# Patient Record
Sex: MALE | Race: WHITE | NOT HISPANIC OR LATINO | Employment: OTHER | URBAN - METROPOLITAN AREA
[De-identification: names, ages, dates, MRNs, and addresses within clinical notes are randomized per-mention and may not be internally consistent; named-entity substitution may affect disease eponyms.]

---

## 2017-02-06 DIAGNOSIS — E11.9 DIABETES MELLITUS WITHOUT COMPLICATION: ICD-10-CM

## 2017-02-06 RX ORDER — INSULIN DETEMIR 100 [IU]/ML
25 INJECTION, SOLUTION SUBCUTANEOUS DAILY
Qty: 45 ML | Refills: 3 | Status: SHIPPED | OUTPATIENT
Start: 2017-02-06 | End: 2018-02-12 | Stop reason: SDUPTHER

## 2017-02-09 ENCOUNTER — NUTRITION (OUTPATIENT)
Dept: DIABETES | Facility: CLINIC | Age: 67
End: 2017-02-09
Payer: MEDICARE

## 2017-02-09 VITALS
BODY MASS INDEX: 28.64 KG/M2 | SYSTOLIC BLOOD PRESSURE: 138 MMHG | DIASTOLIC BLOOD PRESSURE: 82 MMHG | WEIGHT: 204.56 LBS | HEIGHT: 71 IN

## 2017-02-09 DIAGNOSIS — E11.9 TYPE II OR UNSPECIFIED TYPE DIABETES MELLITUS WITHOUT MENTION OF COMPLICATION, NOT STATED AS UNCONTROLLED: Primary | ICD-10-CM

## 2017-02-09 PROCEDURE — 99999 PR PBB SHADOW E&M-EST. PATIENT-LVL III: CPT | Mod: PBBFAC,,, | Performed by: DIETITIAN, REGISTERED

## 2017-02-09 PROCEDURE — G0108 DIAB MANAGE TRN  PER INDIV: HCPCS | Mod: S$GLB,,, | Performed by: DIETITIAN, REGISTERED

## 2017-02-09 NOTE — MR AVS SNAPSHOT
[facility]  [address] [facility] [place] [address]  Phone: [phone]  Fax: [phone]                  Rigoberto Vasquez   2017 9:30 AM   Nutrition    Description:  Male : 1950   Provider:  Genevieve Saldaña RD, CDE   Department:  [facility] - [facility]           Reason for Visit     Diabetes           Diagnoses this Visit        Comments    Type II or unspecified type diabetes mellitus without mention of complication, not stated as uncontrolled    -  Primary            To Do List           Future Appointments        Provider Department Dept Phone    2017 7:35 AM LAB[facility]ATORY, [facility] [phone]    2017 7:40 AM SPECIMEN, [facility] [phone]    2017 8:40 AM MARYURI Doran [facility] [phone]    2017 10:00 AM FREDERICK Epperson MD [facility] [phone]      Goals (5 Years of Data)     None      Follow-Up and Disposition     Return in about 6 months (around 2017), or E11.9 Cornelio; [facility] mid am.      [facility]     [facility] -  Assistance  Registered nurses in the [facility] provide clinical advisement, health education, appointment booking, and other advisory services.  Call for this free service at [phone].             Medications           Message regarding Medications     Verify the changes and/or additions to your medication regime listed below are the same as discussed with your clinician today.  If any of these changes or additions are incorrect, please notify your healthcare provider.             Verify that the below list of medications is an accurate representation of the medications you are currently taking.  If none reported, the list may be blank. If incorrect, please contact your healthcare provider. Carry this list with you in case of emergency.           Current Medications     ACETAMINOPHEN  "(TYLENOL 8 HOUR ORAL) Take by mouth as needed.    azelastine (ASTELIN) 137 mcg (0.1 %) nasal spray 2 sprays (274 mcg total) by Nasal route 2 (two) times daily.    bifidobacterium infantis (ALIGN) 4 mg Cap Take 1 capsule by mouth Daily.    blood sugar diagnostic (ACCU-CHEK JAXON) Strp Inject 1 strip into the skin as directed. Check BG 2-3 times daily. Accuchek jaxon strips    EASY TOUCH 31 gauge x 3/16" Ndle USE AS DIRECTED WITH INSULIN    fenofibrate micronized (LOFIBRA) 200 MG Cap Take 1 capsule (200 mg total) by mouth every evening.    hydrocortisone 2.5 % cream     LEVEMIR FLEXPEN 100 unit/mL (3 mL) InPn pen Inject 25 Units into the skin once daily. Needs pens d/t limited manual dexterity.    liraglutide 0.6 mg/0.1 mL, 18 mg/3 mL, subq PNIJ (VICTOZA 3-ALESHA) 0.6 mg/0.1 mL (18 mg/3 mL) PnIj INJECT 1.8 MG DAILY    losartan (COZAAR) 50 MG tablet Take 1 tablet (50 mg total) by mouth once daily.    lovastatin (MEVACOR) 40 MG tablet Take 1 tablet by mouth Every evening.    metformin (GLUCOPHAGE) 1000 MG tablet Take 1 tablet (1,000 mg total) by mouth once daily. Generic OK    multivitamin capsule Take 1 capsule by mouth once daily.    pantoprazole (PROTONIX) 40 MG tablet Take 1 tablet (40 mg total) by mouth before breakfast.    pen needle, diabetic (SURE COMFORT PEN NEEDLE) 31 gauge x 3/16" Ndle USE AS DIRECTED WITH INSULIN    pioglitazone (ACTOS) 30 MG tablet Take 1 tablet (30 mg total) by mouth once daily.    lidocaine (XYLOCAINE) 5 % Oint ointment Apply 1 to 2 grams to affected area 3 to 4 times daily    lidocaine-prilocaine (EMLA) cream            Clinical Reference Information           Your Vitals Were     BP Height Weight BMI       138/82 (BP Location: Right arm, Patient Position: Sitting, BP Method: Manual) 5' 11" (1.803 m) 92.8 kg (204 lb 9.4 oz) 28.53 kg/m2       Allergies as of 2/9/2017     Aspirin    Meperidine    Niacin      Immunizations Administered on Date of Encounter - 2/9/2017     None      Language " Assistance Services     ATTENTION: Language assistance services are available, free of charge. Please call [phone].      ATENCIÓN: Si habla talya, tiene a neal disposición servicios gratuitos de asistencia lingüística. Llame al [phone].     CHÚ Ý: N?u b?n nói Ti?ng Vi?t, có các d?ch v? h? tr? ngôn ng? mi?n phí dành cho b?n. G?i s? [phone].         [facility] complies with applicable Federal civil rights laws and does not discriminate on the basis of race, color, national origin, age, disability, or sex.

## 2017-02-09 NOTE — PROGRESS NOTES
"PCP:  Jesse Grimes MD  REFERRING PROVIDER:  Jesse Grimes MD    HISTORY OF PRESENT ILLNESS:  66 y.o. male patient is in clinic today for fu diabetes.  Diabetes medications:   Actos 30 mg in AM  Victoza 1.8 mg SQ AM  Metformin 1000 mg in PM  Levemir 16 units twice daily    Hemoglobin A1C   Date Value Ref Range Status   12/19/2016 6.5 (H) 4.5 - 6.2 % Final     Comment:     According to ADA guidelines, hemoglobin A1C <7.0% represents  optimal control in non-pregnant diabetic patients.  Different  metrics may apply to specific populations.   Standards of Medical Care in Diabetes - 2016.  For the purpose of screening for the presence of diabetes:  <5.7%     Consistent with the absence of diabetes  5.7-6.4%  Consistent with increasing risk for diabetes   (prediabetes)  >or=6.5%  Consistent with diabetes  Currently no consensus exists for use of hemoglobin A1C  for diagnosis of diabetes for children.     , ADA recommends less than 7.0.      Per recall, fasting BGs are , 117; ac . Patient denies polyuria, polydipsia, polyphagia, blurred vision, nausea, vomiting, diarrhea, and hypoglycemia.     VITAL SIGNS:  Height: 5' 11" (180.3 cm)   Weight: 92.8 kg (204 lb 9.4 oz)   IBW: 172 lbs +/-10%      ALLERGIES & MEDICATIONS: Reviewed and Reconciled  MEDICAL/SURGICAL & FAMILY HISTORY: Reviewed and Reconciled    LABORATORY: Reviewed Diabetes Management Flowsheet and Results Review.    HEALTH MAINTENANCE: Reviewed and Reconciled  Eye exam: 12/12  Dental exam: Recommend regular exams; denies gums bleeding.  Podiatry exam: 8/16    SELF-MONITORING: Glucometer - accucheck; Food - none are avail    ACTIVITY LEVEL: core exercises from PT - plans to start bike or treadmill at home    NUTRITION INTAKE: Meal patterns include 3 meals, 1-2 snacks daily with intake 5852-5778 cals/d. Patient is limiting carbohydrates, saturated fat or sodium w/ occas excess from dining out. Adequate intakes of fruits, vegetables. Inadequate " whole grains.  B - cereal (cheerios or raisin bran), 2% milk   S - occs fruit   L - BK hamburger, French fries OR chix, rice, carrots, g. beans - decaf tea (splenda)   S - fruit  D - salad - lettuce, carrot, cucumber, ham/cheese, french OR ham sandwich (white) - water, sf flav  S - handful nuts OR few vanilla wafers OR canned fruit     PSYCHOSOCIAL: Stage of change - action; Barriers to change - none    EDUCATIONAL ASSESSMENT:  Patient is able to verbalize and/or demonstrate appropriate self care management skills:  Monitoring  Taking medications  Problem solving  Healthy coping  Reducing risks  Patient needs improvement in self care management skills:    Healthy Eating   Being Active     MNT ASSESSMENT:  7177-9124 calories, 6 ounces protein daily  45 grams carb/meal, 15-30 grams carb/snack  increase fruit 2 serv/d, vegetables 2+ cups/d, whole grains 3+serv/d, lowfat dairy 3+serv/d  low-fat, low-sodium  150 min physical activity per week, moderate intensity, as tolerated      PLAN:  · Reviewed pathophysiology diabetes, complications and importance of annual dilated eye exams, regular dental exams, and daily foot self-exams.   · Encouraged daily self-monitoring of glucose, food and activity patterns, return records to clinic.      Reviewed glucose goals. Discussed prevention, identification and treatment of hyperglycemia and hypoglycemia and when to contact clinic. Instructed to test glucose once daily - fasting, 2 hr pp rotating times as needed.  · Reviewed action of diabetes medications and to continue taking as prescribed.    Provided meal-planning instruction via foodlists, plate method, food models, food labels and/or ADA booklet. Reviewed micro/macronutrient effect on health management. Discussed carbohydrate counting and spacing techniques with emphasis on cardiovascular wellness, health strategies, functional foods. Reviewed principles of energy metabolism to assist weight and health management. Pt agrees  to increase whole grains for insulin sensitivity improvement and weight loss. He will report to clinic if BG levels start dropping below target.    Discussed importance of daily physical activity with review of benefits, methods, guidelines and precautions. Pt agrees to start cardio (bike, treadmill) 30min 5d/wk. He will report to clinic if BG levels start dropping below target.     Reviewed ADA goals, progress.   GOALS: Self monitoring: daily food & activity journal. Meal plan-90% accuracy, Physical activity-150 minutes per week.   Visit Time Spent:  30 minutes    Thank you for the opportunity to work with your patient.

## 2017-02-21 ENCOUNTER — OFFICE VISIT (OUTPATIENT)
Dept: GASTROENTEROLOGY | Facility: CLINIC | Age: 67
End: 2017-02-21
Payer: MEDICARE

## 2017-02-21 VITALS
SYSTOLIC BLOOD PRESSURE: 128 MMHG | HEIGHT: 71 IN | BODY MASS INDEX: 28.39 KG/M2 | HEART RATE: 80 BPM | DIASTOLIC BLOOD PRESSURE: 78 MMHG | WEIGHT: 202.81 LBS

## 2017-02-21 DIAGNOSIS — R11.0 NAUSEA: ICD-10-CM

## 2017-02-21 DIAGNOSIS — K29.50 CHRONIC GASTRITIS WITHOUT BLEEDING, UNSPECIFIED GASTRITIS TYPE: Primary | ICD-10-CM

## 2017-02-21 DIAGNOSIS — K58.9 COLON SPASM: ICD-10-CM

## 2017-02-21 PROCEDURE — 99999 PR PBB SHADOW E&M-EST. PATIENT-LVL III: CPT | Mod: PBBFAC,,, | Performed by: NURSE PRACTITIONER

## 2017-02-21 PROCEDURE — 3078F DIAST BP <80 MM HG: CPT | Mod: S$GLB,,, | Performed by: NURSE PRACTITIONER

## 2017-02-21 PROCEDURE — 1160F RVW MEDS BY RX/DR IN RCRD: CPT | Mod: S$GLB,,, | Performed by: NURSE PRACTITIONER

## 2017-02-21 PROCEDURE — 99214 OFFICE O/P EST MOD 30 MIN: CPT | Mod: S$GLB,,, | Performed by: NURSE PRACTITIONER

## 2017-02-21 PROCEDURE — 3074F SYST BP LT 130 MM HG: CPT | Mod: S$GLB,,, | Performed by: NURSE PRACTITIONER

## 2017-02-21 RX ORDER — DICYCLOMINE HYDROCHLORIDE 20 MG/1
20 TABLET ORAL EVERY 6 HOURS
Qty: 120 TABLET | Refills: 0 | Status: SHIPPED | OUTPATIENT
Start: 2017-02-21 | End: 2017-03-23

## 2017-02-21 NOTE — MR AVS SNAPSHOT
[facility]  [address] [place] [address]  Phone: [phone]  Fax: [phone]                  Rigoberto Vasquez   2017 9:40 AM   Office Visit    Description:  Male : 1950   Provider:  MARYURI Kim   Department:  [facility]           Reason for Visit     Abdominal Pain     Nausea           Diagnoses this Visit        Comments    Colon spasm    -  Primary     Nausea                To Do List           Future Appointments        Provider Department Dept Phone    [facility] [phone]    2017 7:35 AM [facility]AT[facility], [facility] [phone]    2017 7:40 AM SPECIMEN, [facility] [phone]    2017 8:40 AM Leticia Toscano [facility] Internal [facility] [phone]    2017 10:00 AM FREDERICK Epperson MD [facility] Op[facility] [phone]      Goals (5 Years of Data)     None      Follow-Up and Disposition     Return in about 6 weeks (around 2017).       These Medications        Disp Refills Start End    dicyclomine (BENTYL) 20 mg tablet 120 tablet 0 2017 3/23/2017    Take 1 tablet (20 mg total) by mouth every 6 (six) hours. - Oral    Pharmacy: [facility] [address] Ph #: [phone]         [facility]     [facility] -  Assistance  Registered nurses in the [facility] provide clinical advisement, health education, appointment booking, and other advisory services.  Call for this free service at [phone].             Medications           Message regarding Medications     Verify the changes and/or additions to your medication regime listed below are the same as discussed with your clinician today.  If any of these changes or additions are incorrect, please notify your healthcare provider.        START taking these NEW medications        Refills     "dicyclomine (BENTYL) 20 mg tablet 0    Sig: Take 1 tablet (20 mg total) by mouth every 6 (six) hours.    Class: Normal    Route: Oral           Verify that the below list of medications is an accurate representation of the medications you are currently taking.  If none reported, the list may be blank. If incorrect, please contact your healthcare provider. Carry this list with you in case of emergency.           Current Medications     ACETAMINOPHEN (TYLENOL 8 HOUR ORAL) Take by mouth as needed.    azelastine (ASTELIN) 137 mcg (0.1 %) nasal spray 2 sprays (274 mcg total) by Nasal route 2 (two) times daily.    bifidobacterium infantis (ALIGN) 4 mg Cap Take 1 capsule by mouth Daily.    blood sugar diagnostic (ACCU-CHEK JAXON) Strp Inject 1 strip into the skin as directed. Check BG 2-3 times daily. Accuchek jaxon strips    EASY TOUCH 31 gauge x 3/16" Ndle USE AS DIRECTED WITH INSULIN    fenofibrate micronized (LOFIBRA) 200 MG Cap Take 1 capsule (200 mg total) by mouth every evening.    hydrocortisone 2.5 % cream     LEVEMIR FLEXPEN 100 unit/mL (3 mL) InPn pen Inject 25 Units into the skin once daily. Needs pens d/t limited manual dexterity.    lidocaine (XYLOCAINE) 5 % Oint ointment Apply 1 to 2 grams to affected area 3 to 4 times daily    lidocaine-prilocaine (EMLA) cream     liraglutide 0.6 mg/0.1 mL, 18 mg/3 mL, subq PNIJ (VICTOZA 3-ALESHA) 0.6 mg/0.1 mL (18 mg/3 mL) PnIj INJECT 1.8 MG DAILY    losartan (COZAAR) 50 MG tablet Take 1 tablet (50 mg total) by mouth once daily.    lovastatin (MEVACOR) 40 MG tablet Take 1 tablet by mouth Every evening.    metformin (GLUCOPHAGE) 1000 MG tablet Take 1 tablet (1,000 mg total) by mouth once daily. Generic OK    multivitamin capsule Take 1 capsule by mouth once daily.    pantoprazole (PROTONIX) 40 MG tablet Take 1 tablet (40 mg total) by mouth before breakfast.    pen needle, diabetic (SURE COMFORT PEN NEEDLE) 31 gauge x 3/16" Ndle USE AS DIRECTED WITH INSULIN    pioglitazone " "(ACTOS) 30 MG tablet Take 1 tablet (30 mg total) by mouth once daily.    dicyclomine (BENTYL) 20 mg tablet Take 1 tablet (20 mg total) by mouth every 6 (six) hours.           Clinical Reference Information           Your Vitals Were     BP Pulse Height Weight BMI    128/78 80 5' 11" (1.803 m) 92 kg (202 lb 13.2 oz) 28.29 kg/m2      Blood Pressure          Most Recent Value    BP  128/78      Allergies as of 2/21/2017     Aspirin    Meperidine    Niacin      Immunizations Administered on Date of Encounter - 2/21/2017     None      Language Assistance Services     ATTENTION: Language assistance services are available, free of charge. Please call [phone].      ATENCIÓN: Si habla talya, tiene a neal disposición servicios gratuitos de asistencia lingüística. Llame al [phone].     KIRSTIE Ý: N?u b?n nói Ti?ng Vi?t, có các d?ch v? h? tr? ngôn ng? mi?n phí dành cho b?n. G?i s? [phone].         [facility] complies with applicable Federal civil rights laws and does not discriminate on the basis of race, color, national origin, age, disability, or sex.        "

## 2017-02-21 NOTE — PROGRESS NOTES
Clinic Follow Up:  [facility] Follow Up Note    Reason for Follow Up:  The primary encounter diagnosis was Chronic gastritis without bleeding, unspecified gastritis type. Diagnoses of Nausea and Colon spasm were also pertinent to this visit.    PCP: Jesse Grimes       HPI:  This is a 66 y.o. male here for follow up of the above  He reports that he has had chronic abdominal complaints.  He states that recently they have progressively worsened. He states that he has had worsening nausea and dyspepsia.  He has been taking PPI daily, but continues with symptoms.  He reports that his symptoms are worse after meals.  In addition he has began having left sided abdominal pain that starts near the epigastric region and radiates downward.  He describes pain as a cramping sensation that occurs about 2-3 hours after meals. He denies any known exacerbating or reliving factors for any of the complaints.   He denies any weight loss.  No melena or hematochezia.       Review of Systems   Constitutional: Negative for chills, fever, malaise/fatigue and weight loss.   Respiratory: Negative for cough.    Cardiovascular: Negative for chest pain.   Gastrointestinal:        Per HPI   Musculoskeletal: Negative for myalgias.   Skin: Negative for itching and rash.   Neurological: Negative for headaches.   Psychiatric/Behavioral: The patient is not nervous/anxious.        Medical History:  Past Medical History   Diagnosis Date    Acid reflux      gi chintanshenry    Angina pectoris     Back pain     BPH (benign prostatic hyperplasia)      blue    Degenerative joint disease     Diverticulosis     Erectile dysfunction     History of elevated PSA 2/14     normalized, dr dave annually    History of pericarditis     Hypercholesteremia     Hypertension     Iron deficiency anemia     Pacemaker      complete av block;NO afib    Squamous cell cancer of skin of mastoid region of scalp      dr jaida salgado    Type 2  "diabetes mellitus      dr wyman    Urinary incontinence     when he was 7 Yrs old.        Surgical History:   Past Surgical History   Procedure Laterality Date    Shoulder arthroscopy Right     Nasal sinus surgery      Cardiac pacemaker placement      Tonsillectomy      Transurethral resection of prostate      Eye surgery      Knee cartilage surgery Bilateral     Tympanoplasty      Vesectomy         Family History:   Family History   Problem Relation Age of Onset    Arthritis Mother     Hypertension Mother     Heart disease Father     Hypertension Father     Stroke Father     Diabetes Son     Diabetes Maternal Grandmother        Social History:   Social History   Substance Use Topics    Smoking status: Never Smoker    Smokeless tobacco: Never Used    Alcohol use Yes      Comment: " once a month"       Allergies: Reviewed    Home Medications:  Current Outpatient Prescriptions on File Prior to Visit   Medication Sig Dispense Refill    ACETAMINOPHEN (TYLENOL 8 HOUR ORAL) Take by mouth as needed.      azelastine (ASTELIN) 137 mcg (0.1 %) nasal spray 2 sprays (274 mcg total) by Nasal route 2 (two) times daily. 30 mL 12    bifidobacterium infantis (ALIGN) 4 mg Cap Take 1 capsule by mouth Daily.      blood sugar diagnostic (ACCU-CHEK JAXON) Strp Inject 1 strip into the skin as directed. Check BG 2-3 times daily. Accuchek jaxon strips 300 strip 3    EASY TOUCH 31 gauge x 3/16" Ndle USE AS DIRECTED WITH INSULIN 100 each 3    fenofibrate micronized (LOFIBRA) 200 MG Cap Take 1 capsule (200 mg total) by mouth every evening. 90 capsule 3    hydrocortisone 2.5 % cream       LEVEMIR FLEXPEN 100 unit/mL (3 mL) InPn pen Inject 25 Units into the skin once daily. Needs pens d/t limited manual dexterity. (Patient taking differently: Inject 16 Units into the skin 2 (two) times daily. Needs pens d/t limited manual dexterity.) 45 mL 3    lidocaine (XYLOCAINE) 5 % Oint ointment Apply 1 to 2 grams to affected " "area 3 to 4 times daily  5    lidocaine-prilocaine (EMLA) cream       liraglutide 0.6 mg/0.1 mL, 18 mg/3 mL, subq PNIJ (VICTOZA 3-ALESHA) 0.6 mg/0.1 mL (18 mg/3 mL) PnIj INJECT 1.8 MG DAILY 27 mL 1    losartan (COZAAR) 50 MG tablet Take 1 tablet (50 mg total) by mouth once daily. 90 tablet 1    lovastatin (MEVACOR) 40 MG tablet Take 1 tablet by mouth Every evening. 90 tablet 3    metformin (GLUCOPHAGE) 1000 MG tablet Take 1 tablet (1,000 mg total) by mouth once daily. Generic  tablet 3    multivitamin capsule Take 1 capsule by mouth once daily.      pantoprazole (PROTONIX) 40 MG tablet Take 1 tablet (40 mg total) by mouth before breakfast. 90 tablet 3    pen needle, diabetic (SURE COMFORT PEN NEEDLE) 31 gauge x 3/16" Ndle USE AS DIRECTED WITH INSULIN 100 each 4    pioglitazone (ACTOS) 30 MG tablet Take 1 tablet (30 mg total) by mouth once daily. 90 tablet 1     No current facility-administered medications on file prior to visit.        Physical Exam:  Vital Signs:  Visit Vitals    /78    Pulse 80    Ht 5' 11" (1.803 m)    Wt 92 kg (202 lb 13.2 oz)    BMI 28.29 kg/m2     Body mass index is 28.29 kg/(m^2).  Physical Exam   Constitutional: He is oriented to person, place, and time. He appears well-developed.   HENT:   Head: Normocephalic.   Neck: Normal range of motion.   Cardiovascular: Normal rate and regular rhythm.    Pulmonary/Chest: Effort normal and breath sounds normal.   Abdominal: Soft. Bowel sounds are normal. He exhibits no distension. There is no tenderness.   Musculoskeletal: Normal range of motion.   Neurological: He is alert and oriented to person, place, and time.   Skin: Skin is warm and dry.   Psychiatric: He has a normal mood and affect.   Vitals reviewed.      Labs: Pertinent labs reviewed.      Assessment:  1. Chronic gastritis without bleeding, unspecified gastritis type    2. Nausea    3. Colon spasm        Recommendations:  Chronic gastritis without bleeding, unspecified " gastritis type  Nausea  - Worsening gastritis.    - Will increase PPI to BID for a short time.  If no improvement, will need repeat EGD for investigation.   - Can add zantac 150mg BID PRN for continued breakthrough     Colon spasm  -     dicyclomine (BENTYL) 20 mg tablet; Take 1 tablet (20 mg total) by mouth every 6 (six) hours.  Dispense: 120 tablet; Refill: 0        Return to Clinic:    Return in about 6 weeks (around 4/4/2017).

## 2017-03-31 RX ORDER — PIOGLITAZONEHYDROCHLORIDE 30 MG/1
30 TABLET ORAL DAILY
Qty: 90 TABLET | Refills: 1 | Status: SHIPPED | OUTPATIENT
Start: 2017-03-31 | End: 2017-10-16 | Stop reason: SDUPTHER

## 2017-03-31 RX ORDER — LOSARTAN POTASSIUM 50 MG/1
50 TABLET ORAL DAILY
Qty: 90 TABLET | Refills: 1 | Status: SHIPPED | OUTPATIENT
Start: 2017-03-31 | End: 2017-10-16 | Stop reason: SDUPTHER

## 2017-04-10 ENCOUNTER — TELEPHONE (OUTPATIENT)
Dept: INTERNAL MEDICINE | Facility: CLINIC | Age: 67
End: 2017-04-10

## 2017-04-10 NOTE — TELEPHONE ENCOUNTER
----- Message from Johana Villalta sent at 4/10/2017 11:10 AM CDT -----  Contact: [facility]/Dr Curt Newman Office  Caller request call from nurse to get pt a pre op apt for total left knee replacement before 05-15-17, I offered next avail but it would be after the date of service and caller declined another provider for pt, please contact caller at [phone] press 0 and over head page

## 2017-04-13 ENCOUNTER — OFFICE VISIT (OUTPATIENT)
Dept: OTOLARYNGOLOGY | Facility: CLINIC | Age: 67
End: 2017-04-13
Payer: MEDICARE

## 2017-04-13 VITALS
BODY MASS INDEX: 28.23 KG/M2 | SYSTOLIC BLOOD PRESSURE: 139 MMHG | HEART RATE: 85 BPM | WEIGHT: 202.38 LBS | DIASTOLIC BLOOD PRESSURE: 87 MMHG

## 2017-04-13 DIAGNOSIS — J01.90 ACUTE SINUSITIS, RECURRENCE NOT SPECIFIED, UNSPECIFIED LOCATION: Primary | ICD-10-CM

## 2017-04-13 PROCEDURE — 3075F SYST BP GE 130 - 139MM HG: CPT | Mod: S$GLB,,, | Performed by: PHYSICIAN ASSISTANT

## 2017-04-13 PROCEDURE — 99214 OFFICE O/P EST MOD 30 MIN: CPT | Mod: S$GLB,,, | Performed by: PHYSICIAN ASSISTANT

## 2017-04-13 PROCEDURE — 1160F RVW MEDS BY RX/DR IN RCRD: CPT | Mod: S$GLB,,, | Performed by: PHYSICIAN ASSISTANT

## 2017-04-13 PROCEDURE — 3079F DIAST BP 80-89 MM HG: CPT | Mod: S$GLB,,, | Performed by: PHYSICIAN ASSISTANT

## 2017-04-13 PROCEDURE — 99999 PR PBB SHADOW E&M-EST. PATIENT-LVL III: CPT | Mod: PBBFAC,,, | Performed by: PHYSICIAN ASSISTANT

## 2017-04-13 RX ORDER — LOVASTATIN 40 MG/1
TABLET ORAL
Qty: 90 TABLET | Refills: 3 | Status: SHIPPED | OUTPATIENT
Start: 2017-04-13 | End: 2018-05-04 | Stop reason: SDUPTHER

## 2017-04-13 RX ORDER — AMOXICILLIN AND CLAVULANATE POTASSIUM 875; 125 MG/1; MG/1
1 TABLET, FILM COATED ORAL 2 TIMES DAILY
Qty: 20 TABLET | Refills: 0 | Status: SHIPPED | OUTPATIENT
Start: 2017-04-13 | End: 2017-04-23

## 2017-04-13 RX ORDER — METHYLPREDNISOLONE 4 MG/1
TABLET ORAL
Qty: 1 PACKAGE | Refills: 0 | Status: SHIPPED | OUTPATIENT
Start: 2017-04-13 | End: 2017-05-04 | Stop reason: ALTCHOICE

## 2017-04-13 NOTE — PROGRESS NOTES
Subjective:   Patient: Rigoberto Vasquez [id number], :1950   Visit date:2017 4:40 PM    Chief Complaint:  Sinus Problem    HPI:  Rigoberto is a 66 y.o. male who is here for follow-up. He was last here in 10/2016 with Dr. Lopez.  He reports URI symptoms started about 1 month ago.  Now worsening with facial pain and pressure, postnasal drainage and rhinorrhea (thick, yellow-green).  Denies fever.  Denies cough.  He's been using Sudafed, Astelin and nasal saline irrigation BID with little improvement.  Denies ear pain or drainage but has ear fullness bilateral.  He has history of 2 prior sinus surgeries; last one 20 years ago.    Surgery date: 3/26/15  Procedure:   1. left tympanoplasty with CWU mastoidectomy with ossicular reconstruction  2. cartilage graft (palisading technique)  3. Tympanostomy tube placement (T tube)  4. Placement of silastic spacer in middle ear     Findings at surgery:   External canal- Normal  Tympanic membrane- Severe retraction with complete myringopexy. Non adherent to the promontory but severe adherence onto ossicles and stapes suprastructure  Middle Ear Mucosa- Normal  Ossicles- Abnormal - erosion of IS joint with retracted TM attached at this point  Mastoid cavity- Normal  Other- None         Review of Systems:  -     Allergic/Immunologic: is allergic to aspirin; meperidine; and niacin..  -     Constitutional: Current temp:      His meds, allergies, medical, surgical, social & family histories were reviewed & updated:  -     He has a current medication list which includes the following prescription(s): acetaminophen, azelastine, bifidobacterium infantis, blood sugar diagnostic, easy touch, fenofibrate micronized, hydrocortisone, levemir flexpen, lidocaine, lidocaine-prilocaine, liraglutide 0.6 mg/0.1 ml (18 mg/3 ml) subq pnij, losartan, lovastatin, metformin, multivitamin, pantoprazole, pen needle, diabetic, pioglitazone, amoxicillin-clavulanate 875-125mg, and methylprednisolone.  -     He   has a past medical history of Acid reflux; Angina pectoris; Back pain; BPH (benign prostatic hyperplasia); Degenerative joint disease; Diverticulosis; Erectile dysfunction; History of elevated PSA (2/14); History of pericarditis; Hypercholesteremia; Hypertension; Iron deficiency anemia; Pacemaker; Squamous cell cancer of skin of mastoid region of scalp; Type 2 diabetes mellitus; Urinary incontinence; and when he was 7 Yrs old..   -     He  does not have any pertinent problems on file.   -     He  has a past surgical history that includes Shoulder arthroscopy (Right); Nasal sinus surgery; Cardiac pacemaker placement; Tonsillectomy; Transurethral resection of prostate; Eye surgery; Knee cartilage surgery (Bilateral); Tympanoplasty; and vesectomy.  -     He  reports that he has never smoked. He has never used smokeless tobacco. He reports that he drinks alcohol. He reports that he does not use illicit drugs.  -     His family history includes Arthritis in his mother; Diabetes in his maternal grandmother and son; Heart disease in his father; Hypertension in his father and mother; Stroke in his father.  -     He is allergic to aspirin; meperidine; and niacin.    Objective:     Physical Exam:  Vitals:  /87  Pulse 85  Wt 91.8 kg (202 lb 6.1 oz)  BMI 28.23 kg/m2  Appearance:  Well-developed, well-nourished.  Communication:  Able to communicate, no hoarseness.  Head & Face:  Normocephalic, atraumatic, tender over bilateral maxillary sinuses; normal facial strength.  Eyes:  Extraocular motions intact.  Ears:  Otoscopy of right external auditory canals and tympanic membrane was normal.  Left TM with T tube in place, no drainage noted.   Clinical speech reception thresholds grossly intact, no mass/lesion of auricle.  Nose:  No masses/lesions of external nose, nasal mucosa normal.  Mouth:  No mass/lesion of lips, teeth, gums, hard/soft palate, tongue, tonsils, or oropharynx.  Neck & Lymphatics:  No cervical  lymphadenopathy, no neck mass/crepitus/ asymmetry, trachea is midline, no thyroid enlargement/tenderness/mass.  Neuro/Psych: Alert with normal mood and affect.   Abdominal: Normal appearance.   Respiration/Chest:  Symmetric expansion during respiration, normal respiratory effort.  Skin:  Warm and intact  Cardiovascular:  No peripheral vascular edema or varicosities.    Assessment & Plan:   Rigoberto was seen today for sinus problem.    Diagnoses and all orders for this visit:    Acute sinusitis, recurrence not specified, unspecified location    Other orders  -     amoxicillin-clavulanate 875-125mg (AUGMENTIN) 875-125 mg per tablet; Take 1 tablet by mouth 2 (two) times daily.  -     methylPREDNISolone (MEDROL DOSEPACK) 4 mg tablet; use as directed    Medications as above.  He is diabetic and we discussed risk of hyperglycemia with steroid use.  He wishes to proceed with steroids and will call PCP with any sugars > 300.  Continue saline nasal irrigations BID and Astelin.  Instructed him to call if no improvement or symptoms worsen.

## 2017-05-04 ENCOUNTER — OFFICE VISIT (OUTPATIENT)
Dept: INTERNAL MEDICINE | Facility: CLINIC | Age: 67
End: 2017-05-04
Payer: COMMERCIAL

## 2017-05-04 VITALS
OXYGEN SATURATION: 95 % | RESPIRATION RATE: 16 BRPM | HEIGHT: 71 IN | DIASTOLIC BLOOD PRESSURE: 77 MMHG | WEIGHT: 204.38 LBS | SYSTOLIC BLOOD PRESSURE: 127 MMHG | BODY MASS INDEX: 28.61 KG/M2 | TEMPERATURE: 98 F | HEART RATE: 74 BPM

## 2017-05-04 DIAGNOSIS — Z01.818 PREOP EXAMINATION: Primary | ICD-10-CM

## 2017-05-04 DIAGNOSIS — E11.59 HYPERTENSION ASSOCIATED WITH DIABETES: Chronic | ICD-10-CM

## 2017-05-04 DIAGNOSIS — M17.0 OSTEOARTHRITIS OF BOTH KNEES, UNSPECIFIED OSTEOARTHRITIS TYPE: ICD-10-CM

## 2017-05-04 DIAGNOSIS — E11.9 TYPE 2 DIABETES MELLITUS WITHOUT COMPLICATION, WITH LONG-TERM CURRENT USE OF INSULIN: Chronic | ICD-10-CM

## 2017-05-04 DIAGNOSIS — Z79.4 TYPE 2 DIABETES MELLITUS WITHOUT COMPLICATION, WITH LONG-TERM CURRENT USE OF INSULIN: Chronic | ICD-10-CM

## 2017-05-04 DIAGNOSIS — Z95.0 PACEMAKER: Chronic | ICD-10-CM

## 2017-05-04 DIAGNOSIS — E78.5 HYPERLIPIDEMIA ASSOCIATED WITH TYPE 2 DIABETES MELLITUS: Chronic | ICD-10-CM

## 2017-05-04 DIAGNOSIS — I15.2 HYPERTENSION ASSOCIATED WITH DIABETES: Chronic | ICD-10-CM

## 2017-05-04 DIAGNOSIS — E11.69 HYPERLIPIDEMIA ASSOCIATED WITH TYPE 2 DIABETES MELLITUS: Chronic | ICD-10-CM

## 2017-05-04 PROCEDURE — 99999 PR PBB SHADOW E&M-EST. PATIENT-LVL III: CPT | Mod: PBBFAC,,, | Performed by: FAMILY MEDICINE

## 2017-05-04 PROCEDURE — 3074F SYST BP LT 130 MM HG: CPT | Mod: S$GLB,,, | Performed by: FAMILY MEDICINE

## 2017-05-04 PROCEDURE — 3078F DIAST BP <80 MM HG: CPT | Mod: S$GLB,,, | Performed by: FAMILY MEDICINE

## 2017-05-04 PROCEDURE — 99499 UNLISTED E&M SERVICE: CPT | Mod: S$GLB,,, | Performed by: FAMILY MEDICINE

## 2017-05-04 PROCEDURE — 1160F RVW MEDS BY RX/DR IN RCRD: CPT | Mod: S$GLB,,, | Performed by: FAMILY MEDICINE

## 2017-05-04 PROCEDURE — 3044F HG A1C LEVEL LT 7.0%: CPT | Mod: S$GLB,,, | Performed by: FAMILY MEDICINE

## 2017-05-04 PROCEDURE — 99214 OFFICE O/P EST MOD 30 MIN: CPT | Mod: S$GLB,,, | Performed by: FAMILY MEDICINE

## 2017-05-04 PROCEDURE — 4010F ACE/ARB THERAPY RXD/TAKEN: CPT | Mod: S$GLB,,, | Performed by: FAMILY MEDICINE

## 2017-05-04 NOTE — PROGRESS NOTES
"Subjective:       Patient ID: Rigboerto Vasquez is a 66 y.o. male.    Chief Complaint: Pre-op Exam    HPI  Here today for preop evaluation.  He will be having a left total knee replacement with Dr. Newman at [facility].  He has significant past medical history for diabetes, hypertension and presence of a pacemaker.  He recently had a new pacemaker within the last year and has been evaluated by his cardiologist yesterday and was cleared from cardiology standpoint. [facility].  He has no concerns at this time and has had several surgeries in the past without ever having any complications from procedures.  He did have an issue with taking Demerol and having nausea.  He plans to have his left knee done at this time since it is the most symptomatic and perhaps in 6 months he will have the right knee replaced also after a course of physical therapy.      Family History   Problem Relation Age of Onset    Arthritis Mother     Hypertension Mother     Heart disease Father     Hypertension Father     Stroke Father     Diabetes Son     Diabetes Maternal Grandmother        Current Outpatient Prescriptions:     ACETAMINOPHEN (TYLENOL 8 HOUR ORAL), Take by mouth as needed., Disp: , Rfl:     azelastine (ASTELIN) 137 mcg (0.1 %) nasal spray, 2 sprays (274 mcg total) by Nasal route 2 (two) times daily., Disp: 30 mL, Rfl: 12    bifidobacterium infantis (ALIGN) 4 mg Cap, Take 1 capsule by mouth Daily., Disp: , Rfl:     blood sugar diagnostic (ACCU-CHEK JAXON) Strp, Inject 1 strip into the skin as directed. Check BG 2-3 times daily. Accuchek jaxon strips, Disp: 300 strip, Rfl: 3    EASY TOUCH 31 gauge x 3/16" Ndle, USE AS DIRECTED WITH INSULIN, Disp: 100 each, Rfl: 3    fenofibrate micronized (LOFIBRA) 200 MG Cap, Take 1 capsule (200 mg total) by mouth every evening., Disp: 90 capsule, Rfl: 3    LEVEMIR FLEXPEN 100 unit/mL (3 mL) InPn pen, Inject 25 Units into the skin once daily. Needs pens d/t " "limited manual dexterity. (Patient taking differently: Inject 16 Units into the skin 2 (two) times daily. Needs pens d/t limited manual dexterity.), Disp: 45 mL, Rfl: 3    lidocaine-prilocaine (EMLA) cream, , Disp: , Rfl:     liraglutide 0.6 mg/0.1 mL, 18 mg/3 mL, subq PNIJ (VICTOZA 3-ALESHA) 0.6 mg/0.1 mL (18 mg/3 mL) PnIj, INJECT 1.8 MG DAILY, Disp: 27 mL, Rfl: 1    losartan (COZAAR) 50 MG tablet, Take 1 tablet (50 mg total) by mouth once daily., Disp: 90 tablet, Rfl: 1    lovastatin (MEVACOR) 40 MG tablet, Take 1 tablet by mouth Every evening., Disp: 90 tablet, Rfl: 3    metformin (GLUCOPHAGE) 1000 MG tablet, Take 1 tablet (1,000 mg total) by mouth once daily. Generic OK, Disp: 180 tablet, Rfl: 3    pantoprazole (PROTONIX) 40 MG tablet, Take 1 tablet (40 mg total) by mouth before breakfast., Disp: 90 tablet, Rfl: 3    pen needle, diabetic (SURE COMFORT PEN NEEDLE) 31 gauge x 3/16" Ndle, USE AS DIRECTED WITH INSULIN, Disp: 100 each, Rfl: 4    pioglitazone (ACTOS) 30 MG tablet, Take 1 tablet (30 mg total) by mouth once daily., Disp: 90 tablet, Rfl: 1    hydrocortisone 2.5 % cream, , Disp: , Rfl:     lidocaine (XYLOCAINE) 5 % Oint ointment, Apply 1 to 2 grams to affected area 3 to 4 times daily, Disp: , Rfl: 5    multivitamin capsule, Take 1 capsule by mouth once daily., Disp: , Rfl:     Review of Systems   Constitutional: Negative for chills, fever and unexpected weight change.   HENT: Negative for congestion, ear pain, sore throat and voice change.    Eyes: Negative for pain and visual disturbance.   Respiratory: Negative for cough, shortness of breath and wheezing.    Cardiovascular: Negative for chest pain.   Gastrointestinal: Negative for abdominal pain, nausea and vomiting.   Genitourinary: Negative for difficulty urinating.   Musculoskeletal: Negative for back pain.   Skin: Negative for rash.   Neurological: Negative for dizziness and speech difficulty.   Hematological: Does not bruise/bleed easily. " "  Psychiatric/Behavioral: Negative for sleep disturbance and suicidal ideas. The patient is not nervous/anxious.        Objective:   /77 (BP Location: Left arm, Patient Position: Sitting, BP Method: Automatic)  Pulse 74  Temp 98 °F (36.7 °C) (Tympanic)   Resp 16  Ht 5' 11" (1.803 m)  Wt 92.7 kg (204 lb 5.9 oz)  SpO2 95%  BMI 28.5 kg/m2     Physical Exam   Constitutional: He is oriented to person, place, and time. He appears well-developed and well-nourished.   HENT:   Head: Normocephalic and atraumatic.   Eyes: Conjunctivae are normal.   Cardiovascular: Normal rate, regular rhythm and normal heart sounds.    Pulmonary/Chest: Effort normal. No respiratory distress.   Musculoskeletal: He exhibits no edema.   Neurological: He is alert and oriented to person, place, and time. Coordination normal.   Skin: Skin is warm and dry. No rash noted.   Psychiatric: He has a normal mood and affect. His behavior is normal.   Vitals reviewed.      Assessment & Plan     1. Preop examination  Assuming all of his blood work done at surgery clinic was normal, he is low risk for planned procedure.  He was seen by cardiology yesterday and given the okay from cardiology standpoint.  He has done well with previous surgeries and his chronic medical conditions are stable.    2. Hypertension associated with diabetes  Well-controlled at this time.  Continue same medications.    3. Type 2 diabetes mellitus without complication, with long-term current use of insulin  His last hemoglobin A1c in December was 6.5.  Is scheduled to repeat labs within the next month.  Continue same medication    4. Hyperlipidemia associated with type 2 diabetes mellitus  Continue lovastatin.    5. Pacemaker  Recently had new pacemaker within the last year and was evaluated by cardiology yesterday and all seems to be working well.  Asymptomatic.    6. Osteoarthritis of both knees, unspecified osteoarthritis type  Having left knee replacement soon and " possibly right in the future.

## 2017-05-04 NOTE — MR AVS SNAPSHOT
[facility]  [address] [address] [address]  Phone: [phone]                  Rigoberto Vasquez   2017 9:20 AM   Office Visit    Description:  Male : 1950   Provider:  Vance Marina DO   Department:  [facility]           Reason for Visit     Pre-op Exam           Diagnoses this Visit        Comments    Preop examination    -  Primary     Hypertension associated with diabetes         Type 2 diabetes mellitus without complication, with long-term current use of insulin         Hyperlipidemia associated with type 2 diabetes mellitus         Pacemaker         Osteoarthritis of both knees, unspecified osteoarthritis type                To Do List           Future Appointments        Provider Department Dept Phone    2017 7:35 AM LAB[facility]ATORY, [facility] [phone]    2017 7:40 AM SPECIMEN, [facility] [phone]    2017 8:40 AM Suma Sheth PA-C [facility] [phone]    2017 10:00 AM FREDERICK Epperson MD [facility] [phone]      Goals (5 Years of Data)     None      Follow-Up and Disposition     Return in about 6 months (around 2017).    Follow-up and Disposition History      [facility]     [facility] -  Assistance  Unless otherwise directed by your provider, please contact [facility], our nurse care line that is available for  assistance.     Registered nurses in the [facility] provide: appointment scheduling, clinical advisement, health education, and other advisory services.  Call: [phone] (toll free)               Medications           Message regarding Medications     Verify the changes and/or additions to your medication regime listed below are the same as discussed with your clinician today.  If any of these changes or additions are incorrect, please notify your healthcare  "provider.        STOP taking these medications     methylPREDNISolone (MEDROL DOSEPACK) 4 mg tablet use as directed           Verify that the below list of medications is an accurate representation of the medications you are currently taking.  If none reported, the list may be blank. If incorrect, please contact your healthcare provider. Carry this list with you in case of emergency.           Current Medications     ACETAMINOPHEN (TYLENOL 8 HOUR ORAL) Take by mouth as needed.    azelastine (ASTELIN) 137 mcg (0.1 %) nasal spray 2 sprays (274 mcg total) by Nasal route 2 (two) times daily.    bifidobacterium infantis (ALIGN) 4 mg Cap Take 1 capsule by mouth Daily.    blood sugar diagnostic (ACCU-CHEK JAXON) Strp Inject 1 strip into the skin as directed. Check BG 2-3 times daily. Accuchek jaxon strips    EASY TOUCH 31 gauge x 3/16" Ndle USE AS DIRECTED WITH INSULIN    fenofibrate micronized (LOFIBRA) 200 MG Cap Take 1 capsule (200 mg total) by mouth every evening.    LEVEMIR FLEXPEN 100 unit/mL (3 mL) InPn pen Inject 25 Units into the skin once daily. Needs pens d/t limited manual dexterity.    lidocaine-prilocaine (EMLA) cream     liraglutide 0.6 mg/0.1 mL, 18 mg/3 mL, subq PNIJ (VICTOZA 3-ALESHA) 0.6 mg/0.1 mL (18 mg/3 mL) PnIj INJECT 1.8 MG DAILY    losartan (COZAAR) 50 MG tablet Take 1 tablet (50 mg total) by mouth once daily.    lovastatin (MEVACOR) 40 MG tablet Take 1 tablet by mouth Every evening.    metformin (GLUCOPHAGE) 1000 MG tablet Take 1 tablet (1,000 mg total) by mouth once daily. Generic OK    pantoprazole (PROTONIX) 40 MG tablet Take 1 tablet (40 mg total) by mouth before breakfast.    pen needle, diabetic (SURE COMFORT PEN NEEDLE) 31 gauge x 3/16" Ndle USE AS DIRECTED WITH INSULIN    pioglitazone (ACTOS) 30 MG tablet Take 1 tablet (30 mg total) by mouth once daily.    hydrocortisone 2.5 % cream     lidocaine (XYLOCAINE) 5 % Oint ointment Apply 1 to 2 grams to affected area 3 to 4 times daily    " "multivitamin capsule Take 1 capsule by mouth once daily.           Clinical Reference Information           Your Vitals Were     BP Pulse Temp Resp    127/77 (BP Location: Left arm, Patient Position: Sitting, BP Method: Automatic) 74 98 °F (36.7 °C) (Tympanic) 16    Height Weight SpO2 BMI    5' 11" (1.803 m) 92.7 kg (204 lb 5.9 oz) 95% 28.5 kg/m2      Blood Pressure          Most Recent Value    BP  127/77      Allergies as of 5/4/2017     Aspirin    Meperidine    Niacin      Immunizations Administered on Date of Encounter - 5/4/2017     None      Language Assistance Services     ATTENTION: Language assistance services are available, free of charge. Please call [phone].      ATENCIÓN: Si brisala talya, tiene a neal disposición servicios gratuitos de asistencia lingüística. Llame al [phone].     CHÚ Ý: N?u b?n nói Ti?ng Vi?t, có các d?ch v? h? tr? ngôn ng? mi?n phí dành cho b?n. G?i s? [phone].         [facility] complies with applicable Federal civil rights laws and does not discriminate on the basis of race, color, national origin, age, disability, or sex.        "

## 2017-05-23 ENCOUNTER — PATIENT MESSAGE (OUTPATIENT)
Dept: INTERNAL MEDICINE | Facility: CLINIC | Age: 67
End: 2017-05-23

## 2017-06-03 ENCOUNTER — PATIENT MESSAGE (OUTPATIENT)
Dept: INTERNAL MEDICINE | Facility: CLINIC | Age: 67
End: 2017-06-03

## 2017-06-03 DIAGNOSIS — R11.0 NAUSEA: ICD-10-CM

## 2017-06-05 RX ORDER — PANTOPRAZOLE SODIUM 40 MG/1
40 TABLET, DELAYED RELEASE ORAL
Qty: 90 TABLET | Refills: 3 | Status: SHIPPED | OUTPATIENT
Start: 2017-06-05 | End: 2018-06-14 | Stop reason: SDUPTHER

## 2017-06-06 ENCOUNTER — PATIENT MESSAGE (OUTPATIENT)
Dept: ORTHOPEDICS | Facility: CLINIC | Age: 67
End: 2017-06-06

## 2017-06-08 ENCOUNTER — OFFICE VISIT (OUTPATIENT)
Dept: GASTROENTEROLOGY | Facility: CLINIC | Age: 67
End: 2017-06-08
Payer: COMMERCIAL

## 2017-06-08 ENCOUNTER — PATIENT MESSAGE (OUTPATIENT)
Dept: INTERNAL MEDICINE | Facility: CLINIC | Age: 67
End: 2017-06-08

## 2017-06-08 VITALS
WEIGHT: 192.44 LBS | HEIGHT: 71 IN | SYSTOLIC BLOOD PRESSURE: 148 MMHG | HEART RATE: 80 BPM | DIASTOLIC BLOOD PRESSURE: 84 MMHG | BODY MASS INDEX: 26.94 KG/M2

## 2017-06-08 DIAGNOSIS — K59.03 DRUG-INDUCED CONSTIPATION: ICD-10-CM

## 2017-06-08 DIAGNOSIS — R11.0 NAUSEA: ICD-10-CM

## 2017-06-08 DIAGNOSIS — R10.12 LUQ ABDOMINAL PAIN: Primary | ICD-10-CM

## 2017-06-08 PROCEDURE — 99999 PR PBB SHADOW E&M-EST. PATIENT-LVL III: CPT | Mod: PBBFAC,,, | Performed by: NURSE PRACTITIONER

## 2017-06-08 PROCEDURE — 99214 OFFICE O/P EST MOD 30 MIN: CPT | Mod: S$GLB,,, | Performed by: NURSE PRACTITIONER

## 2017-06-08 NOTE — PROGRESS NOTES
Clinic Follow Up:  [facility] Follow Up Note    Reason for Follow Up:  The primary encounter diagnosis was LUQ abdominal pain. Diagnoses of Nausea and Drug-induced constipation were also pertinent to this visit.    PCP: Jesse Grimes       HPI:  This is a 66 y.o. male here for follow up of the above  He reports that since his last visit in February, he has had a return of his symptoms.   He states that over the last few weeks, his LUQ abdominal pain with nausea has returned.  He states that the symptom worsened while he was taking high doses of NSAID s/p total knee replacement.  He has stopped the NSAID's.  He is now taking PPI twice daily since Saturday and has had some mild improvement in his symptoms, but not complete resolution.   In addition, he has had a return of his constipation, worsening by use of narcotics for pain in the knee.   He denies any vomiting.  No melena or hematochezia.     Review of Systems   Constitutional: Negative for chills, fever, malaise/fatigue and weight loss.   Respiratory: Negative for cough.    Cardiovascular: Negative for chest pain.   Gastrointestinal:        Per HPI   Musculoskeletal: Negative for myalgias.   Skin: Negative for itching and rash.   Neurological: Negative for headaches.   Psychiatric/Behavioral: The patient is not nervous/anxious.        Medical History:  Past Medical History:   Diagnosis Date    Acid reflux     gi chintanshenry    Angina pectoris     Back pain     BPH (benign prostatic hyperplasia)     blue    Degenerative joint disease     Diverticulosis     Erectile dysfunction     History of elevated PSA 2/14    normalized, dr dave annually    History of pericarditis     Hypercholesteremia     Hypertension     Iron deficiency anemia     Pacemaker     complete av block;NO afib    Squamous cell cancer of skin of mastoid region of scalp     dr jaida salgado    Type 2 diabetes mellitus     dr wyman    Urinary incontinence      "when he was 7 Yrs old.        Surgical History:   Past Surgical History:   Procedure Laterality Date    CARDIAC PACEMAKER PLACEMENT      EYE SURGERY      KNEE CARTILAGE SURGERY Bilateral     NASAL SINUS SURGERY      SHOULDER ARTHROSCOPY Right     TONSILLECTOMY      TOTAL KNEE ARTHROPLASTY Left 05/15/2017    TRANSURETHRAL RESECTION OF PROSTATE      TYMPANOPLASTY      vesectomy         Family History:   Family History   Problem Relation Age of Onset    Arthritis Mother     Hypertension Mother     Heart disease Father     Hypertension Father     Stroke Father     Diabetes Son     Diabetes Maternal Grandmother        Social History:   Social History   Substance Use Topics    Smoking status: Never Smoker    Smokeless tobacco: Never Used    Alcohol use Yes      Comment: " once a month"       Allergies: Reviewed    Home Medications:  Current Outpatient Prescriptions on File Prior to Visit   Medication Sig Dispense Refill    ACETAMINOPHEN (TYLENOL 8 HOUR ORAL) Take by mouth as needed.      azelastine (ASTELIN) 137 mcg (0.1 %) nasal spray 2 sprays (274 mcg total) by Nasal route 2 (two) times daily. 30 mL 12    bifidobacterium infantis (ALIGN) 4 mg Cap Take 1 capsule by mouth Daily.      blood sugar diagnostic (ACCU-CHEK JAXON) Strp Inject 1 strip into the skin as directed. Check BG 2-3 times daily. Accuchek jaxon strips 300 strip 3    EASY TOUCH 31 gauge x 3/16" Ndle USE AS DIRECTED WITH INSULIN 100 each 3    fenofibrate micronized (LOFIBRA) 200 MG Cap Take 1 capsule (200 mg total) by mouth every evening. 90 capsule 3    hydrocortisone 2.5 % cream       LEVEMIR FLEXPEN 100 unit/mL (3 mL) InPn pen Inject 25 Units into the skin once daily. Needs pens d/t limited manual dexterity. (Patient taking differently: Inject 16 Units into the skin 2 (two) times daily. Needs pens d/t limited manual dexterity.) 45 mL 3    lidocaine (XYLOCAINE) 5 % Oint ointment Apply 1 to 2 grams to affected area 3 to 4 times " "daily  5    lidocaine-prilocaine (EMLA) cream       liraglutide 0.6 mg/0.1 mL, 18 mg/3 mL, subq PNIJ (VICTOZA 3-ALESHA) 0.6 mg/0.1 mL (18 mg/3 mL) PnIj INJECT 1.8 MG DAILY 27 mL 3    losartan (COZAAR) 50 MG tablet Take 1 tablet (50 mg total) by mouth once daily. 90 tablet 1    lovastatin (MEVACOR) 40 MG tablet Take 1 tablet by mouth Every evening. 90 tablet 3    metformin (GLUCOPHAGE) 1000 MG tablet Take 1 tablet (1,000 mg total) by mouth once daily. Generic  tablet 3    multivitamin capsule Take 1 capsule by mouth once daily.      pantoprazole (PROTONIX) 40 MG tablet Take 1 tablet (40 mg total) by mouth before breakfast. 90 tablet 3    pen needle, diabetic (SURE COMFORT PEN NEEDLE) 31 gauge x 3/16" Ndle USE AS DIRECTED WITH INSULIN 100 each 4    pioglitazone (ACTOS) 30 MG tablet Take 1 tablet (30 mg total) by mouth once daily. 90 tablet 1     No current facility-administered medications on file prior to visit.        Physical Exam:  Vital Signs:  BP (!) 148/84   Pulse 80   Ht 5' 11" (1.803 m)   Wt 87.3 kg (192 lb 7.4 oz)   BMI 26.84 kg/m²   Body mass index is 26.84 kg/m².  Physical Exam   Constitutional: He is oriented to person, place, and time. He appears well-developed.   HENT:   Head: Normocephalic.   Eyes: No scleral icterus.   Neck: Normal range of motion.   Cardiovascular: Normal rate and regular rhythm.    Pulmonary/Chest: Effort normal and breath sounds normal.   Abdominal: Soft. Bowel sounds are normal. He exhibits no distension. There is no tenderness.   Musculoskeletal: Normal range of motion.   Neurological: He is alert and oriented to person, place, and time.   Skin: Skin is warm and dry.   Psychiatric: He has a normal mood and affect.   Vitals reviewed.      Labs: Pertinent labs reviewed.      Assessment:  1. LUQ abdominal pain    2. Nausea    3. Drug-induced constipation        Recommendations:  LUQ abdominal pain  Nausea  - Will continue PPI BID  - Add zantac 150mg BID  - If " symptoms worsen or continue, will plan for EGD    Drug-induced constipation  - Miralax once daily        Return to Clinic:    Return in about 6 weeks (around 7/20/2017).

## 2017-06-08 NOTE — PATIENT INSTRUCTIONS
Start Miralax once daily    Continue Protonix twice daily    Add Zantac 150mg twice daily with first and last meal of the day

## 2017-06-09 ENCOUNTER — TELEPHONE (OUTPATIENT)
Dept: INTERNAL MEDICINE | Facility: CLINIC | Age: 67
End: 2017-06-09

## 2017-06-13 ENCOUNTER — PATIENT MESSAGE (OUTPATIENT)
Dept: INTERNAL MEDICINE | Facility: CLINIC | Age: 67
End: 2017-06-13

## 2017-06-13 NOTE — TELEPHONE ENCOUNTER
"Mila can you help with below :    "[facility] had prescribed MGLP cream (contained Meloxicam, Gabapentin, and Lidocaine) to be used on my joints because I cannot take anti-inflammatory medicine by mouth (upsets my stomach). I had received two orders of this cream from [facility], then my insurance stopped coverage and had them send me two separate items. One was Lidocaine cream and the other was a liquid anti-inflammatory medicine. I mixed both of these together, but it was very messy, greasy and did not work as well as the MGLP. Please send a refill request to [facility] for the original cream and see if the insurance will cover it. If not, I will pay for it. "  "

## 2017-06-14 NOTE — TELEPHONE ENCOUNTER
These are the ingredients for the cream. I can not put in computer you may have to write rx.   MGLP 0.09-6-2.5-2.5% (240GM)    Ingredients:  Meloxicam 15 MG Tablet Regina  Gabapentin  600 MG Tablet Gin  Lidocaine-Prilocaine Cream

## 2017-06-15 ENCOUNTER — PATIENT MESSAGE (OUTPATIENT)
Dept: INTERNAL MEDICINE | Facility: CLINIC | Age: 67
End: 2017-06-15

## 2017-06-20 ENCOUNTER — LAB VISIT (OUTPATIENT)
Dept: LAB | Facility: HOSPITAL | Age: 67
End: 2017-06-20
Attending: FAMILY MEDICINE
Payer: MEDICARE

## 2017-06-20 DIAGNOSIS — Z79.4 TYPE 2 DIABETES MELLITUS WITHOUT COMPLICATION, WITH LONG-TERM CURRENT USE OF INSULIN: Chronic | ICD-10-CM

## 2017-06-20 DIAGNOSIS — E11.9 TYPE 2 DIABETES MELLITUS WITHOUT COMPLICATION, WITH LONG-TERM CURRENT USE OF INSULIN: Chronic | ICD-10-CM

## 2017-06-20 LAB
ALBUMIN SERPL BCP-MCNC: 3.8 G/DL
ALP SERPL-CCNC: 39 U/L
ALT SERPL W/O P-5'-P-CCNC: 8 U/L
ANION GAP SERPL CALC-SCNC: 9 MMOL/L
AST SERPL-CCNC: 17 U/L
BILIRUB SERPL-MCNC: 0.5 MG/DL
BUN SERPL-MCNC: 15 MG/DL
CALCIUM SERPL-MCNC: 9.4 MG/DL
CHLORIDE SERPL-SCNC: 103 MMOL/L
CHOLEST/HDLC SERPL: 2.7 {RATIO}
CO2 SERPL-SCNC: 27 MMOL/L
CREAT SERPL-MCNC: 1.2 MG/DL
EST. GFR  (AFRICAN AMERICAN): >60 ML/MIN/1.73 M^2
EST. GFR  (NON AFRICAN AMERICAN): >60 ML/MIN/1.73 M^2
GLUCOSE SERPL-MCNC: 91 MG/DL
HDL/CHOLESTEROL RATIO: 36.9 %
HDLC SERPL-MCNC: 111 MG/DL
HDLC SERPL-MCNC: 41 MG/DL
LDLC SERPL CALC-MCNC: 55.6 MG/DL
NONHDLC SERPL-MCNC: 70 MG/DL
POTASSIUM SERPL-SCNC: 3.9 MMOL/L
PROT SERPL-MCNC: 6.8 G/DL
SODIUM SERPL-SCNC: 139 MMOL/L
TRIGL SERPL-MCNC: 72 MG/DL

## 2017-06-20 PROCEDURE — 80061 LIPID PANEL: CPT

## 2017-06-20 PROCEDURE — 80053 COMPREHEN METABOLIC PANEL: CPT

## 2017-06-20 PROCEDURE — 83036 HEMOGLOBIN GLYCOSYLATED A1C: CPT

## 2017-06-20 PROCEDURE — 36415 COLL VENOUS BLD VENIPUNCTURE: CPT | Mod: PO

## 2017-06-21 LAB
ESTIMATED AVG GLUCOSE: 123 MG/DL
HBA1C MFR BLD HPLC: 5.9 %

## 2017-06-22 ENCOUNTER — PATIENT MESSAGE (OUTPATIENT)
Dept: GASTROENTEROLOGY | Facility: CLINIC | Age: 67
End: 2017-06-22

## 2017-06-27 ENCOUNTER — OFFICE VISIT (OUTPATIENT)
Dept: INTERNAL MEDICINE | Facility: CLINIC | Age: 67
End: 2017-06-27
Payer: COMMERCIAL

## 2017-06-27 VITALS
OXYGEN SATURATION: 95 % | DIASTOLIC BLOOD PRESSURE: 80 MMHG | TEMPERATURE: 98 F | HEART RATE: 96 BPM | WEIGHT: 194 LBS | HEIGHT: 71 IN | BODY MASS INDEX: 27.16 KG/M2 | SYSTOLIC BLOOD PRESSURE: 130 MMHG

## 2017-06-27 DIAGNOSIS — E11.59 HYPERTENSION ASSOCIATED WITH DIABETES: Chronic | ICD-10-CM

## 2017-06-27 DIAGNOSIS — Z95.0 PACEMAKER: Chronic | ICD-10-CM

## 2017-06-27 DIAGNOSIS — E11.69 HYPERLIPIDEMIA ASSOCIATED WITH TYPE 2 DIABETES MELLITUS: Chronic | ICD-10-CM

## 2017-06-27 DIAGNOSIS — E78.5 HYPERLIPIDEMIA ASSOCIATED WITH TYPE 2 DIABETES MELLITUS: Chronic | ICD-10-CM

## 2017-06-27 DIAGNOSIS — I15.2 HYPERTENSION ASSOCIATED WITH DIABETES: Chronic | ICD-10-CM

## 2017-06-27 DIAGNOSIS — E11.9 TYPE 2 DIABETES MELLITUS WITHOUT COMPLICATION, WITHOUT LONG-TERM CURRENT USE OF INSULIN: Primary | ICD-10-CM

## 2017-06-27 PROCEDURE — 99999 PR PBB SHADOW E&M-EST. PATIENT-LVL IV: CPT | Mod: PBBFAC,,, | Performed by: PHYSICIAN ASSISTANT

## 2017-06-27 PROCEDURE — 3044F HG A1C LEVEL LT 7.0%: CPT | Mod: S$GLB,,, | Performed by: PHYSICIAN ASSISTANT

## 2017-06-27 PROCEDURE — 4010F ACE/ARB THERAPY RXD/TAKEN: CPT | Mod: S$GLB,,, | Performed by: PHYSICIAN ASSISTANT

## 2017-06-27 PROCEDURE — 99213 OFFICE O/P EST LOW 20 MIN: CPT | Mod: S$GLB,,, | Performed by: PHYSICIAN ASSISTANT

## 2017-06-27 PROCEDURE — 99499 UNLISTED E&M SERVICE: CPT | Mod: S$GLB,,, | Performed by: PHYSICIAN ASSISTANT

## 2017-06-27 NOTE — PROGRESS NOTES
Subjective:       Patient ID: Rigoberto Vasquez is a 66 y.o. male.    Chief Complaint: Follow-up    HPI   Here today for 6 month follow up. Had knee replacement on the left. After surgery developed abdominal pain, nausea, constipation, reduced appetite, and weight loss. Has been seeing GI for this.  GI put him on protonix BID, miralax, and zantac. Pt states that the next step will be an endoscopy.   Today denies any acute changes or issues to discuss.   Patient Active Problem List   Diagnosis    Osteoarthritis of knees, bilateral -total knee replacement. Currently in PT to work on ROM.     Hypertension associated with diabetes -stable. Doing well on medications    Type 2 diabetes mellitus -much improved since weight loss. Denies any hypoglycemic episodes. Would like to get off of metformin at some point.     Hyperlipidemia associated with type 2 diabetes mellitus - improved. Will review recent labs today.     Macular hole    Open angle with borderline findings, low risk - up to date with eye doctor    High myopia    Optic disc anomaly    CHANDRAKANT (iron deficiency anemia) - stable.    Pacemaker -denies chest pain, shortness of breath, or palpitations. Stable    Conductive hearing loss in left ear -See's ENT regularly    Steroid responders borderline glaucoma    Choroidal nevus of right eye    Epiretinal membrane    Overweight (BMI 25.0-29.9) - weight loss due to GI issues. Currently seeing GI for this.     S/P nasal surgery    Macular hole of right eye    Glaucoma suspect of both eyes         Health Maintenance   Topic Date Due    Foot Exam  08/09/2017    Influenza Vaccine  08/01/2017    Eye Exam  12/05/2017    Hemoglobin A1c  12/20/2017    Pneumococcal (65+) (2 of 2 - PPSV23) 12/28/2017    Lipid Panel  06/20/2018    Colonoscopy  06/10/2020    TETANUS VACCINE  03/22/2021    DEXA SCAN  07/14/2021    Hepatitis C Screening  Completed    Zoster Vaccine  Completed    Abdominal Aortic Aneurysm  "Screening  Excluded     Review of Systems   Constitutional: Positive for appetite change and unexpected weight change. Negative for activity change, chills, diaphoresis, fatigue and fever.   HENT: Negative.  Negative for congestion, hearing loss, postnasal drip, rhinorrhea, sore throat, trouble swallowing and voice change.    Eyes: Negative.  Negative for visual disturbance.   Respiratory: Negative.  Negative for cough, choking, chest tightness and shortness of breath.    Cardiovascular: Negative for chest pain, palpitations and leg swelling.   Gastrointestinal: Positive for abdominal pain, constipation and nausea. Negative for abdominal distention, blood in stool, diarrhea and vomiting.   Endocrine: Negative for cold intolerance, heat intolerance, polydipsia and polyuria.   Genitourinary: Negative.  Negative for difficulty urinating and frequency.   Musculoskeletal: Positive for arthralgias. Negative for back pain, gait problem, joint swelling and myalgias.   Skin: Negative for color change, pallor, rash and wound.   Neurological: Negative for dizziness, tremors, weakness, light-headedness, numbness and headaches.   Hematological: Negative for adenopathy.   Psychiatric/Behavioral: Negative for behavioral problems, confusion, self-injury, sleep disturbance and suicidal ideas. The patient is not nervous/anxious.          /80   Pulse 96   Temp 98.1 °F (36.7 °C) (Tympanic)   Ht 5' 11" (1.803 m)   Wt 88 kg (194 lb 0.1 oz)   SpO2 95%   BMI 27.06 kg/m²   Objective:      Physical Exam   Constitutional: He is oriented to person, place, and time. He appears well-developed and well-nourished.   HENT:   Head: Normocephalic and atraumatic.   Right Ear: Hearing, tympanic membrane, external ear and ear canal normal.   Left Ear: External ear and ear canal normal. There is drainage.   Ears:    Nose: Nose normal.   Mouth/Throat: Oropharynx is clear and moist.   Eyes: Conjunctivae and EOM are normal. Pupils are equal, " round, and reactive to light.   Neck: Normal range of motion. Neck supple.   Cardiovascular: Normal rate, regular rhythm and normal heart sounds.  Exam reveals no gallop and no friction rub.    No murmur heard.  Pulses:       Dorsalis pedis pulses are 2+ on the right side, and 2+ on the left side.        Posterior tibial pulses are 2+ on the right side, and 2+ on the left side.   Pulmonary/Chest: Effort normal and breath sounds normal. No respiratory distress. He has no wheezes. He has no rales. He exhibits no tenderness.   Abdominal: Soft. He exhibits no distension. There is no tenderness.   Musculoskeletal: Normal range of motion.   Feet:   Right Foot:   Protective Sensation: 10 sites tested. 10 sites sensed.   Left Foot:   Protective Sensation: 10 sites tested. 10 sites sensed.   Lymphadenopathy:     He has no cervical adenopathy.   Neurological: He is alert and oriented to person, place, and time.   Skin: Skin is warm and dry.   Psychiatric: He has a normal mood and affect. His behavior is normal. Judgment and thought content normal.   Vitals reviewed.      No visits with results within 1 Week(s) from this visit.   Latest known visit with results is:   Lab Visit on 06/20/2017   Component Date Value Ref Range Status    Hemoglobin A1C 06/21/2017 5.9* 4.0 - 5.6 % Final    Comment: According to ADA guidelines, hemoglobin A1c <7.0% represents  optimal control in non-pregnant diabetic patients. Different  metrics may apply to specific patient populations.   Standards of Medical Care in Diabetes-2016.  For the purpose of screening for the presence of diabetes:  <5.7%     Consistent with the absence of diabetes  5.7-6.4%  Consistent with increasing risk for diabetes   (prediabetes)  >or=6.5%  Consistent with diabetes  Currently, no consensus exists for use of hemoglobin A1c  for diagnosis of diabetes for children.  This Hemoglobin A1c assay has significant interference with fetal   hemoglobin   (HbF). The results are  invalid for patients with abnormal amounts of   HbF,   including those with known Hereditary Persistence   of Fetal Hemoglobin. Heterozygous hemoglobin variants (HbAS, HbAC,   HbAD, HbAE, HbA2) do not significantly interfere with this assay;   however, presence of multiple variants in a sample may impact the %   interference.      Estimated Avg Glucose 06/21/2017 123  68 - 131 mg/dL Final    Cholesterol 06/20/2017 111* 120 - 199 mg/dL Final    Comment: The National Cholesterol Education Program (NCEP) has set the  following guidelines (reference ranges) for Cholesterol:  Optimal.....................<200 mg/dL  Borderline High.............200-239 mg/dL  High........................> or = 240 mg/dL      Triglycerides 06/20/2017 72  30 - 150 mg/dL Final    Comment: The National Cholesterol Education Program (NCEP) has set the  following guidelines (reference values) for triglycerides:  Normal......................<150 mg/dL  Borderline High.............150-199 mg/dL  High........................200-499 mg/dL      HDL 06/20/2017 41  40 - 75 mg/dL Final    Comment: The National Cholesterol Education Program (NCEP) has set the  following guidelines (reference values) for HDL Cholesterol:  Low...............<40 mg/dL  Optimal...........>60 mg/dL      LDL Cholesterol 06/20/2017 55.6* 63.0 - 159.0 mg/dL Final    Comment: The National Cholesterol Education Program (NCEP) has set the  following guidelines (reference values) for LDL Cholesterol:  Optimal.......................<130 mg/dL  Borderline High...............130-159 mg/dL  High..........................160-189 mg/dL  Very High.....................>190 mg/dL      HDL/Chol Ratio 06/20/2017 36.9  20.0 - 50.0 % Final    Total Cholesterol/HDL Ratio 06/20/2017 2.7  2.0 - 5.0 Final    Non-HDL Cholesterol 06/20/2017 70  mg/dL Final    Comment: Risk category and Non-HDL cholesterol goals:  Coronary heart disease (CHD)or equivalent (10-year risk of CHD >20%):  Non-HDL  cholesterol goal     <130 mg/dL  Two or more CHD risk factors and 10-year risk of CHD <= 20%:  Non-HDL cholesterol goal     <160 mg/dL  0 to 1 CHD risk factor:  Non-HDL cholesterol goal     <190 mg/dL      Sodium 06/20/2017 139  136 - 145 mmol/L Final    Potassium 06/20/2017 3.9  3.5 - 5.1 mmol/L Final    Chloride 06/20/2017 103  95 - 110 mmol/L Final    CO2 06/20/2017 27  23 - 29 mmol/L Final    Glucose 06/20/2017 91  70 - 110 mg/dL Final    BUN, Bld 06/20/2017 15  8 - 23 mg/dL Final    Creatinine 06/20/2017 1.2  0.5 - 1.4 mg/dL Final    Calcium 06/20/2017 9.4  8.7 - 10.5 mg/dL Final    Total Protein 06/20/2017 6.8  6.0 - 8.4 g/dL Final    Albumin 06/20/2017 3.8  3.5 - 5.2 g/dL Final    Total Bilirubin 06/20/2017 0.5  0.1 - 1.0 mg/dL Final    Comment: For infants and newborns, interpretation of results should be based  on gestational age, weight and in agreement with clinical  observations.  Premature Infant recommended reference ranges:  Up to 24 hours.............<8.0 mg/dL  Up to 48 hours............<12.0 mg/dL  3-5 days..................<15.0 mg/dL  6-29 days.................<15.0 mg/dL      Alkaline Phosphatase 06/20/2017 39* 55 - 135 U/L Final    AST 06/20/2017 17  10 - 40 U/L Final    ALT 06/20/2017 8* 10 - 44 U/L Final    Anion Gap 06/20/2017 9  8 - 16 mmol/L Final    eGFR if African American 06/20/2017 >60.0  >60 mL/min/1.73 m^2 Final    eGFR if non African American 06/20/2017 >60.0  >60 mL/min/1.73 m^2 Final    Comment: Calculation used to obtain the estimated glomerular filtration  rate (eGFR) is the CKD-EPI equation. Since race is unknown   in our information system, the eGFR values for   -American and Non--American patients are given   for each creatinine result.         Assessment:       1. Type 2 diabetes mellitus without complication, without long-term current use of insulin    2. Hyperlipidemia associated with type 2 diabetes mellitus    3. Hypertension associated  with diabetes    4. Pacemaker        Plan:   Type 2 diabetes mellitus without complication, without long-term current use of insulin  -     Hemoglobin A1c; Future; Expected date: 12/27/2017  -monitor for signs of hypoglycemia. If noted will need to make adjustment.   -will recheck in 6 months. Pt needs to get back to baseline before we make any adjustments.   -Foot exam completed today. NO acute findings.     Hyperlipidemia associated with type 2 diabetes mellitus  -low cholesterol noted on labs. Continue lovastatin for now.     Hypertension associated with diabetes  -stable. Doing well on current medications. Within good range.     Pacemaker  -no issues to report. Cardiologist: Dr. Luís Senior.       Return in about 6 months (around 12/27/2017), or if symptoms worsen or fail to improve.

## 2017-06-28 ENCOUNTER — TELEPHONE (OUTPATIENT)
Dept: GASTROENTEROLOGY | Facility: CLINIC | Age: 67
End: 2017-06-28

## 2017-06-28 ENCOUNTER — PATIENT MESSAGE (OUTPATIENT)
Dept: GASTROENTEROLOGY | Facility: CLINIC | Age: 67
End: 2017-06-28

## 2017-06-28 DIAGNOSIS — R11.0 NAUSEA: ICD-10-CM

## 2017-06-28 DIAGNOSIS — R10.12 LUQ ABDOMINAL PAIN: Primary | ICD-10-CM

## 2017-06-28 NOTE — TELEPHONE ENCOUNTER
----- Message from Pallavi Augustin NP sent at 6/28/2017 12:57 PM CDT -----  Please contact patient to schedule EGD.

## 2017-06-28 NOTE — TELEPHONE ENCOUNTER
Phone call to pt and scheduled pt for EGD on 07/03/17, verbal instructions given and pt vrbalized understanding, instructions also mailed to pt/mirna,lpn

## 2017-06-28 NOTE — TELEPHONE ENCOUNTER
Will place order for EGD for Jahaira Akins. I will have her nursing staff contact patient to schedule endoscopy.

## 2017-07-02 PROBLEM — R10.12 CHRONIC LUQ PAIN: Status: ACTIVE | Noted: 2017-07-02

## 2017-07-02 PROBLEM — R11.0 NAUSEA: Status: ACTIVE | Noted: 2017-07-02

## 2017-07-02 PROBLEM — G89.29 CHRONIC LUQ PAIN: Status: ACTIVE | Noted: 2017-07-02

## 2017-07-03 ENCOUNTER — SURGERY (OUTPATIENT)
Age: 67
End: 2017-07-03

## 2017-07-03 ENCOUNTER — ANESTHESIA (OUTPATIENT)
Dept: ENDOSCOPY | Facility: HOSPITAL | Age: 67
End: 2017-07-03
Payer: COMMERCIAL

## 2017-07-03 ENCOUNTER — PATIENT MESSAGE (OUTPATIENT)
Dept: GASTROENTEROLOGY | Facility: HOSPITAL | Age: 67
End: 2017-07-03

## 2017-07-03 ENCOUNTER — ANESTHESIA EVENT (OUTPATIENT)
Dept: ENDOSCOPY | Facility: HOSPITAL | Age: 67
End: 2017-07-03
Payer: COMMERCIAL

## 2017-07-03 ENCOUNTER — HOSPITAL ENCOUNTER (OUTPATIENT)
Facility: HOSPITAL | Age: 67
Discharge: HOME | End: 2017-07-03
Attending: INTERNAL MEDICINE | Admitting: INTERNAL MEDICINE
Payer: COMMERCIAL

## 2017-07-03 VITALS — RESPIRATION RATE: 26 BRPM

## 2017-07-03 VITALS
OXYGEN SATURATION: 98 % | SYSTOLIC BLOOD PRESSURE: 158 MMHG | TEMPERATURE: 98 F | BODY MASS INDEX: 26.88 KG/M2 | HEIGHT: 71 IN | HEART RATE: 70 BPM | RESPIRATION RATE: 18 BRPM | DIASTOLIC BLOOD PRESSURE: 70 MMHG | WEIGHT: 192 LBS

## 2017-07-03 DIAGNOSIS — Q40.2 ECTOPIC GASTRIC MUCOSA: Chronic | ICD-10-CM

## 2017-07-03 DIAGNOSIS — R10.9 ABDOMINAL PAIN: ICD-10-CM

## 2017-07-03 DIAGNOSIS — R10.12 CHRONIC LUQ PAIN: Primary | ICD-10-CM

## 2017-07-03 DIAGNOSIS — R11.0 NAUSEA: ICD-10-CM

## 2017-07-03 DIAGNOSIS — G89.29 CHRONIC LUQ PAIN: Primary | ICD-10-CM

## 2017-07-03 LAB — POCT GLUCOSE: 106 MG/DL (ref 70–110)

## 2017-07-03 PROCEDURE — 88305 TISSUE EXAM BY PATHOLOGIST: CPT | Performed by: PATHOLOGY

## 2017-07-03 PROCEDURE — 25000003 PHARM REV CODE 250: Performed by: INTERNAL MEDICINE

## 2017-07-03 PROCEDURE — 25000003 PHARM REV CODE 250: Performed by: NURSE ANESTHETIST, CERTIFIED REGISTERED

## 2017-07-03 PROCEDURE — 37000008 HC ANESTHESIA 1ST 15 MINUTES: Performed by: INTERNAL MEDICINE

## 2017-07-03 PROCEDURE — 88305 TISSUE EXAM BY PATHOLOGIST: CPT | Mod: 26,,, | Performed by: PATHOLOGY

## 2017-07-03 PROCEDURE — 82962 GLUCOSE BLOOD TEST: CPT | Performed by: INTERNAL MEDICINE

## 2017-07-03 PROCEDURE — 43239 EGD BIOPSY SINGLE/MULTIPLE: CPT | Mod: ,,, | Performed by: INTERNAL MEDICINE

## 2017-07-03 PROCEDURE — 43239 EGD BIOPSY SINGLE/MULTIPLE: CPT | Performed by: INTERNAL MEDICINE

## 2017-07-03 PROCEDURE — 37000009 HC ANESTHESIA EA ADD 15 MINS: Performed by: INTERNAL MEDICINE

## 2017-07-03 PROCEDURE — 63600175 PHARM REV CODE 636 W HCPCS: Performed by: NURSE ANESTHETIST, CERTIFIED REGISTERED

## 2017-07-03 PROCEDURE — 27201012 HC FORCEPS, HOT/COLD, DISP: Performed by: INTERNAL MEDICINE

## 2017-07-03 RX ORDER — LIDOCAINE HCL/PF 100 MG/5ML
SYRINGE (ML) INTRAVENOUS
Status: DISCONTINUED | OUTPATIENT
Start: 2017-07-03 | End: 2017-07-03

## 2017-07-03 RX ORDER — SODIUM CHLORIDE, SODIUM LACTATE, POTASSIUM CHLORIDE, CALCIUM CHLORIDE 600; 310; 30; 20 MG/100ML; MG/100ML; MG/100ML; MG/100ML
INJECTION, SOLUTION INTRAVENOUS CONTINUOUS PRN
Status: DISCONTINUED | OUTPATIENT
Start: 2017-07-03 | End: 2017-07-03

## 2017-07-03 RX ORDER — SODIUM CHLORIDE, SODIUM LACTATE, POTASSIUM CHLORIDE, CALCIUM CHLORIDE 600; 310; 30; 20 MG/100ML; MG/100ML; MG/100ML; MG/100ML
INJECTION, SOLUTION INTRAVENOUS CONTINUOUS
Status: DISCONTINUED | OUTPATIENT
Start: 2017-07-03 | End: 2017-07-03 | Stop reason: HOSPADM

## 2017-07-03 RX ORDER — PROPOFOL 10 MG/ML
INJECTION, EMULSION INTRAVENOUS
Status: DISCONTINUED | OUTPATIENT
Start: 2017-07-03 | End: 2017-07-03

## 2017-07-03 RX ADMIN — PROPOFOL 30 MG: 10 INJECTION, EMULSION INTRAVENOUS at 09:07

## 2017-07-03 RX ADMIN — SODIUM CHLORIDE, SODIUM LACTATE, POTASSIUM CHLORIDE, AND CALCIUM CHLORIDE: 600; 310; 30; 20 INJECTION, SOLUTION INTRAVENOUS at 09:07

## 2017-07-03 RX ADMIN — PROPOFOL 140 MG: 10 INJECTION, EMULSION INTRAVENOUS at 09:07

## 2017-07-03 RX ADMIN — LIDOCAINE HYDROCHLORIDE 100 MG: 20 INJECTION, SOLUTION INTRAVENOUS at 09:07

## 2017-07-03 NOTE — DISCHARGE INSTRUCTIONS
If you develop fevers, severe abdominal pain, significant bleeding, nausea or vomiting, please contact us or come to our Emergency Department at [facility].  Our  contact number is [phone] available for emergencies or any question that you may have.      You may return to normal activities tomorrow.  Written discharge instructions were provided to you today and have them handy since you may not remember our conversation today.       I also recommend that you follow a high fiber diet and take calcium supplements daily as well as to continue your present medications.      If you take Aspirin, please resume taking it at your prior dose today. If we obtained biopsies or removed polyps during your procedure, we will contact you within 5 to 6 days with the results.      Thanks for trusting us with your healthcare needs.      Sincerely,     Greyson Crowe M.D.        To rate your experience with Dr. Crowe, please click on the link below     [email or web address]

## 2017-07-03 NOTE — ANESTHESIA PREPROCEDURE EVALUATION
07/03/2017  Rigoberto Vasquez is a 66 y.o., male.    Anesthesia Evaluation    I have reviewed the Patient Summary Reports.    I have reviewed the Nursing Notes.   I have reviewed the Medications.     Review of Systems  Anesthesia Hx:  No problems with previous Anesthesia    Social:  Non-Smoker, Social Alcohol Use    Hematology/Oncology:         -- Anemia: --  Cancer in past history:  Oncology Comments: Skin cancer     EENT/Dental:   chronic allergic rhinitis   Cardiovascular:   Pacemaker Hypertension, well controlled Angina ECG has been reviewed. Electronic ventricular pacemaker  When compared with ECG of 11-MAY-2010 10:41,  Angina pectoris history with pacemaker.  Pericarditis history Hypertension, Essential Hypertension    Pulmonary:   Pneumonia Shortness of breath Snore Denies Pulmonary Infection.    Renal/:  Renal/ Normal     Hepatic/GI:   Bowel Prep. GERD 0730 last drink of fluid.  diverticulosis Esophageal / Stomach Disorders Gerd    Musculoskeletal:   Arthritis     Neurological:  Neurology Normal    Endocrine:   Diabetes, well controlled, type 2, using insulin  Diabetes, Type 2 Diabetes , controlled by insulin.  Metabolic Disorders, Obesity / BMI > 30  Dermatological:  Skin Normal    Psych:  Psychiatric Normal           Physical Exam  General:  Obesity    Airway/Jaw/Neck:  Airway Findings: Mouth Opening: Normal Tongue: Normal  General Airway Assessment: Adult  Mallampati: III       Chest/Lungs:  Chest/Lungs Findings: Normal Respiratory Rate     Heart/Vascular:  Heart Findings: Rate: Normal             Anesthesia Plan  Type of Anesthesia, risks & benefits discussed:  Anesthesia Type:  MAC  Patient's Preference:   Intra-op Monitoring Plan:   Intra-op Monitoring Plan Comments:   Post Op Pain Control Plan:   Post Op Pain Control Plan Comments:   Induction:   IV  Beta Blocker:  Patient is not currently on a  Beta-Blocker (No further documentation required).       Informed Consent: Patient understands risks and agrees with Anesthesia plan.  Questions answered. Anesthesia consent signed with patient.  ASA Score: 3     Day of Surgery Review of History & Physical: I have interviewed and examined the patient. I have reviewed the patient's H&P dated: 07/03/17. There are no significant changes.  H&P update referred to the surgeon.         Ready For Surgery From Anesthesia Perspective.

## 2017-07-03 NOTE — TRANSFER OF CARE
"Anesthesia Transfer of Care Note    Patient: Rigoberto Vasquez    Procedure(s) Performed: Procedure(s) (LRB):  ESOPHAGOGASTRODUODENOSCOPY (EGD) (N/A)    Patient location: PACU    Anesthesia Type: MAC    Transport from OR: Transported from OR on room air with adequate spontaneous ventilation    Post pain: adequate analgesia    Post assessment: no apparent anesthetic complications    Post vital signs: stable    Level of consciousness: sedated    Nausea/Vomiting: no nausea/vomiting    Complications: none    Transfer of care protocol was followed      Last vitals:   Visit Vitals  BP (!) 143/83 (BP Location: Right leg, Patient Position: Lying, BP Method: Automatic)   Pulse 77   Temp 36.8 °C (98.2 °F) (Oral)   Resp 16   Ht 5' 11" (1.803 m)   Wt 87.1 kg (192 lb)   SpO2 95%   BMI 26.78 kg/m²     "

## 2017-07-03 NOTE — ANESTHESIA RELEASE NOTE
"Anesthesia Release from PACU Note    Patient: Rigoberto Vasquez    Procedure(s) Performed: Procedure(s) (LRB):  ESOPHAGOGASTRODUODENOSCOPY (EGD) (N/A)    Anesthesia type: MAC    Post pain: Adequate analgesia    Post assessment: no apparent anesthetic complications, tolerated procedure well and no evidence of recall    Last Vitals:   Visit Vitals  BP (!) 143/83 (BP Location: Right leg, Patient Position: Lying, BP Method: Automatic)   Pulse 77   Temp 36.8 °C (98.2 °F) (Oral)   Resp 16   Ht 5' 11" (1.803 m)   Wt 87.1 kg (192 lb)   SpO2 95%   BMI 26.78 kg/m²       Post vital signs: stable    Level of consciousness: awake and alert     Nausea/Vomiting: no nausea/no vomiting    Complications: none    Airway Patency: patent    Respiratory: unassisted, spontaneous ventilation, room air    Cardiovascular: stable    Hydration: euvolemic  "

## 2017-07-03 NOTE — INTERVAL H&P NOTE
The patient has been examined and the H&P has been reviewed:    I concur with the findings and no changes have occurred since H&P was written.    Anesthesia/Surgery risks, benefits and alternative options discussed and understood by patient/family.          Active Hospital Problems    Diagnosis  POA    *Chronic LUQ pain [R10.12, G89.29]  Yes    Nausea [R11.0]  Yes      Resolved Hospital Problems    Diagnosis Date Resolved POA   No resolved problems to display.

## 2017-07-03 NOTE — ANESTHESIA POSTPROCEDURE EVALUATION
"Anesthesia Post Evaluation    Patient: Rigoberto Vasquez    Procedure(s) Performed: Procedure(s) (LRB):  ESOPHAGOGASTRODUODENOSCOPY (EGD) (N/A)    Final Anesthesia Type: MAC  Patient location during evaluation: PACU  Patient participation: Yes- Able to Participate  Level of consciousness: oriented and awake and alert  Post-procedure vital signs: reviewed and stable  Pain management: adequate  Airway patency: patent  PONV status at discharge: No PONV  Anesthetic complications: no      Cardiovascular status: blood pressure returned to baseline  Respiratory status: unassisted, room air and spontaneous ventilation  Hydration status: euvolemic  Follow-up not needed.        Visit Vitals  BP (!) 143/83 (BP Location: Right leg, Patient Position: Lying, BP Method: Automatic)   Pulse 77   Temp 36.8 °C (98.2 °F) (Oral)   Resp 16   Ht 5' 11" (1.803 m)   Wt 87.1 kg (192 lb)   SpO2 95%   BMI 26.78 kg/m²       Pain/Berna Score: Pain Assessment Performed: Yes (7/3/2017  9:00 AM)  Presence of Pain: denies (7/3/2017  9:00 AM)      "

## 2017-07-03 NOTE — PLAN OF CARE
Dr Crowe came to bedside and discussed findings. NO N/V,  no abdominal pain, no GI bleeding, and vitals stable.  Pt discharged from unit.

## 2017-07-03 NOTE — DISCHARGE SUMMARY
" Endoscopy Discharge Summary      Admit Date: 7/3/2017    Discharge Date and Time:  7/3/2017 9:47 AM    Attending Physician: Greyson Crowe MD     Discharge Physician: Greyson Crowe MD     Principal Admitting Diagnoses: Chronic LUQ pain         Discharge Diagnosis: The primary encounter diagnosis was Chronic LUQ pain. Diagnoses of Abdominal pain, Nausea, and Ectopic gastric mucosa were also pertinent to this visit.     Discharged Condition: Good    Indication for Admission: Chronic LUQ pain     Hospital Course: Patient was admitted for an inpatient procedure and tolerated the procedure well with no complications.    Significant Diagnostic Studies: EGD    Pathology (if any):  Specimen (12h ago through future)    Start     Ordered    07/03/17 0936  Specimen to Pathology - Surgery  Once     Comments:  1. Antrum biopsy r/o h.pylori2. Small bowel biopsy r/o celiac disease      07/03/17 0944          Disposition: Home.    Bleeding: None    No Implants    Follow Up/Patient Instructions:   Current Discharge Medication List      CONTINUE these medications which have NOT CHANGED    Details   ACETAMINOPHEN (TYLENOL 8 HOUR ORAL) Take by mouth as needed.      azelastine (ASTELIN) 137 mcg (0.1 %) nasal spray 2 sprays (274 mcg total) by Nasal route 2 (two) times daily.  Qty: 30 mL, Refills: 12      bifidobacterium infantis (ALIGN) 4 mg Cap Take 1 capsule by mouth Daily.      blood sugar diagnostic (ACCU-CHEK LUIS FERNANDO) Strp Inject 1 strip into the skin as directed. Check BG 2-3 times daily. Accuchek luis fernando strips  Qty: 300 strip, Refills: 3      !! EASY TOUCH 31 gauge x 3/16" Ndle USE AS DIRECTED WITH INSULIN  Qty: 100 each, Refills: 3      fenofibrate micronized (LOFIBRA) 200 MG Cap Take 1 capsule (200 mg total) by mouth every evening.  Qty: 90 capsule, Refills: 3      hydrocortisone 2.5 % cream       LEVEMIR FLEXPEN 100 unit/mL (3 mL) InPn pen Inject 25 Units into the skin once daily. Needs pens d/t limited manual dexterity.  Qty: " "45 mL, Refills: 3    Associated Diagnoses: Diabetes mellitus without complication      lidocaine (XYLOCAINE) 5 % Oint ointment Apply 1 to 2 grams to affected area 3 to 4 times daily  Refills: 5      lidocaine-prilocaine (EMLA) cream       liraglutide 0.6 mg/0.1 mL, 18 mg/3 mL, subq PNIJ (VICTOZA 3-ALESHA) 0.6 mg/0.1 mL (18 mg/3 mL) PnIj INJECT 1.8 MG DAILY  Qty: 27 mL, Refills: 3      losartan (COZAAR) 50 MG tablet Take 1 tablet (50 mg total) by mouth once daily.  Qty: 90 tablet, Refills: 1      lovastatin (MEVACOR) 40 MG tablet Take 1 tablet by mouth Every evening.  Qty: 90 tablet, Refills: 3      metformin (GLUCOPHAGE) 1000 MG tablet Take 1 tablet (1,000 mg total) by mouth once daily. Generic OK  Qty: 180 tablet, Refills: 3      pantoprazole (PROTONIX) 40 MG tablet Take 1 tablet (40 mg total) by mouth before breakfast.  Qty: 90 tablet, Refills: 3    Associated Diagnoses: Nausea      !! pen needle, diabetic (SURE COMFORT PEN NEEDLE) 31 gauge x 3/16" Ndle USE AS DIRECTED WITH INSULIN  Qty: 100 each, Refills: 4      pioglitazone (ACTOS) 30 MG tablet Take 1 tablet (30 mg total) by mouth once daily.  Qty: 90 tablet, Refills: 1      multivitamin capsule Take 1 capsule by mouth once daily.       !! - Potential duplicate medications found. Please discuss with provider.            Discharge Procedure Orders  Diet general     Activity as tolerated     Call MD for:  temperature >100.4     Call MD for:  persistent nausea and vomiting     Call MD for:  severe uncontrolled pain     Call MD for:  difficulty breathing, headache or visual disturbances     Call MD for:  redness, tenderness, or signs of infection (pain, swelling, redness, odor or green/yellow discharge around incision site)     Call MD for:  hives     Call MD for:  persistent dizziness or light-headedness     No dressing needed         Follow-up Information     Jesse Grimes MD.    Specialty:  Family Medicine  Why:  As needed  Contact information:  [address]  [place] [address]  [phone]

## 2017-07-08 ENCOUNTER — PATIENT MESSAGE (OUTPATIENT)
Dept: GASTROENTEROLOGY | Facility: HOSPITAL | Age: 67
End: 2017-07-08

## 2017-07-08 NOTE — TELEPHONE ENCOUNTER
FINAL PATHOLOGIC DIAGNOSIS  1  Antrum biopsy. Rule out H. pylori:  Antral mucosa without significant histologic alteration;  H. pylori is not identified on H&E-stained sections.  2  Small bowel biopsy. Rule out celiac disease:  Normal small bowel mucosa.    Biopsy results sent to the patient.    baltazar

## 2017-07-20 ENCOUNTER — OFFICE VISIT (OUTPATIENT)
Dept: GASTROENTEROLOGY | Facility: CLINIC | Age: 67
End: 2017-07-20
Payer: COMMERCIAL

## 2017-07-20 VITALS
WEIGHT: 195.75 LBS | HEART RATE: 82 BPM | SYSTOLIC BLOOD PRESSURE: 110 MMHG | HEIGHT: 71 IN | DIASTOLIC BLOOD PRESSURE: 82 MMHG | BODY MASS INDEX: 27.4 KG/M2

## 2017-07-20 DIAGNOSIS — R10.12 LUQ ABDOMINAL PAIN: ICD-10-CM

## 2017-07-20 DIAGNOSIS — K21.9 GASTROESOPHAGEAL REFLUX DISEASE, ESOPHAGITIS PRESENCE NOT SPECIFIED: Primary | ICD-10-CM

## 2017-07-20 DIAGNOSIS — K59.03 DRUG-INDUCED CONSTIPATION: ICD-10-CM

## 2017-07-20 PROCEDURE — 99999 PR PBB SHADOW E&M-EST. PATIENT-LVL III: CPT | Mod: PBBFAC,,, | Performed by: NURSE PRACTITIONER

## 2017-07-20 PROCEDURE — 99214 OFFICE O/P EST MOD 30 MIN: CPT | Mod: S$GLB,,, | Performed by: NURSE PRACTITIONER

## 2017-07-20 NOTE — PROGRESS NOTES
Clinic Follow Up:  [facility] Follow Up Note    Reason for Follow Up:  The primary encounter diagnosis was Gastroesophageal reflux disease, esophagitis presence not specified. Diagnoses of LUQ abdominal pain and Drug-induced constipation were also pertinent to this visit.    PCP: Jesse Grimes       HPI:  This is a 66 y.o. male here for follow up of the above  He reports that since his last visit, he has been feeling overall well without any major complaints. He has decreased the PPI to once daily and has continued the Zantac BID.  He denies any further abdominal pain.  GERD is well controlled.  He continues with intermittent constipation related to his pain medications, however, it is well controlled with PRN Miralax.     Review of Systems   Constitutional: Negative for chills, fever, malaise/fatigue and weight loss.   Respiratory: Negative for cough.    Cardiovascular: Negative for chest pain.   Gastrointestinal:        Per HPI   Musculoskeletal: Negative for myalgias.   Skin: Negative for itching and rash.   Neurological: Negative for headaches.   Psychiatric/Behavioral: The patient is not nervous/anxious.        Medical History:  Past Medical History:   Diagnosis Date    Acid reflux     [facility]    Angina pectoris     Back pain     BPH (benign prostatic hyperplasia)     blue    Degenerative joint disease     Diverticulosis     Erectile dysfunction     History of elevated PSA 2/14    normalized, dr dave annually    History of pericarditis     Hypercholesteremia     Hypertension     Iron deficiency anemia     Pacemaker     complete av block;NO afib    Squamous cell cancer of skin of mastoid region of scalp     dr jaida salgado    Type 2 diabetes mellitus     dr wyman    Urinary incontinence     when he was 7 Yrs old.        Surgical History:   Past Surgical History:   Procedure Laterality Date    CARDIAC PACEMAKER PLACEMENT      JOINT REPLACEMENT      KNEE  "CARTILAGE SURGERY Bilateral     NASAL SINUS SURGERY      SHOULDER ARTHROSCOPY Right     TONSILLECTOMY      TOTAL KNEE ARTHROPLASTY Left 05/15/2017    TRANSURETHRAL RESECTION OF PROSTATE      TYMPANOPLASTY      vesectomy         Family History:   Family History   Problem Relation Age of Onset    Arthritis Mother     Hypertension Mother     Heart disease Father     Hypertension Father     Stroke Father     Diabetes Son     Diabetes Maternal Grandmother     Colon cancer Paternal Grandmother        Social History:   Social History   Substance Use Topics    Smoking status: Never Smoker    Smokeless tobacco: Never Used    Alcohol use Yes      Comment: " once a month"       Allergies: Reviewed    Home Medications:  Current Outpatient Prescriptions on File Prior to Visit   Medication Sig Dispense Refill    ACETAMINOPHEN (TYLENOL 8 HOUR ORAL) Take by mouth as needed.      bifidobacterium infantis (ALIGN) 4 mg Cap Take 1 capsule by mouth Daily.      blood sugar diagnostic (ACCU-CHEK JAXON) Strp Inject 1 strip into the skin as directed. Check BG 2-3 times daily. Accuchek jaxon strips 300 strip 3    EASY TOUCH 31 gauge x 3/16" Ndle USE AS DIRECTED WITH INSULIN 100 each 3    fenofibrate micronized (LOFIBRA) 200 MG Cap Take 1 capsule (200 mg total) by mouth every evening. 90 capsule 3    hydrocortisone 2.5 % cream       LEVEMIR FLEXPEN 100 unit/mL (3 mL) InPn pen Inject 25 Units into the skin once daily. Needs pens d/t limited manual dexterity. (Patient taking differently: Inject 16 Units into the skin 2 (two) times daily. Needs pens d/t limited manual dexterity.) 45 mL 3    liraglutide 0.6 mg/0.1 mL, 18 mg/3 mL, subq PNIJ (VICTOZA 3-ALESHA) 0.6 mg/0.1 mL (18 mg/3 mL) PnIj INJECT 1.8 MG DAILY 27 mL 3    losartan (COZAAR) 50 MG tablet Take 1 tablet (50 mg total) by mouth once daily. 90 tablet 1    lovastatin (MEVACOR) 40 MG tablet Take 1 tablet by mouth Every evening. 90 tablet 3    metformin (GLUCOPHAGE) " "1000 MG tablet Take 1 tablet (1,000 mg total) by mouth once daily. Generic  tablet 3    pantoprazole (PROTONIX) 40 MG tablet Take 1 tablet (40 mg total) by mouth before breakfast. 90 tablet 3    pen needle, diabetic (SURE COMFORT PEN NEEDLE) 31 gauge x 3/16" Ndle USE AS DIRECTED WITH INSULIN 100 each 4    pioglitazone (ACTOS) 30 MG tablet Take 1 tablet (30 mg total) by mouth once daily. 90 tablet 1    azelastine (ASTELIN) 137 mcg (0.1 %) nasal spray 2 sprays (274 mcg total) by Nasal route 2 (two) times daily. 30 mL 12    multivitamin capsule Take 1 capsule by mouth once daily.      [DISCONTINUED] lidocaine (XYLOCAINE) 5 % Oint ointment Apply 1 to 2 grams to affected area 3 to 4 times daily  5    [DISCONTINUED] lidocaine-prilocaine (EMLA) cream        No current facility-administered medications on file prior to visit.        Physical Exam:  Vital Signs:  /82   Pulse 82   Ht 5' 11" (1.803 m)   Wt 88.8 kg (195 lb 12.3 oz)   BMI 27.30 kg/m²   Body mass index is 27.3 kg/m².  Physical Exam   Constitutional: He is oriented to person, place, and time. He appears well-developed.   HENT:   Head: Normocephalic.   Neck: Normal range of motion.   Cardiovascular: Normal rate and regular rhythm.    Pulmonary/Chest: Effort normal and breath sounds normal.   Abdominal: Soft. Bowel sounds are normal. He exhibits no distension. There is no tenderness.   Musculoskeletal: Normal range of motion.   Neurological: He is alert and oriented to person, place, and time.   Skin: Skin is warm and dry.   Psychiatric: He has a normal mood and affect.   Vitals reviewed.      Labs: Pertinent labs reviewed.      Assessment:  1. Gastroesophageal reflux disease, esophagitis presence not specified    2. LUQ abdominal pain    3. Drug-induced constipation        Recommendations:  Gastroesophageal reflux disease, esophagitis presence not specified  LUQ abdominal pain  - Improved  - Continue current medications  - GERD diet and " lifestyle discussed.     Drug-induced constipation  - Improved  - Continue to decrease pain medication use to PRN  - Continue Miralax PRN.         Return to Clinic:    Return in about 6 months (around 1/20/2018).

## 2017-07-24 ENCOUNTER — PATIENT MESSAGE (OUTPATIENT)
Dept: OTOLARYNGOLOGY | Facility: CLINIC | Age: 67
End: 2017-07-24

## 2017-07-25 RX ORDER — AZELASTINE 1 MG/ML
2 SPRAY, METERED NASAL 2 TIMES DAILY
Qty: 30 ML | Refills: 12 | Status: SHIPPED | OUTPATIENT
Start: 2017-07-25 | End: 2018-07-23 | Stop reason: SDUPTHER

## 2017-08-02 ENCOUNTER — PATIENT MESSAGE (OUTPATIENT)
Dept: INTERNAL MEDICINE | Facility: CLINIC | Age: 67
End: 2017-08-02

## 2017-08-02 RX ORDER — FENOFIBRATE 200 MG/1
200 CAPSULE ORAL NIGHTLY
Qty: 90 CAPSULE | Refills: 3 | Status: SHIPPED | OUTPATIENT
Start: 2017-08-02 | End: 2018-08-27 | Stop reason: SDUPTHER

## 2017-10-16 RX ORDER — LOSARTAN POTASSIUM 50 MG/1
50 TABLET ORAL DAILY
Qty: 90 TABLET | Refills: 3 | Status: SHIPPED | OUTPATIENT
Start: 2017-10-16 | End: 2018-10-03 | Stop reason: SDUPTHER

## 2017-10-16 RX ORDER — PIOGLITAZONEHYDROCHLORIDE 30 MG/1
30 TABLET ORAL DAILY
Qty: 90 TABLET | Refills: 3 | Status: SHIPPED | OUTPATIENT
Start: 2017-10-16 | End: 2018-10-03 | Stop reason: SDUPTHER

## 2017-11-20 ENCOUNTER — OFFICE VISIT (OUTPATIENT)
Dept: OTOLARYNGOLOGY | Facility: CLINIC | Age: 67
End: 2017-11-20
Payer: MEDICARE

## 2017-11-20 VITALS
HEART RATE: 93 BPM | SYSTOLIC BLOOD PRESSURE: 145 MMHG | TEMPERATURE: 99 F | WEIGHT: 208.13 LBS | DIASTOLIC BLOOD PRESSURE: 101 MMHG | BODY MASS INDEX: 29.03 KG/M2

## 2017-11-20 DIAGNOSIS — Z98.890 HISTORY OF TYMPANOPLASTY OF LEFT EAR: ICD-10-CM

## 2017-11-20 DIAGNOSIS — J32.9 RHINOSINUSITIS: ICD-10-CM

## 2017-11-20 DIAGNOSIS — Z90.89 H/O MASTOIDECTOMY: ICD-10-CM

## 2017-11-20 DIAGNOSIS — H66.92 ACUTE OTITIS MEDIA, LEFT: Primary | ICD-10-CM

## 2017-11-20 PROCEDURE — 99214 OFFICE O/P EST MOD 30 MIN: CPT | Mod: S$GLB,,, | Performed by: OTOLARYNGOLOGY

## 2017-11-20 PROCEDURE — 99999 PR PBB SHADOW E&M-EST. PATIENT-LVL IV: CPT | Mod: PBBFAC,,, | Performed by: OTOLARYNGOLOGY

## 2017-11-20 PROCEDURE — 87070 CULTURE OTHR SPECIMN AEROBIC: CPT

## 2017-11-20 RX ORDER — CIPROFLOXACIN AND DEXAMETHASONE 3; 1 MG/ML; MG/ML
5 SUSPENSION/ DROPS AURICULAR (OTIC) 2 TIMES DAILY
Qty: 7.5 ML | Refills: 0 | Status: SHIPPED | OUTPATIENT
Start: 2017-11-20 | End: 2017-12-04

## 2017-11-20 RX ORDER — METHYLPREDNISOLONE 4 MG/1
TABLET ORAL
Qty: 1 PACKAGE | Refills: 0 | Status: SHIPPED | OUTPATIENT
Start: 2017-11-20 | End: 2017-12-04 | Stop reason: ALTCHOICE

## 2017-11-20 RX ORDER — LEVOFLOXACIN 500 MG/1
500 TABLET, FILM COATED ORAL DAILY
Qty: 14 TABLET | Refills: 0 | Status: SHIPPED | OUTPATIENT
Start: 2017-11-20 | End: 2017-12-04 | Stop reason: ALTCHOICE

## 2017-11-20 NOTE — PROGRESS NOTES
Subjective:   Patient: Rigoberto Vasquez [id number], :1950   Visit date:2017 3:04 PM    Chief Complaint:  Right ear pain.    HPI:  Rigoberto is a 67 y.o. male     2 weeks nasal drainage, post nasal drip.  Left ear pain.   Location: left ear  Severity: moderate  Quality: crampy  Duration: 2 week(s)  Modifying factors:  None  Associated signs and symptoms:  Decrease hearing    Surgery date: 3/26/15  Procedure:   1. left tympanoplasty with CWU mastoidectomy with ossicular reconstruction  2. cartilage graft (palisading technique)  3. Tympanostomy tube placement (T tube)  4. Placement of silastic spacer in middle ear    Findings at surgery:   External canal- Normal  Tympanic membrane- Severe retraction with complete myringopexy. Non adherent to the promontory but severe adherence onto ossicles and stapes suprastructure  Middle Ear Mucosa- Normal  Ossicles- Abnormal - erosion of IS joint with retracted TM attached at this point  Mastoid cavity- Normal  Other- None      Review of Systems:  -     Allergic/Immunologic: is allergic to aspirin; meperidine; and niacin..  -     Constitutional: Current temp: 98.7 °F (37.1 °C) (Tympanic)    His meds, allergies, medical, surgical, social & family histories were reviewed & updated:  -     He has a current medication list which includes the following prescription(s): acetaminophen, azelastine, bifidobacterium infantis, blood sugar diagnostic, easy touch, fenofibrate micronized, hydrocortisone, levemir flexpen, liraglutide 0.6 mg/0.1 ml (18 mg/3 ml) subq pnij, losartan, lovastatin, metformin, multivitamin, pantoprazole, pen needle, diabetic, pioglitazone, ranitidine, ciprofloxacin-dexamethasone 0.3-0.1%, levofloxacin, and methylprednisolone.  -     He  has a past medical history of Acid reflux; Angina pectoris; Back pain; BPH (benign prostatic hyperplasia); Degenerative joint disease; Diverticulosis; Erectile dysfunction; History of elevated PSA (); History of pericarditis;  Hypercholesteremia; Hypertension; Iron deficiency anemia; Pacemaker; Squamous cell cancer of skin of mastoid region of scalp; Type 2 diabetes mellitus; Urinary incontinence; and when he was 7 Yrs old..   -     He  does not have any pertinent problems on file.   -     He  has a past surgical history that includes Shoulder arthroscopy (Right); Nasal sinus surgery; Cardiac pacemaker placement; Tonsillectomy; Transurethral resection of prostate; Knee cartilage surgery (Bilateral); Tympanoplasty; vesectomy; Total knee arthroplasty (Left, 05/15/2017); and Joint replacement.  -     He  reports that he has never smoked. He has never used smokeless tobacco. He reports that he drinks alcohol. He reports that he does not use drugs.  -     His family history includes Arthritis in his mother; Colon cancer in his paternal grandmother; Diabetes in his maternal grandmother and son; Heart disease in his father; Hypertension in his father and mother; Stroke in his father.  -     He is allergic to aspirin; meperidine; and niacin.    Objective:     Physical Exam:  Vitals:    BP (!) 145/101   Pulse 93   Temp 98.7 °F (37.1 °C) (Tympanic)   Wt 94.4 kg (208 lb 1.8 oz)   BMI 29.03 kg/m²   Physical Exam   Constitutional: He is oriented to person, place, and time. Vital signs are normal. He appears well-developed and well-nourished. No distress.   HENT:   Head: Normocephalic and atraumatic.   Right Ear: External ear and ear canal normal. Tympanic membrane is mildly retracted.  Left ear:  Purulence and debris near TM with edema and erythema of TM.  Unable to see tube despite suctioning.   Nose: No significant abnormality   Mouth/Throat: Uvula is midline, oropharynx is clear and moist and mucous membranes are normal. No trismus in the jaw. Normal dentition. No uvula swelling. No oropharyngeal exudate or posterior oropharyngeal edema.   Eyes: Conjunctivae and EOM are normal. Pupils are equal, round, and reactive to light. Right eye exhibits  no chemosis. Left eye exhibits no chemosis. Right conjunctiva is not injected. Left conjunctiva is not injected. No scleral icterus.   Neck: Trachea normal and phonation normal. No tracheal tenderness present. No tracheal deviation present. No thyroid mass and no thyromegaly present.   Cardiovascular: Intact distal pulses.   Pulmonary/Chest: Effort normal. No accessory muscle usage or stridor. No respiratory distress.   Lymphadenopathy:   Head (right side): No submental, no submandibular, no preauricular and no posterior auricular adenopathy present.   Head (left side): No submental, no submandibular, no preauricular and no posterior auricular adenopathy present.   He has no cervical adenopathy.   Right cervical: No superficial cervical and no deep cervical adenopathy present.  Left cervical: No superficial cervical and no deep cervical adenopathy present.   Neurological: He is alert and oriented to person, place, and time. No cranial nerve deficit.   Skin: Skin is warm and dry. No rash noted. No erythema.   Psychiatric: He has a normal mood and affect. His behavior is normal. Thought content normal.                           Assessment & Plan:   Rigoberto was seen today for otalgia.    Diagnoses and all orders for this visit:    Acute otitis media, left  -     CULTURE, AEROBIC  (SPECIFY SOURCE)    H/O mastoidectomy    History of tympanoplasty of left ear    Rhinosinusitis    Other orders  -     ciprofloxacin-dexamethasone 0.3-0.1% (CIPRODEX) 0.3-0.1 % DrpS; Place 5 drops into the left ear 2 (two) times daily.  -     levoFLOXacin (LEVAQUIN) 500 MG tablet; Take 1 tablet (500 mg total) by mouth once daily.  -     methylPREDNISolone (MEDROL DOSEPACK) 4 mg tablet; use as directed         Left ear- acutely infected.  Cultured.  Empiric abx      Right ear- intermittent eustachian tube dysfunction mild evidence of retraction on exam.  He has some issues with wax and advised OTC rinses are OK but ONLY in the right ear.    Nasal-  acute infection.  Abx/steroids.  Recommended trying to avoid steroids but he states that without them he never resolves.  He will call PCP for sugars over [phone]    We discussed his medical conditions, treatments and plan.  Rigoberto should return to clinic if any issues arise (symptoms worsen or persist), otherwise we will see him back in the clinic in a year.

## 2017-11-22 LAB — BACTERIA SPEC AEROBE CULT: NORMAL

## 2017-12-04 ENCOUNTER — OFFICE VISIT (OUTPATIENT)
Dept: OTOLARYNGOLOGY | Facility: CLINIC | Age: 67
End: 2017-12-04
Payer: MEDICARE

## 2017-12-04 VITALS
BODY MASS INDEX: 28.23 KG/M2 | TEMPERATURE: 99 F | WEIGHT: 202.38 LBS | DIASTOLIC BLOOD PRESSURE: 82 MMHG | HEART RATE: 82 BPM | SYSTOLIC BLOOD PRESSURE: 131 MMHG

## 2017-12-04 DIAGNOSIS — Z98.890 HISTORY OF TYMPANOPLASTY OF LEFT EAR: ICD-10-CM

## 2017-12-04 DIAGNOSIS — Z90.89 H/O MASTOIDECTOMY: Primary | ICD-10-CM

## 2017-12-04 DIAGNOSIS — H69.93 EUSTACHIAN TUBE DYSFUNCTION, BILATERAL: ICD-10-CM

## 2017-12-04 PROCEDURE — 99213 OFFICE O/P EST LOW 20 MIN: CPT | Mod: S$GLB,,, | Performed by: OTOLARYNGOLOGY

## 2017-12-04 PROCEDURE — 99999 PR PBB SHADOW E&M-EST. PATIENT-LVL III: CPT | Mod: PBBFAC,,, | Performed by: OTOLARYNGOLOGY

## 2017-12-04 NOTE — PROGRESS NOTES
Subjective:   Patient: Rigoberto Vasquez [id number], :1950   Visit date:2017 3:04 PM    Chief Complaint:  Right ear pain.    HPI:  Rigoberto is a 67 y.o. male     Treated with steroids and levaquin.  Ear no longer draining.  Feeling better.  Still with poor hearing.     Surgery date: 3/26/15  Procedure:   1. left tympanoplasty with CWU mastoidectomy with ossicular reconstruction  2. cartilage graft (palisading technique)  3. Tympanostomy tube placement (T tube)  4. Placement of silastic spacer in middle ear    Findings at surgery:   External canal- Normal  Tympanic membrane- Severe retraction with complete myringopexy. Non adherent to the promontory but severe adherence onto ossicles and stapes suprastructure  Middle Ear Mucosa- Normal  Ossicles- Abnormal - erosion of IS joint with retracted TM attached at this point  Mastoid cavity- Normal  Other- None      Review of Systems:  -     Allergic/Immunologic: is allergic to aspirin; meperidine; and niacin..  -     Constitutional: Current temp: 98.5 °F (36.9 °C) (Tympanic)    His meds, allergies, medical, surgical, social & family histories were reviewed & updated:  -     He has a current medication list which includes the following prescription(s): acetaminophen, azelastine, bifidobacterium infantis, blood sugar diagnostic, easy touch, fenofibrate micronized, hydrocortisone, levemir flexpen, liraglutide 0.6 mg/0.1 ml (18 mg/3 ml) subq pnij, losartan, lovastatin, metformin, multivitamin, pantoprazole, pen needle, diabetic, pioglitazone, ranitidine, amoxicillin-clavulanate 875-125mg, fluticasone, and methylprednisolone.  -     He  has a past medical history of Acid reflux; Angina pectoris; Back pain; BPH (benign prostatic hyperplasia); Degenerative joint disease; Diverticulosis; Erectile dysfunction; History of elevated PSA (); History of pericarditis; Hypercholesteremia; Hypertension; Iron deficiency anemia; Pacemaker; Squamous cell cancer of skin of mastoid region  of scalp; Type 2 diabetes mellitus; Urinary incontinence; and when he was 7 Yrs old..   -     He  does not have any pertinent problems on file.   -     He  has a past surgical history that includes Shoulder arthroscopy (Right); Nasal sinus surgery; Cardiac pacemaker placement; Tonsillectomy; Transurethral resection of prostate; Knee cartilage surgery (Bilateral); Tympanoplasty; vesectomy; Total knee arthroplasty (Left, 05/15/2017); and Joint replacement.  -     He  reports that he has never smoked. He has never used smokeless tobacco. He reports that he drinks alcohol. He reports that he does not use drugs.  -     His family history includes Arthritis in his mother; Colon cancer in his paternal grandmother; Diabetes in his maternal grandmother and son; Heart disease in his father; Hypertension in his father and mother; Stroke in his father.  -     He is allergic to aspirin; meperidine; and niacin.    Objective:     Physical Exam:  Vitals:    /82   Pulse 82   Temp 98.5 °F (36.9 °C) (Tympanic)   Wt 91.8 kg (202 lb 6.1 oz)   BMI 28.23 kg/m²   Physical Exam   Constitutional: He is oriented to person, place, and time. Vital signs are normal. He appears well-developed and well-nourished. No distress.   HENT:   Head: Normocephalic and atraumatic.   Right Ear: External ear and ear canal normal. Tympanic membrane is mildly retracted.  Left ear: Debris near TM; perforation anteriorly.  Blunted landmarks. ?squamous debris  Nose: No significant abnormality   Mouth/Throat: Uvula is midline, oropharynx is clear and moist and mucous membranes are normal. No trismus in the jaw. Normal dentition. No uvula swelling. No oropharyngeal exudate or posterior oropharyngeal edema.   Eyes: Conjunctivae and EOM are normal. Pupils are equal, round, and reactive to light. Right eye exhibits no chemosis. Left eye exhibits no chemosis. Right conjunctiva is not injected. Left conjunctiva is not injected. No scleral icterus.   Neck:  Trachea normal and phonation normal. No tracheal tenderness present. No tracheal deviation present. No thyroid mass and no thyromegaly present.   Cardiovascular: Intact distal pulses.   Pulmonary/Chest: Effort normal. No accessory muscle usage or stridor. No respiratory distress.   Lymphadenopathy:   Head (right side): No submental, no submandibular, no preauricular and no posterior auricular adenopathy present.   Head (left side): No submental, no submandibular, no preauricular and no posterior auricular adenopathy present.   He has no cervical adenopathy.   Right cervical: No superficial cervical and no deep cervical adenopathy present.  Left cervical: No superficial cervical and no deep cervical adenopathy present.   Neurological: He is alert and oriented to person, place, and time. No cranial nerve deficit.   Skin: Skin is warm and dry. No rash noted. No erythema.   Psychiatric: He has a normal mood and affect. His behavior is normal. Thought content normal.                           Assessment & Plan:   Rigoberto was seen today for follow-up and sinus problem.    Diagnoses and all orders for this visit:    H/O mastoidectomy    History of tympanoplasty of left ear    Rhinosinusitis    Eustachian tube dysfunction, bilateral         Left ear- concerned for cholesteatoma; will recheck in 1 month; call if draining or having pain      Right ear- intermittent eustachian tube dysfunction mild evidence of retraction on exam.  He has some issues with wax and advised OTC rinses are OK but ONLY in the right ear.    We discussed his medical conditions, treatments and plan.  Rigoberto should return to clinic if any issues arise (symptoms worsen or persist), otherwise we will see him back in the clinic in a year.

## 2017-12-11 ENCOUNTER — OFFICE VISIT (OUTPATIENT)
Dept: OPHTHALMOLOGY | Facility: CLINIC | Age: 67
End: 2017-12-11
Payer: MEDICARE

## 2017-12-11 DIAGNOSIS — H35.341 MACULAR HOLE OF RIGHT EYE: ICD-10-CM

## 2017-12-11 DIAGNOSIS — E11.59 HYPERTENSION ASSOCIATED WITH DIABETES: Chronic | ICD-10-CM

## 2017-12-11 DIAGNOSIS — Z79.4 TYPE 2 DIABETES MELLITUS WITHOUT COMPLICATION, WITH LONG-TERM CURRENT USE OF INSULIN: Primary | Chronic | ICD-10-CM

## 2017-12-11 DIAGNOSIS — H40.019 OPEN ANGLE WITH BORDERLINE FINDINGS, LOW RISK: ICD-10-CM

## 2017-12-11 DIAGNOSIS — E11.9 TYPE 2 DIABETES MELLITUS WITHOUT COMPLICATION, WITH LONG-TERM CURRENT USE OF INSULIN: Primary | Chronic | ICD-10-CM

## 2017-12-11 DIAGNOSIS — D31.31 CHOROIDAL NEVUS OF RIGHT EYE: ICD-10-CM

## 2017-12-11 DIAGNOSIS — I15.2 HYPERTENSION ASSOCIATED WITH DIABETES: Chronic | ICD-10-CM

## 2017-12-11 DIAGNOSIS — H40.003 GLAUCOMA SUSPECT OF BOTH EYES: ICD-10-CM

## 2017-12-11 DIAGNOSIS — H35.372 EPIRETINAL MEMBRANE (ERM) OF LEFT EYE: ICD-10-CM

## 2017-12-11 DIAGNOSIS — H52.13 SEVERE MYOPIA OF BOTH EYES: ICD-10-CM

## 2017-12-11 PROCEDURE — 92133 CPTRZD OPH DX IMG PST SGM ON: CPT | Mod: S$GLB,,, | Performed by: OPHTHALMOLOGY

## 2017-12-11 PROCEDURE — 99499 UNLISTED E&M SERVICE: CPT | Mod: S$GLB,,, | Performed by: OPHTHALMOLOGY

## 2017-12-11 PROCEDURE — 92014 COMPRE OPH EXAM EST PT 1/>: CPT | Mod: S$GLB,,, | Performed by: OPHTHALMOLOGY

## 2017-12-11 PROCEDURE — 99999 PR PBB SHADOW E&M-EST. PATIENT-LVL II: CPT | Mod: PBBFAC,,, | Performed by: OPHTHALMOLOGY

## 2017-12-11 NOTE — PROGRESS NOTES
===============================  12/11/2017   Rigoberto Vasquez,   67 y.o. male   Last visit [facility]: :12/5/2016   Last visit eye dept. Visit date not found  [facility]:  Corrected distance visual acuity was 20/25 in the right eye and 20/25 in the left eye.  Tonometry     Tonometry (Applanation, 9:56 AM)       Right Left    Pressure 21 18              Wearing Rx     Wearing Rx       Sphere Cylinder Axis Add    Right -5.50 +1.25 160 +2.25    Left -5.25 +0.25 160 +2.25              Manifest Refraction     Manifest Refraction       Sphere Cylinder Axis Dist [facility]    Right -5.50 +1.25 160 20/20    Left -4.75 +0.25 160 20/20              No chief complaint on file.       HPI     1. DM since 1999  NO DBR  2. Mac hole od  3. erm os  4. Choroidal nevus od   5. HIGH MYOPE(-6)  6. SCOAG  iop 25/ 20 ou on po steroid  Last vf 5/25/15 worse os / od stable with ^ blind spot  anamolous myopic disc  ASSYMETRIC C/D 0.7 / 0.5  (-7)  GOOD IOP  NO FAMILY HISTORY      Last edited by FREDERICK Epperson MD on 12/11/2017  9:37 AM. (History)          ________________  12/11/2017  Problem List Items Addressed This Visit        Eye/Vision problems    Hypertension associated with diabetes (Chronic)  No htnr    Type 2 diabetes mellitus - Primary (Chronic)  No bdr    Choroidal nevus of right eye  Choroidal nevus along [address], perfectly wedged between primary superior vein and primary superior artery, drusen in center of nevus stable appearance from previous visits  Continue to follow  optos photos today      Overview              Relevant Orders    Color Fundus Photography - OU - Both Eyes (Completed)    Epiretinal membrane (ERM) of left eye  Stable, see images below - follow    Macular hole of right eye  Stable see images below - follow       Other    Open angle with borderline findings, low risk  Slight worse RNFL?  Increased c:d  Recommend VF next available    Relevant Orders    Posterior Segment OCT Optic Nerve- Both eyes (Completed)    High myopia     Glaucoma suspect of both eyes    Overview                    .RNFL symmetric low NFL OU  Recommend repeat VF and iop check next available  Disc oct     ===========================

## 2017-12-12 ENCOUNTER — TELEPHONE (OUTPATIENT)
Dept: OTOLARYNGOLOGY | Facility: CLINIC | Age: 67
End: 2017-12-12

## 2017-12-12 NOTE — TELEPHONE ENCOUNTER
Scheduled pt to see Dorinda Ludwig PA-C on 12/13/17 @10am at the [facility] for possible sinus infection.  Pt verbalized understanding of date/time/location.

## 2017-12-12 NOTE — TELEPHONE ENCOUNTER
----- Message from Irene Licona sent at 12/12/2017  8:21 AM CST -----  Contact: self [phone]  Would like same day appointment for sinus infection.   Please call back at [phone].   Md Katie

## 2017-12-13 ENCOUNTER — OFFICE VISIT (OUTPATIENT)
Dept: OTOLARYNGOLOGY | Facility: CLINIC | Age: 67
End: 2017-12-13
Payer: MEDICARE

## 2017-12-13 VITALS
WEIGHT: 203.06 LBS | HEART RATE: 88 BPM | DIASTOLIC BLOOD PRESSURE: 85 MMHG | SYSTOLIC BLOOD PRESSURE: 131 MMHG | TEMPERATURE: 99 F | BODY MASS INDEX: 28.32 KG/M2

## 2017-12-13 DIAGNOSIS — Z98.890 HISTORY OF SINUS SURGERY: ICD-10-CM

## 2017-12-13 DIAGNOSIS — J01.90 ACUTE SINUSITIS, RECURRENCE NOT SPECIFIED, UNSPECIFIED LOCATION: Primary | ICD-10-CM

## 2017-12-13 DIAGNOSIS — Z98.890 HISTORY OF TYMPANOPLASTY OF LEFT EAR: ICD-10-CM

## 2017-12-13 PROCEDURE — 99214 OFFICE O/P EST MOD 30 MIN: CPT | Mod: S$GLB,,, | Performed by: PHYSICIAN ASSISTANT

## 2017-12-13 PROCEDURE — 99499 UNLISTED E&M SERVICE: CPT | Mod: S$GLB,,, | Performed by: PHYSICIAN ASSISTANT

## 2017-12-13 PROCEDURE — 99999 PR PBB SHADOW E&M-EST. PATIENT-LVL IV: CPT | Mod: PBBFAC,,, | Performed by: PHYSICIAN ASSISTANT

## 2017-12-13 RX ORDER — FLUTICASONE PROPIONATE 50 MCG
2 SPRAY, SUSPENSION (ML) NASAL DAILY
Qty: 1 BOTTLE | Refills: 12 | Status: SHIPPED | OUTPATIENT
Start: 2017-12-13 | End: 2018-07-02

## 2017-12-13 RX ORDER — AMOXICILLIN AND CLAVULANATE POTASSIUM 875; 125 MG/1; MG/1
1 TABLET, FILM COATED ORAL 2 TIMES DAILY
Qty: 20 TABLET | Refills: 0 | Status: SHIPPED | OUTPATIENT
Start: 2017-12-13 | End: 2017-12-23

## 2017-12-13 RX ORDER — METHYLPREDNISOLONE 4 MG/1
TABLET ORAL
Qty: 1 PACKAGE | Refills: 0 | Status: SHIPPED | OUTPATIENT
Start: 2017-12-13 | End: 2018-01-03

## 2017-12-13 NOTE — PROGRESS NOTES
"Subjective:   Patient: Rigoberto Vasquez [id number], :1950   Visit date:2017 10:37 AM    Chief Complaint:  Sinus Problem    HPI:  Rigoberto is a 67 y.o. male who is here for sinus issues. He was last here with Dr. Lopez on 17 in followup for ear pain and drainage.  He was treated with steroids and levaquin and reported then that his ear was no longer draining.  Feeling better but still with poor hearing.     Today he notes 2 weeks of worsening thick yellow-green nasal drainage and postnasal drainage; subjective fevers; facial pain and pressure.  His right ear feels "sucked in."  Denies ear drainage.  He's had slight cough.  Had headache yesterday and today notes pressure in his forehead.  He's using Nyquil, Tylenol Cold and Flu, and Astelin.  Also saline irrigation BID.  He has history of 2 prior sinus surgeries; last one 20 years ago.     Surgery date: 3/26/15  Procedure:   1. left tympanoplasty with CWU mastoidectomy with ossicular reconstruction  2. cartilage graft (palisading technique)  3. Tympanostomy tube placement (T tube)  4. Placement of silastic spacer in middle ear     Findings at surgery:   External canal- Normal  Tympanic membrane- Severe retraction with complete myringopexy. Non adherent to the promontory but severe adherence onto ossicles and stapes suprastructure  Middle Ear Mucosa- Normal  Ossicles- Abnormal - erosion of IS joint with retracted TM attached at this point  Mastoid cavity- Normal  Other- None        Review of Systems:  -     Allergic/Immunologic: is allergic to aspirin; meperidine; and niacin..  -     Constitutional: Current temp: 99 °F (37.2 °C) (Tympanic)    His meds, allergies, medical, surgical, social & family histories were reviewed & updated:  -     He has a current medication list which includes the following prescription(s): acetaminophen, azelastine, bifidobacterium infantis, blood sugar diagnostic, easy touch, fenofibrate micronized, hydrocortisone, levemir flexpen, " liraglutide 0.6 mg/0.1 ml (18 mg/3 ml) subq pnij, losartan, lovastatin, metformin, multivitamin, pantoprazole, pen needle, diabetic, pioglitazone, ranitidine, amoxicillin-clavulanate 875-125mg, fluticasone, and methylprednisolone.  -     He  has a past medical history of Acid reflux; Angina pectoris; Back pain; BPH (benign prostatic hyperplasia); Degenerative joint disease; Diverticulosis; Erectile dysfunction; History of elevated PSA (2/14); History of pericarditis; Hypercholesteremia; Hypertension; Iron deficiency anemia; Pacemaker; Squamous cell cancer of skin of mastoid region of scalp; Type 2 diabetes mellitus; Urinary incontinence; and when he was 7 Yrs old..   -     He  does not have any pertinent problems on file.   -     He  has a past surgical history that includes Shoulder arthroscopy (Right); Nasal sinus surgery; Cardiac pacemaker placement; Tonsillectomy; Transurethral resection of prostate; Knee cartilage surgery (Bilateral); Tympanoplasty; vesectomy; Total knee arthroplasty (Left, 05/15/2017); and Joint replacement.  -     He  reports that he has never smoked. He has never used smokeless tobacco. He reports that he drinks alcohol. He reports that he does not use drugs.  -     His family history includes Arthritis in his mother; Colon cancer in his paternal grandmother; Diabetes in his maternal grandmother and son; Heart disease in his father; Hypertension in his father and mother; Stroke in his father.  -     He is allergic to aspirin; meperidine; and niacin.    Objective:     Physical Exam:  Vitals:  /85   Pulse 88   Temp 99 °F (37.2 °C) (Tympanic)   Wt 92.1 kg (203 lb 0.7 oz)   BMI 28.32 kg/m²   Appearance:  Well-developed, well-nourished.  Communication:  Able to communicate, no hoarseness.  Head & Face:  Normocephalic, atraumatic, sinus tenderness bilateral maxillary, normal facial strength.  Eyes:  Extraocular motions intact. Eyeglasses  Ears:  Otoscopy of right external auditory canal  and tympanic membrane was retracted,  Left TM is abnormal with loss of landmarks; do not see T tube; no drainage but squamous debris in front of TM.  clinical speech reception thresholds grossly intact, no mass/lesion of auricle.     Nose:  No masses/lesions of external nose, nasal mucosa edematous, septum, and turbinates were within normal limits.  Mouth:  No mass/lesion of lips, teeth, gums, hard/soft palate, tongue, tonsils, or oropharynx.  Neck & Lymphatics:  No cervical lymphadenopathy, no neck mass/crepitus/ asymmetry, trachea is midline, no thyroid enlargement/tenderness/mass.  Neuro/Psych: Alert with normal mood and affect.   Abdominal: Normal appearance.   Respiration/Chest:  Symmetric expansion during respiration, normal respiratory effort.  Skin:  Warm and intact  Cardiovascular:  No peripheral vascular edema or varicosities.    Assessment & Plan:   Rigoberto was seen today for sinus problem.    Diagnoses and all orders for this visit:    Acute sinusitis, recurrence not specified, unspecified location    History of sinus surgery    History of tympanoplasty of left ear    Other orders  -     amoxicillin-clavulanate 875-125mg (AUGMENTIN) 875-125 mg per tablet; Take 1 tablet by mouth 2 (two) times daily.  -     methylPREDNISolone (MEDROL DOSEPACK) 4 mg tablet; use as directed  -     fluticasone (FLONASE) 50 mcg/actuation nasal spray; 2 sprays by Each Nare route once daily.        Medications as above.  He is diabetic and we discussed risk of hyperglycemia with steroid use.  He wishes to proceed with steroids and will call PCP with any sugars > 300.  Continue saline nasal irrigations BID and Astelin.  Will add Flonase to his regimen.   Instructed him to call if no improvement or symptoms worsen; otherwise keep already scheduled followup with Dr. Lopez to recheck ear.

## 2017-12-27 ENCOUNTER — LAB VISIT (OUTPATIENT)
Dept: LAB | Facility: HOSPITAL | Age: 67
End: 2017-12-27
Attending: FAMILY MEDICINE
Payer: MEDICARE

## 2017-12-27 DIAGNOSIS — E11.9 TYPE 2 DIABETES MELLITUS WITHOUT COMPLICATION, WITHOUT LONG-TERM CURRENT USE OF INSULIN: ICD-10-CM

## 2017-12-27 LAB
ESTIMATED AVG GLUCOSE: 126 MG/DL
HBA1C MFR BLD HPLC: 6 %

## 2017-12-27 PROCEDURE — 83036 HEMOGLOBIN GLYCOSYLATED A1C: CPT

## 2017-12-27 PROCEDURE — 36415 COLL VENOUS BLD VENIPUNCTURE: CPT | Mod: PO

## 2018-01-02 ENCOUNTER — PATIENT MESSAGE (OUTPATIENT)
Dept: ADMINISTRATIVE | Facility: OTHER | Age: 68
End: 2018-01-02

## 2018-01-03 ENCOUNTER — OFFICE VISIT (OUTPATIENT)
Dept: INTERNAL MEDICINE | Facility: CLINIC | Age: 68
End: 2018-01-03
Payer: MEDICARE

## 2018-01-03 VITALS
SYSTOLIC BLOOD PRESSURE: 126 MMHG | WEIGHT: 205.69 LBS | HEART RATE: 82 BPM | HEIGHT: 71 IN | BODY MASS INDEX: 28.8 KG/M2 | DIASTOLIC BLOOD PRESSURE: 88 MMHG | OXYGEN SATURATION: 96 % | TEMPERATURE: 98 F

## 2018-01-03 DIAGNOSIS — E78.5 HYPERLIPIDEMIA ASSOCIATED WITH TYPE 2 DIABETES MELLITUS: Chronic | ICD-10-CM

## 2018-01-03 DIAGNOSIS — E11.69 HYPERLIPIDEMIA ASSOCIATED WITH TYPE 2 DIABETES MELLITUS: Chronic | ICD-10-CM

## 2018-01-03 DIAGNOSIS — Z79.4 TYPE 2 DIABETES MELLITUS WITHOUT COMPLICATION, WITH LONG-TERM CURRENT USE OF INSULIN: Primary | Chronic | ICD-10-CM

## 2018-01-03 DIAGNOSIS — E11.9 TYPE 2 DIABETES MELLITUS WITHOUT COMPLICATION, WITH LONG-TERM CURRENT USE OF INSULIN: Primary | Chronic | ICD-10-CM

## 2018-01-03 DIAGNOSIS — E11.59 HYPERTENSION ASSOCIATED WITH DIABETES: Chronic | ICD-10-CM

## 2018-01-03 DIAGNOSIS — I15.2 HYPERTENSION ASSOCIATED WITH DIABETES: Chronic | ICD-10-CM

## 2018-01-03 PROCEDURE — 99499 UNLISTED E&M SERVICE: CPT | Mod: S$GLB,,, | Performed by: FAMILY MEDICINE

## 2018-01-03 PROCEDURE — 90732 PPSV23 VACC 2 YRS+ SUBQ/IM: CPT | Mod: S$GLB,,, | Performed by: FAMILY MEDICINE

## 2018-01-03 PROCEDURE — 99214 OFFICE O/P EST MOD 30 MIN: CPT | Mod: S$GLB,,, | Performed by: FAMILY MEDICINE

## 2018-01-03 PROCEDURE — 99999 PR PBB SHADOW E&M-EST. PATIENT-LVL III: CPT | Mod: PBBFAC,,, | Performed by: FAMILY MEDICINE

## 2018-01-03 PROCEDURE — G0009 ADMIN PNEUMOCOCCAL VACCINE: HCPCS | Mod: S$GLB,,, | Performed by: FAMILY MEDICINE

## 2018-01-03 RX ORDER — METFORMIN HYDROCHLORIDE 1000 MG/1
1000 TABLET ORAL DAILY
Qty: 180 TABLET | Refills: 3 | Status: SHIPPED | OUTPATIENT
Start: 2018-01-03 | End: 2019-01-15 | Stop reason: SDUPTHER

## 2018-01-03 NOTE — PROGRESS NOTES
Subjective:       Patient ID: Rigoberto Vasquez is a . male.    Chief Complaint: Multiple issues see below    HPIType 2 diabetes: a1c ok . elida insul. ;Tolerating medicine. No hypoglycemia c/o; ;on metform once daily and elida      Hypertension: blood pressures at home normal. Tolerating medicine.   Hypercholesterolemia: controlled. Tolerating medicine.  GERD sympt w/o med. last egd fall 15;asympt. Tolerating medication. No swallowing problems    Hx elev psa sees dr dave annually utd        Pacemaker utd dr davila    Past Medical History   Diagnosis Date    Hypertension     Acid reflux     Degenerative joint disease     Erectile dysfunction     pacemaker       dr davila card    Diverticulosis     Pacemaker      complete av block    BPH (benign prostatic hyperplasia)      blue    History of pericarditis     Squamous cell cancer of skin of mastoid region of scalp      dr jaida salgado    Type 2 diabetes mellitus     Hypercholesteremia     Glaucoma      Past Surgical History   Procedure Laterality Date    Shoulder arthroscopy      Knee surgery       cartilage removal    Nasal sinus surgery      Cardiac pacemaker placement      Tonsillectomy      Transurethral resection of prostate       Family History   Problem Relation Age of Onset    Arthritis Mother     Hypertension Mother     Heart disease Father     Hypertension Father     Stroke Father     Diabetes Son     Diabetes Maternal Grandmother                    Review of Systems   Respiratory: Negative for shortness of breath.    Cardiovascular: Negative for chest pain and leg swelling.       Objective:      Physical Exam   Constitutional: He is oriented to person, place, and time. He appears well-developed and well-nourished.   HENT:   Head: Normocephalic and atraumatic.   Eyes: Conjunctivae normal and EOM are normal. Pupils are equal, round, and reactive to light.   Neck: Normal range of motion. Neck supple. Carotid bruit is not present.    Cardiovascular: Normal rate and regular rhythm.    Pulmonary/Chest: Effort normal and breath sounds normal.     Musculoskeletal: He exhibits no edema.   Neurological: He is alert and oriented to person, place, and time.   Skin: Skin is warm and dry.   Psychiatric: He has a normal mood and affect. His behavior is normal. Judgment and thought content normal.     Bilateral feet with normal monofilament testing and no lesions.      Assessment:         type 2 dm  htn  gerd  hyperchol  Hx elev psa  Pacemaker hx    Plan:    **  F/u card and urol as planned    He req dietician for dm f/u and help wt loss gained after knee surg   Lab and follow up after in 6 months; Leigh      Avoid nsaids except for rare use(note seeing ortho for chronic djd knees_)  Type 2 diabetes mellitus without complication, with long-term current use of insulin  -     Comprehensive metabolic panel; Future; Expected date: 07/02/2018  -     Lipid panel; Future; Expected date: 07/02/2018  -     Microalbumin/creatinine urine ratio; Future; Expected date: 07/02/2018  -     Hemoglobin A1c; Future; Expected date: 07/02/2018  -     Ambulatory Referral to Diabetes Education    Hypertension associated with diabetes    Hyperlipidemia associated with type 2 diabetes mellitus    Other orders  -     (In Office Administered) Pneumococcal Polysaccharide Vaccine (23 Valent) (SQ/IM)

## 2018-01-12 ENCOUNTER — OFFICE VISIT (OUTPATIENT)
Dept: OTOLARYNGOLOGY | Facility: CLINIC | Age: 68
End: 2018-01-12
Payer: MEDICARE

## 2018-01-12 VITALS — BODY MASS INDEX: 29.08 KG/M2 | WEIGHT: 207.69 LBS | RESPIRATION RATE: 18 BRPM | HEIGHT: 71 IN

## 2018-01-12 DIAGNOSIS — Z98.890 HISTORY OF TYMPANOPLASTY OF LEFT EAR: ICD-10-CM

## 2018-01-12 DIAGNOSIS — H69.93 EUSTACHIAN TUBE DYSFUNCTION, BILATERAL: ICD-10-CM

## 2018-01-12 DIAGNOSIS — Z98.890 HISTORY OF SINUS SURGERY: Primary | ICD-10-CM

## 2018-01-12 DIAGNOSIS — J30.89 CHRONIC NON-SEASONAL ALLERGIC RHINITIS, UNSPECIFIED TRIGGER: ICD-10-CM

## 2018-01-12 PROCEDURE — 99214 OFFICE O/P EST MOD 30 MIN: CPT | Mod: 25,S$GLB,, | Performed by: OTOLARYNGOLOGY

## 2018-01-12 PROCEDURE — 92504 EAR MICROSCOPY EXAMINATION: CPT | Mod: S$GLB,,, | Performed by: OTOLARYNGOLOGY

## 2018-01-12 PROCEDURE — 99999 PR PBB SHADOW E&M-EST. PATIENT-LVL III: CPT | Mod: PBBFAC,,, | Performed by: OTOLARYNGOLOGY

## 2018-01-12 NOTE — PROGRESS NOTES
Subjective:   Patient: Rigoberto Vasquez [id number], :1950   Visit date:2018 10:37 AM    Chief Complaint:  Follow-up (1 month follow up)    HPI:  Rigoberto is a 67 y.o. male who is here for follow up on the left ear.  No drainage or pain.  Hearing unchanged.  Still with PND.   Moderate severity.  Thick to watery.  Nearly constant for many months but was worse during active infection.  Improved with abx.  astelin and flonase are helpful.  No assoc ssx.        Surgery date: 3/26/15  Procedure:   1. left tympanoplasty with CWU mastoidectomy with ossicular reconstruction  2. cartilage graft (palisading technique)  3. Tympanostomy tube placement (T tube)  4. Placement of silastic spacer in middle ear     Findings at surgery:   External canal- Normal  Tympanic membrane- Severe retraction with complete myringopexy. Non adherent to the promontory but severe adherence onto ossicles and stapes suprastructure  Middle Ear Mucosa- Normal  Ossicles- Abnormal - erosion of IS joint with retracted TM attached at this point  Mastoid cavity- Normal  Other- None        Review of Systems:  -     Allergic/Immunologic: is allergic to aspirin; meperidine; and niacin..  -     Constitutional: Current temp:      His meds, allergies, medical, surgical, social & family histories were reviewed & updated:  -     He has a current medication list which includes the following prescription(s): acetaminophen, azelastine, bifidobacterium infantis, blood sugar diagnostic, easy touch, fenofibrate micronized, fluticasone, hydrocortisone, levemir flexpen, liraglutide 0.6 mg/0.1 ml (18 mg/3 ml) subq pnij, losartan, lovastatin, metformin, multivitamin, pantoprazole, pen needle, diabetic, pioglitazone, and ranitidine.  -     He  has a past medical history of Acid reflux; Angina pectoris; Back pain; BPH (benign prostatic hyperplasia); Degenerative joint disease; Diverticulosis; Erectile dysfunction; History of elevated PSA (); History of pericarditis;  "Hypercholesteremia; Hypertension; Iron deficiency anemia; Pacemaker; Squamous cell cancer of skin of mastoid region of scalp; Type 2 diabetes mellitus; Urinary incontinence; and when he was 7 Yrs old..   -     He  does not have any pertinent problems on file.   -     He  has a past surgical history that includes Shoulder arthroscopy (Right); Nasal sinus surgery; Cardiac pacemaker placement; Tonsillectomy; Transurethral resection of prostate; Knee cartilage surgery (Bilateral); Tympanoplasty; vesectomy; Total knee arthroplasty (Left, 05/15/2017); and Joint replacement.  -     He  reports that he has never smoked. He has never used smokeless tobacco. He reports that he drinks alcohol. He reports that he does not use drugs.  -     His family history includes Arthritis in his mother; Colon cancer in his paternal grandmother; Diabetes in his maternal grandmother and son; Heart disease in his father; Hypertension in his father and mother; Stroke in his father.  -     He is allergic to aspirin; meperidine; and niacin.    Objective:     Physical Exam:  Vitals:  Resp 18   Ht 5' 11" (1.803 m)   Wt 94.2 kg (207 lb 10.8 oz)   BMI 28.96 kg/m²   Appearance:  Well-developed, well-nourished.  Communication:  Able to communicate, no hoarseness.  Head & Face:  Normocephalic, atraumatic, sinus tenderness bilateral maxillary, normal facial strength.  Eyes:  Extraocular motions intact. Eyeglasses    Ear exam was performed utilizing binocular otomicroscopy with the findings:  Otoscopy of right external auditory canal and tympanic membrane was retracted,  Left TM is abnormal with loss of landmarks and large cartilage graft; do not see T tube; no drainage.  No squamous debris present.  clinical speech reception thresholds grossly intact, no mass/lesion of auricle.         Nose:  No masses/lesions of external nose, nasal mucosa edematous, septum, and turbinates were within normal limits.  Mouth:  No mass/lesion of lips, teeth, gums, " hard/soft palate, tongue, tonsils, or oropharynx.  Neck & Lymphatics:  No cervical lymphadenopathy, no neck mass/crepitus/ asymmetry, trachea is midline, no thyroid enlargement/tenderness/mass.  Neuro/Psych: Alert with normal mood and affect.   Abdominal: Normal appearance.   Respiration/Chest:  Symmetric expansion during respiration, normal respiratory effort.  Skin:  Warm and intact  Cardiovascular:  No peripheral vascular edema or varicosities.    Assessment & Plan:   There are no diagnoses linked to this encounter.    Xyzal for AR.  Ear Looks better.  Observe for now.  6 mo unless problems arise.

## 2018-01-13 ENCOUNTER — PATIENT MESSAGE (OUTPATIENT)
Dept: ADMINISTRATIVE | Facility: OTHER | Age: 68
End: 2018-01-13

## 2018-01-15 ENCOUNTER — OFFICE VISIT (OUTPATIENT)
Dept: OPHTHALMOLOGY | Facility: CLINIC | Age: 68
End: 2018-01-15
Payer: MEDICARE

## 2018-01-15 DIAGNOSIS — H40.019 OPEN ANGLE WITH BORDERLINE FINDINGS, LOW RISK: Primary | ICD-10-CM

## 2018-01-15 PROCEDURE — 92083 EXTENDED VISUAL FIELD XM: CPT | Mod: S$GLB,,, | Performed by: OPHTHALMOLOGY

## 2018-01-15 PROCEDURE — 99999 PR PBB SHADOW E&M-EST. PATIENT-LVL II: CPT | Mod: PBBFAC,,, | Performed by: OPHTHALMOLOGY

## 2018-01-15 PROCEDURE — 99212 OFFICE O/P EST SF 10 MIN: CPT | Mod: S$GLB,,, | Performed by: OPHTHALMOLOGY

## 2018-01-15 NOTE — PROGRESS NOTES
===============================  01/15/2018   Rigoberto Vasquez,   67 y.o. male   Last visit [facility]: :12/11/2017   Last visit eye dept. 12/11/2017  [facility]:  Corrected distance visual acuity was 20/25 in the right eye and 20/25 in the left eye.  Tonometry     Tonometry (Applanation, 12:12 PM)       Right Left    Pressure 19 19               Not recorded         Not recorded        Chief Complaint   Patient presents with    Glaucoma Suspect     REV HVF/ IOP CHECK    Diabetes        HPI     Glaucoma Suspect    Additional comments: REV HVF/ IOP CHECK           Comments   1. DM since 1999  NO DBR  2. Mac hole od  3. erm os  4. Choroidal nevus od   5. HIGH MYOPE(-6)  6. SCOAG  iop 25/ 20 ou on po steroid  HVF 5/25/15 worse os / od stable with ^ blind spot  LAST HVF 1/15/18   anamolous myopic disc  ASSYMETRIC C/D 0.7 / 0.5  (-7)  GOOD IOP  NO FAMILY HISTORY       Last edited by Tasha Dooley on 1/15/2018 12:04 PM. (History)          ________________  1/15/2018  Problem List Items Addressed This Visit     Open angle with borderline findings, low risk - Primary    Relevant Orders    Morales Visual Field - OU - Extended - Both Eyes (Completed)          .hvf today ? Increased blind spot  od good reliability, OS poor reliability  rtc 1 year with repeat vf, rnfl, disc contour       ===========================

## 2018-01-18 ENCOUNTER — NUTRITION (OUTPATIENT)
Dept: DIABETES | Facility: CLINIC | Age: 68
End: 2018-01-18
Payer: MEDICARE

## 2018-01-18 VITALS — HEIGHT: 71 IN | WEIGHT: 208.13 LBS | BODY MASS INDEX: 29.14 KG/M2

## 2018-01-18 DIAGNOSIS — E11.9 DIABETES MELLITUS WITHOUT COMPLICATION: Primary | ICD-10-CM

## 2018-01-18 DIAGNOSIS — Z79.4 ENCOUNTER FOR LONG-TERM (CURRENT) USE OF INSULIN: ICD-10-CM

## 2018-01-18 PROCEDURE — G0108 DIAB MANAGE TRN  PER INDIV: HCPCS | Mod: S$GLB,,, | Performed by: DIETITIAN, REGISTERED

## 2018-01-18 PROCEDURE — 99999 PR PBB SHADOW E&M-EST. PATIENT-LVL III: CPT | Mod: PBBFAC,,, | Performed by: DIETITIAN, REGISTERED

## 2018-01-18 NOTE — LETTER
January 18, 2018        Jesse Grimes MD  [address] [facility] [place] [address]             [facility] Diabetes [facility]  [address] [facility] [place] [address]  Phone: [phone]  Fax: [phone]   Patient: Rigoberto Vasquez   MR Number: [id number]   YOB: 1950   Date of Visit: 1/18/2018       Dear Dr. Grimes:    Thank you for referring Rigoberto Vasquez to me for evaluation. Below are the relevant portions of my assessment and plan of care.    If you have questions, please do not hesitate to call me. I look forward to following Rigoberto along with you.    Sincerely,      Genevieve Saldaña, DONNY, CDE           CC  No Recipients

## 2018-01-18 NOTE — PROGRESS NOTES
Diabetes Education  Author: Genevieve Saldaña RD, CDE  Date: 1/18/2018    Diabetes Education Visit  Diabetes Education Record Assessment/Progress: Comprehensive/Group    Diabetes Type  Diabetes Type : Type II      Nutrition  Meal Planning:  (2292-0189 cals/d. Overall, limiting carb, fat, sodium. Fried foods 1d/wk. )  Meal Plan 24 Hour Recall - Breakfast: raisin bran or cheerios or oat w/ 1/2 banana   Meal Plan 24 Hour Recall - Lunch: leftover bkd pork, s.potato, g.beans- water  Meal Plan 24 Hour Recall - Dinner: similar to lunch   Meal Plan 24 Hour Recall - Snack: fruit    Monitoring   Self Monitoring : fst bg , 126   Blood Glucose Logs: Yes    Exercise   Exercise Type:  (PT 5d/wk (leg) 60-90min (pt will work on increasing cardio intensity and duration verses stretching/resistance))    Current Diabetes Treatment   Current Treatment: Oral Medication, Diet, Injectable, Exercise, Insulin (levemir 16 units twice daily, victoza 1.8 mg daily, metformin 1000mg daily, actos 30 mg 1 tab daily)    Social History  Preferred Learning Method: Face to Face  Primary Support: Self, Spouse  Smoking Status: Never a Smoker  Alcohol Use: Rarely     Barriers to Change  Barriers to Change: None  Learning Challenges : None    Readiness to Learn   Readiness to Learn : Eager    Cultural Influences  Cultural Influences: No    Diabetes Education Assessment/Progress  Diabetes Disease Process (diabetes disease process and treatment options): Discussion, Individual Session, Demonstrates Understanding/Competency(verbalizes/demonstrates)  Nutrition (Incorporating nutritional management into one's lifestyle): Discussion, Individual Session, Demonstrates Understanding/Competency (verbalizes/demonstrates), Written Materials Provided  Physical Activity (incorporating physical activity into one's lifestyle): Discussion, Individual Session, Demonstrates Understanding/Competency (verbalizes/demonstrates)  Medications (states correct name, dose,  onset, peak, duration, side effects & timing of meds): Discussion, Individual Session, Demonstrates Understanding/Competency(verbalizes/demonstrates), Written Materials Provided  Monitoring (monitoring blood glucose/other parameters & using results): Discussion, Individual Session, Demonstrates Understanding/Competency (verbalizes/demonstrates)  Acute Complications (preventing, detecting, and treating acute complications): Discussion, Individual Session, Demonstrates Understanding/Competency (verbalizes/demonstrates)  Chronic Complications (preventing, detecting, and treating chronic complications): Discussion, Individual Session, Demonstrates Understanding/Competency (verbalizes/demonstrates)  Clinical (diabetes and other pertinent medical history): Discussion, Individual Session, Demonstrates Understanding/Competency (verbalizes/demonstrates)  Cognitive (knowledge of self-management skills, functional health literacy): Discussion, Individual Session, Demonstrates Understanding/Competency (verbalizes/demonstrates)  Psychosocial (emotional response to diabetes): Discussion, Individual Session, Demonstrates Understanding/Competency (verbalizes/demonstrates)  Diabetes Distress and Support Systems: Discussion, Individual Session, Demonstrates Understanding/Competency (verbalizes/demonstrates)  Behavioral (readiness for change, lifestyle practices, self-care behaviors): Discussion, Individual Session, Demonstrates Understanding/Competency (verbalizes/demonstrates)    Goals  Patient has selected/evaluated goals during today's session: Yes, evaluated  Physical Activity: Set (cardio (bike, treadmill) 30min 5d/wk)  Met Percentage : 50%  Start Date: 01/18/18  Target Date: 07/18/18    Diabetes Self-Management Support Plan  Diabetes Learning: DM websites, DM apps  Exercise/Nutrition: apps, websites  Review Status: Patient has selected and agrees to support plan.    Diabetes Care Plan/Intervention  Education Plan/Intervention:  Individual Follow-Up DSMT    Diabetes Meal Plan  Restrictions: Low Fat, Low Sodium  Calories: 1600, 1800  Carbohydrate Per Meal: 30-45g  Carbohydrate Per Snack : 7-15g    Education Units of Time   Time Spent: 30 min      Health Maintenance Topics with due status: Not Due       Topic Last Completion Date    TETANUS VACCINE 03/22/2011    Colonoscopy 06/10/2015    DEXA SCAN 07/14/2016    Lipid Panel 06/20/2017    Foot Exam 06/27/2017    Eye Exam 12/11/2017    Hemoglobin A1c 12/27/2017     There are no preventive care reminders to display for this patient.

## 2018-01-22 ENCOUNTER — OFFICE VISIT (OUTPATIENT)
Dept: GASTROENTEROLOGY | Facility: CLINIC | Age: 68
End: 2018-01-22
Payer: MEDICARE

## 2018-01-22 VITALS
HEART RATE: 96 BPM | BODY MASS INDEX: 28.92 KG/M2 | SYSTOLIC BLOOD PRESSURE: 124 MMHG | DIASTOLIC BLOOD PRESSURE: 80 MMHG | WEIGHT: 206.56 LBS | HEIGHT: 71 IN

## 2018-01-22 DIAGNOSIS — K59.03 DRUG-INDUCED CONSTIPATION: ICD-10-CM

## 2018-01-22 DIAGNOSIS — K21.9 GASTROESOPHAGEAL REFLUX DISEASE, ESOPHAGITIS PRESENCE NOT SPECIFIED: Primary | ICD-10-CM

## 2018-01-22 PROCEDURE — 99214 OFFICE O/P EST MOD 30 MIN: CPT | Mod: S$GLB,,, | Performed by: NURSE PRACTITIONER

## 2018-01-22 PROCEDURE — 99999 PR PBB SHADOW E&M-EST. PATIENT-LVL III: CPT | Mod: PBBFAC,,, | Performed by: NURSE PRACTITIONER

## 2018-01-22 PROCEDURE — 99499 UNLISTED E&M SERVICE: CPT | Mod: S$GLB,,, | Performed by: NURSE PRACTITIONER

## 2018-01-23 NOTE — PROGRESS NOTES
Clinic Follow Up:  [facility] Follow Up Note    Reason for Follow Up:  The primary encounter diagnosis was Gastroesophageal reflux disease, esophagitis presence not specified. A diagnosis of Drug-induced constipation was also pertinent to this visit.    PCP: Jesse Grimes       HPI:  This is a 67 y.o. male here for follow up of the above  He states that since his last visit, he has been overall feeling well without any new complaints.   GERD is well controlled with current medications.   He has not had any further issues with constipation since stopping the pain medications.   Denies any abdominal pain.  No nausea or vomiting.  No change in bowel pattern.  No melena or hematochezia. No weight loss.      Review of Systems   Constitutional: Negative for chills, fever, malaise/fatigue and weight loss.   Respiratory: Negative for cough.    Cardiovascular: Negative for chest pain.   Gastrointestinal:        Per HPI   Musculoskeletal: Negative for myalgias.   Skin: Negative for itching and rash.   Neurological: Negative for headaches.   Psychiatric/Behavioral: The patient is not nervous/anxious.        Medical History:  Past Medical History:   Diagnosis Date    Acid reflux     [facility]    Angina pectoris     Back pain     BPH (benign prostatic hyperplasia)     blue    Degenerative joint disease     Diverticulosis     Erectile dysfunction     History of elevated PSA 2/14    normalized, dr dave annually    History of pericarditis     Hypercholesteremia     Hypertension     Iron deficiency anemia     Pacemaker     complete av block;NO afib    Squamous cell cancer of skin of mastoid region of scalp     dr jaida salgado    Type 2 diabetes mellitus     dr wyman    Urinary incontinence     when he was 7 Yrs old.        Surgical History:   Past Surgical History:   Procedure Laterality Date    CARDIAC PACEMAKER PLACEMENT      JOINT REPLACEMENT      KNEE CARTILAGE SURGERY Bilateral   "   NASAL SINUS SURGERY      SHOULDER ARTHROSCOPY Right     TONSILLECTOMY      TOTAL KNEE ARTHROPLASTY Left 05/15/2017    TRANSURETHRAL RESECTION OF PROSTATE      TYMPANOPLASTY      vesectomy         Family History:   Family History   Problem Relation Age of Onset    Arthritis Mother     Hypertension Mother     Heart disease Father     Hypertension Father     Stroke Father     Diabetes Son     Diabetes Maternal Grandmother     Colon cancer Paternal Grandmother        Social History:   Social History   Substance Use Topics    Smoking status: Never Smoker    Smokeless tobacco: Never Used    Alcohol use Yes      Comment: " once a month"       Allergies: Reviewed    Home Medications:  Current Outpatient Prescriptions on File Prior to Visit   Medication Sig Dispense Refill    ACETAMINOPHEN (TYLENOL 8 HOUR ORAL) Take by mouth as needed.      azelastine (ASTELIN) 137 mcg (0.1 %) nasal spray 2 sprays (274 mcg total) by Nasal route 2 (two) times daily. 30 mL 12    bifidobacterium infantis (ALIGN) 4 mg Cap Take 1 capsule by mouth Daily.      blood sugar diagnostic (ACCU-CHEK JAXON) Strp Inject 1 strip into the skin as directed. Check BG 2-3 times daily. Accuchek jaxon strips 300 strip 3    EASY TOUCH 31 gauge x 3/16" Ndle USE AS DIRECTED WITH INSULIN 100 each 3    fenofibrate micronized (LOFIBRA) 200 MG Cap Take 1 capsule (200 mg total) by mouth every evening. 90 capsule 3    fluticasone (FLONASE) 50 mcg/actuation nasal spray 2 sprays by Each Nare route once daily. 1 Bottle 12    hydrocortisone 2.5 % cream       LEVEMIR FLEXPEN 100 unit/mL (3 mL) InPn pen Inject 25 Units into the skin once daily. Needs pens d/t limited manual dexterity. (Patient taking differently: Inject 16 Units into the skin 2 (two) times daily. Needs pens d/t limited manual dexterity.) 45 mL 3    liraglutide 0.6 mg/0.1 mL, 18 mg/3 mL, subq PNIJ (VICTOZA 3-ALESHA) 0.6 mg/0.1 mL (18 mg/3 mL) PnIj INJECT 1.8 MG DAILY 27 mL 3    " "losartan (COZAAR) 50 MG tablet Take 1 tablet (50 mg total) by mouth once daily. 90 tablet 3    lovastatin (MEVACOR) 40 MG tablet Take 1 tablet by mouth Every evening. 90 tablet 3    metFORMIN (GLUCOPHAGE) 1000 MG tablet Take 1 tablet (1,000 mg total) by mouth once daily. Generic  tablet 3    multivitamin capsule Take 1 capsule by mouth once daily.      pantoprazole (PROTONIX) 40 MG tablet Take 1 tablet (40 mg total) by mouth before breakfast. 90 tablet 3    pen needle, diabetic (SURE COMFORT PEN NEEDLE) 31 gauge x 3/16" Ndle USE AS DIRECTED WITH INSULIN 100 each 4    pioglitazone (ACTOS) 30 MG tablet Take 1 tablet (30 mg total) by mouth once daily. 90 tablet 3    ranitidine (ZANTAC) 150 MG tablet Take 150 mg by mouth 2 (two) times daily.       No current facility-administered medications on file prior to visit.        Physical Exam:  Vital Signs:  /80   Pulse 96   Ht 5' 11" (1.803 m)   Wt 93.7 kg (206 lb 9.1 oz)   BMI 28.81 kg/m²   Body mass index is 28.81 kg/m².  Physical Exam   Constitutional: He is oriented to person, place, and time. He appears well-developed.   HENT:   Head: Normocephalic.   Eyes: No scleral icterus.   Neck: Normal range of motion.   Cardiovascular: Normal rate and regular rhythm.    Pulmonary/Chest: Effort normal and breath sounds normal.   Abdominal: Soft. Bowel sounds are normal. He exhibits no distension. There is no tenderness.   Musculoskeletal: Normal range of motion.   Neurological: He is alert and oriented to person, place, and time.   Skin: Skin is warm and dry.   Psychiatric: He has a normal mood and affect.   Vitals reviewed.      Labs: Pertinent labs reviewed.    Assessment:  1. Gastroesophageal reflux disease, esophagitis presence not specified    2. Drug-induced constipation        Recommendations:  - Stable with no new complaints.  - Can start weaning off PPI and remain on H2 blocker PRN for breakthrough symptoms.     Gastroesophageal reflux disease, " esophagitis presence not specified    Drug-induced constipation        Return to Clinic:    Follow-up in about 1 year (around 1/22/2019).

## 2018-02-06 ENCOUNTER — PATIENT OUTREACH (OUTPATIENT)
Dept: ADMINISTRATIVE | Facility: HOSPITAL | Age: 68
End: 2018-02-06

## 2018-02-06 NOTE — PROGRESS NOTES
Last documented 2017 Medication reconciliation Post d/c 06/08/17 per NORI LANTIGUA, FNP has been sent to [facility] as attestation documentation for MEDICATION RECONCILITION. Measure has been met.

## 2018-02-12 DIAGNOSIS — E11.9 DIABETES MELLITUS WITHOUT COMPLICATION: ICD-10-CM

## 2018-02-21 DIAGNOSIS — E11.9 DIABETES MELLITUS WITHOUT COMPLICATION: ICD-10-CM

## 2018-03-20 RX ORDER — PEN NEEDLE, DIABETIC 30 GX3/16"
NEEDLE, DISPOSABLE MISCELLANEOUS
Qty: 90 EACH | Refills: 4 | Status: SHIPPED | OUTPATIENT
Start: 2018-03-20 | End: 2021-11-19 | Stop reason: SDUPTHER

## 2018-03-26 ENCOUNTER — OFFICE VISIT (OUTPATIENT)
Dept: INTERNAL MEDICINE | Facility: CLINIC | Age: 68
End: 2018-03-26
Payer: MEDICARE

## 2018-03-26 VITALS
SYSTOLIC BLOOD PRESSURE: 138 MMHG | OXYGEN SATURATION: 98 % | TEMPERATURE: 98 F | HEIGHT: 71 IN | HEART RATE: 82 BPM | WEIGHT: 204.81 LBS | BODY MASS INDEX: 28.67 KG/M2 | DIASTOLIC BLOOD PRESSURE: 78 MMHG

## 2018-03-26 DIAGNOSIS — M54.6 ACUTE RIGHT-SIDED THORACIC BACK PAIN: Primary | ICD-10-CM

## 2018-03-26 PROCEDURE — 3078F DIAST BP <80 MM HG: CPT | Mod: CPTII,S$GLB,, | Performed by: FAMILY MEDICINE

## 2018-03-26 PROCEDURE — 99999 PR PBB SHADOW E&M-EST. PATIENT-LVL III: CPT | Mod: PBBFAC,,, | Performed by: FAMILY MEDICINE

## 2018-03-26 PROCEDURE — 99214 OFFICE O/P EST MOD 30 MIN: CPT | Mod: S$GLB,,, | Performed by: FAMILY MEDICINE

## 2018-03-26 PROCEDURE — 3075F SYST BP GE 130 - 139MM HG: CPT | Mod: CPTII,S$GLB,, | Performed by: FAMILY MEDICINE

## 2018-03-26 RX ORDER — CYCLOBENZAPRINE HCL 10 MG
10 TABLET ORAL 3 TIMES DAILY
Qty: 21 TABLET | Refills: 0 | Status: SHIPPED | OUTPATIENT
Start: 2018-03-26 | End: 2018-04-05

## 2018-03-26 RX ORDER — METHYLPREDNISOLONE 4 MG/1
TABLET ORAL
Qty: 1 PACKAGE | Refills: 0 | Status: SHIPPED | OUTPATIENT
Start: 2018-03-26 | End: 2018-07-02

## 2018-03-26 RX ORDER — LEVOCETIRIZINE DIHYDROCHLORIDE 5 MG/1
5 TABLET, FILM COATED ORAL NIGHTLY
COMMUNITY

## 2018-03-26 NOTE — PROGRESS NOTES
"Subjective:   Patient ID: Rigoberto Vasquez is a 67 y.o. male.  Chief Complaint:  Back Pain    Back Pain   This is a new problem. The current episode started 1 to 4 weeks ago. The problem occurs constantly. The problem has been waxing and waning since onset. The pain is present in the thoracic spine. The quality of the pain is described as aching. The pain does not radiate. The pain is at a severity of 5/10. The pain is moderate. The pain is the same all the time. The symptoms are aggravated by position (Palpation). Stiffness is present: None. Pertinent negatives include no abdominal pain, bladder incontinence, bowel incontinence, chest pain, dysuria, fever, headaches, leg pain, numbness, paresis, paresthesias, pelvic pain, perianal numbness, tingling or weakness. He has tried nothing for the symptoms.        Review of Systems   Constitutional: Negative for chills, fatigue and fever.   Cardiovascular: Negative for chest pain, palpitations and leg swelling.   Gastrointestinal: Negative for abdominal pain, bowel incontinence, constipation, diarrhea, nausea and vomiting.   Genitourinary: Negative for bladder incontinence, decreased urine volume, difficulty urinating, dysuria, flank pain, frequency, hematuria, pelvic pain and urgency.   Musculoskeletal: Positive for back pain and myalgias. Negative for arthralgias, gait problem, joint swelling, neck pain and neck stiffness.   Skin: Negative for rash.   Neurological: Negative for dizziness, tingling, tremors, syncope, weakness, light-headedness, numbness, headaches and paresthesias.   Psychiatric/Behavioral: Negative for sleep disturbance. The patient is not nervous/anxious.      Objective:   /78 (BP Location: Right arm, Patient Position: Sitting, BP Method: Large (Manual))   Pulse 82   Temp 97.7 °F (36.5 °C) (Tympanic)   Ht 5' 11" (1.803 m)   Wt 92.9 kg (204 lb 12.9 oz)   SpO2 98%   BMI 28.56 kg/m²     Physical Exam   Constitutional: Vital signs are normal. He " appears well-developed and well-nourished. No distress.   Eyes: No scleral icterus.   Neck: Normal range of motion and full passive range of motion without pain. Neck supple.   Abdominal: Soft. He exhibits no distension. There is no tenderness. There is no rebound, no guarding and no CVA tenderness.   Musculoskeletal: He exhibits no edema.        Thoracic back: He exhibits pain and spasm. He exhibits normal range of motion and no bony tenderness.        Lumbar back: He exhibits decreased range of motion, bony tenderness, pain and spasm. He exhibits no tenderness, no swelling, no edema, no deformity and no laceration.   Neurological: He has normal reflexes. He displays a negative Romberg sign. Coordination and gait normal.   Skin: Skin is warm, dry and intact. No rash noted.   Psychiatric: He has a normal mood and affect.   Nursing note and vitals reviewed.    Assessment:     1. Acute right-sided thoracic back pain      Plan:   Acute right-sided thoracic back pain  -     methylPREDNISolone (MEDROL DOSEPACK) 4 mg tablet; Take as directed on dosepack  Dispense: 1 Package; Refill: 0  -     cyclobenzaprine (FLEXERIL) 10 MG tablet; Take 1 tablet (10 mg total) by mouth 3 (three) times daily.  Dispense: 21 tablet; Refill: 0     no specific trigger point identified.  Treatment more for soft tissue related problems.  If no improvement, then gabapentin for more neuropathic etiology.  Referral to pain management if/when indicated based on response to above treatment

## 2018-04-02 ENCOUNTER — PATIENT MESSAGE (OUTPATIENT)
Dept: INTERNAL MEDICINE | Facility: CLINIC | Age: 68
End: 2018-04-02

## 2018-04-02 DIAGNOSIS — M47.816 ARTHRITIS OF LUMBAR SPINE: ICD-10-CM

## 2018-04-02 DIAGNOSIS — G89.29 CHRONIC BILATERAL LOW BACK PAIN WITHOUT SCIATICA: ICD-10-CM

## 2018-04-02 DIAGNOSIS — M54.6 ACUTE RIGHT-SIDED THORACIC BACK PAIN: Primary | ICD-10-CM

## 2018-04-02 DIAGNOSIS — M54.50 CHRONIC BILATERAL LOW BACK PAIN WITHOUT SCIATICA: ICD-10-CM

## 2018-04-03 ENCOUNTER — PATIENT MESSAGE (OUTPATIENT)
Dept: INTERNAL MEDICINE | Facility: CLINIC | Age: 68
End: 2018-04-03

## 2018-04-03 DIAGNOSIS — M47.816 ARTHRITIS OF LUMBAR SPINE: ICD-10-CM

## 2018-04-03 DIAGNOSIS — M54.50 CHRONIC BILATERAL LOW BACK PAIN WITHOUT SCIATICA: ICD-10-CM

## 2018-04-03 DIAGNOSIS — M54.6 ACUTE RIGHT-SIDED THORACIC BACK PAIN: Primary | ICD-10-CM

## 2018-04-03 DIAGNOSIS — G89.29 CHRONIC BILATERAL LOW BACK PAIN WITHOUT SCIATICA: ICD-10-CM

## 2018-04-03 RX ORDER — GABAPENTIN 300 MG/1
300 CAPSULE ORAL 3 TIMES DAILY
Qty: 90 CAPSULE | Refills: 0 | Status: SHIPPED | OUTPATIENT
Start: 2018-04-03 | End: 2019-07-09

## 2018-05-04 RX ORDER — LOVASTATIN 40 MG/1
TABLET ORAL
Qty: 90 TABLET | Refills: 3 | Status: SHIPPED | OUTPATIENT
Start: 2018-05-04 | End: 2019-05-06 | Stop reason: SDUPTHER

## 2018-06-11 ENCOUNTER — TELEPHONE (OUTPATIENT)
Dept: INTERNAL MEDICINE | Facility: CLINIC | Age: 68
End: 2018-06-11

## 2018-06-11 NOTE — TELEPHONE ENCOUNTER
----- Message from Waylon Vega sent at 6/11/2018 11:20 AM CDT -----  Contact: faviola from [facility]   States she's calling to get pt worked in to see Dr for surgery clearance for surgery 07/18 and can be reached at [phone] press [phone] for  and overhead page her//thanks/dbw

## 2018-06-14 DIAGNOSIS — R11.0 NAUSEA: ICD-10-CM

## 2018-06-14 RX ORDER — PANTOPRAZOLE SODIUM 40 MG/1
40 TABLET, DELAYED RELEASE ORAL
Qty: 90 TABLET | Refills: 3 | Status: SHIPPED | OUTPATIENT
Start: 2018-06-14 | End: 2019-06-11 | Stop reason: SDUPTHER

## 2018-06-18 ENCOUNTER — PATIENT OUTREACH (OUTPATIENT)
Dept: ADMINISTRATIVE | Facility: HOSPITAL | Age: 68
End: 2018-06-18

## 2018-07-02 ENCOUNTER — HOSPITAL ENCOUNTER (OUTPATIENT)
Dept: RADIOLOGY | Facility: HOSPITAL | Age: 68
Discharge: HOME | End: 2018-07-02
Attending: FAMILY MEDICINE
Payer: MEDICARE

## 2018-07-02 ENCOUNTER — OFFICE VISIT (OUTPATIENT)
Dept: INTERNAL MEDICINE | Facility: CLINIC | Age: 68
End: 2018-07-02
Payer: MEDICARE

## 2018-07-02 VITALS
SYSTOLIC BLOOD PRESSURE: 116 MMHG | HEIGHT: 71 IN | TEMPERATURE: 98 F | DIASTOLIC BLOOD PRESSURE: 68 MMHG | BODY MASS INDEX: 28.11 KG/M2 | OXYGEN SATURATION: 96 % | HEART RATE: 88 BPM | WEIGHT: 200.81 LBS

## 2018-07-02 DIAGNOSIS — E11.69 HYPERLIPIDEMIA ASSOCIATED WITH TYPE 2 DIABETES MELLITUS: Chronic | ICD-10-CM

## 2018-07-02 DIAGNOSIS — E11.59 HYPERTENSION ASSOCIATED WITH DIABETES: Chronic | ICD-10-CM

## 2018-07-02 DIAGNOSIS — E78.5 HYPERLIPIDEMIA ASSOCIATED WITH TYPE 2 DIABETES MELLITUS: Chronic | ICD-10-CM

## 2018-07-02 DIAGNOSIS — I15.2 HYPERTENSION ASSOCIATED WITH DIABETES: Chronic | ICD-10-CM

## 2018-07-02 DIAGNOSIS — E11.9 TYPE 2 DIABETES MELLITUS WITHOUT COMPLICATION, WITH LONG-TERM CURRENT USE OF INSULIN: Primary | Chronic | ICD-10-CM

## 2018-07-02 DIAGNOSIS — M25.561 CHRONIC PAIN OF RIGHT KNEE: ICD-10-CM

## 2018-07-02 DIAGNOSIS — Z79.4 TYPE 2 DIABETES MELLITUS WITHOUT COMPLICATION, WITH LONG-TERM CURRENT USE OF INSULIN: Primary | Chronic | ICD-10-CM

## 2018-07-02 DIAGNOSIS — G89.29 CHRONIC PAIN OF RIGHT KNEE: ICD-10-CM

## 2018-07-02 PROCEDURE — 87081 CULTURE SCREEN ONLY: CPT

## 2018-07-02 PROCEDURE — 71046 X-RAY EXAM CHEST 2 VIEWS: CPT | Mod: TC,FY,PO

## 2018-07-02 PROCEDURE — 71046 X-RAY EXAM CHEST 2 VIEWS: CPT | Mod: 26,,, | Performed by: RADIOLOGY

## 2018-07-02 PROCEDURE — 3078F DIAST BP <80 MM HG: CPT | Mod: CPTII,S$GLB,, | Performed by: FAMILY MEDICINE

## 2018-07-02 PROCEDURE — 99214 OFFICE O/P EST MOD 30 MIN: CPT | Mod: S$GLB,,, | Performed by: FAMILY MEDICINE

## 2018-07-02 PROCEDURE — 3074F SYST BP LT 130 MM HG: CPT | Mod: CPTII,S$GLB,, | Performed by: FAMILY MEDICINE

## 2018-07-02 PROCEDURE — 3044F HG A1C LEVEL LT 7.0%: CPT | Mod: CPTII,S$GLB,, | Performed by: FAMILY MEDICINE

## 2018-07-02 PROCEDURE — 99999 PR PBB SHADOW E&M-EST. PATIENT-LVL III: CPT | Mod: PBBFAC,,, | Performed by: FAMILY MEDICINE

## 2018-07-02 NOTE — PROGRESS NOTES
Subjective:       Patient ID: Rigoberto Vasquez is a . male.    Chief Complaint: Multiple issues see below    HPIType 2 diabetes: a1c pnd . elida insul. ;Tolerating medicine. No hypoglycemia c/o; ;on metform once daily and elida      Hypertension: blood pressures at home normal. Tolerating medicine.   Hypercholesterolemia: controlled. Tolerating medicine.  GERD sympt w/o med. last egd fall 15;asympt. Tolerating medication. No swallowing problems    Hx elev psa sees dr dave annually utd        Pacemaker utd dr davila    r tka planned 7/18 dr aminata cruz to last yr l knee. No periop complications. Has appt soon his card for cardiac clearnce    Past Medical History   Diagnosis Date    Hypertension     Acid reflux     Degenerative joint disease     Erectile dysfunction     pacemaker       dr davila card    Diverticulosis     Pacemaker      complete av block    BPH (benign prostatic hyperplasia)      blue    History of pericarditis     Squamous cell cancer of skin of mastoid region of scalp      dr ajida salgado    Type 2 diabetes mellitus     Hypercholesteremia     Glaucoma      Past Surgical History   Procedure Laterality Date    Shoulder arthroscopy      Knee surgery       cartilage removal    Nasal sinus surgery      Cardiac pacemaker placement      Tonsillectomy      Transurethral resection of prostate       Family History   Problem Relation Age of Onset    Arthritis Mother     Hypertension Mother     Heart disease Father     Hypertension Father     Stroke Father     Diabetes Son     Diabetes Maternal Grandmother                    Review of Systems   Respiratory: Negative for shortness of breath.    Cardiovascular: Negative for chest pain and leg swelling.       Objective:      Physical Exam   Constitutional: He is oriented to person, place, and time. He appears well-developed and well-nourished.   HENT:   Head: Normocephalic and atraumatic.   Eyes: Conjunctivae normal and EOM are normal.  Pupils are equal, round, and reactive to light.   Neck: Normal range of motion. Neck supple. Carotid bruit is not present.   Cardiovascular: Normal rate and regular rhythm.    Pulmonary/Chest: Effort normal and breath sounds normal.     Musculoskeletal: He exhibits no edema.   Neurological: He is alert and oriented to person, place, and time.   Skin: Skin is warm and dry.   Psychiatric: He has a normal mood and affect. His behavior is normal. Judgment and thought content normal.         Assessment:         type 2 dm  htn  gerd  hyperchol  Hx elev psa  Pacemaker hx  r knee djd    Plan:    **  F/u card (clearance )and urol as planned    Cancel lab tomorrow  Cancel 7/10 visit w jevon  Nasal swab today for mrsa  a1c also 6 months and f/u    Clear per lab cxr (defer ekg since seeing card soon)    Type 2 diabetes mellitus without complication, with long-term current use of insulin  -     Hemoglobin A1c; Future; Expected date: 12/29/2018  -     CBC auto differential; Future; Expected date: 07/02/2018  -     Hemoglobin A1c; Future; Expected date: 07/02/2018  -     Comprehensive metabolic panel; Future; Expected date: 07/02/2018  -     Microalbumin/creatinine urine ratio; Future; Expected date: 07/02/2018  -     Lipid panel; Future; Expected date: 07/02/2018  -     Urinalysis; Future; Expected date: 07/02/2018    Hyperlipidemia associated with type 2 diabetes mellitus    Hypertension associated with diabetes  -     X-Ray Chest PA And Lateral; Future; Expected date: 07/02/2018    Chronic pain of right knee  -     Culture, MRSA  -     GROUP & RH; Future; Expected date: 07/02/2018  -     Protime-INR; Future; Expected date: 07/02/2018  -     APTT; Future; Expected date: 07/02/2018    add:lab cxr ok cleared for surg. Form completed and fx to ortho

## 2018-07-04 LAB — MRSA SPEC QL CULT: NORMAL

## 2018-07-16 ENCOUNTER — OFFICE VISIT (OUTPATIENT)
Dept: OTOLARYNGOLOGY | Facility: CLINIC | Age: 68
End: 2018-07-16
Payer: MEDICARE

## 2018-07-16 VITALS
HEART RATE: 79 BPM | WEIGHT: 201.06 LBS | BODY MASS INDEX: 28.04 KG/M2 | SYSTOLIC BLOOD PRESSURE: 124 MMHG | TEMPERATURE: 99 F | DIASTOLIC BLOOD PRESSURE: 76 MMHG

## 2018-07-16 DIAGNOSIS — H69.93 EUSTACHIAN TUBE DYSFUNCTION, BILATERAL: ICD-10-CM

## 2018-07-16 DIAGNOSIS — Z98.890 HISTORY OF TYMPANOPLASTY OF LEFT EAR: ICD-10-CM

## 2018-07-16 DIAGNOSIS — Z98.890 HISTORY OF SINUS SURGERY: Primary | ICD-10-CM

## 2018-07-16 PROCEDURE — 99999 PR PBB SHADOW E&M-EST. PATIENT-LVL IV: CPT | Mod: PBBFAC,,, | Performed by: OTOLARYNGOLOGY

## 2018-07-16 PROCEDURE — 3074F SYST BP LT 130 MM HG: CPT | Mod: CPTII,S$GLB,, | Performed by: OTOLARYNGOLOGY

## 2018-07-16 PROCEDURE — 99213 OFFICE O/P EST LOW 20 MIN: CPT | Mod: 25,S$GLB,, | Performed by: OTOLARYNGOLOGY

## 2018-07-16 PROCEDURE — 3078F DIAST BP <80 MM HG: CPT | Mod: CPTII,S$GLB,, | Performed by: OTOLARYNGOLOGY

## 2018-07-16 PROCEDURE — 92504 EAR MICROSCOPY EXAMINATION: CPT | Mod: S$GLB,,, | Performed by: OTOLARYNGOLOGY

## 2018-07-16 NOTE — PATIENT INSTRUCTIONS
Impacted Earwax  Impacted earwax is a buildup of the natural wax in the ear (cerumen). Impacted earwax is very common. It can cause symptoms such as hearing loss. It can also stop a doctor doing an exam of your ear.    Understanding earwax  Tiny glands in your ear make substances that combine with dead skin cells to form earwax. Earwax helps protect your ear canal from water, dirt, infection, and injury. Over time, earwax travels from the inner part of your ear canal to the entrance of the canal. Then it falls away naturally. But in some cases, it cant travel to the entrance of the canal. This may be because of a health condition or objects put in the ear. With age, earwax tends to become harder and less fluid. Older adults are more likely to have problems with earwax buildup.    Symptoms of impacted earwax  Excess earwax usually does not cause any symptoms, unless there is a large amount of buildup. Then it may cause symptoms such as:  · Hearing loss  · Earache  · Sense of ear fullness  · Itching in the ear  · Dizziness  · Ringing in the ears  · Cough    Treatment for impacted earwax  If you dont have symptoms, you may not need treatment. Often the earwax goes away on its own with time. If you have symptoms, you may have 1 or more treatments such as:  · Ear drops.  Over-the-counter ear rinses such as Debrox can be used to help soften and rinse out wax buildup within the ear.  Also the use of mineral oil in the ear can help soften the wax and assist in allowing the wax to clear.      · Removal of the earwax with small tools. This is done in a doctors office.    Do not use these at home:  Health care providers do not advise use of ear candles or ear vacuum kits. These methods are not shown to work.     Preventing impacted earwax  You may not be able to prevent impacted earwax if you have a health condition that causes it, such as eczema. In other cases, you may be able to prevent earwax buildup by:  · Using ear  drops such as Debrox once or twice a week  · Not using cotton swabs in the ear       If earwax is persistent after conservative measures such as using mineral oil and over-the-counter ear rinses, you should contact your physician for an appointment to have it removed in the office.

## 2018-07-16 NOTE — PROGRESS NOTES
Subjective:   Patient: Rigoberto Vasquez [id number], :1950   Visit date:2018 10:37 AM    Chief Complaint:  Follow-up and Hearing Loss (c/o doesn't hear very well out of Left ear )    HPI:  Rigoberto is a 67 y.o. male who is here for follow up on the left ear.  No drainage or pain.  He feels that the left sided hearing has declined over the past year or so.  Last audio 2016.  Sinus problems are doing well.       Surgery date: 3/26/15  Procedure:   1. left tympanoplasty with CWU mastoidectomy with ossicular reconstruction  2. cartilage graft (palisading technique)  3. Tympanostomy tube placement (T tube)  4. Placement of silastic spacer in middle ear     Findings at surgery:   External canal- Normal  Tympanic membrane- Severe retraction with complete myringopexy. Non adherent to the promontory but severe adherence onto ossicles and stapes suprastructure  Middle Ear Mucosa- Normal  Ossicles- Abnormal - erosion of IS joint with retracted TM attached at this point  Mastoid cavity- Normal  Other- None    He has had 2 prior sinus surgeries, the last one about 20 years ago.    Review of Systems:  -     Allergic/Immunologic: is allergic to aspirin; meperidine; and niacin..  -     Constitutional: Current temp: 99 °F (37.2 °C) (Tympanic)    His meds, allergies, medical, surgical, social & family histories were reviewed & updated:  -     He has a current medication list which includes the following prescription(s): acetaminophen, azelastine, bifidobacterium infantis, blood sugar diagnostic, fenofibrate micronized, gabapentin, hydrocortisone, insulin detemir u-100, levocetirizine, liraglutide 0.6 mg/0.1 ml (18 mg/3 ml) subq pnij, losartan, lovastatin, metformin, multivitamin, pantoprazole, pen needle, diabetic, pioglitazone, and ranitidine.  -     He  has a past medical history of Acid reflux; Angina pectoris; Back pain; BPH (benign prostatic hyperplasia); Degenerative joint disease; Diverticulosis; Erectile dysfunction;  History of elevated PSA (2/14); History of pericarditis; Hypercholesteremia; Hypertension; Iron deficiency anemia; Pacemaker; Squamous cell cancer of skin of mastoid region of scalp; Type 2 diabetes mellitus; Urinary incontinence; and when he was 7 Yrs old..   -     He  does not have any pertinent problems on file.   -     He  has a past surgical history that includes Shoulder arthroscopy (Right); Nasal sinus surgery; Cardiac pacemaker placement; Tonsillectomy; Transurethral resection of prostate; Knee cartilage surgery (Bilateral); Tympanoplasty; vesectomy; Total knee arthroplasty (Left, 05/15/2017); and Joint replacement.  -     He  reports that he has never smoked. He has never used smokeless tobacco. He reports that he drinks alcohol. He reports that he does not use drugs.  -     His family history includes Arthritis in his mother; Colon cancer in his paternal grandmother; Diabetes in his maternal grandmother and son; Heart disease in his father; Hypertension in his father and mother; Stroke in his father.  -     He is allergic to aspirin; meperidine; and niacin.    Objective:     Physical Exam:  Vitals:  /76   Pulse 79   Temp 99 °F (37.2 °C) (Tympanic)   Wt 91.2 kg (201 lb 1 oz)   BMI 28.04 kg/m²   Appearance:  Well-developed, well-nourished.  Communication:  Able to communicate, no hoarseness.  Head & Face:  Normocephalic, atraumatic, sinus tenderness bilateral maxillary, normal facial strength.  Eyes:  Extraocular motions intact. Eyeglasses    Ear exam was performed utilizing binocular otomicroscopy with the findings:  Otoscopy of right external auditory canal and tympanic membrane was retracted,  Left TM is abnormal with loss of landmarks and large cartilage graft; T tube at anterior most aspect of TM; ? Small perforation around the tube; no drainage.  squamous debris removed from lateral surface of TM.  clinical speech reception thresholds grossly intact, no mass/lesion of auricle.         Nose:   No masses/lesions of external nose, nasal mucosa edematous, septum, and turbinates were within normal limits.  Mouth:  No mass/lesion of lips, teeth, gums, hard/soft palate, tongue, tonsils, or oropharynx.  Neck & Lymphatics:  No cervical lymphadenopathy, no neck mass/crepitus/ asymmetry, trachea is midline, no thyroid enlargement/tenderness/mass.  Neuro/Psych: Alert with normal mood and affect.   Abdominal: Normal appearance.   Respiration/Chest:  Symmetric expansion during respiration, normal respiratory effort.  Skin:  Warm and intact  Cardiovascular:  No peripheral vascular edema or varicosities.        Assessment & Plan:   Rigoberto was seen today for follow-up and hearing loss.    Diagnoses and all orders for this visit:    History of sinus surgery    History of tympanoplasty of left ear    Eustachian tube dysfunction, bilateral        audio

## 2018-07-17 ENCOUNTER — TELEPHONE (OUTPATIENT)
Dept: INTERNAL MEDICINE | Facility: CLINIC | Age: 68
End: 2018-07-17

## 2018-07-17 ENCOUNTER — CLINICAL SUPPORT (OUTPATIENT)
Dept: AUDIOLOGY | Facility: CLINIC | Age: 68
End: 2018-07-17
Payer: MEDICARE

## 2018-07-17 DIAGNOSIS — H90.A21 SENSORINEURAL HEARING LOSS (SNHL) OF RIGHT EAR WITH RESTRICTED HEARING OF LEFT EAR: ICD-10-CM

## 2018-07-17 DIAGNOSIS — H90.A32 MIXED CONDUCTIVE AND SENSORINEURAL HEARING LOSS OF LEFT EAR WITH RESTRICTED HEARING OF RIGHT EAR: Primary | ICD-10-CM

## 2018-07-17 PROCEDURE — 92557 COMPREHENSIVE HEARING TEST: CPT | Mod: PBBFAC,PO | Performed by: AUDIOLOGIST-HEARING AID FITTER

## 2018-07-17 PROCEDURE — 92567 TYMPANOMETRY: CPT | Mod: PBBFAC,PO | Performed by: AUDIOLOGIST-HEARING AID FITTER

## 2018-07-17 NOTE — TELEPHONE ENCOUNTER
----- Message from Natalie Reno sent at 7/17/2018 11:01 AM CDT -----  Contact: Ashlyn with Dr Harvey Gomes's office  Pt is having surgery tomorrow.  Did office do a liver function and MRSA?    Please call Ashlyn at [phone] press [phone] for  and have her paged overhead

## 2018-07-17 NOTE — PROGRESS NOTES
Referring Provider:    Rigoberto Vasquez was seen 07/17/2018 for an audiological evaluation.  Patient complains that left sided hearing has declined over the past year or so. He reports intermittent tinnitus bilaterally. Last audio from 2016 revealed a mild mixed hearing loss 250-8000 Hz for the right ear, and a mild-to-severe mixed hearing loss 250-8000 Hz for the left ear.  Sinus problems are doing well.      Previous procedures:  Surgery date: 3/26/15  Procedure:   1. left tympanoplasty with CWU mastoidectomy with ossicular reconstruction  2. cartilage graft (palisading technique)  3. Tympanostomy tube placement (T tube)  4. Placement of silastic spacer in middle ear    Results reveal a mild-to-moderate sensorineural hearing loss 250-8000 Hz for the right ear, and a mild-to-severe mixed hearing loss 250-8000 Hz for the left ear.   Speech Reception Thresholds were  25 dBHL for the right ear and 30 dBHL for the left ear.   Word recognition scores were excellent for the right ear and excellent for the left ear.   Tympanograms were Type Ad, hypermobile for the right ear and {a seal could not be obtained for the left ear.    Patient was counseled on the above findings.    Recommendations:  1. ENT  2. Annual Audiograms  3. Binaural hearing aids. Hearing aid information was provided today.

## 2018-07-23 RX ORDER — AZELASTINE 1 MG/ML
2 SPRAY, METERED NASAL 2 TIMES DAILY
Qty: 30 ML | Refills: 12 | Status: SHIPPED | OUTPATIENT
Start: 2018-07-23 | End: 2019-07-09 | Stop reason: SDUPTHER

## 2018-08-06 ENCOUNTER — PATIENT MESSAGE (OUTPATIENT)
Dept: OTHER | Facility: OTHER | Age: 68
End: 2018-08-06

## 2018-08-10 ENCOUNTER — PATIENT OUTREACH (OUTPATIENT)
Dept: OTHER | Facility: OTHER | Age: 68
End: 2018-08-10

## 2018-08-10 NOTE — PROGRESS NOTES
Last 5 Patient Entered Readings                                      Current 30 Day Average: 142/88     Recent Readings 8/10/2018 8/10/2018 8/9/2018 8/9/2018 8/8/2018    SBP (mmHg) 135 135 144 120 146    DBP (mmHg) 86 91 86 88 94    Pulse 76 78 85 92 84          Mr. Rigoberto Vasquez is a 68 y.o. male who is newly enrolled in the Digital Medicine Hypertension Clinics.     The following information was reviewed/updated:  Preferred pharmacy   [facility] [place] [address] [place], [place] [address] [address]  Phone: [phone] Fax: [phone]    [facility] [address] [facility]  [address] [facility] [address]  Phone: [phone] Fax: [phone]      Patient prefers a 90 days supply    HYPERTENSION    Explained that we expect patient to obtain several blood pressures per week at random times of day.   Our goal is to get  BP to consistently below 130/80mmHg and make the process convenient so patient can avoid extra trips to the office. Getting your blood pressure below 130/80mmHg (definition of control) will reduce your risk for heart attack, kidney failure, stroke and death (as well as kidney failure, eye disease, & dementia).     Patient is not meeting the goal already. Current BP average 142/88 mmHg.  When asked what the patient thinks is causing BP to be elevated, he states: knee surgery 3 weeks ago    Discussed appropriate BP measuring technique:  Before taking your blood pressure, find a quiet place. You will need to listen for your heartbeat.  Roll up the sleeve on your left arm or remove any tight-sleeved clothing, if needed. (It's best to take your blood pressure from your left arm if you are right-handed.You can use the other arm if you have been told by your health care provider to do so.)  Rest in a chair next to a table for 5 to 10 minutes. (Your left arm should rest comfortably at heart level.)  Sit up straight with your back  against the chair, legs uncrossed and on the ground.  Rest your forearm on the table with the palm of your hand facing up.  You should not talk, read the newspaper, or watch television during this process.    Last 5 Patient Entered Readings                                      Current 30 Day Average: 142/88     Recent Readings 8/10/2018 8/10/2018 8/9/2018 8/9/2018 8/8/2018    SBP (mmHg) 135 135 144 120 146    DBP (mmHg) 86 91 86 88 94    Pulse 76 78 85 92 84         Instructed patient not to allow anyone else to use phone and BP cuff or glucometer.   I'm not available for emergencies. Patient will call [facility] on-call ([phone] or [phone]) or [phone] if needed.     Patient and I agreed that he will continue to monitor blood pressure and sodium intake and continue to remain adherent to medications.     I will plan to follow-up with the patient in 3 weeks.   Emailed [facility] link to [facility]pages and my contact information in case he has any questions.      Lifestyle Assessment:    Diet: <2,000mg of sodium.  Patient states he is watching his sodium intake at this time.  Does not add salt to food and uses Ms. Dash to season food.  Cooks at home most of the time.  Will , chicken and steak.  Uses green beans from the can as a side but finds the no salt added.  Encouraged patient to stay mindful when grocery shopping.    Exercise: Patient has knee replacement three weeks ago and has been doing well.  Went to PT and uses stationary bikes and performs exercises.  Will start fitness routine after.    Alcohol/Tobacco:  Patient reports rare alcohol intake and no tobacco use.    Medication: Patient reports no complaints with medication.    Reviewed technique with patient.  Has been taking readings at desk.  Encouraged patient to rest for 5-10 minutes before taking a reading.  Has been taking some readings before medication and others an hour after medication.  Encouraged patient to only take  readings after medication.

## 2018-08-10 NOTE — LETTER
Norah Canada, PharmD  [address] [place] [address]     Dear Rigoberto Vasquez,    Welcome to the [facility] Digital Medicine Program!           My name is Norah Canada PharmD and I am your dedicated Digital Medicine clinician.  As an expert in medication management, I will help ensure that the medications you are taking continue to provide you with the intended benefits.        I am Prashant Diaz and I will be your health  for the duration of the program.  My  job is to help you identify lifestyle changes to improve your blood pressure control.  We will talk about nutrition, exercise, and other ways that you may be able to adjust your current habits to better your health. Together, we will work to improve your overall health and encourage you to meet your goals for a healthier lifestyle.    What we expect from YOU:    You will need to take blood pressure readings multiple times a week and no less than one reading per week.   It is important that you take your measurements at different times during the day, when possible.     What you should expect from your Digital Medicine Care Team:   We will provide you with education about high blood pressure, including lifestyle changes that could help you to control your blood pressure.   We will review your weekly readings and provide you with monthly blood pressure progress reports after you have been in the program for more than 30 days.   We will send monthly progress reports on your blood pressure control to your physician so they can follow along with your progress as well.    You will be able to reach me by phone at [phone] or through your [facility] account by clicking my name under Care Team on the right side of the home screen.    I look forward to working with you to achieve your blood pressure goals!    Sincerely,    Norah Canada PharmD  Your personal clinician    Please visit  [email or web address] to learn more about high blood pressure and what you can do lower your blood pressure.                                                                                           Rigoberto Vasquez  [address]  [place] [address]

## 2018-08-15 ENCOUNTER — PATIENT MESSAGE (OUTPATIENT)
Dept: INTERNAL MEDICINE | Facility: CLINIC | Age: 68
End: 2018-08-15

## 2018-08-27 RX ORDER — FENOFIBRATE 200 MG/1
200 CAPSULE ORAL NIGHTLY
Qty: 90 CAPSULE | Refills: 3 | Status: SHIPPED | OUTPATIENT
Start: 2018-08-27 | End: 2019-08-19 | Stop reason: SDUPTHER

## 2018-08-28 ENCOUNTER — PATIENT MESSAGE (OUTPATIENT)
Dept: INTERNAL MEDICINE | Facility: CLINIC | Age: 68
End: 2018-08-28

## 2018-09-06 ENCOUNTER — PATIENT OUTREACH (OUTPATIENT)
Dept: OTHER | Facility: OTHER | Age: 68
End: 2018-09-06

## 2018-09-06 NOTE — PROGRESS NOTES
Last 5 Patient Entered Readings                                      Current 30 Day Average: 131/81     Recent Readings 9/5/2018 9/2/2018 8/31/2018 8/30/2018 8/30/2018    SBP (mmHg) 136 120 136 123 131    DBP (mmHg) 89 74 88 79 82    Pulse 75 76 68 85 91        Digital Medicine: Health  Follow Up    Lifestyle Modifications:    1.Dietary Modifications (Sodium intake <2,000mg/day, food labels, dining out): Reports no changes in diet    2.Physical Activity: No changes in physical activity.    3.Medication Therapy: Patient has been compliant with the medication regimen.    4.Patient has the following medication side effects/concerns:   (Frequency/Alleviating factors/Precipitating factors, etc.)     Follow up with Mr. Rigoberto Vasquez completed. No further questions or concerns. Will continue follow up to achieve health goals.    Believes lower readings are attributed to technique and when he first started he had knee surgery, so feeling great about readings.

## 2018-09-11 ENCOUNTER — PATIENT OUTREACH (OUTPATIENT)
Dept: OTHER | Facility: OTHER | Age: 68
End: 2018-09-11

## 2018-09-11 RX ORDER — HYDROCODONE BITARTRATE AND ACETAMINOPHEN 5; 325 MG/1; MG/1
1 TABLET ORAL
Refills: 0 | COMMUNITY
Start: 2018-07-12 | End: 2018-12-17

## 2018-09-11 NOTE — PROGRESS NOTES
Last 5 Patient Entered Readings                                      Current 30 Day Average: 128/77     Recent Readings 9/11/2018 9/7/2018 9/6/2018 9/5/2018 9/2/2018    SBP (mmHg) 139 120 118 136 120    DBP (mmHg) 90 56 65 89 74    Pulse 79 79 75 75 76        Patient's BP average is controlled based on 2017 ACC/AHA HTN guidelines of goal BP <130/80.      Patient denies s/s of hypotension (lightheadedness, dizziness, nausea, fatigue) associated with low readings. Instructed patient to inform me if this occurs, patient confirms understanding.      Patient denies s/s of hypertension (SOB, CP, severe headaches, changes in vision) associated with high readings. Instructed patient to go to the ED if BP > 180/110 and accompanied by hypertensive s/s, patient confirms understanding.     Patient's health , Prashant Diaz, will be following up every 3-4 weeks. I will continue to monitor regularly and will follow up in 4-6 weeks, sooner if BP begins to trend upward or downward.    Patient has my contact information and knows to call with any concerns or clinical changes.     Current HTN regimen:  Hypertension Medications             losartan (COZAAR) 50 MG tablet Take 1 tablet (50 mg total) by mouth once daily.

## 2018-10-02 ENCOUNTER — PATIENT MESSAGE (OUTPATIENT)
Dept: ADMINISTRATIVE | Facility: OTHER | Age: 68
End: 2018-10-02

## 2018-10-03 RX ORDER — PIOGLITAZONEHYDROCHLORIDE 30 MG/1
30 TABLET ORAL DAILY
Qty: 90 TABLET | Refills: 3 | Status: SHIPPED | OUTPATIENT
Start: 2018-10-03 | End: 2019-09-23 | Stop reason: SDUPTHER

## 2018-10-03 RX ORDER — LOSARTAN POTASSIUM 50 MG/1
50 TABLET ORAL DAILY
Qty: 90 TABLET | Refills: 3 | Status: SHIPPED | OUTPATIENT
Start: 2018-10-03 | End: 2018-12-11 | Stop reason: SDUPTHER

## 2018-10-05 ENCOUNTER — PATIENT OUTREACH (OUTPATIENT)
Dept: OTHER | Facility: OTHER | Age: 68
End: 2018-10-05

## 2018-10-05 NOTE — PROGRESS NOTES
Last 5 Patient Entered Readings                                      Current 30 Day Average: 133/80     Recent Readings 10/3/2018 10/2/2018 9/26/2018 9/26/2018 9/25/2018    SBP (mmHg) 145 132 149 148 133    DBP (mmHg) 93 84 90 88 78    Pulse 80 79 77 80 72          10/5: LVM. Will follow up in one week.  Pt readings are on upward trend

## 2018-10-12 NOTE — PROGRESS NOTES
Last 5 Patient Entered Readings                                      Current 30 Day Average: 133/83     Recent Readings 10/7/2018 10/3/2018 10/2/2018 9/26/2018 9/26/2018    SBP (mmHg) 121 145 132 149 148    DBP (mmHg) 80 93 84 90 88    Pulse 68 80 79 77 80          10/12: LVM.  Sent MyChart.  Will follow up in 1 week.    10/19: Pharmacist attempted to call patient yesterday.  Will push call back 1 week.

## 2018-10-18 ENCOUNTER — PATIENT OUTREACH (OUTPATIENT)
Dept: OTHER | Facility: OTHER | Age: 68
End: 2018-10-18

## 2018-10-18 NOTE — PROGRESS NOTES
Last 5 Patient Entered Readings                                      Current 30 Day Average: 135/85     Recent Readings 10/18/2018 10/15/2018 10/12/2018 10/12/2018 10/7/2018    SBP (mmHg) 145 128 139 133 121    DBP (mmHg) 97 84 85 91 80    Pulse 80 74 86 90 68          Left message requesting call back. Discuss BP fluctuations and upward trend since last outreach. Health  also attempting contact.

## 2018-10-19 NOTE — PROGRESS NOTES
Last 5 Patient Entered Readings                                      Current 30 Day Average: 137/86     Recent Readings 10/18/2018 10/15/2018 10/12/2018 10/12/2018 10/7/2018    SBP (mmHg) 145 128 139 133 121    DBP (mmHg) 97 84 85 91 80    Pulse 80 74 86 90 68          Digital Medicine: Health  Follow Up    Lifestyle Modifications:    1.Dietary Modifications (Sodium intake <2,000mg/day, food labels, dining out): States he is continuing to watch sodium intake.    2.Physical Activity: States he is continuing with PT for knee and hopes to join fitness program.    3.Medication Therapy: Patient has been compliant with the medication regimen.    4.Patient has the following medication side effects/concerns: none  (Frequency/Alleviating factors/Precipitating factors, etc.)     Follow up with Mr. Rigoberto Vasquez completed. No further questions or concerns. Will continue to follow up to achieve health goals.

## 2018-10-31 NOTE — PROGRESS NOTES
Last 5 Patient Entered Readings                                      Current 30 Day Average: 136/86     Recent Readings 10/24/2018 10/19/2018 10/18/2018 10/15/2018 10/12/2018    SBP (mmHg) 140 137 145 128 139    DBP (mmHg) 84 73 97 84 85    Pulse 76 77 80 74 86          Patient's BP average is above goal of <130/80.     Patient states he feels like the cuff inflates/reads differently between readings. Appears he is using proper technique and placement. [place]. He agreed to monitor until follow up in 1-2 weeks. He will go on hiatus at that time if uncomfortable with cuff, or discuss increasing losartan if he feels he is able to use the cuff correctly and BP remains elevated. He reports charging the cuff 1 week ago.    Patient has my contact information and knows to call with any concerns or clinical changes.     Current HTN regimen:  Hypertension Medications             losartan (COZAAR) 50 MG tablet Take 1 tablet (50 mg total) by mouth once daily.

## 2018-11-14 NOTE — PROGRESS NOTES
Last 5 Patient Entered Readings                                      Current 30 Day Average: 137/85     Recent Readings 11/14/2018 11/14/2018 11/5/2018 11/2/2018 11/1/2018    SBP (mmHg) 142 154 125 139 138    DBP (mmHg) 88 88 72 93 89    Pulse 71 68 83 76 73          Left voicemail. Follow up on patient's desire to remain in program and increasing losartan.

## 2018-11-20 NOTE — PROGRESS NOTES
Last 5 Patient Entered Readings                                      Current 30 Day Average: 136/83     Recent Readings 11/15/2018 11/14/2018 11/14/2018 11/5/2018 11/2/2018    SBP (mmHg) 129 142 154 125 139    DBP (mmHg) 68 88 88 72 93    Pulse 72 71 68 83 76          Left voicemail and sent [facility] message. Follow up on patient's desire to remain in program and increasing losartan.

## 2018-11-27 ENCOUNTER — PATIENT OUTREACH (OUTPATIENT)
Dept: OTHER | Facility: OTHER | Age: 68
End: 2018-11-27

## 2018-11-27 NOTE — PROGRESS NOTES
Last 5 Patient Entered Readings                                      Current 30 Day Average: 138/82     Recent Readings 11/22/2018 11/21/2018 11/21/2018 11/21/2018 11/15/2018    SBP (mmHg) 146 146 154 150 129    DBP (mmHg) 87 82 76 78 68    Pulse 71 71 78 83 72          11/27: LVM.  Will call in 2 weeks.  Pharmacist trying to reach patient about continuing to participate in the program.

## 2018-11-28 NOTE — PROGRESS NOTES
Last 5 Patient Entered Readings                                      Current 30 Day Average: 138/82     Recent Readings 11/22/2018 11/21/2018 11/21/2018 11/21/2018 11/15/2018    SBP (mmHg) 146 146 154 150 129    DBP (mmHg) 87 82 76 78 68    Pulse 71 71 78 83 72          Digital Medicine: Health  Follow Up    Lifestyle Modifications:    1.Dietary Modifications (Sodium intake <2,000mg/day, food labels, dining out): Patient states he is watching sodium intake.    2.Physical Activity: States he is exercising 3x/week and has been doing that for a while.    3.Medication Therapy: Patient has been compliant with the medication regimen.    4.Patient has the following medication side effects/concerns: none.  (Frequency/Alleviating factors/Precipitating factors, etc.)     Follow up with Mr. Rigoberto Vasquez completed. No further questions or concerns. Will continue to follow up to achieve health goals.    States things have been busy remodeling the kitchen.  Reviewed technique.  Patient has been sitting in chair in living room resting arm on arm rest.  Encouraged patient to find a chair with table to rest arm.  Informed patient pharmacist will call next week to discuss medications.  Patient states he feels the cuff is accurate because he has been watching pulse rate to see if it is accurate.

## 2018-12-04 ENCOUNTER — IMMUNIZATION (OUTPATIENT)
Dept: INTERNAL MEDICINE | Facility: CLINIC | Age: 68
End: 2018-12-04
Payer: MEDICARE

## 2018-12-04 PROCEDURE — 99999 PR PBB SHADOW E&M-EST. PATIENT-LVL II: CPT | Mod: PBBFAC,HCNC,,

## 2018-12-04 PROCEDURE — G0008 ADMIN INFLUENZA VIRUS VAC: HCPCS | Mod: HCNC,S$GLB,, | Performed by: FAMILY MEDICINE

## 2018-12-04 PROCEDURE — 90662 IIV NO PRSV INCREASED AG IM: CPT | Mod: HCNC,S$GLB,, | Performed by: FAMILY MEDICINE

## 2018-12-04 NOTE — PROGRESS NOTES
Last 5 Patient Entered Readings                                      Current 30 Day Average: 143/84     Recent Readings 12/4/2018 12/3/2018 12/3/2018 12/3/2018 12/3/2018    SBP (mmHg) 140 165 159 158 144    DBP (mmHg) 81 93 89 93 94    Pulse 65 74 75 76 76        Patient's BP average is above goal of <130/80.     Patient denies s/s of hypotension (lightheadedness, dizziness, nausea, fatigue) associated with low readings. Instructed patient to inform me if this occurs, patient confirms understanding.      Patient denies s/s of hypertension (SOB, CP, severe headaches, changes in vision) associated with high readings. Instructed patient to go to the ED if BP > 180/110 and accompanied by hypertensive s/s, patient confirms understanding.    Health  spoke with patient last week and reviewed proper BP measurement technique. Patient reports he was taking a decongestant, last dose 11/30. Only drinks decaf. Tea or coffee. He is left handed and has had trouble putting cuff on arm. He is resting, then putting cuff on and pressing start. He will try putting cuff on right arm or have wife assist with placement. He reports recent office BP of 132/80 mmHg. Discussed increasing losartan to 50 mg BID if BP remains elevated on follow up.    Will continue to monitor regularly. Will follow up in 2-3 weeks, sooner if BP begins to trend upward or downward.    Patient has my contact information and knows to call with any concerns or clinical changes.     Current HTN regimen:  Hypertension Medications             losartan (COZAAR) 50 MG tablet Take 1 tablet (50 mg total) by mouth once daily.

## 2018-12-11 ENCOUNTER — PATIENT OUTREACH (OUTPATIENT)
Dept: OTHER | Facility: OTHER | Age: 68
End: 2018-12-11

## 2018-12-11 DIAGNOSIS — E11.59 HYPERTENSION ASSOCIATED WITH DIABETES: Primary | Chronic | ICD-10-CM

## 2018-12-11 DIAGNOSIS — I15.2 HYPERTENSION ASSOCIATED WITH DIABETES: Primary | Chronic | ICD-10-CM

## 2018-12-11 RX ORDER — LOSARTAN POTASSIUM 50 MG/1
50 TABLET ORAL 2 TIMES DAILY
Qty: 60 TABLET | Refills: 3 | Status: SHIPPED | OUTPATIENT
Start: 2018-12-11 | End: 2019-01-10

## 2018-12-11 NOTE — PROGRESS NOTES
Last 5 Patient Entered Readings                                      Current 30 Day Average: 145/84     Recent Readings 12/10/2018 12/5/2018 12/4/2018 12/3/2018 12/3/2018    SBP (mmHg) 140 151 140 165 159    DBP (mmHg) 86 76 81 93 89    Pulse 73 66 65 74 75          Patient's BP average is above goal of <130/80.     Patient denies s/s of hypotension (lightheadedness, dizziness, nausea, fatigue) associated with low readings. Instructed patient to inform me if this occurs, patient confirms understanding.      Patient denies s/s of hypertension (SOB, CP, severe headaches, changes in vision) associated with high readings. Instructed patient to go to the ED if BP > 180/110 and accompanied by hypertensive s/s, patient confirms understanding.    Patient amenable to medication change. Increase losartan to 50 mg BID. He does not anticipate compliance issues with BID dosing.    Will continue to monitor regularly. Will follow up in 3-4 weeks, sooner if BP begins to trend upward or downward.    Patient has my contact information and knows to call with any concerns or clinical changes.     Current HTN regimen:  Hypertension Medications             losartan (COZAAR) 50 MG tablet Take 1 tablet (50 mg total) by mouth once daily.

## 2018-12-16 ENCOUNTER — PATIENT MESSAGE (OUTPATIENT)
Dept: OTHER | Facility: OTHER | Age: 68
End: 2018-12-16

## 2018-12-17 ENCOUNTER — OFFICE VISIT (OUTPATIENT)
Dept: OTOLARYNGOLOGY | Facility: CLINIC | Age: 68
End: 2018-12-17
Payer: MEDICARE

## 2018-12-17 VITALS
TEMPERATURE: 99 F | HEART RATE: 78 BPM | SYSTOLIC BLOOD PRESSURE: 135 MMHG | DIASTOLIC BLOOD PRESSURE: 82 MMHG | BODY MASS INDEX: 27.37 KG/M2 | WEIGHT: 196.19 LBS

## 2018-12-17 DIAGNOSIS — B96.89 ACUTE BACTERIAL RHINOSINUSITIS: Primary | ICD-10-CM

## 2018-12-17 DIAGNOSIS — J01.90 ACUTE BACTERIAL RHINOSINUSITIS: Primary | ICD-10-CM

## 2018-12-17 PROCEDURE — 99214 OFFICE O/P EST MOD 30 MIN: CPT | Mod: HCNC,S$GLB,, | Performed by: OTOLARYNGOLOGY

## 2018-12-17 PROCEDURE — 3075F SYST BP GE 130 - 139MM HG: CPT | Mod: CPTII,HCNC,S$GLB, | Performed by: OTOLARYNGOLOGY

## 2018-12-17 PROCEDURE — 3079F DIAST BP 80-89 MM HG: CPT | Mod: CPTII,HCNC,S$GLB, | Performed by: OTOLARYNGOLOGY

## 2018-12-17 PROCEDURE — 99999 PR PBB SHADOW E&M-EST. PATIENT-LVL II: CPT | Mod: PBBFAC,HCNC,, | Performed by: OTOLARYNGOLOGY

## 2018-12-17 PROCEDURE — 1101F PT FALLS ASSESS-DOCD LE1/YR: CPT | Mod: CPTII,HCNC,S$GLB, | Performed by: OTOLARYNGOLOGY

## 2018-12-17 RX ORDER — METHYLPREDNISOLONE 4 MG/1
TABLET ORAL
Qty: 1 PACKAGE | Refills: 0 | Status: SHIPPED | OUTPATIENT
Start: 2018-12-17 | End: 2019-01-02

## 2018-12-17 RX ORDER — AMOXICILLIN AND CLAVULANATE POTASSIUM 875; 125 MG/1; MG/1
1 TABLET, FILM COATED ORAL 2 TIMES DAILY
Qty: 20 TABLET | Refills: 0 | Status: SHIPPED | OUTPATIENT
Start: 2018-12-17 | End: 2018-12-27

## 2018-12-17 NOTE — PROGRESS NOTES
Subjective:   Patient: Rigoberto Vasquez [id number], :1950   Visit date:2018 1:52 PM    Chief Complaint:  Other (Pt c/o ongoing sinus infection along with pain/pressure in the forehead area. States that when he blows his nose he has a yellow tinged mucus.)    HPI:  MAJOR SYMPTOMS:  Yes  Purulent anterior nasal discharge  Yes  Purulent or discolored posterior nasal discharge  Yes  Nasal congestion or obstruction  Yes  Facial Congestion or fullness  No  Hyposmia or anosmia  No  Fever    MINOR SYMPTOMS:  No  Headache  No  Ear pain, pressure, or fullness  No  Halitosis  No  Dental pain  Yes  Fatigue    The diagnosis of acute sinusitis is based on the presence of at least 2 major or 1 major and at least 2 minor symptoms.  Rigoberto does meet this criteria.  Clinical Practice Guideline for Acute Bacterial Rhinosinusitis in Children and Adults. Clin Infect Dis 2012; 54:e72    Onset:  2 weeks ago  Exacerbating factors: No  Relieving factors: No  Timing:  unchanged          Review of Systems:  -     Allergic/Immunologic: is allergic to aspirin; meperidine; and niacin..  -     Constitutional: Current temp: 99.1 °F (37.3 °C) (Tympanic)    His meds, allergies, medical, surgical, social & family histories were reviewed & updated:  -     He has a current medication list which includes the following prescription(s): acetaminophen, azelastine, bifidobacterium infantis, blood sugar diagnostic, fenofibrate micronized, gabapentin, hydrocortisone, insulin detemir u-100, levocetirizine, liraglutide 0.6 mg/0.1 ml (18 mg/3 ml) subq pnij, losartan, lovastatin, metformin, multivitamin, pantoprazole, pen needle, diabetic, pioglitazone, ranitidine, amoxicillin-clavulanate 875-125mg, and methylprednisolone.  -     He  has a past medical history of Acid reflux, Angina pectoris, Back pain, BPH (benign prostatic hyperplasia), Degenerative joint disease, Diverticulosis, Erectile dysfunction, History of elevated PSA (), History of  pericarditis, Hypercholesteremia, Hypertension, Iron deficiency anemia, Pacemaker, Squamous cell cancer of skin of mastoid region of scalp, Type 2 diabetes mellitus, Urinary incontinence, and when he was 7 Yrs old..   -     He does not have any pertinent problems on file.   -     He  has a past surgical history that includes Shoulder arthroscopy (Right); Nasal sinus surgery; Cardiac pacemaker placement; Tonsillectomy; Transurethral resection of prostate; Knee cartilage surgery (Bilateral); Tympanoplasty; vesectomy; Total knee arthroplasty (Left, 05/15/2017); Joint replacement; ESOPHAGOGASTRODUODENOSCOPY (EGD) (N/A, 7/3/2017); ESOPHAGOGASTRODUODENOSCOPY (EGD) (N/A, 6/10/2015); COLONOSCOPY (N/A, 6/10/2015); TYMPANOPLASTY   (Left, 3/26/2015); GRAFT (Left, 3/26/2015); MASTOIDECTOMY (Left, 3/26/2015); INSERTION TUBE-PE (Left, 3/26/2015); and ESOPHAGOGASTRODUODENOSCOPY (EGD) (N/A, 6/18/2014).  -     He  reports that  has never smoked. he has never used smokeless tobacco. He reports that he drinks alcohol. He reports that he does not use drugs.  -     His family history includes Arthritis in his mother; Colon cancer in his paternal grandmother; Diabetes in his maternal grandmother and son; Heart disease in his father; Hypertension in his father and mother; Stroke in his father.  -     He is allergic to aspirin; meperidine; and niacin.    Objective:     Physical Exam:  Vitals:  /82   Pulse 78   Temp 99.1 °F (37.3 °C) (Tympanic)   Wt 89 kg (196 lb 3.4 oz)   BMI 27.37 kg/m²   Appearance:  Well-developed, well-nourished.  Communication:  Able to communicate, no hoarseness.  Head & Face:  Normocephalic, atraumatic, no sinus tenderness, normal facial strength.  Eyes:  Extraocular motions intact.  Ears:  Right wnl.  Left with T tube.  Cartilage graft present.   Nose:  No masses/lesions of external nose, nasal mucosa, septum, and turbinates were within normal limits.  Mouth:  No mass/lesion of lips, teeth, gums,  hard/soft palate, tongue, tonsils, or oropharynx.  Neck & Lymphatics:  No cervical lymphadenopathy, no neck mass/crepitus/ asymmetry, trachea is midline, no thyroid enlargement/tenderness/mass.  Neuro/Psych: Alert with normal mood and affect.   Abdominal: Normal appearance.   Respiration/Chest:  Symmetric expansion during respiration, normal respiratory effort.  Skin:  Warm and intact  Cardiovascular:  No peripheral vascular edema or varicosities.    Assessment & Plan:   Rigoberto was seen today for other.    Diagnoses and all orders for this visit:    Acute bacterial rhinosinusitis    Other orders  -     methylPREDNISolone (MEDROL DOSEPACK) 4 mg tablet; use as directed  -     amoxicillin-clavulanate 875-125mg (AUGMENTIN) 875-125 mg per tablet; Take 1 tablet by mouth 2 (two) times daily. for 10 days      We discussed his medical conditions, treatments and plan.  Rigoberto should return to clinic if any issues arise (symptoms worsen or persist), otherwise we will see him back in the clinic only as needed.

## 2018-12-17 NOTE — PROGRESS NOTES
Last 5 Patient Entered Readings                                      Current 30 Day Average: 141/83     Recent Readings 12/17/2018 12/17/2018 12/16/2018 12/14/2018 12/13/2018    SBP (mmHg) 137 148 113 139 127    DBP (mmHg) 88 85 79 75 76    Pulse 81 71 93 76 72        See [facility] message. Patient experiencing hypotension symptoms with increased losartan dose. He is agreeable to losartan 50 mg in the morning and 25 mg at night.

## 2018-12-18 ENCOUNTER — LAB VISIT (OUTPATIENT)
Dept: LAB | Facility: HOSPITAL | Age: 68
End: 2018-12-18
Attending: FAMILY MEDICINE
Payer: MEDICARE

## 2018-12-18 DIAGNOSIS — Z79.4 TYPE 2 DIABETES MELLITUS WITHOUT COMPLICATION, WITH LONG-TERM CURRENT USE OF INSULIN: Chronic | ICD-10-CM

## 2018-12-18 DIAGNOSIS — E11.9 TYPE 2 DIABETES MELLITUS WITHOUT COMPLICATION, WITH LONG-TERM CURRENT USE OF INSULIN: Chronic | ICD-10-CM

## 2018-12-18 LAB
ALBUMIN/CREAT UR: 6.1 UG/MG
CREAT UR-MCNC: 164 MG/DL
MICROALBUMIN UR DL<=1MG/L-MCNC: 10 UG/ML

## 2018-12-18 PROCEDURE — 82043 UR ALBUMIN QUANTITATIVE: CPT | Mod: HCNC

## 2018-12-27 ENCOUNTER — PATIENT OUTREACH (OUTPATIENT)
Dept: OTHER | Facility: OTHER | Age: 68
End: 2018-12-27

## 2018-12-27 NOTE — PROGRESS NOTES
Last 5 Patient Entered Readings                                      Current 30 Day Average: 136/83     Recent Readings 12/27/2018 12/22/2018 12/21/2018 12/20/2018 12/18/2018    SBP (mmHg) 119 118 126 118 138    DBP (mmHg) 84 77 77 71 88    Pulse 84 81 85 90 73          12/27: LVM.  Will call in 3 weeks.  Patient on downward trend.  Felt lightheadedness a week ago.

## 2019-01-02 ENCOUNTER — OFFICE VISIT (OUTPATIENT)
Dept: INTERNAL MEDICINE | Facility: CLINIC | Age: 69
End: 2019-01-02
Payer: MEDICARE

## 2019-01-02 VITALS
OXYGEN SATURATION: 97 % | SYSTOLIC BLOOD PRESSURE: 138 MMHG | WEIGHT: 198.63 LBS | TEMPERATURE: 99 F | BODY MASS INDEX: 27.81 KG/M2 | HEIGHT: 71 IN | HEART RATE: 86 BPM | DIASTOLIC BLOOD PRESSURE: 68 MMHG

## 2019-01-02 DIAGNOSIS — E11.59 HYPERTENSION ASSOCIATED WITH DIABETES: Chronic | ICD-10-CM

## 2019-01-02 DIAGNOSIS — I15.2 HYPERTENSION ASSOCIATED WITH DIABETES: Chronic | ICD-10-CM

## 2019-01-02 DIAGNOSIS — E11.69 HYPERLIPIDEMIA ASSOCIATED WITH TYPE 2 DIABETES MELLITUS: Chronic | ICD-10-CM

## 2019-01-02 DIAGNOSIS — E11.9 TYPE 2 DIABETES MELLITUS WITHOUT COMPLICATION, WITH LONG-TERM CURRENT USE OF INSULIN: Primary | Chronic | ICD-10-CM

## 2019-01-02 DIAGNOSIS — E78.5 HYPERLIPIDEMIA ASSOCIATED WITH TYPE 2 DIABETES MELLITUS: Chronic | ICD-10-CM

## 2019-01-02 DIAGNOSIS — Z79.4 TYPE 2 DIABETES MELLITUS WITHOUT COMPLICATION, WITH LONG-TERM CURRENT USE OF INSULIN: Primary | Chronic | ICD-10-CM

## 2019-01-02 DIAGNOSIS — Z12.5 SCREENING FOR PROSTATE CANCER: ICD-10-CM

## 2019-01-02 DIAGNOSIS — Z95.0 PACEMAKER: Chronic | ICD-10-CM

## 2019-01-02 PROCEDURE — 3078F DIAST BP <80 MM HG: CPT | Mod: CPTII,HCNC,S$GLB, | Performed by: FAMILY MEDICINE

## 2019-01-02 PROCEDURE — 99214 PR OFFICE/OUTPT VISIT, EST, LEVL IV, 30-39 MIN: ICD-10-PCS | Mod: HCNC,S$GLB,, | Performed by: FAMILY MEDICINE

## 2019-01-02 PROCEDURE — 99214 OFFICE O/P EST MOD 30 MIN: CPT | Mod: HCNC,S$GLB,, | Performed by: FAMILY MEDICINE

## 2019-01-02 PROCEDURE — 3075F SYST BP GE 130 - 139MM HG: CPT | Mod: CPTII,HCNC,S$GLB, | Performed by: FAMILY MEDICINE

## 2019-01-02 PROCEDURE — 1101F PR PT FALLS ASSESS DOC 0-1 FALLS W/OUT INJ PAST YR: ICD-10-PCS | Mod: CPTII,HCNC,S$GLB, | Performed by: FAMILY MEDICINE

## 2019-01-02 PROCEDURE — 99999 PR PBB SHADOW E&M-EST. PATIENT-LVL III: ICD-10-PCS | Mod: PBBFAC,HCNC,, | Performed by: FAMILY MEDICINE

## 2019-01-02 PROCEDURE — 3044F PR MOST RECENT HEMOGLOBIN A1C LEVEL <7.0%: ICD-10-PCS | Mod: CPTII,HCNC,S$GLB, | Performed by: FAMILY MEDICINE

## 2019-01-02 PROCEDURE — 3078F PR MOST RECENT DIASTOLIC BLOOD PRESSURE < 80 MM HG: ICD-10-PCS | Mod: CPTII,HCNC,S$GLB, | Performed by: FAMILY MEDICINE

## 2019-01-02 PROCEDURE — 99999 PR PBB SHADOW E&M-EST. PATIENT-LVL III: CPT | Mod: PBBFAC,HCNC,, | Performed by: FAMILY MEDICINE

## 2019-01-02 PROCEDURE — 3044F HG A1C LEVEL LT 7.0%: CPT | Mod: CPTII,HCNC,S$GLB, | Performed by: FAMILY MEDICINE

## 2019-01-02 PROCEDURE — 1101F PT FALLS ASSESS-DOCD LE1/YR: CPT | Mod: CPTII,HCNC,S$GLB, | Performed by: FAMILY MEDICINE

## 2019-01-02 PROCEDURE — 3075F PR MOST RECENT SYSTOLIC BLOOD PRESS GE 130-139MM HG: ICD-10-PCS | Mod: CPTII,HCNC,S$GLB, | Performed by: FAMILY MEDICINE

## 2019-01-02 NOTE — PROGRESS NOTES
Subjective:       Patient ID: Rigoberto Vasquez is a . male.    Chief Complaint: Multiple issues see below    HPIType 2 diabetes: a1cnl . elida insul. ;Tolerating medicine. No hypoglycemia c/o; ;on metform once daily and elida      Hypertension: blood pressures at home normal. Tolerating medicine.   Hypercholesterolemia: controlled. Tolerating medicine.  GERD sympt w/o med. last egd fall 15;asympt. Tolerating medication. No swallowing problems    Hx elev psa sees dr dave annually utd        Pacemaker utd dr davila        Past Medical History   Diagnosis Date    Hypertension     Acid reflux     Degenerative joint disease     Erectile dysfunction     pacemaker       dr davila card    Diverticulosis     Pacemaker      complete av block    BPH (benign prostatic hyperplasia)      blue    History of pericarditis     Squamous cell cancer of skin of mastoid region of scalp      dr jaida salgado    Type 2 diabetes mellitus     Hypercholesteremia     Glaucoma      Past Surgical History   Procedure Laterality Date    Shoulder arthroscopy      Knee surgery       cartilage removal    Nasal sinus surgery      Cardiac pacemaker placement      Tonsillectomy      Transurethral resection of prostate       Family History   Problem Relation Age of Onset    Arthritis Mother     Hypertension Mother     Heart disease Father     Hypertension Father     Stroke Father     Diabetes Son     Diabetes Maternal Grandmother                    Review of Systems   Respiratory: Negative for shortness of breath.    Cardiovascular: Negative for chest pain and leg swelling.       Objective:      Physical Exam   Constitutional: He is oriented to person, place, and time. He appears well-developed and well-nourished.   HENT:   Head: Normocephalic and atraumatic.   Eyes: Conjunctivae normal and EOM are normal. Pupils are equal, round, and reactive to light.   Neck: Normal range of motion. Neck supple. Carotid bruit is not present.    Cardiovascular: Normal rate and regular rhythm.    Pulmonary/Chest: Effort normal and breath sounds normal.     Musculoskeletal: He exhibits no edema.   Neurological: He is alert and oriented to person, place, and time.   Skin: Skin is warm and dry.   Psychiatric: He has a normal mood and affect. His behavior is normal. Judgment and thought content normal.   Bilateral feet with normal monofilament testing and no lesions.        Assessment:         type 2 dm  htn  gerd  hyperchol  Hx elev psa  Pacemaker hx      Plan:    **  F/u card ()and urol as planned    Shingrix new shingles vaccine  via a pharmacy  Lab and follow up after in 6 months  F/ualisha afte  Type 2 diabetes mellitus without complication, with long-term current use of insulin  -     Hemoglobin A1c; Future; Expected date: 07/01/2019  -     Microalbumin/creatinine urine ratio; Future; Expected date: 07/01/2019  -     Diabetes Digital Medicine (DDMP) Enrollment Order  -     Basic metabolic panel; Future; Expected date: 07/01/2019    Hyperlipidemia associated with type 2 diabetes mellitus    Hypertension associated with diabetes    Pacemaker    Screening for prostate cancer    Other orders  -     Cancel: PSA, Screening; Future; Expected date: 07/01/2019

## 2019-01-08 ENCOUNTER — PATIENT OUTREACH (OUTPATIENT)
Dept: OTHER | Facility: OTHER | Age: 69
End: 2019-01-08

## 2019-01-08 DIAGNOSIS — E11.59 HYPERTENSION ASSOCIATED WITH DIABETES: Chronic | ICD-10-CM

## 2019-01-08 DIAGNOSIS — I15.2 HYPERTENSION ASSOCIATED WITH DIABETES: Chronic | ICD-10-CM

## 2019-01-08 NOTE — PROGRESS NOTES
Last 5 Patient Entered Readings                                      Current 30 Day Average: 129/80     Recent Readings 1/6/2019 1/6/2019 1/4/2019 12/31/2018 12/27/2018    SBP (mmHg) 153 148 137 131 119    DBP (mmHg) 85 81 73 83 84    Pulse 84 81 84 85 84          Left voicemail. Follow up on losartan dose adjustment and lightheadedness. BP was improved until 1/4. Health  outreach planned next week. Of note, he had a PCP appointment 1/2.

## 2019-01-10 RX ORDER — LOSARTAN POTASSIUM 50 MG/1
TABLET ORAL
Qty: 60 TABLET | Refills: 3
Start: 2019-01-10 | End: 2019-01-15 | Stop reason: SDUPTHER

## 2019-01-10 NOTE — PROGRESS NOTES
Last 5 Patient Entered Readings                                      Current 30 Day Average: 130/80     Recent Readings 1/6/2019 1/6/2019 1/4/2019 12/31/2018 12/27/2018    SBP (mmHg) 153 148 137 131 119    DBP (mmHg) 85 81 73 83 84    Pulse 84 81 84 85 84          HPI:  Called patient to follow up. Patient endorses adherence to medication regimen. Reports lightheadedness has improved with losartan 50 mg in the morning and 25 mg in the evening. Discussed elevated BP 1/6, states he ate out the night prior.  Patient denies hypotensive s/sx (lightheadedness, dizziness, nausea, fatigue); patient denies hypertensive s/sx (SOB, CP, severe headaches, changes in vision, dizziness, fatigue, confusion, anxiety, nosebleeds).     Assessment:  Reviewed recent readings. Per 2017 ACC/ AHA HTN guidelines (goal of BP < 130/80), current 30-day average improved and now right at goal.     Plan:  Continue current medication regimen. I will continue to monitor regularly and will follow-up in 4 to 6 weeks, sooner if blood pressure begins to trend upward or downward.     Current medication regimen:  Hypertension Medications             losartan (COZAAR) 50 MG tablet Take 1 tablet (50 mg total) by mouth 2 (two) times daily.          Patient denies having questions or concerns. Patient has my contact information and knows to call with any concerns or clinical changes.

## 2019-01-15 DIAGNOSIS — E11.59 HYPERTENSION ASSOCIATED WITH DIABETES: Chronic | ICD-10-CM

## 2019-01-15 DIAGNOSIS — I15.2 HYPERTENSION ASSOCIATED WITH DIABETES: Chronic | ICD-10-CM

## 2019-01-15 RX ORDER — METFORMIN HYDROCHLORIDE 1000 MG/1
1000 TABLET ORAL DAILY
Qty: 180 TABLET | Refills: 3 | Status: SHIPPED | OUTPATIENT
Start: 2019-01-15 | End: 2020-04-14 | Stop reason: SDUPTHER

## 2019-01-15 RX ORDER — LOSARTAN POTASSIUM 50 MG/1
TABLET ORAL
Qty: 60 TABLET | Refills: 3
Start: 2019-01-15 | End: 2019-01-29

## 2019-01-18 NOTE — PROGRESS NOTES
Last 5 Patient Entered Readings                                      Current 30 Day Average: 136/80     Recent Readings 1/17/2019 1/15/2019 1/13/2019 1/13/2019 1/13/2019    SBP (mmHg) 146 158 151 137 153    DBP (mmHg) 82 85 85 74 88    Pulse 76 81 75 79 80          1/18: LVM.  Will call in 2 weeks.

## 2019-01-21 ENCOUNTER — OFFICE VISIT (OUTPATIENT)
Dept: OPHTHALMOLOGY | Facility: CLINIC | Age: 69
End: 2019-01-21
Payer: MEDICARE

## 2019-01-21 DIAGNOSIS — H35.341 MACULAR HOLE OF RIGHT EYE: ICD-10-CM

## 2019-01-21 DIAGNOSIS — H35.372 EPIRETINAL MEMBRANE (ERM) OF LEFT EYE: ICD-10-CM

## 2019-01-21 DIAGNOSIS — H40.023 OPEN ANGLE WITH BORDERLINE FINDINGS AND HIGH GLAUCOMA RISK IN BOTH EYES: ICD-10-CM

## 2019-01-21 DIAGNOSIS — D31.31 CHOROIDAL NEVUS OF RIGHT EYE: Primary | ICD-10-CM

## 2019-01-21 DIAGNOSIS — E11.9 TYPE 2 DIABETES MELLITUS WITHOUT COMPLICATION, WITHOUT LONG-TERM CURRENT USE OF INSULIN: ICD-10-CM

## 2019-01-21 PROCEDURE — 92014 COMPRE OPH EXAM EST PT 1/>: CPT | Mod: HCNC,S$GLB,, | Performed by: OPHTHALMOLOGY

## 2019-01-21 PROCEDURE — 92083 HUMPHREY VISUAL FIELD - OU - BOTH EYES: ICD-10-PCS | Mod: HCNC,S$GLB,, | Performed by: OPHTHALMOLOGY

## 2019-01-21 PROCEDURE — 99999 PR PBB SHADOW E&M-EST. PATIENT-LVL II: CPT | Mod: PBBFAC,HCNC,, | Performed by: OPHTHALMOLOGY

## 2019-01-21 PROCEDURE — 99999 PR PBB SHADOW E&M-EST. PATIENT-LVL II: ICD-10-PCS | Mod: PBBFAC,HCNC,, | Performed by: OPHTHALMOLOGY

## 2019-01-21 PROCEDURE — 92014 PR EYE EXAM, EST PATIENT,COMPREHESV: ICD-10-PCS | Mod: HCNC,S$GLB,, | Performed by: OPHTHALMOLOGY

## 2019-01-21 PROCEDURE — 92250 COLOR FUNDUS PHOTOGRAPHY - OU - BOTH EYES: ICD-10-PCS | Mod: HCNC,S$GLB,, | Performed by: OPHTHALMOLOGY

## 2019-01-21 PROCEDURE — 92250 FUNDUS PHOTOGRAPHY W/I&R: CPT | Mod: HCNC,S$GLB,, | Performed by: OPHTHALMOLOGY

## 2019-01-21 PROCEDURE — 92083 EXTENDED VISUAL FIELD XM: CPT | Mod: HCNC,S$GLB,, | Performed by: OPHTHALMOLOGY

## 2019-01-21 NOTE — PROGRESS NOTES
===============================  01/21/2019   Rigoberto Vasquez,   68 y.o. male   Last visit [facility]: :1/15/2018   Last visit eye dept. Visit date not found  [facility]:  Corrected distance visual acuity was 20/25 -2 in the right eye and 20/20 -1 in the left eye.  Tonometry     Tonometry (Applanation, 11:39 AM)       Right Left    Pressure 19 19              Wearing Rx     Wearing Rx       Sphere Cylinder Axis Add    Right -5.25 +1.50 164 +2.25    Left -4.75 +0.50 167 +2.25    Age:  9m    Type:  PAL               Not recorded        Chief Complaint   Patient presents with    Diabetes     YEARLY DIABETIC EXAM/ REV HVF/ GOCT. Pt states no problems since his last visit. not using any drops. no ocular pain or irritation. vision is the same with his glasses, got them 10 months ago.         HPI     Diabetes      Additional comments: YEARLY DIABETIC EXAM/ REV HVF/ GOCT. Pt states no   problems since his last visit. not using any drops. no ocular pain or   irritation. vision is the same with his glasses, got them 10 months ago.               Comments     1. DM since 1999  NO DBR  2. Mac hole od  3. erm os  4. Choroidal nevus od   5. HIGH MYOPE(-6)  6. SCOAG  iop 25/ 20 ou on po steroid  anamolous myopic disc  ASSYMETRIC C/D 0.7 / 0.5  (-7)  NO FAMILY HISTORY  F 1/21/19          Last edited by Prashant Mi on 1/21/2019 11:43 AM. 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    Overview              Relevant Orders    Color Fundus Photography - OU - Both Eyes    Epiretinal membrane (ERM) of left eye    Open angle with borderline findings and high glaucoma risk in both eyes    Relevant Orders    Posterior Segment OCT Optic Nerve- Both eyes (Completed)    Morales Visual Field - OU - Extended - Both Eyes (Completed)        Dm no bdr  od nevus 2dd [address]  Hi  myope  od mh 3+ erm OD, trace os erm  anomolous myopic disc stable by rnfl  scoag asymmetric myopic discs  rnfl ptdat stable       .ou erm stable    optos today  rtc 1 year, vf and optos         ===========================

## 2019-01-29 ENCOUNTER — PATIENT OUTREACH (OUTPATIENT)
Dept: OTHER | Facility: OTHER | Age: 69
End: 2019-01-29

## 2019-01-29 DIAGNOSIS — I15.2 HYPERTENSION ASSOCIATED WITH DIABETES: Chronic | ICD-10-CM

## 2019-01-29 DIAGNOSIS — E11.59 HYPERTENSION ASSOCIATED WITH DIABETES: Chronic | ICD-10-CM

## 2019-01-29 RX ORDER — LOSARTAN POTASSIUM 50 MG/1
TABLET ORAL
Qty: 60 TABLET | Refills: 3
Start: 2019-01-29 | End: 2019-03-21 | Stop reason: SDUPTHER

## 2019-01-29 NOTE — PROGRESS NOTES
Last 5 Patient Entered Readings                                      Current 30 Day Average: 146/83     Recent Readings 1/26/2019 1/24/2019 1/22/2019 1/21/2019 1/17/2019    SBP (mmHg) 137 147 142 156 146    DBP (mmHg) 79 89 77 94 82    Pulse 76 70 81 77 76          HPI:  Called patient to follow up. Patient endorses adherence to medication regimen. Relates increased BP to kitchen remodeling and eating out more. He expects remodeling to take another 2 to 3 weeks.     Patient denies hypotensive s/sx (lightheadedness, dizziness, nausea, fatigue); patient denies hypertensive s/sx (SOB, CP, severe headaches, changes in vision, dizziness, fatigue, confusion, anxiety, nosebleeds).     Assessment:  Reviewed recent readings. Per 2017 ACC/ AHA HTN guidelines (goal of BP < 130/80), current 30-day average needs to be addressed more thoroughly today.     Plan:  Increase losartan to 50 mg BID. I will continue to monitor regularly and will follow-up in 2 to 3 weeks, sooner if blood pressure begins to trend upward or downward.     Current medication regimen:  Hypertension Medications             losartan (COZAAR) 50 MG tablet Take 1 tablet by mouth in the morning and 0.5 tablet in the evening          Patient denies having questions or concerns. Patient has my contact information and knows to call with any concerns or clinical changes.

## 2019-01-29 NOTE — PROGRESS NOTES
Last 5 Patient Entered Readings                                      Current 30 Day Average: 146/83     Recent Readings 1/26/2019 1/24/2019 1/22/2019 1/21/2019 1/17/2019    SBP (mmHg) 137 147 142 156 146    DBP (mmHg) 79 89 77 94 82    Pulse 76 70 81 77 76          Digital Medicine: Health  Follow Up    Lifestyle Modifications:    1.Dietary Modifications (Sodium intake <2,000mg/day, food labels, dining out): States he is remodeling his kitchen, so he finds himself eating out more.  Reports he is not eating at fast food, but local restaurants.  States he is ordering salads and vegetables.  Encouraged patient to find healthy options.    2.Physical Activity: States he is still going to PT 3x/week, so he is staying active there.    3.Medication Therapy: Patient has been compliant with the medication regimen.    4.Patient has the following medication side effects/concerns: Wondering about going back to 2 pills/day.  Will inform pharmacist.  (Frequency/Alleviating factors/Precipitating factors, etc.)     Follow up with Mr. Rigoberto Vasquez completed. No further questions or concerns. Will continue to follow up to achieve health goals.

## 2019-02-19 ENCOUNTER — PATIENT OUTREACH (OUTPATIENT)
Dept: OTHER | Facility: OTHER | Age: 69
End: 2019-02-19

## 2019-02-19 NOTE — PROGRESS NOTES
Last 5 Patient Entered Readings                                      Current 30 Day Average: 142/84     Recent Readings 2/18/2019 2/17/2019 2/17/2019 2/16/2019 2/13/2019    SBP (mmHg) 150 134 149 151 154    DBP (mmHg) 95 88 80 90 87    Pulse 73 72 76 71 74          Left voicemail.  Variable BP readings. Follow up losartan dose increase. He may benefit from thiazide diuretic.

## 2019-02-27 ENCOUNTER — PATIENT OUTREACH (OUTPATIENT)
Dept: OTHER | Facility: OTHER | Age: 69
End: 2019-02-27

## 2019-02-27 NOTE — PROGRESS NOTES
Last 5 Patient Entered Readings                                      Current 30 Day Average: 143/84     Recent Readings 2/26/2019 2/23/2019 2/18/2019 2/17/2019 2/17/2019    SBP (mmHg) 159 143 150 134 149    DBP (mmHg) 86 83 95 88 80    Pulse 72 73 73 72 76          2/27: LVM.  Will call in 2 weeks.

## 2019-02-27 NOTE — PROGRESS NOTES
Last 5 Patient Entered Readings                                      Current 30 Day Average: 143/84     Recent Readings 2/26/2019 2/23/2019 2/18/2019 2/17/2019 2/17/2019    SBP (mmHg) 159 143 150 134 149    DBP (mmHg) 86 83 95 88 80    Pulse 72 73 73 72 76          Digital Medicine: Health  Follow Up    Lifestyle Modifications:    1.Dietary Modifications (Sodium intake <2,000mg/day, food labels, dining out): States they are still remodeling the kitchen, so they have been dining out more.  Encouraged patient to avoid dishes that have added sauces or seasonings that could be high in sodium.    2.Physical Activity: deferred    3.Medication Therapy: Patient has been compliant with the medication regimen.    4.Patient has the following medication side effects/concerns: none  (Frequency/Alleviating factors/Precipitating factors, etc.)     Follow up with Mr. Rigoberto Vasquez completed. No further questions or concerns. Will continue to follow up to achieve health goals.

## 2019-03-05 DIAGNOSIS — E11.9 DIABETES MELLITUS WITHOUT COMPLICATION: ICD-10-CM

## 2019-03-05 RX ORDER — INSULIN DETEMIR 100 [IU]/ML
INJECTION, SOLUTION SUBCUTANEOUS
Qty: 30 ML | Refills: 3 | Status: SHIPPED | OUTPATIENT
Start: 2019-03-05 | End: 2020-02-13 | Stop reason: SDUPTHER

## 2019-03-08 NOTE — PROGRESS NOTES
Last 5 Patient Entered Readings                                      Current 30 Day Average: 140/83     Recent Readings 3/5/2019 2/26/2019 2/26/2019 2/23/2019 2/18/2019    SBP (mmHg) 157 136 159 143 150    DBP (mmHg) 90 77 86 83 95    Pulse 70 78 72 73 73          HPI:  Called patient to follow up. Patient endorses adherence to medication regimen. Reports kitchen remodeling should be finished in about a week. He expects diet and stress level to improve once project completed. Patient denies hypotensive s/sx (lightheadedness, dizziness, nausea, fatigue); patient denies hypertensive s/sx (SOB, CP, severe headaches, changes in vision).     Assessment:  Reviewed recent readings. Per 2017 ACC/ AHA HTN guidelines (goal of BP < 130/80), current 30-day average needs to be addressed more thoroughly today.     Plan:  Discussed beginning thiazide diuretic. Patient would like to wait until remodeling is completed and he can improve his diet. He appears open to medication change if BP not improved on follow up. I will continue to monitor regularly and will follow-up in 4 to 6 weeks, sooner if blood pressure begins to trend upward or downward.     Current medication regimen:  Hypertension Medications             losartan (COZAAR) 50 MG tablet Take 1 tablet by mouth in the morning and 1 tablet in the evening          Patient denies having questions or concerns. Patient has my contact information and knows to call with any concerns or clinical changes.

## 2019-03-21 DIAGNOSIS — I15.2 HYPERTENSION ASSOCIATED WITH DIABETES: Chronic | ICD-10-CM

## 2019-03-21 DIAGNOSIS — E11.59 HYPERTENSION ASSOCIATED WITH DIABETES: Chronic | ICD-10-CM

## 2019-03-21 RX ORDER — LOSARTAN POTASSIUM 50 MG/1
TABLET ORAL
Qty: 60 TABLET | Refills: 3
Start: 2019-03-21 | End: 2019-03-26 | Stop reason: SDUPTHER

## 2019-03-26 DIAGNOSIS — E11.59 HYPERTENSION ASSOCIATED WITH DIABETES: Chronic | ICD-10-CM

## 2019-03-26 DIAGNOSIS — I15.2 HYPERTENSION ASSOCIATED WITH DIABETES: Chronic | ICD-10-CM

## 2019-03-26 RX ORDER — LOSARTAN POTASSIUM 50 MG/1
TABLET ORAL
Qty: 20 TABLET | Refills: 0 | Status: SHIPPED | OUTPATIENT
Start: 2019-03-26 | End: 2019-03-26 | Stop reason: SDUPTHER

## 2019-03-26 RX ORDER — LOSARTAN POTASSIUM 50 MG/1
TABLET ORAL
Qty: 180 TABLET | Refills: 3 | Status: SHIPPED | OUTPATIENT
Start: 2019-03-26 | End: 2019-12-24 | Stop reason: SDUPTHER

## 2019-03-26 RX ORDER — LOSARTAN POTASSIUM 50 MG/1
TABLET ORAL
Qty: 20 TABLET | Refills: 0
Start: 2019-03-26 | End: 2019-03-26 | Stop reason: SDUPTHER

## 2019-03-26 NOTE — TELEPHONE ENCOUNTER
Patient request small prescription go to [facility] in [facility] mail order comes in.     Med/pharmacy set up for MD to route

## 2019-03-26 NOTE — TELEPHONE ENCOUNTER
Spoke with patient who advised that his prescription has not been sent into [facility] according to the pharmacy. Spoke with Dr. Grimes who advised that it was sent in wrong initially.   Dr. Grimes sent in correct quanity and script to [facility] and a small script to local pharmacy.    Spoke to patient and apologized about the inconvenience, advised him that this has been corrected and he verbalized understanding.

## 2019-03-26 NOTE — TELEPHONE ENCOUNTER
----- Message from Laurie Fong sent at 3/26/2019 11:12 AM CDT -----  Contact: Patient  Type:  Patient Returning Call    Who Called:Rigoberto  Who Left Message for Patient: the nurse  Does the patient know what this is regarding?:n/a  Would the patient rather a call back or a response via [employer]?  Call back   Best Call Back Number: [phone]  Additional Information: n/a    Laurie Owens

## 2019-03-29 ENCOUNTER — PATIENT OUTREACH (OUTPATIENT)
Dept: OTHER | Facility: OTHER | Age: 69
End: 2019-03-29

## 2019-03-29 NOTE — PROGRESS NOTES
Last 5 Patient Entered Readings                                      Current 30 Day Average: 144/84     Recent Readings 3/24/2019 3/21/2019 3/19/2019 3/17/2019 3/14/2019    SBP (mmHg) 138 144 137 151 138    DBP (mmHg) 78 86 91 85 75    Pulse 73 79 70 75 77        3/29: LVM.  Will call in 2 weeks.  Patient on downward trend.

## 2019-04-12 NOTE — PROGRESS NOTES
Last 5 Patient Entered Readings                                      Current 30 Day Average: 142/85     Recent Readings 4/7/2019 4/2/2019 4/1/2019 4/1/2019 3/24/2019    SBP (mmHg) 136 132 158 157 138    DBP (mmHg) 89 76 92 95 78    Pulse 74 71 75 75 73        4/12: Left message with wife.  Will call in 1 week.

## 2019-04-22 ENCOUNTER — LAB VISIT (OUTPATIENT)
Dept: LAB | Facility: HOSPITAL | Age: 69
End: 2019-04-22
Attending: FAMILY MEDICINE
Payer: MEDICARE

## 2019-04-22 ENCOUNTER — OFFICE VISIT (OUTPATIENT)
Dept: INTERNAL MEDICINE | Facility: CLINIC | Age: 69
End: 2019-04-22
Payer: MEDICARE

## 2019-04-22 VITALS
OXYGEN SATURATION: 96 % | HEART RATE: 86 BPM | TEMPERATURE: 99 F | HEIGHT: 71 IN | WEIGHT: 200.81 LBS | SYSTOLIC BLOOD PRESSURE: 114 MMHG | DIASTOLIC BLOOD PRESSURE: 78 MMHG | BODY MASS INDEX: 28.11 KG/M2

## 2019-04-22 DIAGNOSIS — R06.09 DYSPNEA ON EXERTION: ICD-10-CM

## 2019-04-22 DIAGNOSIS — E11.9 TYPE 2 DIABETES MELLITUS WITHOUT COMPLICATION, WITH LONG-TERM CURRENT USE OF INSULIN: Chronic | ICD-10-CM

## 2019-04-22 DIAGNOSIS — Z79.4 TYPE 2 DIABETES MELLITUS WITHOUT COMPLICATION, WITH LONG-TERM CURRENT USE OF INSULIN: Chronic | ICD-10-CM

## 2019-04-22 DIAGNOSIS — R53.83 FATIGUE, UNSPECIFIED TYPE: Primary | ICD-10-CM

## 2019-04-22 PROCEDURE — 3078F DIAST BP <80 MM HG: CPT | Mod: HCNC,CPTII,S$GLB, | Performed by: FAMILY MEDICINE

## 2019-04-22 PROCEDURE — 99999 PR PBB SHADOW E&M-EST. PATIENT-LVL III: ICD-10-PCS | Mod: PBBFAC,HCNC,, | Performed by: FAMILY MEDICINE

## 2019-04-22 PROCEDURE — 1101F PT FALLS ASSESS-DOCD LE1/YR: CPT | Mod: HCNC,CPTII,S$GLB, | Performed by: FAMILY MEDICINE

## 2019-04-22 PROCEDURE — 3078F PR MOST RECENT DIASTOLIC BLOOD PRESSURE < 80 MM HG: ICD-10-PCS | Mod: HCNC,CPTII,S$GLB, | Performed by: FAMILY MEDICINE

## 2019-04-22 PROCEDURE — 83036 HEMOGLOBIN GLYCOSYLATED A1C: CPT | Mod: HCNC

## 2019-04-22 PROCEDURE — 99999 PR PBB SHADOW E&M-EST. PATIENT-LVL III: CPT | Mod: PBBFAC,HCNC,, | Performed by: FAMILY MEDICINE

## 2019-04-22 PROCEDURE — 36415 COLL VENOUS BLD VENIPUNCTURE: CPT | Mod: HCNC

## 2019-04-22 PROCEDURE — 99214 OFFICE O/P EST MOD 30 MIN: CPT | Mod: HCNC,S$GLB,, | Performed by: FAMILY MEDICINE

## 2019-04-22 PROCEDURE — 1101F PR PT FALLS ASSESS DOC 0-1 FALLS W/OUT INJ PAST YR: ICD-10-PCS | Mod: HCNC,CPTII,S$GLB, | Performed by: FAMILY MEDICINE

## 2019-04-22 PROCEDURE — 99214 PR OFFICE/OUTPT VISIT, EST, LEVL IV, 30-39 MIN: ICD-10-PCS | Mod: HCNC,S$GLB,, | Performed by: FAMILY MEDICINE

## 2019-04-22 PROCEDURE — 3074F PR MOST RECENT SYSTOLIC BLOOD PRESSURE < 130 MM HG: ICD-10-PCS | Mod: HCNC,CPTII,S$GLB, | Performed by: FAMILY MEDICINE

## 2019-04-22 PROCEDURE — 3074F SYST BP LT 130 MM HG: CPT | Mod: HCNC,CPTII,S$GLB, | Performed by: FAMILY MEDICINE

## 2019-04-22 PROCEDURE — 3044F HG A1C LEVEL LT 7.0%: CPT | Mod: HCNC,CPTII,S$GLB, | Performed by: FAMILY MEDICINE

## 2019-04-22 PROCEDURE — 3044F PR MOST RECENT HEMOGLOBIN A1C LEVEL <7.0%: ICD-10-PCS | Mod: HCNC,CPTII,S$GLB, | Performed by: FAMILY MEDICINE

## 2019-04-22 NOTE — PROGRESS NOTES
Last 5 Patient Entered Readings                                      Current 30 Day Average: 139/84     Recent Readings 4/20/2019 4/17/2019 4/13/2019 4/12/2019 4/12/2019    SBP (mmHg) 141 137 139 129 142    DBP (mmHg) 85 72 84 88 76    Pulse 71 74 73 72 75          Digital Medicine: Health  Follow Up    Lifestyle Modifications:    1.Dietary Modifications (Sodium intake <2,000mg/day, food labels, dining out): Patient states he is trying to eat better.  States his kitchen remodel is done, so they have been trying to cook more at home.  Offered recipes, but patient denied at this time.    2.Physical Activity: deferred    3.Medication Therapy: Patient has been compliant with the medication regimen.    4.Patient has the following medication side effects/concerns: none  (Frequency/Alleviating factors/Precipitating factors, etc.)     Follow up with Mr. Rigoberto Vasquez completed. No further questions or concerns. Will continue to follow up to achieve health goals.

## 2019-04-22 NOTE — PROGRESS NOTES
Subjective:   Patient ID: Rigoberto Vasquez is a 68 y.o. male.  Chief Complaint:  Fatigue and Shortness of Breath      PCP Dr. Grimes.    Presents for evaluation of fatigue and shortness of breath/dyspnea on exertion.    Sees Cardiology.  Third degree heart block.  Complained to them of similar symptoms.  Underwent additional testing to include echo and stress test which were unremarkable and  Ruled out a cardiac cause for symptoms.  No known pulmonary disease.  No pulmonary function testing on file.    History of diabetes, no recent A1c on file.    Cardiologist also tested CBC, CMP and TSH which were all normal/ acceptable.  Patient test positive on PHQ screening questionnaire. .    Denies any previous diagnosis depression, anxiety, or mood disorder.    Review of Systems   Constitutional: Positive for fatigue. Negative for activity change, appetite change, chills and fever.   HENT: Negative for congestion, ear pain, postnasal drip, rhinorrhea, sinus pressure and sore throat.    Eyes: Negative for visual disturbance.   Respiratory: Positive for shortness of breath. Negative for cough, chest tightness and wheezing.    Cardiovascular: Negative for chest pain, palpitations and leg swelling.   Gastrointestinal: Negative for abdominal pain, constipation, diarrhea, nausea and vomiting.   Endocrine: Negative for polydipsia, polyphagia and polyuria.   Genitourinary: Negative for difficulty urinating, dysuria, flank pain, frequency, hematuria and urgency.   Musculoskeletal: Negative for arthralgias and myalgias.   Skin: Negative for rash.   Neurological: Negative for dizziness, syncope, weakness, light-headedness, numbness and headaches.   Hematological: Negative for adenopathy.   Psychiatric/Behavioral: Negative for agitation, behavioral problems, confusion, decreased concentration, dysphoric mood, hallucinations, self-injury, sleep disturbance and suicidal ideas. The patient is not nervous/anxious and is not hyperactive.   "    Objective:   /78 (BP Location: Left arm, Patient Position: Sitting, BP Method: Large (Manual))   Pulse 86   Temp 98.7 °F (37.1 °C) (Tympanic)   Ht 5' 11" (1.803 m)   Wt 91.1 kg (200 lb 13.4 oz)   SpO2 96%   BMI 28.01 kg/m²     Physical Exam   Constitutional: He appears well-developed and well-nourished.   Eyes: Conjunctivae are normal. Right eye exhibits no discharge. Left eye exhibits no discharge. No scleral icterus.   Neck: No JVD present.   Cardiovascular: Normal rate, regular rhythm and normal heart sounds. Exam reveals no gallop and no friction rub.   No murmur heard.  Pulmonary/Chest: Effort normal and breath sounds normal. He has no wheezes. He has no rhonchi. He has no rales.   Abdominal: Soft. He exhibits no distension. There is no tenderness. There is no rebound and no guarding.   Musculoskeletal: He exhibits no edema.   Skin: Skin is warm and dry. No rash noted.   Psychiatric: His speech is normal. Judgment and thought content normal. He is slowed and withdrawn. He is not agitated, not aggressive, not hyperactive and not combative. Cognition and memory are normal. He exhibits a depressed mood.   Nursing note and vitals reviewed.    Assessment:       ICD-10-CM ICD-9-CM   1. Fatigue, unspecified type R53.83 780.79   2. Dyspnea on exertion R06.09 786.09   3. Type 2 diabetes mellitus without complication, with long-term current use of insulin E11.9 250.00    Z79.4 V58.67     Plan:   Fatigue, unspecified type  Question primary mood related.    Recent labs stable.    Screening questionnaire is positive   Check A1c  If diabetes mellitus remains controlled, consider SSRI at follow-up visit.      Dyspnea on exertion  -     Complete PFT with bronchodilator; Future; Expected date: 04/22/2019  Check PFTs to rule out any underlying pulmonary cause.    Treat as indicated.  If normal, questionable more anxiety / mood related or just generalized debility / deconditioning.    Type 2 diabetes mellitus " without complication, with long-term current use of insulin  -     Hemoglobin A1c; Future; Expected date: 04/22/2019  Continue metformin 1000  Mg daily  Continue Actos 30 mg daily   Continue Victoza daily   Adjust Levemir insulin if indicated    RTC 2 weeks

## 2019-04-23 ENCOUNTER — CLINICAL SUPPORT (OUTPATIENT)
Dept: PULMONOLOGY | Facility: CLINIC | Age: 69
End: 2019-04-23
Payer: MEDICARE

## 2019-04-23 DIAGNOSIS — R53.83 FATIGUE, UNSPECIFIED TYPE: ICD-10-CM

## 2019-04-23 DIAGNOSIS — R06.09 DYSPNEA ON EXERTION: ICD-10-CM

## 2019-04-23 LAB
BRPFT: ABNORMAL
DLCO ADJ PRE: 23.68 ML/(MIN*MMHG) (ref 21.58–35.43)
DLCO SINGLE BREATH LLN: 21.58
DLCO SINGLE BREATH PRE REF: 83.1 %
DLCO SINGLE BREATH REF: 28.5
DLCOC SBVA LLN: 2.77
DLCOC SBVA PRE REF: 105.1 %
DLCOC SBVA REF: 3.9
DLCOC SINGLE BREATH LLN: 21.58
DLCOC SINGLE BREATH PRE REF: 83.1 %
DLCOC SINGLE BREATH REF: 28.5
DLCOVA LLN: 2.77
DLCOVA PRE REF: 105.1 %
DLCOVA PRE: 4.1 ML/(MIN*MMHG*L) (ref 2.77–5.04)
DLCOVA REF: 3.9
DLVAADJ PRE: 4.1 ML/(MIN*MMHG*L) (ref 2.77–5.04)
ERV LLN: 1.12
ERV PRE REF: 110.2 %
ERV REF: 1.12
ESTIMATED AVG GLUCOSE: 123 MG/DL (ref 68–131)
FEF 25 75 CHG: 14.9 %
FEF 25 75 LLN: 1.17
FEF 25 75 POST REF: 133.5 %
FEF 25 75 PRE REF: 116.2 %
FEF 25 75 REF: 2.61
FET100 CHG: -22.5 %
FEV1 CHG: 0.6 %
FEV1 FVC CHG: 3.9 %
FEV1 FVC LLN: 63
FEV1 FVC POST REF: 104.5 %
FEV1 FVC PRE REF: 100.6 %
FEV1 FVC REF: 76
FEV1 LLN: 2.47
FEV1 POST REF: 101.8 %
FEV1 PRE REF: 101.2 %
FEV1 REF: 3.39
FEV6 CHG: -0.9 %
FEV6 LLN: 3.51
FEV6 POST REF: 95.8 %
FEV6 POST: 4.25 L (ref 3.51–5.37)
FEV6 PRE REF: 96.7 %
FEV6 PRE: 4.29 L (ref 3.51–5.37)
FEV6 REF: 4.44
FRCPLETH LLN: 2.74
FRCPLETH PREREF: 88.8 %
FRCPLETH REF: 3.72
FVC CHG: -3.1 %
FVC LLN: 3.34
FVC POST REF: 97.1 %
FVC PRE REF: 100.3 %
FVC REF: 4.47
HBA1C MFR BLD HPLC: 5.9 % (ref 4–5.6)
IVC PRE: 4.07 L (ref 3.34–5.6)
IVC SINGLE BREATH LLN: 3.34
IVC SINGLE BREATH PRE REF: 91 %
IVC SINGLE BREATH REF: 4.47
MVV LLN: 112
MVV PRE REF: 62.7 %
MVV REF: 132
PEF CHG: -4 %
PEF LLN: 6.47
PEF POST REF: 71.8 %
PEF PRE REF: 74.8 %
PEF REF: 8.84
POST FEF 25 75: 3.49 L/S (ref 1.17–4.05)
POST FET 100: 9.17 SEC
POST FEV1 FVC: 79.56 % (ref 63.12–89.2)
POST FEV1: 3.45 L (ref 2.47–4.31)
POST FVC: 4.34 L (ref 3.34–5.6)
POST PEF: 6.35 L/S (ref 6.47–11.22)
PRE DLCO: 23.68 ML/(MIN*MMHG) (ref 21.58–35.43)
PRE ERV: 1.24 L (ref 1.12–1.12)
PRE FEF 25 75: 3.04 L/S (ref 1.17–4.05)
PRE FET 100: 11.82 SEC
PRE FEV1 FVC: 76.61 % (ref 63.12–89.2)
PRE FEV1: 3.43 L (ref 2.47–4.31)
PRE FRC PL: 3.31 L
PRE FVC: 4.48 L (ref 3.34–5.6)
PRE MVV: 83 L/MIN (ref 112.44–152.12)
PRE PEF: 6.62 L/S (ref 6.47–11.22)
PRE RV: 2.11 L (ref 1.93–3.28)
PRE TLC: 6.59 L (ref 6.15–8.45)
RAW LLN: 3.06
RAW PRE REF: 98.8 %
RAW PRE: 3.02 CMH2O*S/L (ref 3.06–3.06)
RAW REF: 3.06
RV LLN: 1.93
RV PRE REF: 81.1 %
RV REF: 2.6
RVTLC LLN: 31
RVTLC PRE REF: 79.8 %
RVTLC PRE: 32.01 % (ref 31.11–49.07)
RVTLC REF: 40
TLC LLN: 6.15
TLC PRE REF: 90.2 %
TLC REF: 7.3
VA PRE: 5.77 L (ref 7.15–7.15)
VA SINGLE BREATH LLN: 7.15
VA SINGLE BREATH PRE REF: 80.7 %
VA SINGLE BREATH REF: 7.15
VC LLN: 3.34
VC PRE REF: 100.3 %
VC PRE: 4.48 L (ref 3.34–5.6)
VC REF: 4.47
VTGRAWPRE: 3.04 L

## 2019-04-23 PROCEDURE — 94729 DIFFUSING CAPACITY: CPT | Mod: HCNC,S$GLB,, | Performed by: INTERNAL MEDICINE

## 2019-04-23 PROCEDURE — 94729 PR C02/MEMBANE DIFFUSE CAPACITY: ICD-10-PCS | Mod: HCNC,S$GLB,, | Performed by: INTERNAL MEDICINE

## 2019-04-23 PROCEDURE — 94726 PULM FUNCT TST PLETHYSMOGRAP: ICD-10-PCS | Mod: HCNC,S$GLB,, | Performed by: INTERNAL MEDICINE

## 2019-04-23 PROCEDURE — 94060 PR EVAL OF BRONCHOSPASM: ICD-10-PCS | Mod: HCNC,S$GLB,, | Performed by: INTERNAL MEDICINE

## 2019-04-23 PROCEDURE — 94060 EVALUATION OF WHEEZING: CPT | Mod: HCNC,S$GLB,, | Performed by: INTERNAL MEDICINE

## 2019-04-23 PROCEDURE — 94726 PLETHYSMOGRAPHY LUNG VOLUMES: CPT | Mod: HCNC,S$GLB,, | Performed by: INTERNAL MEDICINE

## 2019-04-25 ENCOUNTER — OFFICE VISIT (OUTPATIENT)
Dept: INTERNAL MEDICINE | Facility: CLINIC | Age: 69
End: 2019-04-25
Payer: MEDICARE

## 2019-04-25 ENCOUNTER — HOSPITAL ENCOUNTER (OUTPATIENT)
Dept: RADIOLOGY | Facility: HOSPITAL | Age: 69
Discharge: HOME | End: 2019-04-25
Attending: FAMILY MEDICINE
Payer: MEDICARE

## 2019-04-25 VITALS
BODY MASS INDEX: 28.21 KG/M2 | DIASTOLIC BLOOD PRESSURE: 78 MMHG | SYSTOLIC BLOOD PRESSURE: 138 MMHG | HEIGHT: 71 IN | WEIGHT: 201.5 LBS | TEMPERATURE: 100 F

## 2019-04-25 DIAGNOSIS — R06.09 DYSPNEA ON EXERTION: ICD-10-CM

## 2019-04-25 DIAGNOSIS — R06.09 DYSPNEA ON EXERTION: Primary | ICD-10-CM

## 2019-04-25 PROCEDURE — 3078F PR MOST RECENT DIASTOLIC BLOOD PRESSURE < 80 MM HG: ICD-10-PCS | Mod: HCNC,CPTII,S$GLB, | Performed by: FAMILY MEDICINE

## 2019-04-25 PROCEDURE — 1101F PT FALLS ASSESS-DOCD LE1/YR: CPT | Mod: HCNC,CPTII,S$GLB, | Performed by: FAMILY MEDICINE

## 2019-04-25 PROCEDURE — 99213 PR OFFICE/OUTPT VISIT, EST, LEVL III, 20-29 MIN: ICD-10-PCS | Mod: HCNC,S$GLB,, | Performed by: FAMILY MEDICINE

## 2019-04-25 PROCEDURE — 99499 UNLISTED E&M SERVICE: CPT | Mod: HCNC,S$GLB,, | Performed by: FAMILY MEDICINE

## 2019-04-25 PROCEDURE — 3078F DIAST BP <80 MM HG: CPT | Mod: HCNC,CPTII,S$GLB, | Performed by: FAMILY MEDICINE

## 2019-04-25 PROCEDURE — 99213 OFFICE O/P EST LOW 20 MIN: CPT | Mod: HCNC,S$GLB,, | Performed by: FAMILY MEDICINE

## 2019-04-25 PROCEDURE — 99499 RISK ADDL DX/OHS AUDIT: ICD-10-PCS | Mod: HCNC,S$GLB,, | Performed by: FAMILY MEDICINE

## 2019-04-25 PROCEDURE — 71046 XR CHEST PA AND LATERAL: ICD-10-PCS | Mod: 26,HCNC,, | Performed by: RADIOLOGY

## 2019-04-25 PROCEDURE — 71046 X-RAY EXAM CHEST 2 VIEWS: CPT | Mod: 26,HCNC,, | Performed by: RADIOLOGY

## 2019-04-25 PROCEDURE — 99999 PR PBB SHADOW E&M-EST. PATIENT-LVL III: ICD-10-PCS | Mod: PBBFAC,HCNC,, | Performed by: FAMILY MEDICINE

## 2019-04-25 PROCEDURE — 3075F PR MOST RECENT SYSTOLIC BLOOD PRESS GE 130-139MM HG: ICD-10-PCS | Mod: HCNC,CPTII,S$GLB, | Performed by: FAMILY MEDICINE

## 2019-04-25 PROCEDURE — 3075F SYST BP GE 130 - 139MM HG: CPT | Mod: HCNC,CPTII,S$GLB, | Performed by: FAMILY MEDICINE

## 2019-04-25 PROCEDURE — 71046 X-RAY EXAM CHEST 2 VIEWS: CPT | Mod: TC,HCNC

## 2019-04-25 PROCEDURE — 99999 PR PBB SHADOW E&M-EST. PATIENT-LVL III: CPT | Mod: PBBFAC,HCNC,, | Performed by: FAMILY MEDICINE

## 2019-04-25 PROCEDURE — 1101F PR PT FALLS ASSESS DOC 0-1 FALLS W/OUT INJ PAST YR: ICD-10-PCS | Mod: HCNC,CPTII,S$GLB, | Performed by: FAMILY MEDICINE

## 2019-04-25 RX ORDER — ALBUTEROL SULFATE 90 UG/1
2 AEROSOL, METERED RESPIRATORY (INHALATION) DAILY PRN
Qty: 18 G | Refills: 0 | Status: SHIPPED | OUTPATIENT
Start: 2019-04-25 | End: 2019-04-25 | Stop reason: SDUPTHER

## 2019-04-25 RX ORDER — ALBUTEROL SULFATE 90 UG/1
2 AEROSOL, METERED RESPIRATORY (INHALATION) DAILY PRN
Qty: 18 G | Refills: 0 | Status: SHIPPED | OUTPATIENT
Start: 2019-04-25 | End: 2020-01-23 | Stop reason: SDUPTHER

## 2019-04-25 RX ORDER — GUAIFENESIN 600 MG/1
1200 TABLET, EXTENDED RELEASE ORAL 2 TIMES DAILY
COMMUNITY
End: 2020-07-09

## 2019-04-25 NOTE — PROGRESS NOTES
"Subjective:   Patient ID: Rigoberto Vasquez is a 68 y.o. male.  Chief Complaint:  Follow-up (pft)      Patient presents for  Follow-up on dyspnea on exertion /fatigue after physical activity.    Today denies any fatigue or decreased interest on a regular basis affecting ADLs.  Since last visit A1c shows well-controlled diabetes mellitus.  Pulmonary function test showed normal spirometry.   Patient did feel like there was improvement in his breathing after albuterol treatment, although there was no significant change on the testing.    Review of Systems   Constitutional: Negative for chills, fatigue and fever.   Respiratory: Positive for shortness of breath. Negative for cough.    Cardiovascular: Negative for chest pain and leg swelling.   Gastrointestinal: Negative for abdominal pain, diarrhea, nausea and vomiting.   Genitourinary: Negative for difficulty urinating.   Musculoskeletal: Negative for back pain, myalgias and neck pain.   Skin: Negative for rash.   Psychiatric/Behavioral: Negative for sleep disturbance. The patient is not nervous/anxious.      Objective:   /78 (BP Location: Left arm, Patient Position: Sitting, BP Method: Small (Manual))   Temp 99.5 °F (37.5 °C) (Tympanic)   Ht 5' 11" (1.803 m)   Wt 91.4 kg (201 lb 8 oz)   BMI 28.10 kg/m²     Physical Exam   Constitutional: Vital signs are normal. He appears well-developed and well-nourished. No distress.   Cardiovascular: Normal rate and regular rhythm.   Pulmonary/Chest: Effort normal and breath sounds normal. No accessory muscle usage or stridor. No tachypnea. No respiratory distress. He has no wheezes. He has no rales. He exhibits no tenderness.   Musculoskeletal: He exhibits no edema.   Skin: Skin is warm, dry and intact. No rash noted.   Psychiatric: He is slowed and withdrawn. He exhibits a depressed mood.   Nursing note and vitals reviewed.    Assessment:       ICD-10-CM ICD-9-CM   1. Dyspnea on exertion R06.09 786.09     Plan:   Dyspnea on " exertion  -     X-Ray Chest PA And Lateral; Future; Expected date: 04/25/2019  -     albuterol (VENTOLIN HFA) 90 mcg/actuation inhaler; Inhale 2 puffs into the lungs daily as needed for Shortness of Breath. Rescue  Dispense: 18 g; Refill: 0    Deconditioning versus exercise-induced bronchospasm.    Check chest x-ray  Trial of albuterol as needed with activity  Follow-up with Dr. Cornelio devries specialist as scheduled.

## 2019-05-03 ENCOUNTER — PATIENT OUTREACH (OUTPATIENT)
Dept: OTHER | Facility: OTHER | Age: 69
End: 2019-05-03

## 2019-05-03 NOTE — PROGRESS NOTES
Last 5 Patient Entered Readings                                      Current 30 Day Average: 138/82     Recent Readings 4/23/2019 4/23/2019 4/20/2019 4/17/2019 4/13/2019    SBP (mmHg) 146 145 141 137 139    DBP (mmHg) 79 79 85 72 84    Pulse 75 83 71 74 73          Left voicemail.  Discuss initiation of chlorthalidone 12.5 mg daily

## 2019-05-06 RX ORDER — LOVASTATIN 40 MG/1
TABLET ORAL
Qty: 90 TABLET | Refills: 3 | Status: SHIPPED | OUTPATIENT
Start: 2019-05-06 | End: 2020-03-30 | Stop reason: SDUPTHER

## 2019-05-14 ENCOUNTER — PATIENT MESSAGE (OUTPATIENT)
Dept: INTERNAL MEDICINE | Facility: CLINIC | Age: 69
End: 2019-05-14

## 2019-05-21 DIAGNOSIS — R53.83 FATIGUE, UNSPECIFIED TYPE: Primary | ICD-10-CM

## 2019-05-22 ENCOUNTER — LAB VISIT (OUTPATIENT)
Dept: LAB | Facility: HOSPITAL | Age: 69
End: 2019-05-22
Attending: FAMILY MEDICINE
Payer: MEDICARE

## 2019-05-22 DIAGNOSIS — R53.83 FATIGUE, UNSPECIFIED TYPE: ICD-10-CM

## 2019-05-22 PROCEDURE — 36415 COLL VENOUS BLD VENIPUNCTURE: CPT | Mod: HCNC,PO

## 2019-05-22 PROCEDURE — 82040 ASSAY OF SERUM ALBUMIN: CPT | Mod: HCNC

## 2019-05-28 ENCOUNTER — PATIENT MESSAGE (OUTPATIENT)
Dept: INTERNAL MEDICINE | Facility: CLINIC | Age: 69
End: 2019-05-28

## 2019-05-28 LAB
ALBUMIN SERPL-MCNC: 4.2 G/DL (ref 3.6–5.1)
SHBG SERPL-SCNC: 73 NMOL/L (ref 22–77)
TESTOST FREE SERPL-MCNC: 40.2 PG/ML (ref 46–224)
TESTOST SERPL-MCNC: 592 NG/DL (ref 250–1100)
TESTOSTERONE.FREE+WB SERPL-MCNC: 77.3 NG/DL (ref 110–575)

## 2019-05-30 ENCOUNTER — PATIENT OUTREACH (OUTPATIENT)
Dept: OTHER | Facility: OTHER | Age: 69
End: 2019-05-30

## 2019-06-06 NOTE — PROGRESS NOTES
Last 5 Patient Entered Readings                                      Current 30 Day Average: 135/80     Recent Readings 6/5/2019 6/4/2019 6/2/2019 6/1/2019 5/29/2019    SBP (mmHg) 140 138 132 114 155    DBP (mmHg) 79 83 79 78 89    Pulse 76 71 79 81 81        Digital Medicine: Health  Follow Up    Lifestyle Modifications:    1.Dietary Modifications (Sodium intake <2,000mg/day, food labels, dining out): Reports he has been watching what he eats.  Reports he has been working on portion sizes and eating more salads.  Encouraged patient to continue focusing on portion control by using smaller plates.    2.Physical Activity: Reports he has been working on exercises at PT.  Reports he is going 3x/week and rides the stationary bike for 20min and has been working on increasing the resistance.  Reports he is also working on using weights on back and upper body.  Set SMG for stationary bike.    3.Medication Therapy: Patient has been compliant with the medication regimen.    4.Patient has the following medication side effects/concerns: none  (Frequency/Alleviating factors/Precipitating factors, etc.)     Follow up with Mr. Rigoberto Vasquez completed. No further questions or concerns. Will continue to follow up to achieve health goals.

## 2019-06-11 DIAGNOSIS — R11.0 NAUSEA: ICD-10-CM

## 2019-06-11 RX ORDER — PANTOPRAZOLE SODIUM 40 MG/1
40 TABLET, DELAYED RELEASE ORAL
Qty: 90 TABLET | Refills: 3 | Status: SHIPPED | OUTPATIENT
Start: 2019-06-11 | End: 2020-06-15 | Stop reason: SDUPTHER

## 2019-06-18 ENCOUNTER — PES CALL (OUTPATIENT)
Dept: ADMINISTRATIVE | Facility: CLINIC | Age: 69
End: 2019-06-18

## 2019-06-25 ENCOUNTER — LAB VISIT (OUTPATIENT)
Dept: LAB | Facility: HOSPITAL | Age: 69
End: 2019-06-25
Attending: FAMILY MEDICINE
Payer: MEDICARE

## 2019-06-25 DIAGNOSIS — Z79.4 TYPE 2 DIABETES MELLITUS WITHOUT COMPLICATION, WITH LONG-TERM CURRENT USE OF INSULIN: Chronic | ICD-10-CM

## 2019-06-25 DIAGNOSIS — E11.9 TYPE 2 DIABETES MELLITUS WITHOUT COMPLICATION, WITH LONG-TERM CURRENT USE OF INSULIN: Chronic | ICD-10-CM

## 2019-06-25 LAB
ANION GAP SERPL CALC-SCNC: 13 MMOL/L (ref 8–16)
BUN SERPL-MCNC: 18 MG/DL (ref 8–23)
CALCIUM SERPL-MCNC: 9.9 MG/DL (ref 8.7–10.5)
CHLORIDE SERPL-SCNC: 106 MMOL/L (ref 95–110)
CO2 SERPL-SCNC: 22 MMOL/L (ref 23–29)
CREAT SERPL-MCNC: 1.3 MG/DL (ref 0.5–1.4)
EST. GFR  (AFRICAN AMERICAN): >60 ML/MIN/1.73 M^2
EST. GFR  (NON AFRICAN AMERICAN): 56.5 ML/MIN/1.73 M^2
GLUCOSE SERPL-MCNC: 64 MG/DL (ref 70–110)
POTASSIUM SERPL-SCNC: 3.9 MMOL/L (ref 3.5–5.1)
SODIUM SERPL-SCNC: 141 MMOL/L (ref 136–145)

## 2019-06-25 PROCEDURE — 36415 COLL VENOUS BLD VENIPUNCTURE: CPT | Mod: HCNC

## 2019-06-25 PROCEDURE — 83036 HEMOGLOBIN GLYCOSYLATED A1C: CPT | Mod: HCNC

## 2019-06-25 PROCEDURE — 80048 BASIC METABOLIC PNL TOTAL CA: CPT | Mod: HCNC

## 2019-06-26 LAB
ESTIMATED AVG GLUCOSE: 117 MG/DL (ref 68–131)
HBA1C MFR BLD HPLC: 5.7 % (ref 4–5.6)

## 2019-07-02 ENCOUNTER — PATIENT MESSAGE (OUTPATIENT)
Dept: INTERNAL MEDICINE | Facility: CLINIC | Age: 69
End: 2019-07-02

## 2019-07-02 ENCOUNTER — LAB VISIT (OUTPATIENT)
Dept: LAB | Facility: HOSPITAL | Age: 69
End: 2019-07-02
Attending: FAMILY MEDICINE
Payer: MEDICARE

## 2019-07-02 ENCOUNTER — OFFICE VISIT (OUTPATIENT)
Dept: INTERNAL MEDICINE | Facility: CLINIC | Age: 69
End: 2019-07-02
Payer: MEDICARE

## 2019-07-02 VITALS
WEIGHT: 199.31 LBS | BODY MASS INDEX: 27.9 KG/M2 | HEIGHT: 71 IN | DIASTOLIC BLOOD PRESSURE: 76 MMHG | TEMPERATURE: 98 F | SYSTOLIC BLOOD PRESSURE: 118 MMHG

## 2019-07-02 DIAGNOSIS — Z79.4 TYPE 2 DIABETES MELLITUS WITHOUT COMPLICATION, WITH LONG-TERM CURRENT USE OF INSULIN: Chronic | ICD-10-CM

## 2019-07-02 DIAGNOSIS — E11.69 HYPERLIPIDEMIA ASSOCIATED WITH TYPE 2 DIABETES MELLITUS: Chronic | ICD-10-CM

## 2019-07-02 DIAGNOSIS — D64.9 ANEMIA, UNSPECIFIED TYPE: ICD-10-CM

## 2019-07-02 DIAGNOSIS — E11.59 HYPERTENSION ASSOCIATED WITH DIABETES: Chronic | ICD-10-CM

## 2019-07-02 DIAGNOSIS — I15.2 HYPERTENSION ASSOCIATED WITH DIABETES: Chronic | ICD-10-CM

## 2019-07-02 DIAGNOSIS — E11.9 TYPE 2 DIABETES MELLITUS WITHOUT COMPLICATION, WITH LONG-TERM CURRENT USE OF INSULIN: Primary | Chronic | ICD-10-CM

## 2019-07-02 DIAGNOSIS — Z79.4 TYPE 2 DIABETES MELLITUS WITHOUT COMPLICATION, WITH LONG-TERM CURRENT USE OF INSULIN: Primary | Chronic | ICD-10-CM

## 2019-07-02 DIAGNOSIS — E11.9 TYPE 2 DIABETES MELLITUS WITHOUT COMPLICATION, WITH LONG-TERM CURRENT USE OF INSULIN: Chronic | ICD-10-CM

## 2019-07-02 DIAGNOSIS — D50.9 IRON DEFICIENCY ANEMIA, UNSPECIFIED IRON DEFICIENCY ANEMIA TYPE: Primary | ICD-10-CM

## 2019-07-02 DIAGNOSIS — E78.5 HYPERLIPIDEMIA ASSOCIATED WITH TYPE 2 DIABETES MELLITUS: Chronic | ICD-10-CM

## 2019-07-02 LAB
BASOPHILS # BLD AUTO: 0.03 K/UL (ref 0–0.2)
BASOPHILS NFR BLD: 0.7 % (ref 0–1.9)
DIFFERENTIAL METHOD: ABNORMAL
EOSINOPHIL # BLD AUTO: 0.2 K/UL (ref 0–0.5)
EOSINOPHIL NFR BLD: 3.4 % (ref 0–8)
ERYTHROCYTE [DISTWIDTH] IN BLOOD BY AUTOMATED COUNT: 12.7 % (ref 11.5–14.5)
FERRITIN SERPL-MCNC: 21 NG/ML (ref 20–300)
FOLATE SERPL-MCNC: 17.3 NG/ML (ref 4–24)
HCT VFR BLD AUTO: 43.1 % (ref 40–54)
HGB BLD-MCNC: 13.7 G/DL (ref 14–18)
IMM GRANULOCYTES # BLD AUTO: 0.02 K/UL (ref 0–0.04)
IMM GRANULOCYTES NFR BLD AUTO: 0.5 % (ref 0–0.5)
IRON SERPL-MCNC: 98 UG/DL (ref 45–160)
LYMPHOCYTES # BLD AUTO: 1.3 K/UL (ref 1–4.8)
LYMPHOCYTES NFR BLD: 29.3 % (ref 18–48)
MCH RBC QN AUTO: 31.8 PG (ref 27–31)
MCHC RBC AUTO-ENTMCNC: 31.8 G/DL (ref 32–36)
MCV RBC AUTO: 100 FL (ref 82–98)
MONOCYTES # BLD AUTO: 0.4 K/UL (ref 0.3–1)
MONOCYTES NFR BLD: 9.8 % (ref 4–15)
NEUTROPHILS # BLD AUTO: 2.5 K/UL (ref 1.8–7.7)
NEUTROPHILS NFR BLD: 56.3 % (ref 38–73)
NRBC BLD-RTO: 0 /100 WBC
PLATELET # BLD AUTO: 261 K/UL (ref 150–350)
PMV BLD AUTO: 10 FL (ref 9.2–12.9)
RBC # BLD AUTO: 4.31 M/UL (ref 4.6–6.2)
SATURATED IRON: 17 % (ref 20–50)
TOTAL IRON BINDING CAPACITY: 591 UG/DL (ref 250–450)
TRANSFERRIN SERPL-MCNC: 399 MG/DL (ref 200–375)
TSH SERPL DL<=0.005 MIU/L-ACNC: 1.65 UIU/ML (ref 0.4–4)
VIT B12 SERPL-MCNC: 509 PG/ML (ref 210–950)
WBC # BLD AUTO: 4.4 K/UL (ref 3.9–12.7)

## 2019-07-02 PROCEDURE — 1101F PT FALLS ASSESS-DOCD LE1/YR: CPT | Mod: HCNC,CPTII,S$GLB, | Performed by: FAMILY MEDICINE

## 2019-07-02 PROCEDURE — 3074F SYST BP LT 130 MM HG: CPT | Mod: HCNC,CPTII,S$GLB, | Performed by: FAMILY MEDICINE

## 2019-07-02 PROCEDURE — 99999 PR PBB SHADOW E&M-EST. PATIENT-LVL III: CPT | Mod: PBBFAC,HCNC,, | Performed by: FAMILY MEDICINE

## 2019-07-02 PROCEDURE — 99499 RISK ADDL DX/OHS AUDIT: ICD-10-PCS | Mod: HCNC,S$GLB,, | Performed by: FAMILY MEDICINE

## 2019-07-02 PROCEDURE — 85025 COMPLETE CBC W/AUTO DIFF WBC: CPT | Mod: HCNC

## 2019-07-02 PROCEDURE — 99999 PR PBB SHADOW E&M-EST. PATIENT-LVL III: ICD-10-PCS | Mod: PBBFAC,HCNC,, | Performed by: FAMILY MEDICINE

## 2019-07-02 PROCEDURE — 3044F PR MOST RECENT HEMOGLOBIN A1C LEVEL <7.0%: ICD-10-PCS | Mod: HCNC,CPTII,S$GLB, | Performed by: FAMILY MEDICINE

## 2019-07-02 PROCEDURE — 36415 COLL VENOUS BLD VENIPUNCTURE: CPT | Mod: HCNC

## 2019-07-02 PROCEDURE — 82607 VITAMIN B-12: CPT | Mod: HCNC

## 2019-07-02 PROCEDURE — 99214 OFFICE O/P EST MOD 30 MIN: CPT | Mod: HCNC,S$GLB,, | Performed by: FAMILY MEDICINE

## 2019-07-02 PROCEDURE — 1101F PR PT FALLS ASSESS DOC 0-1 FALLS W/OUT INJ PAST YR: ICD-10-PCS | Mod: HCNC,CPTII,S$GLB, | Performed by: FAMILY MEDICINE

## 2019-07-02 PROCEDURE — 3078F DIAST BP <80 MM HG: CPT | Mod: HCNC,CPTII,S$GLB, | Performed by: FAMILY MEDICINE

## 2019-07-02 PROCEDURE — 3044F HG A1C LEVEL LT 7.0%: CPT | Mod: HCNC,CPTII,S$GLB, | Performed by: FAMILY MEDICINE

## 2019-07-02 PROCEDURE — 3078F PR MOST RECENT DIASTOLIC BLOOD PRESSURE < 80 MM HG: ICD-10-PCS | Mod: HCNC,CPTII,S$GLB, | Performed by: FAMILY MEDICINE

## 2019-07-02 PROCEDURE — 99214 PR OFFICE/OUTPT VISIT, EST, LEVL IV, 30-39 MIN: ICD-10-PCS | Mod: HCNC,S$GLB,, | Performed by: FAMILY MEDICINE

## 2019-07-02 PROCEDURE — 3074F PR MOST RECENT SYSTOLIC BLOOD PRESSURE < 130 MM HG: ICD-10-PCS | Mod: HCNC,CPTII,S$GLB, | Performed by: FAMILY MEDICINE

## 2019-07-02 PROCEDURE — 83540 ASSAY OF IRON: CPT | Mod: HCNC

## 2019-07-02 PROCEDURE — 82728 ASSAY OF FERRITIN: CPT | Mod: HCNC

## 2019-07-02 PROCEDURE — 84443 ASSAY THYROID STIM HORMONE: CPT | Mod: HCNC

## 2019-07-02 PROCEDURE — 99499 UNLISTED E&M SERVICE: CPT | Mod: HCNC,S$GLB,, | Performed by: FAMILY MEDICINE

## 2019-07-02 PROCEDURE — 82746 ASSAY OF FOLIC ACID SERUM: CPT | Mod: HCNC

## 2019-07-02 RX ORDER — FERROUS GLUCONATE 324(38)MG
324 TABLET ORAL
Qty: 90 TABLET | Refills: 3 | Status: SHIPPED | OUTPATIENT
Start: 2019-07-02 | End: 2019-07-03 | Stop reason: SDUPTHER

## 2019-07-02 NOTE — PROGRESS NOTES
Subjective:       Patient ID: Rigoberto Vasquez is a . male.    Chief Complaint: Multiple issues see below    HPIType 2 diabetes: a1cnl . elida insul. ;Tolerating medicine. No hypoglycemia c/o; ;on metform once daily and elida;declines digital dm      Hypertension: blood pressures at home normal. Tolerating medicine. w digital htn  Hypercholesterolemia: controlled. Tolerating medicine.  GERD sympt w/o med. last egd fall 15;asympt. Tolerating medication. No swallowing problems    Hx elev psa sees dr dave annually utd as of 7/19    Chronic borderline anemia/hct: iron def w/u 2015. He says anemic all his life. egd cscope done. Saw gi. Not sure if video capsule done      Pacemaker utd dr lola Hall weeks ago onset see dr noel note. He also reviewd w his card and felt not to be cv. pfts ok but he does note benefit with albut. Overall improved. He feels there was a virus and recovering.hct 41 was lower than few months prior in care everywhere but similar to prior labhere; mild inc mcv            Review of Systems   Respiratory: Negative for shortness of breath.    Cardiovascular: Negative for chest pain and leg swelling.       Objective:      Physical Exam   Constitutional: He is oriented to person, place, and time. He appears well-developed and well-nourished.   HENT:   Head: Normocephalic and atraumatic.   Eyes: Conjunctivae normal and EOM are normal. Pupils are equal, round, and reactive to light.   Neck: Normal range of motion. Neck supple. Carotid bruit is not present.   Cardiovascular: Normal rate and regular rhythm.    Pulmonary/Chest: Effort normal and breath sounds normal.     Musculoskeletal: He exhibits no edema.   Neurological: He is alert and oriented to person, place, and time.   Skin: Skin is warm and dry.   Psychiatric: He has a normal mood and affect. His behavior is normal. Judgment and thought content normal.   Bilateral feet with normal monofilament testing and no lesions.        Assessment:          "type 2 dm  htn  gerd  hyperchol  Hx elev psa  Pacemaker hx  Hall-improved  Hx iron def anemia    Plan:    **  F/u card ()and urol as planned    Shingrix new shingles vaccine  via a pharmacy  Lab and follow up after in 6 months Leigh  Anemia lab today  If iron def again then resume iron and f/u gi  If hall doesn't cont to improve then plan pulm    Declines digital DM    Type 2 diabetes mellitus without complication, with long-term current use of insulin  -     Comprehensive metabolic panel; Future; Expected date: 12/29/2019  -     Hemoglobin A1c; Future; Expected date: 12/29/2019  -     Lipid panel; Future; Expected date: 12/29/2019  -     Microalbumin/creatinine urine ratio; Future; Expected date: 12/29/2019  -     TSH; Future; Expected date: 07/02/2019    Hypertension associated with diabetes    Hyperlipidemia associated with type 2 diabetes mellitus    Anemia, unspecified type  -     CBC auto differential; Future; Expected date: 07/02/2019  -     Iron and TIBC; Future; Expected date: 07/02/2019  -     Ferritin; Future; Expected date: 07/02/2019  -     Vitamin B12; Future; Expected date: 07/02/2019  -     Folate; Future; Expected date: 07/02/2019    ADD: msg to patient via phone" Please let him know his iron is still low  Iron rx sent to [facility]. Constipation precautions  Also recomm he f/u gi as he may need further eval that wasn't done 2015 for why iron low     Chadd iron studies, cbc, ua couple months    Note may need video capsule. Not sure if rescope needed  "

## 2019-07-03 RX ORDER — FERROUS GLUCONATE 324(38)MG
324 TABLET ORAL
Qty: 30 TABLET | Refills: 11 | Status: SHIPPED | OUTPATIENT
Start: 2019-07-03 | End: 2019-09-17

## 2019-07-03 NOTE — TELEPHONE ENCOUNTER
----- Message from Jordan Sanchez sent at 7/3/2019 10:02 AM CDT -----  Contact: Pt  .Type:  Patient Returning Call    Who Called: Pt  Who Left Message for Patient: Beldodie  Does the patient know what this is regarding?: call back  Would the patient rather a call back or a response via [facility]? Call back  Best Call Back Number .[phone] (home)   Additional Information:

## 2019-07-03 NOTE — TELEPHONE ENCOUNTER
Please let him know his iron is still low  Iron rx sent to [facility]. Constipation precautions  Also recomm he f/u gi as he may need further eval that wasn't done 2015 for why iron low     Chadd iron studies, cbc, ua couple months

## 2019-07-03 NOTE — TELEPHONE ENCOUNTER
----- Message from Jordan Sanchez sent at 7/3/2019 10:02 AM CDT -----  Contact: Pt  .Type:  Patient Returning Call    Who Called: Pt  Who Left Message for Patient: Beldodie  Does the patient know what this is regarding?: call back  Would the patient rather a call back or a response via [facility]? Call back  Best Call Back Number .[phone] (home)   Additional Information:

## 2019-07-03 NOTE — TELEPHONE ENCOUNTER
Patient notified of test results and states ok, f/u appt scheduled with Juan Akins on 7/10/19 at 2:20pm.  Patient also request rx be sent to [facility]

## 2019-07-09 ENCOUNTER — OFFICE VISIT (OUTPATIENT)
Dept: GASTROENTEROLOGY | Facility: CLINIC | Age: 69
End: 2019-07-09
Payer: MEDICARE

## 2019-07-09 VITALS
WEIGHT: 202.19 LBS | SYSTOLIC BLOOD PRESSURE: 124 MMHG | DIASTOLIC BLOOD PRESSURE: 68 MMHG | HEART RATE: 72 BPM | BODY MASS INDEX: 28.31 KG/M2 | HEIGHT: 71 IN

## 2019-07-09 DIAGNOSIS — D50.0 IRON DEFICIENCY ANEMIA DUE TO CHRONIC BLOOD LOSS: Primary | ICD-10-CM

## 2019-07-09 DIAGNOSIS — R19.4 ALTERED BOWEL HABITS: ICD-10-CM

## 2019-07-09 PROCEDURE — 3078F PR MOST RECENT DIASTOLIC BLOOD PRESSURE < 80 MM HG: ICD-10-PCS | Mod: HCNC,CPTII,S$GLB, | Performed by: NURSE PRACTITIONER

## 2019-07-09 PROCEDURE — 99214 PR OFFICE/OUTPT VISIT, EST, LEVL IV, 30-39 MIN: ICD-10-PCS | Mod: HCNC,S$GLB,, | Performed by: NURSE PRACTITIONER

## 2019-07-09 PROCEDURE — 99214 OFFICE O/P EST MOD 30 MIN: CPT | Mod: HCNC,S$GLB,, | Performed by: NURSE PRACTITIONER

## 2019-07-09 PROCEDURE — 3074F PR MOST RECENT SYSTOLIC BLOOD PRESSURE < 130 MM HG: ICD-10-PCS | Mod: HCNC,CPTII,S$GLB, | Performed by: NURSE PRACTITIONER

## 2019-07-09 PROCEDURE — 3074F SYST BP LT 130 MM HG: CPT | Mod: HCNC,CPTII,S$GLB, | Performed by: NURSE PRACTITIONER

## 2019-07-09 PROCEDURE — 1101F PR PT FALLS ASSESS DOC 0-1 FALLS W/OUT INJ PAST YR: ICD-10-PCS | Mod: HCNC,CPTII,S$GLB, | Performed by: NURSE PRACTITIONER

## 2019-07-09 PROCEDURE — 3078F DIAST BP <80 MM HG: CPT | Mod: HCNC,CPTII,S$GLB, | Performed by: NURSE PRACTITIONER

## 2019-07-09 PROCEDURE — 99999 PR PBB SHADOW E&M-EST. PATIENT-LVL IV: ICD-10-PCS | Mod: PBBFAC,HCNC,, | Performed by: NURSE PRACTITIONER

## 2019-07-09 PROCEDURE — 1101F PT FALLS ASSESS-DOCD LE1/YR: CPT | Mod: HCNC,CPTII,S$GLB, | Performed by: NURSE PRACTITIONER

## 2019-07-09 PROCEDURE — 99999 PR PBB SHADOW E&M-EST. PATIENT-LVL IV: CPT | Mod: PBBFAC,HCNC,, | Performed by: NURSE PRACTITIONER

## 2019-07-09 RX ORDER — AZELASTINE 1 MG/ML
2 SPRAY, METERED NASAL 2 TIMES DAILY
Qty: 30 ML | Refills: 12 | Status: SHIPPED | OUTPATIENT
Start: 2019-07-09 | End: 2020-06-24 | Stop reason: SDUPTHER

## 2019-07-09 NOTE — PROGRESS NOTES
Clinic Follow Up:  [facility] Follow Up Note    Reason for Follow Up:  The primary encounter diagnosis was Iron deficiency anemia due to chronic blood loss. A diagnosis of Altered bowel habits was also pertinent to this visit.    PCP: Jesse Grimes       HPI:  This is a 68 y.o. male here for the above  Pt states that over the last few months, he has had a new onset of fatigue and SOB  He was seen by his cardiologist and reports that workup was unremarkable.  He was then seen by PCP and was found to have worsening CHANDRAKANT.   He states that he has had issues previously with CHANDRAKANT, however, per chart review, the iron is lower than in previous.   He denies any overt GI bleeding.  He has had progressive worsening constipation.  Has been taking miralax daily to control, previously was able to take PRN  Last colonoscopy 2015 with polyps.  Last EGD 2017.       Review of Systems   Constitutional: Positive for malaise/fatigue. Negative for chills, fever and weight loss.   Respiratory: Positive for shortness of breath. Negative for cough.    Cardiovascular: Negative for chest pain.   Gastrointestinal:        Per HPI   Musculoskeletal: Negative for myalgias.   Skin: Negative for itching and rash.   Neurological: Negative for headaches.   Psychiatric/Behavioral: The patient is not nervous/anxious.        Medical History:  Past Medical History:   Diagnosis Date    Acid reflux     [facility]    Angina pectoris     Back pain     BPH (benign prostatic hyperplasia)     blue    Degenerative joint disease     Diverticulosis     Erectile dysfunction     History of elevated PSA 2/14    normalized, dr dave annually    History of pericarditis     Hypercholesteremia     Hypertension     Iron deficiency anemia     Pacemaker     complete av block;NO afib    Squamous cell cancer of skin of mastoid region of scalp     dr jaida salgado    Type 2 diabetes mellitus     dr wyman    Urinary incontinence      "when he was 7 Yrs old.        Surgical History:   Past Surgical History:   Procedure Laterality Date    CARDIAC PACEMAKER PLACEMENT      COLONOSCOPY N/A 6/10/2015    Performed by Kan Ramsey III, [facility] at [facility]    ESOPHAGOGASTRODUODENOSCOPY (EGD) N/A 7/3/2017    Performed by Greyson Crowe [facility] at [facility]    ESOPHAGOGASTRODUODENOSCOPY (EGD) N/A 6/10/2015    Per[facility] by Kan [facility] III[facility]    ESOPHAGOGASTRODUODENOSCOPY (EGD) N/A 6/18/2014    Performed by Ham Ortiz MD at [facility]    GRAFT Left 3/26/2015    [facility] by Rigoberto Lopez [facility] at [facility] OR    INSERTION TUBE-PE Left 3/26/2015    [facility]    JOINT REPLACEMENT      KNEE CARTILAGE SURGERY Bilateral     MASTOIDECTOMY Left 3/26/2015    [facility]    NASAL SINUS SURGERY      SHOULDER ARTHROSCOPY Right     TONSILLECTOMY      TOTAL KNEE ARTHROPLASTY Left 05/15/2017    TRANSURETHRAL RESECTION OF PROSTATE      TYMPANOPLASTY      TYMPANOPLASTY   Left 3/26/2015    [facility]    vesectomy         Family History:   Family History   Problem Relation Age of Onset    Arthritis Mother     Hypertension Mother     Heart disease Father     Hypertension Father     Stroke Father     Diabetes Son     Diabetes Maternal Grandmother     Colon cancer Paternal Grandmother        Social History:   Social History     Tobacco Use    Smoking status: Never Smoker    Smokeless tobacco: Never Used   Substance Use Topics    Alcohol use: Yes     Comment: " once a month"    Drug use: No       Allergies: Reviewed    Home Medications:  Current Outpatient Medications on File Prior to Visit   Medication Sig Dispense Refill    ACETAMINOPHEN (TYLENOL 8 HOUR ORAL) Take by mouth as needed.      albuterol (VENTOLIN HFA) 90 mcg/actuation inhaler Inhale 2 puffs into the lungs daily as needed for Shortness of Breath. Rescue 18 g 0    bifidobacterium infantis (ALIGN) 4 " "mg Cap Take 1 capsule by mouth Daily.      blood sugar diagnostic (ACCU-CHEK JAXON) Strp Check BG 2-3 times daily. Accuchek jaoxn strips 300 strip 3    fenofibrate micronized (LOFIBRA) 200 MG Cap Take 1 capsule (200 mg total) by mouth every evening. 90 capsule 3    ferrous gluconate (FERGON) 324 MG tablet Take 1 tablet (324 mg total) by mouth daily with breakfast. 30 tablet 11    guaiFENesin (MUCINEX) 600 mg 12 hr tablet Take 1,200 mg by mouth 2 (two) times daily.      hydrocortisone 2.5 % cream       LEVEMIR FLEXTOUCH U-100 INSULN 100 unit/mL (3 mL) InPn pen INJECT 25 UNITS INTO THE SKIN ONCE DAILY. 30 mL 3    levocetirizine (XYZAL) 5 MG tablet Take 5 mg by mouth every evening.      liraglutide 0.6 mg/0.1 mL, 18 mg/3 mL, subq PNIJ (VICTOZA 3-ALESHA) 0.6 mg/0.1 mL (18 mg/3 mL) PnIj INJECT 1.8 MG DAILY 27 mL 3    losartan (COZAAR) 50 MG tablet Take 1 tablet by mouth in the morning and 1 tablet in the evening 180 tablet 3    lovastatin (MEVACOR) 40 MG tablet Take 1 tablet by mouth Every evening. 90 tablet 3    metFORMIN (GLUCOPHAGE) 1000 MG tablet Take 1 tablet (1,000 mg total) by mouth once daily. Generic  tablet 3    multivitamin capsule Take 1 capsule by mouth once daily.      pantoprazole (PROTONIX) 40 MG tablet Take 1 tablet (40 mg total) by mouth before breakfast. 90 tablet 3    pen needle, diabetic (BD INSULIN PEN NEEDLE UF MINI) 31 gauge x 3/16" Ndle USE AS DIRECTED WITH INSULIN 90 each 4    pioglitazone (ACTOS) 30 MG tablet Take 1 tablet (30 mg total) by mouth once daily. 90 tablet 3    ranitidine (ZANTAC) 150 MG tablet Take 150 mg by mouth 2 (two) times daily.      [DISCONTINUED] azelastine (ASTELIN) 137 mcg (0.1 %) nasal spray 2 sprays (274 mcg total) by Nasal route 2 (two) times daily. 30 mL 12    [DISCONTINUED] gabapentin (NEURONTIN) 300 MG capsule Take 1 capsule (300 mg total) by mouth 3 (three) times daily. 90 capsule 0     No current facility-administered medications on file prior " "to visit.        Physical Exam:  Vital Signs:  /68   Pulse 72   Ht 5' 11" (1.803 m)   Wt 91.7 kg (202 lb 2.6 oz)   BMI 28.20 kg/m²   Body mass index is 28.2 kg/m².  Physical Exam   Constitutional: He is oriented to person, place, and time. He appears well-developed.   HENT:   Head: Normocephalic.   Neck: Normal range of motion.   Cardiovascular: Normal rate and regular rhythm.   Pulmonary/Chest: Effort normal and breath sounds normal.   Abdominal: Soft. Bowel sounds are normal. He exhibits no distension. There is no tenderness.   Musculoskeletal: Normal range of motion.   Neurological: He is alert and oriented to person, place, and time.   Skin: Skin is warm and dry.   Psychiatric: He has a normal mood and affect.   Vitals reviewed.      Labs: Pertinent labs reviewed.    Assessment:  1. Iron deficiency anemia due to chronic blood loss    2. Altered bowel habits        Recommendations:  - unclear etiology of symptomatic anemia.  - will plan for EGD/Colonoscopy with miralax prep for further investigation  - continue iron supplement per PCP  - if no significant findings, may need VCE to evaluate the small bowel      Return to Clinic:    Follow up to be determined by results of procedure.      "

## 2019-07-18 ENCOUNTER — PATIENT OUTREACH (OUTPATIENT)
Dept: OTHER | Facility: OTHER | Age: 69
End: 2019-07-18

## 2019-07-18 NOTE — PROGRESS NOTES
"Last 5 Patient Entered Readings                                      Current 30 Day Average: 127/76     Recent Readings 7/16/2019 7/12/2019 7/9/2019 7/8/2019 7/7/2019    SBP (mmHg) 138 120 124 133 124    DBP (mmHg) 80 79 68 82 69    Pulse 75 95 72 77 81        Reports he has reduced his stress levels.  States he rests about 20min before taking a reading.    Digital Medicine: Health  Follow Up    Lifestyle Modifications:    1.Dietary Modifications (Sodium intake <2,000mg/day, food labels, dining out): Reports he is "eating right."    2.Physical Activity: Reports he stays active during the day.  States he is still riding the stationary bike and has increased the resistance.  Encouraged patient to continue exercise.    3.Medication Therapy: Patient has been compliant with the medication regimen.    4.Patient has the following medication side effects/concerns: none  (Frequency/Alleviating factors/Precipitating factors, etc.)     Follow up with Mr. Rigoberto Vasquez completed. No further questions or concerns. Will continue to follow up to achieve health goals.  "

## 2019-08-19 RX ORDER — FENOFIBRATE 200 MG/1
200 CAPSULE ORAL NIGHTLY
Qty: 90 CAPSULE | Refills: 3 | Status: SHIPPED | OUTPATIENT
Start: 2019-08-19 | End: 2020-03-06 | Stop reason: SDUPTHER

## 2019-08-22 NOTE — PROGRESS NOTES
"Last 5 Patient Entered Readings                                      Current 30 Day Average: 119/75     Recent Readings 8/18/2019 8/17/2019 8/16/2019 8/15/2019 8/14/2019    SBP (mmHg) 125 112 131 122 111    DBP (mmHg) 75 71 80 85 73    Pulse 80 74 80 75 86          HPI:  Called patient to follow up. Patient endorses adherence to medication regimen. Patient denies hypotensive s/sx (lightheadedness, dizziness, nausea, fatigue) except occasionally feeling like he is "dragging"; patient denies hypertensive s/sx (SOB, CP, severe headaches, changes in vision).     Assessment:  Reviewed recent readings. Per 2017 ACC/ AHA HTN guidelines (goal of BP < 130/80), current 30-day average is well controlled.     Plan:  Congratulated patient on lifestyle modifications. Reviewed s/s of hypotension and discussed possible losartan increase if he is experiencing symptoms.   Continue current medication regimen. I will continue to monitor regularly and will follow-up in 2 to 3 months, sooner if blood pressure begins to trend upward or downward.     Current medication regimen:  Hypertension Medications             losartan (COZAAR) 50 MG tablet Take 1 tablet by mouth in the morning and 1 tablet in the evening          Patient denies having questions or concerns. Patient has my contact information and knows to call with any concerns or clinical changes.    "

## 2019-09-11 ENCOUNTER — PATIENT OUTREACH (OUTPATIENT)
Dept: OTHER | Facility: OTHER | Age: 69
End: 2019-09-11

## 2019-09-11 NOTE — PROGRESS NOTES
Digital Medicine: Health  Follow-Up    Patient reports he is working on monitoring what he eats and reducing stress.  States he is doing things that he enjoys and has increased exercise.    The history is provided by the patient.     Follow Up  Follow-up reason(s): routine education      Routine Education Topics: eating patterns, meal planning and physical activity            Diet:       Intervention(s): meal planning and regularly scheduling meals    Physical Activity:   When asked if exercising, patient responded: yes3 day(s) a week.      Patient participates in the following activities: body weight exercises and biking    Patient going to gym where he had rehab after knee replacement.    Intervention(s): goal tracking       [facility]    INTERVENTION(S)  recommend physical activity and goal setting    PLAN  patient verbalizes understanding      There are no preventive care reminders to display for this patient.    Last 5 Patient Entered Readings                                      Current 30 Day Average: 120/75     Recent Readings 9/6/2019 9/5/2019 8/28/2019 8/27/2019 8/25/2019    SBP (mmHg) 115 132 136 122 115    DBP (mmHg) 76 75 83 67 73    Pulse 80 79 83 77 72

## 2019-09-13 ENCOUNTER — HOSPITAL ENCOUNTER (OUTPATIENT)
Facility: HOSPITAL | Age: 69
Discharge: HOME | End: 2019-09-13
Attending: INTERNAL MEDICINE | Admitting: INTERNAL MEDICINE
Payer: MEDICARE

## 2019-09-13 ENCOUNTER — ANESTHESIA EVENT (OUTPATIENT)
Dept: ENDOSCOPY | Facility: HOSPITAL | Age: 69
End: 2019-09-13
Payer: MEDICARE

## 2019-09-13 ENCOUNTER — ANESTHESIA (OUTPATIENT)
Dept: ENDOSCOPY | Facility: HOSPITAL | Age: 69
End: 2019-09-13
Payer: MEDICARE

## 2019-09-13 VITALS
TEMPERATURE: 98 F | SYSTOLIC BLOOD PRESSURE: 100 MMHG | BODY MASS INDEX: 27.28 KG/M2 | HEIGHT: 71 IN | HEART RATE: 69 BPM | OXYGEN SATURATION: 96 % | WEIGHT: 194.88 LBS | RESPIRATION RATE: 18 BRPM | DIASTOLIC BLOOD PRESSURE: 72 MMHG

## 2019-09-13 DIAGNOSIS — D12.5 ADENOMATOUS POLYP OF SIGMOID COLON: ICD-10-CM

## 2019-09-13 DIAGNOSIS — K29.30 CHRONIC SUPERFICIAL GASTRITIS WITHOUT BLEEDING: ICD-10-CM

## 2019-09-13 DIAGNOSIS — K57.30 DIVERTICULA OF COLON: ICD-10-CM

## 2019-09-13 DIAGNOSIS — D50.0 IRON DEFICIENCY ANEMIA DUE TO CHRONIC BLOOD LOSS: Primary | Chronic | ICD-10-CM

## 2019-09-13 LAB — POCT GLUCOSE: 95 MG/DL (ref 70–110)

## 2019-09-13 PROCEDURE — 45385 COLONOSCOPY W/LESION REMOVAL: CPT | Mod: HCNC,,, | Performed by: INTERNAL MEDICINE

## 2019-09-13 PROCEDURE — 63600175 PHARM REV CODE 636 W HCPCS: Mod: HCNC | Performed by: NURSE ANESTHETIST, CERTIFIED REGISTERED

## 2019-09-13 PROCEDURE — 45380 COLONOSCOPY AND BIOPSY: CPT | Mod: HCNC | Performed by: INTERNAL MEDICINE

## 2019-09-13 PROCEDURE — 63600175 PHARM REV CODE 636 W HCPCS: Mod: HCNC | Performed by: INTERNAL MEDICINE

## 2019-09-13 PROCEDURE — 45380 PR COLONOSCOPY,BIOPSY: ICD-10-PCS | Mod: 59,HCNC,, | Performed by: INTERNAL MEDICINE

## 2019-09-13 PROCEDURE — 43239 EGD BIOPSY SINGLE/MULTIPLE: CPT | Mod: 51,HCNC,, | Performed by: INTERNAL MEDICINE

## 2019-09-13 PROCEDURE — 27201012 HC FORCEPS, HOT/COLD, DISP: Mod: HCNC | Performed by: INTERNAL MEDICINE

## 2019-09-13 PROCEDURE — 37000009 HC ANESTHESIA EA ADD 15 MINS: Mod: HCNC | Performed by: INTERNAL MEDICINE

## 2019-09-13 PROCEDURE — 27201089 HC SNARE, DISP (ANY): Mod: HCNC | Performed by: INTERNAL MEDICINE

## 2019-09-13 PROCEDURE — 37000008 HC ANESTHESIA 1ST 15 MINUTES: Mod: HCNC | Performed by: INTERNAL MEDICINE

## 2019-09-13 PROCEDURE — 25000003 PHARM REV CODE 250: Mod: HCNC | Performed by: NURSE ANESTHETIST, CERTIFIED REGISTERED

## 2019-09-13 PROCEDURE — 43239 EGD BIOPSY SINGLE/MULTIPLE: CPT | Mod: HCNC | Performed by: INTERNAL MEDICINE

## 2019-09-13 PROCEDURE — 88305 TISSUE EXAM BY PATHOLOGIST: CPT | Mod: 26,HCNC,, | Performed by: PATHOLOGY

## 2019-09-13 PROCEDURE — 88305 TISSUE EXAM BY PATHOLOGIST: CPT | Mod: HCNC | Performed by: PATHOLOGY

## 2019-09-13 PROCEDURE — 45385 PR COLONOSCOPY,REMV LESN,SNARE: ICD-10-PCS | Mod: HCNC,,, | Performed by: INTERNAL MEDICINE

## 2019-09-13 PROCEDURE — 45385 COLONOSCOPY W/LESION REMOVAL: CPT | Mod: HCNC | Performed by: INTERNAL MEDICINE

## 2019-09-13 PROCEDURE — 43239 PR EGD, FLEX, W/BIOPSY, SGL/MULTI: ICD-10-PCS | Mod: 51,HCNC,, | Performed by: INTERNAL MEDICINE

## 2019-09-13 PROCEDURE — 45380 COLONOSCOPY AND BIOPSY: CPT | Mod: 59,HCNC,, | Performed by: INTERNAL MEDICINE

## 2019-09-13 PROCEDURE — 88305 TISSUE SPECIMEN TO PATHOLOGY - SURGERY: ICD-10-PCS | Mod: 26,HCNC,, | Performed by: PATHOLOGY

## 2019-09-13 RX ORDER — PHENYLEPHRINE HYDROCHLORIDE 10 MG/ML
INJECTION INTRAVENOUS
Status: DISCONTINUED | OUTPATIENT
Start: 2019-09-13 | End: 2019-09-13

## 2019-09-13 RX ORDER — SODIUM CHLORIDE, SODIUM LACTATE, POTASSIUM CHLORIDE, CALCIUM CHLORIDE 600; 310; 30; 20 MG/100ML; MG/100ML; MG/100ML; MG/100ML
INJECTION, SOLUTION INTRAVENOUS CONTINUOUS
Status: DISCONTINUED | OUTPATIENT
Start: 2019-09-13 | End: 2019-09-13 | Stop reason: HOSPADM

## 2019-09-13 RX ORDER — PROPOFOL 10 MG/ML
INJECTION, EMULSION INTRAVENOUS
Status: DISCONTINUED | OUTPATIENT
Start: 2019-09-13 | End: 2019-09-13

## 2019-09-13 RX ORDER — LIDOCAINE HYDROCHLORIDE 10 MG/ML
INJECTION, SOLUTION EPIDURAL; INFILTRATION; INTRACAUDAL; PERINEURAL
Status: DISCONTINUED | OUTPATIENT
Start: 2019-09-13 | End: 2019-09-13

## 2019-09-13 RX ORDER — SODIUM CHLORIDE 0.9 % (FLUSH) 0.9 %
10 SYRINGE (ML) INJECTION
Status: DISCONTINUED | OUTPATIENT
Start: 2019-09-13 | End: 2019-09-13 | Stop reason: HOSPADM

## 2019-09-13 RX ADMIN — PROPOFOL 50 MG: 10 INJECTION, EMULSION INTRAVENOUS at 07:09

## 2019-09-13 RX ADMIN — PROPOFOL 30 MG: 10 INJECTION, EMULSION INTRAVENOUS at 07:09

## 2019-09-13 RX ADMIN — SODIUM CHLORIDE, SODIUM LACTATE, POTASSIUM CHLORIDE, AND CALCIUM CHLORIDE: 600; 310; 30; 20 INJECTION, SOLUTION INTRAVENOUS at 07:09

## 2019-09-13 RX ADMIN — SODIUM CHLORIDE, SODIUM LACTATE, POTASSIUM CHLORIDE, AND CALCIUM CHLORIDE: 600; 310; 30; 20 INJECTION, SOLUTION INTRAVENOUS at 06:09

## 2019-09-13 RX ADMIN — PHENYLEPHRINE HYDROCHLORIDE 50 MCG: 10 INJECTION INTRAVENOUS at 07:09

## 2019-09-13 RX ADMIN — LIDOCAINE HYDROCHLORIDE 100 MG: 10 INJECTION, SOLUTION EPIDURAL; INFILTRATION; INTRACAUDAL; PERINEURAL at 07:09

## 2019-09-13 RX ADMIN — PROPOFOL 20 MG: 10 INJECTION, EMULSION INTRAVENOUS at 07:09

## 2019-09-13 NOTE — DISCHARGE INSTRUCTIONS
Hemorrhoids    Hemorrhoids are swollen and inflamed veins inside the rectum and near the anus. The rectum is the last several inches of the colon. The anus is the passage between the rectum and the outside of the body.  Causes  The veins can become swollen due to increased pressure in them. This is most often caused by:  · Chronic constipation or diarrhea  · Straining when having a bowel movement  · Sitting too long on the toilet  · A low-fiber diet  · Pregnancy  Symptoms  · Bleeding from the rectum (this may be noticeable after bowel movements)  · Lump near the anus  · Itching around the anus  · Pain around the anus  There are different types of hemorrhoids. Depending on the type you have and the severity, you may be able to treat yourself at home. In some cases, a procedure may be the best treatment option. Your healthcare provider can tell you more about this, if needed.  Home care  General care  · To get relief from pain or itching, try:  ¨ Topical products. Your healthcare provider may prescribe or recommend creams, ointments, or pads that can be applied to the hemorrhoid. Use these exactly as directed.  ¨ Medicines. Your healthcare provider may recommend stool softeners, suppositories, or laxatives to help manage constipation. Use these exactly as directed.  ¨ Sitz baths. A sitz bath involves sitting in a few inches of warm bath water. Be careful not to make the water so hot that you burn yourself--test it before sitting in it. Soak for about 10 to 15 minutes a few times a day. This may help relieve pain.  Tips to help prevent hemorrhoids  · Eat more fiber. Fiber adds bulk to stool and absorbs water as it moves through your colon. This makes stool softer and easier to pass.  ¨ Increase the fiber in your diet with more fiber-rich foods. These include fresh fruit, vegetables, and whole grains.  ¨ Take a fiber supplement or bulking agent, if advised to by your provider. These include products such as psyllium  or methylcellulose.  · Drink plenty of water, if directed to by your provider. This can help keep stool soft.  · Be more active. Frequent exercise aids digestion and helps prevent constipation. It may also help make bowel movements more regular.  · Dont strain during bowel movements. This can make hemorrhoids more likely. Also, dont sit on the toilet for long periods of time.  Follow-up care  Follow up with your healthcare provider, or as advised. If a culture or imaging tests were done, you will be notified of the results when they are ready. This may take a few days or longer.  When to seek medical advice  Call your healthcare provider right away if any of these occur:  · Increased bleeding from the rectum  · Increased pain around the rectum or anus  · Weakness or dizziness  Call 911  Call 911 or return to the emergency department right away if any of these occur:  · Trouble breathing or swallowing  · Fainting or loss of consciousness  · Unusually fast heart rate  · Vomiting blood  · Large amounts of blood in stool  Date Last Reviewed: 6/22/2015 © 2000-2017 [employer]. [address] [place] [address]. All rights reserved. This information is not intended as a substitute for professional medical care. Always follow your healthcare professional's instructions.        Understanding Colon and Rectal Polyps    The colon (also called the large intestine) is a muscular tube that forms the last part of the digestive tract. It absorbs water and stores food waste. The colon is about 4 to 6 feet long. The rectum is the last 6 inches of the colon. The colon and rectum have a smooth lining composed of millions of cells. Changes in these cells can lead to growths in the colon that can become cancerous and should be removed. Multiple tests are available to screen for colon cancer, but the colonoscopy is the most recommended test. During colonoscopy, these polyps can be removed. How often you need this  test depends on many things including your condition, your family history, symptoms, and what the findings were at the previous colonoscopy.   When the colon lining changes  Changes that happen in the cells that line the colon or rectum can lead to growths called polyps. Over a period of years, polyps can turn cancerous. Removing polyps early may prevent cancer from ever forming.  Polyps  Polyps are fleshy clumps of tissue that form on the lining of the colon or rectum. Small polyps are usually benign (not cancerous). However, over time, cells in a polyp can change and become cancerous. Certain types of polyps known as adenomatous polyps are premalignant. The risk for invasive cancer increases with the size of the polyp and certain cell and gene features. This means that they can become cancerous if they're not removed. Hyperplastic polyps are benign. They can grow quite large and not turn cancerous.   Cancer  Almost all colorectal cancers start when polyp cells begin growing abnormally. As a cancerous tumor grows, it may involve more and more of the colon or rectum. In time, cancer can also grow beyond the colon or rectum and spread to nearby organs or to glands called lymph nodes. The cells can also travel to other parts of the body. This is known as metastasis. The earlier a cancerous tumor is removed, the better the chance of preventing its spread.    Date Last Reviewed: 8/1/2016 © 2000-2017 [employer]. [address] [place] [address], [place] [address]. All rights reserved. This information is not intended as a substitute for professional medical care. Always follow your healthcare professional's instructions.        Gastritis (Adult)    Gastritis is inflammation and irritation of the stomach lining. It can be present for a short time (acute) or be long lasting (chronic). Gastritis is often caused by infection with bacteria called H pylori. More than a third of people in the [place] have this bacteria in  their bodies. In many cases, H pylori causes no problems or symptoms. In some people, though, the infection irritates the stomach lining and causes gastritis. Other causes of stomach irritation include drinking alcohol or taking pain-relieving medicines called NSAIDs (such as aspirin or ibuprofen).   Symptoms of gastritis can include:  · Abdominal pain or bloating  · Loss of appetite  · Nausea or vomiting  · Vomiting blood or having black stools  · Feeling more tired than usual  An inflamed and irritated stomach lining is more likely to develop a sore called an ulcer. To help prevent this, gastritis should be treated.  Home care  If needed, medicines may be prescribed. If you have H pylori infection, treating it will likely relieve your symptoms. Other changes can help reduce stomach irritation and help it heal.  · If you have been prescribed medicines for H pylori infection, take them as directed. Take all of the medicine until it is finished or your healthcare provider tells you to stop, even if you feel better.  · Your healthcare provider may recommend avoiding NSAIDs. If you take daily aspirin for your heart or other medical reasons, do not stop without talking to your healthcare provider first.  · Avoid drinking alcohol.  · Stop smoking. Smoking can irritate the stomach and delay healing. As much as possible, stay away from second hand smoke.  Follow-up care  Follow up with your healthcare provider, or as advised by our staff. Testing may be needed to check for inflammation or an ulcer.  When to seek medical advice  Call your healthcare provider for any of the following:  · Stomach pain that gets worse or moves to the lower right abdomen (appendix area)  · Chest pain that appears or gets worse, or spreads to the back, neck, shoulder, or arm  · Frequent vomiting (cant keep down liquids)  · Blood in the stool or vomit (red or black in color)  · Feeling weak or dizzy  · Fever of 100.4ºF (38ºC) or higher, or as  directed by your healthcare provider  Date Last Reviewed: 6/22/2015  © 2084-5067 [employer]. [address] [place] [address], [place] [address]. All rights reserved. This information is not intended as a substitute for professional medical care. Always follow your healthcare professional's instructions.

## 2019-09-13 NOTE — TRANSFER OF CARE
"Anesthesia Transfer of Care Note    Patient: Rigoberto Vasquez    Procedure(s) Performed: Procedure(s) (LRB):  ESOPHAGOGASTRODUODENOSCOPY (EGD) (N/A)  COLONOSCOPY (N/A)    Patient location: PACU    Anesthesia Type: MAC    Transport from OR: Transported from OR on room air with adequate spontaneous ventilation    Post pain: adequate analgesia    Post assessment: no apparent anesthetic complications    Post vital signs: stable    Level of consciousness: awake    Nausea/Vomiting: no nausea/vomiting    Complications: none    Transfer of care protocol was followed      Last vitals:   Visit Vitals  /62 (BP Location: Left arm, Patient Position: Lying)   Pulse 69   Temp 36.6 °C (97.9 °F) (Temporal)   Resp 18   Ht 5' 11" (1.803 m)   Wt 88.4 kg (194 lb 14.2 oz)   SpO2 98%   BMI 27.18 kg/m²     "

## 2019-09-13 NOTE — INTERVAL H&P NOTE
The patient has been examined and the H&P has been reviewed:I have reviewed this note and I agree with this assessment. The patient remains stable for endoscopy at the time of this present evaluation.         Anesthesia/Surgery risks, benefits and alternative options discussed and understood by patient/family.          Active Hospital Problems    Diagnosis  POA    CHANDRAKANT (iron deficiency anemia) [D50.9]  Yes     Chronic      Resolved Hospital Problems   No resolved problems to display.

## 2019-09-13 NOTE — H&P
Short Stay Endoscopy History and Physical    PCP - eJsse Grimes MD    Procedure - EGD and Colonoscopy  ASA - 3  Mallampati - per anesthesia  History of Anesthesia problems - no  Family history Anesthesia problems -  no     HPI:  This is a 69 y.o.male here for evaluation of : Iron Deficiency Anemia    Reflux - no  Dysphagia - no  Abdominal pain - no  Diarrhea - no  Anemia - yes  GI bleeding - no  Nausea and vomiting-no  Early satiety-no  aversion to sight or smell of food-no  Constipation-yes    ROS:  Constitutional: No fevers, chills, No weight loss  ENT: No allergies  CV: No chest pain  Pulm: No cough, No shortness of breath  Ophtho: No vision changes  GI: see HPI  Derm: No rash  Heme: No lymphadenopathy, No bruising  MSK: No arthritis  : No dysuria, No hematuria  Endo: No hot or cold intolerance  Neuro: No syncope, No seizure  Psych: No anxiety, No depression    Medical History:  Past Medical History:   Diagnosis Date    Acid reflux     [facility]    Angina pectoris     Back pain     BPH (benign prostatic hyperplasia)     blue    Degenerative joint disease     Diverticulosis     Erectile dysfunction     History of elevated PSA 2/14    normalized, dr dave annually    History of pericarditis     Hypercholesteremia     Hypertension     Iron deficiency anemia     Pacemaker     complete av block;NO afib    Squamous cell cancer of skin of mastoid region of scalp     dr jaida salgado    Type 2 diabetes mellitus     dr wyman    Urinary incontinence     when he was 7 Yrs old.        Surgical History:  Past Surgical History:   Procedure Laterality Date    CARDIAC PACEMAKER PLACEMENT      COLONOSCOPY N/A 6/10/2015    Performed by Kan Ramsey III, [facility]    ESOPHAGOGASTRODUODENOSCOPY (EGD) N/A 7/3/2017    Performed by Greyson Crowe MD at [facility]    ESOPHAGOGASTRODUODENOSCOPY (EGD) N/A 6/10/2015    Performed by Kan Ramsey III, MD at [facility]     "ESOPHAGOGASTRODUODENOSCOPY (EGD) N/A 6/18/2014    Performed by Ham Ortiz MD at [facility]    GRAFT Left 3/26/2015    Performed by Rigoberto Lopez MD at [facility] OR    INSERTION TUBE-PE Left 3/26/2015    [facility] Lopez [facility] at [facility]    JOINT REPLACEMENT      KNEE CARTILAGE SURGERY Bilateral     MASTOIDECTOMY Left 3/26/2015    Per[facility] by [facility]ober[facility] Lopez [facility] at [facility]    NASAL SINUS SURGERY      SHOULDER ARTHROSCOPY Right     TONSILLECTOMY      TOTAL KNEE ARTHROPLASTY Left 05/15/2017    TRANSURETHRAL RESECTION OF PROSTATE      TYMPANOPLASTY      TYMPANOPLASTY   Left 3/26/2015    Performed by [facility]ober[facility] Lopez MD at [facility]    vesectomy         Family History:  Family History   Problem Relation Age of Onset    Arthritis Mother     Hypertension Mother     Heart disease Father     Hypertension Father     Stroke Father     Diabetes Son     Diabetes Maternal Grandmother     Colon cancer Paternal Grandmother        Social History:  Social History     Socioeconomic History    Marital status:      Spouse name: SAUL    Number of children: 2    Years of education: Not on file    Highest education level: Not on file   Occupational History    Occupation: retired- [employer] .   Social Needs    Financial resource strain: Not on file    Food insecurity:     Worry: Not on file     Inability: Not on file    Transportation needs:     Medical: Not on file     Non-medical: Not on file   Tobacco Use    Smoking status: Never Smoker    Smokeless tobacco: Never Used   Substance and Sexual Activity    Alcohol use: Yes     Comment: " once a month"    Drug use: No    Sexual activity: Yes     Partners: Female   Lifestyle    Physical activity:     Days per week: Not on file     Minutes per session: Not on file    Stress: Not on file   Relationships    Social connections:     Talks on phone: Not on file     Gets together: Not on file     Attends Rastafarian service: Not on file     Active " member of club or organization: Not on file     Attends meetings of clubs or organizations: Not on file     Relationship status: Not on file   Other Topics Concern    Not on file   Social History Narrative     . Lives with spouse. Has 2 children. Patient retired from [employer]. His son has type 1 diabetes.       Allergies:   Review of patient's allergies indicates:   Allergen Reactions    Aspirin      Stomach pain even with ppi    Meperidine      Other reaction(s): Stomach upset    Niacin      Other reaction(s): hot skin       Medications:   No current facility-administered medications on file prior to encounter.      Current Outpatient Medications on File Prior to Encounter   Medication Sig Dispense Refill    ACETAMINOPHEN (TYLENOL 8 HOUR ORAL) Take by mouth as needed.      albuterol (VENTOLIN HFA) 90 mcg/actuation inhaler Inhale 2 puffs into the lungs daily as needed for Shortness of Breath. Rescue 18 g 0    azelastine (ASTELIN) 137 mcg (0.1 %) nasal spray 2 sprays (274 mcg total) by Nasal route 2 (two) times daily. 30 mL 12    bifidobacterium infantis (ALIGN) 4 mg Cap Take 1 capsule by mouth Daily.      blood sugar diagnostic (ACCU-CHEK JAXON) Strp Check BG 2-3 times daily. Accuchek jaxon strips 300 strip 3    ferrous gluconate (FERGON) 324 MG tablet Take 1 tablet (324 mg total) by mouth daily with breakfast. 30 tablet 11    guaiFENesin (MUCINEX) 600 mg 12 hr tablet Take 1,200 mg by mouth 2 (two) times daily.      hydrocortisone 2.5 % cream       LEVEMIR FLEXTOUCH U-100 INSULN 100 unit/mL (3 mL) InPn pen INJECT 25 UNITS INTO THE SKIN ONCE DAILY. 30 mL 3    levocetirizine (XYZAL) 5 MG tablet Take 5 mg by mouth every evening.      liraglutide 0.6 mg/0.1 mL, 18 mg/3 mL, subq PNIJ (VICTOZA 3-ALESHA) 0.6 mg/0.1 mL (18 mg/3 mL) PnIj INJECT 1.8 MG DAILY 27 mL 3    losartan (COZAAR) 50 MG tablet Take 1 tablet by mouth in the morning and 1 tablet in the evening 180 tablet 3    lovastatin (MEVACOR)  "40 MG tablet Take 1 tablet by mouth Every evening. 90 tablet 3    metFORMIN (GLUCOPHAGE) 1000 MG tablet Take 1 tablet (1,000 mg total) by mouth once daily. Generic  tablet 3    multivitamin capsule Take 1 capsule by mouth once daily.      pantoprazole (PROTONIX) 40 MG tablet Take 1 tablet (40 mg total) by mouth before breakfast. 90 tablet 3    pen needle, diabetic (BD INSULIN PEN NEEDLE UF MINI) 31 gauge x 3/16" Ndle USE AS DIRECTED WITH INSULIN 90 each 4    pioglitazone (ACTOS) 30 MG tablet Take 1 tablet (30 mg total) by mouth once daily. 90 tablet 3    ranitidine (ZANTAC) 150 MG tablet Take 150 mg by mouth 2 (two) times daily.         Objective Findings:    Vital Signs:There were no vitals filed for this visit.        Physical Exam:  General Appearance: Well appearing in no acute distress  Eyes:    No scleral icterus  ENT: Neck supple, Lips, mucosa, and tongue normal; teeth and gums normal  Lungs: CTA bilaterally in anterior and posterior fields, no wheezes, no crackles.  Heart:  Regular rate, S1, S2 normal, no murmurs heard.  Abdomen: Soft, non tender, non distended with normal bowel sounds. No hepatosplenomegaly, ascites, or mass.  Extremities: No clubbing, cyanosis or edema  Skin: No rash    Labs:  Reviewed    Plan:EGD and Colonoscopy    I have explained the risks and benefits of endoscopy procedures to the patient including but not limited to bleeding, perforation, infection, and death. The patient wishes to proceed.           "

## 2019-09-13 NOTE — ANESTHESIA RELEASE NOTE
"Anesthesia Release from PACU Note    Patient: Rigoberto Vasquez    Procedure(s) Performed: Procedure(s) (LRB):  ESOPHAGOGASTRODUODENOSCOPY (EGD) (N/A)  COLONOSCOPY (N/A)    Anesthesia type: MAC    Post pain: Adequate analgesia    Post assessment: no apparent anesthetic complications, tolerated procedure well and no evidence of recall    Last Vitals:   Visit Vitals  /62 (BP Location: Left arm, Patient Position: Lying)   Pulse 69   Temp 36.6 °C (97.9 °F) (Temporal)   Resp 18   Ht 5' 11" (1.803 m)   Wt 88.4 kg (194 lb 14.2 oz)   SpO2 98%   BMI 27.18 kg/m²       Post vital signs: stable    Level of consciousness: awake, alert  and oriented    Nausea/Vomiting: no nausea/no vomiting    Complications: none    Airway Patency: patent    Respiratory: unassisted, spontaneous ventilation, room air    Cardiovascular: stable and blood pressure at baseline    Hydration: euvolemic  "

## 2019-09-13 NOTE — DISCHARGE SUMMARY
[facility]  Brief Operative Note     SUMMARY     Surgery Date: 9/13/2019     Surgeon(s) and Role:     * Kan Ramsey III, MD - Primary    Assisting Surgeon: None    Pre-op Diagnosis:  CHANDRAKANT (iron deficiency anemia) [D50.9]  Altered bowel habits [R19.4]    Post-op Diagnosis:  Post-Op Diagnosis Codes:     * CHANDRAKANT (iron deficiency anemia) [D50.9]     * Altered bowel habits [R19.4]     - Colon Polyps     - Diverticulosis     - Gastritis  Procedure(s) (LRB):  ESOPHAGOGASTRODUODENOSCOPY (EGD) (N/A)  COLONOSCOPY (N/A)    Anesthesia: Monitor Anesthesia Care    Description of the findings of the procedure: Procedures completed. See Procedure note for full details.    Findings/Key Components: Procedures completed. See Procedure note for full details.    Prosthetics/Devices: None    Estimated Blood Loss: * No values recorded between 9/13/2019 12:00 AM and 9/13/2019  7:55 AM *         Specimens:   Specimen (12h ago, onward)    Start     Ordered    09/13/19 0718  Specimen to Pathology - Surgery  Once     Comments:  #1 Small Bowel Bx's R/O Celiac sprue#2 Corpus BX's, gastritis#3 Antral BX's,gastritis, R/O H pylori#4 Rectosigmoid colon polyp#5 Hepatic Flexure Polyp#6 Transverse colon polyp     Start Status     09/13/19 0718 Collected (09/13/19 0804) Order ID: [id number]       09/13/19 0758          Discharge Note    SUMMARY     Admit Date: 9/13/2019    Discharge Date and Time: 9/13/2019    Hospital Course (synopsis of major diagnoses, care, treatment, and services provided during the course of the hospital stay):  Procedures completed. See Procedure note for full details. Discharge patient when discharge criteria met.    Final Diagnosis: Post-Op Diagnosis Codes:     * CHANDRAKANT (iron deficiency anemia) [D50.9]     * Altered bowel habits [R19.4]      - Colon polyps      - Diverticulosis      - Gastritis       Disposition: Discharge patient when discharge criteria met.    Follow Up/Patient Instructions:        Medications:  Reconciled Home Medications:   Current Discharge Medication List      CONTINUE these medications which have NOT CHANGED    Details   ACETAMINOPHEN (TYLENOL 8 HOUR ORAL) Take by mouth as needed.      azelastine (ASTELIN) 137 mcg (0.1 %) nasal spray 2 sprays (274 mcg total) by Nasal route 2 (two) times daily.  Qty: 30 mL, Refills: 12      bifidobacterium infantis (ALIGN) 4 mg Cap Take 1 capsule by mouth Daily.      blood sugar diagnostic (ACCU-CHEK JAXON) Strp Check BG 2-3 times daily. Accuchek jaxon strips  Qty: 300 strip, Refills: 3      fenofibrate micronized (LOFIBRA) 200 MG Cap Take 1 capsule (200 mg total) by mouth every evening.  Qty: 90 capsule, Refills: 3      ferrous gluconate (FERGON) 324 MG tablet Take 1 tablet (324 mg total) by mouth daily with breakfast.  Qty: 30 tablet, Refills: 11      guaiFENesin (MUCINEX) 600 mg 12 hr tablet Take 1,200 mg by mouth 2 (two) times daily.      hydrocortisone 2.5 % cream       LEVEMIR FLEXTOUCH U-100 INSULN 100 unit/mL (3 mL) InPn pen INJECT 25 UNITS INTO THE SKIN ONCE DAILY.  Qty: 30 mL, Refills: 3    Associated Diagnoses: Diabetes mellitus without complication      levocetirizine (XYZAL) 5 MG tablet Take 5 mg by mouth every evening.      liraglutide 0.6 mg/0.1 mL, 18 mg/3 mL, subq PNIJ (VICTOZA 3-ALESHA) 0.6 mg/0.1 mL (18 mg/3 mL) PnIj INJECT 1.8 MG DAILY  Qty: 27 mL, Refills: 3      losartan (COZAAR) 50 MG tablet Take 1 tablet by mouth in the morning and 1 tablet in the evening  Qty: 180 tablet, Refills: 3    Associated Diagnoses: Hypertension associated with diabetes      lovastatin (MEVACOR) 40 MG tablet Take 1 tablet by mouth Every evening.  Qty: 90 tablet, Refills: 3      metFORMIN (GLUCOPHAGE) 1000 MG tablet Take 1 tablet (1,000 mg total) by mouth once daily. Generic OK  Qty: 180 tablet, Refills: 3      multivitamin capsule Take 1 capsule by mouth once daily.      pantoprazole (PROTONIX) 40 MG tablet Take 1 tablet (40 mg total) by mouth before  "breakfast.  Qty: 90 tablet, Refills: 3    Associated Diagnoses: Nausea      pen needle, diabetic (BD INSULIN PEN NEEDLE UF MINI) 31 gauge x 3/16" Ndle USE AS DIRECTED WITH INSULIN  Qty: 90 each, Refills: 4      pioglitazone (ACTOS) 30 MG tablet Take 1 tablet (30 mg total) by mouth once daily.  Qty: 90 tablet, Refills: 3      ranitidine (ZANTAC) 150 MG tablet Take 150 mg by mouth 2 (two) times daily.      albuterol (VENTOLIN HFA) 90 mcg/actuation inhaler Inhale 2 puffs into the lungs daily as needed for Shortness of Breath. Rescue  Qty: 18 g, Refills: 0    Associated Diagnoses: Dyspnea on exertion            Discharge Procedure Orders   Diet general     Activity as tolerated     "

## 2019-09-13 NOTE — PROVATION PATIENT INSTRUCTIONS
Discharge Summary/Instructions after an Endoscopic Procedure  Patient Name: Rigoberto Vasquez  Patient MRN: [id number]  Patient YOB: 1950 Friday, September 13, 2019 Kan Ramsey III, MD  RESTRICTIONS:  During your procedure today, you received medications for sedation.  These   medications may affect your judgment, balance and coordination.  Therefore,   for 24 hours, you have the following restrictions:   - DO NOT drive a car, operate machinery, make legal/financial decisions,   sign important papers or drink alcohol.    ACTIVITY:  Today: no heavy lifting, straining or running due to procedural   sedation/anesthesia.  The following day: return to full activity including work.  DIET:  Eat and drink normally unless instructed otherwise.     TREATMENT FOR COMMON SIDE EFFECTS:  - Mild abdominal pain, nausea, belching, bloating or excessive gas:  rest,   eat lightly and use a heating pad.  - Sore Throat: treat with throat lozenges and/or gargle with warm salt   water.  - Because air was used during the procedure, expelling large amounts of air   from your rectum or belching is normal.  - If a bowel prep was taken, you may not have a bowel movement for 1-3 days.    This is normal.  SYMPTOMS TO WATCH FOR AND REPORT TO YOUR PHYSICIAN:  1. Abdominal pain or bloating, other than gas cramps.  2. Chest pain.  3. Back pain.  4. Signs of infection such as: chills or fever occurring within 24 hours   after the procedure.  5. Rectal bleeding, which would show as bright red, maroon, or black stools.   (A tablespoon of blood from the rectum is not serious, especially if   hemorrhoids are present.)  6. Vomiting.  7. Weakness or dizziness.  GO DIRECTLY TO THE NEAREST EMERGENCY ROOM IF YOU HAVE ANY OF THE FOLLOWING:      Difficulty breathing              Chills and/or fever over 101 F   Persistent vomiting and/or vomiting blood   Severe abdominal pain   Severe chest pain   Black, tarry stools   Bleeding- more than one  tablespoon   Any other symptom or condition that you feel may need urgent attention  Your doctor recommends these additional instructions:  If any biopsies were taken, your doctors clinic will contact you in 1 to 2   weeks with any results.  - Discharge patient to home (via wheelchair).   - Resume previous diet.   - Continue present medications.   - Await pathology results.   - Return to GI clinic as previously scheduled.   - Return to referring physician as previously scheduled.  For questions, problems or results please call your physician Kan Ramsey III, MD at Work:  [phone]  If you have any questions about the above instructions, call the GI   department at [phone] or call the endoscopy unit at [phone]   from 7am until 3 pm.  [facility] [place], EMERGENCY ROOM PHONE NUMBER:   [phone]  IF A COMPLICATION OR EMERGENCY SITUATION ARISES AND YOU ARE UNABLE TO REACH   YOUR PHYSICIAN - GO DIRECTLY TO THE EMERGENCY ROOM.  I have read or have had read to me these discharge instructions for my   procedure and have received a written copy.  I understand these   instructions and will follow-up with my physician if I have any questions.     __________________________________       _____________________________________  Nurse Signature                                          Patient/Designated   Responsible Party Signature  Kan Ramsey III, MD  9/13/2019 8:16:53 AM  This report has been verified and signed electronically.  [employer]

## 2019-09-13 NOTE — PROVATION PATIENT INSTRUCTIONS
Discharge Summary/Instructions after an Endoscopic Procedure  Patient Name: Rigoberto Vasquez  Patient MRN: [id number]  Patient YOB: 1950 Friday, September 13, 2019 Kan Ramsey III, MD  RESTRICTIONS:  During your procedure today, you received medications for sedation.  These   medications may affect your judgment, balance and coordination.  Therefore,   for 24 hours, you have the following restrictions:   - DO NOT drive a car, operate machinery, make legal/financial decisions,   sign important papers or drink alcohol.    ACTIVITY:  Today: no heavy lifting, straining or running due to procedural   sedation/anesthesia.  The following day: return to full activity including work.  DIET:  Eat and drink normally unless instructed otherwise.     TREATMENT FOR COMMON SIDE EFFECTS:  - Mild abdominal pain, nausea, belching, bloating or excessive gas:  rest,   eat lightly and use a heating pad.  - Sore Throat: treat with throat lozenges and/or gargle with warm salt   water.  - Because air was used during the procedure, expelling large amounts of air   from your rectum or belching is normal.  - If a bowel prep was taken, you may not have a bowel movement for 1-3 days.    This is normal.  SYMPTOMS TO WATCH FOR AND REPORT TO YOUR PHYSICIAN:  1. Abdominal pain or bloating, other than gas cramps.  2. Chest pain.  3. Back pain.  4. Signs of infection such as: chills or fever occurring within 24 hours   after the procedure.  5. Rectal bleeding, which would show as bright red, maroon, or black stools.   (A tablespoon of blood from the rectum is not serious, especially if   hemorrhoids are present.)  6. Vomiting.  7. Weakness or dizziness.  GO DIRECTLY TO THE NEAREST EMERGENCY ROOM IF YOU HAVE ANY OF THE FOLLOWING:      Difficulty breathing              Chills and/or fever over 101 F   Persistent vomiting and/or vomiting blood   Severe abdominal pain   Severe chest pain   Black, tarry stools   Bleeding- more than one  tablespoon   Any other symptom or condition that you feel may need urgent attention  Your doctor recommends these additional instructions:  If any biopsies were taken, your doctors clinic will contact you in 1 to 2   weeks with any results.  - Discharge patient to home (via wheelchair).   - High fiber diet.   - Continue present medications.   - Await pathology results.   - Repeat colonoscopy in 3 years for surveillance.   - Return to GI office as previously scheduled.   - Discharge patient to home (via wheelchair).   - High fiber diet.   - Continue present medications.   - Await pathology results.   - Repeat colonoscopy in 3 years for surveillance.   - Return to GI clinic as previously scheduled.   - Return to referring physician as previously scheduled.  For questions, problems or results please call your physician Kan Ramsey III, MD at Work:  [phone]  If you have any questions about the above instructions, call the GI   department at [phone] or call the endoscopy unit at [phone]   from 7am until 3 pm.  [facility] [place], EMERGENCY ROOM PHONE NUMBER:   [phone]  IF A COMPLICATION OR EMERGENCY SITUATION ARISES AND YOU ARE UNABLE TO REACH   YOUR PHYSICIAN - GO DIRECTLY TO THE EMERGENCY ROOM.  I have read or have had read to me these discharge instructions for my   procedure and have received a written copy.  I understand these   instructions and will follow-up with my physician if I have any questions.     __________________________________       _____________________________________  Nurse Signature                                          Patient/Designated   Responsible Party Signature  Kan Ramsey III, MD  9/13/2019 8:10:51 AM  This report has been verified and signed electronically.  [employer]ATION

## 2019-09-13 NOTE — PLAN OF CARE
MD at bedside. Discussed procedure results and plan of care with patient and family. Vital signs stable. D/C criteria met D/C instructions given to pt and spouse. Demonstrated understanding by teach back method. Pt to be dressed and taken down to awaiting vehicle via wheelchair.

## 2019-09-13 NOTE — ANESTHESIA PREPROCEDURE EVALUATION
"                                                                                                             09/13/2019  Rigoberto Vasquez is a 69 y.o., male.    Anesthesia Evaluation    I have reviewed the Patient Summary Reports.    I have reviewed the Nursing Notes.   I have reviewed the Medications.     Review of Systems  Anesthesia Hx:  No problems with previous Anesthesia  Denies Family Hx of Anesthesia complications.   Denies Personal Hx of Anesthesia complications.   Social:  Non-Smoker, No Alcohol Use    Cardiovascular:   Exercise tolerance: good Hypertension, well controlled Angina (denies current angina, previous epidosde "awhile back") hyperlipidemia Pacemaker dependent, 3rd degree heart block Functional Capacity good / => 4 METS    Pulmonary:   Pneumonia    Hepatic/GI:   GERD    Musculoskeletal:   Arthritis     Endocrine:   Diabetes, type 2, using insulin        Physical Exam  General:  Well nourished    Airway/Jaw/Neck:  Jaw/Neck Findings:    Neck ROM: Normal ROM      Dental:  Dental Findings: In tact   Chest/Lungs:  Chest/Lungs Findings: Clear to auscultation, Normal Respiratory Rate     Heart/Vascular:  Heart Findings: Rate: Normal  Rhythm: Regular Rhythm             Anesthesia Plan  Type of Anesthesia, risks & benefits discussed:  Anesthesia Type:  MAC  Patient's Preference:   Intra-op Monitoring Plan:   Intra-op Monitoring Plan Comments:   Post Op Pain Control Plan: multimodal analgesia  Post Op Pain Control Plan Comments:   Induction:   IV  Beta Blocker:  Patient is on a Beta-Blocker and has received one dose within the past 24 hours (No further documentation required).       Informed Consent: Patient understands risks and agrees with Anesthesia plan.  Questions answered.   ASA Score: 3     Day of Surgery Review of History & Physical:    H&P update referred to the provider.         Ready For Surgery From Anesthesia Perspective.       "

## 2019-09-13 NOTE — OR NURSING
Z line @ 40 cm  Fundus gland polyps in corpus  Lipoma  Gastritis    COLON  BPH  Diverticulosis  Colon polyps  Mild internal hemorrhoids

## 2019-09-13 NOTE — ANESTHESIA POSTPROCEDURE EVALUATION
Anesthesia Post Evaluation    Patient: Rigoberto Vasquez    Procedure(s) Performed: Procedure(s) (LRB):  ESOPHAGOGASTRODUODENOSCOPY (EGD) (N/A)  COLONOSCOPY (N/A)    Final Anesthesia Type: MAC  Patient location during evaluation: GI PACU  Patient participation: Yes- Able to Participate  Level of consciousness: awake and alert  Post-procedure vital signs: reviewed and stable  Pain management: adequate  Airway patency: patent  PONV status at discharge: No PONV  Anesthetic complications: no      Cardiovascular status: blood pressure returned to baseline  Respiratory status: unassisted, spontaneous ventilation and room air  Hydration status: euvolemic  Follow-up not needed.          Vitals Value Taken Time   /62 9/13/2019  7:59 AM   Temp 36.6 °C (97.9 °F) 9/13/2019  7:59 AM   Pulse 69 9/13/2019  7:59 AM   Resp 18 9/13/2019  7:59 AM   SpO2 98 % 9/13/2019  7:59 AM         No case tracking events are documented in the log.      Pain/Berna Score: Berna Score: 9 (9/13/2019  7:59 AM)

## 2019-09-16 ENCOUNTER — PATIENT MESSAGE (OUTPATIENT)
Dept: INTERNAL MEDICINE | Facility: CLINIC | Age: 69
End: 2019-09-16

## 2019-09-17 ENCOUNTER — TELEPHONE (OUTPATIENT)
Dept: INTERNAL MEDICINE | Facility: CLINIC | Age: 69
End: 2019-09-17

## 2019-09-17 RX ORDER — FERROUS GLUCONATE 324(38)MG
324 TABLET ORAL
Qty: 90 TABLET | Refills: 1 | Status: CANCELLED | OUTPATIENT
Start: 2019-09-17

## 2019-09-17 RX ORDER — FERROUS GLUCONATE 324(38)MG
324 TABLET ORAL
Qty: 90 TABLET | Refills: 1 | Status: SHIPPED | OUTPATIENT
Start: 2019-09-17 | End: 2019-12-24 | Stop reason: SDUPTHER

## 2019-09-20 ENCOUNTER — TELEPHONE (OUTPATIENT)
Dept: URGENT CARE | Facility: CLINIC | Age: 69
End: 2019-09-20

## 2019-09-20 ENCOUNTER — TELEPHONE (OUTPATIENT)
Dept: INTERNAL MEDICINE | Facility: CLINIC | Age: 69
End: 2019-09-20

## 2019-09-20 NOTE — TELEPHONE ENCOUNTER
I tried to return the a call to the pt there was no answer so I left him a vm asking that he contact staff again to assist him with rescheduling a nurse visit. //kah

## 2019-09-20 NOTE — TELEPHONE ENCOUNTER
----- Message from Akilah Kenney sent at 9/20/2019  9:27 AM CDT -----  Contact: [phone]  Would like to consult with the nurse,Patioent would like to reschedule his nurse Visit Appt,Patient would like to speak with the nurse concerning this,  Please call back at [phone], Thanks sj

## 2019-09-20 NOTE — TELEPHONE ENCOUNTER
----- Message from Ashley Sauer sent at 9/20/2019  1:54 PM CDT -----  Contact: self/[phone]  Type:  Patient Returning Call    Who Called:.Rigoberto Vasquez    Who Left Message for Patient:nurse  Does the patient know what this is regarding?:appt  Would the patient rather a call back or a response via [facility]? Call back  Best Call Back Number:[phone]  Additional Information:

## 2019-09-23 NOTE — TELEPHONE ENCOUNTER
----- Message from Dora Cheek sent at 9/23/2019  1:46 PM CDT -----  Contact: pt  He's calling in regards to missed call,      pls call pt back at [phone] (home)

## 2019-09-27 RX ORDER — PIOGLITAZONEHYDROCHLORIDE 30 MG/1
30 TABLET ORAL DAILY
Qty: 90 TABLET | Refills: 3 | Status: SHIPPED | OUTPATIENT
Start: 2019-09-27 | End: 2020-03-06 | Stop reason: SDUPTHER

## 2019-10-19 ENCOUNTER — PATIENT MESSAGE (OUTPATIENT)
Dept: GASTROENTEROLOGY | Facility: CLINIC | Age: 69
End: 2019-10-19

## 2019-11-07 ENCOUNTER — PATIENT MESSAGE (OUTPATIENT)
Dept: INTERNAL MEDICINE | Facility: CLINIC | Age: 69
End: 2019-11-07

## 2019-11-07 ENCOUNTER — PATIENT OUTREACH (OUTPATIENT)
Dept: OTHER | Facility: OTHER | Age: 69
End: 2019-11-07

## 2019-11-07 NOTE — PROGRESS NOTES
Digital Medicine: Health  Follow-Up    The history is provided by the patient.     Follow Up  Follow-up reason(s): reading review      Readings are trending up due to stress.  Reports he is feeling better about lower readings.  Denies using any stress relief techniques.      INTERVENTION(S)  encouragement/support    PLAN  patient verbalizes understanding      There are no preventive care reminders to display for this patient.    Last 5 Patient Entered Readings                                      Current 30 Day Average: 134/82     Recent Readings 11/7/2019 11/5/2019 11/3/2019 10/30/2019 10/29/2019    SBP (mmHg) 109 124 143 139 140    DBP (mmHg) 70 84 93 73 86    Pulse 84 76 79 78 90                      Diet Screening   No change to diet.      Physical Activity Screening   No change to exercise routine.          SDOH

## 2019-11-11 RX ORDER — HYDROCORTISONE 25 MG/G
CREAM TOPICAL 2 TIMES DAILY
Qty: 3.5 G | Refills: 5 | Status: SHIPPED | OUTPATIENT
Start: 2019-11-11

## 2019-11-13 NOTE — TELEPHONE ENCOUNTER
Please fax printed  rx for compounded cream to [facility] in [place] and let him knw  Faxed. thanks

## 2019-11-14 ENCOUNTER — IMMUNIZATION (OUTPATIENT)
Dept: INTERNAL MEDICINE | Facility: CLINIC | Age: 69
End: 2019-11-14
Payer: MEDICARE

## 2019-11-14 ENCOUNTER — TELEPHONE (OUTPATIENT)
Dept: INTERNAL MEDICINE | Facility: CLINIC | Age: 69
End: 2019-11-14

## 2019-11-14 PROCEDURE — 99999 PR PBB SHADOW E&M-EST. PATIENT-LVL II: ICD-10-PCS | Mod: PBBFAC,HCNC,,

## 2019-11-14 PROCEDURE — G0008 FLU VACCINE - HIGH DOSE (65+) PRESERVATIVE FREE IM: ICD-10-PCS | Mod: HCNC,S$GLB,, | Performed by: FAMILY MEDICINE

## 2019-11-14 PROCEDURE — 90662 FLU VACCINE - HIGH DOSE (65+) PRESERVATIVE FREE IM: ICD-10-PCS | Mod: HCNC,S$GLB,, | Performed by: FAMILY MEDICINE

## 2019-11-14 PROCEDURE — G0008 ADMIN INFLUENZA VIRUS VAC: HCPCS | Mod: HCNC,S$GLB,, | Performed by: FAMILY MEDICINE

## 2019-11-14 PROCEDURE — 99999 PR PBB SHADOW E&M-EST. PATIENT-LVL II: CPT | Mod: PBBFAC,HCNC,,

## 2019-11-14 PROCEDURE — 90662 IIV NO PRSV INCREASED AG IM: CPT | Mod: HCNC,S$GLB,, | Performed by: FAMILY MEDICINE

## 2019-11-14 NOTE — TELEPHONE ENCOUNTER
----- Message from Patricia Hager sent at 11/14/2019 10:02 AM CST -----  Contact: Joan with [facility]  Caller asked that nurse return her call regarding a medication prescription faxed over:        [facility] [place] - [phone]  [address] [id number]  Phone: [phone] Fax: [phone]

## 2019-11-14 NOTE — PROGRESS NOTES
"BP trended up due to stress (per health  note) but is trending back down. Recent readings at goal. Avg 131/80 mmHg.  Patient seen by cardiology 9/3/19, "AV block, 3rd degree (HCC) s/p pacemaker (medtronic). Essential hypertension  Well-controlled at current.  Cardiomyopathy mild EF 50% recheck in 3 months likely some component of 100% pacemaker dependence."  Continue current regimen  Repeat CMP pending 12/20/19    Hypertension Medications             losartan (COZAAR) 50 MG tablet Take 1 tablet by mouth in the morning and 1 tablet in the evening            "

## 2019-11-25 ENCOUNTER — OFFICE VISIT (OUTPATIENT)
Dept: INTERNAL MEDICINE | Facility: CLINIC | Age: 69
End: 2019-11-25
Payer: MEDICARE

## 2019-11-25 VITALS
HEART RATE: 68 BPM | SYSTOLIC BLOOD PRESSURE: 122 MMHG | WEIGHT: 206.56 LBS | OXYGEN SATURATION: 98 % | DIASTOLIC BLOOD PRESSURE: 88 MMHG | TEMPERATURE: 98 F | HEIGHT: 71 IN | BODY MASS INDEX: 28.92 KG/M2

## 2019-11-25 DIAGNOSIS — R11.0 NAUSEA: ICD-10-CM

## 2019-11-25 DIAGNOSIS — G89.29 CHRONIC LUQ PAIN: ICD-10-CM

## 2019-11-25 DIAGNOSIS — Z00.00 ENCOUNTER FOR PREVENTIVE HEALTH EXAMINATION: Primary | ICD-10-CM

## 2019-11-25 DIAGNOSIS — H35.341 MACULAR HOLE OF RIGHT EYE: ICD-10-CM

## 2019-11-25 DIAGNOSIS — Z85.828 HISTORY OF SKIN CANCER: ICD-10-CM

## 2019-11-25 DIAGNOSIS — H35.372 EPIRETINAL MEMBRANE (ERM) OF LEFT EYE: ICD-10-CM

## 2019-11-25 DIAGNOSIS — R10.12 CHRONIC LUQ PAIN: ICD-10-CM

## 2019-11-25 DIAGNOSIS — Z79.4 TYPE 2 DIABETES MELLITUS WITHOUT COMPLICATION, WITH LONG-TERM CURRENT USE OF INSULIN: Chronic | ICD-10-CM

## 2019-11-25 DIAGNOSIS — I15.2 HYPERTENSION ASSOCIATED WITH DIABETES: Chronic | ICD-10-CM

## 2019-11-25 DIAGNOSIS — E78.5 HYPERLIPIDEMIA ASSOCIATED WITH TYPE 2 DIABETES MELLITUS: Chronic | ICD-10-CM

## 2019-11-25 DIAGNOSIS — M17.0 OSTEOARTHRITIS OF BOTH KNEES, UNSPECIFIED OSTEOARTHRITIS TYPE: ICD-10-CM

## 2019-11-25 DIAGNOSIS — H40.003 GLAUCOMA SUSPECT OF BOTH EYES: ICD-10-CM

## 2019-11-25 DIAGNOSIS — E11.59 HYPERTENSION ASSOCIATED WITH DIABETES: Chronic | ICD-10-CM

## 2019-11-25 DIAGNOSIS — D50.0 IRON DEFICIENCY ANEMIA DUE TO CHRONIC BLOOD LOSS: Chronic | ICD-10-CM

## 2019-11-25 DIAGNOSIS — E11.69 HYPERLIPIDEMIA ASSOCIATED WITH TYPE 2 DIABETES MELLITUS: Chronic | ICD-10-CM

## 2019-11-25 DIAGNOSIS — E11.9 TYPE 2 DIABETES MELLITUS WITHOUT COMPLICATION, WITH LONG-TERM CURRENT USE OF INSULIN: Chronic | ICD-10-CM

## 2019-11-25 DIAGNOSIS — H90.12 CONDUCTIVE HEARING LOSS OF LEFT EAR, UNSPECIFIED HEARING STATUS ON CONTRALATERAL SIDE: ICD-10-CM

## 2019-11-25 DIAGNOSIS — Z95.0 PACEMAKER: Chronic | ICD-10-CM

## 2019-11-25 PROCEDURE — 3079F PR MOST RECENT DIASTOLIC BLOOD PRESSURE 80-89 MM HG: ICD-10-PCS | Mod: HCNC,CPTII,S$GLB, | Performed by: NURSE PRACTITIONER

## 2019-11-25 PROCEDURE — 3079F DIAST BP 80-89 MM HG: CPT | Mod: HCNC,CPTII,S$GLB, | Performed by: NURSE PRACTITIONER

## 2019-11-25 PROCEDURE — 99999 PR PBB SHADOW E&M-EST. PATIENT-LVL V: CPT | Mod: PBBFAC,HCNC,, | Performed by: NURSE PRACTITIONER

## 2019-11-25 PROCEDURE — 3044F HG A1C LEVEL LT 7.0%: CPT | Mod: HCNC,CPTII,S$GLB, | Performed by: NURSE PRACTITIONER

## 2019-11-25 PROCEDURE — 3074F PR MOST RECENT SYSTOLIC BLOOD PRESSURE < 130 MM HG: ICD-10-PCS | Mod: HCNC,CPTII,S$GLB, | Performed by: NURSE PRACTITIONER

## 2019-11-25 PROCEDURE — 3074F SYST BP LT 130 MM HG: CPT | Mod: HCNC,CPTII,S$GLB, | Performed by: NURSE PRACTITIONER

## 2019-11-25 PROCEDURE — 99999 PR PBB SHADOW E&M-EST. PATIENT-LVL V: ICD-10-PCS | Mod: PBBFAC,HCNC,, | Performed by: NURSE PRACTITIONER

## 2019-11-25 PROCEDURE — G0439 PR MEDICARE ANNUAL WELLNESS SUBSEQUENT VISIT: ICD-10-PCS | Mod: HCNC,S$GLB,, | Performed by: NURSE PRACTITIONER

## 2019-11-25 PROCEDURE — G0439 PPPS, SUBSEQ VISIT: HCPCS | Mod: HCNC,S$GLB,, | Performed by: NURSE PRACTITIONER

## 2019-11-25 PROCEDURE — 3044F PR MOST RECENT HEMOGLOBIN A1C LEVEL <7.0%: ICD-10-PCS | Mod: HCNC,CPTII,S$GLB, | Performed by: NURSE PRACTITIONER

## 2019-11-25 RX ORDER — OMEPRAZOLE 20 MG/1
20 CAPSULE, DELAYED RELEASE ORAL DAILY
COMMUNITY
End: 2020-06-16 | Stop reason: ALTCHOICE

## 2019-11-25 NOTE — PATIENT INSTRUCTIONS
Counseling and Referral of Other Preventative  (Italic type indicates deductible and co-insurance are waived)    Patient Name: Rigoberto Vasquez  Today's Date: 11/25/2019    Health Maintenance       Date Due Completion Date    Shingles Vaccine (2 of 3) 12/22/2011 10/27/2011    PROSTATE-SPECIFIC ANTIGEN 12/30/2014 12/30/2013    Foot Exam 01/02/2020 1/2/2019 (Done)    Override on 1/2/2019: Done    Override on 8/9/2016: Done    Override on 6/29/2016: Done    Override on 11/11/2015: Done    Override on 2/19/2014: Done    Override on 8/14/2013: Done    Lipid Panel 12/18/2019 12/18/2018    Hemoglobin A1c 12/25/2019 6/25/2019    Eye Exam 01/21/2020 1/21/2019    Override on 1/21/2019: Done    Override on 12/11/2017: Done    Override on 12/5/2016: Done (DR JOÃO WESLEY. NO DBR)    Override on 5/30/2016: Done    Override on 4/6/2015: Done    Override on 2/25/2013: Done    Low Dose Statin 11/25/2020 11/25/2019    TETANUS VACCINE 03/22/2021 3/22/2011    DEXA SCAN 07/14/2021 7/14/2016    Colonoscopy 09/13/2024 9/13/2019    Override on 1/16/2009: Done        No orders of the defined types were placed in this encounter.    The following information is provided to all patients.  This information is to help you find resources for any of the problems found today that may be affecting your health:                Living healthy guide: www.[facility].[place].gov      Understanding Diabetes: www.diabetes.org      Eating healthy: www.cdc.gov/healthyweight      CDC home safety checklist: www.cdc.gov/steadi/patient.html      Agency on Aging: www.goea.[place].gov      Alcoholics anonymous (AA): www.aa.org      Physical Activity: www.renee.nih.gov/vx6nsru      Tobacco use: www.quitwithusla.org

## 2019-11-25 NOTE — PROGRESS NOTES
"Rigoberto Vasquez presented for a  Medicare AWV and comprehensive Health Risk Assessment today. The following components were reviewed and updated:    · Medical history  · Family History  · Social history  · Allergies and Current Medications  · Health Risk Assessment  · Health Maintenance  · Care Team     ** See Completed Assessments for Annual Wellness Visit within the encounter summary.**       The following assessments were completed:  · Living Situation  · CAGE  · Depression Screening  · Timed Get Up and Go  · Whisper Test  · Cognitive Function Screening  · Nutrition Screening  · ADL Screening  · PAQ Screening    Vitals:    11/25/19 1022   BP: 122/88   BP Location: Right arm   Patient Position: Sitting   BP Method: Large (Manual)   Pulse: 68   Temp: 98.3 °F (36.8 °C)   TempSrc: Tympanic   SpO2: 98%   Weight: 93.7 kg (206 lb 9.1 oz)   Height: 5' 11" (1.803 m)     Body mass index is 28.81 kg/m².  Physical Exam   Constitutional: He is oriented to person, place, and time. Vital signs are normal. He appears well-developed and well-nourished.   HENT:   Head: Normocephalic and atraumatic.   Neck: Normal range of motion.   Cardiovascular: Normal rate and regular rhythm.   Pulmonary/Chest: Effort normal and breath sounds normal.   Abdominal: Soft. Bowel sounds are normal.   Musculoskeletal: Normal range of motion.   Neurological: He is alert and oriented to person, place, and time.   Skin: Skin is warm.   Psychiatric: He has a normal mood and affect. His behavior is normal.             Diagnoses and health risks identified today and associated recommendations/orders:    1. Encounter for preventive health examination      2. History of skin cancer  Stable.  Has annual visits with dermatology.     3. Epiretinal membrane (ERM) of left eye  Stable.  Will continue current treatment plan.     4. Glaucoma suspect of both eyes  Stable.  Will continue current treatment plan.     5. Macular hole of right eye  Stable.  Will continue " current treatment plan.     6. Conductive hearing loss of left ear, unspecified hearing status on contralateral side  Stable.  Will continue current treatment plan.     7. Hyperlipidemia associated with type 2 diabetes mellitus  Stable.  Will continue current treatment plan.   Component      Latest Ref Rng & Units 12/18/2018 7/2/2018   Cholesterol      120 - 199 mg/dL 116 (L) 120   Triglycerides      30 - 150 mg/dL 58 66   HDL      40 - 75 mg/dL 42 47   LDL Cholesterol External      63.0 - 159.0 mg/dL 62.4 (L) 59.8 (L)   Hdl/Cholesterol Ratio      20.0 - 50.0 % 36.2 39.2   Total Cholesterol/HDL Ratio      2.0 - 5.0 2.8 2.6   Non-HDL Cholesterol      mg/dL 74 73       8. Hypertension associated with diabetes  Stable.  Will continue current treatment plan.     9. Pacemaker  Stable.  Has routine visits with cardiologist: Dr. Christ Senior.  Will continue current treatment plan.     10. Iron deficiency anemia due to chronic blood loss  Stable.  Will continue current treatment plan.     11. Type 2 diabetes mellitus without complication, with long-term current use of insulin  Stable.  Will continue current treatment plan.   Component      Latest Ref Rng & Units 6/25/2019 4/22/2019   Hemoglobin A1C External      4.0 - 5.6 % 5.7 (H) 5.9 (H)   Estimated Avg Glucose      68 - 131 mg/dL 117 123       12. Chronic LUQ pain  Reports abdominal pain has been stable.  Intermittent nausea.  EGD 09/2019-chronic gastritis.      13. Nausea      14. Osteoarthritis of both knees, unspecified osteoarthritis type  Stable.  Will continue current treatment plan.         Provided Rigoberto with a 5-10 year written screening schedule and personal prevention plan. Recommendations were developed using the USPSTF age appropriate recommendations. Education, counseling, and referrals were provided as needed. After Visit Summary printed and given to patient which includes a list of additional screenings\tests needed.    No follow-ups on file.    Leigh MCKOY  Bobby, NP

## 2019-11-26 ENCOUNTER — TELEPHONE (OUTPATIENT)
Dept: OPHTHALMOLOGY | Facility: CLINIC | Age: 69
End: 2019-11-26

## 2019-11-26 NOTE — TELEPHONE ENCOUNTER
Left a detailed voice message for mr yates to call back and ask for our dept. Phone ladies and they will make his annual appt to see dr. Epperson. kf

## 2019-12-12 ENCOUNTER — PATIENT OUTREACH (OUTPATIENT)
Dept: OTHER | Facility: OTHER | Age: 69
End: 2019-12-12

## 2019-12-19 ENCOUNTER — LAB VISIT (OUTPATIENT)
Dept: LAB | Facility: HOSPITAL | Age: 69
End: 2019-12-19
Attending: FAMILY MEDICINE
Payer: MEDICARE

## 2019-12-19 DIAGNOSIS — E11.9 TYPE 2 DIABETES MELLITUS WITHOUT COMPLICATION, WITH LONG-TERM CURRENT USE OF INSULIN: Chronic | ICD-10-CM

## 2019-12-19 DIAGNOSIS — Z79.4 TYPE 2 DIABETES MELLITUS WITHOUT COMPLICATION, WITH LONG-TERM CURRENT USE OF INSULIN: Chronic | ICD-10-CM

## 2019-12-19 LAB
ALBUMIN SERPL BCP-MCNC: 4.1 G/DL (ref 3.5–5.2)
ALP SERPL-CCNC: 34 U/L (ref 55–135)
ALT SERPL W/O P-5'-P-CCNC: 10 U/L (ref 10–44)
ANION GAP SERPL CALC-SCNC: 8 MMOL/L (ref 8–16)
AST SERPL-CCNC: 23 U/L (ref 10–40)
BILIRUB SERPL-MCNC: 0.8 MG/DL (ref 0.1–1)
BUN SERPL-MCNC: 19 MG/DL (ref 8–23)
CALCIUM SERPL-MCNC: 9.7 MG/DL (ref 8.7–10.5)
CHLORIDE SERPL-SCNC: 106 MMOL/L (ref 95–110)
CO2 SERPL-SCNC: 28 MMOL/L (ref 23–29)
CREAT SERPL-MCNC: 1.4 MG/DL (ref 0.5–1.4)
EST. GFR  (AFRICAN AMERICAN): 59.3 ML/MIN/1.73 M^2
EST. GFR  (NON AFRICAN AMERICAN): 51.3 ML/MIN/1.73 M^2
ESTIMATED AVG GLUCOSE: 126 MG/DL (ref 68–131)
GLUCOSE SERPL-MCNC: 84 MG/DL (ref 70–110)
HBA1C MFR BLD HPLC: 6 % (ref 4–5.6)
POTASSIUM SERPL-SCNC: 4 MMOL/L (ref 3.5–5.1)
PROT SERPL-MCNC: 7.1 G/DL (ref 6–8.4)
SODIUM SERPL-SCNC: 142 MMOL/L (ref 136–145)

## 2019-12-19 PROCEDURE — 83036 HEMOGLOBIN GLYCOSYLATED A1C: CPT | Mod: HCNC

## 2019-12-19 PROCEDURE — 36415 COLL VENOUS BLD VENIPUNCTURE: CPT | Mod: HCNC,PO

## 2019-12-19 PROCEDURE — 80061 LIPID PANEL: CPT | Mod: HCNC

## 2019-12-19 PROCEDURE — 80053 COMPREHEN METABOLIC PANEL: CPT | Mod: HCNC,PO

## 2019-12-20 LAB
CHOLEST SERPL-MCNC: 129 MG/DL (ref 120–199)
CHOLEST/HDLC SERPL: 2.6 {RATIO} (ref 2–5)
HDLC SERPL-MCNC: 49 MG/DL (ref 40–75)
HDLC SERPL: 38 % (ref 20–50)
LDLC SERPL CALC-MCNC: 68.8 MG/DL (ref 63–159)
NONHDLC SERPL-MCNC: 80 MG/DL
TRIGL SERPL-MCNC: 56 MG/DL (ref 30–150)

## 2019-12-24 DIAGNOSIS — I15.2 HYPERTENSION ASSOCIATED WITH DIABETES: Chronic | ICD-10-CM

## 2019-12-24 DIAGNOSIS — E11.59 HYPERTENSION ASSOCIATED WITH DIABETES: Chronic | ICD-10-CM

## 2019-12-24 RX ORDER — LOSARTAN POTASSIUM 50 MG/1
TABLET ORAL
Qty: 180 TABLET | Refills: 3 | Status: SHIPPED | OUTPATIENT
Start: 2019-12-24 | End: 2020-03-16

## 2019-12-24 RX ORDER — FERROUS GLUCONATE 324(38)MG
324 TABLET ORAL
Qty: 90 TABLET | Refills: 1 | Status: SHIPPED | OUTPATIENT
Start: 2019-12-24 | End: 2020-03-06 | Stop reason: SDUPTHER

## 2020-01-02 ENCOUNTER — LAB VISIT (OUTPATIENT)
Dept: LAB | Facility: HOSPITAL | Age: 70
End: 2020-01-02
Attending: NURSE PRACTITIONER
Payer: MEDICARE

## 2020-01-02 ENCOUNTER — OFFICE VISIT (OUTPATIENT)
Dept: INTERNAL MEDICINE | Facility: CLINIC | Age: 70
End: 2020-01-02
Payer: MEDICARE

## 2020-01-02 VITALS
HEIGHT: 71 IN | SYSTOLIC BLOOD PRESSURE: 104 MMHG | DIASTOLIC BLOOD PRESSURE: 66 MMHG | WEIGHT: 206.81 LBS | OXYGEN SATURATION: 96 % | HEART RATE: 84 BPM | BODY MASS INDEX: 28.95 KG/M2 | TEMPERATURE: 98 F

## 2020-01-02 DIAGNOSIS — Z00.00 ROUTINE MEDICAL EXAM: Primary | ICD-10-CM

## 2020-01-02 DIAGNOSIS — Z95.0 PACEMAKER: Chronic | ICD-10-CM

## 2020-01-02 DIAGNOSIS — R53.83 FATIGUE, UNSPECIFIED TYPE: ICD-10-CM

## 2020-01-02 DIAGNOSIS — M17.0 OSTEOARTHRITIS OF BOTH KNEES, UNSPECIFIED OSTEOARTHRITIS TYPE: ICD-10-CM

## 2020-01-02 DIAGNOSIS — D50.9 IRON DEFICIENCY ANEMIA, UNSPECIFIED IRON DEFICIENCY ANEMIA TYPE: ICD-10-CM

## 2020-01-02 DIAGNOSIS — E11.59 HYPERTENSION ASSOCIATED WITH DIABETES: Chronic | ICD-10-CM

## 2020-01-02 DIAGNOSIS — I15.2 HYPERTENSION ASSOCIATED WITH DIABETES: Chronic | ICD-10-CM

## 2020-01-02 LAB
ALBUMIN SERPL BCP-MCNC: 3.7 G/DL (ref 3.5–5.2)
ALP SERPL-CCNC: 35 U/L (ref 55–135)
ALT SERPL W/O P-5'-P-CCNC: 8 U/L (ref 10–44)
ANION GAP SERPL CALC-SCNC: 8 MMOL/L (ref 8–16)
AST SERPL-CCNC: 20 U/L (ref 10–40)
BASOPHILS # BLD AUTO: 0.04 K/UL (ref 0–0.2)
BASOPHILS NFR BLD: 0.8 % (ref 0–1.9)
BILIRUB SERPL-MCNC: 0.6 MG/DL (ref 0.1–1)
BUN SERPL-MCNC: 20 MG/DL (ref 8–23)
CALCIUM SERPL-MCNC: 9.5 MG/DL (ref 8.7–10.5)
CHLORIDE SERPL-SCNC: 106 MMOL/L (ref 95–110)
CO2 SERPL-SCNC: 27 MMOL/L (ref 23–29)
CREAT SERPL-MCNC: 1.4 MG/DL (ref 0.5–1.4)
DIFFERENTIAL METHOD: ABNORMAL
EOSINOPHIL # BLD AUTO: 0.2 K/UL (ref 0–0.5)
EOSINOPHIL NFR BLD: 3 % (ref 0–8)
ERYTHROCYTE [DISTWIDTH] IN BLOOD BY AUTOMATED COUNT: 12.7 % (ref 11.5–14.5)
EST. GFR  (AFRICAN AMERICAN): 59.3 ML/MIN/1.73 M^2
EST. GFR  (NON AFRICAN AMERICAN): 51.3 ML/MIN/1.73 M^2
GLUCOSE SERPL-MCNC: 112 MG/DL (ref 70–110)
HCT VFR BLD AUTO: 42.5 % (ref 40–54)
HGB BLD-MCNC: 13.2 G/DL (ref 14–18)
IMM GRANULOCYTES # BLD AUTO: 0.02 K/UL (ref 0–0.04)
IMM GRANULOCYTES NFR BLD AUTO: 0.4 % (ref 0–0.5)
IRON SERPL-MCNC: 117 UG/DL (ref 45–160)
LYMPHOCYTES # BLD AUTO: 1.4 K/UL (ref 1–4.8)
LYMPHOCYTES NFR BLD: 29.1 % (ref 18–48)
MCH RBC QN AUTO: 31.6 PG (ref 27–31)
MCHC RBC AUTO-ENTMCNC: 31.1 G/DL (ref 32–36)
MCV RBC AUTO: 102 FL (ref 82–98)
MONOCYTES # BLD AUTO: 0.4 K/UL (ref 0.3–1)
MONOCYTES NFR BLD: 8.3 % (ref 4–15)
NEUTROPHILS # BLD AUTO: 2.9 K/UL (ref 1.8–7.7)
NEUTROPHILS NFR BLD: 58.4 % (ref 38–73)
NRBC BLD-RTO: 0 /100 WBC
PLATELET # BLD AUTO: 304 K/UL (ref 150–350)
PMV BLD AUTO: 10 FL (ref 9.2–12.9)
POTASSIUM SERPL-SCNC: 4.4 MMOL/L (ref 3.5–5.1)
PROT SERPL-MCNC: 6.7 G/DL (ref 6–8.4)
RBC # BLD AUTO: 4.18 M/UL (ref 4.6–6.2)
SATURATED IRON: 22 % (ref 20–50)
SODIUM SERPL-SCNC: 141 MMOL/L (ref 136–145)
TOTAL IRON BINDING CAPACITY: 528 UG/DL (ref 250–450)
TRANSFERRIN SERPL-MCNC: 357 MG/DL (ref 200–375)
TSH SERPL DL<=0.005 MIU/L-ACNC: 1.94 UIU/ML (ref 0.4–4)
WBC # BLD AUTO: 4.92 K/UL (ref 3.9–12.7)

## 2020-01-02 PROCEDURE — 99999 PR PBB SHADOW E&M-EST. PATIENT-LVL V: ICD-10-PCS | Mod: PBBFAC,HCNC,, | Performed by: NURSE PRACTITIONER

## 2020-01-02 PROCEDURE — 99999 PR PBB SHADOW E&M-EST. PATIENT-LVL V: CPT | Mod: PBBFAC,HCNC,, | Performed by: NURSE PRACTITIONER

## 2020-01-02 PROCEDURE — 1126F PR PAIN SEVERITY QUANTIFIED, NO PAIN PRESENT: ICD-10-PCS | Mod: HCNC,S$GLB,, | Performed by: NURSE PRACTITIONER

## 2020-01-02 PROCEDURE — 36415 COLL VENOUS BLD VENIPUNCTURE: CPT | Mod: HCNC

## 2020-01-02 PROCEDURE — 1126F AMNT PAIN NOTED NONE PRSNT: CPT | Mod: HCNC,S$GLB,, | Performed by: NURSE PRACTITIONER

## 2020-01-02 PROCEDURE — 1101F PT FALLS ASSESS-DOCD LE1/YR: CPT | Mod: HCNC,CPTII,S$GLB, | Performed by: NURSE PRACTITIONER

## 2020-01-02 PROCEDURE — 3074F PR MOST RECENT SYSTOLIC BLOOD PRESSURE < 130 MM HG: ICD-10-PCS | Mod: HCNC,CPTII,S$GLB, | Performed by: NURSE PRACTITIONER

## 2020-01-02 PROCEDURE — 1159F PR MEDICATION LIST DOCUMENTED IN MEDICAL RECORD: ICD-10-PCS | Mod: HCNC,S$GLB,, | Performed by: NURSE PRACTITIONER

## 2020-01-02 PROCEDURE — 3078F PR MOST RECENT DIASTOLIC BLOOD PRESSURE < 80 MM HG: ICD-10-PCS | Mod: HCNC,CPTII,S$GLB, | Performed by: NURSE PRACTITIONER

## 2020-01-02 PROCEDURE — 99214 PR OFFICE/OUTPT VISIT, EST, LEVL IV, 30-39 MIN: ICD-10-PCS | Mod: HCNC,S$GLB,, | Performed by: NURSE PRACTITIONER

## 2020-01-02 PROCEDURE — 84443 ASSAY THYROID STIM HORMONE: CPT | Mod: HCNC

## 2020-01-02 PROCEDURE — 80053 COMPREHEN METABOLIC PANEL: CPT | Mod: HCNC

## 2020-01-02 PROCEDURE — 85025 COMPLETE CBC W/AUTO DIFF WBC: CPT | Mod: HCNC

## 2020-01-02 PROCEDURE — 3078F DIAST BP <80 MM HG: CPT | Mod: HCNC,CPTII,S$GLB, | Performed by: NURSE PRACTITIONER

## 2020-01-02 PROCEDURE — 1101F PR PT FALLS ASSESS DOC 0-1 FALLS W/OUT INJ PAST YR: ICD-10-PCS | Mod: HCNC,CPTII,S$GLB, | Performed by: NURSE PRACTITIONER

## 2020-01-02 PROCEDURE — 3044F PR MOST RECENT HEMOGLOBIN A1C LEVEL <7.0%: ICD-10-PCS | Mod: HCNC,CPTII,S$GLB, | Performed by: NURSE PRACTITIONER

## 2020-01-02 PROCEDURE — 1159F MED LIST DOCD IN RCRD: CPT | Mod: HCNC,S$GLB,, | Performed by: NURSE PRACTITIONER

## 2020-01-02 PROCEDURE — 3044F HG A1C LEVEL LT 7.0%: CPT | Mod: HCNC,CPTII,S$GLB, | Performed by: NURSE PRACTITIONER

## 2020-01-02 PROCEDURE — 3074F SYST BP LT 130 MM HG: CPT | Mod: HCNC,CPTII,S$GLB, | Performed by: NURSE PRACTITIONER

## 2020-01-02 PROCEDURE — 99214 OFFICE O/P EST MOD 30 MIN: CPT | Mod: HCNC,S$GLB,, | Performed by: NURSE PRACTITIONER

## 2020-01-02 PROCEDURE — 83540 ASSAY OF IRON: CPT | Mod: HCNC

## 2020-01-02 NOTE — PROGRESS NOTES
Subjective:       Patient ID: Rigoberto Vasquez is a 69 y.o. male.    Chief Complaint: Follow-up (6 mo )    Patient presents for 6 month follow up.  Saw urologist in July 2019.   Reports feeling some shortness of breath on exertion (about 2 weeks ago).  Denies chest pain or palpitations.     Going to PT three days a week for about 90 mins.   No distress with therapy/activitiy.   Urologist: annually (07/2020).  Has routine visits with cardiologist.     Review of Systems   Constitutional: Positive for fatigue. Negative for chills.   Respiratory: Positive for shortness of breath (on some exertion ).    Musculoskeletal: Negative for arthralgias and gait problem.   Skin: Negative for color change and rash.   Psychiatric/Behavioral: Negative for agitation and confusion.       Objective:      Physical Exam   Constitutional: He is oriented to person, place, and time. Vital signs are normal. He appears well-developed and well-nourished.   HENT:   Head: Normocephalic and atraumatic.   Neck: Normal range of motion.   Cardiovascular: Normal rate and regular rhythm.   Pulses:       Dorsalis pedis pulses are 2+ on the right side, and 2+ on the left side.   Pulmonary/Chest: Effort normal and breath sounds normal.   Abdominal: Soft. Bowel sounds are normal. There is no tenderness.   Musculoskeletal: Normal range of motion.   Feet:   Right Foot:   Protective Sensation: 6 sites tested. 6 sites sensed.   Skin Integrity: Positive for callus (great toe).   Left Foot:   Protective Sensation: 6 sites tested. 6 sites sensed.   Skin Integrity: Positive for callus (great toe).   Neurological: He is alert and oriented to person, place, and time. No cranial nerve deficit.   Skin: Skin is warm.   Psychiatric: He has a normal mood and affect. His behavior is normal.       Assessment:       1. Routine medical exam    2. Hypertension associated with diabetes    3. Pacemaker    4. Osteoarthritis of both knees, unspecified osteoarthritis type    5. Iron  deficiency anemia, unspecified iron deficiency anemia type    6. Fatigue, unspecified type        Plan:         Routine medical exam    Hypertension associated with diabetes  -     Hemoglobin A1c; Future; Expected date: 01/02/2020  -     Comprehensive metabolic panel; Future; Expected date: 01/02/2020    Pacemaker    Osteoarthritis of both knees, unspecified osteoarthritis type    Iron deficiency anemia, unspecified iron deficiency anemia type  -     Iron and TIBC; Future; Expected date: 01/02/2020  -     CBC auto differential; Future; Expected date: 01/02/2020    Fatigue, unspecified type  -     Iron and TIBC; Future; Expected date: 01/02/2020  -     TSH; Future; Expected date: 01/02/2020  -     CBC auto differential; Future; Expected date: 01/02/2020        Labs today: CBC, Iron & TIBC, and TSH  Labs in 6 months: A1C and CMP  Follow up with Dr. Grimes in 6 months.   Eye appointment as scheduled.

## 2020-01-06 ENCOUNTER — TELEPHONE (OUTPATIENT)
Dept: PULMONOLOGY | Facility: CLINIC | Age: 70
End: 2020-01-06

## 2020-01-06 DIAGNOSIS — R06.02 SHORTNESS OF BREATH ON EXERTION: Primary | ICD-10-CM

## 2020-01-09 ENCOUNTER — CLINICAL SUPPORT (OUTPATIENT)
Dept: PULMONOLOGY | Facility: CLINIC | Age: 70
End: 2020-01-09
Payer: MEDICARE

## 2020-01-09 ENCOUNTER — TELEPHONE (OUTPATIENT)
Dept: URGENT CARE | Facility: CLINIC | Age: 70
End: 2020-01-09

## 2020-01-09 DIAGNOSIS — R06.02 SHORTNESS OF BREATH ON EXERTION: ICD-10-CM

## 2020-01-09 PROCEDURE — 94726 PLETHYSMOGRAPHY LUNG VOLUMES: CPT | Mod: HCNC,S$GLB,, | Performed by: INTERNAL MEDICINE

## 2020-01-09 PROCEDURE — 94060 EVALUATION OF WHEEZING: CPT | Mod: HCNC,S$GLB,, | Performed by: INTERNAL MEDICINE

## 2020-01-09 PROCEDURE — 94060 PR EVAL OF BRONCHOSPASM: ICD-10-PCS | Mod: HCNC,S$GLB,, | Performed by: INTERNAL MEDICINE

## 2020-01-09 PROCEDURE — 94726 PULM FUNCT TST PLETHYSMOGRAP: ICD-10-PCS | Mod: HCNC,S$GLB,, | Performed by: INTERNAL MEDICINE

## 2020-01-09 PROCEDURE — 94729 DIFFUSING CAPACITY: CPT | Mod: HCNC,S$GLB,, | Performed by: INTERNAL MEDICINE

## 2020-01-09 PROCEDURE — 94729 PR C02/MEMBANE DIFFUSE CAPACITY: ICD-10-PCS | Mod: HCNC,S$GLB,, | Performed by: INTERNAL MEDICINE

## 2020-01-09 NOTE — TELEPHONE ENCOUNTER
----- Message from Marilu Hyatt sent at 1/9/2020  2:43 PM CST -----  Patient returning call [phone]

## 2020-01-09 NOTE — TELEPHONE ENCOUNTER
I s/w the pt and he verbalized understanding of lab results and states that he completed the pulmonary function test today and will schedule an appt with his cardiologists. fyi  //kah

## 2020-01-10 LAB
BRPFT: ABNORMAL
DLCO ADJ PRE: 24.29 ML/(MIN*MMHG) (ref 20.72–34.57)
DLCO SINGLE BREATH LLN: 20.72
DLCO SINGLE BREATH PRE REF: 84.2 %
DLCO SINGLE BREATH REF: 27.64
DLCOC SBVA LLN: 2.71
DLCOC SBVA PRE REF: 108.6 %
DLCOC SBVA REF: 3.87
DLCOC SINGLE BREATH LLN: 20.72
DLCOC SINGLE BREATH PRE REF: 87.9 %
DLCOC SINGLE BREATH REF: 27.64
DLCOVA LLN: 2.71
DLCOVA PRE REF: 104 %
DLCOVA PRE: 4.03 ML/(MIN*MMHG*L) (ref 2.71–5.03)
DLCOVA REF: 3.87
DLVAADJ PRE: 4.2 ML/(MIN*MMHG*L) (ref 2.71–5.03)
ERV LLN: 1.09
ERV PRE REF: 46.5 %
ERV REF: 1.09
FEF 25 75 CHG: 4.1 %
FEF 25 75 LLN: 1.12
FEF 25 75 POST REF: 136.7 %
FEF 25 75 PRE REF: 131.4 %
FEF 25 75 REF: 2.53
FET100 CHG: -6.9 %
FEV1 CHG: 3.8 %
FEV1 FVC CHG: 0.8 %
FEV1 FVC LLN: 63
FEV1 FVC POST REF: 105.9 %
FEV1 FVC PRE REF: 105.1 %
FEV1 FVC REF: 76
FEV1 LLN: 2.39
FEV1 POST REF: 105.9 %
FEV1 PRE REF: 102 %
FEV1 REF: 3.28
FRCPLETH LLN: 2.7
FRCPLETH PREREF: 145.8 %
FRCPLETH REF: 3.69
FVC CHG: 3 %
FVC LLN: 3.22
FVC POST REF: 99.6 %
FVC PRE REF: 96.7 %
FVC REF: 4.32
IVC PRE: 3.91 L (ref 3.22–5.42)
IVC SINGLE BREATH LLN: 3.22
IVC SINGLE BREATH PRE REF: 90.5 %
IVC SINGLE BREATH REF: 4.32
MVV LLN: 109
MVV PRE REF: 68.9 %
MVV REF: 128
PEF CHG: 1.2 %
PEF LLN: 6.25
PEF POST REF: 92.4 %
PEF PRE REF: 91.3 %
PEF REF: 8.57
POST FEF 25 75: 3.46 L/S (ref 1.12–3.93)
POST FET 100: 6.71 SEC
POST FEV1 FVC: 80.72 % (ref 63.03–89.36)
POST FEV1: 3.47 L (ref 2.39–4.17)
POST FVC: 4.3 L (ref 3.22–5.42)
POST PEF: 7.92 L/S (ref 6.25–10.89)
PRE DLCO: 23.27 ML/(MIN*MMHG) (ref 20.72–34.57)
PRE ERV: 0.51 L (ref 1.09–1.09)
PRE FEF 25 75: 3.32 L/S (ref 1.12–3.93)
PRE FET 100: 7.21 SEC
PRE FEV1 FVC: 80.11 % (ref 63.03–89.36)
PRE FEV1: 3.35 L (ref 2.39–4.17)
PRE FRC PL: 5.38 L
PRE FVC: 4.18 L (ref 3.22–5.42)
PRE MVV: 88 L/MIN (ref 108.53–146.83)
PRE PEF: 7.82 L/S (ref 6.25–10.89)
PRE RV: 4.75 L (ref 1.92–3.27)
PRE TLC: 9.19 L (ref 5.99–8.29)
RAW LLN: 3.06
RAW PRE REF: 67.9 %
RAW PRE: 2.08 CMH2O*S/L (ref 3.06–3.06)
RAW REF: 3.06
RV LLN: 1.92
RV PRE REF: 182.7 %
RV REF: 2.6
RVTLC LLN: 31
RVTLC PRE REF: 127.6 %
RVTLC PRE: 51.67 % (ref 31.5–49.46)
RVTLC REF: 40
TLC LLN: 5.99
TLC PRE REF: 128.6 %
TLC REF: 7.14
VA PRE: 5.79 L (ref 6.99–6.99)
VA SINGLE BREATH LLN: 6.99
VA SINGLE BREATH PRE REF: 82.8 %
VA SINGLE BREATH REF: 6.99
VC LLN: 3.22
VC PRE REF: 102.7 %
VC PRE: 4.44 L (ref 3.22–5.42)
VC REF: 4.32
VTGRAWPRE: 3.29 L

## 2020-01-12 DIAGNOSIS — R06.09 DOE (DYSPNEA ON EXERTION): ICD-10-CM

## 2020-01-12 DIAGNOSIS — R09.89 HYPERINFLATION OF LUNGS: Primary | ICD-10-CM

## 2020-01-13 ENCOUNTER — PATIENT MESSAGE (OUTPATIENT)
Dept: PULMONOLOGY | Facility: CLINIC | Age: 70
End: 2020-01-13

## 2020-01-13 DIAGNOSIS — R09.89 HYPERINFLATION OF LUNGS: ICD-10-CM

## 2020-01-13 DIAGNOSIS — R06.09 DOE (DYSPNEA ON EXERTION): Primary | ICD-10-CM

## 2020-01-13 NOTE — PROGRESS NOTES
"Digital Medicine: Health  Follow-Up    Patient reports he has been trying to "eat better," with cutting back on fried foods and sweets.  States he read a study about blood pressure medications and how it is better to be on a diuretic.  Will route note and defer to pharmacist.    The history is provided by the patient.     Follow Up  Follow-up reason(s): routine education      Routine Education Topics: eating patterns        INTERVENTION(S)  recommended diet modifications, reviewed monitoring technique and encouragement/support    PLAN  patient verbalizes understanding          Topic    Eye Exam        Last 5 Patient Entered Readings                                      Current 30 Day Average: 135/79     Recent Readings 1/9/2020 1/4/2020 1/2/2020 1/2/2020 12/17/2019    SBP (mmHg) 133 140 134 104 132    DBP (mmHg) 91 81 70 66 88    Pulse 87 79 76 84 77                      Diet Screening   He has the following dietary restrictions: low sodium diet and low carb    Intervention(s): low sodium diet education, carb reduction, reducing sodium intake and reading food labels      SDOH  "

## 2020-01-21 ENCOUNTER — OFFICE VISIT (OUTPATIENT)
Dept: OPHTHALMOLOGY | Facility: CLINIC | Age: 70
End: 2020-01-21
Payer: MEDICARE

## 2020-01-21 DIAGNOSIS — D31.31 CHOROIDAL NEVUS OF RIGHT EYE: Primary | ICD-10-CM

## 2020-01-21 DIAGNOSIS — H35.372 EPIRETINAL MEMBRANE (ERM) OF LEFT EYE: ICD-10-CM

## 2020-01-21 DIAGNOSIS — H40.013 OPEN ANGLE WITH BORDERLINE FINDINGS AND LOW GLAUCOMA RISK IN BOTH EYES: ICD-10-CM

## 2020-01-21 PROCEDURE — 99999 PR PBB SHADOW E&M-EST. PATIENT-LVL III: ICD-10-PCS | Mod: PBBFAC,HCNC,, | Performed by: OPHTHALMOLOGY

## 2020-01-21 PROCEDURE — 92014 PR EYE EXAM, EST PATIENT,COMPREHESV: ICD-10-PCS | Mod: HCNC,S$GLB,, | Performed by: OPHTHALMOLOGY

## 2020-01-21 PROCEDURE — 99999 PR PBB SHADOW E&M-EST. PATIENT-LVL III: CPT | Mod: PBBFAC,HCNC,, | Performed by: OPHTHALMOLOGY

## 2020-01-21 PROCEDURE — 92250 COLOR FUNDUS PHOTOGRAPHY - OU - BOTH EYES: ICD-10-PCS | Mod: HCNC,S$GLB,, | Performed by: OPHTHALMOLOGY

## 2020-01-21 PROCEDURE — 92014 COMPRE OPH EXAM EST PT 1/>: CPT | Mod: HCNC,S$GLB,, | Performed by: OPHTHALMOLOGY

## 2020-01-21 PROCEDURE — 92250 FUNDUS PHOTOGRAPHY W/I&R: CPT | Mod: HCNC,S$GLB,, | Performed by: OPHTHALMOLOGY

## 2020-01-21 NOTE — PROGRESS NOTES
===============================  Rigoberto Vasquez,  1/21/2020 today   69 y.o. male   Last visit [facility]: :1/21/2019   Last visit eye dept. Visit date not found  [facility]:  Corrected distance visual acuity was 20/25 in the right eye and 20/20 in the left eye.  Tonometry     Tonometry (Applanation, 10:12 AM)       Right Left    Pressure 14 14          Tonometry #2 (Applanation, 10:36 AM)       Right Left    Pressure 19 19              Wearing Rx     Wearing Rx       Sphere Cylinder Axis Add    Right -5.50 +1.50 160 +2.25    Left -4.50 +0.50 170 +2.25    Type:  PAL               Not recorded        CYCLOPLEGIC     Cycloplegic Refraction       Sphere Cylinder Axis Add    Right -5.50 +1.50 160 +2.25    Left -4.50 +0.50 170 +2.25              Chief Complaint   Patient presents with    Diabetes     yearly dm/ coag s.    Glaucoma Suspect    choroidal nevus of right eye       ________________  1/21/2020 today  HPI     Diabetes      Additional comments: yearly dm/ coag s.              Comments     1. DM since 1999  NO DBR  2. Mac hole od  3. erm os  4. Choroidal nevus od   5. HIGH MYOPE(-6)  6. SCOAG  iop 25/ 20 ou on po steroid  anamolous myopic disc  ASSYMETRIC C/D 0.7 / 0.5  (-7)  NO FAMILY HISTORY  HVF 1/21/19          Last edited by Tasha Dooley on 1/21/2020 10:05 AM. 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

## 2020-01-23 ENCOUNTER — HOSPITAL ENCOUNTER (OUTPATIENT)
Dept: RADIOLOGY | Facility: HOSPITAL | Age: 70
Discharge: HOME | End: 2020-01-23
Attending: INTERNAL MEDICINE
Payer: MEDICARE

## 2020-01-23 ENCOUNTER — OFFICE VISIT (OUTPATIENT)
Dept: PULMONOLOGY | Facility: CLINIC | Age: 70
End: 2020-01-23
Payer: MEDICARE

## 2020-01-23 ENCOUNTER — CLINICAL SUPPORT (OUTPATIENT)
Dept: PULMONOLOGY | Facility: CLINIC | Age: 70
End: 2020-01-23
Payer: MEDICARE

## 2020-01-23 VITALS
SYSTOLIC BLOOD PRESSURE: 102 MMHG | DIASTOLIC BLOOD PRESSURE: 70 MMHG | HEART RATE: 79 BPM | RESPIRATION RATE: 17 BRPM | OXYGEN SATURATION: 95 % | HEIGHT: 71 IN | WEIGHT: 206.13 LBS | BODY MASS INDEX: 28.86 KG/M2

## 2020-01-23 VITALS — BODY MASS INDEX: 28.86 KG/M2 | HEIGHT: 71 IN | WEIGHT: 206.13 LBS

## 2020-01-23 DIAGNOSIS — R06.09 DOE (DYSPNEA ON EXERTION): ICD-10-CM

## 2020-01-23 DIAGNOSIS — R53.83 FATIGUE, UNSPECIFIED TYPE: ICD-10-CM

## 2020-01-23 DIAGNOSIS — R09.89 HYPERINFLATION OF LUNGS: Primary | ICD-10-CM

## 2020-01-23 DIAGNOSIS — R09.89 HYPERINFLATION OF LUNGS: ICD-10-CM

## 2020-01-23 DIAGNOSIS — R06.09 DYSPNEA ON EXERTION: ICD-10-CM

## 2020-01-23 PROCEDURE — 71046 X-RAY EXAM CHEST 2 VIEWS: CPT | Mod: TC,HCNC

## 2020-01-23 PROCEDURE — 1101F PT FALLS ASSESS-DOCD LE1/YR: CPT | Mod: HCNC,CPTII,S$GLB, | Performed by: NURSE PRACTITIONER

## 2020-01-23 PROCEDURE — 94618 PULMONARY STRESS TESTING: ICD-10-PCS | Mod: HCNC,S$GLB,, | Performed by: INTERNAL MEDICINE

## 2020-01-23 PROCEDURE — 99204 OFFICE O/P NEW MOD 45 MIN: CPT | Mod: HCNC,S$GLB,, | Performed by: NURSE PRACTITIONER

## 2020-01-23 PROCEDURE — 99204 PR OFFICE/OUTPT VISIT, NEW, LEVL IV, 45-59 MIN: ICD-10-PCS | Mod: HCNC,S$GLB,, | Performed by: NURSE PRACTITIONER

## 2020-01-23 PROCEDURE — 71046 XR CHEST PA AND LATERAL: ICD-10-PCS | Mod: 26,HCNC,, | Performed by: RADIOLOGY

## 2020-01-23 PROCEDURE — 3074F SYST BP LT 130 MM HG: CPT | Mod: HCNC,CPTII,S$GLB, | Performed by: NURSE PRACTITIONER

## 2020-01-23 PROCEDURE — 99999 PR PBB SHADOW E&M-EST. PATIENT-LVL V: ICD-10-PCS | Mod: PBBFAC,HCNC,, | Performed by: NURSE PRACTITIONER

## 2020-01-23 PROCEDURE — 3078F PR MOST RECENT DIASTOLIC BLOOD PRESSURE < 80 MM HG: ICD-10-PCS | Mod: HCNC,CPTII,S$GLB, | Performed by: NURSE PRACTITIONER

## 2020-01-23 PROCEDURE — 94618 PULMONARY STRESS TESTING: CPT | Mod: HCNC,S$GLB,, | Performed by: INTERNAL MEDICINE

## 2020-01-23 PROCEDURE — 1159F PR MEDICATION LIST DOCUMENTED IN MEDICAL RECORD: ICD-10-PCS | Mod: HCNC,S$GLB,, | Performed by: NURSE PRACTITIONER

## 2020-01-23 PROCEDURE — 71046 X-RAY EXAM CHEST 2 VIEWS: CPT | Mod: 26,HCNC,, | Performed by: RADIOLOGY

## 2020-01-23 PROCEDURE — 3078F DIAST BP <80 MM HG: CPT | Mod: HCNC,CPTII,S$GLB, | Performed by: NURSE PRACTITIONER

## 2020-01-23 PROCEDURE — 1101F PR PT FALLS ASSESS DOC 0-1 FALLS W/OUT INJ PAST YR: ICD-10-PCS | Mod: HCNC,CPTII,S$GLB, | Performed by: NURSE PRACTITIONER

## 2020-01-23 PROCEDURE — 3074F PR MOST RECENT SYSTOLIC BLOOD PRESSURE < 130 MM HG: ICD-10-PCS | Mod: HCNC,CPTII,S$GLB, | Performed by: NURSE PRACTITIONER

## 2020-01-23 PROCEDURE — 99999 PR PBB SHADOW E&M-EST. PATIENT-LVL V: CPT | Mod: PBBFAC,HCNC,, | Performed by: NURSE PRACTITIONER

## 2020-01-23 PROCEDURE — 1159F MED LIST DOCD IN RCRD: CPT | Mod: HCNC,S$GLB,, | Performed by: NURSE PRACTITIONER

## 2020-01-23 RX ORDER — ALBUTEROL SULFATE 90 UG/1
2 AEROSOL, METERED RESPIRATORY (INHALATION) DAILY PRN
Qty: 18 G | Refills: 0 | Status: SHIPPED | OUTPATIENT
Start: 2020-01-23 | End: 2021-01-21

## 2020-01-23 NOTE — PROCEDURES
"[facility] - Pulmonary Function [facility]  Six Minute Walk     SUMMARY     Ordering Provider: Dr. Osullivan   Interpreting Provider: Dr. Osullivan  Performing nurse/tech/RT: PALOMO Black  Diagnosis: (Dyspnea on exertion/ Hyperinflation)  Height: 5' 11" (180.3 cm)  Weight: 93.5 kg (206 lb 2.1 oz)  BMI (Calculated): 28.8   Patient Race:             Phase Oxygen Assessment Supplemental O2 Heart   Rate Blood Pressure L[place]ou Dyspnea Scale Rating   Resting 98 % Room Air 71 bpm 125/76 2   Exercise        Minute        1 96 % Room Air 89 bpm     2 97 % Room Air 96 bpm     3 98 % Room Air 98 bpm     4 97 % Room Air 97 bpm     5 96 % Room Air 98 bpm     6  96 % Room Air 100 bpm 124/70 3   Recovery        Minute        1 98 % Room Air 79 bpm     2 97 % Room Air 79 bpm     3 97 % Room Air 78 bpm     4 97 % Room Air 75 bpm 114/74 3     Six Minute Walk Summary  6MWT Status: completed without stopping  Patient Reported: Dyspnea     Interpretation:  Did the patient stop or pause?: No                          Total Time Walked (Calculated): 360 seconds  Final Partial Lap Distance (feet): 175 feet  Total Distance Meters (Calculated): 419.1 meters  Predicted Distance Meters (Calculated): 549.95 meters  Percentage of Predicted (Calculated): 76.21  Peak VO2 (Calculated): 16.55  Mets: 4.73  Has The Patient Had a Previous Six Minute Walk Test?: No       Previous 6MWT Results  Has The Patient Had a Previous Six Minute Walk Test?: No        Interpretation:  Total distance walked in six minutes is mildly reduced indicating a reduction in overall  functional capacity. There was no exercise induced hypoxemia with significant oxygen desaturation.    Oxygen desaturation did not meet criteria for supplemental oxygen prescription.    Clinical correlation suggested.    Roger Osullivan MD    Mild exercise-induced hypoxemia described as an arterial oxygen saturation of 93-95% (or 3-4% less than at rest), moderate exercise-induced hypoxemia as 89-93%, and " severe exercise induced hypoxemia as < 89% O2 saturation.  Medicare Criteria Comments:   When arterial oxygen saturation is at or below 88% during exercise (severe exercise induced hypoxemia) then the patient falls under Medicare Group 1 criteria for supplemental oxygen.  Details about Medicare Group Criteria coverage can be found at http://[email or web address].[facility].gov/manuals/downloads/

## 2020-01-23 NOTE — LETTER
January 23, 2020      Leigh Rosales NP  [address]  [place] [address]           [facility]  [address]  [place] [address]  Phone: [phone]  Fax: [phone]          Patient: Rigoberto Vasquez   MR Number: [id number]   YOB: 1950   Date of Visit: 1/23/2020       Dear Leigh Rosales:    Thank you for referring Rigoberto Vasquez to me for evaluation. Attached you will find relevant portions of my assessment and plan of care.    If you have questions, please do not hesitate to call me. I look forward to following Rigoberto Vasquez along with you.    Sincerely,    Elizabeth Lejeune, NP    Enclosure  CC:  No Recipients    If you would like to receive this communication electronically, please contact [email or web address] or [phone] to request more information on [employer] access.    For providers and/or their staff who would like to refer a patient to [facility], please contact us through our one-stop-shop provider referral line, [facility] , at [phone].    If you feel you have received this communication in error or would no longer like to receive these types of communications, please e-mail [email or web address]

## 2020-02-12 ENCOUNTER — PATIENT OUTREACH (OUTPATIENT)
Dept: OTHER | Facility: OTHER | Age: 70
End: 2020-02-12

## 2020-02-12 NOTE — PROGRESS NOTES
Left voicemail requesting call back  BP at goal but trending higher than usual  Average 130/78 mmHg  Review charging cuff monthly  Patient enrolled in [facility] 7/2018, discuss lifespan of cuff typically 2-3 years    Hypertension Medications             losartan (COZAAR) 50 MG tablet Take 1 tablet by mouth in the morning and 1 tablet in the evening

## 2020-02-13 DIAGNOSIS — E11.9 DIABETES MELLITUS WITHOUT COMPLICATION: ICD-10-CM

## 2020-02-17 ENCOUNTER — OFFICE VISIT (OUTPATIENT)
Dept: INTERNAL MEDICINE | Facility: CLINIC | Age: 70
End: 2020-02-17
Payer: MEDICARE

## 2020-02-17 VITALS
OXYGEN SATURATION: 96 % | BODY MASS INDEX: 28.83 KG/M2 | DIASTOLIC BLOOD PRESSURE: 82 MMHG | SYSTOLIC BLOOD PRESSURE: 124 MMHG | TEMPERATURE: 97 F | WEIGHT: 205.94 LBS | HEIGHT: 71 IN | RESPIRATION RATE: 16 BRPM | HEART RATE: 72 BPM

## 2020-02-17 DIAGNOSIS — Z87.438 HISTORY OF BPH: ICD-10-CM

## 2020-02-17 DIAGNOSIS — Z98.890 HISTORY OF TRANSURETHRAL PROSTATECTOMY: ICD-10-CM

## 2020-02-17 DIAGNOSIS — Z79.4 TYPE 2 DIABETES MELLITUS WITHOUT COMPLICATION, WITH LONG-TERM CURRENT USE OF INSULIN: ICD-10-CM

## 2020-02-17 DIAGNOSIS — R39.198 DIFFICULTY URINATING: Primary | ICD-10-CM

## 2020-02-17 DIAGNOSIS — E11.9 TYPE 2 DIABETES MELLITUS WITHOUT COMPLICATION, WITH LONG-TERM CURRENT USE OF INSULIN: ICD-10-CM

## 2020-02-17 DIAGNOSIS — Z90.79 HISTORY OF TRANSURETHRAL PROSTATECTOMY: ICD-10-CM

## 2020-02-17 PROBLEM — I44.2 AV BLOCK, 3RD DEGREE: Status: ACTIVE | Noted: 2019-02-04

## 2020-02-17 PROCEDURE — 99999 PR PBB SHADOW E&M-EST. PATIENT-LVL III: CPT | Mod: PBBFAC,HCNC,, | Performed by: FAMILY MEDICINE

## 2020-02-17 PROCEDURE — 1159F PR MEDICATION LIST DOCUMENTED IN MEDICAL RECORD: ICD-10-PCS | Mod: HCNC,S$GLB,, | Performed by: FAMILY MEDICINE

## 2020-02-17 PROCEDURE — 99214 OFFICE O/P EST MOD 30 MIN: CPT | Mod: HCNC,S$GLB,, | Performed by: FAMILY MEDICINE

## 2020-02-17 PROCEDURE — 1101F PR PT FALLS ASSESS DOC 0-1 FALLS W/OUT INJ PAST YR: ICD-10-PCS | Mod: HCNC,CPTII,S$GLB, | Performed by: FAMILY MEDICINE

## 2020-02-17 PROCEDURE — 99214 PR OFFICE/OUTPT VISIT, EST, LEVL IV, 30-39 MIN: ICD-10-PCS | Mod: HCNC,S$GLB,, | Performed by: FAMILY MEDICINE

## 2020-02-17 PROCEDURE — 99999 PR PBB SHADOW E&M-EST. PATIENT-LVL III: ICD-10-PCS | Mod: PBBFAC,HCNC,, | Performed by: FAMILY MEDICINE

## 2020-02-17 PROCEDURE — 3044F HG A1C LEVEL LT 7.0%: CPT | Mod: HCNC,CPTII,S$GLB, | Performed by: FAMILY MEDICINE

## 2020-02-17 PROCEDURE — 3079F PR MOST RECENT DIASTOLIC BLOOD PRESSURE 80-89 MM HG: ICD-10-PCS | Mod: HCNC,CPTII,S$GLB, | Performed by: FAMILY MEDICINE

## 2020-02-17 PROCEDURE — 1159F MED LIST DOCD IN RCRD: CPT | Mod: HCNC,S$GLB,, | Performed by: FAMILY MEDICINE

## 2020-02-17 PROCEDURE — 3074F PR MOST RECENT SYSTOLIC BLOOD PRESSURE < 130 MM HG: ICD-10-PCS | Mod: HCNC,CPTII,S$GLB, | Performed by: FAMILY MEDICINE

## 2020-02-17 PROCEDURE — 3044F PR MOST RECENT HEMOGLOBIN A1C LEVEL <7.0%: ICD-10-PCS | Mod: HCNC,CPTII,S$GLB, | Performed by: FAMILY MEDICINE

## 2020-02-17 PROCEDURE — 3074F SYST BP LT 130 MM HG: CPT | Mod: HCNC,CPTII,S$GLB, | Performed by: FAMILY MEDICINE

## 2020-02-17 PROCEDURE — 1101F PT FALLS ASSESS-DOCD LE1/YR: CPT | Mod: HCNC,CPTII,S$GLB, | Performed by: FAMILY MEDICINE

## 2020-02-17 PROCEDURE — 3079F DIAST BP 80-89 MM HG: CPT | Mod: HCNC,CPTII,S$GLB, | Performed by: FAMILY MEDICINE

## 2020-02-17 RX ORDER — TAMSULOSIN HYDROCHLORIDE 0.4 MG/1
0.8 CAPSULE ORAL DAILY
Qty: 60 CAPSULE | Refills: 0 | Status: SHIPPED | OUTPATIENT
Start: 2020-02-17 | End: 2020-03-19 | Stop reason: SDUPTHER

## 2020-02-17 NOTE — PROGRESS NOTES
Subjective:   Patient ID: Rigoberto Vasquez is a 69 y.o. male.  Chief Complaint:  c/o enlarged prostrate (Would lik4 referral to new Urologist) and Establish Care      Patient presents to discuss difficulty urinating.    History of BPH.  History of transurethral procedure, probable prostatectomy.  Did well postop and remained off medications without difficulty urinating, but increase obstructive-type symptoms over the past few months.  Currently not taking any urologic medications  Would like to see new urologist.    Dysuria    This is a recurrent problem. The current episode started more than 1 month ago. The problem occurs every urination. The problem has been unchanged. The patient is experiencing no pain. There has been no fever. There is no history of pyelonephritis. Associated symptoms include frequency, hesitancy and urgency. Pertinent negatives include no behavior changes, chills, discharge, flank pain, hematuria, nausea, sweats, vomiting, weight loss, bubble bath use, constipation, rash or withholding. He has tried nothing for the symptoms. His past medical history is significant for diabetes mellitus and a urological procedure.     Review of Systems   Constitutional: Negative for chills, fatigue, fever and weight loss.   Eyes: Negative for visual disturbance.   Respiratory: Negative for cough, chest tightness, shortness of breath and wheezing.    Cardiovascular: Negative for chest pain, palpitations and leg swelling.   Gastrointestinal: Negative for abdominal pain, constipation, diarrhea, nausea and vomiting.   Endocrine: Positive for polyuria. Negative for polydipsia and polyphagia.   Genitourinary: Positive for difficulty urinating, dysuria, frequency, hesitancy and urgency. Negative for decreased urine volume, discharge, enuresis, flank pain, hematuria, penile pain, penile swelling, scrotal swelling and testicular pain.   Musculoskeletal: Negative for myalgias.   Skin: Negative for rash.   Neurological:  "Negative for dizziness, syncope, weakness, numbness and headaches.     Objective:   /82 (BP Location: Left arm, Patient Position: Sitting)   Pulse 72   Temp 97.4 °F (36.3 °C) (Oral)   Resp 16   Ht 5' 11" (1.803 m)   Wt 93.4 kg (205 lb 14.6 oz)   SpO2 96%   BMI 28.72 kg/m²     Physical Exam   Constitutional: Vital signs are normal. He appears well-developed and well-nourished. No distress.   Eyes: Conjunctivae are normal. Right conjunctiva is not injected. Left conjunctiva is not injected. No scleral icterus.   Neck: No JVD present. No thyroid mass and no thyromegaly present.   Cardiovascular: Normal rate, regular rhythm and normal heart sounds.   Pulses:       Radial pulses are 2+ on the right side, and 2+ on the left side.   Pulmonary/Chest: Effort normal and breath sounds normal. No accessory muscle usage. No tachypnea. No respiratory distress.   Abdominal: Normal appearance and bowel sounds are normal. There is no rebound, no guarding and no CVA tenderness.   Musculoskeletal: He exhibits no edema.   Skin: Skin is warm, dry and intact. No rash noted.   Psychiatric: He has a normal mood and affect. Judgment normal.   Nursing note and vitals reviewed.    Assessment:       ICD-10-CM ICD-9-CM   1. Difficulty urinating R39.198 788.99   2. History of BPH Z87.438 V13.89   3. History of transurethral prostatectomy Z98.890 V15.29    Z90.79    4. Type 2 diabetes mellitus without complication, with long-term current use of insulin E11.9 250.00    Z79.4 V58.67     Plan:   Difficulty urinating  History of BPH  History of transurethral prostatectomy  -     tamsulosin (FLOMAX) 0.4 mg Cap; Take 2 capsules (0.8 mg total) by mouth once daily.  Dispense: 60 capsule; Refill: 0  Recommend trial of Flomax.    If symptoms not improved, urology referral.      Type 2 diabetes mellitus without complication, with long-term current use of insulin  Last A1c December 2019 6.0% stable.    Continue present medications.      Return " to clinic as needed

## 2020-03-06 ENCOUNTER — PATIENT OUTREACH (OUTPATIENT)
Dept: OTHER | Facility: OTHER | Age: 70
End: 2020-03-06

## 2020-03-06 RX ORDER — FENOFIBRATE 200 MG/1
200 CAPSULE ORAL NIGHTLY
Qty: 90 CAPSULE | Refills: 3 | Status: SHIPPED | OUTPATIENT
Start: 2020-03-06 | End: 2021-05-17 | Stop reason: SDUPTHER

## 2020-03-06 RX ORDER — PIOGLITAZONEHYDROCHLORIDE 30 MG/1
30 TABLET ORAL DAILY
Qty: 90 TABLET | Refills: 3 | Status: SHIPPED | OUTPATIENT
Start: 2020-03-06 | End: 2021-06-08 | Stop reason: SDUPTHER

## 2020-03-06 RX ORDER — FERROUS GLUCONATE 324(38)MG
324 TABLET ORAL
Qty: 90 TABLET | Refills: 1 | Status: SHIPPED | OUTPATIENT
Start: 2020-03-06 | End: 2021-01-01 | Stop reason: SDUPTHER

## 2020-03-06 NOTE — PROGRESS NOTES
"Digital Medicine: Health  Follow-Up    Patient reports he has been feeling fatigued for the last couple of weeks.  States he has occasional fatigue in the morning.  Denies any changes in lifestyle recently, except he is trying to "eat better" and has been working on weight loss.    The history is provided by the patient.     Follow Up  Follow-up reason(s): routine education          INTERVENTION(S)  encouragement/support    PLAN  patient verbalizes understanding, Clinician follow-up and continue monitoring    Will route note to clinician.  Clinician attempted to contact patient last month.      There are no preventive care reminders to display for this patient.    Last 5 Patient Entered Readings                                      Current 30 Day Average: 123/76     Recent Readings 3/5/2020 2/25/2020 2/23/2020 2/17/2020 2/13/2020    SBP (mmHg) 115 129 125 124 124    DBP (mmHg) 68 79 73 82 81    Pulse 75 74 88 72 83                      Diet Screening       Reports he has been trying to "eat better" and lose weight.      SDOH  "

## 2020-03-12 ENCOUNTER — OFFICE VISIT (OUTPATIENT)
Dept: INTERNAL MEDICINE | Facility: CLINIC | Age: 70
End: 2020-03-12
Payer: MEDICARE

## 2020-03-12 ENCOUNTER — TELEPHONE (OUTPATIENT)
Dept: INTERNAL MEDICINE | Facility: CLINIC | Age: 70
End: 2020-03-12

## 2020-03-12 VITALS
HEART RATE: 75 BPM | RESPIRATION RATE: 16 BRPM | OXYGEN SATURATION: 96 % | SYSTOLIC BLOOD PRESSURE: 130 MMHG | DIASTOLIC BLOOD PRESSURE: 68 MMHG | HEIGHT: 71 IN | TEMPERATURE: 99 F | BODY MASS INDEX: 29.23 KG/M2 | WEIGHT: 208.75 LBS

## 2020-03-12 DIAGNOSIS — R53.83 FATIGUE, UNSPECIFIED TYPE: ICD-10-CM

## 2020-03-12 DIAGNOSIS — E11.9 DIABETES MELLITUS WITHOUT COMPLICATION: ICD-10-CM

## 2020-03-12 DIAGNOSIS — E16.2 HYPOGLYCEMIA: Primary | ICD-10-CM

## 2020-03-12 LAB — GLUCOSE SERPL-MCNC: 108 MG/DL (ref 70–110)

## 2020-03-12 PROCEDURE — 3075F SYST BP GE 130 - 139MM HG: CPT | Mod: HCNC,CPTII,S$GLB, | Performed by: NURSE PRACTITIONER

## 2020-03-12 PROCEDURE — 1101F PR PT FALLS ASSESS DOC 0-1 FALLS W/OUT INJ PAST YR: ICD-10-PCS | Mod: HCNC,CPTII,S$GLB, | Performed by: NURSE PRACTITIONER

## 2020-03-12 PROCEDURE — 3044F PR MOST RECENT HEMOGLOBIN A1C LEVEL <7.0%: ICD-10-PCS | Mod: HCNC,CPTII,S$GLB, | Performed by: NURSE PRACTITIONER

## 2020-03-12 PROCEDURE — 82962 GLUCOSE BLOOD TEST: CPT | Mod: HCNC,S$GLB,, | Performed by: NURSE PRACTITIONER

## 2020-03-12 PROCEDURE — 99999 PR PBB SHADOW E&M-EST. PATIENT-LVL V: ICD-10-PCS | Mod: PBBFAC,HCNC,, | Performed by: NURSE PRACTITIONER

## 2020-03-12 PROCEDURE — 3078F DIAST BP <80 MM HG: CPT | Mod: HCNC,CPTII,S$GLB, | Performed by: NURSE PRACTITIONER

## 2020-03-12 PROCEDURE — 1126F PR PAIN SEVERITY QUANTIFIED, NO PAIN PRESENT: ICD-10-PCS | Mod: HCNC,S$GLB,, | Performed by: NURSE PRACTITIONER

## 2020-03-12 PROCEDURE — 99999 PR PBB SHADOW E&M-EST. PATIENT-LVL V: CPT | Mod: PBBFAC,HCNC,, | Performed by: NURSE PRACTITIONER

## 2020-03-12 PROCEDURE — 3078F PR MOST RECENT DIASTOLIC BLOOD PRESSURE < 80 MM HG: ICD-10-PCS | Mod: HCNC,CPTII,S$GLB, | Performed by: NURSE PRACTITIONER

## 2020-03-12 PROCEDURE — 1159F PR MEDICATION LIST DOCUMENTED IN MEDICAL RECORD: ICD-10-PCS | Mod: HCNC,S$GLB,, | Performed by: NURSE PRACTITIONER

## 2020-03-12 PROCEDURE — 82962 POCT GLUCOSE, HAND-HELD DEVICE: ICD-10-PCS | Mod: HCNC,S$GLB,, | Performed by: NURSE PRACTITIONER

## 2020-03-12 PROCEDURE — 3044F HG A1C LEVEL LT 7.0%: CPT | Mod: HCNC,CPTII,S$GLB, | Performed by: NURSE PRACTITIONER

## 2020-03-12 PROCEDURE — 1159F MED LIST DOCD IN RCRD: CPT | Mod: HCNC,S$GLB,, | Performed by: NURSE PRACTITIONER

## 2020-03-12 PROCEDURE — 99214 OFFICE O/P EST MOD 30 MIN: CPT | Mod: HCNC,S$GLB,, | Performed by: NURSE PRACTITIONER

## 2020-03-12 PROCEDURE — 3075F PR MOST RECENT SYSTOLIC BLOOD PRESS GE 130-139MM HG: ICD-10-PCS | Mod: HCNC,CPTII,S$GLB, | Performed by: NURSE PRACTITIONER

## 2020-03-12 PROCEDURE — 99214 PR OFFICE/OUTPT VISIT, EST, LEVL IV, 30-39 MIN: ICD-10-PCS | Mod: HCNC,S$GLB,, | Performed by: NURSE PRACTITIONER

## 2020-03-12 PROCEDURE — 1101F PT FALLS ASSESS-DOCD LE1/YR: CPT | Mod: HCNC,CPTII,S$GLB, | Performed by: NURSE PRACTITIONER

## 2020-03-12 PROCEDURE — 1126F AMNT PAIN NOTED NONE PRSNT: CPT | Mod: HCNC,S$GLB,, | Performed by: NURSE PRACTITIONER

## 2020-03-12 NOTE — Clinical Note
Please schedule diabetes management appointment and cancel diabetes education appointment.  Need to follow up for medication management.  Thanks

## 2020-03-12 NOTE — TELEPHONE ENCOUNTER
I called and informed the pt that I was aware he is here and as soon as I get and available room I will be out to get him. He verbalized understanding. //kah

## 2020-03-12 NOTE — PROGRESS NOTES
Subjective:       Patient ID: Rigoberto Vasquez is a 69 y.o. male.    Chief Complaint: Follow-up; Hypoglycemia; Fatigue; Dizziness; and Diarrhea    Patient presents with hypoglycemia, disorientation, and diarrhea that started on 03/10/2020.  Reports hypoglycemic episodes are in the middle of the night.  Has been decreasing carb.  Exercising 3 days a week.    Currently taking pioglitazone 30 mg QD, metformin 1000 mg QD, and Levemir 16 units in the AM and PM.    Overall, blood glucose readings are lower: 65-91 mg/dl before breakfast.  Has one reading listed before lunch 78 mg/dl.     Currently feeling fatigue, light-headed.  Denies chest pain/shortness of breath.      Review of Systems   Constitutional: Negative for chills and fatigue.   Eyes: Negative for visual disturbance.   Respiratory: Negative for cough and shortness of breath.    Cardiovascular: Negative for chest pain and palpitations.   Musculoskeletal: Negative for arthralgias and gait problem.   Psychiatric/Behavioral: Negative for agitation and confusion.       Objective:      Physical Exam   Constitutional: He is oriented to person, place, and time. Vital signs are normal. He appears well-developed and well-nourished.   HENT:   Head: Normocephalic and atraumatic.   Neck: Normal range of motion.   Cardiovascular: Normal rate and regular rhythm.   Pulmonary/Chest: Effort normal and breath sounds normal.   Musculoskeletal: Normal range of motion.   Neurological: He is alert and oriented to person, place, and time.   Psychiatric: He has a normal mood and affect. His behavior is normal.       Assessment:       1. Hypoglycemia    2. Diabetes mellitus without complication    3. Fatigue, unspecified type        Plan:           Hypoglycemia  -     POCT Glucose, Hand-Held Device  -     insulin detemir U-100 (LEVEMIR FLEXTOUCH U-100 INSULN) 100 unit/mL (3 mL) SubQ InPn pen; Inject 16 Units into the skin once daily.  Dispense: 30 mL; Refill: 3  -     Ambulatory  referral/consult to Diabetic Advanced Practice Providers (Medical Management); Future; Expected date: 03/22/2020    Diabetes mellitus without complication  -     Ambulatory referral/consult to Diabetes Education; Future; Expected date: 03/19/2020  -     Ambulatory referral/consult to Diabetic Advanced Practice Providers (Medical Management); Future; Expected date: 03/22/2020    Fatigue, unspecified type        Recommend to hold PM dose of Levemir.  Check home blood glucose readings twice a day (various times).  Recommend 2 week follow up with diabetes management.

## 2020-03-12 NOTE — PROGRESS NOTES
BP improved, average 123/76 mmHg  Noted from health  outreach that patient has occasional fatigue in morning. Do not suspect fatigue is related to his BP.  Continue current regimen and monitoring.  1/2020 CMP reviewed.    Hypertension Medications             losartan (COZAAR) 50 MG tablet Take 1 tablet by mouth in the morning and 1 tablet in the evening

## 2020-03-16 ENCOUNTER — PATIENT OUTREACH (OUTPATIENT)
Dept: OTHER | Facility: OTHER | Age: 70
End: 2020-03-16

## 2020-03-16 ENCOUNTER — PATIENT MESSAGE (OUTPATIENT)
Dept: INTERNAL MEDICINE | Facility: CLINIC | Age: 70
End: 2020-03-16

## 2020-03-16 ENCOUNTER — PATIENT MESSAGE (OUTPATIENT)
Dept: OTHER | Facility: OTHER | Age: 70
End: 2020-03-16

## 2020-03-16 DIAGNOSIS — E11.59 HYPERTENSION ASSOCIATED WITH DIABETES: Chronic | ICD-10-CM

## 2020-03-16 DIAGNOSIS — I15.2 HYPERTENSION ASSOCIATED WITH DIABETES: Chronic | ICD-10-CM

## 2020-03-16 RX ORDER — LOSARTAN POTASSIUM 50 MG/1
TABLET ORAL
Qty: 180 TABLET | Refills: 3
Start: 2020-03-16 | End: 2020-03-31

## 2020-03-16 NOTE — PROGRESS NOTES
Digital Medicine: Clinician Follow-Up    See [facility] messages.  Patient self decreased losartan to 50 mg qam and 25 qpm this past weekend due to lower BP readings and lack of energy    The history is provided by the patient.     Follow Up  Follow-up reason(s): reading review and medication change      Medication Change: dose decrease        INTERVENTION(S)  recommended med change, encouragement/support and denied questions    PLAN  patient verbalizes understanding and continue monitoring    Due to potential hypotensive symptoms and lower BP readings, continue with reduced dose of losartan 50 mg qam and 25 mg qpm      There are no preventive care reminders to display for this patient.    Last 5 Patient Entered Readings                                      Current 30 Day Average: 121/72     Recent Readings 3/16/2020 3/15/2020 3/15/2020 3/14/2020 3/12/2020    SBP (mmHg) 125 122 115 101 118    DBP (mmHg) 66 73 68 61 74    Pulse 82 76 88 69 79             Hypertension Medications             losartan (COZAAR) 50 MG tablet Take 1 tablet by mouth in the morning and 1 tablet in the evening                 Screenings

## 2020-03-19 ENCOUNTER — PATIENT MESSAGE (OUTPATIENT)
Dept: INTERNAL MEDICINE | Facility: CLINIC | Age: 70
End: 2020-03-19

## 2020-03-19 DIAGNOSIS — R39.198 DIFFICULTY URINATING: ICD-10-CM

## 2020-03-19 DIAGNOSIS — Z87.438 HISTORY OF BPH: ICD-10-CM

## 2020-03-19 DIAGNOSIS — Z98.890 HISTORY OF TRANSURETHRAL PROSTATECTOMY: ICD-10-CM

## 2020-03-19 DIAGNOSIS — Z90.79 HISTORY OF TRANSURETHRAL PROSTATECTOMY: ICD-10-CM

## 2020-03-19 RX ORDER — TAMSULOSIN HYDROCHLORIDE 0.4 MG/1
0.8 CAPSULE ORAL DAILY
Qty: 180 CAPSULE | Refills: 3 | Status: SHIPPED | OUTPATIENT
Start: 2020-03-19 | End: 2021-01-21

## 2020-03-30 RX ORDER — LOVASTATIN 40 MG/1
TABLET ORAL
Qty: 90 TABLET | Refills: 3 | Status: SHIPPED | OUTPATIENT
Start: 2020-03-30 | End: 2021-03-23 | Stop reason: SDUPTHER

## 2020-03-31 ENCOUNTER — PATIENT OUTREACH (OUTPATIENT)
Dept: OTHER | Facility: OTHER | Age: 70
End: 2020-03-31

## 2020-03-31 DIAGNOSIS — E11.59 HYPERTENSION ASSOCIATED WITH DIABETES: Chronic | ICD-10-CM

## 2020-03-31 DIAGNOSIS — I15.2 HYPERTENSION ASSOCIATED WITH DIABETES: Chronic | ICD-10-CM

## 2020-03-31 RX ORDER — LOSARTAN POTASSIUM 50 MG/1
50 TABLET ORAL DAILY
Qty: 180 TABLET | Refills: 3
Start: 2020-03-31 | End: 2020-04-27

## 2020-03-31 NOTE — PROGRESS NOTES
Digital Medicine: Clinician Follow-Up    See [facility] messages. Patient reports continued dizziness and lightheadedness despite losartan dose reduction 3/15/20    The history is provided by the patient.     Follow Up  Follow-up reason(s): reading review, medication change and medication change follow-up      Medication Change: dose decrease    Patient started new medication.        INTERVENTION(S)  recommended med change    PLAN  continue monitoring    Suspect addition of tamsulosin affecting BP and dizziness   Given continued dizziness and lightheadedness concerns and BP below goal, further reduce to losartan to 50 mg once daily  Advised patient to take losartan and tamsulosin at opposite times of the day      There are no preventive care reminders to display for this patient.    Last 5 Patient Entered Readings                                      Current 30 Day Average: 117/69     Recent Readings 3/30/2020 3/30/2020 3/29/2020 3/28/2020 3/27/2020    SBP (mmHg) 101 102 127 127 113    DBP (mmHg) 61 53 69 69 72    Pulse 78 78 80 82 84             Hypertension Medications             losartan (COZAAR) 50 MG tablet Take 1 tablet by mouth in the morning and 0.5 tablet in the evening

## 2020-04-14 RX ORDER — METFORMIN HYDROCHLORIDE 1000 MG/1
1000 TABLET ORAL DAILY
Qty: 180 TABLET | Refills: 3 | Status: SHIPPED | OUTPATIENT
Start: 2020-04-14 | End: 2021-07-30 | Stop reason: SDUPTHER

## 2020-04-16 ENCOUNTER — PATIENT OUTREACH (OUTPATIENT)
Dept: OTHER | Facility: OTHER | Age: 70
End: 2020-04-16

## 2020-04-16 DIAGNOSIS — I15.2 HYPERTENSION ASSOCIATED WITH DIABETES: Chronic | ICD-10-CM

## 2020-04-16 DIAGNOSIS — E11.59 HYPERTENSION ASSOCIATED WITH DIABETES: Chronic | ICD-10-CM

## 2020-04-16 NOTE — PROGRESS NOTES
Left voicemail requesting call back  Follow up losartan dose decrease and hypotensive symptoms  BP remains at goal, average 121/71 mmHg    Hypertension Medications             losartan (COZAAR) 50 MG tablet Take 1 tablet (50 mg total) by mouth once daily.

## 2020-04-26 ENCOUNTER — PATIENT MESSAGE (OUTPATIENT)
Dept: INTERNAL MEDICINE | Facility: CLINIC | Age: 70
End: 2020-04-26

## 2020-04-26 ENCOUNTER — PATIENT MESSAGE (OUTPATIENT)
Dept: OTHER | Facility: OTHER | Age: 70
End: 2020-04-26

## 2020-04-27 RX ORDER — LOSARTAN POTASSIUM 50 MG/1
25 TABLET ORAL DAILY
Qty: 180 TABLET | Refills: 3
Start: 2020-04-27 | End: 2021-02-10 | Stop reason: SDUPTHER

## 2020-04-27 NOTE — PROGRESS NOTES
Digital Medicine: Clinician Follow-Up    See [facility] messages  Patient continues to have decrease in BP and dizziness when changing positions    The history is provided by the patient.     Follow Up  Follow-up reason(s): reading review, medication change and medication change follow-up      Medication Change: dose decrease    Patient started new medication.        INTERVENTION(S)  recommended med change    PLAN  continue monitoring    BP at goal  Decrease losartan to 25 mg daily, advised to take in evening  Provided precautions to reduce fall risk when changing positions  Advised adequate hydration with water      There are no preventive care reminders to display for this patient.    Last 5 Patient Entered Readings                                      Current 30 Day Average: 120/72     Recent Readings 4/26/2020 4/25/2020 4/25/2020 4/25/2020 4/24/2020    SBP (mmHg) 114 111 111 133 105    DBP (mmHg) 73 66 66 78 65    Pulse 76 72 73 73 76             Hypertension Medications             losartan (COZAAR) 50 MG tablet Take 1 tablet (50 mg total) by mouth once daily.                 Screenings

## 2020-05-01 ENCOUNTER — PATIENT OUTREACH (OUTPATIENT)
Dept: OTHER | Facility: OTHER | Age: 70
End: 2020-05-01

## 2020-05-04 NOTE — TELEPHONE ENCOUNTER
Received message form pt requesting EGD as previously discussed with him and NPJahaira. Forwarding request to Pallavi LOPEZ/stanislaw bradley   clear

## 2020-05-08 ENCOUNTER — TELEPHONE (OUTPATIENT)
Dept: INTERNAL MEDICINE | Facility: CLINIC | Age: 70
End: 2020-05-08

## 2020-05-08 NOTE — TELEPHONE ENCOUNTER
Left  for pt informing that Dr. Grimes looked a sugars he sent in and they were good. Informed him if he had any questions to give us a call. //BJ

## 2020-05-08 NOTE — TELEPHONE ENCOUNTER
Please let him know I reviewed premeal sugars sent from him to us. I dont see any low sugars anymore. Also the the sugars looks pretty good. Most before bfast, lunch , dinner sugars below 120. Ok to continue

## 2020-05-13 ENCOUNTER — PATIENT OUTREACH (OUTPATIENT)
Dept: OTHER | Facility: OTHER | Age: 70
End: 2020-05-13

## 2020-05-13 ENCOUNTER — CLINICAL SUPPORT (OUTPATIENT)
Dept: DIABETES | Facility: CLINIC | Age: 70
End: 2020-05-13
Payer: MEDICARE

## 2020-05-13 DIAGNOSIS — E11.9 DIABETES MELLITUS WITHOUT COMPLICATION: ICD-10-CM

## 2020-05-13 PROCEDURE — G0108 PR DIAB MANAGE TRN  PER INDIV: ICD-10-PCS | Mod: HCNC,S$GLB,, | Performed by: DIETITIAN, REGISTERED

## 2020-05-13 PROCEDURE — G0108 DIAB MANAGE TRN  PER INDIV: HCPCS | Mod: HCNC,S$GLB,, | Performed by: DIETITIAN, REGISTERED

## 2020-05-13 NOTE — PROGRESS NOTES
Diabetes Education  Author: Tasha Thapa RD, CDE  Date: 5/13/2020     Diabetes Care Specialist Telehealth Visit Note     It was in the patient's best interest to receive diabetes self-management education and support services in this format to prevent the exposure to COVID-19.        Established Patient - Audio Only Telehealth Visit  The patient location is: home   The chief complaint leading to consultation is: Diabetes    Visit type: Virtual visit with audio only (telephone)  Total time spent with patient: 45 min      The reason for the audio only service rather than synchronous audio and video virtual visit was related to technical difficulties or patient preference/necessity.     Each patient to whom I provide medical services by telemedicine is:  (1) informed of the relationship between the physician and patient and the respective role of any other health care provider with respect to management of the patient; and (2) notified that they may decline to receive medical services by telemedicine and may withdraw from such care at any time. Patient verbally consented to receive this service via voice-only telephone call.     Diabetes Care Management Summary  Diabetes Education Record Assessment/Progress: Initial  Current Diabetes Risk Level: Low     Referring Provider: Leigh Rosales NP  69 y.o. male in clinic today for diabetes education. Patient was experiencing episodes of night time hypoglycemia in March. He was taking 16u Levemir in AM and PM. After seeing his PCP, his dose of Levemir was decreased to 16u in AM only. He is no longer experiencing hypoglycemia. Reviewed patient's BG log from April and no hypoglycemia is noted. All results are WNL.             There is no height or weight on file to calculate BMI.        Diabetes Type  Diabetes Type : Type II    Diabetes History  Diabetes Diagnosis: >10 years  Current Treatment: Oral Medication, Injectable  Reviewed Problem List with Patient: Yes    Health  Maintenance was reviewed today with patient. Discussed with patient importance of routine eye exams, foot exams/foot care, blood work (i.e.: A1c, microalbumin, and lipid), dental visits, yearly flu vaccine, and pneumonia vaccine as indicated by PCP. Patient verbalized understanding.     Health Maintenance Topics with due status: Not Due       Topic Last Completion Date    TETANUS VACCINE 03/22/2011    DEXA SCAN 07/14/2016    Colonoscopy 09/13/2019    Lipid Panel 12/19/2019    Hemoglobin A1c 12/19/2019    Foot Exam 01/02/2020    Eye Exam 01/21/2020    Low Dose Statin 03/30/2020     Health Maintenance Due   Topic Date Due    PROSTATE-SPECIFIC ANTIGEN  12/30/2014       Nutrition  Meal Planning: 3 meals per day  What type of sweetener do you use?: Splenda, Stevia/Truvia  What type of beverages do you drink?: water, diet soda/tea( diet sodas on occasion)  Meal Plan 24 Hour Recall - Breakfast:  Egg, burkett, biscuit with butter and jelly, coffee  Meal Plan 24 Hour Recall - Lunch:  Smoked chicken, green beans, boiled potatoes, sliced peaches, unsweet tea  Meal Plan 24 Hour Recall - Dinner: 1 slice of bread with ham, cheese, pickles, lettuce, chips, water flavored with sugar free additive  Meal Plan 24 Hour Recall - Snack:  3 pm Afternoon snack: unsalted shelled peanuts    Monitoring   Self Monitoring :  checks at least 3 times per day. FBG ranges  // Pre Lunch and Dinner ranges   Blood Glucose Logs: Yes( Patient sent log from the past month and is in Media)  Do you use a personal continuous glucose monitor?: No  In the last month, how often have you had a low blood sugar reaction?: never( HIstory of night time hypoglycemia)  What are your symptoms of low blood sugar?: shaky  How do you treat low blood sugar?:  Peanut butter  Can you tell when your blood sugar is too high?: no             Exercise   Exercise Type: use exercise equipment, bike riding  Intensity: Low  Frequency: 3-5 Times per week  Duration: 45  min    Current Diabetes Treatment   Current Treatment: Oral Medication, Injectable    Social History  Preferred Learning Method: Face to Face  Primary Support: Self  Occupation:  Retired                                Barriers to Change  Barriers to Change: None  Learning Challenges : None    Readiness to Learn   Readiness to Learn : Acceptance    Cultural Influences  Cultural Influences: No    Diabetes Education Assessment/Progress  Diabetes Disease Process (diabetes disease process and treatment options): Comprehends Key Points, Individual Session, Discussion, Written Materials Provided  Nutrition (Incorporating nutritional management into one's lifestyle): Comprehends Key Points, Written Materials Provided, Individual Session, Discussion  Physical Activity (incorporating physical activity into one's lifestyle): Written Materials Provided, Comprehends Key Points, Individual Session, Discussion  Medications (states correct name, dose, onset, peak, duration, side effects & timing of meds): Written Materials Provided, Comprehends Key Points, Individual Session, Discussion  Monitoring (monitoring blood glucose/other parameters & using results): Comprehends Key Points, Individual Session, Discussion, Written Materials Provided  Acute Complications (preventing, detecting, and treating acute complications): Individual Session, Discussion, Written Materials Provided, Comprehends Key Points  Chronic Complications (preventing, detecting, and treating chronic complications): Written Materials Provided, Comprehends Key Points, Individual Session, Discussion  Clinical (diabetes, other pertinent medical history, and relevant comorbidities reviewed during visit): Individual Session, Discussion  Cognitive (knowledge of self-management skills, functional health literacy): Individual Session, Discussion  Psychosocial (emotional response to diabetes): Individual Session, Discussion  Diabetes Distress and Support Systems: Not  Covered/Deferred  Behavioral (readiness for change, lifestyle practices, self-care behaviors): Individual Session, Discussion    Goals  Patient has selected/evaluated goals during today's session: Yes, selected  Healthy Eating: Set( Patient will review meal plans, portion control, 30-45g CHO with snacks. )  Start Date: 05/13/20  Target Date: 07/13/20    Emailed Diabetes educational materials: Diabetes Management Guide.             Diabetes Meal Plan  Restrictions: Restricted Carbohydrate, Low Sodium, Low Fat  Calories: 1800  Carbohydrate Per Meal: 30-45g  Carbohydrate Per Snack : 15-30g    Today's Self-Management Care Plan was developed with the patient's input and is based on barriers identified during today's assessment.    The long and short-term goals in the care plan were written with the patient/caregiver's input. The patient has agreed to work toward these goals to improve his overall diabetes control.      The patient received a copy of today's self-management plan and verbalized understanding of the care plan, goals, and all of today's instructions.      The patient was encouraged to communicate with his physician and care team regarding his condition(s) and treatment.  I provided the patient with my contact information today and encouraged him to contact me via phone or patient portal as needed.

## 2020-05-13 NOTE — PROGRESS NOTES
"Digital Medicine: Clinician Follow-Up    Patient reports dizziness/weakness have improved but still occur about 2 days per week, states BP at those times typically ~ 120s/70s mmHg  States BG is "normal" during these days  States cardiology has completed work up and not identified any issues with pacemaker    The history is provided by the patient.     Follow Up  Follow-up reason(s): reading review and medication change follow-up      Patient started new medication.        INTERVENTION(S)  reviewed appropriate dose schedule, encouragement/support and denied questions    PLAN  patient verbalizes understanding and continue monitoring    BP at goal  Discussed nephroprotective benefits of losartan in addition to BP lowering  Appears prior symptoms have improved although not completely resolved  Patient is amenable to continuing low dose losartan and requests follow up in ~ 1 month  Discussed would consult with Dr. Grimes prior to stopping losartan completely      There are no preventive care reminders to display for this patient.    Last 5 Patient Entered Readings                                      Current 30 Day Average: 116/74     Recent Readings 5/13/2020 5/12/2020 5/8/2020 5/6/2020 5/6/2020    SBP (mmHg) 121 115 129 116 133    DBP (mmHg) 76 78 78 69 86    Pulse 75 79 64 85 67             Hypertension Medications             losartan (COZAAR) 50 MG tablet Take 0.5 tablets (25 mg total) by mouth once daily.                             Medication Adherence Screening     Patient knows purpose of medications.      "

## 2020-05-20 ENCOUNTER — PATIENT MESSAGE (OUTPATIENT)
Dept: INTERNAL MEDICINE | Facility: CLINIC | Age: 70
End: 2020-05-20

## 2020-06-01 ENCOUNTER — TELEPHONE (OUTPATIENT)
Dept: INTERNAL MEDICINE | Facility: CLINIC | Age: 70
End: 2020-06-01

## 2020-06-01 DIAGNOSIS — R09.89 CHEST CONGESTION: Primary | ICD-10-CM

## 2020-06-01 NOTE — TELEPHONE ENCOUNTER
----- Message from [facility] sent at 6/1/2020 12:07 PM CDT -----  Contact: pt   ## RED DOT##    Pt called stating that his wife was exposed to her brother and sister on 5/23/2020 both are confirmed positive for covid-19 and he would like to be tested. Pt has symptoms of sore throat fatigue and congestion pt can be reached at [phone]

## 2020-06-01 NOTE — TELEPHONE ENCOUNTER
Informed pt that we are waiting on Dr. Grimes to place an order for him to get tested. He verbalized understanding.

## 2020-06-01 NOTE — TELEPHONE ENCOUNTER
----- Message from Annette Blake sent at 6/1/2020  1:55 PM CDT -----  Contact: self  Pt is calling in regards to having access to a Covid 19 test performed. Please call back [phone] or [phone]  Thanks

## 2020-06-02 ENCOUNTER — LAB VISIT (OUTPATIENT)
Dept: INTERNAL MEDICINE | Facility: CLINIC | Age: 70
End: 2020-06-02
Payer: MEDICARE

## 2020-06-02 ENCOUNTER — TELEPHONE (OUTPATIENT)
Dept: INTERNAL MEDICINE | Facility: CLINIC | Age: 70
End: 2020-06-02

## 2020-06-02 DIAGNOSIS — R09.89 CHEST CONGESTION: ICD-10-CM

## 2020-06-02 PROCEDURE — U0003 INFECTIOUS AGENT DETECTION BY NUCLEIC ACID (DNA OR RNA); SEVERE ACUTE RESPIRATORY SYNDROME CORONAVIRUS 2 (SARS-COV-2) (CORONAVIRUS DISEASE [COVID-19]), AMPLIFIED PROBE TECHNIQUE, MAKING USE OF HIGH THROUGHPUT TECHNOLOGIES AS DESCRIBED BY CMS-2020-01-R: HCPCS | Mod: HCNC

## 2020-06-02 NOTE — TELEPHONE ENCOUNTER
----- Message from Yessi Hanson sent at 6/2/2020 10:39 AM CDT -----  Contact: pt  Type: Patient Call Back    Who called:pt    What is the request in detail:pt returned the nurse's phone call. Call pt    Can the clinic reply by [facility]?    Would the patient rather a call back or a response via [facility]? call    Best call back number:[phone] (home)       Additional Information:

## 2020-06-02 NOTE — TELEPHONE ENCOUNTER
Pt informed that provider will get around to placing any orders or answering messages due to being in clinic at the moment. Pt informed that a call will be placed to him when order is placed as requested./Joceline

## 2020-06-03 LAB — SARS-COV-2 RNA RESP QL NAA+PROBE: NOT DETECTED

## 2020-06-09 ENCOUNTER — OFFICE VISIT (OUTPATIENT)
Dept: GASTROENTEROLOGY | Facility: CLINIC | Age: 70
End: 2020-06-09
Payer: MEDICARE

## 2020-06-09 VITALS
WEIGHT: 198.63 LBS | HEIGHT: 71 IN | HEART RATE: 80 BPM | SYSTOLIC BLOOD PRESSURE: 124 MMHG | BODY MASS INDEX: 27.81 KG/M2 | DIASTOLIC BLOOD PRESSURE: 82 MMHG

## 2020-06-09 DIAGNOSIS — R19.7 DIARRHEA, UNSPECIFIED TYPE: Primary | ICD-10-CM

## 2020-06-09 DIAGNOSIS — K58.0 IRRITABLE BOWEL SYNDROME WITH DIARRHEA: ICD-10-CM

## 2020-06-09 PROCEDURE — 99999 PR PBB SHADOW E&M-EST. PATIENT-LVL IV: CPT | Mod: PBBFAC,HCNC,, | Performed by: NURSE PRACTITIONER

## 2020-06-09 PROCEDURE — 99213 PR OFFICE/OUTPT VISIT, EST, LEVL III, 20-29 MIN: ICD-10-PCS | Mod: HCNC,S$GLB,, | Performed by: NURSE PRACTITIONER

## 2020-06-09 PROCEDURE — 1101F PR PT FALLS ASSESS DOC 0-1 FALLS W/OUT INJ PAST YR: ICD-10-PCS | Mod: HCNC,CPTII,S$GLB, | Performed by: NURSE PRACTITIONER

## 2020-06-09 PROCEDURE — 3074F SYST BP LT 130 MM HG: CPT | Mod: HCNC,CPTII,S$GLB, | Performed by: NURSE PRACTITIONER

## 2020-06-09 PROCEDURE — 1159F PR MEDICATION LIST DOCUMENTED IN MEDICAL RECORD: ICD-10-PCS | Mod: HCNC,S$GLB,, | Performed by: NURSE PRACTITIONER

## 2020-06-09 PROCEDURE — 1159F MED LIST DOCD IN RCRD: CPT | Mod: HCNC,S$GLB,, | Performed by: NURSE PRACTITIONER

## 2020-06-09 PROCEDURE — 3079F PR MOST RECENT DIASTOLIC BLOOD PRESSURE 80-89 MM HG: ICD-10-PCS | Mod: HCNC,CPTII,S$GLB, | Performed by: NURSE PRACTITIONER

## 2020-06-09 PROCEDURE — 3079F DIAST BP 80-89 MM HG: CPT | Mod: HCNC,CPTII,S$GLB, | Performed by: NURSE PRACTITIONER

## 2020-06-09 PROCEDURE — 1101F PT FALLS ASSESS-DOCD LE1/YR: CPT | Mod: HCNC,CPTII,S$GLB, | Performed by: NURSE PRACTITIONER

## 2020-06-09 PROCEDURE — 99213 OFFICE O/P EST LOW 20 MIN: CPT | Mod: HCNC,S$GLB,, | Performed by: NURSE PRACTITIONER

## 2020-06-09 PROCEDURE — 99999 PR PBB SHADOW E&M-EST. PATIENT-LVL IV: ICD-10-PCS | Mod: PBBFAC,HCNC,, | Performed by: NURSE PRACTITIONER

## 2020-06-09 PROCEDURE — 3074F PR MOST RECENT SYSTOLIC BLOOD PRESSURE < 130 MM HG: ICD-10-PCS | Mod: HCNC,CPTII,S$GLB, | Performed by: NURSE PRACTITIONER

## 2020-06-09 NOTE — PATIENT INSTRUCTIONS
Fiber Choice Chewables 1-2 tablets with a large glass of water    FODMAP diet    IBGard as needed     Florastor or Culturelle

## 2020-06-09 NOTE — PROGRESS NOTES
Clinic Follow Up:  [facility] Follow Up Note    Reason for Follow Up:  The primary encounter diagnosis was Diarrhea, unspecified type. A diagnosis of Irritable bowel syndrome with diarrhea was also pertinent to this visit.    PCP: Jesse Grimes       HPI:  This is a 69 y.o. male here for follow up of the above  Pt states that since his last visit, he has been overall stable.  However, he has continued with loose stools most days of the week.  He has some mild abdominal discomfort just prior to having a BM.  Discomfort is relieved with a BM.  Loose stool is mostly in the mid-day.  Has been unable to determine any specific trigger foods.   He denies any melena or hematochezia.  No new or change in medications.   Has been taking a probiotic without any change in symptoms.   Previous colonoscopy in 2019 with colon polyps.      Review of Systems   Constitutional: Negative for chills, fever, malaise/fatigue and weight loss.   Respiratory: Negative for cough.    Cardiovascular: Negative for chest pain.   Gastrointestinal:        Per HPI   Musculoskeletal: Negative for myalgias.   Skin: Negative for itching and rash.   Neurological: Negative for headaches.   Psychiatric/Behavioral: The patient is not nervous/anxious.        Medical History:  Past Medical History:   Diagnosis Date    Acid reflux     [facility]    Angina pectoris     Back pain     BPH (benign prostatic hyperplasia)     blue    Degenerative joint disease     Diverticulosis     Erectile dysfunction     History of elevated PSA 2/14    normalized, dr dave annually    History of pericarditis     Hypercholesteremia     Hypertension     Iron deficiency anemia     Pacemaker     complete av block;NO afib    Squamous cell cancer of skin of mastoid region of scalp     dr jaida salgado    Type 2 diabetes mellitus     dr wyman    Urinary incontinence     when he was 7 Yrs old.        Surgical History:   Past Surgical History:  "  Procedure Laterality Date    CARDIAC PACEMAKER PLACEMENT      COLONOSCOPY N/A 9/13/2019    Procedure: COLONOSCOPY;  Surgeon: Kan Ramsey III, MD;  Location: [facility] END[facility];  Service: Endoscopy;  Laterality: N/A;    ESOPHAGOGASTRODUODENOSCOPY N/A 9/13/2019    Procedure: ESOPHAGOGASTRODUODENOSCOPY (EGD);  Surgeon: Kan Ramsey III, MD;  Location: [facility] ENDO;  Service: Endoscopy;  Laterality: N/A;    JOINT REPLACEMENT      KNEE CARTILAGE SURGERY Bilateral     NASAL SINUS SURGERY      SHOULDER ARTHROSCOPY Right     TONSILLECTOMY      TOTAL KNEE ARTHROPLASTY Left 05/15/2017    TRANSURETHRAL RESECTION OF PROSTATE      TYMPANOPLASTY      vesectomy         Family History:   Family History   Problem Relation Age of Onset    Arthritis Mother     Hypertension Mother     Heart disease Father     Hypertension Father     Stroke Father     Diabetes Son     Diabetes Maternal Grandmother     Colon cancer Paternal Grandmother        Social History:   Social History     Tobacco Use    Smoking status: Never Smoker    Smokeless tobacco: Never Used   Substance Use Topics    Alcohol use: Yes     Comment: " once a month"    Drug use: No       Allergies: Reviewed    Home Medications:  Current Outpatient Medications on File Prior to Visit   Medication Sig Dispense Refill    ACETAMINOPHEN (TYLENOL 8 HOUR ORAL) Take by mouth as needed.      azelastine (ASTELIN) 137 mcg (0.1 %) nasal spray 2 sprays (274 mcg total) by Nasal route 2 (two) times daily. 30 mL 12    bifidobacterium infantis (ALIGN) 4 mg Cap Take 1 capsule by mouth Daily.      blood sugar diagnostic (ACCU-CHEK LUIS FERNANDO) Strp Check BG 2-3 times daily. Accuchek luis fernando strips 300 strip 3    fenofibrate micronized (LOFIBRA) 200 MG Cap Take 1 capsule (200 mg total) by mouth every evening. 90 capsule 3    ferrous gluconate (FERGON) 324 MG tablet Take 1 tablet (324 mg total) by mouth daily with breakfast. 90 tablet 1    gabapentin (CMPD PAIN MANAGEMENT " "COMPOUND OINTMNENT THREE) Apply bid prn pain 100 g 0    guaiFENesin (MUCINEX) 600 mg 12 hr tablet Take 1,200 mg by mouth 2 (two) times daily.      hydrocortisone 2.5 % cream Apply topically 2 (two) times daily. 3.5 g 5    insulin detemir U-100 (LEVEMIR FLEXTOUCH U-100 INSULN) 100 unit/mL (3 mL) SubQ InPn pen Inject 16 Units into the skin once daily. 30 mL 3    levocetirizine (XYZAL) 5 MG tablet Take 5 mg by mouth every evening.      liraglutide 0.6 mg/0.1 mL, 18 mg/3 mL, subq PNIJ (VICTOZA 3-ALESHA) 0.6 mg/0.1 mL (18 mg/3 mL) PnIj INJECT 1.8 MG DAILY 27 mL 3    losartan (COZAAR) 50 MG tablet Take 0.5 tablets (25 mg total) by mouth once daily. 180 tablet 3    lovastatin (MEVACOR) 40 MG tablet Take 1 tablet by mouth Every evening. 90 tablet 3    metFORMIN (GLUCOPHAGE) 1000 MG tablet Take 1 tablet (1,000 mg total) by mouth once daily. Generic  tablet 3    multivitamin capsule Take 1 capsule by mouth once daily.      omeprazole (PRILOSEC) 20 MG capsule Take 20 mg by mouth once daily.      pantoprazole (PROTONIX) 40 MG tablet Take 1 tablet (40 mg total) by mouth before breakfast. 90 tablet 3    pen needle, diabetic (BD INSULIN PEN NEEDLE UF MINI) 31 gauge x 3/16" Ndle USE AS DIRECTED WITH INSULIN 90 each 4    pioglitazone (ACTOS) 30 MG tablet Take 1 tablet (30 mg total) by mouth once daily. 90 tablet 3    tamsulosin (FLOMAX) 0.4 mg Cap Take 2 capsules (0.8 mg total) by mouth once daily. 180 capsule 3    albuterol (VENTOLIN HFA) 90 mcg/actuation inhaler Inhale 2 puffs into the lungs daily as needed for Shortness of Breath. Rescue (Patient not taking: Reported on 6/9/2020) 18 g 0    ranitidine (ZANTAC) 150 MG tablet Take 150 mg by mouth 2 (two) times daily.       No current facility-administered medications on file prior to visit.        Physical Exam:  Vital Signs:  /82   Pulse 80   Ht 5' 11" (1.803 m)   Wt 90.1 kg (198 lb 10.2 oz)   BMI 27.70 kg/m²   Body mass index is 27.7 kg/m².  Physical " Exam   Constitutional: He is oriented to person, place, and time. He appears well-developed.   HENT:   Head: Normocephalic.   Neck: Normal range of motion.   Cardiovascular: Normal rate.   Pulmonary/Chest: Effort normal.   Abdominal: He exhibits no distension.   Musculoskeletal: Normal range of motion.   Neurological: He is alert and oriented to person, place, and time.   Skin: Skin is warm and dry.   Psychiatric: He has a normal mood and affect.   Vitals reviewed.      Labs: Pertinent labs reviewed.    Assessment:  1. Diarrhea, unspecified type    2. Irritable bowel syndrome with diarrhea        Recommendations:  Complaints are stable over the last year  - I suspect symptoms are related to IBS-D.  - will have him start a fiber supplement once daily to add bulk to the stool.   - continue probiotic daily.  Can try a different probiotic, florastor or culturelle.   - FODMAP diet discussed.   - if symptoms continue, will plan for CT abd/pelvis for further evaluation.     Return to Clinic:  Follow up to be determined by results of above.

## 2020-06-11 ENCOUNTER — TELEPHONE (OUTPATIENT)
Dept: INTERNAL MEDICINE | Facility: CLINIC | Age: 70
End: 2020-06-11

## 2020-06-11 ENCOUNTER — PATIENT OUTREACH (OUTPATIENT)
Dept: OTHER | Facility: OTHER | Age: 70
End: 2020-06-11

## 2020-06-11 DIAGNOSIS — Z12.5 SCREENING FOR PROSTATE CANCER: Primary | ICD-10-CM

## 2020-06-11 NOTE — PROGRESS NOTES
Left voicemail requesting call back  Follow up possible prior hypotensive symptoms  BP average 119/76 mmHg    Hypertension Medications             losartan (COZAAR) 50 MG tablet Take 0.5 tablets (25 mg total) by mouth once daily.

## 2020-06-11 NOTE — TELEPHONE ENCOUNTER
S/W pt, He would like a PSA ordered for his upcoming appt with Dr. Guerrero. I informed the pt I will send Dr. Grimes a lilly/mukesh

## 2020-06-23 NOTE — PROGRESS NOTES
"Digital Medicine: Health  Follow-Up    Following up about lightheadedness/dizziness.  Reports he and clinical pharmacist Norah adjusted medications.recently, so he has noticed fewer lower readings.  Reports he was not feeling "" with reading on 6/12 of 97/66 mmHg.    The history is provided by the patient.   Follow Up  Follow-up reason(s): reading review      Readings are trending down   Routine Education Topics: eating patterns        INTERVENTION(S)  recommended diet modifications, encouragement/support and denied questions    PLAN  patient verbalizes understanding and continue monitoring          Topic    Hemoglobin A1C        Last 5 Patient Entered Readings                                      Current 30 Day Average: 115/73     Recent Readings 6/17/2020 6/12/2020 6/6/2020 5/31/2020 5/26/2020    SBP (mmHg) 107 97 131 131 103    DBP (mmHg) 70 66 80 81 62    Pulse 95 86 72 77 99                      Diet Screening       Reviewed hydration.  States he drinks 4 16oz cups per day.  Reports he also drinks powerade and coke zero.  Encouraged patient to stay hydrated especially if going outside.    Intervention(s): increasing water intake      SDOH  "

## 2020-06-24 RX ORDER — AZELASTINE 1 MG/ML
2 SPRAY, METERED NASAL 2 TIMES DAILY
Qty: 30 ML | Refills: 12 | Status: SHIPPED | OUTPATIENT
Start: 2020-06-24 | End: 2021-06-08 | Stop reason: SDUPTHER

## 2020-06-25 ENCOUNTER — LAB VISIT (OUTPATIENT)
Dept: LAB | Facility: HOSPITAL | Age: 70
End: 2020-06-25
Attending: NURSE PRACTITIONER
Payer: MEDICARE

## 2020-06-25 DIAGNOSIS — I15.2 HYPERTENSION ASSOCIATED WITH DIABETES: Chronic | ICD-10-CM

## 2020-06-25 DIAGNOSIS — E11.59 HYPERTENSION ASSOCIATED WITH DIABETES: Chronic | ICD-10-CM

## 2020-06-25 DIAGNOSIS — Z12.5 SCREENING FOR PROSTATE CANCER: ICD-10-CM

## 2020-06-25 LAB
COMPLEXED PSA SERPL-MCNC: 1.4 NG/ML (ref 0–4)
ESTIMATED AVG GLUCOSE: 120 MG/DL (ref 68–131)
HBA1C MFR BLD HPLC: 5.8 % (ref 4–5.6)

## 2020-06-25 PROCEDURE — 83036 HEMOGLOBIN GLYCOSYLATED A1C: CPT | Mod: HCNC

## 2020-06-25 PROCEDURE — 36415 COLL VENOUS BLD VENIPUNCTURE: CPT | Mod: HCNC,PO

## 2020-06-25 PROCEDURE — 84153 ASSAY OF PSA TOTAL: CPT | Mod: HCNC

## 2020-07-02 ENCOUNTER — TELEPHONE (OUTPATIENT)
Dept: UROLOGY | Facility: CLINIC | Age: 70
End: 2020-07-02

## 2020-07-02 NOTE — TELEPHONE ENCOUNTER
Attempted to contact pt but no avail.  Spoke to his wife and informed her that pts appt needed to be rescheduled as MD was out.

## 2020-07-08 ENCOUNTER — OFFICE VISIT (OUTPATIENT)
Dept: UROLOGY | Facility: CLINIC | Age: 70
End: 2020-07-08
Payer: MEDICARE

## 2020-07-08 VITALS
BODY MASS INDEX: 27.87 KG/M2 | TEMPERATURE: 98 F | HEART RATE: 92 BPM | DIASTOLIC BLOOD PRESSURE: 78 MMHG | HEIGHT: 71 IN | SYSTOLIC BLOOD PRESSURE: 138 MMHG | WEIGHT: 199.06 LBS

## 2020-07-08 DIAGNOSIS — N52.9 ERECTILE DYSFUNCTION, UNSPECIFIED ERECTILE DYSFUNCTION TYPE: ICD-10-CM

## 2020-07-08 DIAGNOSIS — N40.1 BENIGN PROSTATIC HYPERPLASIA (BPH) WITH STRAINING ON URINATION: Primary | ICD-10-CM

## 2020-07-08 DIAGNOSIS — R39.16 BENIGN PROSTATIC HYPERPLASIA (BPH) WITH STRAINING ON URINATION: Primary | ICD-10-CM

## 2020-07-08 PROCEDURE — 3008F BODY MASS INDEX DOCD: CPT | Mod: HCNC,CPTII,S$GLB, | Performed by: UROLOGY

## 2020-07-08 PROCEDURE — 1126F AMNT PAIN NOTED NONE PRSNT: CPT | Mod: HCNC,S$GLB,, | Performed by: UROLOGY

## 2020-07-08 PROCEDURE — 3078F PR MOST RECENT DIASTOLIC BLOOD PRESSURE < 80 MM HG: ICD-10-PCS | Mod: HCNC,CPTII,S$GLB, | Performed by: UROLOGY

## 2020-07-08 PROCEDURE — 1159F PR MEDICATION LIST DOCUMENTED IN MEDICAL RECORD: ICD-10-PCS | Mod: HCNC,S$GLB,, | Performed by: UROLOGY

## 2020-07-08 PROCEDURE — 99204 OFFICE O/P NEW MOD 45 MIN: CPT | Mod: HCNC,S$GLB,, | Performed by: UROLOGY

## 2020-07-08 PROCEDURE — 3078F DIAST BP <80 MM HG: CPT | Mod: HCNC,CPTII,S$GLB, | Performed by: UROLOGY

## 2020-07-08 PROCEDURE — 1159F MED LIST DOCD IN RCRD: CPT | Mod: HCNC,S$GLB,, | Performed by: UROLOGY

## 2020-07-08 PROCEDURE — 99499 RISK ADDL DX/OHS AUDIT: ICD-10-PCS | Mod: HCNC,S$GLB,, | Performed by: UROLOGY

## 2020-07-08 PROCEDURE — 1101F PT FALLS ASSESS-DOCD LE1/YR: CPT | Mod: HCNC,CPTII,S$GLB, | Performed by: UROLOGY

## 2020-07-08 PROCEDURE — 1101F PR PT FALLS ASSESS DOC 0-1 FALLS W/OUT INJ PAST YR: ICD-10-PCS | Mod: HCNC,CPTII,S$GLB, | Performed by: UROLOGY

## 2020-07-08 PROCEDURE — 3075F SYST BP GE 130 - 139MM HG: CPT | Mod: HCNC,CPTII,S$GLB, | Performed by: UROLOGY

## 2020-07-08 PROCEDURE — 3008F PR BODY MASS INDEX (BMI) DOCUMENTED: ICD-10-PCS | Mod: HCNC,CPTII,S$GLB, | Performed by: UROLOGY

## 2020-07-08 PROCEDURE — 1126F PR PAIN SEVERITY QUANTIFIED, NO PAIN PRESENT: ICD-10-PCS | Mod: HCNC,S$GLB,, | Performed by: UROLOGY

## 2020-07-08 PROCEDURE — 99204 PR OFFICE/OUTPT VISIT, NEW, LEVL IV, 45-59 MIN: ICD-10-PCS | Mod: HCNC,S$GLB,, | Performed by: UROLOGY

## 2020-07-08 PROCEDURE — 99999 PR PBB SHADOW E&M-EST. PATIENT-LVL III: CPT | Mod: PBBFAC,HCNC,, | Performed by: UROLOGY

## 2020-07-08 PROCEDURE — 3075F PR MOST RECENT SYSTOLIC BLOOD PRESS GE 130-139MM HG: ICD-10-PCS | Mod: HCNC,CPTII,S$GLB, | Performed by: UROLOGY

## 2020-07-08 PROCEDURE — 99999 PR PBB SHADOW E&M-EST. PATIENT-LVL III: ICD-10-PCS | Mod: PBBFAC,HCNC,, | Performed by: UROLOGY

## 2020-07-08 PROCEDURE — 99499 UNLISTED E&M SERVICE: CPT | Mod: HCNC,S$GLB,, | Performed by: UROLOGY

## 2020-07-08 RX ORDER — DUTASTERIDE 0.5 MG/1
0.5 CAPSULE, LIQUID FILLED ORAL DAILY
Qty: 30 CAPSULE | Refills: 11 | Status: SHIPPED | OUTPATIENT
Start: 2020-07-08 | End: 2020-08-28 | Stop reason: SDUPTHER

## 2020-07-08 RX ORDER — SILDENAFIL CITRATE 20 MG/1
20 TABLET ORAL 3 TIMES DAILY
Qty: 90 TABLET | Refills: 11 | Status: SHIPPED | OUTPATIENT
Start: 2020-07-08 | End: 2021-01-27 | Stop reason: SDUPTHER

## 2020-07-08 NOTE — PROGRESS NOTES
Chief Complaint:   Encounter Diagnoses   Name Primary?    Benign prostatic hyperplasia (BPH) with straining on urination Yes    Erectile dysfunction, unspecified erectile dysfunction type        HPI:  69-year-old gentleman who reports due to BPH type symptoms.  Patient on tamsulosin after the last 6 months, with no significant improvement.  This happened approximately 14-15 years ago he was having significant issues which required Florence catheterization.  Outside urologist did a TURP, symptoms seem to improve following that.  Patient's also complains of erectile dysfunction which he takes over-the-counter medications for.  One of his biggest complaints is retrograde ejaculation, which is most likely secondary to the tamsulosin.  Patient states he also has some decreased sensation with erections.  He is a significant diabetic Farideh lost from night, with no significant frequency or urgency issues during the daytime.  He states his biggest complaint is difficulty starting and stopping during his stream.  He has had no other urological history, no family history of urological cancers or stones, except for BPH in his father.  No gross hematuria, he has never been a smoker    Allergies:  Aspirin, Meperidine, and Niacin    Medications:  has a current medication list which includes the following prescription(s): acetaminophen, albuterol, azelastine, bifidobacterium infantis, blood sugar diagnostic, fenofibrate micronized, ferrous gluconate, gabapentin, guaifenesin, hydrocortisone, insulin detemir u-100, levocetirizine, liraglutide 0.6 mg/0.1 ml (18 mg/3 ml) subq pnij, losartan, lovastatin, metformin, multivitamin, pantoprazole, pen needle, diabetic, pioglitazone, ranitidine, tamsulosin, dutasteride, and sildenafil.    Review of Systems:  General: No fever, chills, fatigability, or weight loss.  Skin: No rashes, itching, or changes in color or texture of skin.  Chest: Denies MEEK, cyanosis, wheezing, cough, and sputum  production.  Abdomen: Appetite fine. No weight loss. Denies diarrhea, abdominal pain, hematemesis, or blood in stool.  Musculoskeletal: No joint stiffness or swelling. Denies back pain.  : As above.  All other review of systems negative.    PMH:   has a past medical history of Acid reflux, Angina pectoris, Back pain, BPH (benign prostatic hyperplasia), Degenerative joint disease, Diverticulosis, Erectile dysfunction, History of elevated PSA (2/14), History of pericarditis, Hypercholesteremia, Hypertension, Iron deficiency anemia, Pacemaker, Squamous cell cancer of skin of mastoid region of scalp, Type 2 diabetes mellitus, Urinary incontinence, and when he was 7 Yrs old..    PSH:   has a past surgical history that includes Shoulder arthroscopy (Right); Nasal sinus surgery; Cardiac pacemaker placement; Tonsillectomy; Transurethral resection of prostate; Knee cartilage surgery (Bilateral); Tympanoplasty; vesectomy; Total knee arthroplasty (Left, 05/15/2017); Joint replacement; Esophagogastroduodenoscopy (N/A, 9/13/2019); and Colonoscopy (N/A, 9/13/2019).    FamHx: family history includes Arthritis in his mother; Colon cancer in his paternal grandmother; Diabetes in his maternal grandmother and son; Heart disease in his father; Hypertension in his father and mother; Stroke in his father.    SocHx:  reports that he has never smoked. He has never used smokeless tobacco. He reports current alcohol use. He reports that he does not use drugs.      Physical Exam:  Vitals:    07/08/20 0823   BP: 138/78   Pulse: 92   Temp: 98.2 °F (36.8 °C)     General: A&Ox3, no apparent distress, no deformities  Neck: No masses, normal ROM  Lungs: normal inspiration, no use of accessory muscles  Heart: normal pulse, no arrhythmias  Abdomen: Soft, NT, ND, no masses, no hernias, no hepatosplenomegaly  Skin: The skin is warm and dry. No jaundice.  Ext: No c/c/e.  : Test desc sandi, no abnormalities of epididymus. Normal penile and scrotal  skin. Meatus normal. Normal rectal tone. Prost 45 gm no nodules or masses appreciated. SV not palpable. Perineum and anus normal.    Labs/Studies:   psa 1.4 6/20    Impression/Plan:     BPH- since the tamsulosin is causing retrograde ejaculation and not assisting his symptoms he would like to stop this medication.  Therefore will have him stop tamsulosin, he knows to restart if he has significant problems voiding.  I will initiate dutasteride, the patient realizes this may take up to 6 months to show benefit.  I recommended a possible TURP, patient wants to hold off on this at this point due to his significant problems with retrograde ejaculation already.    2.  Erectile dysfunction- patient is taking over-the-counter medications as of now, will go ahead and initiate sildenafil 40 mg and titrate up to not go beyond 100 mg.  This may assist in allowing him to have a firmer erection which might assist sensation.  I have also instructed the patient that this is probably also secondary to his diabetes with possible peripheral neuropathy.  In regards to the retrograde ejaculation, he understands that both forms of therapy may continue to create these symptoms and we might not be able to assist this.  I will see the patient back in 6 weeks and re-evaluate with a uroflow and postvoid residual.    He knows that this may cause blue-green vision changes, reflux, flushing, headaches, and to not take this with nitro pills.  He understands this may cause chest pain and if so to report to the emergency department immediately.  Patient understands that this medication can cause death of taken with nitro pills for his heart.  He is understanding of all the side effects, and would still like to proceed.

## 2020-07-09 ENCOUNTER — LAB VISIT (OUTPATIENT)
Dept: LAB | Facility: HOSPITAL | Age: 70
End: 2020-07-09
Attending: FAMILY MEDICINE
Payer: MEDICARE

## 2020-07-09 ENCOUNTER — OFFICE VISIT (OUTPATIENT)
Dept: INTERNAL MEDICINE | Facility: CLINIC | Age: 70
End: 2020-07-09
Payer: MEDICARE

## 2020-07-09 VITALS
HEART RATE: 76 BPM | HEIGHT: 71 IN | TEMPERATURE: 99 F | BODY MASS INDEX: 28.05 KG/M2 | SYSTOLIC BLOOD PRESSURE: 132 MMHG | OXYGEN SATURATION: 97 % | DIASTOLIC BLOOD PRESSURE: 80 MMHG | WEIGHT: 200.38 LBS | RESPIRATION RATE: 16 BRPM

## 2020-07-09 DIAGNOSIS — I44.2 AV BLOCK, 3RD DEGREE: ICD-10-CM

## 2020-07-09 DIAGNOSIS — Z79.4 TYPE 2 DIABETES MELLITUS WITHOUT COMPLICATION, WITH LONG-TERM CURRENT USE OF INSULIN: Primary | Chronic | ICD-10-CM

## 2020-07-09 DIAGNOSIS — Z95.0 PACEMAKER: Chronic | ICD-10-CM

## 2020-07-09 DIAGNOSIS — I15.2 HYPERTENSION ASSOCIATED WITH DIABETES: Chronic | ICD-10-CM

## 2020-07-09 DIAGNOSIS — D50.9 IRON DEFICIENCY ANEMIA, UNSPECIFIED IRON DEFICIENCY ANEMIA TYPE: ICD-10-CM

## 2020-07-09 DIAGNOSIS — E11.9 TYPE 2 DIABETES MELLITUS WITHOUT COMPLICATION, WITH LONG-TERM CURRENT USE OF INSULIN: Primary | Chronic | ICD-10-CM

## 2020-07-09 DIAGNOSIS — Z20.822 CLOSE EXPOSURE TO COVID-19 VIRUS: ICD-10-CM

## 2020-07-09 DIAGNOSIS — E11.59 HYPERTENSION ASSOCIATED WITH DIABETES: Chronic | ICD-10-CM

## 2020-07-09 LAB — SARS-COV-2 IGG SERPLBLD QL IA.RAPID: NEGATIVE

## 2020-07-09 PROCEDURE — 3079F DIAST BP 80-89 MM HG: CPT | Mod: HCNC,CPTII,S$GLB, | Performed by: FAMILY MEDICINE

## 2020-07-09 PROCEDURE — 36415 COLL VENOUS BLD VENIPUNCTURE: CPT | Mod: HCNC

## 2020-07-09 PROCEDURE — 1126F AMNT PAIN NOTED NONE PRSNT: CPT | Mod: HCNC,S$GLB,, | Performed by: FAMILY MEDICINE

## 2020-07-09 PROCEDURE — 3044F HG A1C LEVEL LT 7.0%: CPT | Mod: HCNC,CPTII,S$GLB, | Performed by: FAMILY MEDICINE

## 2020-07-09 PROCEDURE — 3044F PR MOST RECENT HEMOGLOBIN A1C LEVEL <7.0%: ICD-10-PCS | Mod: HCNC,CPTII,S$GLB, | Performed by: FAMILY MEDICINE

## 2020-07-09 PROCEDURE — 3079F PR MOST RECENT DIASTOLIC BLOOD PRESSURE 80-89 MM HG: ICD-10-PCS | Mod: HCNC,CPTII,S$GLB, | Performed by: FAMILY MEDICINE

## 2020-07-09 PROCEDURE — 99214 OFFICE O/P EST MOD 30 MIN: CPT | Mod: HCNC,S$GLB,, | Performed by: FAMILY MEDICINE

## 2020-07-09 PROCEDURE — 1159F PR MEDICATION LIST DOCUMENTED IN MEDICAL RECORD: ICD-10-PCS | Mod: HCNC,S$GLB,, | Performed by: FAMILY MEDICINE

## 2020-07-09 PROCEDURE — 99999 PR PBB SHADOW E&M-EST. PATIENT-LVL V: ICD-10-PCS | Mod: PBBFAC,HCNC,, | Performed by: FAMILY MEDICINE

## 2020-07-09 PROCEDURE — 99214 PR OFFICE/OUTPT VISIT, EST, LEVL IV, 30-39 MIN: ICD-10-PCS | Mod: HCNC,S$GLB,, | Performed by: FAMILY MEDICINE

## 2020-07-09 PROCEDURE — 99499 RISK ADDL DX/OHS AUDIT: ICD-10-PCS | Mod: HCNC,S$GLB,, | Performed by: FAMILY MEDICINE

## 2020-07-09 PROCEDURE — 86769 SARS-COV-2 COVID-19 ANTIBODY: CPT | Mod: HCNC

## 2020-07-09 PROCEDURE — 3008F PR BODY MASS INDEX (BMI) DOCUMENTED: ICD-10-PCS | Mod: HCNC,CPTII,S$GLB, | Performed by: FAMILY MEDICINE

## 2020-07-09 PROCEDURE — 3075F SYST BP GE 130 - 139MM HG: CPT | Mod: HCNC,CPTII,S$GLB, | Performed by: FAMILY MEDICINE

## 2020-07-09 PROCEDURE — 1126F PR PAIN SEVERITY QUANTIFIED, NO PAIN PRESENT: ICD-10-PCS | Mod: HCNC,S$GLB,, | Performed by: FAMILY MEDICINE

## 2020-07-09 PROCEDURE — 3075F PR MOST RECENT SYSTOLIC BLOOD PRESS GE 130-139MM HG: ICD-10-PCS | Mod: HCNC,CPTII,S$GLB, | Performed by: FAMILY MEDICINE

## 2020-07-09 PROCEDURE — 1101F PR PT FALLS ASSESS DOC 0-1 FALLS W/OUT INJ PAST YR: ICD-10-PCS | Mod: HCNC,CPTII,S$GLB, | Performed by: FAMILY MEDICINE

## 2020-07-09 PROCEDURE — 99499 UNLISTED E&M SERVICE: CPT | Mod: HCNC,S$GLB,, | Performed by: FAMILY MEDICINE

## 2020-07-09 PROCEDURE — 1101F PT FALLS ASSESS-DOCD LE1/YR: CPT | Mod: HCNC,CPTII,S$GLB, | Performed by: FAMILY MEDICINE

## 2020-07-09 PROCEDURE — 1159F MED LIST DOCD IN RCRD: CPT | Mod: HCNC,S$GLB,, | Performed by: FAMILY MEDICINE

## 2020-07-09 PROCEDURE — 99999 PR PBB SHADOW E&M-EST. PATIENT-LVL V: CPT | Mod: PBBFAC,HCNC,, | Performed by: FAMILY MEDICINE

## 2020-07-09 PROCEDURE — 3008F BODY MASS INDEX DOCD: CPT | Mod: HCNC,CPTII,S$GLB, | Performed by: FAMILY MEDICINE

## 2020-07-09 NOTE — PROGRESS NOTES
Subjective:       Patient ID: Rigoberto Vasquez is a . male.    Chief Complaint: Multiple issues see below    HPIType 2 diabetes: a1cnl . elida insul. And elim pm insul completely and a1c ok and no low sugars overnight as beforeT;declines digital dm      Hypertension: blood pressures at home normal. Tolerating medicine. w digital htn;less med needed wti 8 lbs wt loss  Hypercholesterolemia: controlled. Tolerating medicine.  GERD sympt w/o med. last egd fall 19;asympt. Tolerating medication. No swallowing problems    Hx elev psa/bph; saw urol yest and flomax etc changed /help with retrograde ejaculation    Chronic borderline anemia/hct: iron def w/u 2015. He says anemic all his life. egd cscope done. Saw gi again . No video capsule done;egd /cscope 9/19    Mild wt loss(helped w dm /bp) with better diet/activity w covid      Pacemaker utd dr davila    Past Medical History:   Diagnosis Date    Acid reflux     [facility]    Angina pectoris     Back pain     BPH (benign prostatic hyperplasia)     blue    Degenerative joint disease     Diverticulosis     Erectile dysfunction     History of elevated PSA 2/14    normalized, dr dave annually    History of pericarditis     Hypercholesteremia     Hypertension     Iron deficiency anemia     Pacemaker     complete av block;NO afib    Squamous cell cancer of skin of mastoid region of scalp     dr jaida salgado    Type 2 diabetes mellitus     dr wyman    Urinary incontinence     when he was 7 Yrs old.      Past Surgical History:   Procedure Laterality Date    CARDIAC PACEMAKER PLACEMENT      COLONOSCOPY N/A 9/13/2019    Procedure: COLONOSCOPY;  Surgeon: Kan Ramsey III, MD;  Location: [facility];  Service: Endoscopy;  Laterality: N/A;    ESOPHAGOGASTRODUODENOSCOPY N/A 9/13/2019    Procedure: ESOPHAGOGASTRODUODENOSCOPY (EGD);  Surgeon: Kan Ramsey III, MD;  Location: [facility];  Service: Endoscopy;  Laterality: N/A;    JOINT REPLACEMENT      KNEE CARTILAGE  "SURGERY Bilateral     NASAL SINUS SURGERY      SHOULDER ARTHROSCOPY Right     TONSILLECTOMY      TOTAL KNEE ARTHROPLASTY Left 05/15/2017    TRANSURETHRAL RESECTION OF PROSTATE      TYMPANOPLASTY      vesectomy       Family History   Problem Relation Age of Onset    Arthritis Mother     Hypertension Mother     Heart disease Father     Hypertension Father     Stroke Father     Diabetes Son     Diabetes Maternal Grandmother     Colon cancer Paternal Grandmother      Social History     Socioeconomic History    Marital status:      Spouse name: SAUL    Number of children: 2    Years of education: Not on file    Highest education level: Not on file   Occupational History    Occupation: retired- [employer] .   Social Needs    Financial resource strain: Not on file    Food insecurity     Worry: Not on file     Inability: Not on file    Transportation needs     Medical: Not on file     Non-medical: Not on file   Tobacco Use    Smoking status: Never Smoker    Smokeless tobacco: Never Used   Substance and Sexual Activity    Alcohol use: Yes     Comment: " once a month"    Drug use: No    Sexual activity: Yes     Partners: Female   Lifestyle    Physical activity     Days per week: Not on file     Minutes per session: Not on file    Stress: Not on file   Relationships    Social connections     Talks on phone: Not on file     Gets together: Not on file     Attends [employer] service: Not on file     Active member of club or organization: Not on file     Attends meetings of clubs or organizations: Not on file     Relationship status: Not on file   Other Topics Concern    Not on file   Social History Narrative     . Lives with spouse. Has 2 children. Patient retired from [employer]. His son has type 1 diabetes.   ]  \              Review of Systems   Respiratory: Negative for shortness of breath.    Cardiovascular: Negative for chest pain and leg swelling.     "   Objective:      Physical Exam   Constitutional: He is oriented to person, place, and time. He appears well-developed and well-nourished.   HENT:   Head: Normocephalic and atraumatic.   Eyes: Conjunctivae normal and EOM are normal. Pupils are equal, round, and reactive to light.   Neck: Normal range of motion. Neck supple. Carotid bruit is not present.   Cardiovascular: Normal rate and regular rhythm.    Pulmonary/Chest: Effort normal and breath sounds normal.   Abd+bssoftndnt no hsm no m ass  Musculoskeletal: He exhibits no edema.   Neurological: He is alert and oriented to person, place, and time.   Skin: Skin is warm and dry.   Psychiatric: He has a normal mood and affect. His behavior is normal. Judgment and thought content normal.           Assessment:         type 2 dm  htn  gerd  hyperchol  Hx elev psa/bph  Pacemaker hx    Hx iron def anemia  bph  Exposure covid  Mild wt loss/stable  Plan:    **  F/u card ()and urol as planned  Cont current insul, meds. No need to dec am insulin  declines digital dm;on digital htn  Lab and follow up after in 6 months Leigh Campo  He has history iron anemia with neg gi work up except no video capsule. If he needs this can you help set up?  Cont iron for now.     Add about a month ago exposed to covd. Neg test but had sorethroat. req ab test  Can try stopping pantoprazole: if sympt return thn resume    Shingrix new shingles vaccine  via a pharmacy      Type 2 diabetes mellitus without complication, with long-term current use of insulin  -     Comprehensive metabolic panel; Future; Expected date: 01/05/2021  -     CBC auto differential; Future; Expected date: 01/05/2021  -     Lipid Panel; Future; Expected date: 01/05/2021  -     Microalbumin/creatinine urine ratio; Future; Expected date: 01/05/2021  -     Hemoglobin A1C; Future; Expected date: 01/05/2021    Pacemaker    Hypertension associated with diabetes    AV block, 3rd degree    Iron deficiency anemia, unspecified iron  deficiency anemia type  -     Iron and TIBC; Future; Expected date: 01/09/2021  -     Ferritin; Future; Expected date: 01/09/2021    Close Exposure to Covid-19 Virus  -     COVID-19 (SARS CoV-2) IgG Antibody; Future; Expected date: 07/09/2020

## 2020-07-09 NOTE — Clinical Note
Jahaira  He has history iron anemia with neg gi work up except no video capsule. If he needs this can you help set up?

## 2020-07-10 NOTE — PROGRESS NOTES
Digital Medicine: Clinician Follow-Up    Patient reports decreased hypotensive symptoms  Patient relates lower BP readings to days he is more active    The history is provided by the patient.   Follow Up  Follow-up reason(s): reading review          INTERVENTION(S)  reviewed appropriate dose schedule, encouragement/support and denied questions    PLAN  patient verbalizes understanding and continue monitoring    BP at goal  Appears hypotensive symptoms improving  Advised patient that he could eat small salty snack and drink water following physical activity, or occasionally hold losartan dose, to prevent hypotensive symptoms  Encouraged him to contact me if symptoms start increasing and we will trial stopping losartan      There are no preventive care reminders to display for this patient.    Last 5 Patient Entered Readings                                      Current 30 Day Average: 117/73     Recent Readings 7/8/2020 7/8/2020 7/6/2020 6/29/2020 6/23/2020    SBP (mmHg) 106 133 138 118 133    DBP (mmHg) 47 84 82 80 84    Pulse 88 87 73 76 68             Hypertension Medications             losartan (COZAAR) 50 MG tablet Take 0.5 tablets (25 mg total) by mouth once daily.                             Medication Adherence Screening     Patient knows purpose of medications.

## 2020-07-14 ENCOUNTER — PATIENT MESSAGE (OUTPATIENT)
Dept: GASTROENTEROLOGY | Facility: CLINIC | Age: 70
End: 2020-07-14

## 2020-07-14 DIAGNOSIS — R19.4 ALTERED BOWEL HABITS: ICD-10-CM

## 2020-07-14 DIAGNOSIS — R10.84 GENERALIZED ABDOMINAL PAIN: ICD-10-CM

## 2020-07-14 DIAGNOSIS — R19.7 DIARRHEA, UNSPECIFIED TYPE: Primary | ICD-10-CM

## 2020-07-20 RX ORDER — DICYCLOMINE HYDROCHLORIDE 20 MG/1
20 TABLET ORAL EVERY 6 HOURS
Qty: 120 TABLET | Refills: 0 | Status: SHIPPED | OUTPATIENT
Start: 2020-07-20 | End: 2020-08-19

## 2020-08-04 ENCOUNTER — OFFICE VISIT (OUTPATIENT)
Dept: OTOLARYNGOLOGY | Facility: CLINIC | Age: 70
End: 2020-08-04
Payer: MEDICARE

## 2020-08-04 VITALS
TEMPERATURE: 98 F | HEIGHT: 71 IN | BODY MASS INDEX: 28 KG/M2 | WEIGHT: 200 LBS | HEART RATE: 71 BPM | SYSTOLIC BLOOD PRESSURE: 132 MMHG | DIASTOLIC BLOOD PRESSURE: 83 MMHG

## 2020-08-04 DIAGNOSIS — H65.493 COME (CHRONIC OTITIS MEDIA WITH EFFUSION), BILATERAL: Primary | ICD-10-CM

## 2020-08-04 DIAGNOSIS — H60.393 INFECTIOUS OTITIS EXTERNA, BILATERAL: ICD-10-CM

## 2020-08-04 PROCEDURE — 99999 PR PBB SHADOW E&M-EST. PATIENT-LVL IV: CPT | Mod: PBBFAC,,, | Performed by: OTOLARYNGOLOGY

## 2020-08-04 PROCEDURE — 3008F PR BODY MASS INDEX (BMI) DOCUMENTED: ICD-10-PCS | Mod: CPTII,S$GLB,, | Performed by: OTOLARYNGOLOGY

## 2020-08-04 PROCEDURE — 3079F PR MOST RECENT DIASTOLIC BLOOD PRESSURE 80-89 MM HG: ICD-10-PCS | Mod: CPTII,S$GLB,, | Performed by: OTOLARYNGOLOGY

## 2020-08-04 PROCEDURE — 1125F AMNT PAIN NOTED PAIN PRSNT: CPT | Mod: S$GLB,,, | Performed by: OTOLARYNGOLOGY

## 2020-08-04 PROCEDURE — 99214 OFFICE O/P EST MOD 30 MIN: CPT | Mod: S$GLB,,, | Performed by: OTOLARYNGOLOGY

## 2020-08-04 PROCEDURE — 99214 PR OFFICE/OUTPT VISIT, EST, LEVL IV, 30-39 MIN: ICD-10-PCS | Mod: S$GLB,,, | Performed by: OTOLARYNGOLOGY

## 2020-08-04 PROCEDURE — 1101F PR PT FALLS ASSESS DOC 0-1 FALLS W/OUT INJ PAST YR: ICD-10-PCS | Mod: CPTII,S$GLB,, | Performed by: OTOLARYNGOLOGY

## 2020-08-04 PROCEDURE — 1159F PR MEDICATION LIST DOCUMENTED IN MEDICAL RECORD: ICD-10-PCS | Mod: S$GLB,,, | Performed by: OTOLARYNGOLOGY

## 2020-08-04 PROCEDURE — 1125F PR PAIN SEVERITY QUANTIFIED, PAIN PRESENT: ICD-10-PCS | Mod: S$GLB,,, | Performed by: OTOLARYNGOLOGY

## 2020-08-04 PROCEDURE — 3075F PR MOST RECENT SYSTOLIC BLOOD PRESS GE 130-139MM HG: ICD-10-PCS | Mod: CPTII,S$GLB,, | Performed by: OTOLARYNGOLOGY

## 2020-08-04 PROCEDURE — 99999 PR PBB SHADOW E&M-EST. PATIENT-LVL IV: ICD-10-PCS | Mod: PBBFAC,,, | Performed by: OTOLARYNGOLOGY

## 2020-08-04 PROCEDURE — 3075F SYST BP GE 130 - 139MM HG: CPT | Mod: CPTII,S$GLB,, | Performed by: OTOLARYNGOLOGY

## 2020-08-04 PROCEDURE — 87070 CULTURE OTHR SPECIMN AEROBIC: CPT

## 2020-08-04 PROCEDURE — 1159F MED LIST DOCD IN RCRD: CPT | Mod: S$GLB,,, | Performed by: OTOLARYNGOLOGY

## 2020-08-04 PROCEDURE — 3079F DIAST BP 80-89 MM HG: CPT | Mod: CPTII,S$GLB,, | Performed by: OTOLARYNGOLOGY

## 2020-08-04 PROCEDURE — 1101F PT FALLS ASSESS-DOCD LE1/YR: CPT | Mod: CPTII,S$GLB,, | Performed by: OTOLARYNGOLOGY

## 2020-08-04 PROCEDURE — 3008F BODY MASS INDEX DOCD: CPT | Mod: CPTII,S$GLB,, | Performed by: OTOLARYNGOLOGY

## 2020-08-04 RX ORDER — NEOMYCIN SULFATE, POLYMYXIN B SULFATE AND HYDROCORTISONE 10; 3.5; 1 MG/ML; MG/ML; [USP'U]/ML
5 SUSPENSION/ DROPS AURICULAR (OTIC) 2 TIMES DAILY
Qty: 5 ML | Refills: 0 | Status: SHIPPED | OUTPATIENT
Start: 2020-08-04 | End: 2020-08-11

## 2020-08-04 RX ORDER — LEVOFLOXACIN 500 MG/1
500 TABLET, FILM COATED ORAL DAILY
Qty: 10 TABLET | Refills: 0 | Status: SHIPPED | OUTPATIENT
Start: 2020-08-04 | End: 2020-08-14

## 2020-08-04 NOTE — PROGRESS NOTES
"Subjective:   Patient: Rigoberto Vasquez [id number], :1950   Visit date:2020 9:38 AM    Chief Complaint:  Other (Pt states that when he woke up sat there was blood coming from his left ear. He also said that his right ear has been achy as well but no bleeding from that ear.)    HPI:  Rigoberto is a 70 y.o. male who is here for follow-up.     HX of COME AS s/p PET and Tplasty.  Tube is out.  No problems for last 2-3 years.  Bloody drainage AS x 3 days.  Pain AD.  Decreased hearing AU.  No exacerbating or relieving factors    Review of Systems:  -     Allergic/Immunologic: is allergic to aspirin; meperidine; and niacin..  -     Constitutional: Current temp: 98.2 °F (36.8 °C) (Tympanic)    -     He has a current medication list which includes the following prescription(s): acetaminophen, albuterol, azelastine, bifidobacterium infantis, blood sugar diagnostic, dicyclomine, dutasteride, fenofibrate micronized, ferrous gluconate, gabapentin, hydrocortisone, insulin detemir u-100, levocetirizine, liraglutide 0.6 mg/0.1 ml (18 mg/3 ml) subq pnij, losartan, lovastatin, metformin, multivitamin, pantoprazole, pen needle, diabetic, pioglitazone, sildenafil, tamsulosin, levofloxacin, and neomycin-polymyxin-hydrocortisone.  Objective:     Physical Exam:  Vitals:  /83   Pulse 71   Temp 98.2 °F (36.8 °C) (Tympanic)   Ht 5' 11" (1.803 m)   Wt 90.7 kg (200 lb)   BMI 27.89 kg/m²   Appearance:  Well-developed, well-nourished.  Communication:  Able to communicate, no hoarseness.  Head & Face:  Normocephalic, atraumatic, no sinus tenderness, normal facial strength.  Eyes:  Extraocular motions intact.  Ears:  Right EAC swollen nearly shut.  Left EAC with purulence. TM erythematous.   Nose:  No masses/lesions of external nose, nasal mucosa, septum, and turbinates were within normal limits.  Mouth:  No mass/lesion of lips, teeth, gums, hard/soft palate, tongue, tonsils, or oropharynx.  Neck & Lymphatics:  No cervical " lymphadenopathy, no neck mass/crepitus/ asymmetry, trachea is midline, no thyroid enlargement/tenderness/mass.  Neuro/Psych: Alert with normal mood and affect.   Abdominal: Normal appearance.   Respiration/Chest:  Symmetric expansion during respiration, normal respiratory effort.  Skin:  Warm and intact  Cardiovascular:  No peripheral vascular edema or varicosities.    Assessment & Plan:   Rigoberto was seen today for other.    Diagnoses and all orders for this visit:    COME (chronic otitis media with effusion), bilateral  -     Aerobic culture  -     neomycin-polymyxin-hydrocortisone (CORTISPORIN) 3.5-10,000-1 mg/mL-unit/mL-% otic suspension; Place 5 drops into both ears 2 (two) times a day. for 7 days  -     levoFLOXacin (LEVAQUIN) 500 MG tablet; Take 1 tablet (500 mg total) by mouth once daily. for 10 days    Infectious otitis externa, bilateral  -     Aerobic culture  -     neomycin-polymyxin-hydrocortisone (CORTISPORIN) 3.5-10,000-1 mg/mL-unit/mL-% otic suspension; Place 5 drops into both ears 2 (two) times a day. for 7 days  -     levoFLOXacin (LEVAQUIN) 500 MG tablet; Take 1 tablet (500 mg total) by mouth once daily. for 10 days      Wick AD.  Dust AS.  Wick to be removed later this weel.

## 2020-08-05 ENCOUNTER — LAB VISIT (OUTPATIENT)
Dept: LAB | Facility: HOSPITAL | Age: 70
End: 2020-08-05
Attending: NURSE PRACTITIONER
Payer: MEDICARE

## 2020-08-05 DIAGNOSIS — R19.7 DIARRHEA, UNSPECIFIED TYPE: ICD-10-CM

## 2020-08-05 DIAGNOSIS — R10.84 GENERALIZED ABDOMINAL PAIN: ICD-10-CM

## 2020-08-05 DIAGNOSIS — R19.4 ALTERED BOWEL HABITS: ICD-10-CM

## 2020-08-05 LAB
CREAT SERPL-MCNC: 1.4 MG/DL (ref 0.5–1.4)
EST. GFR  (AFRICAN AMERICAN): 58.8 ML/MIN/1.73 M^2
EST. GFR  (NON AFRICAN AMERICAN): 50.9 ML/MIN/1.73 M^2

## 2020-08-05 PROCEDURE — 36415 COLL VENOUS BLD VENIPUNCTURE: CPT | Mod: PO

## 2020-08-05 PROCEDURE — 82565 ASSAY OF CREATININE: CPT | Mod: PO

## 2020-08-06 ENCOUNTER — OFFICE VISIT (OUTPATIENT)
Dept: OTOLARYNGOLOGY | Facility: CLINIC | Age: 70
End: 2020-08-06
Payer: MEDICARE

## 2020-08-06 ENCOUNTER — TELEPHONE (OUTPATIENT)
Dept: RADIOLOGY | Facility: HOSPITAL | Age: 70
End: 2020-08-06

## 2020-08-06 VITALS
WEIGHT: 194.88 LBS | HEART RATE: 83 BPM | SYSTOLIC BLOOD PRESSURE: 136 MMHG | TEMPERATURE: 98 F | DIASTOLIC BLOOD PRESSURE: 80 MMHG | BODY MASS INDEX: 27.18 KG/M2

## 2020-08-06 DIAGNOSIS — H65.493 COME (CHRONIC OTITIS MEDIA WITH EFFUSION), BILATERAL: ICD-10-CM

## 2020-08-06 DIAGNOSIS — H60.393 INFECTIOUS OTITIS EXTERNA, BILATERAL: Primary | ICD-10-CM

## 2020-08-06 PROCEDURE — 99213 PR OFFICE/OUTPT VISIT, EST, LEVL III, 20-29 MIN: ICD-10-PCS | Mod: S$GLB,,, | Performed by: PHYSICIAN ASSISTANT

## 2020-08-06 PROCEDURE — 1159F MED LIST DOCD IN RCRD: CPT | Mod: S$GLB,,, | Performed by: PHYSICIAN ASSISTANT

## 2020-08-06 PROCEDURE — 3079F DIAST BP 80-89 MM HG: CPT | Mod: CPTII,S$GLB,, | Performed by: PHYSICIAN ASSISTANT

## 2020-08-06 PROCEDURE — 99999 PR PBB SHADOW E&M-EST. PATIENT-LVL V: CPT | Mod: PBBFAC,,, | Performed by: PHYSICIAN ASSISTANT

## 2020-08-06 PROCEDURE — 1159F PR MEDICATION LIST DOCUMENTED IN MEDICAL RECORD: ICD-10-PCS | Mod: S$GLB,,, | Performed by: PHYSICIAN ASSISTANT

## 2020-08-06 PROCEDURE — 3008F PR BODY MASS INDEX (BMI) DOCUMENTED: ICD-10-PCS | Mod: CPTII,S$GLB,, | Performed by: PHYSICIAN ASSISTANT

## 2020-08-06 PROCEDURE — 99213 OFFICE O/P EST LOW 20 MIN: CPT | Mod: S$GLB,,, | Performed by: PHYSICIAN ASSISTANT

## 2020-08-06 PROCEDURE — 3075F PR MOST RECENT SYSTOLIC BLOOD PRESS GE 130-139MM HG: ICD-10-PCS | Mod: CPTII,S$GLB,, | Performed by: PHYSICIAN ASSISTANT

## 2020-08-06 PROCEDURE — 1125F AMNT PAIN NOTED PAIN PRSNT: CPT | Mod: S$GLB,,, | Performed by: PHYSICIAN ASSISTANT

## 2020-08-06 PROCEDURE — 99999 PR PBB SHADOW E&M-EST. PATIENT-LVL V: ICD-10-PCS | Mod: PBBFAC,,, | Performed by: PHYSICIAN ASSISTANT

## 2020-08-06 PROCEDURE — 1101F PR PT FALLS ASSESS DOC 0-1 FALLS W/OUT INJ PAST YR: ICD-10-PCS | Mod: CPTII,S$GLB,, | Performed by: PHYSICIAN ASSISTANT

## 2020-08-06 PROCEDURE — 1101F PT FALLS ASSESS-DOCD LE1/YR: CPT | Mod: CPTII,S$GLB,, | Performed by: PHYSICIAN ASSISTANT

## 2020-08-06 PROCEDURE — 3008F BODY MASS INDEX DOCD: CPT | Mod: CPTII,S$GLB,, | Performed by: PHYSICIAN ASSISTANT

## 2020-08-06 PROCEDURE — 3079F PR MOST RECENT DIASTOLIC BLOOD PRESSURE 80-89 MM HG: ICD-10-PCS | Mod: CPTII,S$GLB,, | Performed by: PHYSICIAN ASSISTANT

## 2020-08-06 PROCEDURE — 1125F PR PAIN SEVERITY QUANTIFIED, PAIN PRESENT: ICD-10-PCS | Mod: S$GLB,,, | Performed by: PHYSICIAN ASSISTANT

## 2020-08-06 PROCEDURE — 3075F SYST BP GE 130 - 139MM HG: CPT | Mod: CPTII,S$GLB,, | Performed by: PHYSICIAN ASSISTANT

## 2020-08-06 RX ORDER — PREDNISONE 20 MG/1
20 TABLET ORAL DAILY
Qty: 5 TABLET | Refills: 0 | Status: SHIPPED | OUTPATIENT
Start: 2020-08-06 | End: 2020-08-11

## 2020-08-06 NOTE — PROGRESS NOTES
Subjective:   Patient: Rigoberto Vasquez [id number], :1950   Visit date:2020 9:07 AM    Chief Complaint:  Wick removal (right ear) (Right ear feels swollen into neck) and Otalgia (Bilateral)    HPI:  Rigoberto is a 70 y.o. male who is here for follow-up.  He was last here with Dr. Lopez on 2020 and had otowick placed in right ear.  He says the right face has been swelling but it is slowly improving.  He has been using Cortisporin and Levaquin.  No fever or further ear drainage.  The pain AD is gradually improving.  No exacerbating or relieving factors.  Still with decreased hearing AU.      HX of COME AS s/p PET and Tplasty.  Tube is out.  No problems for last 2-3 years.        Review of Systems:  -     Allergic/Immunologic: is allergic to aspirin; meperidine; and niacin..  -     Constitutional: Current temp: 97.9 °F (36.6 °C) (Tympanic)    -     He has a current medication list which includes the following prescription(s): acetaminophen, albuterol, azelastine, bifidobacterium infantis, blood sugar diagnostic, dicyclomine, dutasteride, fenofibrate micronized, ferrous gluconate, gabapentin, hydrocortisone, insulin detemir u-100, levocetirizine, levofloxacin, liraglutide 0.6 mg/0.1 ml (18 mg/3 ml) subq pnij, losartan, lovastatin, metformin, multivitamin, neomycin-polymyxin-hydrocortisone, pantoprazole, pen needle, diabetic, pioglitazone, sildenafil, tamsulosin, and prednisone.  Objective:     Physical Exam:  Vitals:  /80   Pulse 83   Temp 97.9 °F (36.6 °C) (Tympanic)   Wt 88.4 kg (194 lb 14.2 oz)   BMI 27.18 kg/m²   Appearance:  Well-developed, well-nourished.  Communication:  Able to communicate, no hoarseness.  Head & Face:  Normocephalic, atraumatic, no sinus tenderness, normal facial strength.  Eyes:  Extraocular motions intact.  Ears:  [place] in RIGHT external auditory canal (removed today without difficulty).  Suctioned some white fluffy drainage AD using 5F suction and otomicroscope; the tympanic  membrane was normal.  Left TM is scarred/erythematous with no active drainage, scant powder along inferior TM.  Nose:  No masses/lesions of external nose, nasal mucosa, septum, and turbinates were within normal limits.  Mouth:  No mass/lesion of lips, teeth, gums, hard/soft palate, tongue, tonsils, or oropharynx.  Neck & Lymphatics:  No cervical lymphadenopathy, no neck mass/crepitus/ asymmetry, trachea is midline, no thyroid enlargement/tenderness/mass.  Neuro/Psych: Alert with normal mood and affect.   Abdominal: Normal appearance.   Respiration/Chest:  Symmetric expansion during respiration, normal respiratory effort.  Skin:  Warm and intact  Cardiovascular:  No peripheral vascular edema or varicosities.    Assessment & Plan:   Rigoberto was seen today for wick removal (right ear) and otalgia.    Diagnoses and all orders for this visit:    Infectious otitis externa, bilateral    Other orders  -     predniSONE (DELTASONE) 20 MG tablet; Take 1 tablet (20 mg total) by mouth once daily. for 5 days      Wick AD removed today without difficulty; edema of canal has improved.  Recommend he continue Cortisporin and Levaquin as prescribed and will schedule him to RTC with Dr. Lopez in 2 weeks for recheck.  Instructed him to call sooner with any worsening symptoms.  Will add short burst of Prednisone to help with the inflammation.  We discussed risks of steroids including elevating blood sugars.  He voiced understanding and agrees to treatment.

## 2020-08-07 ENCOUNTER — HOSPITAL ENCOUNTER (OUTPATIENT)
Dept: RADIOLOGY | Facility: HOSPITAL | Age: 70
Discharge: HOME | End: 2020-08-07
Attending: NURSE PRACTITIONER
Payer: MEDICARE

## 2020-08-07 DIAGNOSIS — R19.4 ALTERED BOWEL HABITS: ICD-10-CM

## 2020-08-07 DIAGNOSIS — R19.7 DIARRHEA, UNSPECIFIED TYPE: ICD-10-CM

## 2020-08-07 DIAGNOSIS — R10.84 GENERALIZED ABDOMINAL PAIN: ICD-10-CM

## 2020-08-07 LAB — BACTERIA SPEC AEROBE CULT: NORMAL

## 2020-08-07 PROCEDURE — 74178 CT ABD&PLV WO CNTR FLWD CNTR: CPT | Mod: 26,,, | Performed by: RADIOLOGY

## 2020-08-07 PROCEDURE — 25500020 PHARM REV CODE 255: Performed by: NURSE PRACTITIONER

## 2020-08-07 PROCEDURE — 74178 CT ABD&PLV WO CNTR FLWD CNTR: CPT | Mod: TC

## 2020-08-07 PROCEDURE — 74178 CT ABDOMEN PELVIS W WO CONTRAST: ICD-10-PCS | Mod: 26,,, | Performed by: RADIOLOGY

## 2020-08-07 RX ADMIN — IOHEXOL 100 ML: 350 INJECTION, SOLUTION INTRAVENOUS at 08:08

## 2020-08-07 RX ADMIN — IOHEXOL 30 ML: 350 INJECTION, SOLUTION INTRAVENOUS at 07:08

## 2020-08-12 ENCOUNTER — PES CALL (OUTPATIENT)
Dept: ADMINISTRATIVE | Facility: CLINIC | Age: 70
End: 2020-08-12

## 2020-08-12 ENCOUNTER — PATIENT OUTREACH (OUTPATIENT)
Dept: OTHER | Facility: OTHER | Age: 70
End: 2020-08-12

## 2020-08-12 NOTE — PROGRESS NOTES
"Digital Medicine: Health  Follow-Up    The history is provided by the patient.             Reason for review: Blood pressure not at goal        Topics Covered on Call: physical activity and Diet          Diet-no change to diet    No change to diet.  Patient reports eating or drinking the following: Reports he is always trying to improve on diet.      Physical Activity-no change to routine  No change to exercise routine.       He exercises for 10 minutes per day 3 day(s) a week.     Additional physical activity details: Reports he is trying to "maintain his exercise level." States he does back exercises and walks on his treadclimber 3x/week for 10min      Medication Adherence-Medication Adherence not addressed.      Substance, Sleep, Stress-Not assessed      Continue current diet/physical activity routine.       Addressed any questions or concerns and patient has my contact information if needed prior to next outreach. Patient verbalizes understanding.      Explained the importance of self-monitoring and medication adherence. Encouraged the patient to communicate with their health  for lifestyle modifications to help improve or maintain a healthy lifestyle.            There are no preventive care reminders to display for this patient.    Last 5 Patient Entered Readings                                      Current 30 Day Average: 127/81     Recent Readings 8/10/2020 8/1/2020 7/28/2020 7/24/2020 7/21/2020    SBP (mmHg) 133 122 130 129 137    DBP (mmHg) 78 78 89 82 79    Pulse 85 68 76 69 75               "

## 2020-08-17 NOTE — PROGRESS NOTES
Subjective:   Patient: Rigoberto Vasquez [id number], :1950   Visit date:2020 9:20 AM    Chief Complaint:  Other (recheck ears pt states that his left ear is still bothering him.)    HPI:  Rigoberto is a 70 y.o. male who is here for follow-up for R OE/OM:    20 - Wick placed AD, tx with Cortisporin and Levaquin  20 - Wick AD removed, continue gtt and abx, added prednisone taper  20 - AD much better; no pain. AS still with fullness and decreased hearing.  No sig drainage. No vertigo      HX of COME AS s/p PET and Tplasty 3/2015.  Has been great with that ear since surgery until recently    Review of Systems:  -     Allergic/Immunologic: is allergic to aspirin; meperidine; and niacin..  -     Constitutional: Current temp: 98.4 °F (36.9 °C) (Tympanic)    -     He has a current medication list which includes the following prescription(s): acetaminophen, albuterol, azelastine, bifidobacterium infantis, blood sugar diagnostic, dicyclomine, dutasteride, fenofibrate micronized, ferrous gluconate, gabapentin, hydrocortisone, insulin detemir u-100, levocetirizine, liraglutide 0.6 mg/0.1 ml (18 mg/3 ml) subq pnij, losartan, lovastatin, metformin, multivitamin, pantoprazole, pen needle, diabetic, pioglitazone, sildenafil, and tamsulosin.  Objective:     Physical Exam:  Vitals:  /84   Pulse 76   Temp 98.4 °F (36.9 °C) (Tympanic)   Wt 88.5 kg (195 lb)   BMI 27.20 kg/m²   Appearance:  Well-developed, well-nourished.  Communication:  Able to communicate, no hoarseness.  Head & Face:  Normocephalic, atraumatic, no sinus tenderness, normal facial strength.  Eyes:  Extraocular motions intact.  Ears:    AD- EAC and TM wnl  AS- EAC wnl. T tube migrated anteriorly with perf, heaped tissue; ?Cholesteatoma formation?    Nose:  No masses/lesions of external nose, nasal mucosa, septum, and turbinates were within normal limits.  Mouth:  No mass/lesion of lips, teeth, gums, hard/soft palate, tongue, tonsils, or  oropharynx.  Neck & Lymphatics:  No cervical lymphadenopathy, no neck mass/crepitus/ asymmetry, trachea is midline, no thyroid enlargement/tenderness/mass.  Neuro/Psych: Alert with normal mood and affect.   Abdominal: Normal appearance.   Respiration/Chest:  Symmetric expansion during respiration, normal respiratory effort.  Skin:  Warm and intact  Cardiovascular:  No peripheral vascular edema or varicosities.    Assessment & Plan:   Rigoberto was seen today for other.    Diagnoses and all orders for this visit:    COME (chronic otitis media with effusion), bilateral      Debrided and dusted AS.  Recheck 2 weeks.                Rigoberto Lopez MD, FACS  [facility]   [facility]  [address]  [place] [address]  P: [phone]  F: [phone]

## 2020-08-18 ENCOUNTER — OFFICE VISIT (OUTPATIENT)
Dept: OTOLARYNGOLOGY | Facility: CLINIC | Age: 70
End: 2020-08-18
Payer: MEDICARE

## 2020-08-18 VITALS
SYSTOLIC BLOOD PRESSURE: 130 MMHG | WEIGHT: 195 LBS | DIASTOLIC BLOOD PRESSURE: 84 MMHG | BODY MASS INDEX: 27.2 KG/M2 | TEMPERATURE: 98 F | HEART RATE: 76 BPM

## 2020-08-18 DIAGNOSIS — H65.493 COME (CHRONIC OTITIS MEDIA WITH EFFUSION), BILATERAL: Primary | ICD-10-CM

## 2020-08-18 PROCEDURE — 99999 PR PBB SHADOW E&M-EST. PATIENT-LVL IV: ICD-10-PCS | Mod: PBBFAC,,, | Performed by: OTOLARYNGOLOGY

## 2020-08-18 PROCEDURE — 1159F PR MEDICATION LIST DOCUMENTED IN MEDICAL RECORD: ICD-10-PCS | Mod: S$GLB,,, | Performed by: OTOLARYNGOLOGY

## 2020-08-18 PROCEDURE — 3079F PR MOST RECENT DIASTOLIC BLOOD PRESSURE 80-89 MM HG: ICD-10-PCS | Mod: CPTII,S$GLB,, | Performed by: OTOLARYNGOLOGY

## 2020-08-18 PROCEDURE — 3075F SYST BP GE 130 - 139MM HG: CPT | Mod: CPTII,S$GLB,, | Performed by: OTOLARYNGOLOGY

## 2020-08-18 PROCEDURE — 3008F BODY MASS INDEX DOCD: CPT | Mod: CPTII,S$GLB,, | Performed by: OTOLARYNGOLOGY

## 2020-08-18 PROCEDURE — 99214 PR OFFICE/OUTPT VISIT, EST, LEVL IV, 30-39 MIN: ICD-10-PCS | Mod: S$GLB,,, | Performed by: OTOLARYNGOLOGY

## 2020-08-18 PROCEDURE — 1125F AMNT PAIN NOTED PAIN PRSNT: CPT | Mod: S$GLB,,, | Performed by: OTOLARYNGOLOGY

## 2020-08-18 PROCEDURE — 3075F PR MOST RECENT SYSTOLIC BLOOD PRESS GE 130-139MM HG: ICD-10-PCS | Mod: CPTII,S$GLB,, | Performed by: OTOLARYNGOLOGY

## 2020-08-18 PROCEDURE — 99214 OFFICE O/P EST MOD 30 MIN: CPT | Mod: S$GLB,,, | Performed by: OTOLARYNGOLOGY

## 2020-08-18 PROCEDURE — 99999 PR PBB SHADOW E&M-EST. PATIENT-LVL IV: CPT | Mod: PBBFAC,,, | Performed by: OTOLARYNGOLOGY

## 2020-08-18 PROCEDURE — 1125F PR PAIN SEVERITY QUANTIFIED, PAIN PRESENT: ICD-10-PCS | Mod: S$GLB,,, | Performed by: OTOLARYNGOLOGY

## 2020-08-18 PROCEDURE — 1101F PT FALLS ASSESS-DOCD LE1/YR: CPT | Mod: CPTII,S$GLB,, | Performed by: OTOLARYNGOLOGY

## 2020-08-18 PROCEDURE — 1101F PR PT FALLS ASSESS DOC 0-1 FALLS W/OUT INJ PAST YR: ICD-10-PCS | Mod: CPTII,S$GLB,, | Performed by: OTOLARYNGOLOGY

## 2020-08-18 PROCEDURE — 3079F DIAST BP 80-89 MM HG: CPT | Mod: CPTII,S$GLB,, | Performed by: OTOLARYNGOLOGY

## 2020-08-18 PROCEDURE — 1159F MED LIST DOCD IN RCRD: CPT | Mod: S$GLB,,, | Performed by: OTOLARYNGOLOGY

## 2020-08-18 PROCEDURE — 3008F PR BODY MASS INDEX (BMI) DOCUMENTED: ICD-10-PCS | Mod: CPTII,S$GLB,, | Performed by: OTOLARYNGOLOGY

## 2020-08-19 ENCOUNTER — OFFICE VISIT (OUTPATIENT)
Dept: UROLOGY | Facility: CLINIC | Age: 70
End: 2020-08-19
Payer: MEDICARE

## 2020-08-19 VITALS
BODY MASS INDEX: 27.32 KG/M2 | TEMPERATURE: 99 F | SYSTOLIC BLOOD PRESSURE: 124 MMHG | DIASTOLIC BLOOD PRESSURE: 82 MMHG | HEART RATE: 80 BPM | HEIGHT: 71 IN | WEIGHT: 195.13 LBS

## 2020-08-19 DIAGNOSIS — R39.16 BENIGN PROSTATIC HYPERPLASIA (BPH) WITH STRAINING ON URINATION: Primary | ICD-10-CM

## 2020-08-19 DIAGNOSIS — N52.9 ERECTILE DYSFUNCTION, UNSPECIFIED ERECTILE DYSFUNCTION TYPE: ICD-10-CM

## 2020-08-19 DIAGNOSIS — N40.1 BENIGN PROSTATIC HYPERPLASIA (BPH) WITH STRAINING ON URINATION: Primary | ICD-10-CM

## 2020-08-19 LAB — POC RESIDUAL URINE VOLUME: 51 ML (ref 0–100)

## 2020-08-19 PROCEDURE — 99999 PR PBB SHADOW E&M-EST. PATIENT-LVL IV: ICD-10-PCS | Mod: PBBFAC,,, | Performed by: UROLOGY

## 2020-08-19 PROCEDURE — 99213 PR OFFICE/OUTPT VISIT, EST, LEVL III, 20-29 MIN: ICD-10-PCS | Mod: S$GLB,,, | Performed by: UROLOGY

## 2020-08-19 PROCEDURE — 1101F PT FALLS ASSESS-DOCD LE1/YR: CPT | Mod: CPTII,S$GLB,, | Performed by: UROLOGY

## 2020-08-19 PROCEDURE — 1159F MED LIST DOCD IN RCRD: CPT | Mod: S$GLB,,, | Performed by: UROLOGY

## 2020-08-19 PROCEDURE — 1126F PR PAIN SEVERITY QUANTIFIED, NO PAIN PRESENT: ICD-10-PCS | Mod: S$GLB,,, | Performed by: UROLOGY

## 2020-08-19 PROCEDURE — 51798 POCT BLADDER SCAN: ICD-10-PCS | Mod: S$GLB,,, | Performed by: UROLOGY

## 2020-08-19 PROCEDURE — 3008F BODY MASS INDEX DOCD: CPT | Mod: CPTII,S$GLB,, | Performed by: UROLOGY

## 2020-08-19 PROCEDURE — 3074F SYST BP LT 130 MM HG: CPT | Mod: CPTII,S$GLB,, | Performed by: UROLOGY

## 2020-08-19 PROCEDURE — 1159F PR MEDICATION LIST DOCUMENTED IN MEDICAL RECORD: ICD-10-PCS | Mod: S$GLB,,, | Performed by: UROLOGY

## 2020-08-19 PROCEDURE — 3079F DIAST BP 80-89 MM HG: CPT | Mod: CPTII,S$GLB,, | Performed by: UROLOGY

## 2020-08-19 PROCEDURE — 1101F PR PT FALLS ASSESS DOC 0-1 FALLS W/OUT INJ PAST YR: ICD-10-PCS | Mod: CPTII,S$GLB,, | Performed by: UROLOGY

## 2020-08-19 PROCEDURE — 3008F PR BODY MASS INDEX (BMI) DOCUMENTED: ICD-10-PCS | Mod: CPTII,S$GLB,, | Performed by: UROLOGY

## 2020-08-19 PROCEDURE — 3074F PR MOST RECENT SYSTOLIC BLOOD PRESSURE < 130 MM HG: ICD-10-PCS | Mod: CPTII,S$GLB,, | Performed by: UROLOGY

## 2020-08-19 PROCEDURE — 3079F PR MOST RECENT DIASTOLIC BLOOD PRESSURE 80-89 MM HG: ICD-10-PCS | Mod: CPTII,S$GLB,, | Performed by: UROLOGY

## 2020-08-19 PROCEDURE — 51798 US URINE CAPACITY MEASURE: CPT | Mod: S$GLB,,, | Performed by: UROLOGY

## 2020-08-19 PROCEDURE — 99999 PR PBB SHADOW E&M-EST. PATIENT-LVL IV: CPT | Mod: PBBFAC,,, | Performed by: UROLOGY

## 2020-08-19 PROCEDURE — 99213 OFFICE O/P EST LOW 20 MIN: CPT | Mod: S$GLB,,, | Performed by: UROLOGY

## 2020-08-19 PROCEDURE — 1126F AMNT PAIN NOTED NONE PRSNT: CPT | Mod: S$GLB,,, | Performed by: UROLOGY

## 2020-09-02 ENCOUNTER — PATIENT OUTREACH (OUTPATIENT)
Dept: ADMINISTRATIVE | Facility: OTHER | Age: 70
End: 2020-09-02

## 2020-09-02 NOTE — PROGRESS NOTES
Subjective:   Patient: Rigoberto Vasquez [id number], :1950   Visit date:9/3/2020 9:20 AM    Chief Complaint:  Follow-up (2 week rtc )    HPI:  Rigoberto is a 70 y.o. male who is here for follow-up for R OE/OM:    20 - Wick placed AD, tx with Cortisporin and Levaquin  20 - Wick AD removed, continue gtt and abx, added prednisone taper  20 - AD much better; no pain. AS still with fullness and decreased hearing.  No sig drainage. No vertigo. Debrided and dusted.   20 - AD with some intermittent pressure requiring valsalva for equalization.  AS feeling much better.  Hearing back to baseline.  Has been consistent with dry ear precautions.       HX of COME AS s/p PET and Tplasty 3/2015      Review of Systems:  -     Allergic/Immunologic: is allergic to aspirin; meperidine; and niacin..  -     Constitutional: Current temp: 98.2 °F (36.8 °C) (Tympanic)    -     He has a current medication list which includes the following prescription(s): acetaminophen, albuterol, azelastine, bifidobacterium infantis, blood sugar diagnostic, dutasteride, fenofibrate micronized, ferrous gluconate, gabapentin, hydrocortisone, insulin detemir u-100, levocetirizine, liraglutide 0.6 mg/0.1 ml (18 mg/3 ml) subq pnij, losartan, lovastatin, metformin, multivitamin, pantoprazole, pen needle, diabetic, pioglitazone, sildenafil, and tamsulosin.  Objective:     Physical Exam:  Vitals:  /84   Pulse 81   Temp 98.2 °F (36.8 °C) (Tympanic)   Wt 89.6 kg (197 lb 8.5 oz)   BMI 27.55 kg/m²   Appearance:  Well-developed, well-nourished.  Communication:  Able to communicate, no hoarseness.  Head & Face:  Normocephalic, atraumatic, no sinus tenderness, normal facial strength.  Eyes:  Extraocular motions intact.  Ears:    AD- EAC and TM wnl  AS- EAC wnl. T tube migrated anteriorly with perf, Appears clean and dry.  Powder collected in canal.   Nose:  No masses/lesions of external nose, nasal mucosa, septum, and turbinates were within  normal limits.  Mouth:  No mass/lesion of lips, teeth, gums, hard/soft palate, tongue, tonsils, or oropharynx.  Neck & Lymphatics:  No cervical lymphadenopathy, no neck mass/crepitus/ asymmetry, trachea is midline, no thyroid enlargement/tenderness/mass.  Neuro/Psych: Alert with normal mood and affect.   Abdominal: Normal appearance.   Respiration/Chest:  Symmetric expansion during respiration, normal respiratory effort.  Skin:  Warm and intact  Cardiovascular:  No peripheral vascular edema or varicosities.    Assessment & Plan:   Rigoberto was seen today for follow-up.    Diagnoses and all orders for this visit:    Dysfunction of both eustachian tubes       Looks much better.  Was painful to try to remove dried powder.  Use gtt to soften/loosen.  RTC 1 month with audio.                Rigoberto Lopez MD, FACS  [facility]   [facility]  [address]  [place] [address]  P: [phone]  F: [phone]

## 2020-09-02 NOTE — PROGRESS NOTES
Health Maintenance Due   Topic Date Due    Shingles Vaccine (2 of 3) 12/22/2011    Influenza Vaccine (1) 09/01/2020     Updates were requested from care everywhere.  Chart was reviewed for overdue [facility] Encounters ([facility]) topics (CRS, Breast Cancer Screening, Eye exam)  Health Maintenance has been updated.  LINKS immunization registry triggered.  Immunizations were reconciled.

## 2020-09-03 ENCOUNTER — IMMUNIZATION (OUTPATIENT)
Dept: PHARMACY | Facility: CLINIC | Age: 70
End: 2020-09-03
Payer: MEDICARE

## 2020-09-03 ENCOUNTER — OFFICE VISIT (OUTPATIENT)
Dept: OTOLARYNGOLOGY | Facility: CLINIC | Age: 70
End: 2020-09-03
Payer: MEDICARE

## 2020-09-03 VITALS
DIASTOLIC BLOOD PRESSURE: 84 MMHG | TEMPERATURE: 98 F | WEIGHT: 197.56 LBS | HEART RATE: 81 BPM | SYSTOLIC BLOOD PRESSURE: 121 MMHG | BODY MASS INDEX: 27.55 KG/M2

## 2020-09-03 DIAGNOSIS — H69.93 DYSFUNCTION OF BOTH EUSTACHIAN TUBES: Primary | ICD-10-CM

## 2020-09-03 PROCEDURE — 1159F PR MEDICATION LIST DOCUMENTED IN MEDICAL RECORD: ICD-10-PCS | Mod: HCNC,S$GLB,, | Performed by: OTOLARYNGOLOGY

## 2020-09-03 PROCEDURE — 3074F PR MOST RECENT SYSTOLIC BLOOD PRESSURE < 130 MM HG: ICD-10-PCS | Mod: HCNC,CPTII,S$GLB, | Performed by: OTOLARYNGOLOGY

## 2020-09-03 PROCEDURE — 1126F AMNT PAIN NOTED NONE PRSNT: CPT | Mod: HCNC,S$GLB,, | Performed by: OTOLARYNGOLOGY

## 2020-09-03 PROCEDURE — 3008F BODY MASS INDEX DOCD: CPT | Mod: HCNC,CPTII,S$GLB, | Performed by: OTOLARYNGOLOGY

## 2020-09-03 PROCEDURE — 1159F MED LIST DOCD IN RCRD: CPT | Mod: HCNC,S$GLB,, | Performed by: OTOLARYNGOLOGY

## 2020-09-03 PROCEDURE — 3074F SYST BP LT 130 MM HG: CPT | Mod: HCNC,CPTII,S$GLB, | Performed by: OTOLARYNGOLOGY

## 2020-09-03 PROCEDURE — 99999 PR PBB SHADOW E&M-EST. PATIENT-LVL IV: ICD-10-PCS | Mod: PBBFAC,HCNC,, | Performed by: OTOLARYNGOLOGY

## 2020-09-03 PROCEDURE — 3079F DIAST BP 80-89 MM HG: CPT | Mod: HCNC,CPTII,S$GLB, | Performed by: OTOLARYNGOLOGY

## 2020-09-03 PROCEDURE — 1101F PT FALLS ASSESS-DOCD LE1/YR: CPT | Mod: HCNC,CPTII,S$GLB, | Performed by: OTOLARYNGOLOGY

## 2020-09-03 PROCEDURE — 3008F PR BODY MASS INDEX (BMI) DOCUMENTED: ICD-10-PCS | Mod: HCNC,CPTII,S$GLB, | Performed by: OTOLARYNGOLOGY

## 2020-09-03 PROCEDURE — 99214 PR OFFICE/OUTPT VISIT, EST, LEVL IV, 30-39 MIN: ICD-10-PCS | Mod: HCNC,S$GLB,, | Performed by: OTOLARYNGOLOGY

## 2020-09-03 PROCEDURE — 1101F PR PT FALLS ASSESS DOC 0-1 FALLS W/OUT INJ PAST YR: ICD-10-PCS | Mod: HCNC,CPTII,S$GLB, | Performed by: OTOLARYNGOLOGY

## 2020-09-03 PROCEDURE — 1126F PR PAIN SEVERITY QUANTIFIED, NO PAIN PRESENT: ICD-10-PCS | Mod: HCNC,S$GLB,, | Performed by: OTOLARYNGOLOGY

## 2020-09-03 PROCEDURE — 99214 OFFICE O/P EST MOD 30 MIN: CPT | Mod: HCNC,S$GLB,, | Performed by: OTOLARYNGOLOGY

## 2020-09-03 PROCEDURE — 99999 PR PBB SHADOW E&M-EST. PATIENT-LVL IV: CPT | Mod: PBBFAC,HCNC,, | Performed by: OTOLARYNGOLOGY

## 2020-09-03 PROCEDURE — 3079F PR MOST RECENT DIASTOLIC BLOOD PRESSURE 80-89 MM HG: ICD-10-PCS | Mod: HCNC,CPTII,S$GLB, | Performed by: OTOLARYNGOLOGY

## 2020-09-25 ENCOUNTER — PATIENT MESSAGE (OUTPATIENT)
Dept: INTERNAL MEDICINE | Facility: CLINIC | Age: 70
End: 2020-09-25

## 2020-09-30 ENCOUNTER — PATIENT OUTREACH (OUTPATIENT)
Dept: OTHER | Facility: OTHER | Age: 70
End: 2020-09-30

## 2020-10-08 ENCOUNTER — CLINICAL SUPPORT (OUTPATIENT)
Dept: AUDIOLOGY | Facility: CLINIC | Age: 70
End: 2020-10-08
Payer: MEDICARE

## 2020-10-08 ENCOUNTER — OFFICE VISIT (OUTPATIENT)
Dept: OTOLARYNGOLOGY | Facility: CLINIC | Age: 70
End: 2020-10-08
Payer: MEDICARE

## 2020-10-08 ENCOUNTER — IMMUNIZATION (OUTPATIENT)
Dept: PHARMACY | Facility: CLINIC | Age: 70
End: 2020-10-08
Payer: MEDICARE

## 2020-10-08 ENCOUNTER — PATIENT OUTREACH (OUTPATIENT)
Dept: ADMINISTRATIVE | Facility: OTHER | Age: 70
End: 2020-10-08

## 2020-10-08 VITALS — BODY MASS INDEX: 27.86 KG/M2 | TEMPERATURE: 98 F | HEIGHT: 71 IN | WEIGHT: 199 LBS

## 2020-10-08 DIAGNOSIS — H90.6 HEARING LOSS, MIXED, BILATERAL: Primary | ICD-10-CM

## 2020-10-08 DIAGNOSIS — H90.12 CONDUCTIVE HEARING LOSS OF LEFT EAR WITH UNRESTRICTED HEARING OF RIGHT EAR: Primary | ICD-10-CM

## 2020-10-08 PROCEDURE — 92557 PR COMPREHENSIVE HEARING TEST: ICD-10-PCS | Mod: HCNC,S$GLB,, | Performed by: AUDIOLOGIST-HEARING AID FITTER

## 2020-10-08 PROCEDURE — 1159F MED LIST DOCD IN RCRD: CPT | Mod: HCNC,S$GLB,, | Performed by: OTOLARYNGOLOGY

## 2020-10-08 PROCEDURE — 92567 PR TYMPA2METRY: ICD-10-PCS | Mod: HCNC,S$GLB,, | Performed by: AUDIOLOGIST-HEARING AID FITTER

## 2020-10-08 PROCEDURE — 99214 OFFICE O/P EST MOD 30 MIN: CPT | Mod: HCNC,S$GLB,, | Performed by: OTOLARYNGOLOGY

## 2020-10-08 PROCEDURE — 99214 PR OFFICE/OUTPT VISIT, EST, LEVL IV, 30-39 MIN: ICD-10-PCS | Mod: HCNC,S$GLB,, | Performed by: OTOLARYNGOLOGY

## 2020-10-08 PROCEDURE — 1101F PR PT FALLS ASSESS DOC 0-1 FALLS W/OUT INJ PAST YR: ICD-10-PCS | Mod: HCNC,CPTII,S$GLB, | Performed by: OTOLARYNGOLOGY

## 2020-10-08 PROCEDURE — 3008F BODY MASS INDEX DOCD: CPT | Mod: HCNC,CPTII,S$GLB, | Performed by: OTOLARYNGOLOGY

## 2020-10-08 PROCEDURE — 99999 PR PBB SHADOW E&M-EST. PATIENT-LVL IV: ICD-10-PCS | Mod: PBBFAC,HCNC,, | Performed by: OTOLARYNGOLOGY

## 2020-10-08 PROCEDURE — 92557 COMPREHENSIVE HEARING TEST: CPT | Mod: HCNC,S$GLB,, | Performed by: AUDIOLOGIST-HEARING AID FITTER

## 2020-10-08 PROCEDURE — 1126F PR PAIN SEVERITY QUANTIFIED, NO PAIN PRESENT: ICD-10-PCS | Mod: HCNC,S$GLB,, | Performed by: OTOLARYNGOLOGY

## 2020-10-08 PROCEDURE — 92567 TYMPANOMETRY: CPT | Mod: HCNC,S$GLB,, | Performed by: AUDIOLOGIST-HEARING AID FITTER

## 2020-10-08 PROCEDURE — 1126F AMNT PAIN NOTED NONE PRSNT: CPT | Mod: HCNC,S$GLB,, | Performed by: OTOLARYNGOLOGY

## 2020-10-08 PROCEDURE — 3008F PR BODY MASS INDEX (BMI) DOCUMENTED: ICD-10-PCS | Mod: HCNC,CPTII,S$GLB, | Performed by: OTOLARYNGOLOGY

## 2020-10-08 PROCEDURE — 1101F PT FALLS ASSESS-DOCD LE1/YR: CPT | Mod: HCNC,CPTII,S$GLB, | Performed by: OTOLARYNGOLOGY

## 2020-10-08 PROCEDURE — 99999 PR PBB SHADOW E&M-EST. PATIENT-LVL IV: CPT | Mod: PBBFAC,HCNC,, | Performed by: OTOLARYNGOLOGY

## 2020-10-08 PROCEDURE — 1159F PR MEDICATION LIST DOCUMENTED IN MEDICAL RECORD: ICD-10-PCS | Mod: HCNC,S$GLB,, | Performed by: OTOLARYNGOLOGY

## 2020-10-08 NOTE — PROGRESS NOTES
Referring Provider:Dr. John Vasquez was seen 10/08/2020 for an audiological evaluation.  Patient is here for updated audiogram. He has a hx of chronic OME and infectious OE. He reports hearing seems much better today than it was one month ago. Requested his left ear be cleaned prior to audio. Large clump of powder remained in left EAC. ENT removed successfully.     Results reveal a mild mixed hearing loss 250-8000 Hz for the right ear, and a moderate-to-severe mixed hearing loss 250-8000 Hz for the left ear.   Speech Reception Thresholds were  30 dBHL for the right ear and 40 dBHL for the left ear.   Word recognition scores were excellent for the right ear and excellent for the left ear.   Tympanograms were Type C, abnormal for the right ear and Type C, abnormal for the left ear.    Patient was counseled on the above findings.    Recommendations:  1. ENT

## 2020-10-08 NOTE — PROGRESS NOTES
BP remains at goal, average 123/79 mmHg  1/2/20 CMP and 8/5/20 SCr reviewed. Repeat CMP pending for 1/2021.  Continue current regimen and monitoring.

## 2020-10-08 NOTE — PROGRESS NOTES
Health Maintenance Due   Topic Date Due    Influenza Vaccine (1) 08/01/2020     Updates were requested from care everywhere.  Chart was reviewed for overdue [facility]ters ([facility]) topics (CRS, Breast Cancer Screening, Eye exam)  Health Maintenance has been updated.  LINKS immunization registry triggered.  Immunizations were reconciled.

## 2020-10-15 ENCOUNTER — PATIENT MESSAGE (OUTPATIENT)
Dept: INTERNAL MEDICINE | Facility: CLINIC | Age: 70
End: 2020-10-15

## 2020-10-15 NOTE — TELEPHONE ENCOUNTER
----- Message from Akilah Kenney sent at 10/15/2020  1:42 PM CDT -----  Contact: [facility] -[phone]  Would like to consult with the nurse , patient need a refill on his Rx medication,  please call back thanks sj     .Type:  RX Refill Request    Who Called: lori  Refill or New Rx:refill  RX Name and Strength:something for pain  How is the patient currently taking it? (ex. 1XDay):  Is this a 30 day or 90 day RX:  Preferred Pharmacy with phone number:.  [facility] [place] - [address]  [address] [address]  Phone: [phone] Fax: [phone]      Local or Mail Order local  Ordering Provider: Dr jernigan  Would the patient rather a call back or a response via [facility]? Call back  Best Call Back Number:[phone]  Additional Information:

## 2020-10-19 ENCOUNTER — TELEPHONE (OUTPATIENT)
Dept: INTERNAL MEDICINE | Facility: CLINIC | Age: 70
End: 2020-10-19

## 2020-10-19 NOTE — TELEPHONE ENCOUNTER
----- Message from Jahaira Cole sent at 10/19/2020  8:53 AM CDT -----  Regarding: Refill  Contact: [phone]  Type:  RX Refill Request    Who Called: pharmacy  Refill or New Rx:refill   RX Name and Strength:gabapentin ([facility] OINTMNENT THREE)  Preferred Pharmacy with phone number: [facility]   Best Call Back Number:[phone]  Additional Information

## 2020-10-20 ENCOUNTER — TELEPHONE (OUTPATIENT)
Dept: INTERNAL MEDICINE | Facility: CLINIC | Age: 70
End: 2020-10-20

## 2020-10-20 NOTE — TELEPHONE ENCOUNTER
----- Message from Kaylynn Barnes sent at 10/20/2020  8:28 AM CDT -----  Type:  Pharmacy Calling to Clarify an RX    Name of Caller:Vicky  Pharmacy Name:[facility]  Prescription Name:gabapentin lidocaine meloxicam and prilocaine  What do they need to clarify?:f/u  on refill request   Best Call Back Number:[phone]  Additional Information: .    Thank you

## 2020-10-28 NOTE — PROGRESS NOTES
Digital Medicine: Health  Follow-Up    The history is provided by the patient.             Reason for review: Blood pressure not at goal      Additional Follow-up details: Following up about downward trend in BP readings. Patient reports he is doing well with no complaints.            Diet-no change to diet    No change to diet.        Physical Activity-no change to routine  No change to exercise routine.       Additional physical activity details: Continues to walk on mojio.      Medication Adherence-Medication Adherence not addressed.      Substance, Sleep, Stress-Not assessed      Continue current diet/physical activity routine.  Provided patient education.       Addressed patient questions and patient has my contact information if needed prior to next outreach. Patient verbalizes understanding.      Explained the importance of self-monitoring and medication adherence. Encouraged the patient to communicate with their health  for lifestyle modifications to help improve or maintain a healthy lifestyle.               There are no preventive care reminders to display for this patient.      Last 5 Patient Entered Readings                                      Current 30 Day Average: 129/83     Recent Readings 10/21/2020 10/19/2020 10/15/2020 10/7/2020 10/5/2020    SBP (mmHg) 133 128 115 130 138    DBP (mmHg) 88 85 75 80 89    Pulse 71 83 77 71 80

## 2020-10-29 ENCOUNTER — OFFICE VISIT (OUTPATIENT)
Dept: OTOLARYNGOLOGY | Facility: CLINIC | Age: 70
End: 2020-10-29
Payer: MEDICARE

## 2020-10-29 VITALS
HEIGHT: 71 IN | DIASTOLIC BLOOD PRESSURE: 73 MMHG | BODY MASS INDEX: 27.87 KG/M2 | HEART RATE: 74 BPM | SYSTOLIC BLOOD PRESSURE: 120 MMHG | WEIGHT: 199.06 LBS | TEMPERATURE: 98 F

## 2020-10-29 DIAGNOSIS — H90.A32 MIXED CONDUCTIVE AND SENSORINEURAL HEARING LOSS OF LEFT EAR WITH RESTRICTED HEARING OF RIGHT EAR: Primary | ICD-10-CM

## 2020-10-29 DIAGNOSIS — H69.93 DYSFUNCTION OF BOTH EUSTACHIAN TUBES: ICD-10-CM

## 2020-10-29 DIAGNOSIS — H65.493 COME (CHRONIC OTITIS MEDIA WITH EFFUSION), BILATERAL: ICD-10-CM

## 2020-10-29 PROCEDURE — 99213 OFFICE O/P EST LOW 20 MIN: CPT | Mod: HCNC,S$GLB,, | Performed by: OTOLARYNGOLOGY

## 2020-10-29 PROCEDURE — 3078F DIAST BP <80 MM HG: CPT | Mod: HCNC,CPTII,S$GLB, | Performed by: OTOLARYNGOLOGY

## 2020-10-29 PROCEDURE — 1159F MED LIST DOCD IN RCRD: CPT | Mod: HCNC,S$GLB,, | Performed by: OTOLARYNGOLOGY

## 2020-10-29 PROCEDURE — 3074F SYST BP LT 130 MM HG: CPT | Mod: HCNC,CPTII,S$GLB, | Performed by: OTOLARYNGOLOGY

## 2020-10-29 PROCEDURE — 3008F BODY MASS INDEX DOCD: CPT | Mod: HCNC,CPTII,S$GLB, | Performed by: OTOLARYNGOLOGY

## 2020-10-29 PROCEDURE — 1101F PR PT FALLS ASSESS DOC 0-1 FALLS W/OUT INJ PAST YR: ICD-10-PCS | Mod: HCNC,CPTII,S$GLB, | Performed by: OTOLARYNGOLOGY

## 2020-10-29 PROCEDURE — 3074F PR MOST RECENT SYSTOLIC BLOOD PRESSURE < 130 MM HG: ICD-10-PCS | Mod: HCNC,CPTII,S$GLB, | Performed by: OTOLARYNGOLOGY

## 2020-10-29 PROCEDURE — 3008F PR BODY MASS INDEX (BMI) DOCUMENTED: ICD-10-PCS | Mod: HCNC,CPTII,S$GLB, | Performed by: OTOLARYNGOLOGY

## 2020-10-29 PROCEDURE — 99999 PR PBB SHADOW E&M-EST. PATIENT-LVL IV: CPT | Mod: PBBFAC,HCNC,, | Performed by: OTOLARYNGOLOGY

## 2020-10-29 PROCEDURE — 1101F PT FALLS ASSESS-DOCD LE1/YR: CPT | Mod: HCNC,CPTII,S$GLB, | Performed by: OTOLARYNGOLOGY

## 2020-10-29 PROCEDURE — 1159F PR MEDICATION LIST DOCUMENTED IN MEDICAL RECORD: ICD-10-PCS | Mod: HCNC,S$GLB,, | Performed by: OTOLARYNGOLOGY

## 2020-10-29 PROCEDURE — 99213 PR OFFICE/OUTPT VISIT, EST, LEVL III, 20-29 MIN: ICD-10-PCS | Mod: HCNC,S$GLB,, | Performed by: OTOLARYNGOLOGY

## 2020-10-29 PROCEDURE — 99999 PR PBB SHADOW E&M-EST. PATIENT-LVL IV: ICD-10-PCS | Mod: PBBFAC,HCNC,, | Performed by: OTOLARYNGOLOGY

## 2020-10-29 PROCEDURE — 3078F PR MOST RECENT DIASTOLIC BLOOD PRESSURE < 80 MM HG: ICD-10-PCS | Mod: HCNC,CPTII,S$GLB, | Performed by: OTOLARYNGOLOGY

## 2020-10-29 NOTE — PROGRESS NOTES
Subjective:       Patient ID: Rigoberto Vasquez is a 70 y.o. male.    Chief Complaint: Patient in to get left ear checked and improve hearing    HPI     Rigoberto Vasquez is a 70 y.o. 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  He reports he has done pretty well since surgery in 2015 with Dr. Lopez, but has noticed over the years that his hearing has worsened.  He has occasional ear drainage.  He is not a hearing aid user at this time.    Review of Systems   Constitutional: Negative for chills and fever.   HENT: Negative for sore throat and trouble swallowing.    Respiratory: Negative for apnea and chest tightness.    Cardiovascular: Negative for chest pain.         Objective:      Physical Exam  Constitutional:       Appearance: He is well-developed.   HENT:      Head: Normocephalic and atraumatic.      Right Ear: External ear normal.      Left Ear: External ear normal.      Ears:      Comments: Binocular Microscopy  Indications: perforation, pre op planning  Details: binocular microscopy used to examine both ears  Findings  AD: posterior superior retraction pocket with myringostapediopexy, able to insufflate no obvious cholestaetoma  AS: prior cartilage graft to posterior tympani membrane, there is an anterior dry perforation 40% with a T-tube sitting in it, no discharge is noted.         Nose: Nose normal.      Mouth/Throat:      Pharynx: Uvula midline.   Neck:      Musculoskeletal: Normal range of motion.      Thyroid: No thyroid mass.       Data:       CT temporal bones image independently reviewed by me and show images are pre op from 2015: scattered soft tissue disease in left middle ear cavity.  Right mastoid and middle ear appear clear with slight erosion of right incuse long process      Assessment:       1. Mixed conductive and sensorineural hearing loss of left ear with restricted hearing of right ear    2. Dysfunction of both  eustachian tubes    3. COME (chronic otitis media with effusion), bilateral        Plan:        hearing likely worsened due to enlarging perf around T-tube, likely needs some perforation in that ear and conductive component is small.  Ear is otherwise dry and safe.  Do not recommend further surgery at this time.      Recommend consider hearing aid evaluation  Follow up as needed for ear cleaning

## 2020-11-09 ENCOUNTER — CLINICAL SUPPORT (OUTPATIENT)
Dept: AUDIOLOGY | Facility: CLINIC | Age: 70
End: 2020-11-09
Payer: MEDICARE

## 2020-11-09 DIAGNOSIS — Z71.89 HEARING AID CONSULTATION: Primary | ICD-10-CM

## 2020-11-09 NOTE — PROGRESS NOTES
Rigoberto Vasquez was seen 11/09/2020 for a hearing aid consult to discuss hearing aids.     The following aids will be ordered:    A pair of Phonak Audeo P's (50 or 70)  Color: Silver  Speaker Units:  1 R/L Standard right/M left  Domes:  TBD      Patient will contact [facility] regarding any hearing aid benefits. He will contact me if he would like to pursue hearing aids with [facility].

## 2020-11-11 ENCOUNTER — OFFICE VISIT (OUTPATIENT)
Dept: DERMATOLOGY | Facility: CLINIC | Age: 70
End: 2020-11-11
Payer: MEDICARE

## 2020-11-11 DIAGNOSIS — D18.01 CHERRY ANGIOMA: ICD-10-CM

## 2020-11-11 DIAGNOSIS — Z12.83 SCREENING, MALIGNANT NEOPLASM, SKIN: ICD-10-CM

## 2020-11-11 DIAGNOSIS — L82.1 SEBORRHEIC KERATOSES: Primary | ICD-10-CM

## 2020-11-11 DIAGNOSIS — L30.9 DERMATITIS: ICD-10-CM

## 2020-11-11 PROCEDURE — 99999 PR PBB SHADOW E&M-EST. PATIENT-LVL IV: ICD-10-PCS | Mod: PBBFAC,HCNC,, | Performed by: STUDENT IN AN ORGANIZED HEALTH CARE EDUCATION/TRAINING PROGRAM

## 2020-11-11 PROCEDURE — 1101F PR PT FALLS ASSESS DOC 0-1 FALLS W/OUT INJ PAST YR: ICD-10-PCS | Mod: HCNC,CPTII,S$GLB, | Performed by: STUDENT IN AN ORGANIZED HEALTH CARE EDUCATION/TRAINING PROGRAM

## 2020-11-11 PROCEDURE — 1126F AMNT PAIN NOTED NONE PRSNT: CPT | Mod: HCNC,S$GLB,, | Performed by: STUDENT IN AN ORGANIZED HEALTH CARE EDUCATION/TRAINING PROGRAM

## 2020-11-11 PROCEDURE — 1159F PR MEDICATION LIST DOCUMENTED IN MEDICAL RECORD: ICD-10-PCS | Mod: HCNC,S$GLB,, | Performed by: STUDENT IN AN ORGANIZED HEALTH CARE EDUCATION/TRAINING PROGRAM

## 2020-11-11 PROCEDURE — 1159F MED LIST DOCD IN RCRD: CPT | Mod: HCNC,S$GLB,, | Performed by: STUDENT IN AN ORGANIZED HEALTH CARE EDUCATION/TRAINING PROGRAM

## 2020-11-11 PROCEDURE — 1126F PR PAIN SEVERITY QUANTIFIED, NO PAIN PRESENT: ICD-10-PCS | Mod: HCNC,S$GLB,, | Performed by: STUDENT IN AN ORGANIZED HEALTH CARE EDUCATION/TRAINING PROGRAM

## 2020-11-11 PROCEDURE — 99999 PR PBB SHADOW E&M-EST. PATIENT-LVL IV: CPT | Mod: PBBFAC,HCNC,, | Performed by: STUDENT IN AN ORGANIZED HEALTH CARE EDUCATION/TRAINING PROGRAM

## 2020-11-11 PROCEDURE — 99202 OFFICE O/P NEW SF 15 MIN: CPT | Mod: HCNC,S$GLB,, | Performed by: STUDENT IN AN ORGANIZED HEALTH CARE EDUCATION/TRAINING PROGRAM

## 2020-11-11 PROCEDURE — 1101F PT FALLS ASSESS-DOCD LE1/YR: CPT | Mod: HCNC,CPTII,S$GLB, | Performed by: STUDENT IN AN ORGANIZED HEALTH CARE EDUCATION/TRAINING PROGRAM

## 2020-11-11 PROCEDURE — 99202 PR OFFICE/OUTPT VISIT, NEW, LEVL II, 15-29 MIN: ICD-10-PCS | Mod: HCNC,S$GLB,, | Performed by: STUDENT IN AN ORGANIZED HEALTH CARE EDUCATION/TRAINING PROGRAM

## 2020-11-11 RX ORDER — TRIAMCINOLONE ACETONIDE 1 MG/G
CREAM TOPICAL
Qty: 454 G | Refills: 0 | Status: SHIPPED | OUTPATIENT
Start: 2020-11-11 | End: 2023-12-06 | Stop reason: SDUPTHER

## 2020-11-11 NOTE — PROGRESS NOTES
Subjective:       Patient ID:  Rigoberto Vasquez is a 70 y.o. male who presents for   Chief Complaint   Patient presents with    Mole     moles on chest     History of Present Illness: The patient presents with chief complaint of skin lesions.  Location: chest  Duration: months  Signs/Symptoms: brownish, warty growths  Prior treatments: none        Review of Systems   Constitutional: Negative for fever and chills.   Skin: Negative for itching, rash and dry skin.        Objective:    Physical Exam   Constitutional: He appears well-developed and well-nourished. No distress.   Neurological: He is alert and oriented to person, place, and time. He is not disoriented.   Psychiatric: He has a normal mood and affect.   Skin:   Areas Examined (abnormalities noted in diagram):   Head / Face Inspection Performed  Neck Inspection Performed  Chest / Axilla Inspection Performed  Abdomen Inspection Performed  Back Inspection Performed  RUE Inspected  LUE Inspection Performed  RLE Inspected  LLE Inspection Performed              Diagram Legend     Erythematous scaling macule/papule c/w actinic keratosis       Vascular papule c/w angioma      Pigmented verrucoid papule/plaque c/w seborrheic keratosis      Yellow umbilicated papule c/w sebaceous hyperplasia      Irregularly shaped tan macule c/w lentigo     1-2 mm smooth white papules consistent with Milia      Movable subcutaneous cyst with punctum c/w epidermal inclusion cyst      Subcutaneous movable cyst c/w pilar cyst      Firm pink to brown papule c/w dermatofibroma      Pedunculated fleshy papule(s) c/w skin tag(s)      Evenly pigmented macule c/w junctional nevus     Mildly variegated pigmented, slightly irregular-bordered macule c/w mildly atypical nevus      Flesh colored to evenly pigmented papule c/w intradermal nevus       Pink pearly papule/plaque c/w basal cell carcinoma      Erythematous hyperkeratotic cursted plaque c/w SCC      Surgical scar with no sign of skin  cancer recurrence      Open and closed comedones      Inflammatory papules and pustules      Verrucoid papule consistent consistent with wart     Erythematous eczematous patches and plaques     Dystrophic onycholytic nail with subungual debris c/w onychomycosis     Umbilicated papule    Erythematous-base heme-crusted tan verrucoid plaque consistent with inflamed seborrheic keratosis     Erythematous Silvery Scaling Plaque c/w Psoriasis     See annotation      Assessment / Plan:        Seborrheic keratoses  These are benign inherited growths without a malignant potential. Reassurance given to patient. No treatment is necessary.     Dermatitis  -     triamcinolone acetonide 0.1% (KENALOG) 0.1 % cream; AAA bid  Dispense: 454 g; Refill: 0    Cherry angioma  This is a benign vascular lesion. Reassurance given. No treatment required.     Screening, malignant neoplasm, skin  Upper body skin examination performed today including at least 6 points as noted in physical examination. No lesions suspicious for malignancy noted.  Reassurance provided.  Instructed patient to observe lesion(s) for changes and follow up in clinic if changes are noted. Discussed ABCDE's of moles and brochure provided.             Follow up in about 1 year (around 11/11/2021).

## 2020-11-20 ENCOUNTER — IMMUNIZATION (OUTPATIENT)
Dept: PHARMACY | Facility: CLINIC | Age: 70
End: 2020-11-20
Payer: MEDICARE

## 2020-12-09 ENCOUNTER — PATIENT OUTREACH (OUTPATIENT)
Dept: OTHER | Facility: OTHER | Age: 70
End: 2020-12-09

## 2020-12-09 NOTE — PROGRESS NOTES
Digital Medicine: Health  Follow-Up    The history is provided by the patient.             Reason for review: Blood pressure not at goal      Additional Follow-up details: Patient reports higher reading this morning was due to inaccurate technique.            Diet-Not assessed          Physical Activity-Change  He removed treadclimber from His physical activity.    He identified the following barriers to physical activity: knees and foot pain        Additional physical activity details: Patient reports he has been doing other exercises to stay active.      Medication Adherence-Medication Adherence not addressed.      Substance, Sleep, Stress-Not assessed      Continue current diet/physical activity routine.  Reviewed Device Techniques.     Addressed patient questions and patient has my contact information if needed prior to next outreach. Patient verbalizes understanding.      Explained the importance of self-monitoring and medication adherence. Encouraged the patient to communicate with their health  for lifestyle modifications to help improve or maintain a healthy lifestyle.               There are no preventive care reminders to display for this patient.      Last 5 Patient Entered Readings                                      Current 30 Day Average: 130/81     Recent Readings 12/9/2020 12/9/2020 12/6/2020 12/1/2020 11/25/2020    SBP (mmHg) 132 149 136 137 114    DBP (mmHg) 82 90 79 89 76    Pulse 72 75 81 82 94

## 2020-12-31 ENCOUNTER — PES CALL (OUTPATIENT)
Dept: ADMINISTRATIVE | Facility: CLINIC | Age: 70
End: 2020-12-31

## 2021-01-04 RX ORDER — FERROUS GLUCONATE 324(38)MG
324 TABLET ORAL
Qty: 90 TABLET | Refills: 3 | Status: SHIPPED | OUTPATIENT
Start: 2021-01-04 | End: 2022-06-27

## 2021-01-07 ENCOUNTER — LAB VISIT (OUTPATIENT)
Dept: LAB | Facility: HOSPITAL | Age: 71
End: 2021-01-07
Attending: FAMILY MEDICINE
Payer: MEDICARE

## 2021-01-07 DIAGNOSIS — E11.9 TYPE 2 DIABETES MELLITUS WITHOUT COMPLICATION, WITH LONG-TERM CURRENT USE OF INSULIN: Chronic | ICD-10-CM

## 2021-01-07 DIAGNOSIS — D50.9 IRON DEFICIENCY ANEMIA, UNSPECIFIED IRON DEFICIENCY ANEMIA TYPE: ICD-10-CM

## 2021-01-07 DIAGNOSIS — Z79.4 TYPE 2 DIABETES MELLITUS WITHOUT COMPLICATION, WITH LONG-TERM CURRENT USE OF INSULIN: Chronic | ICD-10-CM

## 2021-01-07 LAB
ALBUMIN SERPL BCP-MCNC: 4.2 G/DL (ref 3.5–5.2)
ALP SERPL-CCNC: 36 U/L (ref 55–135)
ALT SERPL W/O P-5'-P-CCNC: 11 U/L (ref 10–44)
ANION GAP SERPL CALC-SCNC: 9 MMOL/L (ref 8–16)
AST SERPL-CCNC: 20 U/L (ref 10–40)
BASOPHILS # BLD AUTO: 0.07 K/UL (ref 0–0.2)
BASOPHILS NFR BLD: 1.3 % (ref 0–1.9)
BILIRUB SERPL-MCNC: 0.7 MG/DL (ref 0.1–1)
BUN SERPL-MCNC: 19 MG/DL (ref 8–23)
CALCIUM SERPL-MCNC: 10 MG/DL (ref 8.7–10.5)
CHLORIDE SERPL-SCNC: 105 MMOL/L (ref 95–110)
CO2 SERPL-SCNC: 26 MMOL/L (ref 23–29)
CREAT SERPL-MCNC: 1.3 MG/DL (ref 0.5–1.4)
DIFFERENTIAL METHOD: ABNORMAL
EOSINOPHIL # BLD AUTO: 0.1 K/UL (ref 0–0.5)
EOSINOPHIL NFR BLD: 2.2 % (ref 0–8)
ERYTHROCYTE [DISTWIDTH] IN BLOOD BY AUTOMATED COUNT: 12.6 % (ref 11.5–14.5)
EST. GFR  (AFRICAN AMERICAN): >60 ML/MIN/1.73 M^2
EST. GFR  (NON AFRICAN AMERICAN): 55.7 ML/MIN/1.73 M^2
ESTIMATED AVG GLUCOSE: 134 MG/DL (ref 68–131)
GLUCOSE SERPL-MCNC: 129 MG/DL (ref 70–110)
HBA1C MFR BLD HPLC: 6.3 % (ref 4–5.6)
HCT VFR BLD AUTO: 45.8 % (ref 40–54)
HGB BLD-MCNC: 15 G/DL (ref 14–18)
IMM GRANULOCYTES # BLD AUTO: 0.02 K/UL (ref 0–0.04)
IMM GRANULOCYTES NFR BLD AUTO: 0.4 % (ref 0–0.5)
LYMPHOCYTES # BLD AUTO: 1.4 K/UL (ref 1–4.8)
LYMPHOCYTES NFR BLD: 25 % (ref 18–48)
MCH RBC QN AUTO: 32.3 PG (ref 27–31)
MCHC RBC AUTO-ENTMCNC: 32.8 G/DL (ref 32–36)
MCV RBC AUTO: 99 FL (ref 82–98)
MONOCYTES # BLD AUTO: 0.4 K/UL (ref 0.3–1)
MONOCYTES NFR BLD: 8 % (ref 4–15)
NEUTROPHILS # BLD AUTO: 3.5 K/UL (ref 1.8–7.7)
NEUTROPHILS NFR BLD: 63.1 % (ref 38–73)
NRBC BLD-RTO: 0 /100 WBC
PLATELET # BLD AUTO: 275 K/UL (ref 150–350)
PMV BLD AUTO: 8.9 FL (ref 9.2–12.9)
POTASSIUM SERPL-SCNC: 4.5 MMOL/L (ref 3.5–5.1)
PROT SERPL-MCNC: 7.2 G/DL (ref 6–8.4)
RBC # BLD AUTO: 4.65 M/UL (ref 4.6–6.2)
SODIUM SERPL-SCNC: 140 MMOL/L (ref 136–145)
WBC # BLD AUTO: 5.53 K/UL (ref 3.9–12.7)

## 2021-01-07 PROCEDURE — 85025 COMPLETE CBC W/AUTO DIFF WBC: CPT | Mod: HCNC,PO

## 2021-01-07 PROCEDURE — 83036 HEMOGLOBIN GLYCOSYLATED A1C: CPT | Mod: HCNC

## 2021-01-07 PROCEDURE — 83540 ASSAY OF IRON: CPT | Mod: HCNC

## 2021-01-07 PROCEDURE — 80061 LIPID PANEL: CPT | Mod: HCNC

## 2021-01-07 PROCEDURE — 80053 COMPREHEN METABOLIC PANEL: CPT | Mod: HCNC,PO

## 2021-01-07 PROCEDURE — 82728 ASSAY OF FERRITIN: CPT | Mod: HCNC

## 2021-01-07 PROCEDURE — 36415 COLL VENOUS BLD VENIPUNCTURE: CPT | Mod: HCNC,PO

## 2021-01-08 LAB
CHOLEST SERPL-MCNC: 124 MG/DL (ref 120–199)
CHOLEST/HDLC SERPL: 2.4 {RATIO} (ref 2–5)
FERRITIN SERPL-MCNC: 54 NG/ML (ref 20–300)
HDLC SERPL-MCNC: 51 MG/DL (ref 40–75)
HDLC SERPL: 41.1 % (ref 20–50)
IRON SERPL-MCNC: 95 UG/DL (ref 45–160)
LDLC SERPL CALC-MCNC: 62.4 MG/DL (ref 63–159)
NONHDLC SERPL-MCNC: 73 MG/DL
SATURATED IRON: 16 % (ref 20–50)
TOTAL IRON BINDING CAPACITY: 576 UG/DL (ref 250–450)
TRANSFERRIN SERPL-MCNC: 389 MG/DL (ref 200–375)
TRIGL SERPL-MCNC: 53 MG/DL (ref 30–150)

## 2021-01-14 ENCOUNTER — TELEPHONE (OUTPATIENT)
Dept: INTERNAL MEDICINE | Facility: CLINIC | Age: 71
End: 2021-01-14

## 2021-01-15 ENCOUNTER — TELEPHONE (OUTPATIENT)
Dept: INTERNAL MEDICINE | Facility: CLINIC | Age: 71
End: 2021-01-15

## 2021-01-21 ENCOUNTER — OFFICE VISIT (OUTPATIENT)
Dept: INTERNAL MEDICINE | Facility: CLINIC | Age: 71
End: 2021-01-21
Payer: MEDICARE

## 2021-01-21 VITALS
SYSTOLIC BLOOD PRESSURE: 118 MMHG | HEART RATE: 97 BPM | OXYGEN SATURATION: 97 % | TEMPERATURE: 99 F | DIASTOLIC BLOOD PRESSURE: 70 MMHG | HEIGHT: 71 IN | WEIGHT: 205.69 LBS | BODY MASS INDEX: 28.8 KG/M2

## 2021-01-21 DIAGNOSIS — D53.9 MACROCYTIC ANEMIA: Primary | ICD-10-CM

## 2021-01-21 DIAGNOSIS — Z95.0 PACEMAKER: Chronic | ICD-10-CM

## 2021-01-21 DIAGNOSIS — N40.1 BENIGN PROSTATIC HYPERPLASIA (BPH) WITH STRAINING ON URINATION: ICD-10-CM

## 2021-01-21 DIAGNOSIS — E11.59 HYPERTENSION ASSOCIATED WITH DIABETES: Chronic | ICD-10-CM

## 2021-01-21 DIAGNOSIS — Z85.828 HISTORY OF SKIN CANCER: ICD-10-CM

## 2021-01-21 DIAGNOSIS — H40.019 OPEN ANGLE WITH BORDERLINE FINDINGS, LOW RISK: ICD-10-CM

## 2021-01-21 DIAGNOSIS — I15.2 HYPERTENSION ASSOCIATED WITH DIABETES: Chronic | ICD-10-CM

## 2021-01-21 DIAGNOSIS — E78.5 HYPERLIPIDEMIA ASSOCIATED WITH TYPE 2 DIABETES MELLITUS: Chronic | ICD-10-CM

## 2021-01-21 DIAGNOSIS — R11.0 NAUSEA: ICD-10-CM

## 2021-01-21 DIAGNOSIS — D50.9 IRON DEFICIENCY ANEMIA, UNSPECIFIED IRON DEFICIENCY ANEMIA TYPE: Chronic | ICD-10-CM

## 2021-01-21 DIAGNOSIS — I44.2 AV BLOCK, 3RD DEGREE: ICD-10-CM

## 2021-01-21 DIAGNOSIS — R39.16 BENIGN PROSTATIC HYPERPLASIA (BPH) WITH STRAINING ON URINATION: ICD-10-CM

## 2021-01-21 DIAGNOSIS — E11.9 TYPE 2 DIABETES MELLITUS WITHOUT COMPLICATION, WITH LONG-TERM CURRENT USE OF INSULIN: Chronic | ICD-10-CM

## 2021-01-21 DIAGNOSIS — E66.3 OVERWEIGHT (BMI 25.0-29.9): ICD-10-CM

## 2021-01-21 DIAGNOSIS — Z79.4 TYPE 2 DIABETES MELLITUS WITHOUT COMPLICATION, WITH LONG-TERM CURRENT USE OF INSULIN: Chronic | ICD-10-CM

## 2021-01-21 DIAGNOSIS — E11.69 HYPERLIPIDEMIA ASSOCIATED WITH TYPE 2 DIABETES MELLITUS: Chronic | ICD-10-CM

## 2021-01-21 PROCEDURE — 3008F PR BODY MASS INDEX (BMI) DOCUMENTED: ICD-10-PCS | Mod: HCNC,CPTII,S$GLB, | Performed by: PHYSICIAN ASSISTANT

## 2021-01-21 PROCEDURE — 3074F SYST BP LT 130 MM HG: CPT | Mod: HCNC,CPTII,S$GLB, | Performed by: PHYSICIAN ASSISTANT

## 2021-01-21 PROCEDURE — 1159F MED LIST DOCD IN RCRD: CPT | Mod: HCNC,S$GLB,, | Performed by: PHYSICIAN ASSISTANT

## 2021-01-21 PROCEDURE — 1159F PR MEDICATION LIST DOCUMENTED IN MEDICAL RECORD: ICD-10-PCS | Mod: HCNC,S$GLB,, | Performed by: PHYSICIAN ASSISTANT

## 2021-01-21 PROCEDURE — 99999 PR PBB SHADOW E&M-EST. PATIENT-LVL V: ICD-10-PCS | Mod: PBBFAC,HCNC,, | Performed by: PHYSICIAN ASSISTANT

## 2021-01-21 PROCEDURE — 3044F HG A1C LEVEL LT 7.0%: CPT | Mod: HCNC,CPTII,S$GLB, | Performed by: PHYSICIAN ASSISTANT

## 2021-01-21 PROCEDURE — 99999 PR PBB SHADOW E&M-EST. PATIENT-LVL V: CPT | Mod: PBBFAC,HCNC,, | Performed by: PHYSICIAN ASSISTANT

## 2021-01-21 PROCEDURE — 3288F PR FALLS RISK ASSESSMENT DOCUMENTED: ICD-10-PCS | Mod: HCNC,CPTII,S$GLB, | Performed by: PHYSICIAN ASSISTANT

## 2021-01-21 PROCEDURE — 3074F PR MOST RECENT SYSTOLIC BLOOD PRESSURE < 130 MM HG: ICD-10-PCS | Mod: HCNC,CPTII,S$GLB, | Performed by: PHYSICIAN ASSISTANT

## 2021-01-21 PROCEDURE — 99499 UNLISTED E&M SERVICE: CPT | Mod: HCNC,S$GLB,, | Performed by: PHYSICIAN ASSISTANT

## 2021-01-21 PROCEDURE — 1126F PR PAIN SEVERITY QUANTIFIED, NO PAIN PRESENT: ICD-10-PCS | Mod: HCNC,S$GLB,, | Performed by: PHYSICIAN ASSISTANT

## 2021-01-21 PROCEDURE — 3078F PR MOST RECENT DIASTOLIC BLOOD PRESSURE < 80 MM HG: ICD-10-PCS | Mod: HCNC,CPTII,S$GLB, | Performed by: PHYSICIAN ASSISTANT

## 2021-01-21 PROCEDURE — 3044F PR MOST RECENT HEMOGLOBIN A1C LEVEL <7.0%: ICD-10-PCS | Mod: HCNC,CPTII,S$GLB, | Performed by: PHYSICIAN ASSISTANT

## 2021-01-21 PROCEDURE — 3288F FALL RISK ASSESSMENT DOCD: CPT | Mod: HCNC,CPTII,S$GLB, | Performed by: PHYSICIAN ASSISTANT

## 2021-01-21 PROCEDURE — 99214 PR OFFICE/OUTPT VISIT, EST, LEVL IV, 30-39 MIN: ICD-10-PCS | Mod: HCNC,S$GLB,, | Performed by: PHYSICIAN ASSISTANT

## 2021-01-21 PROCEDURE — 1101F PR PT FALLS ASSESS DOC 0-1 FALLS W/OUT INJ PAST YR: ICD-10-PCS | Mod: HCNC,CPTII,S$GLB, | Performed by: PHYSICIAN ASSISTANT

## 2021-01-21 PROCEDURE — 99214 OFFICE O/P EST MOD 30 MIN: CPT | Mod: HCNC,S$GLB,, | Performed by: PHYSICIAN ASSISTANT

## 2021-01-21 PROCEDURE — 3078F DIAST BP <80 MM HG: CPT | Mod: HCNC,CPTII,S$GLB, | Performed by: PHYSICIAN ASSISTANT

## 2021-01-21 PROCEDURE — 99499 RISK ADDL DX/OHS AUDIT: ICD-10-PCS | Mod: HCNC,S$GLB,, | Performed by: PHYSICIAN ASSISTANT

## 2021-01-21 PROCEDURE — 3008F BODY MASS INDEX DOCD: CPT | Mod: HCNC,CPTII,S$GLB, | Performed by: PHYSICIAN ASSISTANT

## 2021-01-21 PROCEDURE — 1101F PT FALLS ASSESS-DOCD LE1/YR: CPT | Mod: HCNC,CPTII,S$GLB, | Performed by: PHYSICIAN ASSISTANT

## 2021-01-21 PROCEDURE — 1126F AMNT PAIN NOTED NONE PRSNT: CPT | Mod: HCNC,S$GLB,, | Performed by: PHYSICIAN ASSISTANT

## 2021-01-21 RX ORDER — INSULIN DETEMIR 100 [IU]/ML
INJECTION, SOLUTION SUBCUTANEOUS
Qty: 30 ML | Refills: 3
Start: 2021-01-21 | End: 2021-03-01 | Stop reason: SDUPTHER

## 2021-01-22 ENCOUNTER — OFFICE VISIT (OUTPATIENT)
Dept: OPHTHALMOLOGY | Facility: CLINIC | Age: 71
End: 2021-01-22
Payer: MEDICARE

## 2021-01-22 DIAGNOSIS — D31.31 CHOROIDAL NEVUS OF RIGHT EYE: ICD-10-CM

## 2021-01-22 DIAGNOSIS — H35.372 EPIRETINAL MEMBRANE (ERM) OF LEFT EYE: ICD-10-CM

## 2021-01-22 DIAGNOSIS — Z79.4 CONTROLLED TYPE 2 DIABETES MELLITUS WITHOUT COMPLICATION, WITH LONG-TERM CURRENT USE OF INSULIN: Primary | ICD-10-CM

## 2021-01-22 DIAGNOSIS — H40.013 OPEN ANGLE WITH BORDERLINE FINDINGS AND LOW GLAUCOMA RISK IN BOTH EYES: ICD-10-CM

## 2021-01-22 DIAGNOSIS — E11.9 CONTROLLED TYPE 2 DIABETES MELLITUS WITHOUT COMPLICATION, WITH LONG-TERM CURRENT USE OF INSULIN: Primary | ICD-10-CM

## 2021-01-22 PROCEDURE — 99999 PR PBB SHADOW E&M-EST. PATIENT-LVL III: ICD-10-PCS | Mod: PBBFAC,HCNC,, | Performed by: OPHTHALMOLOGY

## 2021-01-22 PROCEDURE — 99999 PR PBB SHADOW E&M-EST. PATIENT-LVL III: CPT | Mod: PBBFAC,HCNC,, | Performed by: OPHTHALMOLOGY

## 2021-01-22 PROCEDURE — 1126F PR PAIN SEVERITY QUANTIFIED, NO PAIN PRESENT: ICD-10-PCS | Mod: HCNC,S$GLB,, | Performed by: OPHTHALMOLOGY

## 2021-01-22 PROCEDURE — 92014 PR EYE EXAM, EST PATIENT,COMPREHESV: ICD-10-PCS | Mod: HCNC,S$GLB,, | Performed by: OPHTHALMOLOGY

## 2021-01-22 PROCEDURE — 92250 FUNDUS PHOTOGRAPHY W/I&R: CPT | Mod: HCNC,S$GLB,, | Performed by: OPHTHALMOLOGY

## 2021-01-22 PROCEDURE — 92014 COMPRE OPH EXAM EST PT 1/>: CPT | Mod: HCNC,S$GLB,, | Performed by: OPHTHALMOLOGY

## 2021-01-22 PROCEDURE — 1126F AMNT PAIN NOTED NONE PRSNT: CPT | Mod: HCNC,S$GLB,, | Performed by: OPHTHALMOLOGY

## 2021-01-22 PROCEDURE — 92250 COLOR FUNDUS PHOTOGRAPHY - OU - BOTH EYES: ICD-10-PCS | Mod: HCNC,S$GLB,, | Performed by: OPHTHALMOLOGY

## 2021-01-27 DIAGNOSIS — N52.9 ERECTILE DYSFUNCTION, UNSPECIFIED ERECTILE DYSFUNCTION TYPE: ICD-10-CM

## 2021-01-28 RX ORDER — SILDENAFIL CITRATE 20 MG/1
20 TABLET ORAL 3 TIMES DAILY
Qty: 90 TABLET | Refills: 11 | Status: SHIPPED | OUTPATIENT
Start: 2021-01-28 | End: 2021-08-05 | Stop reason: SDUPTHER

## 2021-02-04 ENCOUNTER — TELEPHONE (OUTPATIENT)
Dept: UROLOGY | Facility: CLINIC | Age: 71
End: 2021-02-04

## 2021-03-10 ENCOUNTER — OFFICE VISIT (OUTPATIENT)
Dept: OTOLARYNGOLOGY | Facility: CLINIC | Age: 71
End: 2021-03-10
Payer: MEDICARE

## 2021-03-10 ENCOUNTER — PATIENT MESSAGE (OUTPATIENT)
Dept: INTERNAL MEDICINE | Facility: CLINIC | Age: 71
End: 2021-03-10

## 2021-03-10 VITALS
SYSTOLIC BLOOD PRESSURE: 132 MMHG | TEMPERATURE: 98 F | WEIGHT: 207.25 LBS | BODY MASS INDEX: 28.9 KG/M2 | HEART RATE: 82 BPM | DIASTOLIC BLOOD PRESSURE: 81 MMHG

## 2021-03-10 DIAGNOSIS — Z79.4 TYPE 2 DIABETES MELLITUS WITHOUT COMPLICATION, WITH LONG-TERM CURRENT USE OF INSULIN: Chronic | ICD-10-CM

## 2021-03-10 DIAGNOSIS — J01.90 ACUTE RHINOSINUSITIS: Primary | ICD-10-CM

## 2021-03-10 DIAGNOSIS — E11.9 TYPE 2 DIABETES MELLITUS WITHOUT COMPLICATION, WITH LONG-TERM CURRENT USE OF INSULIN: Chronic | ICD-10-CM

## 2021-03-10 PROCEDURE — 3079F DIAST BP 80-89 MM HG: CPT | Mod: CPTII,S$GLB,, | Performed by: PHYSICIAN ASSISTANT

## 2021-03-10 PROCEDURE — 3079F PR MOST RECENT DIASTOLIC BLOOD PRESSURE 80-89 MM HG: ICD-10-PCS | Mod: CPTII,S$GLB,, | Performed by: PHYSICIAN ASSISTANT

## 2021-03-10 PROCEDURE — 1159F MED LIST DOCD IN RCRD: CPT | Mod: S$GLB,,, | Performed by: PHYSICIAN ASSISTANT

## 2021-03-10 PROCEDURE — 1125F PR PAIN SEVERITY QUANTIFIED, PAIN PRESENT: ICD-10-PCS | Mod: S$GLB,,, | Performed by: PHYSICIAN ASSISTANT

## 2021-03-10 PROCEDURE — 99999 PR PBB SHADOW E&M-EST. PATIENT-LVL IV: ICD-10-PCS | Mod: PBBFAC,,, | Performed by: PHYSICIAN ASSISTANT

## 2021-03-10 PROCEDURE — 3008F BODY MASS INDEX DOCD: CPT | Mod: CPTII,S$GLB,, | Performed by: PHYSICIAN ASSISTANT

## 2021-03-10 PROCEDURE — 99214 PR OFFICE/OUTPT VISIT, EST, LEVL IV, 30-39 MIN: ICD-10-PCS | Mod: S$GLB,,, | Performed by: PHYSICIAN ASSISTANT

## 2021-03-10 PROCEDURE — 99214 OFFICE O/P EST MOD 30 MIN: CPT | Mod: S$GLB,,, | Performed by: PHYSICIAN ASSISTANT

## 2021-03-10 PROCEDURE — 1125F AMNT PAIN NOTED PAIN PRSNT: CPT | Mod: S$GLB,,, | Performed by: PHYSICIAN ASSISTANT

## 2021-03-10 PROCEDURE — 3075F PR MOST RECENT SYSTOLIC BLOOD PRESS GE 130-139MM HG: ICD-10-PCS | Mod: CPTII,S$GLB,, | Performed by: PHYSICIAN ASSISTANT

## 2021-03-10 PROCEDURE — 3008F PR BODY MASS INDEX (BMI) DOCUMENTED: ICD-10-PCS | Mod: CPTII,S$GLB,, | Performed by: PHYSICIAN ASSISTANT

## 2021-03-10 PROCEDURE — 1159F PR MEDICATION LIST DOCUMENTED IN MEDICAL RECORD: ICD-10-PCS | Mod: S$GLB,,, | Performed by: PHYSICIAN ASSISTANT

## 2021-03-10 PROCEDURE — 99999 PR PBB SHADOW E&M-EST. PATIENT-LVL IV: CPT | Mod: PBBFAC,,, | Performed by: PHYSICIAN ASSISTANT

## 2021-03-10 PROCEDURE — 3075F SYST BP GE 130 - 139MM HG: CPT | Mod: CPTII,S$GLB,, | Performed by: PHYSICIAN ASSISTANT

## 2021-03-10 RX ORDER — AMOXICILLIN AND CLAVULANATE POTASSIUM 875; 125 MG/1; MG/1
1 TABLET, FILM COATED ORAL 2 TIMES DAILY
Qty: 20 TABLET | Refills: 0 | Status: SHIPPED | OUTPATIENT
Start: 2021-03-10 | End: 2021-03-20

## 2021-03-10 RX ORDER — METHYLPREDNISOLONE 4 MG/1
TABLET ORAL
Qty: 1 PACKAGE | Refills: 0 | Status: ON HOLD | OUTPATIENT
Start: 2021-03-10 | End: 2021-09-03 | Stop reason: HOSPADM

## 2021-03-17 RX ORDER — LOVASTATIN 40 MG/1
TABLET ORAL
Qty: 90 TABLET | Refills: 3 | Status: CANCELLED | OUTPATIENT
Start: 2021-03-17

## 2021-03-19 ENCOUNTER — PATIENT MESSAGE (OUTPATIENT)
Dept: OTOLARYNGOLOGY | Facility: CLINIC | Age: 71
End: 2021-03-19

## 2021-03-22 ENCOUNTER — OFFICE VISIT (OUTPATIENT)
Dept: OPHTHALMOLOGY | Facility: CLINIC | Age: 71
End: 2021-03-22
Payer: MEDICARE

## 2021-03-22 ENCOUNTER — PATIENT MESSAGE (OUTPATIENT)
Dept: INTERNAL MEDICINE | Facility: CLINIC | Age: 71
End: 2021-03-22

## 2021-03-22 DIAGNOSIS — H35.372 EPIRETINAL MEMBRANE (ERM) OF LEFT EYE: ICD-10-CM

## 2021-03-22 DIAGNOSIS — D31.31 CHOROIDAL NEVUS OF RIGHT EYE: ICD-10-CM

## 2021-03-22 DIAGNOSIS — E11.9 CONTROLLED TYPE 2 DIABETES MELLITUS WITHOUT COMPLICATION, WITH LONG-TERM CURRENT USE OF INSULIN: ICD-10-CM

## 2021-03-22 DIAGNOSIS — Z79.4 CONTROLLED TYPE 2 DIABETES MELLITUS WITHOUT COMPLICATION, WITH LONG-TERM CURRENT USE OF INSULIN: ICD-10-CM

## 2021-03-22 DIAGNOSIS — H40.013 OPEN ANGLE WITH BORDERLINE FINDINGS AND LOW GLAUCOMA RISK IN BOTH EYES: Primary | ICD-10-CM

## 2021-03-22 PROCEDURE — 99213 PR OFFICE/OUTPT VISIT, EST, LEVL III, 20-29 MIN: ICD-10-PCS | Mod: S$GLB,,, | Performed by: OPHTHALMOLOGY

## 2021-03-22 PROCEDURE — 92083 HUMPHREY VISUAL FIELD - OU - BOTH EYES: ICD-10-PCS | Mod: S$GLB,,, | Performed by: OPHTHALMOLOGY

## 2021-03-22 PROCEDURE — 99999 PR PBB SHADOW E&M-EST. PATIENT-LVL III: ICD-10-PCS | Mod: PBBFAC,,, | Performed by: OPHTHALMOLOGY

## 2021-03-22 PROCEDURE — 76514 ULTRASOUND PACHYMETRY: ICD-10-PCS | Mod: S$GLB,,, | Performed by: OPHTHALMOLOGY

## 2021-03-22 PROCEDURE — 76514 ECHO EXAM OF EYE THICKNESS: CPT | Mod: S$GLB,,, | Performed by: OPHTHALMOLOGY

## 2021-03-22 PROCEDURE — 1126F PR PAIN SEVERITY QUANTIFIED, NO PAIN PRESENT: ICD-10-PCS | Mod: S$GLB,,, | Performed by: OPHTHALMOLOGY

## 2021-03-22 PROCEDURE — 3044F PR MOST RECENT HEMOGLOBIN A1C LEVEL <7.0%: ICD-10-PCS | Mod: CPTII,S$GLB,, | Performed by: OPHTHALMOLOGY

## 2021-03-22 PROCEDURE — 3044F HG A1C LEVEL LT 7.0%: CPT | Mod: CPTII,S$GLB,, | Performed by: OPHTHALMOLOGY

## 2021-03-22 PROCEDURE — 99999 PR PBB SHADOW E&M-EST. PATIENT-LVL III: CPT | Mod: PBBFAC,,, | Performed by: OPHTHALMOLOGY

## 2021-03-22 PROCEDURE — 1159F MED LIST DOCD IN RCRD: CPT | Mod: S$GLB,,, | Performed by: OPHTHALMOLOGY

## 2021-03-22 PROCEDURE — 1159F PR MEDICATION LIST DOCUMENTED IN MEDICAL RECORD: ICD-10-PCS | Mod: S$GLB,,, | Performed by: OPHTHALMOLOGY

## 2021-03-22 PROCEDURE — 92083 EXTENDED VISUAL FIELD XM: CPT | Mod: S$GLB,,, | Performed by: OPHTHALMOLOGY

## 2021-03-22 PROCEDURE — 1126F AMNT PAIN NOTED NONE PRSNT: CPT | Mod: S$GLB,,, | Performed by: OPHTHALMOLOGY

## 2021-03-22 PROCEDURE — 99213 OFFICE O/P EST LOW 20 MIN: CPT | Mod: S$GLB,,, | Performed by: OPHTHALMOLOGY

## 2021-03-22 RX ORDER — INSULIN DETEMIR 100 [IU]/ML
INJECTION, SOLUTION SUBCUTANEOUS
Qty: 30 ML | Refills: 3 | Status: SHIPPED | OUTPATIENT
Start: 2021-03-22 | End: 2022-07-28 | Stop reason: SDUPTHER

## 2021-03-22 RX ORDER — LOVASTATIN 40 MG/1
TABLET ORAL
Qty: 90 TABLET | Refills: 3 | Status: CANCELLED | OUTPATIENT
Start: 2021-03-22

## 2021-03-23 RX ORDER — LOVASTATIN 40 MG/1
TABLET ORAL
Qty: 90 TABLET | Refills: 3 | Status: SHIPPED | OUTPATIENT
Start: 2021-03-23 | End: 2022-03-03 | Stop reason: SDUPTHER

## 2021-05-18 RX ORDER — FENOFIBRATE 200 MG/1
200 CAPSULE ORAL NIGHTLY
Qty: 90 CAPSULE | Refills: 3 | Status: SHIPPED | OUTPATIENT
Start: 2021-05-18 | End: 2022-05-12 | Stop reason: SDUPTHER

## 2021-06-08 RX ORDER — PIOGLITAZONEHYDROCHLORIDE 30 MG/1
30 TABLET ORAL DAILY
Qty: 90 TABLET | Refills: 3 | Status: SHIPPED | OUTPATIENT
Start: 2021-06-08 | End: 2022-06-14 | Stop reason: SDUPTHER

## 2021-06-15 ENCOUNTER — LAB VISIT (OUTPATIENT)
Dept: LAB | Facility: HOSPITAL | Age: 71
End: 2021-06-15
Attending: NURSE PRACTITIONER
Payer: MEDICARE

## 2021-06-15 ENCOUNTER — OFFICE VISIT (OUTPATIENT)
Dept: HEPATOLOGY | Facility: CLINIC | Age: 71
End: 2021-06-15
Payer: MEDICARE

## 2021-06-15 VITALS
SYSTOLIC BLOOD PRESSURE: 114 MMHG | BODY MASS INDEX: 29.38 KG/M2 | HEART RATE: 73 BPM | HEIGHT: 71 IN | WEIGHT: 209.88 LBS | DIASTOLIC BLOOD PRESSURE: 76 MMHG

## 2021-06-15 DIAGNOSIS — R11.0 NAUSEA: ICD-10-CM

## 2021-06-15 DIAGNOSIS — R10.13 DYSPEPSIA: ICD-10-CM

## 2021-06-15 DIAGNOSIS — R10.13 DYSPEPSIA: Primary | ICD-10-CM

## 2021-06-15 PROCEDURE — 99499 RISK ADDL DX/OHS AUDIT: ICD-10-PCS | Mod: HCNC,S$GLB,, | Performed by: NURSE PRACTITIONER

## 2021-06-15 PROCEDURE — 36415 COLL VENOUS BLD VENIPUNCTURE: CPT | Mod: HCNC | Performed by: NURSE PRACTITIONER

## 2021-06-15 PROCEDURE — 3288F FALL RISK ASSESSMENT DOCD: CPT | Mod: HCNC,CPTII,S$GLB, | Performed by: NURSE PRACTITIONER

## 2021-06-15 PROCEDURE — 1125F PR PAIN SEVERITY QUANTIFIED, PAIN PRESENT: ICD-10-PCS | Mod: HCNC,S$GLB,, | Performed by: NURSE PRACTITIONER

## 2021-06-15 PROCEDURE — 3008F PR BODY MASS INDEX (BMI) DOCUMENTED: ICD-10-PCS | Mod: HCNC,CPTII,S$GLB, | Performed by: NURSE PRACTITIONER

## 2021-06-15 PROCEDURE — 99999 PR PBB SHADOW E&M-EST. PATIENT-LVL IV: ICD-10-PCS | Mod: PBBFAC,HCNC,, | Performed by: NURSE PRACTITIONER

## 2021-06-15 PROCEDURE — 3008F BODY MASS INDEX DOCD: CPT | Mod: HCNC,CPTII,S$GLB, | Performed by: NURSE PRACTITIONER

## 2021-06-15 PROCEDURE — 1159F MED LIST DOCD IN RCRD: CPT | Mod: HCNC,S$GLB,, | Performed by: NURSE PRACTITIONER

## 2021-06-15 PROCEDURE — 1101F PT FALLS ASSESS-DOCD LE1/YR: CPT | Mod: HCNC,CPTII,S$GLB, | Performed by: NURSE PRACTITIONER

## 2021-06-15 PROCEDURE — 86677 HELICOBACTER PYLORI ANTIBODY: CPT | Mod: HCNC | Performed by: NURSE PRACTITIONER

## 2021-06-15 PROCEDURE — 1101F PR PT FALLS ASSESS DOC 0-1 FALLS W/OUT INJ PAST YR: ICD-10-PCS | Mod: HCNC,CPTII,S$GLB, | Performed by: NURSE PRACTITIONER

## 2021-06-15 PROCEDURE — 99214 OFFICE O/P EST MOD 30 MIN: CPT | Mod: HCNC,S$GLB,, | Performed by: NURSE PRACTITIONER

## 2021-06-15 PROCEDURE — 99214 PR OFFICE/OUTPT VISIT, EST, LEVL IV, 30-39 MIN: ICD-10-PCS | Mod: HCNC,S$GLB,, | Performed by: NURSE PRACTITIONER

## 2021-06-15 PROCEDURE — 1159F PR MEDICATION LIST DOCUMENTED IN MEDICAL RECORD: ICD-10-PCS | Mod: HCNC,S$GLB,, | Performed by: NURSE PRACTITIONER

## 2021-06-15 PROCEDURE — 99499 UNLISTED E&M SERVICE: CPT | Mod: HCNC,S$GLB,, | Performed by: NURSE PRACTITIONER

## 2021-06-15 PROCEDURE — 99999 PR PBB SHADOW E&M-EST. PATIENT-LVL IV: CPT | Mod: PBBFAC,HCNC,, | Performed by: NURSE PRACTITIONER

## 2021-06-15 PROCEDURE — 3288F PR FALLS RISK ASSESSMENT DOCUMENTED: ICD-10-PCS | Mod: HCNC,CPTII,S$GLB, | Performed by: NURSE PRACTITIONER

## 2021-06-15 PROCEDURE — 1125F AMNT PAIN NOTED PAIN PRSNT: CPT | Mod: HCNC,S$GLB,, | Performed by: NURSE PRACTITIONER

## 2021-06-15 RX ORDER — ONDANSETRON 8 MG/1
8 TABLET, ORALLY DISINTEGRATING ORAL EVERY 6 HOURS PRN
Qty: 20 TABLET | Refills: 0 | Status: SHIPPED | OUTPATIENT
Start: 2021-06-15 | End: 2023-08-11 | Stop reason: SDUPTHER

## 2021-06-17 LAB — H PYLORI IGG SERPL QL IA: NEGATIVE

## 2021-07-21 ENCOUNTER — PATIENT MESSAGE (OUTPATIENT)
Dept: HEPATOLOGY | Facility: CLINIC | Age: 71
End: 2021-07-21

## 2021-07-21 ENCOUNTER — LAB VISIT (OUTPATIENT)
Dept: LAB | Facility: HOSPITAL | Age: 71
End: 2021-07-21
Payer: MEDICARE

## 2021-07-21 DIAGNOSIS — E11.9 TYPE 2 DIABETES MELLITUS WITHOUT COMPLICATION, WITH LONG-TERM CURRENT USE OF INSULIN: Chronic | ICD-10-CM

## 2021-07-21 DIAGNOSIS — Z79.4 TYPE 2 DIABETES MELLITUS WITHOUT COMPLICATION, WITH LONG-TERM CURRENT USE OF INSULIN: Chronic | ICD-10-CM

## 2021-07-21 DIAGNOSIS — D53.9 MACROCYTIC ANEMIA: ICD-10-CM

## 2021-07-21 LAB
ESTIMATED AVG GLUCOSE: 126 MG/DL (ref 68–131)
HBA1C MFR BLD: 6 % (ref 4–5.6)
VIT B12 SERPL-MCNC: 671 PG/ML (ref 210–950)

## 2021-07-21 PROCEDURE — 36415 COLL VENOUS BLD VENIPUNCTURE: CPT | Mod: HCNC,PO | Performed by: PHYSICIAN ASSISTANT

## 2021-07-21 PROCEDURE — 83036 HEMOGLOBIN GLYCOSYLATED A1C: CPT | Mod: HCNC | Performed by: PHYSICIAN ASSISTANT

## 2021-07-21 PROCEDURE — 82607 VITAMIN B-12: CPT | Mod: HCNC | Performed by: PHYSICIAN ASSISTANT

## 2021-07-23 ENCOUNTER — TELEPHONE (OUTPATIENT)
Dept: INTERNAL MEDICINE | Facility: CLINIC | Age: 71
End: 2021-07-23

## 2021-07-26 ENCOUNTER — PATIENT OUTREACH (OUTPATIENT)
Dept: ADMINISTRATIVE | Facility: OTHER | Age: 71
End: 2021-07-26

## 2021-07-26 ENCOUNTER — PATIENT MESSAGE (OUTPATIENT)
Dept: INTERNAL MEDICINE | Facility: CLINIC | Age: 71
End: 2021-07-26

## 2021-07-27 ENCOUNTER — OFFICE VISIT (OUTPATIENT)
Dept: GASTROENTEROLOGY | Facility: CLINIC | Age: 71
End: 2021-07-27
Payer: MEDICARE

## 2021-07-27 VITALS
WEIGHT: 206.56 LBS | DIASTOLIC BLOOD PRESSURE: 74 MMHG | OXYGEN SATURATION: 96 % | BODY MASS INDEX: 28.92 KG/M2 | HEIGHT: 71 IN | SYSTOLIC BLOOD PRESSURE: 126 MMHG | HEART RATE: 82 BPM

## 2021-07-27 DIAGNOSIS — R11.0 NAUSEA: Primary | ICD-10-CM

## 2021-07-27 DIAGNOSIS — K59.00 CONSTIPATION, UNSPECIFIED CONSTIPATION TYPE: ICD-10-CM

## 2021-07-27 DIAGNOSIS — R10.13 DYSPEPSIA: ICD-10-CM

## 2021-07-27 PROCEDURE — 1126F PR PAIN SEVERITY QUANTIFIED, NO PAIN PRESENT: ICD-10-PCS | Mod: HCNC,CPTII,S$GLB, | Performed by: PHYSICIAN ASSISTANT

## 2021-07-27 PROCEDURE — 1159F PR MEDICATION LIST DOCUMENTED IN MEDICAL RECORD: ICD-10-PCS | Mod: HCNC,CPTII,S$GLB, | Performed by: PHYSICIAN ASSISTANT

## 2021-07-27 PROCEDURE — 1101F PT FALLS ASSESS-DOCD LE1/YR: CPT | Mod: HCNC,CPTII,S$GLB, | Performed by: PHYSICIAN ASSISTANT

## 2021-07-27 PROCEDURE — 3074F PR MOST RECENT SYSTOLIC BLOOD PRESSURE < 130 MM HG: ICD-10-PCS | Mod: HCNC,CPTII,S$GLB, | Performed by: PHYSICIAN ASSISTANT

## 2021-07-27 PROCEDURE — 1126F AMNT PAIN NOTED NONE PRSNT: CPT | Mod: HCNC,CPTII,S$GLB, | Performed by: PHYSICIAN ASSISTANT

## 2021-07-27 PROCEDURE — 3044F PR MOST RECENT HEMOGLOBIN A1C LEVEL <7.0%: ICD-10-PCS | Mod: HCNC,CPTII,S$GLB, | Performed by: PHYSICIAN ASSISTANT

## 2021-07-27 PROCEDURE — 3008F PR BODY MASS INDEX (BMI) DOCUMENTED: ICD-10-PCS | Mod: HCNC,CPTII,S$GLB, | Performed by: PHYSICIAN ASSISTANT

## 2021-07-27 PROCEDURE — 3044F HG A1C LEVEL LT 7.0%: CPT | Mod: HCNC,CPTII,S$GLB, | Performed by: PHYSICIAN ASSISTANT

## 2021-07-27 PROCEDURE — 99214 PR OFFICE/OUTPT VISIT, EST, LEVL IV, 30-39 MIN: ICD-10-PCS | Mod: HCNC,S$GLB,, | Performed by: PHYSICIAN ASSISTANT

## 2021-07-27 PROCEDURE — 99999 PR PBB SHADOW E&M-EST. PATIENT-LVL IV: CPT | Mod: PBBFAC,HCNC,, | Performed by: PHYSICIAN ASSISTANT

## 2021-07-27 PROCEDURE — 1101F PR PT FALLS ASSESS DOC 0-1 FALLS W/OUT INJ PAST YR: ICD-10-PCS | Mod: HCNC,CPTII,S$GLB, | Performed by: PHYSICIAN ASSISTANT

## 2021-07-27 PROCEDURE — 3008F BODY MASS INDEX DOCD: CPT | Mod: HCNC,CPTII,S$GLB, | Performed by: PHYSICIAN ASSISTANT

## 2021-07-27 PROCEDURE — 3288F FALL RISK ASSESSMENT DOCD: CPT | Mod: HCNC,CPTII,S$GLB, | Performed by: PHYSICIAN ASSISTANT

## 2021-07-27 PROCEDURE — 3078F PR MOST RECENT DIASTOLIC BLOOD PRESSURE < 80 MM HG: ICD-10-PCS | Mod: HCNC,CPTII,S$GLB, | Performed by: PHYSICIAN ASSISTANT

## 2021-07-27 PROCEDURE — 1159F MED LIST DOCD IN RCRD: CPT | Mod: HCNC,CPTII,S$GLB, | Performed by: PHYSICIAN ASSISTANT

## 2021-07-27 PROCEDURE — 99214 OFFICE O/P EST MOD 30 MIN: CPT | Mod: HCNC,S$GLB,, | Performed by: PHYSICIAN ASSISTANT

## 2021-07-27 PROCEDURE — 3078F DIAST BP <80 MM HG: CPT | Mod: HCNC,CPTII,S$GLB, | Performed by: PHYSICIAN ASSISTANT

## 2021-07-27 PROCEDURE — 3074F SYST BP LT 130 MM HG: CPT | Mod: HCNC,CPTII,S$GLB, | Performed by: PHYSICIAN ASSISTANT

## 2021-07-27 PROCEDURE — 99999 PR PBB SHADOW E&M-EST. PATIENT-LVL IV: ICD-10-PCS | Mod: PBBFAC,HCNC,, | Performed by: PHYSICIAN ASSISTANT

## 2021-07-27 PROCEDURE — 3288F PR FALLS RISK ASSESSMENT DOCUMENTED: ICD-10-PCS | Mod: HCNC,CPTII,S$GLB, | Performed by: PHYSICIAN ASSISTANT

## 2021-07-27 RX ORDER — PANTOPRAZOLE SODIUM 40 MG/1
40 TABLET, DELAYED RELEASE ORAL 2 TIMES DAILY
Qty: 60 TABLET | Refills: 1 | Status: SHIPPED | OUTPATIENT
Start: 2021-07-27 | End: 2021-10-04 | Stop reason: SDUPTHER

## 2021-07-30 ENCOUNTER — TELEPHONE (OUTPATIENT)
Dept: INTERNAL MEDICINE | Facility: CLINIC | Age: 71
End: 2021-07-30

## 2021-07-30 RX ORDER — METFORMIN HYDROCHLORIDE 1000 MG/1
1000 TABLET ORAL DAILY
Qty: 180 TABLET | Refills: 3 | Status: SHIPPED | OUTPATIENT
Start: 2021-07-30 | End: 2022-10-13 | Stop reason: SDUPTHER

## 2021-08-02 ENCOUNTER — TELEPHONE (OUTPATIENT)
Dept: INTERNAL MEDICINE | Facility: CLINIC | Age: 71
End: 2021-08-02

## 2021-08-03 ENCOUNTER — OFFICE VISIT (OUTPATIENT)
Dept: INTERNAL MEDICINE | Facility: CLINIC | Age: 71
End: 2021-08-03
Payer: MEDICARE

## 2021-08-03 VITALS
TEMPERATURE: 99 F | HEART RATE: 72 BPM | SYSTOLIC BLOOD PRESSURE: 134 MMHG | DIASTOLIC BLOOD PRESSURE: 80 MMHG | BODY MASS INDEX: 28.73 KG/M2 | HEIGHT: 71 IN | OXYGEN SATURATION: 97 % | WEIGHT: 205.25 LBS

## 2021-08-03 DIAGNOSIS — N40.1 BENIGN PROSTATIC HYPERPLASIA (BPH) WITH STRAINING ON URINATION: ICD-10-CM

## 2021-08-03 DIAGNOSIS — E11.69 HYPERLIPIDEMIA ASSOCIATED WITH TYPE 2 DIABETES MELLITUS: Chronic | ICD-10-CM

## 2021-08-03 DIAGNOSIS — E78.5 HYPERLIPIDEMIA ASSOCIATED WITH TYPE 2 DIABETES MELLITUS: Chronic | ICD-10-CM

## 2021-08-03 DIAGNOSIS — J84.10 LUNG GRANULOMA: ICD-10-CM

## 2021-08-03 DIAGNOSIS — D50.9 IRON DEFICIENCY ANEMIA, UNSPECIFIED IRON DEFICIENCY ANEMIA TYPE: Chronic | ICD-10-CM

## 2021-08-03 DIAGNOSIS — H40.019 OPEN ANGLE WITH BORDERLINE FINDINGS, LOW RISK: ICD-10-CM

## 2021-08-03 DIAGNOSIS — I15.2 HYPERTENSION ASSOCIATED WITH DIABETES: Chronic | ICD-10-CM

## 2021-08-03 DIAGNOSIS — R39.16 BENIGN PROSTATIC HYPERPLASIA (BPH) WITH STRAINING ON URINATION: ICD-10-CM

## 2021-08-03 DIAGNOSIS — N18.31 TYPE 2 DIABETES MELLITUS WITH STAGE 3A CHRONIC KIDNEY DISEASE, WITH LONG-TERM CURRENT USE OF INSULIN: Primary | ICD-10-CM

## 2021-08-03 DIAGNOSIS — E11.59 HYPERTENSION ASSOCIATED WITH DIABETES: Chronic | ICD-10-CM

## 2021-08-03 DIAGNOSIS — Z79.4 TYPE 2 DIABETES MELLITUS WITH STAGE 3A CHRONIC KIDNEY DISEASE, WITH LONG-TERM CURRENT USE OF INSULIN: Primary | ICD-10-CM

## 2021-08-03 DIAGNOSIS — E11.22 TYPE 2 DIABETES MELLITUS WITH STAGE 3A CHRONIC KIDNEY DISEASE, WITH LONG-TERM CURRENT USE OF INSULIN: Primary | ICD-10-CM

## 2021-08-03 PROCEDURE — 3044F HG A1C LEVEL LT 7.0%: CPT | Mod: HCNC,CPTII,S$GLB, | Performed by: PHYSICIAN ASSISTANT

## 2021-08-03 PROCEDURE — 1101F PT FALLS ASSESS-DOCD LE1/YR: CPT | Mod: HCNC,CPTII,S$GLB, | Performed by: PHYSICIAN ASSISTANT

## 2021-08-03 PROCEDURE — 99214 PR OFFICE/OUTPT VISIT, EST, LEVL IV, 30-39 MIN: ICD-10-PCS | Mod: HCNC,S$GLB,, | Performed by: PHYSICIAN ASSISTANT

## 2021-08-03 PROCEDURE — 1159F PR MEDICATION LIST DOCUMENTED IN MEDICAL RECORD: ICD-10-PCS | Mod: HCNC,CPTII,S$GLB, | Performed by: PHYSICIAN ASSISTANT

## 2021-08-03 PROCEDURE — 1126F AMNT PAIN NOTED NONE PRSNT: CPT | Mod: HCNC,CPTII,S$GLB, | Performed by: PHYSICIAN ASSISTANT

## 2021-08-03 PROCEDURE — 3044F PR MOST RECENT HEMOGLOBIN A1C LEVEL <7.0%: ICD-10-PCS | Mod: HCNC,CPTII,S$GLB, | Performed by: PHYSICIAN ASSISTANT

## 2021-08-03 PROCEDURE — 1160F PR REVIEW ALL MEDS BY PRESCRIBER/CLIN PHARMACIST DOCUMENTED: ICD-10-PCS | Mod: HCNC,CPTII,S$GLB, | Performed by: PHYSICIAN ASSISTANT

## 2021-08-03 PROCEDURE — 3288F PR FALLS RISK ASSESSMENT DOCUMENTED: ICD-10-PCS | Mod: HCNC,CPTII,S$GLB, | Performed by: PHYSICIAN ASSISTANT

## 2021-08-03 PROCEDURE — 99499 UNLISTED E&M SERVICE: CPT | Mod: HCNC,S$GLB,, | Performed by: PHYSICIAN ASSISTANT

## 2021-08-03 PROCEDURE — 1159F MED LIST DOCD IN RCRD: CPT | Mod: HCNC,CPTII,S$GLB, | Performed by: PHYSICIAN ASSISTANT

## 2021-08-03 PROCEDURE — 99999 PR PBB SHADOW E&M-EST. PATIENT-LVL V: ICD-10-PCS | Mod: PBBFAC,HCNC,, | Performed by: PHYSICIAN ASSISTANT

## 2021-08-03 PROCEDURE — 99999 PR PBB SHADOW E&M-EST. PATIENT-LVL V: CPT | Mod: PBBFAC,HCNC,, | Performed by: PHYSICIAN ASSISTANT

## 2021-08-03 PROCEDURE — 3288F FALL RISK ASSESSMENT DOCD: CPT | Mod: HCNC,CPTII,S$GLB, | Performed by: PHYSICIAN ASSISTANT

## 2021-08-03 PROCEDURE — 1126F PR PAIN SEVERITY QUANTIFIED, NO PAIN PRESENT: ICD-10-PCS | Mod: HCNC,CPTII,S$GLB, | Performed by: PHYSICIAN ASSISTANT

## 2021-08-03 PROCEDURE — 99214 OFFICE O/P EST MOD 30 MIN: CPT | Mod: HCNC,S$GLB,, | Performed by: PHYSICIAN ASSISTANT

## 2021-08-03 PROCEDURE — 1101F PR PT FALLS ASSESS DOC 0-1 FALLS W/OUT INJ PAST YR: ICD-10-PCS | Mod: HCNC,CPTII,S$GLB, | Performed by: PHYSICIAN ASSISTANT

## 2021-08-03 PROCEDURE — 3008F PR BODY MASS INDEX (BMI) DOCUMENTED: ICD-10-PCS | Mod: HCNC,CPTII,S$GLB, | Performed by: PHYSICIAN ASSISTANT

## 2021-08-03 PROCEDURE — 3079F PR MOST RECENT DIASTOLIC BLOOD PRESSURE 80-89 MM HG: ICD-10-PCS | Mod: HCNC,CPTII,S$GLB, | Performed by: PHYSICIAN ASSISTANT

## 2021-08-03 PROCEDURE — 3079F DIAST BP 80-89 MM HG: CPT | Mod: HCNC,CPTII,S$GLB, | Performed by: PHYSICIAN ASSISTANT

## 2021-08-03 PROCEDURE — 3075F PR MOST RECENT SYSTOLIC BLOOD PRESS GE 130-139MM HG: ICD-10-PCS | Mod: HCNC,CPTII,S$GLB, | Performed by: PHYSICIAN ASSISTANT

## 2021-08-03 PROCEDURE — 3075F SYST BP GE 130 - 139MM HG: CPT | Mod: HCNC,CPTII,S$GLB, | Performed by: PHYSICIAN ASSISTANT

## 2021-08-03 PROCEDURE — 1160F RVW MEDS BY RX/DR IN RCRD: CPT | Mod: HCNC,CPTII,S$GLB, | Performed by: PHYSICIAN ASSISTANT

## 2021-08-03 PROCEDURE — 99499 RISK ADDL DX/OHS AUDIT: ICD-10-PCS | Mod: HCNC,S$GLB,, | Performed by: PHYSICIAN ASSISTANT

## 2021-08-03 PROCEDURE — 3008F BODY MASS INDEX DOCD: CPT | Mod: HCNC,CPTII,S$GLB, | Performed by: PHYSICIAN ASSISTANT

## 2021-08-03 RX ORDER — LOSARTAN POTASSIUM 25 MG/1
25 TABLET ORAL DAILY
Qty: 90 TABLET | Refills: 3
Start: 2021-08-03 | End: 2021-11-19 | Stop reason: SDUPTHER

## 2021-08-04 PROBLEM — J84.10 LUNG GRANULOMA: Status: ACTIVE | Noted: 2021-08-04

## 2021-08-05 DIAGNOSIS — N52.9 ERECTILE DYSFUNCTION, UNSPECIFIED ERECTILE DYSFUNCTION TYPE: ICD-10-CM

## 2021-08-05 RX ORDER — SILDENAFIL CITRATE 20 MG/1
20 TABLET ORAL 3 TIMES DAILY
Qty: 90 TABLET | Refills: 11 | Status: SHIPPED | OUTPATIENT
Start: 2021-08-05 | End: 2022-08-29 | Stop reason: SDUPTHER

## 2021-08-06 ENCOUNTER — PATIENT MESSAGE (OUTPATIENT)
Dept: INTERNAL MEDICINE | Facility: CLINIC | Age: 71
End: 2021-08-06

## 2021-08-16 ENCOUNTER — LAB VISIT (OUTPATIENT)
Dept: LAB | Facility: HOSPITAL | Age: 71
End: 2021-08-16
Attending: UROLOGY
Payer: MEDICARE

## 2021-08-16 DIAGNOSIS — N40.1 BENIGN PROSTATIC HYPERPLASIA (BPH) WITH STRAINING ON URINATION: ICD-10-CM

## 2021-08-16 DIAGNOSIS — R39.16 BENIGN PROSTATIC HYPERPLASIA (BPH) WITH STRAINING ON URINATION: ICD-10-CM

## 2021-08-16 LAB — COMPLEXED PSA SERPL-MCNC: 0.37 NG/ML (ref 0–4)

## 2021-08-16 PROCEDURE — 84153 ASSAY OF PSA TOTAL: CPT | Mod: HCNC | Performed by: UROLOGY

## 2021-08-16 PROCEDURE — 36415 COLL VENOUS BLD VENIPUNCTURE: CPT | Mod: HCNC,PO | Performed by: UROLOGY

## 2021-08-17 ENCOUNTER — PATIENT MESSAGE (OUTPATIENT)
Dept: GASTROENTEROLOGY | Facility: CLINIC | Age: 71
End: 2021-08-17

## 2021-08-18 ENCOUNTER — TELEPHONE (OUTPATIENT)
Dept: GASTROENTEROLOGY | Facility: CLINIC | Age: 71
End: 2021-08-18

## 2021-08-20 ENCOUNTER — OFFICE VISIT (OUTPATIENT)
Dept: GASTROENTEROLOGY | Facility: CLINIC | Age: 71
End: 2021-08-20
Payer: MEDICARE

## 2021-08-20 ENCOUNTER — HOSPITAL ENCOUNTER (OUTPATIENT)
Dept: RADIOLOGY | Facility: HOSPITAL | Age: 71
Discharge: HOME | End: 2021-08-20
Attending: INTERNAL MEDICINE
Payer: MEDICARE

## 2021-08-20 VITALS
DIASTOLIC BLOOD PRESSURE: 66 MMHG | SYSTOLIC BLOOD PRESSURE: 134 MMHG | BODY MASS INDEX: 28.77 KG/M2 | HEART RATE: 87 BPM | HEIGHT: 71 IN | WEIGHT: 205.5 LBS

## 2021-08-20 DIAGNOSIS — K59.04 CHRONIC IDIOPATHIC CONSTIPATION: ICD-10-CM

## 2021-08-20 DIAGNOSIS — R19.4 CHANGE IN BOWEL HABITS: Primary | ICD-10-CM

## 2021-08-20 DIAGNOSIS — R19.4 CHANGE IN BOWEL HABITS: ICD-10-CM

## 2021-08-20 PROCEDURE — 3008F PR BODY MASS INDEX (BMI) DOCUMENTED: ICD-10-PCS | Mod: HCNC,CPTII,S$GLB, | Performed by: INTERNAL MEDICINE

## 2021-08-20 PROCEDURE — 74019 RADEX ABDOMEN 2 VIEWS: CPT | Mod: 26,HCNC,, | Performed by: RADIOLOGY

## 2021-08-20 PROCEDURE — 74019 XR ABDOMEN FLAT AND ERECT: ICD-10-PCS | Mod: 26,HCNC,, | Performed by: RADIOLOGY

## 2021-08-20 PROCEDURE — 3008F BODY MASS INDEX DOCD: CPT | Mod: HCNC,CPTII,S$GLB, | Performed by: INTERNAL MEDICINE

## 2021-08-20 PROCEDURE — 3044F HG A1C LEVEL LT 7.0%: CPT | Mod: HCNC,CPTII,S$GLB, | Performed by: INTERNAL MEDICINE

## 2021-08-20 PROCEDURE — 3288F PR FALLS RISK ASSESSMENT DOCUMENTED: ICD-10-PCS | Mod: HCNC,CPTII,S$GLB, | Performed by: INTERNAL MEDICINE

## 2021-08-20 PROCEDURE — 3075F PR MOST RECENT SYSTOLIC BLOOD PRESS GE 130-139MM HG: ICD-10-PCS | Mod: HCNC,CPTII,S$GLB, | Performed by: INTERNAL MEDICINE

## 2021-08-20 PROCEDURE — 3288F FALL RISK ASSESSMENT DOCD: CPT | Mod: HCNC,CPTII,S$GLB, | Performed by: INTERNAL MEDICINE

## 2021-08-20 PROCEDURE — 99999 PR PBB SHADOW E&M-EST. PATIENT-LVL IV: CPT | Mod: PBBFAC,HCNC,, | Performed by: INTERNAL MEDICINE

## 2021-08-20 PROCEDURE — 74019 RADEX ABDOMEN 2 VIEWS: CPT | Mod: TC,HCNC

## 2021-08-20 PROCEDURE — 99999 PR PBB SHADOW E&M-EST. PATIENT-LVL IV: ICD-10-PCS | Mod: PBBFAC,HCNC,, | Performed by: INTERNAL MEDICINE

## 2021-08-20 PROCEDURE — 1101F PT FALLS ASSESS-DOCD LE1/YR: CPT | Mod: HCNC,CPTII,S$GLB, | Performed by: INTERNAL MEDICINE

## 2021-08-20 PROCEDURE — 1125F PR PAIN SEVERITY QUANTIFIED, PAIN PRESENT: ICD-10-PCS | Mod: HCNC,CPTII,S$GLB, | Performed by: INTERNAL MEDICINE

## 2021-08-20 PROCEDURE — 99213 PR OFFICE/OUTPT VISIT, EST, LEVL III, 20-29 MIN: ICD-10-PCS | Mod: HCNC,S$GLB,, | Performed by: INTERNAL MEDICINE

## 2021-08-20 PROCEDURE — 99213 OFFICE O/P EST LOW 20 MIN: CPT | Mod: HCNC,S$GLB,, | Performed by: INTERNAL MEDICINE

## 2021-08-20 PROCEDURE — 3078F DIAST BP <80 MM HG: CPT | Mod: HCNC,CPTII,S$GLB, | Performed by: INTERNAL MEDICINE

## 2021-08-20 PROCEDURE — 3075F SYST BP GE 130 - 139MM HG: CPT | Mod: HCNC,CPTII,S$GLB, | Performed by: INTERNAL MEDICINE

## 2021-08-20 PROCEDURE — 3044F PR MOST RECENT HEMOGLOBIN A1C LEVEL <7.0%: ICD-10-PCS | Mod: HCNC,CPTII,S$GLB, | Performed by: INTERNAL MEDICINE

## 2021-08-20 PROCEDURE — 1125F AMNT PAIN NOTED PAIN PRSNT: CPT | Mod: HCNC,CPTII,S$GLB, | Performed by: INTERNAL MEDICINE

## 2021-08-20 PROCEDURE — 1101F PR PT FALLS ASSESS DOC 0-1 FALLS W/OUT INJ PAST YR: ICD-10-PCS | Mod: HCNC,CPTII,S$GLB, | Performed by: INTERNAL MEDICINE

## 2021-08-20 PROCEDURE — 3078F PR MOST RECENT DIASTOLIC BLOOD PRESSURE < 80 MM HG: ICD-10-PCS | Mod: HCNC,CPTII,S$GLB, | Performed by: INTERNAL MEDICINE

## 2021-08-22 RX ORDER — SODIUM, POTASSIUM,MAG SULFATES 17.5-3.13G
1 SOLUTION, RECONSTITUTED, ORAL ORAL DAILY
Qty: 1 KIT | Refills: 0 | Status: SHIPPED | OUTPATIENT
Start: 2021-08-22 | End: 2021-08-24 | Stop reason: SDUPTHER

## 2021-08-23 ENCOUNTER — PATIENT MESSAGE (OUTPATIENT)
Dept: GASTROENTEROLOGY | Facility: CLINIC | Age: 71
End: 2021-08-23

## 2021-08-23 ENCOUNTER — OFFICE VISIT (OUTPATIENT)
Dept: UROLOGY | Facility: CLINIC | Age: 71
End: 2021-08-23
Payer: MEDICARE

## 2021-08-23 VITALS — DIASTOLIC BLOOD PRESSURE: 70 MMHG | SYSTOLIC BLOOD PRESSURE: 140 MMHG | WEIGHT: 205 LBS | BODY MASS INDEX: 28.6 KG/M2

## 2021-08-23 DIAGNOSIS — N40.1 BENIGN PROSTATIC HYPERPLASIA (BPH) WITH STRAINING ON URINATION: Primary | ICD-10-CM

## 2021-08-23 DIAGNOSIS — R39.16 BENIGN PROSTATIC HYPERPLASIA (BPH) WITH STRAINING ON URINATION: Primary | ICD-10-CM

## 2021-08-23 DIAGNOSIS — N52.9 ERECTILE DYSFUNCTION, UNSPECIFIED ERECTILE DYSFUNCTION TYPE: ICD-10-CM

## 2021-08-23 LAB — POC RESIDUAL URINE VOLUME: 49 ML (ref 0–100)

## 2021-08-23 PROCEDURE — 3044F PR MOST RECENT HEMOGLOBIN A1C LEVEL <7.0%: ICD-10-PCS | Mod: HCNC,CPTII,S$GLB, | Performed by: UROLOGY

## 2021-08-23 PROCEDURE — 3008F BODY MASS INDEX DOCD: CPT | Mod: HCNC,CPTII,S$GLB, | Performed by: UROLOGY

## 2021-08-23 PROCEDURE — 1101F PT FALLS ASSESS-DOCD LE1/YR: CPT | Mod: HCNC,CPTII,S$GLB, | Performed by: UROLOGY

## 2021-08-23 PROCEDURE — 3288F FALL RISK ASSESSMENT DOCD: CPT | Mod: HCNC,CPTII,S$GLB, | Performed by: UROLOGY

## 2021-08-23 PROCEDURE — 3078F DIAST BP <80 MM HG: CPT | Mod: HCNC,CPTII,S$GLB, | Performed by: UROLOGY

## 2021-08-23 PROCEDURE — 51798 POCT BLADDER SCAN: ICD-10-PCS | Mod: HCNC,S$GLB,, | Performed by: UROLOGY

## 2021-08-23 PROCEDURE — 1159F MED LIST DOCD IN RCRD: CPT | Mod: HCNC,CPTII,S$GLB, | Performed by: UROLOGY

## 2021-08-23 PROCEDURE — 3077F SYST BP >= 140 MM HG: CPT | Mod: HCNC,CPTII,S$GLB, | Performed by: UROLOGY

## 2021-08-23 PROCEDURE — 3044F HG A1C LEVEL LT 7.0%: CPT | Mod: HCNC,CPTII,S$GLB, | Performed by: UROLOGY

## 2021-08-23 PROCEDURE — 1101F PR PT FALLS ASSESS DOC 0-1 FALLS W/OUT INJ PAST YR: ICD-10-PCS | Mod: HCNC,CPTII,S$GLB, | Performed by: UROLOGY

## 2021-08-23 PROCEDURE — 99213 PR OFFICE/OUTPT VISIT, EST, LEVL III, 20-29 MIN: ICD-10-PCS | Mod: HCNC,S$GLB,, | Performed by: UROLOGY

## 2021-08-23 PROCEDURE — 99213 OFFICE O/P EST LOW 20 MIN: CPT | Mod: HCNC,S$GLB,, | Performed by: UROLOGY

## 2021-08-23 PROCEDURE — 99999 PR PBB SHADOW E&M-EST. PATIENT-LVL IV: ICD-10-PCS | Mod: PBBFAC,HCNC,, | Performed by: UROLOGY

## 2021-08-23 PROCEDURE — 1125F AMNT PAIN NOTED PAIN PRSNT: CPT | Mod: HCNC,CPTII,S$GLB, | Performed by: UROLOGY

## 2021-08-23 PROCEDURE — 51798 US URINE CAPACITY MEASURE: CPT | Mod: HCNC,S$GLB,, | Performed by: UROLOGY

## 2021-08-23 PROCEDURE — 1159F PR MEDICATION LIST DOCUMENTED IN MEDICAL RECORD: ICD-10-PCS | Mod: HCNC,CPTII,S$GLB, | Performed by: UROLOGY

## 2021-08-23 PROCEDURE — 99999 PR PBB SHADOW E&M-EST. PATIENT-LVL IV: CPT | Mod: PBBFAC,HCNC,, | Performed by: UROLOGY

## 2021-08-23 PROCEDURE — 1125F PR PAIN SEVERITY QUANTIFIED, PAIN PRESENT: ICD-10-PCS | Mod: HCNC,CPTII,S$GLB, | Performed by: UROLOGY

## 2021-08-23 PROCEDURE — 3077F PR MOST RECENT SYSTOLIC BLOOD PRESSURE >= 140 MM HG: ICD-10-PCS | Mod: HCNC,CPTII,S$GLB, | Performed by: UROLOGY

## 2021-08-23 PROCEDURE — 3078F PR MOST RECENT DIASTOLIC BLOOD PRESSURE < 80 MM HG: ICD-10-PCS | Mod: HCNC,CPTII,S$GLB, | Performed by: UROLOGY

## 2021-08-23 PROCEDURE — 3008F PR BODY MASS INDEX (BMI) DOCUMENTED: ICD-10-PCS | Mod: HCNC,CPTII,S$GLB, | Performed by: UROLOGY

## 2021-08-23 PROCEDURE — 3288F PR FALLS RISK ASSESSMENT DOCUMENTED: ICD-10-PCS | Mod: HCNC,CPTII,S$GLB, | Performed by: UROLOGY

## 2021-08-24 RX ORDER — SODIUM, POTASSIUM,MAG SULFATES 17.5-3.13G
1 SOLUTION, RECONSTITUTED, ORAL ORAL DAILY
Qty: 1 KIT | Refills: 0 | Status: SHIPPED | OUTPATIENT
Start: 2021-08-24 | End: 2021-08-26

## 2021-09-01 ENCOUNTER — ANESTHESIA EVENT (OUTPATIENT)
Dept: ENDOSCOPY | Facility: HOSPITAL | Age: 71
End: 2021-09-01
Payer: MEDICARE

## 2021-09-03 ENCOUNTER — ANESTHESIA (OUTPATIENT)
Dept: ENDOSCOPY | Facility: HOSPITAL | Age: 71
End: 2021-09-03
Payer: MEDICARE

## 2021-09-03 ENCOUNTER — HOSPITAL ENCOUNTER (OUTPATIENT)
Facility: HOSPITAL | Age: 71
Discharge: HOME | End: 2021-09-03
Attending: INTERNAL MEDICINE | Admitting: INTERNAL MEDICINE
Payer: MEDICARE

## 2021-09-03 VITALS
SYSTOLIC BLOOD PRESSURE: 131 MMHG | HEIGHT: 71 IN | HEART RATE: 68 BPM | RESPIRATION RATE: 19 BRPM | WEIGHT: 202.63 LBS | OXYGEN SATURATION: 94 % | BODY MASS INDEX: 28.37 KG/M2 | DIASTOLIC BLOOD PRESSURE: 75 MMHG | TEMPERATURE: 98 F

## 2021-09-03 DIAGNOSIS — R10.13 DYSPEPSIA: ICD-10-CM

## 2021-09-03 DIAGNOSIS — R11.0 NAUSEA: Primary | ICD-10-CM

## 2021-09-03 LAB — SARS-COV-2 RDRP RESP QL NAA+PROBE: NEGATIVE

## 2021-09-03 PROCEDURE — 88112 CYTOPATH CELL ENHANCE TECH: CPT | Mod: 26,HCNC,, | Performed by: PATHOLOGY

## 2021-09-03 PROCEDURE — 82962 GLUCOSE BLOOD TEST: CPT | Mod: HCNC | Performed by: INTERNAL MEDICINE

## 2021-09-03 PROCEDURE — 88112 CYTOPATH CELL ENHANCE TECH: CPT | Mod: HCNC | Performed by: PATHOLOGY

## 2021-09-03 PROCEDURE — 27201012 HC FORCEPS, HOT/COLD, DISP: Mod: HCNC | Performed by: INTERNAL MEDICINE

## 2021-09-03 PROCEDURE — 37000008 HC ANESTHESIA 1ST 15 MINUTES: Mod: HCNC | Performed by: INTERNAL MEDICINE

## 2021-09-03 PROCEDURE — 43239 PR EGD, FLEX, W/BIOPSY, SGL/MULTI: ICD-10-PCS | Mod: HCNC,,, | Performed by: INTERNAL MEDICINE

## 2021-09-03 PROCEDURE — 88342 CHG IMMUNOCYTOCHEMISTRY: ICD-10-PCS | Mod: 26,HCNC,, | Performed by: PATHOLOGY

## 2021-09-03 PROCEDURE — 88112 PR  CYTOPATH, CELL ENHANCE TECH: ICD-10-PCS | Mod: 26,HCNC,, | Performed by: PATHOLOGY

## 2021-09-03 PROCEDURE — D9220A PRA ANESTHESIA: Mod: HCNC,CRNA,, | Performed by: NURSE ANESTHETIST, CERTIFIED REGISTERED

## 2021-09-03 PROCEDURE — 88305 TISSUE EXAM BY PATHOLOGIST: CPT | Mod: 59,HCNC | Performed by: PATHOLOGY

## 2021-09-03 PROCEDURE — 27200946 HC BRUSH, CYTOLOGY: Mod: HCNC | Performed by: INTERNAL MEDICINE

## 2021-09-03 PROCEDURE — 43235 EGD DIAGNOSTIC BRUSH WASH: CPT | Mod: HCNC | Performed by: INTERNAL MEDICINE

## 2021-09-03 PROCEDURE — D9220A PRA ANESTHESIA: Mod: HCNC,ANES,, | Performed by: ANESTHESIOLOGY

## 2021-09-03 PROCEDURE — 88305 TISSUE EXAM BY PATHOLOGIST: ICD-10-PCS | Mod: 26,HCNC,, | Performed by: PATHOLOGY

## 2021-09-03 PROCEDURE — 43239 EGD BIOPSY SINGLE/MULTIPLE: CPT | Mod: HCNC | Performed by: INTERNAL MEDICINE

## 2021-09-03 PROCEDURE — 63600175 PHARM REV CODE 636 W HCPCS: Mod: HCNC | Performed by: NURSE ANESTHETIST, CERTIFIED REGISTERED

## 2021-09-03 PROCEDURE — U0002 COVID-19 LAB TEST NON-CDC: HCPCS | Mod: HCNC | Performed by: INTERNAL MEDICINE

## 2021-09-03 PROCEDURE — 25000003 PHARM REV CODE 250: Mod: HCNC | Performed by: NURSE ANESTHETIST, CERTIFIED REGISTERED

## 2021-09-03 PROCEDURE — D9220A PRA ANESTHESIA: ICD-10-PCS | Mod: HCNC,CRNA,, | Performed by: NURSE ANESTHETIST, CERTIFIED REGISTERED

## 2021-09-03 PROCEDURE — 43239 EGD BIOPSY SINGLE/MULTIPLE: CPT | Mod: HCNC,,, | Performed by: INTERNAL MEDICINE

## 2021-09-03 PROCEDURE — 88342 IMHCHEM/IMCYTCHM 1ST ANTB: CPT | Mod: HCNC | Performed by: PATHOLOGY

## 2021-09-03 PROCEDURE — 88342 IMHCHEM/IMCYTCHM 1ST ANTB: CPT | Mod: 26,HCNC,, | Performed by: PATHOLOGY

## 2021-09-03 PROCEDURE — D9220A PRA ANESTHESIA: ICD-10-PCS | Mod: HCNC,ANES,, | Performed by: ANESTHESIOLOGY

## 2021-09-03 PROCEDURE — 88305 TISSUE EXAM BY PATHOLOGIST: CPT | Mod: 26,HCNC,, | Performed by: PATHOLOGY

## 2021-09-03 PROCEDURE — 37000009 HC ANESTHESIA EA ADD 15 MINS: Mod: HCNC | Performed by: INTERNAL MEDICINE

## 2021-09-03 PROCEDURE — 63600175 PHARM REV CODE 636 W HCPCS: Mod: HCNC | Performed by: INTERNAL MEDICINE

## 2021-09-03 RX ORDER — SODIUM CHLORIDE, SODIUM LACTATE, POTASSIUM CHLORIDE, CALCIUM CHLORIDE 600; 310; 30; 20 MG/100ML; MG/100ML; MG/100ML; MG/100ML
INJECTION, SOLUTION INTRAVENOUS CONTINUOUS
Status: DISCONTINUED | OUTPATIENT
Start: 2021-09-03 | End: 2021-09-03 | Stop reason: HOSPADM

## 2021-09-03 RX ORDER — LIDOCAINE HYDROCHLORIDE 20 MG/ML
INJECTION, SOLUTION EPIDURAL; INFILTRATION; INTRACAUDAL; PERINEURAL
Status: DISCONTINUED | OUTPATIENT
Start: 2021-09-03 | End: 2021-09-03

## 2021-09-03 RX ORDER — PROPOFOL 10 MG/ML
VIAL (ML) INTRAVENOUS
Status: DISCONTINUED | OUTPATIENT
Start: 2021-09-03 | End: 2021-09-03

## 2021-09-03 RX ORDER — SODIUM CHLORIDE 0.9 % (FLUSH) 0.9 %
10 SYRINGE (ML) INJECTION
Status: DISCONTINUED | OUTPATIENT
Start: 2021-09-03 | End: 2021-09-03 | Stop reason: HOSPADM

## 2021-09-03 RX ADMIN — PROPOFOL 50 MG: 10 INJECTION, EMULSION INTRAVENOUS at 07:09

## 2021-09-03 RX ADMIN — LIDOCAINE HYDROCHLORIDE 100 MG: 20 INJECTION, SOLUTION EPIDURAL; INFILTRATION; INTRACAUDAL; PERINEURAL at 07:09

## 2021-09-03 RX ADMIN — BENZOCAINE 2 EACH: 200 SPRAY DENTAL; ORAL; PERIODONTAL at 07:09

## 2021-09-03 RX ADMIN — SODIUM CHLORIDE, SODIUM LACTATE, POTASSIUM CHLORIDE, AND CALCIUM CHLORIDE: .6; .31; .03; .02 INJECTION, SOLUTION INTRAVENOUS at 06:09

## 2021-09-06 ENCOUNTER — PATIENT MESSAGE (OUTPATIENT)
Dept: INTERNAL MEDICINE | Facility: CLINIC | Age: 71
End: 2021-09-06

## 2021-09-07 ENCOUNTER — OFFICE VISIT (OUTPATIENT)
Dept: GASTROENTEROLOGY | Facility: CLINIC | Age: 71
End: 2021-09-07
Payer: MEDICARE

## 2021-09-07 VITALS
WEIGHT: 205 LBS | HEART RATE: 66 BPM | DIASTOLIC BLOOD PRESSURE: 82 MMHG | BODY MASS INDEX: 28.7 KG/M2 | HEIGHT: 71 IN | SYSTOLIC BLOOD PRESSURE: 120 MMHG

## 2021-09-07 DIAGNOSIS — K59.04 CHRONIC IDIOPATHIC CONSTIPATION: Primary | ICD-10-CM

## 2021-09-07 DIAGNOSIS — R11.0 NAUSEA: ICD-10-CM

## 2021-09-07 DIAGNOSIS — R10.84 GENERALIZED ABDOMINAL PAIN: ICD-10-CM

## 2021-09-07 LAB — POCT GLUCOSE: 98 MG/DL (ref 70–110)

## 2021-09-07 PROCEDURE — 4010F PR ACE/ARB THEARPY RXD/TAKEN: ICD-10-PCS | Mod: HCNC,CPTII,S$GLB, | Performed by: PHYSICIAN ASSISTANT

## 2021-09-07 PROCEDURE — 3066F PR DOCUMENTATION OF TREATMENT FOR NEPHROPATHY: ICD-10-PCS | Mod: HCNC,CPTII,S$GLB, | Performed by: PHYSICIAN ASSISTANT

## 2021-09-07 PROCEDURE — 99999 PR PBB SHADOW E&M-EST. PATIENT-LVL IV: ICD-10-PCS | Mod: PBBFAC,HCNC,, | Performed by: PHYSICIAN ASSISTANT

## 2021-09-07 PROCEDURE — 1159F MED LIST DOCD IN RCRD: CPT | Mod: HCNC,CPTII,S$GLB, | Performed by: PHYSICIAN ASSISTANT

## 2021-09-07 PROCEDURE — 3288F PR FALLS RISK ASSESSMENT DOCUMENTED: ICD-10-PCS | Mod: HCNC,CPTII,S$GLB, | Performed by: PHYSICIAN ASSISTANT

## 2021-09-07 PROCEDURE — 3008F BODY MASS INDEX DOCD: CPT | Mod: HCNC,CPTII,S$GLB, | Performed by: PHYSICIAN ASSISTANT

## 2021-09-07 PROCEDURE — 3079F DIAST BP 80-89 MM HG: CPT | Mod: HCNC,CPTII,S$GLB, | Performed by: PHYSICIAN ASSISTANT

## 2021-09-07 PROCEDURE — 99213 PR OFFICE/OUTPT VISIT, EST, LEVL III, 20-29 MIN: ICD-10-PCS | Mod: HCNC,S$GLB,, | Performed by: PHYSICIAN ASSISTANT

## 2021-09-07 PROCEDURE — 1101F PR PT FALLS ASSESS DOC 0-1 FALLS W/OUT INJ PAST YR: ICD-10-PCS | Mod: HCNC,CPTII,S$GLB, | Performed by: PHYSICIAN ASSISTANT

## 2021-09-07 PROCEDURE — 3074F SYST BP LT 130 MM HG: CPT | Mod: HCNC,CPTII,S$GLB, | Performed by: PHYSICIAN ASSISTANT

## 2021-09-07 PROCEDURE — 3061F PR NEG MICROALBUMINURIA RESULT DOCUMENTED/REVIEW: ICD-10-PCS | Mod: HCNC,CPTII,S$GLB, | Performed by: PHYSICIAN ASSISTANT

## 2021-09-07 PROCEDURE — 3008F PR BODY MASS INDEX (BMI) DOCUMENTED: ICD-10-PCS | Mod: HCNC,CPTII,S$GLB, | Performed by: PHYSICIAN ASSISTANT

## 2021-09-07 PROCEDURE — 3044F PR MOST RECENT HEMOGLOBIN A1C LEVEL <7.0%: ICD-10-PCS | Mod: HCNC,CPTII,S$GLB, | Performed by: PHYSICIAN ASSISTANT

## 2021-09-07 PROCEDURE — 1126F PR PAIN SEVERITY QUANTIFIED, NO PAIN PRESENT: ICD-10-PCS | Mod: HCNC,CPTII,S$GLB, | Performed by: PHYSICIAN ASSISTANT

## 2021-09-07 PROCEDURE — 4010F ACE/ARB THERAPY RXD/TAKEN: CPT | Mod: HCNC,CPTII,S$GLB, | Performed by: PHYSICIAN ASSISTANT

## 2021-09-07 PROCEDURE — 3079F PR MOST RECENT DIASTOLIC BLOOD PRESSURE 80-89 MM HG: ICD-10-PCS | Mod: HCNC,CPTII,S$GLB, | Performed by: PHYSICIAN ASSISTANT

## 2021-09-07 PROCEDURE — 3044F HG A1C LEVEL LT 7.0%: CPT | Mod: HCNC,CPTII,S$GLB, | Performed by: PHYSICIAN ASSISTANT

## 2021-09-07 PROCEDURE — 3288F FALL RISK ASSESSMENT DOCD: CPT | Mod: HCNC,CPTII,S$GLB, | Performed by: PHYSICIAN ASSISTANT

## 2021-09-07 PROCEDURE — 99999 PR PBB SHADOW E&M-EST. PATIENT-LVL IV: CPT | Mod: PBBFAC,HCNC,, | Performed by: PHYSICIAN ASSISTANT

## 2021-09-07 PROCEDURE — 3066F NEPHROPATHY DOC TX: CPT | Mod: HCNC,CPTII,S$GLB, | Performed by: PHYSICIAN ASSISTANT

## 2021-09-07 PROCEDURE — 3074F PR MOST RECENT SYSTOLIC BLOOD PRESSURE < 130 MM HG: ICD-10-PCS | Mod: HCNC,CPTII,S$GLB, | Performed by: PHYSICIAN ASSISTANT

## 2021-09-07 PROCEDURE — 3061F NEG MICROALBUMINURIA REV: CPT | Mod: HCNC,CPTII,S$GLB, | Performed by: PHYSICIAN ASSISTANT

## 2021-09-07 PROCEDURE — 1159F PR MEDICATION LIST DOCUMENTED IN MEDICAL RECORD: ICD-10-PCS | Mod: HCNC,CPTII,S$GLB, | Performed by: PHYSICIAN ASSISTANT

## 2021-09-07 PROCEDURE — 99213 OFFICE O/P EST LOW 20 MIN: CPT | Mod: HCNC,S$GLB,, | Performed by: PHYSICIAN ASSISTANT

## 2021-09-07 PROCEDURE — 1101F PT FALLS ASSESS-DOCD LE1/YR: CPT | Mod: HCNC,CPTII,S$GLB, | Performed by: PHYSICIAN ASSISTANT

## 2021-09-07 PROCEDURE — 1126F AMNT PAIN NOTED NONE PRSNT: CPT | Mod: HCNC,CPTII,S$GLB, | Performed by: PHYSICIAN ASSISTANT

## 2021-09-09 DIAGNOSIS — N40.1 BENIGN PROSTATIC HYPERPLASIA (BPH) WITH STRAINING ON URINATION: ICD-10-CM

## 2021-09-09 DIAGNOSIS — R39.16 BENIGN PROSTATIC HYPERPLASIA (BPH) WITH STRAINING ON URINATION: ICD-10-CM

## 2021-09-09 LAB
FINAL PATHOLOGIC DIAGNOSIS: NORMAL
Lab: NORMAL

## 2021-09-09 RX ORDER — DUTASTERIDE 0.5 MG/1
0.5 CAPSULE, LIQUID FILLED ORAL DAILY
Qty: 90 CAPSULE | Refills: 3 | Status: SHIPPED | OUTPATIENT
Start: 2021-09-09 | End: 2022-09-09 | Stop reason: SDUPTHER

## 2021-09-10 LAB
FINAL PATHOLOGIC DIAGNOSIS: NORMAL
GROSS: NORMAL
Lab: NORMAL
MICROSCOPIC EXAM: NORMAL

## 2021-09-16 ENCOUNTER — PATIENT MESSAGE (OUTPATIENT)
Dept: GASTROENTEROLOGY | Facility: CLINIC | Age: 71
End: 2021-09-16

## 2021-09-16 RX ORDER — FLUCONAZOLE 200 MG/1
200 TABLET ORAL DAILY
Qty: 22 TABLET | Refills: 0 | Status: SHIPPED | OUTPATIENT
Start: 2021-09-16 | End: 2021-10-07

## 2021-09-20 ENCOUNTER — PATIENT MESSAGE (OUTPATIENT)
Dept: GASTROENTEROLOGY | Facility: CLINIC | Age: 71
End: 2021-09-20

## 2021-09-28 ENCOUNTER — IMMUNIZATION (OUTPATIENT)
Dept: INTERNAL MEDICINE | Facility: CLINIC | Age: 71
End: 2021-09-28
Payer: MEDICARE

## 2021-09-28 PROCEDURE — G0008 FLU VACCINE - QUADRIVALENT - ADJUVANTED: ICD-10-PCS | Mod: HCNC,S$GLB,, | Performed by: FAMILY MEDICINE

## 2021-09-28 PROCEDURE — 90694 VACC AIIV4 NO PRSRV 0.5ML IM: CPT | Mod: HCNC,S$GLB,, | Performed by: FAMILY MEDICINE

## 2021-09-28 PROCEDURE — G0008 ADMIN INFLUENZA VIRUS VAC: HCPCS | Mod: HCNC,S$GLB,, | Performed by: FAMILY MEDICINE

## 2021-09-28 PROCEDURE — 90694 FLU VACCINE - QUADRIVALENT - ADJUVANTED: ICD-10-PCS | Mod: HCNC,S$GLB,, | Performed by: FAMILY MEDICINE

## 2021-09-30 ENCOUNTER — PATIENT MESSAGE (OUTPATIENT)
Dept: GASTROENTEROLOGY | Facility: CLINIC | Age: 71
End: 2021-09-30

## 2021-09-30 DIAGNOSIS — R10.84 GENERALIZED ABDOMINAL PAIN: Primary | ICD-10-CM

## 2021-10-04 ENCOUNTER — PATIENT MESSAGE (OUTPATIENT)
Dept: GASTROENTEROLOGY | Facility: CLINIC | Age: 71
End: 2021-10-04

## 2021-10-12 ENCOUNTER — PATIENT OUTREACH (OUTPATIENT)
Dept: ADMINISTRATIVE | Facility: OTHER | Age: 71
End: 2021-10-12

## 2021-10-13 ENCOUNTER — OFFICE VISIT (OUTPATIENT)
Dept: DERMATOLOGY | Facility: CLINIC | Age: 71
End: 2021-10-13
Payer: MEDICARE

## 2021-10-13 DIAGNOSIS — Z12.83 SCREENING, MALIGNANT NEOPLASM, SKIN: ICD-10-CM

## 2021-10-13 DIAGNOSIS — D18.01 CHERRY ANGIOMA: ICD-10-CM

## 2021-10-13 DIAGNOSIS — L82.1 SEBORRHEIC KERATOSES: Primary | ICD-10-CM

## 2021-10-13 PROCEDURE — 99999 PR PBB SHADOW E&M-EST. PATIENT-LVL III: ICD-10-PCS | Mod: PBBFAC,HCNC,, | Performed by: STUDENT IN AN ORGANIZED HEALTH CARE EDUCATION/TRAINING PROGRAM

## 2021-10-13 PROCEDURE — 3044F HG A1C LEVEL LT 7.0%: CPT | Mod: HCNC,CPTII,S$GLB, | Performed by: STUDENT IN AN ORGANIZED HEALTH CARE EDUCATION/TRAINING PROGRAM

## 2021-10-13 PROCEDURE — 3066F NEPHROPATHY DOC TX: CPT | Mod: HCNC,CPTII,S$GLB, | Performed by: STUDENT IN AN ORGANIZED HEALTH CARE EDUCATION/TRAINING PROGRAM

## 2021-10-13 PROCEDURE — 1160F PR REVIEW ALL MEDS BY PRESCRIBER/CLIN PHARMACIST DOCUMENTED: ICD-10-PCS | Mod: HCNC,CPTII,S$GLB, | Performed by: STUDENT IN AN ORGANIZED HEALTH CARE EDUCATION/TRAINING PROGRAM

## 2021-10-13 PROCEDURE — 1126F PR PAIN SEVERITY QUANTIFIED, NO PAIN PRESENT: ICD-10-PCS | Mod: HCNC,CPTII,S$GLB, | Performed by: STUDENT IN AN ORGANIZED HEALTH CARE EDUCATION/TRAINING PROGRAM

## 2021-10-13 PROCEDURE — 3066F PR DOCUMENTATION OF TREATMENT FOR NEPHROPATHY: ICD-10-PCS | Mod: HCNC,CPTII,S$GLB, | Performed by: STUDENT IN AN ORGANIZED HEALTH CARE EDUCATION/TRAINING PROGRAM

## 2021-10-13 PROCEDURE — 1160F RVW MEDS BY RX/DR IN RCRD: CPT | Mod: HCNC,CPTII,S$GLB, | Performed by: STUDENT IN AN ORGANIZED HEALTH CARE EDUCATION/TRAINING PROGRAM

## 2021-10-13 PROCEDURE — 3061F PR NEG MICROALBUMINURIA RESULT DOCUMENTED/REVIEW: ICD-10-PCS | Mod: HCNC,CPTII,S$GLB, | Performed by: STUDENT IN AN ORGANIZED HEALTH CARE EDUCATION/TRAINING PROGRAM

## 2021-10-13 PROCEDURE — 4010F PR ACE/ARB THEARPY RXD/TAKEN: ICD-10-PCS | Mod: HCNC,CPTII,S$GLB, | Performed by: STUDENT IN AN ORGANIZED HEALTH CARE EDUCATION/TRAINING PROGRAM

## 2021-10-13 PROCEDURE — 1126F AMNT PAIN NOTED NONE PRSNT: CPT | Mod: HCNC,CPTII,S$GLB, | Performed by: STUDENT IN AN ORGANIZED HEALTH CARE EDUCATION/TRAINING PROGRAM

## 2021-10-13 PROCEDURE — 4010F ACE/ARB THERAPY RXD/TAKEN: CPT | Mod: HCNC,CPTII,S$GLB, | Performed by: STUDENT IN AN ORGANIZED HEALTH CARE EDUCATION/TRAINING PROGRAM

## 2021-10-13 PROCEDURE — 3288F PR FALLS RISK ASSESSMENT DOCUMENTED: ICD-10-PCS | Mod: HCNC,CPTII,S$GLB, | Performed by: STUDENT IN AN ORGANIZED HEALTH CARE EDUCATION/TRAINING PROGRAM

## 2021-10-13 PROCEDURE — 99213 OFFICE O/P EST LOW 20 MIN: CPT | Mod: HCNC,S$GLB,, | Performed by: STUDENT IN AN ORGANIZED HEALTH CARE EDUCATION/TRAINING PROGRAM

## 2021-10-13 PROCEDURE — 3044F PR MOST RECENT HEMOGLOBIN A1C LEVEL <7.0%: ICD-10-PCS | Mod: HCNC,CPTII,S$GLB, | Performed by: STUDENT IN AN ORGANIZED HEALTH CARE EDUCATION/TRAINING PROGRAM

## 2021-10-13 PROCEDURE — 3061F NEG MICROALBUMINURIA REV: CPT | Mod: HCNC,CPTII,S$GLB, | Performed by: STUDENT IN AN ORGANIZED HEALTH CARE EDUCATION/TRAINING PROGRAM

## 2021-10-13 PROCEDURE — 1101F PT FALLS ASSESS-DOCD LE1/YR: CPT | Mod: HCNC,CPTII,S$GLB, | Performed by: STUDENT IN AN ORGANIZED HEALTH CARE EDUCATION/TRAINING PROGRAM

## 2021-10-13 PROCEDURE — 99999 PR PBB SHADOW E&M-EST. PATIENT-LVL III: CPT | Mod: PBBFAC,HCNC,, | Performed by: STUDENT IN AN ORGANIZED HEALTH CARE EDUCATION/TRAINING PROGRAM

## 2021-10-13 PROCEDURE — 99213 PR OFFICE/OUTPT VISIT, EST, LEVL III, 20-29 MIN: ICD-10-PCS | Mod: HCNC,S$GLB,, | Performed by: STUDENT IN AN ORGANIZED HEALTH CARE EDUCATION/TRAINING PROGRAM

## 2021-10-13 PROCEDURE — 1101F PR PT FALLS ASSESS DOC 0-1 FALLS W/OUT INJ PAST YR: ICD-10-PCS | Mod: HCNC,CPTII,S$GLB, | Performed by: STUDENT IN AN ORGANIZED HEALTH CARE EDUCATION/TRAINING PROGRAM

## 2021-10-13 PROCEDURE — 1159F PR MEDICATION LIST DOCUMENTED IN MEDICAL RECORD: ICD-10-PCS | Mod: HCNC,CPTII,S$GLB, | Performed by: STUDENT IN AN ORGANIZED HEALTH CARE EDUCATION/TRAINING PROGRAM

## 2021-10-13 PROCEDURE — 1159F MED LIST DOCD IN RCRD: CPT | Mod: HCNC,CPTII,S$GLB, | Performed by: STUDENT IN AN ORGANIZED HEALTH CARE EDUCATION/TRAINING PROGRAM

## 2021-10-13 PROCEDURE — 3288F FALL RISK ASSESSMENT DOCD: CPT | Mod: HCNC,CPTII,S$GLB, | Performed by: STUDENT IN AN ORGANIZED HEALTH CARE EDUCATION/TRAINING PROGRAM

## 2021-11-02 ENCOUNTER — PES CALL (OUTPATIENT)
Dept: ADMINISTRATIVE | Facility: CLINIC | Age: 71
End: 2021-11-02
Payer: MEDICARE

## 2021-11-11 ENCOUNTER — OFFICE VISIT (OUTPATIENT)
Dept: INTERNAL MEDICINE | Facility: CLINIC | Age: 71
End: 2021-11-11
Payer: MEDICARE

## 2021-11-11 VITALS
HEIGHT: 71 IN | WEIGHT: 206.56 LBS | SYSTOLIC BLOOD PRESSURE: 120 MMHG | HEART RATE: 73 BPM | OXYGEN SATURATION: 95 % | BODY MASS INDEX: 28.92 KG/M2 | DIASTOLIC BLOOD PRESSURE: 72 MMHG

## 2021-11-11 DIAGNOSIS — Z00.00 ENCOUNTER FOR PREVENTIVE HEALTH EXAMINATION: Primary | ICD-10-CM

## 2021-11-11 DIAGNOSIS — Z95.0 PACEMAKER: ICD-10-CM

## 2021-11-11 DIAGNOSIS — E11.22 DIABETES MELLITUS WITH STAGE 3 CHRONIC KIDNEY DISEASE: ICD-10-CM

## 2021-11-11 DIAGNOSIS — E78.5 HYPERLIPIDEMIA ASSOCIATED WITH TYPE 2 DIABETES MELLITUS: ICD-10-CM

## 2021-11-11 DIAGNOSIS — I70.0 CALCIFICATION OF AORTA: ICD-10-CM

## 2021-11-11 DIAGNOSIS — N18.30 DIABETES MELLITUS WITH STAGE 3 CHRONIC KIDNEY DISEASE: ICD-10-CM

## 2021-11-11 DIAGNOSIS — R20.2 NUMBNESS AND TINGLING: ICD-10-CM

## 2021-11-11 DIAGNOSIS — Z85.828 HISTORY OF SKIN CANCER: ICD-10-CM

## 2021-11-11 DIAGNOSIS — E11.69 HYPERLIPIDEMIA ASSOCIATED WITH TYPE 2 DIABETES MELLITUS: ICD-10-CM

## 2021-11-11 DIAGNOSIS — J84.10 LUNG GRANULOMA: ICD-10-CM

## 2021-11-11 DIAGNOSIS — R20.0 NUMBNESS AND TINGLING: ICD-10-CM

## 2021-11-11 DIAGNOSIS — I10 HYPERTENSION, UNSPECIFIED TYPE: ICD-10-CM

## 2021-11-11 PROBLEM — Z86.79 HISTORY OF HEART BLOCK: Status: ACTIVE | Noted: 2019-02-04

## 2021-11-11 PROCEDURE — 3288F FALL RISK ASSESSMENT DOCD: CPT | Mod: HCNC,CPTII,S$GLB, | Performed by: NURSE PRACTITIONER

## 2021-11-11 PROCEDURE — 1159F PR MEDICATION LIST DOCUMENTED IN MEDICAL RECORD: ICD-10-PCS | Mod: HCNC,CPTII,S$GLB, | Performed by: NURSE PRACTITIONER

## 2021-11-11 PROCEDURE — G0439 PR MEDICARE ANNUAL WELLNESS SUBSEQUENT VISIT: ICD-10-PCS | Mod: HCNC,S$GLB,, | Performed by: NURSE PRACTITIONER

## 2021-11-11 PROCEDURE — 3066F NEPHROPATHY DOC TX: CPT | Mod: HCNC,CPTII,S$GLB, | Performed by: NURSE PRACTITIONER

## 2021-11-11 PROCEDURE — 99999 PR PBB SHADOW E&M-EST. PATIENT-LVL V: ICD-10-PCS | Mod: PBBFAC,HCNC,, | Performed by: NURSE PRACTITIONER

## 2021-11-11 PROCEDURE — 3008F PR BODY MASS INDEX (BMI) DOCUMENTED: ICD-10-PCS | Mod: HCNC,CPTII,S$GLB, | Performed by: NURSE PRACTITIONER

## 2021-11-11 PROCEDURE — 99499 RISK ADDL DX/OHS AUDIT: ICD-10-PCS | Mod: S$GLB,,, | Performed by: NURSE PRACTITIONER

## 2021-11-11 PROCEDURE — 3074F PR MOST RECENT SYSTOLIC BLOOD PRESSURE < 130 MM HG: ICD-10-PCS | Mod: HCNC,CPTII,S$GLB, | Performed by: NURSE PRACTITIONER

## 2021-11-11 PROCEDURE — 3288F PR FALLS RISK ASSESSMENT DOCUMENTED: ICD-10-PCS | Mod: HCNC,CPTII,S$GLB, | Performed by: NURSE PRACTITIONER

## 2021-11-11 PROCEDURE — 3044F PR MOST RECENT HEMOGLOBIN A1C LEVEL <7.0%: ICD-10-PCS | Mod: HCNC,CPTII,S$GLB, | Performed by: NURSE PRACTITIONER

## 2021-11-11 PROCEDURE — 1160F PR REVIEW ALL MEDS BY PRESCRIBER/CLIN PHARMACIST DOCUMENTED: ICD-10-PCS | Mod: HCNC,CPTII,S$GLB, | Performed by: NURSE PRACTITIONER

## 2021-11-11 PROCEDURE — 3078F PR MOST RECENT DIASTOLIC BLOOD PRESSURE < 80 MM HG: ICD-10-PCS | Mod: HCNC,CPTII,S$GLB, | Performed by: NURSE PRACTITIONER

## 2021-11-11 PROCEDURE — 1170F PR FUNCTIONAL STATUS ASSESSED: ICD-10-PCS | Mod: HCNC,CPTII,S$GLB, | Performed by: NURSE PRACTITIONER

## 2021-11-11 PROCEDURE — 3008F BODY MASS INDEX DOCD: CPT | Mod: HCNC,CPTII,S$GLB, | Performed by: NURSE PRACTITIONER

## 2021-11-11 PROCEDURE — 99499 UNLISTED E&M SERVICE: CPT | Mod: S$GLB,,, | Performed by: NURSE PRACTITIONER

## 2021-11-11 PROCEDURE — 1126F PR PAIN SEVERITY QUANTIFIED, NO PAIN PRESENT: ICD-10-PCS | Mod: HCNC,CPTII,S$GLB, | Performed by: NURSE PRACTITIONER

## 2021-11-11 PROCEDURE — 1160F RVW MEDS BY RX/DR IN RCRD: CPT | Mod: HCNC,CPTII,S$GLB, | Performed by: NURSE PRACTITIONER

## 2021-11-11 PROCEDURE — 3061F PR NEG MICROALBUMINURIA RESULT DOCUMENTED/REVIEW: ICD-10-PCS | Mod: HCNC,CPTII,S$GLB, | Performed by: NURSE PRACTITIONER

## 2021-11-11 PROCEDURE — 1159F MED LIST DOCD IN RCRD: CPT | Mod: HCNC,CPTII,S$GLB, | Performed by: NURSE PRACTITIONER

## 2021-11-11 PROCEDURE — 4010F ACE/ARB THERAPY RXD/TAKEN: CPT | Mod: HCNC,CPTII,S$GLB, | Performed by: NURSE PRACTITIONER

## 2021-11-11 PROCEDURE — 4010F PR ACE/ARB THEARPY RXD/TAKEN: ICD-10-PCS | Mod: HCNC,CPTII,S$GLB, | Performed by: NURSE PRACTITIONER

## 2021-11-11 PROCEDURE — 3078F DIAST BP <80 MM HG: CPT | Mod: HCNC,CPTII,S$GLB, | Performed by: NURSE PRACTITIONER

## 2021-11-11 PROCEDURE — 3061F NEG MICROALBUMINURIA REV: CPT | Mod: HCNC,CPTII,S$GLB, | Performed by: NURSE PRACTITIONER

## 2021-11-11 PROCEDURE — 1126F AMNT PAIN NOTED NONE PRSNT: CPT | Mod: HCNC,CPTII,S$GLB, | Performed by: NURSE PRACTITIONER

## 2021-11-11 PROCEDURE — G0439 PPPS, SUBSEQ VISIT: HCPCS | Mod: HCNC,S$GLB,, | Performed by: NURSE PRACTITIONER

## 2021-11-11 PROCEDURE — 3074F SYST BP LT 130 MM HG: CPT | Mod: HCNC,CPTII,S$GLB, | Performed by: NURSE PRACTITIONER

## 2021-11-11 PROCEDURE — 3044F HG A1C LEVEL LT 7.0%: CPT | Mod: HCNC,CPTII,S$GLB, | Performed by: NURSE PRACTITIONER

## 2021-11-11 PROCEDURE — 1101F PR PT FALLS ASSESS DOC 0-1 FALLS W/OUT INJ PAST YR: ICD-10-PCS | Mod: HCNC,CPTII,S$GLB, | Performed by: NURSE PRACTITIONER

## 2021-11-11 PROCEDURE — 1170F FXNL STATUS ASSESSED: CPT | Mod: HCNC,CPTII,S$GLB, | Performed by: NURSE PRACTITIONER

## 2021-11-11 PROCEDURE — 3066F PR DOCUMENTATION OF TREATMENT FOR NEPHROPATHY: ICD-10-PCS | Mod: HCNC,CPTII,S$GLB, | Performed by: NURSE PRACTITIONER

## 2021-11-11 PROCEDURE — 1101F PT FALLS ASSESS-DOCD LE1/YR: CPT | Mod: HCNC,CPTII,S$GLB, | Performed by: NURSE PRACTITIONER

## 2021-11-11 PROCEDURE — 99999 PR PBB SHADOW E&M-EST. PATIENT-LVL V: CPT | Mod: PBBFAC,HCNC,, | Performed by: NURSE PRACTITIONER

## 2021-11-12 ENCOUNTER — PATIENT OUTREACH (OUTPATIENT)
Dept: ADMINISTRATIVE | Facility: OTHER | Age: 71
End: 2021-11-12
Payer: MEDICARE

## 2021-11-15 ENCOUNTER — OFFICE VISIT (OUTPATIENT)
Dept: GASTROENTEROLOGY | Facility: CLINIC | Age: 71
End: 2021-11-15
Payer: MEDICARE

## 2021-11-15 VITALS
OXYGEN SATURATION: 99 % | HEIGHT: 71 IN | WEIGHT: 206.81 LBS | BODY MASS INDEX: 28.95 KG/M2 | HEART RATE: 78 BPM | SYSTOLIC BLOOD PRESSURE: 132 MMHG | DIASTOLIC BLOOD PRESSURE: 90 MMHG

## 2021-11-15 DIAGNOSIS — R10.13 DYSPEPSIA: ICD-10-CM

## 2021-11-15 DIAGNOSIS — R11.0 NAUSEA: ICD-10-CM

## 2021-11-15 PROCEDURE — 3080F PR MOST RECENT DIASTOLIC BLOOD PRESSURE >= 90 MM HG: ICD-10-PCS | Mod: HCNC,CPTII,S$GLB, | Performed by: PHYSICIAN ASSISTANT

## 2021-11-15 PROCEDURE — 99999 PR PBB SHADOW E&M-EST. PATIENT-LVL IV: ICD-10-PCS | Mod: PBBFAC,HCNC,, | Performed by: PHYSICIAN ASSISTANT

## 2021-11-15 PROCEDURE — 1159F PR MEDICATION LIST DOCUMENTED IN MEDICAL RECORD: ICD-10-PCS | Mod: HCNC,CPTII,S$GLB, | Performed by: PHYSICIAN ASSISTANT

## 2021-11-15 PROCEDURE — 99213 OFFICE O/P EST LOW 20 MIN: CPT | Mod: HCNC,S$GLB,, | Performed by: PHYSICIAN ASSISTANT

## 2021-11-15 PROCEDURE — 3008F PR BODY MASS INDEX (BMI) DOCUMENTED: ICD-10-PCS | Mod: HCNC,CPTII,S$GLB, | Performed by: PHYSICIAN ASSISTANT

## 2021-11-15 PROCEDURE — 3288F PR FALLS RISK ASSESSMENT DOCUMENTED: ICD-10-PCS | Mod: HCNC,CPTII,S$GLB, | Performed by: PHYSICIAN ASSISTANT

## 2021-11-15 PROCEDURE — 3066F PR DOCUMENTATION OF TREATMENT FOR NEPHROPATHY: ICD-10-PCS | Mod: HCNC,CPTII,S$GLB, | Performed by: PHYSICIAN ASSISTANT

## 2021-11-15 PROCEDURE — 3061F PR NEG MICROALBUMINURIA RESULT DOCUMENTED/REVIEW: ICD-10-PCS | Mod: HCNC,CPTII,S$GLB, | Performed by: PHYSICIAN ASSISTANT

## 2021-11-15 PROCEDURE — 1101F PR PT FALLS ASSESS DOC 0-1 FALLS W/OUT INJ PAST YR: ICD-10-PCS | Mod: HCNC,CPTII,S$GLB, | Performed by: PHYSICIAN ASSISTANT

## 2021-11-15 PROCEDURE — 99213 PR OFFICE/OUTPT VISIT, EST, LEVL III, 20-29 MIN: ICD-10-PCS | Mod: HCNC,S$GLB,, | Performed by: PHYSICIAN ASSISTANT

## 2021-11-15 PROCEDURE — 1159F MED LIST DOCD IN RCRD: CPT | Mod: HCNC,CPTII,S$GLB, | Performed by: PHYSICIAN ASSISTANT

## 2021-11-15 PROCEDURE — 3075F PR MOST RECENT SYSTOLIC BLOOD PRESS GE 130-139MM HG: ICD-10-PCS | Mod: HCNC,CPTII,S$GLB, | Performed by: PHYSICIAN ASSISTANT

## 2021-11-15 PROCEDURE — 1126F PR PAIN SEVERITY QUANTIFIED, NO PAIN PRESENT: ICD-10-PCS | Mod: HCNC,CPTII,S$GLB, | Performed by: PHYSICIAN ASSISTANT

## 2021-11-15 PROCEDURE — 3075F SYST BP GE 130 - 139MM HG: CPT | Mod: HCNC,CPTII,S$GLB, | Performed by: PHYSICIAN ASSISTANT

## 2021-11-15 PROCEDURE — 1126F AMNT PAIN NOTED NONE PRSNT: CPT | Mod: HCNC,CPTII,S$GLB, | Performed by: PHYSICIAN ASSISTANT

## 2021-11-15 PROCEDURE — 3061F NEG MICROALBUMINURIA REV: CPT | Mod: HCNC,CPTII,S$GLB, | Performed by: PHYSICIAN ASSISTANT

## 2021-11-15 PROCEDURE — 3044F PR MOST RECENT HEMOGLOBIN A1C LEVEL <7.0%: ICD-10-PCS | Mod: HCNC,CPTII,S$GLB, | Performed by: PHYSICIAN ASSISTANT

## 2021-11-15 PROCEDURE — 1101F PT FALLS ASSESS-DOCD LE1/YR: CPT | Mod: HCNC,CPTII,S$GLB, | Performed by: PHYSICIAN ASSISTANT

## 2021-11-15 PROCEDURE — 3044F HG A1C LEVEL LT 7.0%: CPT | Mod: HCNC,CPTII,S$GLB, | Performed by: PHYSICIAN ASSISTANT

## 2021-11-15 PROCEDURE — 4010F PR ACE/ARB THEARPY RXD/TAKEN: ICD-10-PCS | Mod: HCNC,CPTII,S$GLB, | Performed by: PHYSICIAN ASSISTANT

## 2021-11-15 PROCEDURE — 3066F NEPHROPATHY DOC TX: CPT | Mod: HCNC,CPTII,S$GLB, | Performed by: PHYSICIAN ASSISTANT

## 2021-11-15 PROCEDURE — 3288F FALL RISK ASSESSMENT DOCD: CPT | Mod: HCNC,CPTII,S$GLB, | Performed by: PHYSICIAN ASSISTANT

## 2021-11-15 PROCEDURE — 3080F DIAST BP >= 90 MM HG: CPT | Mod: HCNC,CPTII,S$GLB, | Performed by: PHYSICIAN ASSISTANT

## 2021-11-15 PROCEDURE — 99999 PR PBB SHADOW E&M-EST. PATIENT-LVL IV: CPT | Mod: PBBFAC,HCNC,, | Performed by: PHYSICIAN ASSISTANT

## 2021-11-15 PROCEDURE — 4010F ACE/ARB THERAPY RXD/TAKEN: CPT | Mod: HCNC,CPTII,S$GLB, | Performed by: PHYSICIAN ASSISTANT

## 2021-11-15 PROCEDURE — 3008F BODY MASS INDEX DOCD: CPT | Mod: HCNC,CPTII,S$GLB, | Performed by: PHYSICIAN ASSISTANT

## 2021-11-15 RX ORDER — PANTOPRAZOLE SODIUM 40 MG/1
40 TABLET, DELAYED RELEASE ORAL 2 TIMES DAILY
Qty: 60 TABLET | Refills: 1 | Status: SHIPPED | OUTPATIENT
Start: 2021-11-15 | End: 2022-02-10 | Stop reason: SDUPTHER

## 2021-11-15 RX ORDER — FLUTICASONE PROPIONATE 50 MCG
1 SPRAY, SUSPENSION (ML) NASAL DAILY
COMMUNITY

## 2021-12-02 ENCOUNTER — OFFICE VISIT (OUTPATIENT)
Dept: PODIATRY | Facility: CLINIC | Age: 71
End: 2021-12-02
Payer: MEDICARE

## 2021-12-02 VITALS — HEIGHT: 71 IN | BODY MASS INDEX: 28.84 KG/M2 | WEIGHT: 206 LBS

## 2021-12-02 DIAGNOSIS — L84 CORN OR CALLUS: ICD-10-CM

## 2021-12-02 DIAGNOSIS — E11.9 COMPREHENSIVE DIABETIC FOOT EXAMINATION, TYPE 2 DM, ENCOUNTER FOR: Primary | ICD-10-CM

## 2021-12-02 PROCEDURE — 99203 PR OFFICE/OUTPT VISIT, NEW, LEVL III, 30-44 MIN: ICD-10-PCS | Mod: 25,HCNC,S$GLB, | Performed by: PODIATRIST

## 2021-12-02 PROCEDURE — 99999 PR PBB SHADOW E&M-EST. PATIENT-LVL IV: CPT | Mod: PBBFAC,HCNC,, | Performed by: PODIATRIST

## 2021-12-02 PROCEDURE — 3066F NEPHROPATHY DOC TX: CPT | Mod: HCNC,CPTII,S$GLB, | Performed by: PODIATRIST

## 2021-12-02 PROCEDURE — 3061F NEG MICROALBUMINURIA REV: CPT | Mod: HCNC,CPTII,S$GLB, | Performed by: PODIATRIST

## 2021-12-02 PROCEDURE — 99999 PR PBB SHADOW E&M-EST. PATIENT-LVL IV: ICD-10-PCS | Mod: PBBFAC,HCNC,, | Performed by: PODIATRIST

## 2021-12-02 PROCEDURE — 4010F PR ACE/ARB THEARPY RXD/TAKEN: ICD-10-PCS | Mod: HCNC,CPTII,S$GLB, | Performed by: PODIATRIST

## 2021-12-02 PROCEDURE — 4010F ACE/ARB THERAPY RXD/TAKEN: CPT | Mod: HCNC,CPTII,S$GLB, | Performed by: PODIATRIST

## 2021-12-02 PROCEDURE — 99203 OFFICE O/P NEW LOW 30 MIN: CPT | Mod: 25,HCNC,S$GLB, | Performed by: PODIATRIST

## 2021-12-02 PROCEDURE — 3066F PR DOCUMENTATION OF TREATMENT FOR NEPHROPATHY: ICD-10-PCS | Mod: HCNC,CPTII,S$GLB, | Performed by: PODIATRIST

## 2021-12-02 PROCEDURE — 3061F PR NEG MICROALBUMINURIA RESULT DOCUMENTED/REVIEW: ICD-10-PCS | Mod: HCNC,CPTII,S$GLB, | Performed by: PODIATRIST

## 2021-12-07 ENCOUNTER — PATIENT MESSAGE (OUTPATIENT)
Dept: INTERNAL MEDICINE | Facility: CLINIC | Age: 71
End: 2021-12-07
Payer: MEDICARE

## 2021-12-07 DIAGNOSIS — R53.82 CHRONIC FATIGUE: Primary | ICD-10-CM

## 2021-12-14 RX ORDER — LOSARTAN POTASSIUM 25 MG/1
25 TABLET ORAL DAILY
Qty: 90 TABLET | Refills: 3
Start: 2021-12-14 | End: 2021-12-16 | Stop reason: SDUPTHER

## 2021-12-15 DIAGNOSIS — I10 ESSENTIAL HYPERTENSION: Primary | ICD-10-CM

## 2021-12-16 RX ORDER — LOSARTAN POTASSIUM 25 MG/1
25 TABLET ORAL DAILY
Qty: 90 TABLET | Refills: 3 | Status: SHIPPED | OUTPATIENT
Start: 2021-12-16 | End: 2023-04-24 | Stop reason: SDUPTHER

## 2021-12-16 RX ORDER — LOSARTAN POTASSIUM 25 MG/1
25 TABLET ORAL DAILY
Qty: 90 TABLET | Refills: 3 | Status: SHIPPED | OUTPATIENT
Start: 2021-12-16 | End: 2021-12-16 | Stop reason: SDUPTHER

## 2021-12-21 ENCOUNTER — PATIENT MESSAGE (OUTPATIENT)
Dept: INTERNAL MEDICINE | Facility: CLINIC | Age: 71
End: 2021-12-21
Payer: MEDICARE

## 2021-12-21 DIAGNOSIS — R53.83 FATIGUE, UNSPECIFIED TYPE: Primary | ICD-10-CM

## 2021-12-27 ENCOUNTER — LAB VISIT (OUTPATIENT)
Dept: LAB | Facility: HOSPITAL | Age: 71
End: 2021-12-27
Attending: FAMILY MEDICINE
Payer: MEDICARE

## 2021-12-27 DIAGNOSIS — R53.83 FATIGUE, UNSPECIFIED TYPE: ICD-10-CM

## 2021-12-27 LAB
ALBUMIN SERPL BCP-MCNC: 4.1 G/DL (ref 3.5–5.2)
ALP SERPL-CCNC: 33 U/L (ref 55–135)
ALT SERPL W/O P-5'-P-CCNC: 12 U/L (ref 10–44)
ANION GAP SERPL CALC-SCNC: 9 MMOL/L (ref 8–16)
AST SERPL-CCNC: 23 U/L (ref 10–40)
BASOPHILS # BLD AUTO: 0.04 K/UL (ref 0–0.2)
BASOPHILS NFR BLD: 0.7 % (ref 0–1.9)
BILIRUB SERPL-MCNC: 0.8 MG/DL (ref 0.1–1)
BUN SERPL-MCNC: 18 MG/DL (ref 8–23)
CALCIUM SERPL-MCNC: 9.9 MG/DL (ref 8.7–10.5)
CHLORIDE SERPL-SCNC: 104 MMOL/L (ref 95–110)
CO2 SERPL-SCNC: 26 MMOL/L (ref 23–29)
CREAT SERPL-MCNC: 1.3 MG/DL (ref 0.5–1.4)
DIFFERENTIAL METHOD: ABNORMAL
EOSINOPHIL # BLD AUTO: 0.1 K/UL (ref 0–0.5)
EOSINOPHIL NFR BLD: 2 % (ref 0–8)
ERYTHROCYTE [DISTWIDTH] IN BLOOD BY AUTOMATED COUNT: 12.8 % (ref 11.5–14.5)
EST. GFR  (AFRICAN AMERICAN): >60 ML/MIN/1.73 M^2
EST. GFR  (NON AFRICAN AMERICAN): 55.3 ML/MIN/1.73 M^2
FERRITIN SERPL-MCNC: 46 NG/ML (ref 20–300)
GLUCOSE SERPL-MCNC: 170 MG/DL (ref 70–110)
HCT VFR BLD AUTO: 44.2 % (ref 40–54)
HGB BLD-MCNC: 14.6 G/DL (ref 14–18)
IMM GRANULOCYTES # BLD AUTO: 0.04 K/UL (ref 0–0.04)
IMM GRANULOCYTES NFR BLD AUTO: 0.7 % (ref 0–0.5)
IRON SERPL-MCNC: 97 UG/DL (ref 45–160)
LYMPHOCYTES # BLD AUTO: 1.2 K/UL (ref 1–4.8)
LYMPHOCYTES NFR BLD: 19.1 % (ref 18–48)
MCH RBC QN AUTO: 32.2 PG (ref 27–31)
MCHC RBC AUTO-ENTMCNC: 33 G/DL (ref 32–36)
MCV RBC AUTO: 98 FL (ref 82–98)
MONOCYTES # BLD AUTO: 0.4 K/UL (ref 0.3–1)
MONOCYTES NFR BLD: 6.1 % (ref 4–15)
NEUTROPHILS # BLD AUTO: 4.3 K/UL (ref 1.8–7.7)
NEUTROPHILS NFR BLD: 71.4 % (ref 38–73)
NRBC BLD-RTO: 0 /100 WBC
PLATELET # BLD AUTO: 256 K/UL (ref 150–450)
PMV BLD AUTO: 9.4 FL (ref 9.2–12.9)
POTASSIUM SERPL-SCNC: 4.7 MMOL/L (ref 3.5–5.1)
PROT SERPL-MCNC: 6.9 G/DL (ref 6–8.4)
RBC # BLD AUTO: 4.53 M/UL (ref 4.6–6.2)
SATURATED IRON: 18 % (ref 20–50)
SODIUM SERPL-SCNC: 139 MMOL/L (ref 136–145)
TOTAL IRON BINDING CAPACITY: 551 UG/DL (ref 250–450)
TRANSFERRIN SERPL-MCNC: 372 MG/DL (ref 200–375)
TSH SERPL DL<=0.005 MIU/L-ACNC: 1.84 UIU/ML (ref 0.4–4)
WBC # BLD AUTO: 6.07 K/UL (ref 3.9–12.7)

## 2021-12-27 PROCEDURE — 84443 ASSAY THYROID STIM HORMONE: CPT | Mod: HCNC,PO | Performed by: FAMILY MEDICINE

## 2021-12-27 PROCEDURE — 82728 ASSAY OF FERRITIN: CPT | Mod: GA,HCNC | Performed by: FAMILY MEDICINE

## 2021-12-27 PROCEDURE — 84466 ASSAY OF TRANSFERRIN: CPT | Mod: HCNC | Performed by: FAMILY MEDICINE

## 2021-12-27 PROCEDURE — 36415 COLL VENOUS BLD VENIPUNCTURE: CPT | Mod: HCNC,PO | Performed by: FAMILY MEDICINE

## 2021-12-27 PROCEDURE — 80053 COMPREHEN METABOLIC PANEL: CPT | Mod: HCNC,PO | Performed by: FAMILY MEDICINE

## 2021-12-27 PROCEDURE — 85025 COMPLETE CBC W/AUTO DIFF WBC: CPT | Mod: HCNC,PO | Performed by: FAMILY MEDICINE

## 2021-12-28 ENCOUNTER — PATIENT MESSAGE (OUTPATIENT)
Dept: INTERNAL MEDICINE | Facility: CLINIC | Age: 71
End: 2021-12-28
Payer: MEDICARE

## 2021-12-28 ENCOUNTER — PATIENT OUTREACH (OUTPATIENT)
Dept: ADMINISTRATIVE | Facility: OTHER | Age: 71
End: 2021-12-28
Payer: MEDICARE

## 2021-12-28 ENCOUNTER — OFFICE VISIT (OUTPATIENT)
Dept: OTOLARYNGOLOGY | Facility: CLINIC | Age: 71
End: 2021-12-28
Payer: MEDICARE

## 2021-12-28 VITALS — BODY MASS INDEX: 28.8 KG/M2 | WEIGHT: 205.69 LBS | HEIGHT: 71 IN

## 2021-12-28 DIAGNOSIS — H60.391 OTHER INFECTIVE ACUTE OTITIS EXTERNA OF RIGHT EAR: Primary | ICD-10-CM

## 2021-12-28 PROCEDURE — 3288F PR FALLS RISK ASSESSMENT DOCUMENTED: ICD-10-PCS | Mod: HCNC,CPTII,S$GLB, | Performed by: PHYSICIAN ASSISTANT

## 2021-12-28 PROCEDURE — 99213 OFFICE O/P EST LOW 20 MIN: CPT | Mod: HCNC,S$GLB,, | Performed by: PHYSICIAN ASSISTANT

## 2021-12-28 PROCEDURE — 1125F PR PAIN SEVERITY QUANTIFIED, PAIN PRESENT: ICD-10-PCS | Mod: HCNC,CPTII,S$GLB, | Performed by: PHYSICIAN ASSISTANT

## 2021-12-28 PROCEDURE — 1159F MED LIST DOCD IN RCRD: CPT | Mod: HCNC,CPTII,S$GLB, | Performed by: PHYSICIAN ASSISTANT

## 2021-12-28 PROCEDURE — 1101F PR PT FALLS ASSESS DOC 0-1 FALLS W/OUT INJ PAST YR: ICD-10-PCS | Mod: HCNC,CPTII,S$GLB, | Performed by: PHYSICIAN ASSISTANT

## 2021-12-28 PROCEDURE — 3061F PR NEG MICROALBUMINURIA RESULT DOCUMENTED/REVIEW: ICD-10-PCS | Mod: HCNC,CPTII,S$GLB, | Performed by: PHYSICIAN ASSISTANT

## 2021-12-28 PROCEDURE — 1159F PR MEDICATION LIST DOCUMENTED IN MEDICAL RECORD: ICD-10-PCS | Mod: HCNC,CPTII,S$GLB, | Performed by: PHYSICIAN ASSISTANT

## 2021-12-28 PROCEDURE — 3288F FALL RISK ASSESSMENT DOCD: CPT | Mod: HCNC,CPTII,S$GLB, | Performed by: PHYSICIAN ASSISTANT

## 2021-12-28 PROCEDURE — 3061F NEG MICROALBUMINURIA REV: CPT | Mod: HCNC,CPTII,S$GLB, | Performed by: PHYSICIAN ASSISTANT

## 2021-12-28 PROCEDURE — 4010F ACE/ARB THERAPY RXD/TAKEN: CPT | Mod: HCNC,CPTII,S$GLB, | Performed by: PHYSICIAN ASSISTANT

## 2021-12-28 PROCEDURE — 3044F PR MOST RECENT HEMOGLOBIN A1C LEVEL <7.0%: ICD-10-PCS | Mod: HCNC,CPTII,S$GLB, | Performed by: PHYSICIAN ASSISTANT

## 2021-12-28 PROCEDURE — 87070 CULTURE OTHR SPECIMN AEROBIC: CPT | Mod: HCNC | Performed by: PHYSICIAN ASSISTANT

## 2021-12-28 PROCEDURE — 3066F PR DOCUMENTATION OF TREATMENT FOR NEPHROPATHY: ICD-10-PCS | Mod: HCNC,CPTII,S$GLB, | Performed by: PHYSICIAN ASSISTANT

## 2021-12-28 PROCEDURE — 4010F PR ACE/ARB THEARPY RXD/TAKEN: ICD-10-PCS | Mod: HCNC,CPTII,S$GLB, | Performed by: PHYSICIAN ASSISTANT

## 2021-12-28 PROCEDURE — 3066F NEPHROPATHY DOC TX: CPT | Mod: HCNC,CPTII,S$GLB, | Performed by: PHYSICIAN ASSISTANT

## 2021-12-28 PROCEDURE — 99213 PR OFFICE/OUTPT VISIT, EST, LEVL III, 20-29 MIN: ICD-10-PCS | Mod: HCNC,S$GLB,, | Performed by: PHYSICIAN ASSISTANT

## 2021-12-28 PROCEDURE — 3008F BODY MASS INDEX DOCD: CPT | Mod: HCNC,CPTII,S$GLB, | Performed by: PHYSICIAN ASSISTANT

## 2021-12-28 PROCEDURE — 99999 PR PBB SHADOW E&M-EST. PATIENT-LVL IV: ICD-10-PCS | Mod: PBBFAC,HCNC,, | Performed by: PHYSICIAN ASSISTANT

## 2021-12-28 PROCEDURE — 3008F PR BODY MASS INDEX (BMI) DOCUMENTED: ICD-10-PCS | Mod: HCNC,CPTII,S$GLB, | Performed by: PHYSICIAN ASSISTANT

## 2021-12-28 PROCEDURE — 1125F AMNT PAIN NOTED PAIN PRSNT: CPT | Mod: HCNC,CPTII,S$GLB, | Performed by: PHYSICIAN ASSISTANT

## 2021-12-28 PROCEDURE — 3044F HG A1C LEVEL LT 7.0%: CPT | Mod: HCNC,CPTII,S$GLB, | Performed by: PHYSICIAN ASSISTANT

## 2021-12-28 PROCEDURE — 1101F PT FALLS ASSESS-DOCD LE1/YR: CPT | Mod: HCNC,CPTII,S$GLB, | Performed by: PHYSICIAN ASSISTANT

## 2021-12-28 PROCEDURE — 99999 PR PBB SHADOW E&M-EST. PATIENT-LVL IV: CPT | Mod: PBBFAC,HCNC,, | Performed by: PHYSICIAN ASSISTANT

## 2021-12-28 RX ORDER — AMOXICILLIN AND CLAVULANATE POTASSIUM 875; 125 MG/1; MG/1
1 TABLET, FILM COATED ORAL EVERY 12 HOURS
Qty: 20 TABLET | Refills: 0 | Status: SHIPPED | OUTPATIENT
Start: 2021-12-28 | End: 2022-01-07

## 2021-12-28 RX ORDER — CIPROFLOXACIN AND DEXAMETHASONE 3; 1 MG/ML; MG/ML
4 SUSPENSION/ DROPS AURICULAR (OTIC) 2 TIMES DAILY
Qty: 7.5 ML | Refills: 0 | Status: SHIPPED | OUTPATIENT
Start: 2021-12-28 | End: 2022-01-04

## 2021-12-31 LAB — BACTERIA SPEC AEROBE CULT: NORMAL

## 2022-01-11 ENCOUNTER — OFFICE VISIT (OUTPATIENT)
Dept: OTOLARYNGOLOGY | Facility: CLINIC | Age: 72
End: 2022-01-11
Payer: MEDICARE

## 2022-01-11 VITALS — WEIGHT: 205.94 LBS | TEMPERATURE: 97 F | BODY MASS INDEX: 28.72 KG/M2

## 2022-01-11 DIAGNOSIS — H90.A32 MIXED CONDUCTIVE AND SENSORINEURAL HEARING LOSS OF LEFT EAR WITH RESTRICTED HEARING OF RIGHT EAR: ICD-10-CM

## 2022-01-11 DIAGNOSIS — H60.391 OTHER INFECTIVE ACUTE OTITIS EXTERNA OF RIGHT EAR: Primary | ICD-10-CM

## 2022-01-11 PROCEDURE — 99999 PR PBB SHADOW E&M-EST. PATIENT-LVL III: CPT | Mod: PBBFAC,HCNC,, | Performed by: PHYSICIAN ASSISTANT

## 2022-01-11 PROCEDURE — 3072F LOW RISK FOR RETINOPATHY: CPT | Mod: HCNC,CPTII,S$GLB, | Performed by: PHYSICIAN ASSISTANT

## 2022-01-11 PROCEDURE — 99999 PR PBB SHADOW E&M-EST. PATIENT-LVL III: ICD-10-PCS | Mod: PBBFAC,HCNC,, | Performed by: PHYSICIAN ASSISTANT

## 2022-01-11 PROCEDURE — 3072F PR LOW RISK FOR RETINOPATHY: ICD-10-PCS | Mod: HCNC,CPTII,S$GLB, | Performed by: PHYSICIAN ASSISTANT

## 2022-01-11 PROCEDURE — 99213 OFFICE O/P EST LOW 20 MIN: CPT | Mod: HCNC,S$GLB,, | Performed by: PHYSICIAN ASSISTANT

## 2022-01-11 PROCEDURE — 1159F MED LIST DOCD IN RCRD: CPT | Mod: HCNC,CPTII,S$GLB, | Performed by: PHYSICIAN ASSISTANT

## 2022-01-11 PROCEDURE — 99213 PR OFFICE/OUTPT VISIT, EST, LEVL III, 20-29 MIN: ICD-10-PCS | Mod: HCNC,S$GLB,, | Performed by: PHYSICIAN ASSISTANT

## 2022-01-11 PROCEDURE — 3008F PR BODY MASS INDEX (BMI) DOCUMENTED: ICD-10-PCS | Mod: HCNC,CPTII,S$GLB, | Performed by: PHYSICIAN ASSISTANT

## 2022-01-11 PROCEDURE — 1159F PR MEDICATION LIST DOCUMENTED IN MEDICAL RECORD: ICD-10-PCS | Mod: HCNC,CPTII,S$GLB, | Performed by: PHYSICIAN ASSISTANT

## 2022-01-11 PROCEDURE — 3008F BODY MASS INDEX DOCD: CPT | Mod: HCNC,CPTII,S$GLB, | Performed by: PHYSICIAN ASSISTANT

## 2022-01-11 NOTE — PROGRESS NOTES
Subjective:   Patient ID: Rigoberto Vasquez is a 71 y.o. male.    Chief Complaint: Follow-up (Otitis media ) and Cerumen Impaction    Rigoberto Vasquez is a 70 y.o. male with history of AS tympmastoid with history of left cartilage tympanoplasty and T-tube placement here for follow up of right ear OE and fullness. He last saw Sherry Maldonado on 12/27 and was treated with both  oral and topical antibiotics. He states that he is now able to wear his hearing aids but still feels fullness especially in right ear.     Review of patient's allergies indicates:   Allergen Reactions    Aspirin      Stomach pain even with ppi    Meperidine      Other reaction(s): Stomach upset    Niacin      Other reaction(s): hot skin           Review of Systems   Constitutional: Negative for fatigue, fever and unexpected weight change.   HENT: Positive for hearing loss. Negative for congestion, ear discharge, ear pain, facial swelling, nosebleeds, postnasal drip, rhinorrhea, sinus pressure, sneezing, sore throat, tinnitus, trouble swallowing and voice change.    Eyes: Negative for discharge, redness and itching.   Respiratory: Negative for cough, choking, shortness of breath and wheezing.    Cardiovascular: Negative for chest pain and palpitations.   Gastrointestinal: Negative for abdominal pain.        No reflux.   Musculoskeletal: Negative for neck pain.   Neurological: Negative for dizziness, facial asymmetry, light-headedness and headaches.   Hematological: Negative for adenopathy. Does not bruise/bleed easily.   Psychiatric/Behavioral: Negative for agitation, behavioral problems, confusion and decreased concentration.         Objective:   Temp 97.2 °F (36.2 °C) (Temporal)   Wt 93.4 kg (205 lb 14.6 oz)   BMI 28.72 kg/m²     Physical Exam  Constitutional:       Appearance: He is well-developed.   HENT:      Head: Normocephalic and atraumatic.      Right Ear: External ear normal.      Left Ear: External ear normal.      Ears:      Comments:    AD: posterior superior retraction pocket with myringostapediopexy, able to insufflate no obvious cholestaetoma  AS: prior cartilage graft to posterior tympani membrane, there is an anterior dry perforation 40% with a T-tube sitting in it, no discharge is noted.         Nose: Nose normal.      Mouth/Throat:      Pharynx: Uvula midline.   Neck:      Thyroid: No thyroid mass.   Musculoskeletal:      Cervical back: Normal range of motion.          Assessment:     1. Other infective acute otitis externa of right ear    2. Mixed conductive and sensorineural hearing loss of left ear with restricted hearing of right ear        Plan:     Other infective acute otitis externa of right ear    Mixed conductive and sensorineural hearing loss of left ear with restricted hearing of right ear      Right OE has resolved. I would recommend that he follow up with HA distributor and obtain audiogram. He has an upcoming appointment. His HA may need adjusting. He may return to clinic as needed.

## 2022-01-12 ENCOUNTER — PATIENT OUTREACH (OUTPATIENT)
Dept: ADMINISTRATIVE | Facility: HOSPITAL | Age: 72
End: 2022-01-12
Payer: MEDICARE

## 2022-01-12 ENCOUNTER — TELEPHONE (OUTPATIENT)
Dept: INTERNAL MEDICINE | Facility: CLINIC | Age: 72
End: 2022-01-12
Payer: MEDICARE

## 2022-01-12 NOTE — PROGRESS NOTES
HTN Report: Patient's last home BP reading was 132/78 on 1/9/22. Patient is active on the [employer] [facility] HTN program. Patient has an appointment scheduled with PCP on 2/3/22 for 6 month follow up.

## 2022-01-24 ENCOUNTER — LAB VISIT (OUTPATIENT)
Dept: LAB | Facility: HOSPITAL | Age: 72
End: 2022-01-24
Attending: PHYSICIAN ASSISTANT
Payer: MEDICARE

## 2022-01-24 ENCOUNTER — OFFICE VISIT (OUTPATIENT)
Dept: OTOLARYNGOLOGY | Facility: CLINIC | Age: 72
End: 2022-01-24
Payer: MEDICARE

## 2022-01-24 VITALS — BODY MASS INDEX: 29.04 KG/M2 | WEIGHT: 207.44 LBS | HEIGHT: 71 IN

## 2022-01-24 DIAGNOSIS — E11.22 TYPE 2 DIABETES MELLITUS WITH STAGE 3A CHRONIC KIDNEY DISEASE, WITH LONG-TERM CURRENT USE OF INSULIN: ICD-10-CM

## 2022-01-24 DIAGNOSIS — H71.92 CHOLESTEATOMA OF EAR, LEFT: ICD-10-CM

## 2022-01-24 DIAGNOSIS — Z79.4 TYPE 2 DIABETES MELLITUS WITH STAGE 3A CHRONIC KIDNEY DISEASE, WITH LONG-TERM CURRENT USE OF INSULIN: ICD-10-CM

## 2022-01-24 DIAGNOSIS — D50.9 IRON DEFICIENCY ANEMIA, UNSPECIFIED IRON DEFICIENCY ANEMIA TYPE: Chronic | ICD-10-CM

## 2022-01-24 DIAGNOSIS — E11.69 HYPERLIPIDEMIA ASSOCIATED WITH TYPE 2 DIABETES MELLITUS: Chronic | ICD-10-CM

## 2022-01-24 DIAGNOSIS — E11.59 HYPERTENSION ASSOCIATED WITH DIABETES: Chronic | ICD-10-CM

## 2022-01-24 DIAGNOSIS — I15.2 HYPERTENSION ASSOCIATED WITH DIABETES: Chronic | ICD-10-CM

## 2022-01-24 DIAGNOSIS — B96.89 ACUTE BACTERIAL RHINOSINUSITIS: Primary | ICD-10-CM

## 2022-01-24 DIAGNOSIS — J01.90 ACUTE BACTERIAL RHINOSINUSITIS: Primary | ICD-10-CM

## 2022-01-24 DIAGNOSIS — E78.5 HYPERLIPIDEMIA ASSOCIATED WITH TYPE 2 DIABETES MELLITUS: Chronic | ICD-10-CM

## 2022-01-24 DIAGNOSIS — N18.31 TYPE 2 DIABETES MELLITUS WITH STAGE 3A CHRONIC KIDNEY DISEASE, WITH LONG-TERM CURRENT USE OF INSULIN: ICD-10-CM

## 2022-01-24 PROCEDURE — 3044F PR MOST RECENT HEMOGLOBIN A1C LEVEL <7.0%: ICD-10-PCS | Mod: HCNC,CPTII,S$GLB, | Performed by: OTOLARYNGOLOGY

## 2022-01-24 PROCEDURE — 3072F LOW RISK FOR RETINOPATHY: CPT | Mod: HCNC,CPTII,S$GLB, | Performed by: OTOLARYNGOLOGY

## 2022-01-24 PROCEDURE — 3044F HG A1C LEVEL LT 7.0%: CPT | Mod: HCNC,CPTII,S$GLB, | Performed by: OTOLARYNGOLOGY

## 2022-01-24 PROCEDURE — 80061 LIPID PANEL: CPT | Mod: HCNC | Performed by: PHYSICIAN ASSISTANT

## 2022-01-24 PROCEDURE — 1159F PR MEDICATION LIST DOCUMENTED IN MEDICAL RECORD: ICD-10-PCS | Mod: HCNC,CPTII,S$GLB, | Performed by: OTOLARYNGOLOGY

## 2022-01-24 PROCEDURE — 3061F NEG MICROALBUMINURIA REV: CPT | Mod: HCNC,CPTII,S$GLB, | Performed by: OTOLARYNGOLOGY

## 2022-01-24 PROCEDURE — 1125F PR PAIN SEVERITY QUANTIFIED, PAIN PRESENT: ICD-10-PCS | Mod: HCNC,CPTII,S$GLB, | Performed by: OTOLARYNGOLOGY

## 2022-01-24 PROCEDURE — 1101F PR PT FALLS ASSESS DOC 0-1 FALLS W/OUT INJ PAST YR: ICD-10-PCS | Mod: HCNC,CPTII,S$GLB, | Performed by: OTOLARYNGOLOGY

## 2022-01-24 PROCEDURE — 1159F MED LIST DOCD IN RCRD: CPT | Mod: HCNC,CPTII,S$GLB, | Performed by: OTOLARYNGOLOGY

## 2022-01-24 PROCEDURE — 3066F PR DOCUMENTATION OF TREATMENT FOR NEPHROPATHY: ICD-10-PCS | Mod: HCNC,CPTII,S$GLB, | Performed by: OTOLARYNGOLOGY

## 2022-01-24 PROCEDURE — 36415 COLL VENOUS BLD VENIPUNCTURE: CPT | Mod: HCNC | Performed by: PHYSICIAN ASSISTANT

## 2022-01-24 PROCEDURE — 3072F PR LOW RISK FOR RETINOPATHY: ICD-10-PCS | Mod: HCNC,CPTII,S$GLB, | Performed by: OTOLARYNGOLOGY

## 2022-01-24 PROCEDURE — 3008F PR BODY MASS INDEX (BMI) DOCUMENTED: ICD-10-PCS | Mod: HCNC,CPTII,S$GLB, | Performed by: OTOLARYNGOLOGY

## 2022-01-24 PROCEDURE — 99999 PR PBB SHADOW E&M-EST. PATIENT-LVL IV: ICD-10-PCS | Mod: PBBFAC,HCNC,, | Performed by: OTOLARYNGOLOGY

## 2022-01-24 PROCEDURE — 1101F PT FALLS ASSESS-DOCD LE1/YR: CPT | Mod: HCNC,CPTII,S$GLB, | Performed by: OTOLARYNGOLOGY

## 2022-01-24 PROCEDURE — 3066F NEPHROPATHY DOC TX: CPT | Mod: HCNC,CPTII,S$GLB, | Performed by: OTOLARYNGOLOGY

## 2022-01-24 PROCEDURE — 99214 PR OFFICE/OUTPT VISIT, EST, LEVL IV, 30-39 MIN: ICD-10-PCS | Mod: HCNC,S$GLB,, | Performed by: OTOLARYNGOLOGY

## 2022-01-24 PROCEDURE — 3288F PR FALLS RISK ASSESSMENT DOCUMENTED: ICD-10-PCS | Mod: HCNC,CPTII,S$GLB, | Performed by: OTOLARYNGOLOGY

## 2022-01-24 PROCEDURE — 99999 PR PBB SHADOW E&M-EST. PATIENT-LVL IV: CPT | Mod: PBBFAC,HCNC,, | Performed by: OTOLARYNGOLOGY

## 2022-01-24 PROCEDURE — 80053 COMPREHEN METABOLIC PANEL: CPT | Mod: HCNC | Performed by: PHYSICIAN ASSISTANT

## 2022-01-24 PROCEDURE — 1125F AMNT PAIN NOTED PAIN PRSNT: CPT | Mod: HCNC,CPTII,S$GLB, | Performed by: OTOLARYNGOLOGY

## 2022-01-24 PROCEDURE — 83036 HEMOGLOBIN GLYCOSYLATED A1C: CPT | Mod: HCNC | Performed by: PHYSICIAN ASSISTANT

## 2022-01-24 PROCEDURE — 85025 COMPLETE CBC W/AUTO DIFF WBC: CPT | Mod: HCNC | Performed by: PHYSICIAN ASSISTANT

## 2022-01-24 PROCEDURE — 3061F PR NEG MICROALBUMINURIA RESULT DOCUMENTED/REVIEW: ICD-10-PCS | Mod: HCNC,CPTII,S$GLB, | Performed by: OTOLARYNGOLOGY

## 2022-01-24 PROCEDURE — 99214 OFFICE O/P EST MOD 30 MIN: CPT | Mod: HCNC,S$GLB,, | Performed by: OTOLARYNGOLOGY

## 2022-01-24 PROCEDURE — 3008F BODY MASS INDEX DOCD: CPT | Mod: HCNC,CPTII,S$GLB, | Performed by: OTOLARYNGOLOGY

## 2022-01-24 PROCEDURE — 3288F FALL RISK ASSESSMENT DOCD: CPT | Mod: HCNC,CPTII,S$GLB, | Performed by: OTOLARYNGOLOGY

## 2022-01-24 RX ORDER — LEVOFLOXACIN 750 MG/1
750 TABLET ORAL DAILY
Qty: 10 TABLET | Refills: 0 | Status: SHIPPED | OUTPATIENT
Start: 2022-01-24 | End: 2022-03-04 | Stop reason: ALTCHOICE

## 2022-01-24 RX ORDER — PREDNISONE 20 MG/1
TABLET ORAL
Qty: 21 TABLET | Refills: 0 | Status: SHIPPED | OUTPATIENT
Start: 2022-01-24 | End: 2022-03-04

## 2022-01-24 NOTE — PROGRESS NOTES
"Subjective:   Patient: Rigoberto Vasquez [id number], :1950   Visit date:2022 9:20 AM    Chief Complaint:  Otalgia (Right ear ) and Nasal Congestion (Green mucus )    Surgical history (ear):  Surgery date: 3/26/15  Procedure:   1. left tympanoplasty with CWU mastoidectomy with ossicular reconstruction  2. cartilage graft (palisading technique)  3. Tympanostomy tube placement (T tube)  4. Placement of silastic spacer in middle ear     Findings at surgery:   External canal- Normal  Tympanic membrane- Severe retraction with complete myringopexy. Non adherent to the promontory but severe adherence onto ossicles and stapes suprastructure  Middle Ear Mucosa- Normal  Ossicles- Abnormal - erosion of IS joint with retracted TM attached at this point  Mastoid cavity- Normal  Other- None       HPI:  Rigoberto is a 71 y.o. male who is here for follow-up for R OE/OM:    Recently treated for a right ear infection.  Now he is not having pain but does have a sense of pressure and decreased hearing on the right.  No complaints on the left.             Review of Systems:  -     Allergic/Immunologic: is allergic to aspirin, meperidine, and niacin..  -     Constitutional: Current temp:      -     He has a current medication list which includes the following prescription(s): acetaminophen, azelastine, blood sugar diagnostic, dutasteride, fenofibrate micronized, ferrous gluconate, fluticasone propionate, gabapentin, hydrocortisone, levemir flextouch u-100 insuln, levocetirizine, victoza 3-claudine, losartan, lovastatin, metformin, multivitamin, ondansetron, pen needle, diabetic, pioglitazone, sildenafil, triamcinolone acetonide 0.1%, bifidobacterium infantis, levofloxacin, pantoprazole, and prednisone.  Objective:     Physical Exam:  Vitals:  Ht 5' 11" (1.803 m)   Wt 94.1 kg (207 lb 7.3 oz)   BMI 28.93 kg/m²   Appearance:  Well-developed, well-nourished.  Communication:  Able to communicate, no hoarseness.  Head & Face:  Normocephalic, " atraumatic, no sinus tenderness, normal facial strength.  Eyes:  Extraocular motions intact.  Ears:    AD- EAC wnl.  TM with mild retraction  AS- EAC wnl. There appears to be significant posterior retraction and erythema around the promontory. There is an anterior deep retraction vs perforation.  There is abundant keratinaceous debris which extends into this space.     Nose:  No masses/lesions of external nose, nasal mucosa, septum, and turbinates were within normal limits.  Mouth:  No mass/lesion of lips, teeth, gums, hard/soft palate, tongue, tonsils, or oropharynx.  Neck & Lymphatics:  No cervical lymphadenopathy, no neck mass/crepitus/ asymmetry, trachea is midline, no thyroid enlargement/tenderness/mass.  Neuro/Psych: Alert with normal mood and affect.   Abdominal: Normal appearance.   Respiration/Chest:  Symmetric expansion during respiration, normal respiratory effort.  Skin:  Warm and intact  Cardiovascular:  No peripheral vascular edema or varicosities.          Assessment & Plan:   Rigoberto was seen today for otalgia and nasal congestion.    Diagnoses and all orders for this visit:    Acute bacterial rhinosinusitis    Cholesteatoma of ear, left  -     CT Temporal Bone without contrast; Future    Other orders  -     levoFLOXacin (LEVAQUIN) 750 MG tablet; Take 1 tablet (750 mg total) by mouth once daily.  -     predniSONE (DELTASONE) 20 MG tablet; Take 3 tab in am x 3d, then 2 tab in am x 3d, then 1 tab in am x 3d, then 1/2 tab in am x 3d       Right appears to have mild retraction due to eustachian tube dysfunction.  The left either has development of cholesteatoma or severe infection.  I am concerned that this is cholesteatoma. Will obtain a CT of the temporal bones and schedule follow up for 2 weeks from now.            Rigoberto Lopez MD, FACS  [facility]   [facility]  [address]  [place] [address]  P: [phone]  F: [phone]      Total time spent in the care of  this patient    New  3  [] 30-44 min  4  [] 45-59 min  5  [] 60-74 min    Established  3  [] 20-29 min  4  [x] 30-39 min  5  [] 40-54 min      [x] preparing to see the patient (eg, review of tests)  [x] obtaining and/or reviewing separately obtained history  [x] performing a medically appropriate examination and/or evaluation  [x] counseling and educating the patient/family/caregiver  [x] ordering medications, tests, or procedures  [] referring and communicating with other health care professionals (when not separately reported)  [x] documenting clinical information in the electronic or other health record  [] independently interpreting results (not separately reported) and communicating results to the  patient/ family/caregiver  [] care coordination (not separately reported)

## 2022-01-25 LAB
ALBUMIN SERPL BCP-MCNC: 4 G/DL (ref 3.5–5.2)
ALP SERPL-CCNC: 36 U/L (ref 55–135)
ALT SERPL W/O P-5'-P-CCNC: 12 U/L (ref 10–44)
ANION GAP SERPL CALC-SCNC: 10 MMOL/L (ref 8–16)
AST SERPL-CCNC: 25 U/L (ref 10–40)
BASOPHILS # BLD AUTO: 0.04 K/UL (ref 0–0.2)
BASOPHILS NFR BLD: 0.6 % (ref 0–1.9)
BILIRUB SERPL-MCNC: 0.7 MG/DL (ref 0.1–1)
BUN SERPL-MCNC: 17 MG/DL (ref 8–23)
CALCIUM SERPL-MCNC: 10.1 MG/DL (ref 8.7–10.5)
CHLORIDE SERPL-SCNC: 105 MMOL/L (ref 95–110)
CHOLEST SERPL-MCNC: 122 MG/DL (ref 120–199)
CHOLEST/HDLC SERPL: 2.4 {RATIO} (ref 2–5)
CO2 SERPL-SCNC: 25 MMOL/L (ref 23–29)
CREAT SERPL-MCNC: 1.5 MG/DL (ref 0.5–1.4)
DIFFERENTIAL METHOD: ABNORMAL
EOSINOPHIL # BLD AUTO: 0.1 K/UL (ref 0–0.5)
EOSINOPHIL NFR BLD: 1.5 % (ref 0–8)
ERYTHROCYTE [DISTWIDTH] IN BLOOD BY AUTOMATED COUNT: 12.8 % (ref 11.5–14.5)
EST. GFR  (AFRICAN AMERICAN): 53.8 ML/MIN/1.73 M^2
EST. GFR  (NON AFRICAN AMERICAN): 46.5 ML/MIN/1.73 M^2
ESTIMATED AVG GLUCOSE: 128 MG/DL (ref 68–131)
GLUCOSE SERPL-MCNC: 87 MG/DL (ref 70–110)
HBA1C MFR BLD: 6.1 % (ref 4–5.6)
HCT VFR BLD AUTO: 45.8 % (ref 40–54)
HDLC SERPL-MCNC: 51 MG/DL (ref 40–75)
HDLC SERPL: 41.8 % (ref 20–50)
HGB BLD-MCNC: 14.6 G/DL (ref 14–18)
IMM GRANULOCYTES # BLD AUTO: 0.03 K/UL (ref 0–0.04)
IMM GRANULOCYTES NFR BLD AUTO: 0.5 % (ref 0–0.5)
LDLC SERPL CALC-MCNC: 59.2 MG/DL (ref 63–159)
LYMPHOCYTES # BLD AUTO: 1.9 K/UL (ref 1–4.8)
LYMPHOCYTES NFR BLD: 28.5 % (ref 18–48)
MCH RBC QN AUTO: 32.3 PG (ref 27–31)
MCHC RBC AUTO-ENTMCNC: 31.9 G/DL (ref 32–36)
MCV RBC AUTO: 101 FL (ref 82–98)
MONOCYTES # BLD AUTO: 0.5 K/UL (ref 0.3–1)
MONOCYTES NFR BLD: 7.5 % (ref 4–15)
NEUTROPHILS # BLD AUTO: 4 K/UL (ref 1.8–7.7)
NEUTROPHILS NFR BLD: 61.4 % (ref 38–73)
NONHDLC SERPL-MCNC: 71 MG/DL
NRBC BLD-RTO: 0 /100 WBC
PLATELET # BLD AUTO: 293 K/UL (ref 150–450)
PMV BLD AUTO: 10.4 FL (ref 9.2–12.9)
POTASSIUM SERPL-SCNC: 4.1 MMOL/L (ref 3.5–5.1)
PROT SERPL-MCNC: 7.3 G/DL (ref 6–8.4)
RBC # BLD AUTO: 4.52 M/UL (ref 4.6–6.2)
SODIUM SERPL-SCNC: 140 MMOL/L (ref 136–145)
TRIGL SERPL-MCNC: 59 MG/DL (ref 30–150)
WBC # BLD AUTO: 6.5 K/UL (ref 3.9–12.7)

## 2022-01-27 ENCOUNTER — PATIENT MESSAGE (OUTPATIENT)
Dept: OTOLARYNGOLOGY | Facility: CLINIC | Age: 72
End: 2022-01-27
Payer: MEDICARE

## 2022-01-27 ENCOUNTER — HOSPITAL ENCOUNTER (OUTPATIENT)
Dept: RADIOLOGY | Facility: HOSPITAL | Age: 72
Discharge: HOME | End: 2022-01-27
Attending: OTOLARYNGOLOGY
Payer: MEDICARE

## 2022-01-27 DIAGNOSIS — H71.92 CHOLESTEATOMA OF EAR, LEFT: ICD-10-CM

## 2022-01-27 PROCEDURE — 70480 CT TEMPORAL BONE WITHOUT CONTRAST: ICD-10-PCS | Mod: 26,HCNC,, | Performed by: RADIOLOGY

## 2022-01-27 PROCEDURE — 70480 CT ORBIT/EAR/FOSSA W/O DYE: CPT | Mod: TC,HCNC,PO

## 2022-01-27 PROCEDURE — 70480 CT ORBIT/EAR/FOSSA W/O DYE: CPT | Mod: 26,HCNC,, | Performed by: RADIOLOGY

## 2022-01-28 ENCOUNTER — PATIENT MESSAGE (OUTPATIENT)
Dept: OTOLARYNGOLOGY | Facility: CLINIC | Age: 72
End: 2022-01-28
Payer: MEDICARE

## 2022-02-03 ENCOUNTER — OFFICE VISIT (OUTPATIENT)
Dept: INTERNAL MEDICINE | Facility: CLINIC | Age: 72
End: 2022-02-03
Payer: MEDICARE

## 2022-02-03 VITALS
HEART RATE: 73 BPM | BODY MASS INDEX: 28.55 KG/M2 | OXYGEN SATURATION: 97 % | WEIGHT: 203.94 LBS | HEIGHT: 71 IN | DIASTOLIC BLOOD PRESSURE: 78 MMHG | TEMPERATURE: 98 F | SYSTOLIC BLOOD PRESSURE: 142 MMHG

## 2022-02-03 DIAGNOSIS — N18.30 TYPE 2 DIABETES MELLITUS WITH STAGE 3 CHRONIC KIDNEY DISEASE, WITH LONG-TERM CURRENT USE OF INSULIN, UNSPECIFIED WHETHER STAGE 3A OR 3B CKD: ICD-10-CM

## 2022-02-03 DIAGNOSIS — J84.10 LUNG GRANULOMA: ICD-10-CM

## 2022-02-03 DIAGNOSIS — N18.30 DIABETES MELLITUS WITH STAGE 3 CHRONIC KIDNEY DISEASE: ICD-10-CM

## 2022-02-03 DIAGNOSIS — G47.33 OSA (OBSTRUCTIVE SLEEP APNEA): ICD-10-CM

## 2022-02-03 DIAGNOSIS — E11.22 TYPE 2 DIABETES MELLITUS WITH STAGE 3 CHRONIC KIDNEY DISEASE, WITH LONG-TERM CURRENT USE OF INSULIN, UNSPECIFIED WHETHER STAGE 3A OR 3B CKD: ICD-10-CM

## 2022-02-03 DIAGNOSIS — H71.90 CHOLESTEATOMA, UNSPECIFIED LATERALITY: ICD-10-CM

## 2022-02-03 DIAGNOSIS — N28.9 RENAL INSUFFICIENCY: Primary | ICD-10-CM

## 2022-02-03 DIAGNOSIS — Z79.4 TYPE 2 DIABETES MELLITUS WITH STAGE 3 CHRONIC KIDNEY DISEASE, WITH LONG-TERM CURRENT USE OF INSULIN, UNSPECIFIED WHETHER STAGE 3A OR 3B CKD: ICD-10-CM

## 2022-02-03 DIAGNOSIS — I70.0 CALCIFICATION OF AORTA: ICD-10-CM

## 2022-02-03 DIAGNOSIS — E11.22 DIABETES MELLITUS WITH STAGE 3 CHRONIC KIDNEY DISEASE: ICD-10-CM

## 2022-02-03 PROCEDURE — 3078F DIAST BP <80 MM HG: CPT | Mod: HCNC,CPTII,S$GLB, | Performed by: FAMILY MEDICINE

## 2022-02-03 PROCEDURE — 3078F PR MOST RECENT DIASTOLIC BLOOD PRESSURE < 80 MM HG: ICD-10-PCS | Mod: HCNC,CPTII,S$GLB, | Performed by: FAMILY MEDICINE

## 2022-02-03 PROCEDURE — 3072F LOW RISK FOR RETINOPATHY: CPT | Mod: HCNC,CPTII,S$GLB, | Performed by: FAMILY MEDICINE

## 2022-02-03 PROCEDURE — 3044F PR MOST RECENT HEMOGLOBIN A1C LEVEL <7.0%: ICD-10-PCS | Mod: HCNC,CPTII,S$GLB, | Performed by: FAMILY MEDICINE

## 2022-02-03 PROCEDURE — 99499 RISK ADDL DX/OHS AUDIT: ICD-10-PCS | Mod: HCNC,S$GLB,, | Performed by: FAMILY MEDICINE

## 2022-02-03 PROCEDURE — 3044F HG A1C LEVEL LT 7.0%: CPT | Mod: HCNC,CPTII,S$GLB, | Performed by: FAMILY MEDICINE

## 2022-02-03 PROCEDURE — 99214 OFFICE O/P EST MOD 30 MIN: CPT | Mod: HCNC,S$GLB,, | Performed by: FAMILY MEDICINE

## 2022-02-03 PROCEDURE — 3061F NEG MICROALBUMINURIA REV: CPT | Mod: HCNC,CPTII,S$GLB, | Performed by: FAMILY MEDICINE

## 2022-02-03 PROCEDURE — 99214 PR OFFICE/OUTPT VISIT, EST, LEVL IV, 30-39 MIN: ICD-10-PCS | Mod: HCNC,S$GLB,, | Performed by: FAMILY MEDICINE

## 2022-02-03 PROCEDURE — 1101F PR PT FALLS ASSESS DOC 0-1 FALLS W/OUT INJ PAST YR: ICD-10-PCS | Mod: HCNC,CPTII,S$GLB, | Performed by: FAMILY MEDICINE

## 2022-02-03 PROCEDURE — 3072F PR LOW RISK FOR RETINOPATHY: ICD-10-PCS | Mod: HCNC,CPTII,S$GLB, | Performed by: FAMILY MEDICINE

## 2022-02-03 PROCEDURE — 1101F PT FALLS ASSESS-DOCD LE1/YR: CPT | Mod: HCNC,CPTII,S$GLB, | Performed by: FAMILY MEDICINE

## 2022-02-03 PROCEDURE — 1126F PR PAIN SEVERITY QUANTIFIED, NO PAIN PRESENT: ICD-10-PCS | Mod: HCNC,CPTII,S$GLB, | Performed by: FAMILY MEDICINE

## 2022-02-03 PROCEDURE — 3008F PR BODY MASS INDEX (BMI) DOCUMENTED: ICD-10-PCS | Mod: HCNC,CPTII,S$GLB, | Performed by: FAMILY MEDICINE

## 2022-02-03 PROCEDURE — 3077F SYST BP >= 140 MM HG: CPT | Mod: HCNC,CPTII,S$GLB, | Performed by: FAMILY MEDICINE

## 2022-02-03 PROCEDURE — 3077F PR MOST RECENT SYSTOLIC BLOOD PRESSURE >= 140 MM HG: ICD-10-PCS | Mod: HCNC,CPTII,S$GLB, | Performed by: FAMILY MEDICINE

## 2022-02-03 PROCEDURE — 99499 UNLISTED E&M SERVICE: CPT | Mod: HCNC,S$GLB,, | Performed by: FAMILY MEDICINE

## 2022-02-03 PROCEDURE — 1126F AMNT PAIN NOTED NONE PRSNT: CPT | Mod: HCNC,CPTII,S$GLB, | Performed by: FAMILY MEDICINE

## 2022-02-03 PROCEDURE — 1159F PR MEDICATION LIST DOCUMENTED IN MEDICAL RECORD: ICD-10-PCS | Mod: HCNC,CPTII,S$GLB, | Performed by: FAMILY MEDICINE

## 2022-02-03 PROCEDURE — 99999 PR PBB SHADOW E&M-EST. PATIENT-LVL V: ICD-10-PCS | Mod: PBBFAC,HCNC,, | Performed by: FAMILY MEDICINE

## 2022-02-03 PROCEDURE — 3288F PR FALLS RISK ASSESSMENT DOCUMENTED: ICD-10-PCS | Mod: HCNC,CPTII,S$GLB, | Performed by: FAMILY MEDICINE

## 2022-02-03 PROCEDURE — 3066F NEPHROPATHY DOC TX: CPT | Mod: HCNC,CPTII,S$GLB, | Performed by: FAMILY MEDICINE

## 2022-02-03 PROCEDURE — 1159F MED LIST DOCD IN RCRD: CPT | Mod: HCNC,CPTII,S$GLB, | Performed by: FAMILY MEDICINE

## 2022-02-03 PROCEDURE — 99999 PR PBB SHADOW E&M-EST. PATIENT-LVL V: CPT | Mod: PBBFAC,HCNC,, | Performed by: FAMILY MEDICINE

## 2022-02-03 PROCEDURE — 3061F PR NEG MICROALBUMINURIA RESULT DOCUMENTED/REVIEW: ICD-10-PCS | Mod: HCNC,CPTII,S$GLB, | Performed by: FAMILY MEDICINE

## 2022-02-03 PROCEDURE — 3288F FALL RISK ASSESSMENT DOCD: CPT | Mod: HCNC,CPTII,S$GLB, | Performed by: FAMILY MEDICINE

## 2022-02-03 PROCEDURE — 3008F BODY MASS INDEX DOCD: CPT | Mod: HCNC,CPTII,S$GLB, | Performed by: FAMILY MEDICINE

## 2022-02-03 PROCEDURE — 3066F PR DOCUMENTATION OF TREATMENT FOR NEPHROPATHY: ICD-10-PCS | Mod: HCNC,CPTII,S$GLB, | Performed by: FAMILY MEDICINE

## 2022-02-03 NOTE — PROGRESS NOTES
Subjective:       Patient ID: Rigoberto Vasquez is a . male.    Chief Complaint: Multiple issues see below    HPIType 2 diabetes: a1cnl . elida insul. And elim pm insul completely and a1c ok and no low sugars overnight as beforeT;declines digital dm      Hypertension: blood pressures w dig. htn 120-130s. Suspect elev today related to walking in from p lot but also steroids.  Hypercholesterolemia: controlled. Tolerating medicine.  GERD sympt w/o med. last egd 9/21;asympt. Tolerating medication. No swallowing problems    Hx elev psa/bph; utd urol y    Chronic borderline anemia/hct: iron def w/u 2015. He says anemic all his life. egd cscope done. Saw gi again . No video capsule done;egd /cscope 9/19    Cri d/wd nephrol    Resol;ving sinus infxn gluc not up much w steroid    Pacemaker utd dr davila    Fatigue mostly am not as much sleepy. Cbc ok. Years .sugar and bp ok when occurs. Some snoring      Past Medical History:   Diagnosis Date    Acid reflux     [facility]    Angina pectoris     Back pain     BPH (benign prostatic hyperplasia)     blue    Cholesteatoma     Degenerative joint disease     Diverticulosis     Erectile dysfunction     History of elevated PSA 2/14    normalized, dr dave annually    History of pericarditis     Hypercholesteremia     Hypertension     Iron deficiency anemia     Pacemaker     complete av block;NO afib    Renal disorder     Squamous cell cancer of skin of mastoid region of scalp     dr jaida salgado    Type 2 diabetes mellitus     dr wyman    Urinary incontinence     when he was 7 Yrs old.      Past Surgical History:   Procedure Laterality Date    CARDIAC PACEMAKER PLACEMENT      COLONOSCOPY N/A 9/13/2019    Procedure: COLONOSCOPY;  Surgeon: Kan Ramsey III, MD;  Location[facility];  Service: Endoscopy;  Laterality: N/A;    ESOPHAGOGASTRODUODENOSCOPY N/A 9/13/2019    Procedure: ESOPHAGOGASTRODUODENOSCOPY (EGD);  Surgeon: Kan Ramsey III, MD;  [facility]  "ENDO;  Service: Endoscopy;  Laterality: N/A;    ESOPHAGOGASTRODUODENOSCOPY N/A 9/3/2021    Procedure: EGD (ESOPHAGOGASTRODUODENOSCOPY);  Surgeon: Kaylene Pineda MD;  Location[facility];  Service: Endoscopy;  Laterality: N/A;    JOINT REPLACEMENT      KNEE CARTILAGE SURGERY Bilateral     NASAL SINUS SURGERY      SHOULDER ARTHROSCOPY Right     TONSILLECTOMY      TOTAL KNEE ARTHROPLASTY Left 05/15/2017    TRANSURETHRAL RESECTION OF PROSTATE      TYMPANOPLASTY      vesectomy       Family History   Problem Relation Age of Onset    Arthritis Mother     Hypertension Mother     Heart disease Father     Hypertension Father     Stroke Father     Diabetes Son     Diabetes Maternal Grandmother     Colon cancer Paternal Grandmother      Social History     Socioeconomic History    Marital status:      Spouse name: SAUL    Number of children: 2   Occupational History    Occupation: retired- [employer] .   Tobacco Use    Smoking status: Never Smoker    Smokeless tobacco: Never Used   Substance and Sexual Activity    Alcohol use: Yes     Comment: " once a month"    Drug use: No    Sexual activity: Yes     Partners: Female   Social History Narrative     . Lives with spouse. Has 2 children. Patient retired from [employer]. His son has type 1 diabetes.     Social Determinants of Health     Financial Resource Strain: Low Risk     Difficulty of Paying Living Expenses: Not hard at all   Food Insecurity: No Food Insecurity    Worried About Running Out of Food in the Last Year: Never true    Ran Out of Food in the Last Year: Never true   Transportation Needs: No Transportation Needs    Lack of Transportation (Medical): No    Lack of Transportation (Non-Medical): No   Physical Activity: Sufficiently Active    Days of Exercise per Week: 5 days    Minutes of Exercise per Session: 60 min   Stress: No Stress Concern Present    Feeling of Stress : Not at all   Social " Connections: Moderately Integrated    Frequency of Communication with Friends and Family: Three times a week    Frequency of Social Gatherings with Friends and Family: Twice a week    Attends Zoroastrian Services: 1 to 4 times per year    Active Member of Clubs or Organizations: No    Attends Club or Organization Meetings: Never    Marital Status:    Housing Stability: Low Risk     Unable to Pay for Housing in the Last Year: No    Number of Places Lived in the Last Year: 1    Unstable Housing in the Last Year: No               Review of Systems   Respiratory: Negative for shortness of breath.    Cardiovascular: Negative for chest pain and leg swelling.       Objective:      Physical Exam   Constitutional: He is oriented to person, place, and time. He appears well-developed and well-nourished.   HENT:   Head: Normocephalic and atraumatic.   Eyes: Conjunctivae normal and EOM are normal. Pupils are equal, round, and reactive to light.   Neck: Normal range of motion. Neck supple. Carotid bruit is not present.   Cardiovascular: Normal rate and regular rhythm.    Pulmonary/Chest: Effort normal and breath sounds normal.   Abd+bssoftndnt no hsm no m ass  Musculoskeletal: He exhibits no edema.   Neurological: He is alert and oriented to person, place, and time.   Skin: Skin is warm and dry.   Psychiatric: He has a normal mood and affect. His behavior is normal. Judgment and thought content normal.       EPWORTH SLEEPINESS SCALE 2/3/2022   Sitting and reading 0   Watching TV 0   Sitting, inactive in a public place (e.g. a theatre or a meeting) 0   As a passenger in a car for an hour without a break 0   Lying down to rest in the afternoon when circumstances permit 2   Sitting and talking to someone 0   Sitting quietly after a lunch without alcohol 0   In a car, while stopped for a few minutes in traffic 0   Total score 2         Assessment:         type 2 dm  htn  gerd  hyperchol  Hx elev psa/bph  Pacemaker  hx    Hx iron def anemia  bph  Cri  Fatigue    Plan:    **  F/u card ()and urol as planned  Has f/u dr wyman      Lab and follow up after in 6 months heidi    If home sl study nl then seekother cause fatigue    Discuss with nephrologist whether to switch out victoza with jardiance or invokana vs add on;deferring change now    Renal insufficiency  -     Ambulatory referral/consult to Nephrology; Future; Expected date: 02/10/2022    Cholesteatoma, unspecified laterality    Diabetes mellitus with stage 3 chronic kidney disease    Calcification of aorta    Lung granuloma    Type 2 diabetes mellitus with stage 3 chronic kidney disease, with long-term current use of insulin, unspecified whether stage 3a or 3b CKD  -     Hemoglobin A1C; Future; Expected date: 08/02/2022  -     Vitamin B12; Future; Expected date: 08/03/2022    СВЕТЛАНА (obstructive sleep apnea)  -     Home Sleep Studies; Future  -     Ambulatory referral/consult to Pulmonology; Future; Expected date: 02/10/2022

## 2022-02-04 ENCOUNTER — TELEPHONE (OUTPATIENT)
Dept: SLEEP MEDICINE | Facility: CLINIC | Age: 72
End: 2022-02-04
Payer: MEDICARE

## 2022-02-04 NOTE — TELEPHONE ENCOUNTER
----- Message from Dorinda Murrell sent at 2/3/2022 11:20 AM CST -----  [facility]

## 2022-02-07 ENCOUNTER — PATIENT OUTREACH (OUTPATIENT)
Dept: ADMINISTRATIVE | Facility: HOSPITAL | Age: 72
End: 2022-02-07
Payer: MEDICARE

## 2022-02-07 ENCOUNTER — TELEPHONE (OUTPATIENT)
Dept: INTERNAL MEDICINE | Facility: CLINIC | Age: 72
End: 2022-02-07
Payer: MEDICARE

## 2022-02-10 ENCOUNTER — PATIENT MESSAGE (OUTPATIENT)
Dept: GASTROENTEROLOGY | Facility: CLINIC | Age: 72
End: 2022-02-10
Payer: MEDICARE

## 2022-02-10 DIAGNOSIS — R11.0 NAUSEA: ICD-10-CM

## 2022-02-10 DIAGNOSIS — R10.13 DYSPEPSIA: ICD-10-CM

## 2022-02-10 RX ORDER — PANTOPRAZOLE SODIUM 40 MG/1
40 TABLET, DELAYED RELEASE ORAL DAILY
Qty: 90 TABLET | Refills: 1 | Status: SHIPPED | OUTPATIENT
Start: 2022-02-10 | End: 2022-07-18 | Stop reason: SDUPTHER

## 2022-02-11 ENCOUNTER — PROCEDURE VISIT (OUTPATIENT)
Dept: SLEEP MEDICINE | Facility: CLINIC | Age: 72
End: 2022-02-11
Payer: MEDICARE

## 2022-02-11 DIAGNOSIS — G47.33 OSA (OBSTRUCTIVE SLEEP APNEA): ICD-10-CM

## 2022-02-11 PROCEDURE — 95806 SLEEP STUDY UNATT&RESP EFFT: CPT | Performed by: INTERNAL MEDICINE

## 2022-02-11 NOTE — Clinical Note
1 night study MILD OBSTRUCTIVE SLEEP APNEA with overall AHI 12.1 /hr ( 85 events): night #1 Oxygen desaturation: 88%. SpO2 between 90% to 94% for 2 hr 56 min. Patient snored 82% time above 50 . Heart rate range: 60 bpm - 80 bpm REC's: Please refer to sleep disorders clinic for prompt attention Therapy with APAP at 4-20 cm WP using mask of choice with heated humidification is an option. Weight loss/management. with regular exercise per direction of physician. Avoid drowsy driving. Follow up in sleep clinic to maximize adherence and ensure resolution of symptoms.

## 2022-02-12 PROCEDURE — 95806 PR SLEEP STUDY, UNATTENDED, SIMUL RECORD HR/O2 SAT/RESP FLOW/RESP EFFT: ICD-10-PCS | Mod: 26,HCNC,S$GLB, | Performed by: INTERNAL MEDICINE

## 2022-02-12 PROCEDURE — 95806 SLEEP STUDY UNATT&RESP EFFT: CPT | Mod: 26,HCNC,S$GLB, | Performed by: INTERNAL MEDICINE

## 2022-02-13 NOTE — PROCEDURES
Home Sleep Studies    Date/Time: 2/11/2022 8:00 AM  Performed by: Luciano Sahu MD  Authorized by: Jesse Grimes MD       1 night study  MILD OBSTRUCTIVE SLEEP APNEA with overall AHI 12.1 /hr ( 85 events): night #1  Oxygen desaturation: 88%. SpO2 between 90% to 94% for 2 hr 56 min.  Patient snored 82% time above 50 .  Heart rate range: 60 bpm - 80 bpm  REC's:  Please refer to sleep disorders clinic for prompt attention  Therapy with APAP at 4-20 cm WP using mask of choice with heated humidification is an option.  Weight loss/management. with regular exercise per direction of physician.  Avoid drowsy driving.  Follow up in sleep clinic to maximize adherence and ensure resolution of symptoms.

## 2022-02-16 ENCOUNTER — PATIENT OUTREACH (OUTPATIENT)
Dept: ADMINISTRATIVE | Facility: OTHER | Age: 72
End: 2022-02-16
Payer: MEDICARE

## 2022-02-17 ENCOUNTER — OFFICE VISIT (OUTPATIENT)
Dept: OTOLARYNGOLOGY | Facility: CLINIC | Age: 72
End: 2022-02-17
Payer: MEDICARE

## 2022-02-17 VITALS
HEART RATE: 72 BPM | WEIGHT: 203.5 LBS | BODY MASS INDEX: 28.38 KG/M2 | SYSTOLIC BLOOD PRESSURE: 140 MMHG | DIASTOLIC BLOOD PRESSURE: 82 MMHG

## 2022-02-17 DIAGNOSIS — H71.92 CHOLESTEATOMA OF EAR, LEFT: Primary | ICD-10-CM

## 2022-02-17 PROCEDURE — 99999 PR PBB SHADOW E&M-EST. PATIENT-LVL III: CPT | Mod: PBBFAC,HCNC,, | Performed by: OTOLARYNGOLOGY

## 2022-02-17 PROCEDURE — 1101F PT FALLS ASSESS-DOCD LE1/YR: CPT | Mod: HCNC,CPTII,S$GLB, | Performed by: OTOLARYNGOLOGY

## 2022-02-17 PROCEDURE — 3061F NEG MICROALBUMINURIA REV: CPT | Mod: HCNC,CPTII,S$GLB, | Performed by: OTOLARYNGOLOGY

## 2022-02-17 PROCEDURE — 1101F PR PT FALLS ASSESS DOC 0-1 FALLS W/OUT INJ PAST YR: ICD-10-PCS | Mod: HCNC,CPTII,S$GLB, | Performed by: OTOLARYNGOLOGY

## 2022-02-17 PROCEDURE — 1126F AMNT PAIN NOTED NONE PRSNT: CPT | Mod: HCNC,CPTII,S$GLB, | Performed by: OTOLARYNGOLOGY

## 2022-02-17 PROCEDURE — 3008F BODY MASS INDEX DOCD: CPT | Mod: HCNC,CPTII,S$GLB, | Performed by: OTOLARYNGOLOGY

## 2022-02-17 PROCEDURE — 92504 PR EAR MICROSCOPY EXAMINATION: ICD-10-PCS | Mod: HCNC,S$GLB,, | Performed by: OTOLARYNGOLOGY

## 2022-02-17 PROCEDURE — 3288F PR FALLS RISK ASSESSMENT DOCUMENTED: ICD-10-PCS | Mod: HCNC,CPTII,S$GLB, | Performed by: OTOLARYNGOLOGY

## 2022-02-17 PROCEDURE — 99999 PR PBB SHADOW E&M-EST. PATIENT-LVL III: ICD-10-PCS | Mod: PBBFAC,HCNC,, | Performed by: OTOLARYNGOLOGY

## 2022-02-17 PROCEDURE — 3079F PR MOST RECENT DIASTOLIC BLOOD PRESSURE 80-89 MM HG: ICD-10-PCS | Mod: HCNC,CPTII,S$GLB, | Performed by: OTOLARYNGOLOGY

## 2022-02-17 PROCEDURE — 3288F FALL RISK ASSESSMENT DOCD: CPT | Mod: HCNC,CPTII,S$GLB, | Performed by: OTOLARYNGOLOGY

## 2022-02-17 PROCEDURE — 1160F RVW MEDS BY RX/DR IN RCRD: CPT | Mod: HCNC,CPTII,S$GLB, | Performed by: OTOLARYNGOLOGY

## 2022-02-17 PROCEDURE — 99213 OFFICE O/P EST LOW 20 MIN: CPT | Mod: 25,HCNC,S$GLB, | Performed by: OTOLARYNGOLOGY

## 2022-02-17 PROCEDURE — 92504 EAR MICROSCOPY EXAMINATION: CPT | Mod: HCNC,S$GLB,, | Performed by: OTOLARYNGOLOGY

## 2022-02-17 PROCEDURE — 1160F PR REVIEW ALL MEDS BY PRESCRIBER/CLIN PHARMACIST DOCUMENTED: ICD-10-PCS | Mod: HCNC,CPTII,S$GLB, | Performed by: OTOLARYNGOLOGY

## 2022-02-17 PROCEDURE — 1159F MED LIST DOCD IN RCRD: CPT | Mod: HCNC,CPTII,S$GLB, | Performed by: OTOLARYNGOLOGY

## 2022-02-17 PROCEDURE — 3066F NEPHROPATHY DOC TX: CPT | Mod: HCNC,CPTII,S$GLB, | Performed by: OTOLARYNGOLOGY

## 2022-02-17 PROCEDURE — 3079F DIAST BP 80-89 MM HG: CPT | Mod: HCNC,CPTII,S$GLB, | Performed by: OTOLARYNGOLOGY

## 2022-02-17 PROCEDURE — 3066F PR DOCUMENTATION OF TREATMENT FOR NEPHROPATHY: ICD-10-PCS | Mod: HCNC,CPTII,S$GLB, | Performed by: OTOLARYNGOLOGY

## 2022-02-17 PROCEDURE — 3044F PR MOST RECENT HEMOGLOBIN A1C LEVEL <7.0%: ICD-10-PCS | Mod: HCNC,CPTII,S$GLB, | Performed by: OTOLARYNGOLOGY

## 2022-02-17 PROCEDURE — 3072F PR LOW RISK FOR RETINOPATHY: ICD-10-PCS | Mod: HCNC,CPTII,S$GLB, | Performed by: OTOLARYNGOLOGY

## 2022-02-17 PROCEDURE — 3008F PR BODY MASS INDEX (BMI) DOCUMENTED: ICD-10-PCS | Mod: HCNC,CPTII,S$GLB, | Performed by: OTOLARYNGOLOGY

## 2022-02-17 PROCEDURE — 99213 PR OFFICE/OUTPT VISIT, EST, LEVL III, 20-29 MIN: ICD-10-PCS | Mod: 25,HCNC,S$GLB, | Performed by: OTOLARYNGOLOGY

## 2022-02-17 PROCEDURE — 1126F PR PAIN SEVERITY QUANTIFIED, NO PAIN PRESENT: ICD-10-PCS | Mod: HCNC,CPTII,S$GLB, | Performed by: OTOLARYNGOLOGY

## 2022-02-17 PROCEDURE — 3077F PR MOST RECENT SYSTOLIC BLOOD PRESSURE >= 140 MM HG: ICD-10-PCS | Mod: HCNC,CPTII,S$GLB, | Performed by: OTOLARYNGOLOGY

## 2022-02-17 PROCEDURE — 3044F HG A1C LEVEL LT 7.0%: CPT | Mod: HCNC,CPTII,S$GLB, | Performed by: OTOLARYNGOLOGY

## 2022-02-17 PROCEDURE — 1159F PR MEDICATION LIST DOCUMENTED IN MEDICAL RECORD: ICD-10-PCS | Mod: HCNC,CPTII,S$GLB, | Performed by: OTOLARYNGOLOGY

## 2022-02-17 PROCEDURE — 3061F PR NEG MICROALBUMINURIA RESULT DOCUMENTED/REVIEW: ICD-10-PCS | Mod: HCNC,CPTII,S$GLB, | Performed by: OTOLARYNGOLOGY

## 2022-02-17 PROCEDURE — 3077F SYST BP >= 140 MM HG: CPT | Mod: HCNC,CPTII,S$GLB, | Performed by: OTOLARYNGOLOGY

## 2022-02-17 PROCEDURE — 3072F LOW RISK FOR RETINOPATHY: CPT | Mod: HCNC,CPTII,S$GLB, | Performed by: OTOLARYNGOLOGY

## 2022-02-17 NOTE — PROGRESS NOTES
Health Maintenance Due   Topic Date Due    TETANUS VACCINE  03/22/2021    Eye Exam  01/22/2022     Updates were requested from care everywhere.  Chart was reviewed for overdue Proactive [facility] ([facility]) topics (CRS, Breast Cancer Screening, Eye exam)  Health Maintenance has been updated.  LINKS immunization registry triggered.  Immunizations were reconciled.

## 2022-02-18 NOTE — PROGRESS NOTES
Subjective:       Patient ID: Rigoberto Vasquez is a 71 y.o. male.    Chief Complaint: Follow-up    HPI       Rigoberto Vasquez is a 71 y.o. male presents for evaluation of possible left ear cholesteatoma.  He has a history of left CWU in 2015 with cartilage grafting fo the tympanic membrane.  He mainly has complaints regarding his right ear however.  He reports right ear fullness and fluctuating hearing loss.  He feeels his left ear has been stable symptom wise and e has not had any otorrhea.      Review of Systems   Constitutional: Negative for chills and fever.   HENT: Positive for ear pain and hearing loss. Negative for sore throat and trouble swallowing.    Respiratory: Negative for apnea and chest tightness.    Cardiovascular: Negative for chest pain.         Objective:      Physical Exam  Constitutional:       Appearance: Normal appearance.   HENT:      Head: Normocephalic and atraumatic.      Right Ear: External ear normal.      Left Ear: External ear normal.   Neurological:      Mental Status: He is alert.       Binocular Microscopy  Indications: perforation, pre op planning  Details: binocular microscopy used to examine both ears  Findings  AD: narrow EAC, posterior atelectatic TM with posterior dry perforation along posterior border of malleus  AS: cartilage graft to posterior TM with small perforation along posterior annulus.  No discharge.    CT temporal bones image independently reviewed by me and show: soft tissue density left middle ear otherwise normal scan.  Abnormality is rectangular in nature and likely represents cartilage given its appearance.        Audiogram tracings independently reviewed and discussed with patient    Assessment:       Problem List Items Addressed This Visit    None     Visit Diagnoses     Cholesteatoma of ear, left    -  Primary    Relevant Orders    CT Temporal Bone without contrast          Plan:         In summary this is a pleasant 71 y.o.with history of AS ear surgery.  Recent  exam shows lateralized cartilage graft vs recurrent cholesteatoma   -offered middle ear exploration.  Declined at this time   -will repeat CT in 4 mo and recheck ear

## 2022-03-03 ENCOUNTER — LAB VISIT (OUTPATIENT)
Dept: LAB | Facility: HOSPITAL | Age: 72
End: 2022-03-03
Attending: INTERNAL MEDICINE
Payer: MEDICARE

## 2022-03-03 DIAGNOSIS — Z79.4 TYPE 2 DIABETES MELLITUS WITH STAGE 3A CHRONIC KIDNEY DISEASE, WITH LONG-TERM CURRENT USE OF INSULIN: Primary | ICD-10-CM

## 2022-03-03 DIAGNOSIS — N18.31 TYPE 2 DIABETES MELLITUS WITH STAGE 3A CHRONIC KIDNEY DISEASE, WITH LONG-TERM CURRENT USE OF INSULIN: ICD-10-CM

## 2022-03-03 DIAGNOSIS — N18.31 TYPE 2 DIABETES MELLITUS WITH STAGE 3A CHRONIC KIDNEY DISEASE, WITH LONG-TERM CURRENT USE OF INSULIN: Primary | ICD-10-CM

## 2022-03-03 DIAGNOSIS — E11.22 TYPE 2 DIABETES MELLITUS WITH STAGE 3A CHRONIC KIDNEY DISEASE, WITH LONG-TERM CURRENT USE OF INSULIN: Primary | ICD-10-CM

## 2022-03-03 DIAGNOSIS — Z79.4 TYPE 2 DIABETES MELLITUS WITH STAGE 3A CHRONIC KIDNEY DISEASE, WITH LONG-TERM CURRENT USE OF INSULIN: ICD-10-CM

## 2022-03-03 DIAGNOSIS — E11.22 TYPE 2 DIABETES MELLITUS WITH STAGE 3A CHRONIC KIDNEY DISEASE, WITH LONG-TERM CURRENT USE OF INSULIN: ICD-10-CM

## 2022-03-03 LAB
ALBUMIN SERPL BCP-MCNC: 3.8 G/DL (ref 3.5–5.2)
ALP SERPL-CCNC: 34 U/L (ref 55–135)
ALT SERPL W/O P-5'-P-CCNC: 10 U/L (ref 10–44)
ANION GAP SERPL CALC-SCNC: 10 MMOL/L (ref 8–16)
AST SERPL-CCNC: 20 U/L (ref 10–40)
BILIRUB SERPL-MCNC: 0.5 MG/DL (ref 0.1–1)
BUN SERPL-MCNC: 19 MG/DL (ref 8–23)
CALCIUM SERPL-MCNC: 9.4 MG/DL (ref 8.7–10.5)
CHLORIDE SERPL-SCNC: 104 MMOL/L (ref 95–110)
CO2 SERPL-SCNC: 25 MMOL/L (ref 23–29)
CREAT SERPL-MCNC: 1.3 MG/DL (ref 0.5–1.4)
EST. GFR  (AFRICAN AMERICAN): >60 ML/MIN/1.73 M^2
EST. GFR  (NON AFRICAN AMERICAN): 55.3 ML/MIN/1.73 M^2
GLUCOSE SERPL-MCNC: 147 MG/DL (ref 70–110)
POTASSIUM SERPL-SCNC: 4.2 MMOL/L (ref 3.5–5.1)
PROT SERPL-MCNC: 6.5 G/DL (ref 6–8.4)
SODIUM SERPL-SCNC: 139 MMOL/L (ref 136–145)

## 2022-03-03 PROCEDURE — 80053 COMPREHEN METABOLIC PANEL: CPT | Mod: PO | Performed by: INTERNAL MEDICINE

## 2022-03-03 PROCEDURE — 36415 COLL VENOUS BLD VENIPUNCTURE: CPT | Mod: PO | Performed by: INTERNAL MEDICINE

## 2022-03-04 ENCOUNTER — OFFICE VISIT (OUTPATIENT)
Dept: NEPHROLOGY | Facility: CLINIC | Age: 72
End: 2022-03-04
Payer: MEDICARE

## 2022-03-04 VITALS
HEIGHT: 71 IN | WEIGHT: 202.63 LBS | RESPIRATION RATE: 18 BRPM | DIASTOLIC BLOOD PRESSURE: 80 MMHG | HEART RATE: 80 BPM | SYSTOLIC BLOOD PRESSURE: 130 MMHG | BODY MASS INDEX: 28.37 KG/M2

## 2022-03-04 DIAGNOSIS — N28.9 RENAL INSUFFICIENCY: ICD-10-CM

## 2022-03-04 DIAGNOSIS — N18.31 STAGE 3A CHRONIC KIDNEY DISEASE: Primary | ICD-10-CM

## 2022-03-04 DIAGNOSIS — I12.9 PARENCHYMAL RENAL HYPERTENSION, STAGE 1 THROUGH STAGE 4 OR UNSPECIFIED CHRONIC KIDNEY DISEASE: ICD-10-CM

## 2022-03-04 PROCEDURE — 3288F PR FALLS RISK ASSESSMENT DOCUMENTED: ICD-10-PCS | Mod: CPTII,S$GLB,, | Performed by: INTERNAL MEDICINE

## 2022-03-04 PROCEDURE — 3075F SYST BP GE 130 - 139MM HG: CPT | Mod: CPTII,S$GLB,, | Performed by: INTERNAL MEDICINE

## 2022-03-04 PROCEDURE — 3288F FALL RISK ASSESSMENT DOCD: CPT | Mod: CPTII,S$GLB,, | Performed by: INTERNAL MEDICINE

## 2022-03-04 PROCEDURE — 1101F PT FALLS ASSESS-DOCD LE1/YR: CPT | Mod: CPTII,S$GLB,, | Performed by: INTERNAL MEDICINE

## 2022-03-04 PROCEDURE — 3044F HG A1C LEVEL LT 7.0%: CPT | Mod: CPTII,S$GLB,, | Performed by: INTERNAL MEDICINE

## 2022-03-04 PROCEDURE — 4010F ACE/ARB THERAPY RXD/TAKEN: CPT | Mod: CPTII,S$GLB,, | Performed by: INTERNAL MEDICINE

## 2022-03-04 PROCEDURE — 3066F NEPHROPATHY DOC TX: CPT | Mod: CPTII,S$GLB,, | Performed by: INTERNAL MEDICINE

## 2022-03-04 PROCEDURE — 99999 PR PBB SHADOW E&M-EST. PATIENT-LVL III: ICD-10-PCS | Mod: PBBFAC,,, | Performed by: INTERNAL MEDICINE

## 2022-03-04 PROCEDURE — 3072F LOW RISK FOR RETINOPATHY: CPT | Mod: CPTII,S$GLB,, | Performed by: INTERNAL MEDICINE

## 2022-03-04 PROCEDURE — 3061F NEG MICROALBUMINURIA REV: CPT | Mod: CPTII,S$GLB,, | Performed by: INTERNAL MEDICINE

## 2022-03-04 PROCEDURE — 3061F PR NEG MICROALBUMINURIA RESULT DOCUMENTED/REVIEW: ICD-10-PCS | Mod: CPTII,S$GLB,, | Performed by: INTERNAL MEDICINE

## 2022-03-04 PROCEDURE — 1101F PR PT FALLS ASSESS DOC 0-1 FALLS W/OUT INJ PAST YR: ICD-10-PCS | Mod: CPTII,S$GLB,, | Performed by: INTERNAL MEDICINE

## 2022-03-04 PROCEDURE — 3008F BODY MASS INDEX DOCD: CPT | Mod: CPTII,S$GLB,, | Performed by: INTERNAL MEDICINE

## 2022-03-04 PROCEDURE — 99999 PR PBB SHADOW E&M-EST. PATIENT-LVL III: CPT | Mod: PBBFAC,,, | Performed by: INTERNAL MEDICINE

## 2022-03-04 PROCEDURE — 99204 OFFICE O/P NEW MOD 45 MIN: CPT | Mod: S$GLB,,, | Performed by: INTERNAL MEDICINE

## 2022-03-04 PROCEDURE — 1159F PR MEDICATION LIST DOCUMENTED IN MEDICAL RECORD: ICD-10-PCS | Mod: CPTII,S$GLB,, | Performed by: INTERNAL MEDICINE

## 2022-03-04 PROCEDURE — 1126F AMNT PAIN NOTED NONE PRSNT: CPT | Mod: CPTII,S$GLB,, | Performed by: INTERNAL MEDICINE

## 2022-03-04 PROCEDURE — 3079F DIAST BP 80-89 MM HG: CPT | Mod: CPTII,S$GLB,, | Performed by: INTERNAL MEDICINE

## 2022-03-04 PROCEDURE — 1160F RVW MEDS BY RX/DR IN RCRD: CPT | Mod: CPTII,S$GLB,, | Performed by: INTERNAL MEDICINE

## 2022-03-04 PROCEDURE — 3072F PR LOW RISK FOR RETINOPATHY: ICD-10-PCS | Mod: CPTII,S$GLB,, | Performed by: INTERNAL MEDICINE

## 2022-03-04 PROCEDURE — 3008F PR BODY MASS INDEX (BMI) DOCUMENTED: ICD-10-PCS | Mod: CPTII,S$GLB,, | Performed by: INTERNAL MEDICINE

## 2022-03-04 PROCEDURE — 4010F PR ACE/ARB THEARPY RXD/TAKEN: ICD-10-PCS | Mod: CPTII,S$GLB,, | Performed by: INTERNAL MEDICINE

## 2022-03-04 PROCEDURE — 3066F PR DOCUMENTATION OF TREATMENT FOR NEPHROPATHY: ICD-10-PCS | Mod: CPTII,S$GLB,, | Performed by: INTERNAL MEDICINE

## 2022-03-04 PROCEDURE — 3079F PR MOST RECENT DIASTOLIC BLOOD PRESSURE 80-89 MM HG: ICD-10-PCS | Mod: CPTII,S$GLB,, | Performed by: INTERNAL MEDICINE

## 2022-03-04 PROCEDURE — 3044F PR MOST RECENT HEMOGLOBIN A1C LEVEL <7.0%: ICD-10-PCS | Mod: CPTII,S$GLB,, | Performed by: INTERNAL MEDICINE

## 2022-03-04 PROCEDURE — 1126F PR PAIN SEVERITY QUANTIFIED, NO PAIN PRESENT: ICD-10-PCS | Mod: CPTII,S$GLB,, | Performed by: INTERNAL MEDICINE

## 2022-03-04 PROCEDURE — 99204 PR OFFICE/OUTPT VISIT, NEW, LEVL IV, 45-59 MIN: ICD-10-PCS | Mod: S$GLB,,, | Performed by: INTERNAL MEDICINE

## 2022-03-04 PROCEDURE — 1160F PR REVIEW ALL MEDS BY PRESCRIBER/CLIN PHARMACIST DOCUMENTED: ICD-10-PCS | Mod: CPTII,S$GLB,, | Performed by: INTERNAL MEDICINE

## 2022-03-04 PROCEDURE — 3075F PR MOST RECENT SYSTOLIC BLOOD PRESS GE 130-139MM HG: ICD-10-PCS | Mod: CPTII,S$GLB,, | Performed by: INTERNAL MEDICINE

## 2022-03-04 PROCEDURE — 1159F MED LIST DOCD IN RCRD: CPT | Mod: CPTII,S$GLB,, | Performed by: INTERNAL MEDICINE

## 2022-03-04 NOTE — PROGRESS NOTES
Rigoberto Vasquez is a 71 y.o. male      HPI:    He was noted to have mildly decreased EGFR on routine laboratory studies.  Nephrology has been consulted for evaluation. In the clinic today he has no specific complaints.  His medications and allergies were reviewed.  All Nephrology related questions were answered to his satisfaction.  On review of old chemistries his creatinine has run between 1.3 and 1.5 for the past several years.  He does not give history of extensive nonsteroidal anti-inflammatory use.    PAST MEDICAL HISTORY:  He  has a past medical history of Acid reflux, Angina pectoris, Back pain, BPH (benign prostatic hyperplasia), Cholesteatoma, Degenerative joint disease, Diverticulosis, Erectile dysfunction, History of elevated PSA (2/14), History of pericarditis, Hypercholesteremia, Hypertension, Iron deficiency anemia, Pacemaker, Renal disorder, Squamous cell cancer of skin of mastoid region of scalp, Type 2 diabetes mellitus, Urinary incontinence, and when he was 7 Yrs old..    PAST SURGICAL HISTORY:  He  has a past surgical history that includes Shoulder arthroscopy (Right); Nasal sinus surgery; Cardiac pacemaker placement; Tonsillectomy; Transurethral resection of prostate; Knee cartilage surgery (Bilateral); Tympanoplasty; vesectomy; Total knee arthroplasty (Left, 05/15/2017); Joint replacement; Esophagogastroduodenoscopy (N/A, 9/13/2019); Colonoscopy (N/A, 9/13/2019); and Esophagogastroduodenoscopy (N/A, 9/3/2021).    SOCIAL HISTORY:  He  reports that he has never smoked. He has never used smokeless tobacco. He reports current alcohol use. He reports that he does not use drugs.      FAMILY MEDICAL HISTORY:  His family history includes Arthritis in his mother; Colon cancer in his paternal grandmother; Diabetes in his maternal grandmother and son; Heart disease in his father; Hypertension in his father and mother; Stroke in his father.    Review of patient's allergies indicates:   Allergen Reactions     Aspirin      Stomach pain even with ppi    Meperidine      Other reaction(s): Stomach upset    Niacin      Other reaction(s): hot skin           Prior to Admission medications    Medication Sig Start Date End Date Taking? Authorizing Provider   ACETAMINOPHEN (TYLENOL 8 HOUR ORAL) Take by mouth as needed.   Yes Historical Provider   azelastine (ASTELIN) 137 mcg (0.1 %) nasal spray 2 sprays (274 mcg total) by Nasal route 2 (two) times daily. 6/8/21 6/8/22 Yes Sherry Maldonado PA-C   Bifidobacterium infantis 4 mg Cap Take 1 capsule by mouth Daily. 5/16/12  Yes Historical Provider   blood sugar diagnostic (ACCU-CHEK JAXON) Strp Check BG 2-3 times daily. Accuchek jaxon strips 11/19/21  Yes Leigh Rosales NP   dutasteride (AVODART) 0.5 mg capsule Take 1 capsule (0.5 mg total) by mouth once daily. 9/9/21  Yes Vance Olivera MD   fenofibrate micronized (LOFIBRA) 200 MG Cap Take 1 capsule (200 mg total) by mouth every evening. 5/18/21  Yes Leigh Rosales NP   fluticasone propionate (FLONASE) 50 mcg/actuation nasal spray 1 spray by Each Nostril route once daily.   Yes Historical Provider   gabapentin (CMPD PAIN MANAGEMENT COMPOUND OINTMNENT THREE) Apply bid prn pain 2/9/22  Yes Jesse Grimes MD   hydrocortisone 2.5 % cream Apply topically 2 (two) times daily. 11/11/19  Yes Jesse Grimes MD   insulin detemir U-100 (LEVEMIR FLEXTOUCH U-100 INSULN) 100 unit/mL (3 mL) InPn pen 16 units in the AM and 8 units in the PM 3/22/21  Yes Suma Sheth PA-C   levocetirizine (XYZAL) 5 MG tablet Take 5 mg by mouth every evening.   Yes Historical Provider   liraglutide 0.6 mg/0.1 mL, 18 mg/3 mL, subq PNIJ (VICTOZA 3-ALESHA) 0.6 mg/0.1 mL (18 mg/3 mL) PnIj pen INJECT 1.8 MG DAILY 5/10/21  Yes Jesse Grimes MD   losartan (COZAAR) 25 MG tablet Take 1 tablet (25 mg total) by mouth once daily. 12/16/21 12/16/22 Yes Suma Sheth PA-C   lovastatin (MEVACOR) 40 MG tablet Take 1 tablet by mouth Every evening.  "3/23/21  Yes Leigh Rosales NP   metFORMIN (GLUCOPHAGE) 1000 MG tablet Take 1 tablet (1,000 mg total) by mouth once daily. Generic OK 7/30/21  Yes Jesse Grimes MD   multivitamin capsule Take 1 capsule by mouth once daily.   Yes Historical Provider   ondansetron (ZOFRAN-ODT) 8 MG TbDL Take 1 tablet (8 mg total) by mouth every 6 (six) hours as needed (nausea). 6/15/21  Yes MARYURI Kim   pantoprazole (PROTONIX) 40 MG tablet Take 1 tablet (40 mg total) by mouth once daily. 2/10/22 4/11/22 Yes Mary Vázquez PA-C   pen needle, diabetic (BD ULTRA-FINE MINI PEN NEEDLE) 31 gauge x 3/16" Ndle USE AS DIRECTED WITH INSULIN 11/24/21  Yes Jesse Grimes MD   pioglitazone (ACTOS) 30 MG tablet Take 1 tablet (30 mg total) by mouth once daily. 6/8/21  Yes Jesse Grimes MD   sildenafil (REVATIO) 20 mg Tab Take 1 tablet (20 mg total) by mouth 3 (three) times daily.  Patient taking differently: Take 20 mg by mouth 3 (three) times daily. Takes prn 8/5/21 8/5/22 Yes Vance Olivera MD   ferrous gluconate (FERGON) 324 MG tablet Take 1 tablet (324 mg total) by mouth daily with breakfast.  Patient not taking: Reported on 3/4/2022 1/4/21   Leigh Rosales NP   triamcinolone acetonide 0.1% (KENALOG) 0.1 % cream AAA bid  Patient not taking: Reported on 3/4/2022 11/11/20   Nelson Leonard MD   levoFLOXacin (LEVAQUIN) 750 MG tablet Take 1 tablet (750 mg total) by mouth once daily.  Patient not taking: Reported on 3/4/2022 1/24/22 3/4/22  Rigoberto Lopez MD   predniSONE (DELTASONE) 20 MG tablet Take 3 tab in am x 3d, then 2 tab in am x 3d, then 1 tab in am x 3d, then 1/2 tab in am x 3d  Patient not taking: Reported on 3/4/2022 1/24/22 3/4/22  Rigoberto Lopez MD     Sodium   Date Value Ref Range Status   03/03/2022 139 136 - 145 mmol/L Final   01/24/2022 140 136 - 145 mmol/L Final   12/27/2021 139 136 - 145 mmol/L Final     Potassium   Date Value Ref Range Status   03/03/2022 4.2 3.5 - 5.1 mmol/L Final   01/24/2022 4.1 3.5 - 5.1 " mmol/L Final   12/27/2021 4.7 3.5 - 5.1 mmol/L Final     Chloride   Date Value Ref Range Status   03/03/2022 104 95 - 110 mmol/L Final   01/24/2022 105 95 - 110 mmol/L Final   12/27/2021 104 95 - 110 mmol/L Final     CO2   Date Value Ref Range Status   03/03/2022 25 23 - 29 mmol/L Final   01/24/2022 25 23 - 29 mmol/L Final   12/27/2021 26 23 - 29 mmol/L Final     Glucose   Date Value Ref Range Status   03/03/2022 147 (H) 70 - 110 mg/dL Final   01/24/2022 87 70 - 110 mg/dL Final   12/27/2021 170 (H) 70 - 110 mg/dL Final     BUN   Date Value Ref Range Status   03/03/2022 19 8 - 23 mg/dL Final   01/24/2022 17 8 - 23 mg/dL Final   12/27/2021 18 8 - 23 mg/dL Final     Creatinine   Date Value Ref Range Status   03/03/2022 1.3 0.5 - 1.4 mg/dL Final   01/24/2022 1.5 (H) 0.5 - 1.4 mg/dL Final   12/27/2021 1.3 0.5 - 1.4 mg/dL Final     Calcium   Date Value Ref Range Status   03/03/2022 9.4 8.7 - 10.5 mg/dL Final   01/24/2022 10.1 8.7 - 10.5 mg/dL Final   12/27/2021 9.9 8.7 - 10.5 mg/dL Final     Total Protein   Date Value Ref Range Status   03/03/2022 6.5 6.0 - 8.4 g/dL Final   01/24/2022 7.3 6.0 - 8.4 g/dL Final   12/27/2021 6.9 6.0 - 8.4 g/dL Final     Albumin   Date Value Ref Range Status   03/03/2022 3.8 3.5 - 5.2 g/dL Final   01/24/2022 4.0 3.5 - 5.2 g/dL Final   12/27/2021 4.1 3.5 - 5.2 g/dL Final   05/22/2019 4.2 3.6 - 5.1 g/dL Final     Comment:     **Data from J Clin Invest 1974:53:819-828 and J Clin Endocrinol   Metab 1973 36:9077-6471. Men with clinically significant   hypogonadal symptoms and testosterone values repeatedly in the   range of the 200-300 ng/dL or less, may benefit from testosterone  treatment after adequate risk and benefits counseling.  For additional information, please refer to   http://[email or web address]/faq/OIU545 (This link is   being provided for informational/ educational purposes only.)  This test was developed and its analytical performance   characteristics have been  determined by [facility]. It has not been cleared or approved by the [place]  Food and Drug Administration. This assay has been validated   pursuant to the CLIA regulations and is used for clinical   purposes.  @ Test Performed By:  [facility]  Tae Everett M.D., Ph.D.,   [address] [place] [address]  [place]  [id number]       Total Bilirubin   Date Value Ref Range Status   03/03/2022 0.5 0.1 - 1.0 mg/dL Final     Comment:     For infants and newborns, interpretation of results should be based  on gestational age, weight and in agreement with clinical  observations.    Premature Infant recommended reference ranges:  Up to 24 hours.............<8.0 mg/dL  Up to 48 hours............<12.0 mg/dL  3-5 days..................<15.0 mg/dL  6-29 days.................<15.0 mg/dL     01/24/2022 0.7 0.1 - 1.0 mg/dL Final     Comment:     For infants and newborns, interpretation of results should be based  on gestational age, weight and in agreement with clinical  observations.    Premature Infant recommended reference ranges:  Up to 24 hours.............<8.0 mg/dL  Up to 48 hours............<12.0 mg/dL  3-5 days..................<15.0 mg/dL  6-29 days.................<15.0 mg/dL     12/27/2021 0.8 0.1 - 1.0 mg/dL Final     Comment:     For infants and newborns, interpretation of results should be based  on gestational age, weight and in agreement with clinical  observations.    Premature Infant recommended reference ranges:  Up to 24 hours.............<8.0 mg/dL  Up to 48 hours............<12.0 mg/dL  3-5 days..................<15.0 mg/dL  6-29 days.................<15.0 mg/dL       Alkaline Phosphatase   Date Value Ref Range Status   03/03/2022 34 (L) 55 - 135 U/L Final   01/24/2022 36 (L) 55 - 135 U/L Final   12/27/2021 33 (L) 55 - 135 U/L Final     AST   Date Value Ref Range Status   03/03/2022 20 10 - 40 U/L Final   01/24/2022 25 10 - 40  U/L Final   12/27/2021 23 10 - 40 U/L Final     ALT   Date Value Ref Range Status   03/03/2022 10 10 - 44 U/L Final   01/24/2022 12 10 - 44 U/L Final   12/27/2021 12 10 - 44 U/L Final     Anion Gap   Date Value Ref Range Status   03/03/2022 10 8 - 16 mmol/L Final   01/24/2022 10 8 - 16 mmol/L Final   12/27/2021 9 8 - 16 mmol/L Final     eGFR if    Date Value Ref Range Status   03/03/2022 >60.0 >60 mL/min/1.73 m^2 Final   01/24/2022 53.8 (A) >60 mL/min/1.73 m^2 Final   12/27/2021 >60.0 >60 mL/min/1.73 m^2 Final     eGFR if non    Date Value Ref Range Status   03/03/2022 55.3 (A) >60 mL/min/1.73 m^2 Final     Comment:     Calculation used to obtain the estimated glomerular filtration  rate (eGFR) is the CKD-EPI equation.      01/24/2022 46.5 (A) >60 mL/min/1.73 m^2 Final     Comment:     Calculation used to obtain the estimated glomerular filtration  rate (eGFR) is the CKD-EPI equation.      12/27/2021 55.3 (A) >60 mL/min/1.73 m^2 Final     Comment:     Calculation used to obtain the estimated glomerular filtration  rate (eGFR) is the CKD-EPI equation.        RBC, UA   Date Value Ref Range Status   02/02/2009 0-1 0 - 4 /hpf Final     WBC, UA   Date Value Ref Range Status   02/02/2009 1-2 0 - 5 /hpf Final   11/25/2008 0-1 0 - 5 /hpf Final     Bacteria   Date Value Ref Range Status   02/02/2009 Rare None-Occ Final   11/25/2008 OCC None-Occ Final     Protein, Urine Random   Date Value Ref Range Status   03/03/2022 10 0 - 15 mg/dL Final     Creatinine, Urine   Date Value Ref Range Status   03/03/2022 134.1 23.0 - 375.0 mg/dL Final   01/24/2022 22.0 (L) 23.0 - 375.0 mg/dL Final   01/07/2021 211.0 23.0 - 375.0 mg/dL Final     Comment:     The random urine reference ranges provided were established   for 24 hour urine collections.  No reference ranges exist for  random urine specimens.  Correlate clinically.       Prot/Creat Ratio, Urine   Date Value Ref Range Status   03/03/2022 0.07 0.00 -  "0.20 Final          REVIEW OF SYSTEMS:  Patient has no fever, fatigue, visual changes, chest pain, edema, cough, dyspnea, nausea, vomiting, constipation, diarrhea, arthralgias, pruritis, dizziness, weakness, depression, confusion.        PHYSICAL EXAM:   height is 5' 11" (1.803 m) and weight is 91.9 kg (202 lb 9.6 oz). His blood pressure is 130/80 and his pulse is 80. His respiration is 18.   Gen: WDWN male in no apparent distress  Psych: Normal mood and affect  Skin: No rashes or ulcers  Eyes: Normal conjunctiva and lids, PERRLA  ENT: Normal hearing with no oropharyngeal lesions  Neck: No JVD  Chest: Clear with no rales, rhonchi, wheezing with normal effort  CV: Regular with no murmurs, gallops or rubs  Abd: Soft, nontender, no distension, positive bowel sounds  Ext: No cyanosis, clubbing or edema          IMPRESSION AND RECOMMENDATIONS:      1. CKD 3A:  He has late stage II early stage IIIA CKD only by calculated EGFR.  His urinalysis is bland without evidence of proteinuria.  EGFR  calculators are not particularly accurate and tend to exaggerate fluctuations in clearances when renal function is relatively normal.      Will recheck laboratory studies in a renal ultrasound in the next few months.  If all findings are still within normal range she does not have  True chronic kidney disease and will only need p.r.n. follow-up with Nephrology.     2. Hypertension:  Blood pressure is well controlled on current regimen.  He is on a low-dose ARB.        "

## 2022-03-07 RX ORDER — LOVASTATIN 40 MG/1
TABLET ORAL
Qty: 90 TABLET | Refills: 3 | Status: SHIPPED | OUTPATIENT
Start: 2022-03-07 | End: 2023-03-21 | Stop reason: SDUPTHER

## 2022-03-21 ENCOUNTER — PATIENT OUTREACH (OUTPATIENT)
Dept: ADMINISTRATIVE | Facility: OTHER | Age: 72
End: 2022-03-21
Payer: MEDICARE

## 2022-03-22 ENCOUNTER — OFFICE VISIT (OUTPATIENT)
Dept: OPHTHALMOLOGY | Facility: CLINIC | Age: 72
End: 2022-03-22
Payer: MEDICARE

## 2022-03-22 DIAGNOSIS — Z79.4 CONTROLLED TYPE 2 DIABETES MELLITUS WITHOUT COMPLICATION, WITH LONG-TERM CURRENT USE OF INSULIN: Primary | ICD-10-CM

## 2022-03-22 DIAGNOSIS — H35.372 EPIRETINAL MEMBRANE (ERM) OF LEFT EYE: ICD-10-CM

## 2022-03-22 DIAGNOSIS — H35.341 LAMELLAR MACULAR HOLE OF RIGHT EYE: ICD-10-CM

## 2022-03-22 DIAGNOSIS — H40.013 OPEN ANGLE WITH BORDERLINE FINDINGS AND LOW GLAUCOMA RISK IN BOTH EYES: ICD-10-CM

## 2022-03-22 DIAGNOSIS — D31.31 CHOROIDAL NEVUS OF RIGHT EYE: ICD-10-CM

## 2022-03-22 DIAGNOSIS — E11.9 CONTROLLED TYPE 2 DIABETES MELLITUS WITHOUT COMPLICATION, WITH LONG-TERM CURRENT USE OF INSULIN: Primary | ICD-10-CM

## 2022-03-22 PROCEDURE — 1160F PR REVIEW ALL MEDS BY PRESCRIBER/CLIN PHARMACIST DOCUMENTED: ICD-10-PCS | Mod: CPTII,S$GLB,, | Performed by: OPHTHALMOLOGY

## 2022-03-22 PROCEDURE — 1126F AMNT PAIN NOTED NONE PRSNT: CPT | Mod: CPTII,S$GLB,, | Performed by: OPHTHALMOLOGY

## 2022-03-22 PROCEDURE — 1159F PR MEDICATION LIST DOCUMENTED IN MEDICAL RECORD: ICD-10-PCS | Mod: CPTII,S$GLB,, | Performed by: OPHTHALMOLOGY

## 2022-03-22 PROCEDURE — 4010F PR ACE/ARB THEARPY RXD/TAKEN: ICD-10-PCS | Mod: CPTII,S$GLB,, | Performed by: OPHTHALMOLOGY

## 2022-03-22 PROCEDURE — 99999 PR PBB SHADOW E&M-EST. PATIENT-LVL III: CPT | Mod: PBBFAC,,, | Performed by: OPHTHALMOLOGY

## 2022-03-22 PROCEDURE — 3061F NEG MICROALBUMINURIA REV: CPT | Mod: CPTII,S$GLB,, | Performed by: OPHTHALMOLOGY

## 2022-03-22 PROCEDURE — 3066F PR DOCUMENTATION OF TREATMENT FOR NEPHROPATHY: ICD-10-PCS | Mod: CPTII,S$GLB,, | Performed by: OPHTHALMOLOGY

## 2022-03-22 PROCEDURE — 3061F PR NEG MICROALBUMINURIA RESULT DOCUMENTED/REVIEW: ICD-10-PCS | Mod: CPTII,S$GLB,, | Performed by: OPHTHALMOLOGY

## 2022-03-22 PROCEDURE — 4010F ACE/ARB THERAPY RXD/TAKEN: CPT | Mod: CPTII,S$GLB,, | Performed by: OPHTHALMOLOGY

## 2022-03-22 PROCEDURE — 99213 PR OFFICE/OUTPT VISIT, EST, LEVL III, 20-29 MIN: ICD-10-PCS | Mod: S$GLB,,, | Performed by: OPHTHALMOLOGY

## 2022-03-22 PROCEDURE — 92134 CPTRZ OPH DX IMG PST SGM RTA: CPT | Mod: S$GLB,,, | Performed by: OPHTHALMOLOGY

## 2022-03-22 PROCEDURE — 99213 OFFICE O/P EST LOW 20 MIN: CPT | Mod: S$GLB,,, | Performed by: OPHTHALMOLOGY

## 2022-03-22 PROCEDURE — 99999 PR PBB SHADOW E&M-EST. PATIENT-LVL III: ICD-10-PCS | Mod: PBBFAC,,, | Performed by: OPHTHALMOLOGY

## 2022-03-22 PROCEDURE — 1126F PR PAIN SEVERITY QUANTIFIED, NO PAIN PRESENT: ICD-10-PCS | Mod: CPTII,S$GLB,, | Performed by: OPHTHALMOLOGY

## 2022-03-22 PROCEDURE — 92134 POSTERIOR SEGMENT OCT RETINA (OCULAR COHERENCE TOMOGRAPHY)-BOTH EYES: ICD-10-PCS | Mod: S$GLB,,, | Performed by: OPHTHALMOLOGY

## 2022-03-22 PROCEDURE — 3066F NEPHROPATHY DOC TX: CPT | Mod: CPTII,S$GLB,, | Performed by: OPHTHALMOLOGY

## 2022-03-22 PROCEDURE — 92083 EXTENDED VISUAL FIELD XM: CPT | Mod: S$GLB,,, | Performed by: OPHTHALMOLOGY

## 2022-03-22 PROCEDURE — 3044F PR MOST RECENT HEMOGLOBIN A1C LEVEL <7.0%: ICD-10-PCS | Mod: CPTII,S$GLB,, | Performed by: OPHTHALMOLOGY

## 2022-03-22 PROCEDURE — 1159F MED LIST DOCD IN RCRD: CPT | Mod: CPTII,S$GLB,, | Performed by: OPHTHALMOLOGY

## 2022-03-22 PROCEDURE — 3044F HG A1C LEVEL LT 7.0%: CPT | Mod: CPTII,S$GLB,, | Performed by: OPHTHALMOLOGY

## 2022-03-22 PROCEDURE — 1160F RVW MEDS BY RX/DR IN RCRD: CPT | Mod: CPTII,S$GLB,, | Performed by: OPHTHALMOLOGY

## 2022-03-22 PROCEDURE — 92083 HUMPHREY VISUAL FIELD - OU - BOTH EYES: ICD-10-PCS | Mod: S$GLB,,, | Performed by: OPHTHALMOLOGY

## 2022-03-22 NOTE — PROGRESS NOTES
===============================  Date today is 3/22/2022  Rigoberto Vasquez is a 71 y.o. male  Last visit [facility]: :3/22/2021   Last visit eye dept. Visit date not found    Corrected distance visual acuity was 20/25 in the right eye and 20/25 in the left eye.  Tonometry     Tonometry (Applanation, 9:50 AM)       Right Left    Pressure 16 16          Tonometry #2 (Applanation, 10:16 AM)       Right Left    Pressure 24 23              Wearing Rx     Wearing Rx       Sphere Cylinder Axis Add    Right -5.50 +1.50 160 +2.25    Left -4.50 +0.50 170 +2.25    Type: PAL              Not recorded       Not recorded       Chief Complaint   Patient presents with    Glaucoma Suspect     Yearly exam/ Rev HVF       Problem List Items Addressed This Visit        Eye/Vision problems    Choroidal nevus of right eye    Overview                Relevant Orders    Posterior Segment OCT Retina-Both eyes (Completed)    Epiretinal membrane (ERM) of left eye    Relevant Orders    Posterior Segment OCT Retina-Both eyes (Completed)    Open angle with borderline findings and low glaucoma risk in both eyes    Overview                Relevant Orders    Morales Visual Field - OU - Extended - Both Eyes (Completed)      Other Visit Diagnoses     Controlled type 2 diabetes mellitus without complication, with long-term current use of insulin    -  Primary    Relevant Orders    Posterior Segment OCT Retina-Both eyes (Completed)    Lamellar macular hole of right eye              ________________  3/22/2022 today    SCOAG based on c/d  VF today better compared to last year  RNFL normal  IOP 24-23 cct -7  OCT shows significant ERM/LMH OD- stable  OS perfect  DM no retinopathy  C/d 0.65/0.5   Recommend glaucoma eval with [facility] ?gtts due to IOP    RTC with Dr. Reza 1-2 months for glaucoma eval, 1 year with [facility]  Instructed to call 24/7 for any worsening of vision or symptoms. Check OU daily.   Gave my office and cell phone  number.  .      ===============================

## 2022-03-22 NOTE — PROGRESS NOTES
Health Maintenance Due   Topic Date Due    TETANUS VACCINE  03/22/2021    Eye Exam  01/22/2022    COVID-19 Vaccine (4 - Booster for Moderna series) 03/26/2022     Updates were requested from care everywhere.  Chart was reviewed for overdue Proactive [facility] Encounters (RENETTA) topics (CRS, Breast Cancer Screening, Eye exam)  Health Maintenance has been updated.  LINKS immunization registry triggered.  Immunizations were reconciled.

## 2022-04-19 ENCOUNTER — PATIENT MESSAGE (OUTPATIENT)
Dept: PULMONOLOGY | Facility: CLINIC | Age: 72
End: 2022-04-19
Payer: MEDICARE

## 2022-05-02 ENCOUNTER — PATIENT OUTREACH (OUTPATIENT)
Dept: ADMINISTRATIVE | Facility: OTHER | Age: 72
End: 2022-05-02
Payer: MEDICARE

## 2022-05-03 ENCOUNTER — OFFICE VISIT (OUTPATIENT)
Dept: OPHTHALMOLOGY | Facility: CLINIC | Age: 72
End: 2022-05-03
Payer: MEDICARE

## 2022-05-03 DIAGNOSIS — H35.372 EPIRETINAL MEMBRANE (ERM) OF LEFT EYE: ICD-10-CM

## 2022-05-03 DIAGNOSIS — Z79.4 CONTROLLED TYPE 2 DIABETES MELLITUS WITHOUT COMPLICATION, WITH LONG-TERM CURRENT USE OF INSULIN: ICD-10-CM

## 2022-05-03 DIAGNOSIS — H35.341 LAMELLAR MACULAR HOLE OF RIGHT EYE: ICD-10-CM

## 2022-05-03 DIAGNOSIS — D31.31 CHOROIDAL NEVUS OF RIGHT EYE: ICD-10-CM

## 2022-05-03 DIAGNOSIS — E11.9 CONTROLLED TYPE 2 DIABETES MELLITUS WITHOUT COMPLICATION, WITH LONG-TERM CURRENT USE OF INSULIN: ICD-10-CM

## 2022-05-03 DIAGNOSIS — H40.013 OPEN ANGLE WITH BORDERLINE FINDINGS AND LOW GLAUCOMA RISK IN BOTH EYES: Primary | ICD-10-CM

## 2022-05-03 LAB
LEFT EYE DM RETINOPATHY: NEGATIVE
RIGHT EYE DM RETINOPATHY: NEGATIVE

## 2022-05-03 PROCEDURE — 3061F PR NEG MICROALBUMINURIA RESULT DOCUMENTED/REVIEW: ICD-10-PCS | Mod: CPTII,S$GLB,, | Performed by: OPHTHALMOLOGY

## 2022-05-03 PROCEDURE — 1159F PR MEDICATION LIST DOCUMENTED IN MEDICAL RECORD: ICD-10-PCS | Mod: CPTII,S$GLB,, | Performed by: OPHTHALMOLOGY

## 2022-05-03 PROCEDURE — 4010F ACE/ARB THERAPY RXD/TAKEN: CPT | Mod: CPTII,S$GLB,, | Performed by: OPHTHALMOLOGY

## 2022-05-03 PROCEDURE — 3044F HG A1C LEVEL LT 7.0%: CPT | Mod: CPTII,S$GLB,, | Performed by: OPHTHALMOLOGY

## 2022-05-03 PROCEDURE — 92133 CPTRZD OPH DX IMG PST SGM ON: CPT | Mod: S$GLB,,, | Performed by: OPHTHALMOLOGY

## 2022-05-03 PROCEDURE — 1159F MED LIST DOCD IN RCRD: CPT | Mod: CPTII,S$GLB,, | Performed by: OPHTHALMOLOGY

## 2022-05-03 PROCEDURE — 99999 PR PBB SHADOW E&M-EST. PATIENT-LVL III: CPT | Mod: PBBFAC,,, | Performed by: OPHTHALMOLOGY

## 2022-05-03 PROCEDURE — 92133 POSTERIOR SEGMENT OCT OPTIC NERVE(OCULAR COHERENCE TOMOGRAPHY) - OU - BOTH EYES: ICD-10-PCS | Mod: S$GLB,,, | Performed by: OPHTHALMOLOGY

## 2022-05-03 PROCEDURE — 3061F NEG MICROALBUMINURIA REV: CPT | Mod: CPTII,S$GLB,, | Performed by: OPHTHALMOLOGY

## 2022-05-03 PROCEDURE — 99214 PR OFFICE/OUTPT VISIT, EST, LEVL IV, 30-39 MIN: ICD-10-PCS | Mod: S$GLB,,, | Performed by: OPHTHALMOLOGY

## 2022-05-03 PROCEDURE — 3066F PR DOCUMENTATION OF TREATMENT FOR NEPHROPATHY: ICD-10-PCS | Mod: CPTII,S$GLB,, | Performed by: OPHTHALMOLOGY

## 2022-05-03 PROCEDURE — 4010F PR ACE/ARB THEARPY RXD/TAKEN: ICD-10-PCS | Mod: CPTII,S$GLB,, | Performed by: OPHTHALMOLOGY

## 2022-05-03 PROCEDURE — 3044F PR MOST RECENT HEMOGLOBIN A1C LEVEL <7.0%: ICD-10-PCS | Mod: CPTII,S$GLB,, | Performed by: OPHTHALMOLOGY

## 2022-05-03 PROCEDURE — 1160F RVW MEDS BY RX/DR IN RCRD: CPT | Mod: CPTII,S$GLB,, | Performed by: OPHTHALMOLOGY

## 2022-05-03 PROCEDURE — 3066F NEPHROPATHY DOC TX: CPT | Mod: CPTII,S$GLB,, | Performed by: OPHTHALMOLOGY

## 2022-05-03 PROCEDURE — 99999 PR PBB SHADOW E&M-EST. PATIENT-LVL III: ICD-10-PCS | Mod: PBBFAC,,, | Performed by: OPHTHALMOLOGY

## 2022-05-03 PROCEDURE — 99214 OFFICE O/P EST MOD 30 MIN: CPT | Mod: S$GLB,,, | Performed by: OPHTHALMOLOGY

## 2022-05-03 PROCEDURE — 1160F PR REVIEW ALL MEDS BY PRESCRIBER/CLIN PHARMACIST DOCUMENTED: ICD-10-PCS | Mod: CPTII,S$GLB,, | Performed by: OPHTHALMOLOGY

## 2022-05-03 RX ORDER — LATANOPROST 50 UG/ML
1 SOLUTION/ DROPS OPHTHALMIC DAILY
Qty: 2.5 ML | Refills: 6 | Status: SHIPPED | OUTPATIENT
Start: 2022-05-03 | End: 2022-08-16 | Stop reason: SDUPTHER

## 2022-05-03 NOTE — PROGRESS NOTES
HPI     Glaucoma Suspect     Comments: Shy Rose per [facility]              Comments     Patient states that he has no knowledge of family history of glaucoma on   either side - denies pain/ discomfort OU - va stable since last visit with   Dr Epperson - does not use anyeye drops on a daily basis        1. DM since 1999   NO DBR   2. Mac hole od   3. erm od  4. Choroidal nevus od   5. HIGH MYOPE(-6)   6. SCOAG   iop 25/ 23 ou on po steroid   anamolous myopic disc   ASSYMETRIC C/D 0.7 / 0.5    // 641  NO FAMILY HISTORY   HVF 3/22/2022            Last edited by Enma Madison MA on 5/3/2022 10:07 AM. (History)            Assessment /Plan     For exam results, see Encounter Report.      ICD-10-CM ICD-9-CM    1. Open angle with borderline findings and low glaucoma risk in both eyes  H40.013 365.01 Posterior Segment OCT Optic Nerve- Both eyes  Based on low TI OD and     Will begin treatment today   No ME in the past   Start latanoprost trial   Can consider SLT in the future if needed     2. Controlled type 2 diabetes mellitus without complication, with long-term current use of insulin  E11.9 250.00 Not due for dilation at this time     Z79.4 V58.67    3. Choroidal nevus of right eye  D31.31 224.6 Followed by Dr Lovell    4. Epiretinal membrane (ERM) of left eye  H35.372 362.56    5. Lamellar macular hole of right eye  H35.341 362.54 Followed by Dr. Epperson        RETURN TO CLINIC 6 weeks IOP   LATANOPROST QHS OU

## 2022-05-17 ENCOUNTER — OFFICE VISIT (OUTPATIENT)
Dept: PULMONOLOGY | Facility: CLINIC | Age: 72
End: 2022-05-17
Payer: MEDICARE

## 2022-05-17 VITALS
WEIGHT: 199.75 LBS | HEART RATE: 79 BPM | BODY MASS INDEX: 27.97 KG/M2 | RESPIRATION RATE: 20 BRPM | DIASTOLIC BLOOD PRESSURE: 76 MMHG | SYSTOLIC BLOOD PRESSURE: 118 MMHG | HEIGHT: 71 IN | OXYGEN SATURATION: 97 %

## 2022-05-17 DIAGNOSIS — E11.59 HYPERTENSION ASSOCIATED WITH DIABETES: Chronic | ICD-10-CM

## 2022-05-17 DIAGNOSIS — Z86.79 HISTORY OF HEART BLOCK: ICD-10-CM

## 2022-05-17 DIAGNOSIS — N18.31 TYPE 2 DIABETES MELLITUS WITH STAGE 3A CHRONIC KIDNEY DISEASE, WITH LONG-TERM CURRENT USE OF INSULIN: ICD-10-CM

## 2022-05-17 DIAGNOSIS — I15.2 HYPERTENSION ASSOCIATED WITH DIABETES: Chronic | ICD-10-CM

## 2022-05-17 DIAGNOSIS — I70.0 CALCIFICATION OF AORTA: ICD-10-CM

## 2022-05-17 DIAGNOSIS — E66.3 OVERWEIGHT (BMI 25.0-29.9): ICD-10-CM

## 2022-05-17 DIAGNOSIS — G47.33 OSA (OBSTRUCTIVE SLEEP APNEA): Primary | ICD-10-CM

## 2022-05-17 DIAGNOSIS — Z79.4 TYPE 2 DIABETES MELLITUS WITH STAGE 3A CHRONIC KIDNEY DISEASE, WITH LONG-TERM CURRENT USE OF INSULIN: ICD-10-CM

## 2022-05-17 DIAGNOSIS — E11.22 TYPE 2 DIABETES MELLITUS WITH STAGE 3A CHRONIC KIDNEY DISEASE, WITH LONG-TERM CURRENT USE OF INSULIN: ICD-10-CM

## 2022-05-17 PROCEDURE — 3072F LOW RISK FOR RETINOPATHY: CPT | Mod: CPTII,S$GLB,, | Performed by: NURSE PRACTITIONER

## 2022-05-17 PROCEDURE — 99999 PR PBB SHADOW E&M-EST. PATIENT-LVL V: CPT | Mod: PBBFAC,,, | Performed by: NURSE PRACTITIONER

## 2022-05-17 PROCEDURE — 3044F PR MOST RECENT HEMOGLOBIN A1C LEVEL <7.0%: ICD-10-PCS | Mod: CPTII,S$GLB,, | Performed by: NURSE PRACTITIONER

## 2022-05-17 PROCEDURE — 3074F SYST BP LT 130 MM HG: CPT | Mod: CPTII,S$GLB,, | Performed by: NURSE PRACTITIONER

## 2022-05-17 PROCEDURE — 3061F PR NEG MICROALBUMINURIA RESULT DOCUMENTED/REVIEW: ICD-10-PCS | Mod: CPTII,S$GLB,, | Performed by: NURSE PRACTITIONER

## 2022-05-17 PROCEDURE — 3044F HG A1C LEVEL LT 7.0%: CPT | Mod: CPTII,S$GLB,, | Performed by: NURSE PRACTITIONER

## 2022-05-17 PROCEDURE — 99499 UNLISTED E&M SERVICE: CPT | Mod: S$GLB,,, | Performed by: NURSE PRACTITIONER

## 2022-05-17 PROCEDURE — 3008F BODY MASS INDEX DOCD: CPT | Mod: CPTII,S$GLB,, | Performed by: NURSE PRACTITIONER

## 2022-05-17 PROCEDURE — 1159F MED LIST DOCD IN RCRD: CPT | Mod: CPTII,S$GLB,, | Performed by: NURSE PRACTITIONER

## 2022-05-17 PROCEDURE — 3061F NEG MICROALBUMINURIA REV: CPT | Mod: CPTII,S$GLB,, | Performed by: NURSE PRACTITIONER

## 2022-05-17 PROCEDURE — 99214 PR OFFICE/OUTPT VISIT, EST, LEVL IV, 30-39 MIN: ICD-10-PCS | Mod: S$GLB,,, | Performed by: NURSE PRACTITIONER

## 2022-05-17 PROCEDURE — 3078F PR MOST RECENT DIASTOLIC BLOOD PRESSURE < 80 MM HG: ICD-10-PCS | Mod: CPTII,S$GLB,, | Performed by: NURSE PRACTITIONER

## 2022-05-17 PROCEDURE — 3066F NEPHROPATHY DOC TX: CPT | Mod: CPTII,S$GLB,, | Performed by: NURSE PRACTITIONER

## 2022-05-17 PROCEDURE — 99499 RISK ADDL DX/OHS AUDIT: ICD-10-PCS | Mod: S$GLB,,, | Performed by: NURSE PRACTITIONER

## 2022-05-17 PROCEDURE — 1160F RVW MEDS BY RX/DR IN RCRD: CPT | Mod: CPTII,S$GLB,, | Performed by: NURSE PRACTITIONER

## 2022-05-17 PROCEDURE — 3078F DIAST BP <80 MM HG: CPT | Mod: CPTII,S$GLB,, | Performed by: NURSE PRACTITIONER

## 2022-05-17 PROCEDURE — 4010F ACE/ARB THERAPY RXD/TAKEN: CPT | Mod: CPTII,S$GLB,, | Performed by: NURSE PRACTITIONER

## 2022-05-17 PROCEDURE — 3066F PR DOCUMENTATION OF TREATMENT FOR NEPHROPATHY: ICD-10-PCS | Mod: CPTII,S$GLB,, | Performed by: NURSE PRACTITIONER

## 2022-05-17 PROCEDURE — 99999 PR PBB SHADOW E&M-EST. PATIENT-LVL V: ICD-10-PCS | Mod: PBBFAC,,, | Performed by: NURSE PRACTITIONER

## 2022-05-17 PROCEDURE — 1159F PR MEDICATION LIST DOCUMENTED IN MEDICAL RECORD: ICD-10-PCS | Mod: CPTII,S$GLB,, | Performed by: NURSE PRACTITIONER

## 2022-05-17 PROCEDURE — 1160F PR REVIEW ALL MEDS BY PRESCRIBER/CLIN PHARMACIST DOCUMENTED: ICD-10-PCS | Mod: CPTII,S$GLB,, | Performed by: NURSE PRACTITIONER

## 2022-05-17 PROCEDURE — 3008F PR BODY MASS INDEX (BMI) DOCUMENTED: ICD-10-PCS | Mod: CPTII,S$GLB,, | Performed by: NURSE PRACTITIONER

## 2022-05-17 PROCEDURE — 99214 OFFICE O/P EST MOD 30 MIN: CPT | Mod: S$GLB,,, | Performed by: NURSE PRACTITIONER

## 2022-05-17 PROCEDURE — 4010F PR ACE/ARB THEARPY RXD/TAKEN: ICD-10-PCS | Mod: CPTII,S$GLB,, | Performed by: NURSE PRACTITIONER

## 2022-05-17 PROCEDURE — 3074F PR MOST RECENT SYSTOLIC BLOOD PRESSURE < 130 MM HG: ICD-10-PCS | Mod: CPTII,S$GLB,, | Performed by: NURSE PRACTITIONER

## 2022-05-17 PROCEDURE — 3072F PR LOW RISK FOR RETINOPATHY: ICD-10-PCS | Mod: CPTII,S$GLB,, | Performed by: NURSE PRACTITIONER

## 2022-05-17 RX ORDER — FENOFIBRATE 200 MG/1
200 CAPSULE ORAL NIGHTLY
Qty: 90 CAPSULE | Refills: 3 | Status: SHIPPED | OUTPATIENT
Start: 2022-05-17 | End: 2023-04-24 | Stop reason: SDUPTHER

## 2022-05-17 NOTE — ASSESSMENT & PLAN NOTE
Managed by primary care provider  Hemoglobin A1C   Date Value Ref Range Status   01/24/2022 6.1 (H) 4.0 - 5.6 % Final     Comment:     ADA Screening Guidelines:  5.7-6.4%  Consistent with prediabetes  >or=6.5%  Consistent with diabetes    High levels of fetal hemoglobin interfere with the HbA1C  assay. Heterozygous hemoglobin variants (HbS, HgC, etc)do  not significantly interfere with this assay.   However, presence of multiple variants may affect accuracy.     07/21/2021 6.0 (H) 4.0 - 5.6 % Final     Comment:     ADA Screening Guidelines:  5.7-6.4%  Consistent with prediabetes  >or=6.5%  Consistent with diabetes    High levels of fetal hemoglobin interfere with the HbA1C  assay. Heterozygous hemoglobin variants (HbS, HgC, etc)do  not significantly interfere with this assay.   However, presence of multiple variants may affect accuracy.     01/07/2021 6.3 (H) 4.0 - 5.6 % Final     Comment:     ADA Screening Guidelines:  5.7-6.4%  Consistent with prediabetes  >or=6.5%  Consistent with diabetes  High levels of fetal hemoglobin interfere with the HbA1C  assay. Heterozygous hemoglobin variants (HbS, HgC, etc)do  not significantly interfere with this assay.   However, presence of multiple variants may affect accuracy.

## 2022-05-17 NOTE — PROGRESS NOTES
Subjective:      Patient ID: Rigoberto Vasquez is a 71 y.o. male.    Patient Active Problem List   Diagnosis    Osteoarthritis of knees, bilateral    Hypertension associated with diabetes    Type 2 diabetes mellitus with stage 3a chronic kidney disease, with long-term current use of insulin    Hyperlipidemia associated with type 2 diabetes mellitus    Macular hole    Open angle with borderline findings, low risk    High myopia    Optic disc anomaly    CHANDRAKANT (iron deficiency anemia)    Pacemaker    Conductive hearing loss in left ear    Choroidal nevus of right eye    Epiretinal membrane (ERM) of left eye    Overweight (BMI 25.0-29.9)    Macular hole of right eye    Open angle with borderline findings and low glaucoma risk in both eyes    Chronic LUQ pain    Nausea    Ectopic gastric mucosa    Open angle with borderline findings and high glaucoma risk in both eyes    History of skin cancer    History of heart block;AV block, 3rd degree    Benign prostatic hyperplasia (BPH) with straining on urination    Erectile dysfunction    Lung granuloma    Dyspepsia    Calcification of aorta    Cholesteatoma    СВЕТЛАНА (obstructive sleep apnea)       he has been referred by Jesse Grimes MD for evaluation and management for   Chief Complaint   Patient presents with    Results     Patient in to get results from sleep study        Chief Complaint: Results (Patient in to get results from sleep study )      HPI:  He presents for СВЕТЛАНА with review 2/10/2022 Home Sleep Study mild obstructive sleep apnea AHI 12.1.   Patient symptomatic for obstructive sleep apnea with feeling tired and fatigued and snoring.   Patient reports somewhat restful sleep.  He denies morning headache.   He reports day time napping; duration 1 Hour  He denies recent weight gain.  Cardiovascular risk factors: diabetes, hypertension, hyperlipidemia and coronary artery disease  Bed time is 0930  Wake time is 0600 - 0700  Sleep onset is  within 15 - 30 Minutes.  Sleep maintenance difficulties related to frequent night time awakening  Wake after sleep onset occurs two times a night.  Nocturia occurs two times a night,   Sleep aids : YES , melatonin 1-2 times a week if working on things around his house, he will think about it, most nights able to go to sleep in 15-20 minutes.  Dry mouth :  NO  Sleep walking:  NO  Sleep talking :  NO  Sleep eating: NO  Vivid Dreams :  NO  Cataplexy :  NO    [place] Sleepiness Scale   EPWORTH SLEEPINESS SCALE 2/3/2022   Sitting and reading 0   Watching TV 0   Sitting, inactive in a public place (e.g. a theatre or a meeting) 0   As a passenger in a car for an hour without a break 0   Lying down to rest in the afternoon when circumstances permit 2   Sitting and talking to someone 0   Sitting quietly after a lunch without alcohol 0   In a car, while stopped for a few minutes in traffic 0   Total score 2       Neck circumference is 43 cm. (17 inches).  Mallampati score 4    STOP - BANG Questionnaire:   1. Snoring : Do you snore loudly ?     YES  2. Tired : Do you often feel tired, fatigued, or sleepy during daytime?   YES  3. Observed: Has anyone observed you stop breathing during your sleep?     YES  4. Blood pressure : Do you have or are you being treated for high blood pressure?    YES  5. BMI :BMI more than 35 kg/m2?   NO  6. Age : Age over 50 yr old?    YES  7. Neck circumference: Neck circumference greater than 40 cm?    YES  8. Gender: Gender male?    YES    SCORE: 7    High risk of СВЕТЛАНА: Yes 5 - 8  Intermediate risk of СВЕТЛАНА: Yes 3 - 4  Low risk of СВЕТЛАНА: Yes 0 - 2    Previous Report Reviewed: lab reports and office notes     Past Medical History: The following portions of the patient's history were reviewed and updated as appropriate:   He  has a past surgical history that includes Shoulder arthroscopy (Right); Nasal sinus surgery; Cardiac pacemaker placement; Tonsillectomy; Transurethral resection of prostate; Knee  cartilage surgery (Bilateral); Tympanoplasty; vesectomy; Total knee arthroplasty (Left, 05/15/2017); Joint replacement; Esophagogastroduodenoscopy (N/A, 9/13/2019); Colonoscopy (N/A, 9/13/2019); and Esophagogastroduodenoscopy (N/A, 9/3/2021).  His family history includes Arthritis in his mother; Colon cancer in his paternal grandmother; Diabetes in his maternal grandmother and son; Heart disease in his father; Hypertension in his father and mother; Stroke in his father.  He  reports that he has never smoked. He has never used smokeless tobacco. He reports current alcohol use. He reports that he does not use drugs.  He has a current medication list which includes the following prescription(s): acetaminophen, azelastine, blood sugar diagnostic, dutasteride, fenofibrate micronized, fluticasone propionate, gabapentin, hydrocortisone, levemir flextouch u-100 insuln, latanoprost, levocetirizine, victoza 3-claudine, losartan, lovastatin, metformin, multivitamin, pen needle, diabetic, pioglitazone, sildenafil, triamcinolone acetonide 0.1%, bifidobacterium infantis, ferrous gluconate, ondansetron, and pantoprazole.  He is allergic to aspirin, meperidine, and niacin..    Review of Systems   Constitutional: Negative for fever, chills, weight loss, weight gain, activity change, appetite change, fatigue and night sweats.   HENT: Negative for postnasal drip, rhinorrhea, sinus pressure, voice change and congestion.    Eyes: Negative for redness and itching.   Respiratory: Positive for apnea and snoring. Negative for cough, sputum production, chest tightness, shortness of breath, wheezing, orthopnea, asthma nighttime symptoms, dyspnea on extertion, use of rescue inhaler and somnolence.    Cardiovascular: Negative.  Negative for chest pain, palpitations and leg swelling.   Genitourinary: Negative for difficulty urinating and hematuria.   Endocrine: Negative for cold intolerance and heat intolerance.    Musculoskeletal: Negative for  "arthralgias, gait problem, joint swelling and myalgias.   Skin: Negative.    Gastrointestinal: Negative for nausea, vomiting, abdominal pain and acid reflux.   Neurological: Negative for dizziness, weakness, light-headedness and headaches.   Hematological: Negative for adenopathy. No excessive bruising.   All other systems reviewed and are negative.     Objective:   /76   Pulse 79   Resp 20   Ht 5' 11" (1.803 m)   Wt 90.6 kg (199 lb 11.8 oz)   SpO2 97%   BMI 27.86 kg/m²   Physical Exam  Vitals and nursing note reviewed.   Constitutional:       Appearance: He is well-developed.   HENT:      Head: Normocephalic and atraumatic.   Eyes:      Conjunctiva/sclera: Conjunctivae normal.   Cardiovascular:      Rate and Rhythm: Normal rate and regular rhythm.      Heart sounds: Normal heart sounds.   Pulmonary:      Effort: Pulmonary effort is normal.      Breath sounds: Normal breath sounds.   Abdominal:      General: Bowel sounds are normal.      Palpations: Abdomen is soft.   Musculoskeletal:         General: Normal range of motion.      Cervical back: Normal range of motion and neck supple.   Skin:     General: Skin is warm and dry.   Neurological:      Mental Status: He is alert and oriented to person, place, and time.      Deep Tendon Reflexes: Reflexes are normal and symmetric.   Psychiatric:         Behavior: Behavior normal.         Thought Content: Thought content normal.         Judgment: Judgment normal.         Personal Diagnostic Review  Review of labs, xray's, cardiology reports.     2/10/2022 Home Sleep Study    1 night study  MILD OBSTRUCTIVE SLEEP APNEA with overall AHI 12.1 /hr ( 85 events): night #1  Oxygen desaturation: 88%. SpO2 between 90% to 94% for 2 hr 56 min.  Patient snored 82% time above 50 .  Heart rate range: 60 bpm - 80 bpm    Assessment:     1. СВЕТЛАНА (obstructive sleep apnea)    2. Overweight (BMI 25.0-29.9)    3. Calcification of aorta    4. History of heart block;AV block, 3rd " degree    5. Type 2 diabetes mellitus with stage 3a chronic kidney disease, with long-term current use of insulin    6. Hypertension associated with diabetes      Orders Placed This Encounter   Procedures    CPAP FOR HOME USE     2/10/2022 Home Sleep Study    1 night study  MILD OBSTRUCTIVE SLEEP APNEA with overall AHI 12.1 /hr ( 85 events): night #1  Oxygen desaturation: 88%. SpO2 between 90% to 94% for 2 hr 56 min.  Patient snored 82% time above 50 .  Heart rate range: 60 bpm - 80 bpm     Order Specific Question:   Length of need (1-99 months):     Answer:   99     Order Specific Question:   Type ():     Answer:   Auto CPAP     Order Specific Question:   Auto CPAP pressure setting range (cmH20):     Answer:   5-20     Order Specific Question:   Fulfillment Priority:     Answer:   Level 4:  all others     Order Specific Question:   Humidification ():     Answer:   Heated     Order Specific Question:   Choose ONE mask type and its corresponding cushions and/or pillows:     Answer:    Nasal Cushion Mask, 1 per 90 days:  Nasal Cushions, (6 per 90 days)     Order Specific Question:   Choose EITHER Heated or Non-Heated Tubjing     Answer:    Non-Heated Tubing, 1 per 90 days     Order Specific Question:   Number of Days Needed:     Answer:   99     Order Specific Question:   All other supplies as needed as listed below:     Answer:    Headgear, 1 per 180 days     Order Specific Question:   All other supplies as needed as listed below:     Answer:    Chin Strap, 1 per 180 days     Order Specific Question:   All other supplies as needed as listed below:     Answer:    Disposable Filter, 6 per 90 days     Order Specific Question:   All other supplies as needed as listed below:     Answer:    Non-Disposable Filter, 1 per 180 days     Order Specific Question:   All other supplies as needed as listed below:     Answer:    Humidifier Chamber, 1 per 180 days     Plan:    Discussed diagnosis, its evaluation, treatment and usual course. All questions answered.  Problem List Items Addressed This Visit     Hypertension associated with diabetes (Chronic)     Managed by primary care provider and [facility] digital htn program, controlled             Type 2 diabetes mellitus with stage 3a chronic kidney disease, with long-term current use of insulin     Managed by primary care provider  Hemoglobin A1C   Date Value Ref Range Status   01/24/2022 6.1 (H) 4.0 - 5.6 % Final     Comment:     ADA Screening Guidelines:  5.7-6.4%  Consistent with prediabetes  >or=6.5%  Consistent with diabetes    High levels of fetal hemoglobin interfere with the HbA1C  assay. Heterozygous hemoglobin variants (HbS, HgC, etc)do  not significantly interfere with this assay.   However, presence of multiple variants may affect accuracy.     07/21/2021 6.0 (H) 4.0 - 5.6 % Final     Comment:     ADA Screening Guidelines:  5.7-6.4%  Consistent with prediabetes  >or=6.5%  Consistent with diabetes    High levels of fetal hemoglobin interfere with the HbA1C  assay. Heterozygous hemoglobin variants (HbS, HgC, etc)do  not significantly interfere with this assay.   However, presence of multiple variants may affect accuracy.     01/07/2021 6.3 (H) 4.0 - 5.6 % Final     Comment:     ADA Screening Guidelines:  5.7-6.4%  Consistent with prediabetes  >or=6.5%  Consistent with diabetes  High levels of fetal hemoglobin interfere with the HbA1C  assay. Heterozygous hemoglobin variants (HbS, HgC, etc)do  not significantly interfere with this assay.   However, presence of multiple variants may affect accuracy.                  Overweight (BMI 25.0-29.9)     Continue to encourage exercise and dietary modification to obtain normal weight.   Regular exercise [facility] 3 days a week   Wt Readings from Last 9 Encounters:   05/17/22 90.6 kg (199 lb 11.8 oz)   03/04/22 91.9 kg (202 lb 9.6 oz)   02/17/22 92.3 kg (203 lb 7.8 oz)   02/03/22 92.5 kg (203  lb 14.8 oz)   01/24/22 94.1 kg (207 lb 7.3 oz)   01/11/22 93.4 kg (205 lb 14.6 oz)   12/28/21 93.3 kg (205 lb 11 oz)   12/02/21 93.4 kg (206 lb)   11/15/21 93.8 kg (206 lb 12.7 oz)   Body mass index is 27.86 kg/m².             СВЕТЛАНА (obstructive sleep apnea) - Primary     2/10/2022 Home Sleep Study    1 night study  MILD OBSTRUCTIVE SLEEP APNEA with overall AHI 12.1 /hr ( 85 events): night #1  Oxygen desaturation: 88%. SpO2 between 90% to 94% for 2 hr 56 min.  Patient snored 82% time above 50 .  Heart rate range: 60 bpm - 80 bpm  5/17/2022 order Auto CPAP 5-20 cm, nasal cushion mask  Follow up 31-90 days from obtaining CPAP for ILD.              Relevant Orders    CPAP FOR HOME USE    History of heart block;AV block, 3rd degree     Managed by cardiology             Calcification of aorta     Follow heart healthy diet. regular exercise.                  Follow up in about 10 weeks (around 7/26/2022) for CPAP compliance download after initial set up.    Thank you for the opportunity to participate in the care of this patient.

## 2022-05-17 NOTE — ASSESSMENT & PLAN NOTE
2/10/2022 Home Sleep Study    1 night study  MILD OBSTRUCTIVE SLEEP APNEA with overall AHI 12.1 /hr ( 85 events): night #1  Oxygen desaturation: 88%. SpO2 between 90% to 94% for 2 hr 56 min.  Patient snored 82% time above 50 .  Heart rate range: 60 bpm - 80 bpm  5/17/2022 order Auto CPAP 5-20 cm, nasal cushion mask  Follow up 31-90 days from obtaining CPAP for ILD.

## 2022-06-08 ENCOUNTER — PATIENT MESSAGE (OUTPATIENT)
Dept: PULMONOLOGY | Facility: CLINIC | Age: 72
End: 2022-06-08
Payer: MEDICARE

## 2022-06-14 ENCOUNTER — OFFICE VISIT (OUTPATIENT)
Dept: OPHTHALMOLOGY | Facility: CLINIC | Age: 72
End: 2022-06-14
Payer: MEDICARE

## 2022-06-14 DIAGNOSIS — Z79.4 CONTROLLED TYPE 2 DIABETES MELLITUS WITHOUT COMPLICATION, WITH LONG-TERM CURRENT USE OF INSULIN: ICD-10-CM

## 2022-06-14 DIAGNOSIS — H35.372 EPIRETINAL MEMBRANE (ERM) OF LEFT EYE: ICD-10-CM

## 2022-06-14 DIAGNOSIS — E11.9 CONTROLLED TYPE 2 DIABETES MELLITUS WITHOUT COMPLICATION, WITH LONG-TERM CURRENT USE OF INSULIN: ICD-10-CM

## 2022-06-14 DIAGNOSIS — D31.31 CHOROIDAL NEVUS OF RIGHT EYE: ICD-10-CM

## 2022-06-14 DIAGNOSIS — H40.013 OPEN ANGLE WITH BORDERLINE FINDINGS AND LOW GLAUCOMA RISK IN BOTH EYES: Primary | ICD-10-CM

## 2022-06-14 PROCEDURE — 3066F PR DOCUMENTATION OF TREATMENT FOR NEPHROPATHY: ICD-10-PCS | Mod: CPTII,S$GLB,, | Performed by: OPHTHALMOLOGY

## 2022-06-14 PROCEDURE — 4010F PR ACE/ARB THEARPY RXD/TAKEN: ICD-10-PCS | Mod: CPTII,S$GLB,, | Performed by: OPHTHALMOLOGY

## 2022-06-14 PROCEDURE — 99213 PR OFFICE/OUTPT VISIT, EST, LEVL III, 20-29 MIN: ICD-10-PCS | Mod: S$GLB,,, | Performed by: OPHTHALMOLOGY

## 2022-06-14 PROCEDURE — 99213 OFFICE O/P EST LOW 20 MIN: CPT | Mod: S$GLB,,, | Performed by: OPHTHALMOLOGY

## 2022-06-14 PROCEDURE — 3044F PR MOST RECENT HEMOGLOBIN A1C LEVEL <7.0%: ICD-10-PCS | Mod: CPTII,S$GLB,, | Performed by: OPHTHALMOLOGY

## 2022-06-14 PROCEDURE — 3061F PR NEG MICROALBUMINURIA RESULT DOCUMENTED/REVIEW: ICD-10-PCS | Mod: CPTII,S$GLB,, | Performed by: OPHTHALMOLOGY

## 2022-06-14 PROCEDURE — 3061F NEG MICROALBUMINURIA REV: CPT | Mod: CPTII,S$GLB,, | Performed by: OPHTHALMOLOGY

## 2022-06-14 PROCEDURE — 3044F HG A1C LEVEL LT 7.0%: CPT | Mod: CPTII,S$GLB,, | Performed by: OPHTHALMOLOGY

## 2022-06-14 PROCEDURE — 99999 PR PBB SHADOW E&M-EST. PATIENT-LVL III: ICD-10-PCS | Mod: PBBFAC,,, | Performed by: OPHTHALMOLOGY

## 2022-06-14 PROCEDURE — 1160F RVW MEDS BY RX/DR IN RCRD: CPT | Mod: CPTII,S$GLB,, | Performed by: OPHTHALMOLOGY

## 2022-06-14 PROCEDURE — 4010F ACE/ARB THERAPY RXD/TAKEN: CPT | Mod: CPTII,S$GLB,, | Performed by: OPHTHALMOLOGY

## 2022-06-14 PROCEDURE — 1160F PR REVIEW ALL MEDS BY PRESCRIBER/CLIN PHARMACIST DOCUMENTED: ICD-10-PCS | Mod: CPTII,S$GLB,, | Performed by: OPHTHALMOLOGY

## 2022-06-14 PROCEDURE — 99999 PR PBB SHADOW E&M-EST. PATIENT-LVL III: CPT | Mod: PBBFAC,,, | Performed by: OPHTHALMOLOGY

## 2022-06-14 PROCEDURE — 3066F NEPHROPATHY DOC TX: CPT | Mod: CPTII,S$GLB,, | Performed by: OPHTHALMOLOGY

## 2022-06-14 PROCEDURE — 1159F MED LIST DOCD IN RCRD: CPT | Mod: CPTII,S$GLB,, | Performed by: OPHTHALMOLOGY

## 2022-06-14 PROCEDURE — 1159F PR MEDICATION LIST DOCUMENTED IN MEDICAL RECORD: ICD-10-PCS | Mod: CPTII,S$GLB,, | Performed by: OPHTHALMOLOGY

## 2022-06-14 RX ORDER — PIOGLITAZONEHYDROCHLORIDE 30 MG/1
30 TABLET ORAL DAILY
Qty: 90 TABLET | Refills: 0 | Status: SHIPPED | OUTPATIENT
Start: 2022-06-14 | End: 2022-08-30 | Stop reason: SDUPTHER

## 2022-06-14 NOTE — TELEPHONE ENCOUNTER
Refill Decision Note   Rigoberto Vasquez  is requesting a refill authorization.  Brief Assessment and Rationale for Refill:  Approve     Medication Therapy Plan:       Medication Reconciliation Completed: No   Comments:     No Care Gaps recommended.     Note composed:3:16 PM 06/14/2022

## 2022-06-14 NOTE — TELEPHONE ENCOUNTER
No new care gaps identified.  [facility] Embedded Care Gaps. Reference number: [id number]. 6/14/2022   7:46:20 AM CDT

## 2022-06-14 NOTE — PROGRESS NOTES
HPI     Pt in today for a 6 week follow-up. Pt states his vision is doing well.   Pt denies any ocular pain or discomfort. Compliant with drops.    1. DM since 1999   NO DBR   2. Mac hole od   3. erm od  4. Choroidal nevus od   5. HIGH MYOPE(-6)   6. SCOAG   iop 25/ 23 ou on po steroid   anamolous myopic disc   ASYMETRIC C/D 0.7 / 0.5    // 641  NO FAMILY HISTORY   HVF 3/22/2022        LATANOPROST QHS OU      Last edited by Pallavi Graves on 6/14/2022 10:13 AM. (History)            Assessment /Plan     For exam results, see Encounter Report.      ICD-10-CM ICD-9-CM    1. Open angle with borderline findings and low glaucoma risk in both eyes  H40.013 365.01 IOP lower OS on Latanoprost, OD is unchanged. gcl OD higher at 31. Will monitor at this time.    2. Controlled type 2 diabetes mellitus without complication, with long-term current use of insulin  E11.9 250.00 No dilation today, though stable at previous.   Reviewed diabetic eye precautions including excellent blood sugar control, and importance of regular follow up.     Z79.4 V58.67    3. Choroidal nevus of right eye  D31.31 224.6 Monitor with dilation   4. Epiretinal membrane (ERM) of left eye  H35.372  Monitor with dilation       Return to clinic 2-3 months         Latanoprost qd OU

## 2022-06-22 ENCOUNTER — TELEPHONE (OUTPATIENT)
Dept: PULMONOLOGY | Facility: CLINIC | Age: 72
End: 2022-06-22
Payer: MEDICARE

## 2022-06-22 NOTE — TELEPHONE ENCOUNTER
Returned call to patient. He was concerned about his ultrasound tomorrow. Informed that yes he do have to fast. He expressed understanding.

## 2022-06-23 ENCOUNTER — HOSPITAL ENCOUNTER (OUTPATIENT)
Dept: RADIOLOGY | Facility: HOSPITAL | Age: 72
Discharge: HOME | End: 2022-06-23
Attending: INTERNAL MEDICINE
Payer: MEDICARE

## 2022-06-23 DIAGNOSIS — N18.31 STAGE 3A CHRONIC KIDNEY DISEASE: ICD-10-CM

## 2022-06-23 PROCEDURE — 76770 US EXAM ABDO BACK WALL COMP: CPT | Mod: 26,,, | Performed by: RADIOLOGY

## 2022-06-23 PROCEDURE — 76770 US RETROPERITONEAL COMPLETE: ICD-10-PCS | Mod: 26,,, | Performed by: RADIOLOGY

## 2022-06-23 PROCEDURE — 76770 US EXAM ABDO BACK WALL COMP: CPT | Mod: TC,PO

## 2022-06-24 ENCOUNTER — TELEPHONE (OUTPATIENT)
Dept: UROLOGY | Facility: CLINIC | Age: 72
End: 2022-06-24
Payer: MEDICARE

## 2022-06-27 ENCOUNTER — OFFICE VISIT (OUTPATIENT)
Dept: NEPHROLOGY | Facility: CLINIC | Age: 72
End: 2022-06-27
Payer: MEDICARE

## 2022-06-27 VITALS
HEIGHT: 71 IN | HEART RATE: 83 BPM | DIASTOLIC BLOOD PRESSURE: 66 MMHG | WEIGHT: 199.06 LBS | SYSTOLIC BLOOD PRESSURE: 114 MMHG | BODY MASS INDEX: 27.87 KG/M2

## 2022-06-27 DIAGNOSIS — N18.31 STAGE 3A CHRONIC KIDNEY DISEASE: Primary | ICD-10-CM

## 2022-06-27 DIAGNOSIS — I12.9 PARENCHYMAL RENAL HYPERTENSION, STAGE 1 THROUGH STAGE 4 OR UNSPECIFIED CHRONIC KIDNEY DISEASE: ICD-10-CM

## 2022-06-27 DIAGNOSIS — N28.1 RENAL CYST: ICD-10-CM

## 2022-06-27 PROCEDURE — 3061F NEG MICROALBUMINURIA REV: CPT | Mod: CPTII,S$GLB,, | Performed by: INTERNAL MEDICINE

## 2022-06-27 PROCEDURE — 1160F PR REVIEW ALL MEDS BY PRESCRIBER/CLIN PHARMACIST DOCUMENTED: ICD-10-PCS | Mod: CPTII,S$GLB,, | Performed by: INTERNAL MEDICINE

## 2022-06-27 PROCEDURE — 3078F PR MOST RECENT DIASTOLIC BLOOD PRESSURE < 80 MM HG: ICD-10-PCS | Mod: CPTII,S$GLB,, | Performed by: INTERNAL MEDICINE

## 2022-06-27 PROCEDURE — 3066F PR DOCUMENTATION OF TREATMENT FOR NEPHROPATHY: ICD-10-PCS | Mod: CPTII,S$GLB,, | Performed by: INTERNAL MEDICINE

## 2022-06-27 PROCEDURE — 3008F BODY MASS INDEX DOCD: CPT | Mod: CPTII,S$GLB,, | Performed by: INTERNAL MEDICINE

## 2022-06-27 PROCEDURE — 1101F PR PT FALLS ASSESS DOC 0-1 FALLS W/OUT INJ PAST YR: ICD-10-PCS | Mod: CPTII,S$GLB,, | Performed by: INTERNAL MEDICINE

## 2022-06-27 PROCEDURE — 3078F DIAST BP <80 MM HG: CPT | Mod: CPTII,S$GLB,, | Performed by: INTERNAL MEDICINE

## 2022-06-27 PROCEDURE — 99214 OFFICE O/P EST MOD 30 MIN: CPT | Mod: S$GLB,,, | Performed by: INTERNAL MEDICINE

## 2022-06-27 PROCEDURE — 3061F PR NEG MICROALBUMINURIA RESULT DOCUMENTED/REVIEW: ICD-10-PCS | Mod: CPTII,S$GLB,, | Performed by: INTERNAL MEDICINE

## 2022-06-27 PROCEDURE — 1159F MED LIST DOCD IN RCRD: CPT | Mod: CPTII,S$GLB,, | Performed by: INTERNAL MEDICINE

## 2022-06-27 PROCEDURE — 3074F PR MOST RECENT SYSTOLIC BLOOD PRESSURE < 130 MM HG: ICD-10-PCS | Mod: CPTII,S$GLB,, | Performed by: INTERNAL MEDICINE

## 2022-06-27 PROCEDURE — 1101F PT FALLS ASSESS-DOCD LE1/YR: CPT | Mod: CPTII,S$GLB,, | Performed by: INTERNAL MEDICINE

## 2022-06-27 PROCEDURE — 1160F RVW MEDS BY RX/DR IN RCRD: CPT | Mod: CPTII,S$GLB,, | Performed by: INTERNAL MEDICINE

## 2022-06-27 PROCEDURE — 3288F FALL RISK ASSESSMENT DOCD: CPT | Mod: CPTII,S$GLB,, | Performed by: INTERNAL MEDICINE

## 2022-06-27 PROCEDURE — 1126F PR PAIN SEVERITY QUANTIFIED, NO PAIN PRESENT: ICD-10-PCS | Mod: CPTII,S$GLB,, | Performed by: INTERNAL MEDICINE

## 2022-06-27 PROCEDURE — 3074F SYST BP LT 130 MM HG: CPT | Mod: CPTII,S$GLB,, | Performed by: INTERNAL MEDICINE

## 2022-06-27 PROCEDURE — 99999 PR PBB SHADOW E&M-EST. PATIENT-LVL V: ICD-10-PCS | Mod: PBBFAC,,, | Performed by: INTERNAL MEDICINE

## 2022-06-27 PROCEDURE — 3044F PR MOST RECENT HEMOGLOBIN A1C LEVEL <7.0%: ICD-10-PCS | Mod: CPTII,S$GLB,, | Performed by: INTERNAL MEDICINE

## 2022-06-27 PROCEDURE — 3072F PR LOW RISK FOR RETINOPATHY: ICD-10-PCS | Mod: CPTII,S$GLB,, | Performed by: INTERNAL MEDICINE

## 2022-06-27 PROCEDURE — 4010F ACE/ARB THERAPY RXD/TAKEN: CPT | Mod: CPTII,S$GLB,, | Performed by: INTERNAL MEDICINE

## 2022-06-27 PROCEDURE — 3072F LOW RISK FOR RETINOPATHY: CPT | Mod: CPTII,S$GLB,, | Performed by: INTERNAL MEDICINE

## 2022-06-27 PROCEDURE — 3288F PR FALLS RISK ASSESSMENT DOCUMENTED: ICD-10-PCS | Mod: CPTII,S$GLB,, | Performed by: INTERNAL MEDICINE

## 2022-06-27 PROCEDURE — 99999 PR PBB SHADOW E&M-EST. PATIENT-LVL V: CPT | Mod: PBBFAC,,, | Performed by: INTERNAL MEDICINE

## 2022-06-27 PROCEDURE — 3066F NEPHROPATHY DOC TX: CPT | Mod: CPTII,S$GLB,, | Performed by: INTERNAL MEDICINE

## 2022-06-27 PROCEDURE — 4010F PR ACE/ARB THEARPY RXD/TAKEN: ICD-10-PCS | Mod: CPTII,S$GLB,, | Performed by: INTERNAL MEDICINE

## 2022-06-27 PROCEDURE — 99214 PR OFFICE/OUTPT VISIT, EST, LEVL IV, 30-39 MIN: ICD-10-PCS | Mod: S$GLB,,, | Performed by: INTERNAL MEDICINE

## 2022-06-27 PROCEDURE — 1159F PR MEDICATION LIST DOCUMENTED IN MEDICAL RECORD: ICD-10-PCS | Mod: CPTII,S$GLB,, | Performed by: INTERNAL MEDICINE

## 2022-06-27 PROCEDURE — 1126F AMNT PAIN NOTED NONE PRSNT: CPT | Mod: CPTII,S$GLB,, | Performed by: INTERNAL MEDICINE

## 2022-06-27 PROCEDURE — 3044F HG A1C LEVEL LT 7.0%: CPT | Mod: CPTII,S$GLB,, | Performed by: INTERNAL MEDICINE

## 2022-06-27 PROCEDURE — 3008F PR BODY MASS INDEX (BMI) DOCUMENTED: ICD-10-PCS | Mod: CPTII,S$GLB,, | Performed by: INTERNAL MEDICINE

## 2022-06-27 NOTE — PROGRESS NOTES
"Subjective:       Patient ID: Rigoberto Vasquez is a 71 y.o. male.    Chief Complaint: Chronic Kidney Disease    HPI    He presents to clinic today for routine follow-up.  Since his last office visit he has been doing well and has no specific or new complaints.  His laboratory studies and medications were reviewed.  All Nephrology related questions were answered to his satisfaction.      Review of Systems   Constitutional: Negative.    HENT: Negative.    Eyes: Negative.    Respiratory: Negative.    Cardiovascular: Negative.    Gastrointestinal: Negative.    Genitourinary: Negative.    Musculoskeletal: Negative.    Skin: Negative.    Neurological: Negative.          /66   Pulse 83   Ht 5' 11" (1.803 m)   Wt 90.3 kg (199 lb 1.2 oz)   BMI 27.77 kg/m²     Lab Results   Component Value Date    WBC 6.50 01/24/2022    HGB 14.6 01/24/2022    HCT 45.8 01/24/2022     (H) 01/24/2022     01/24/2022      BMP  Lab Results   Component Value Date     06/23/2022    K 4.3 06/23/2022     06/23/2022    CO2 29 06/23/2022    BUN 19 06/23/2022    CREATININE 1.3 06/23/2022    CALCIUM 9.5 06/23/2022    ANIONGAP 6 (L) 06/23/2022    ESTGFRAFRICA >60.0 06/23/2022    EGFRNONAA 55.3 (A) 06/23/2022     CMP  Sodium   Date Value Ref Range Status   06/23/2022 142 136 - 145 mmol/L Final     Potassium   Date Value Ref Range Status   06/23/2022 4.3 3.5 - 5.1 mmol/L Final     Chloride   Date Value Ref Range Status   06/23/2022 107 95 - 110 mmol/L Final     CO2   Date Value Ref Range Status   06/23/2022 29 23 - 29 mmol/L Final     Glucose   Date Value Ref Range Status   06/23/2022 113 (H) 70 - 110 mg/dL Final     BUN   Date Value Ref Range Status   06/23/2022 19 8 - 23 mg/dL Final     Creatinine   Date Value Ref Range Status   06/23/2022 1.3 0.5 - 1.4 mg/dL Final     Calcium   Date Value Ref Range Status   06/23/2022 9.5 8.7 - 10.5 mg/dL Final     Total Protein   Date Value Ref Range Status   03/03/2022 6.5 6.0 - 8.4 g/dL " Final     Albumin   Date Value Ref Range Status   06/23/2022 3.7 3.5 - 5.2 g/dL Final   05/22/2019 4.2 3.6 - 5.1 g/dL Final     Comment:     **Data from J Clin Invest 1974:53:819-828 and J Clin Endocrinol   Metab 1973 36:9759-6087. Men with clinically significant   hypogonadal symptoms and testosterone values repeatedly in the   range of the 200-300 ng/dL or less, may benefit from testosterone  treatment after adequate risk and benefits counseling.  For additional information, please refer to   http://education.Brainsway/faq/HSL784 (This link is   being provided for informational/ educational purposes only.)  This test was developed and its analytical performance   characteristics have been determined by [employer]  [facility]. It has not been cleared or approved by the [place]  Food and Drug Administration. This assay has been validated   pursuant to the CLIA regulations and is used for clinical   purposes.  @ Test Performed By:  [facility]  Tae Everett M.D., Ph.D.,   [address] [place] [id number]  [facility]  [id number]       Total Bilirubin   Date Value Ref Range Status   03/03/2022 0.5 0.1 - 1.0 mg/dL Final     Comment:     For infants and newborns, interpretation of results should be based  on gestational age, weight and in agreement with clinical  observations.    Premature Infant recommended reference ranges:  Up to 24 hours.............<8.0 mg/dL  Up to 48 hours............<12.0 mg/dL  3-5 days..................<15.0 mg/dL  6-29 days.................<15.0 mg/dL       Alkaline Phosphatase   Date Value Ref Range Status   03/03/2022 34 (L) 55 - 135 U/L Final     AST   Date Value Ref Range Status   03/03/2022 20 10 - 40 U/L Final     ALT   Date Value Ref Range Status   03/03/2022 10 10 - 44 U/L Final     Anion Gap   Date Value Ref Range Status   06/23/2022 6 (L) 8 - 16 mmol/L Final     eGFR if    Date Value Ref  Range Status   06/23/2022 >60.0 >60 mL/min/1.73 m^2 Final     eGFR if non    Date Value Ref Range Status   06/23/2022 55.3 (A) >60 mL/min/1.73 m^2 Final     Comment:     Calculation used to obtain the estimated glomerular filtration  rate (eGFR) is the CKD-EPI equation.        Current Outpatient Medications on File Prior to Visit   Medication Sig Dispense Refill    ACETAMINOPHEN (TYLENOL 8 HOUR ORAL) Take by mouth as needed.      azelastine (ASTELIN) 137 mcg (0.1 %) nasal spray 2 sprays (274 mcg total) by Nasal route 2 (two) times daily. 30 mL 12    Bifidobacterium infantis 4 mg Cap Take 1 capsule by mouth Daily.      blood sugar diagnostic (ACCU-CHEK JAXON) Strp Check BG 2-3 times daily. Accuchek jaxon strips 300 strip 3    dutasteride (AVODART) 0.5 mg capsule Take 1 capsule (0.5 mg total) by mouth once daily. 90 capsule 3    fenofibrate micronized (LOFIBRA) 200 MG Cap Take 1 capsule (200 mg total) by mouth every evening. 90 capsule 3    fluticasone propionate (FLONASE) 50 mcg/actuation nasal spray 1 spray by Each Nostril route once daily.      gabapentin (CMPD PAIN MANAGEMENT COMPOUND OINTMNENT THREE) Apply bid prn pain 100 g 11    hydrocortisone 2.5 % cream Apply topically 2 (two) times daily. 3.5 g 5    insulin detemir U-100 (LEVEMIR FLEXTOUCH U-100 INSULN) 100 unit/mL (3 mL) InPn pen 16 units in the AM and 8 units in the PM 30 mL 3    latanoprost 0.005 % ophthalmic solution Place 1 drop into both eyes once daily. 2.5 mL 6    levocetirizine (XYZAL) 5 MG tablet Take 5 mg by mouth every evening.      liraglutide 0.6 mg/0.1 mL, 18 mg/3 mL, subq PNIJ (VICTOZA 3-ALESHA) 0.6 mg/0.1 mL (18 mg/3 mL) PnIj pen INJECT 1.8 MG DAILY 27 mL 0    losartan (COZAAR) 25 MG tablet Take 1 tablet (25 mg total) by mouth once daily. 90 tablet 3    lovastatin (MEVACOR) 40 MG tablet Take 1 tablet by mouth Every evening. 90 tablet 3    metFORMIN (GLUCOPHAGE) 1000 MG tablet Take 1 tablet (1,000 mg total) by  "mouth once daily. Generic  tablet 3    multivitamin capsule Take 1 capsule by mouth once daily.      ondansetron (ZOFRAN-ODT) 8 MG TbDL Take 1 tablet (8 mg total) by mouth every 6 (six) hours as needed (nausea). 20 tablet 0    pen needle, diabetic (BD ULTRA-FINE MINI PEN NEEDLE) 31 gauge x 3/16" Ndle USE AS DIRECTED WITH INSULIN 90 each 4    pioglitazone (ACTOS) 30 MG tablet Take 1 tablet (30 mg total) by mouth once daily. 90 tablet 0    sildenafil (REVATIO) 20 mg Tab Take 1 tablet (20 mg total) by mouth 3 (three) times daily. (Patient taking differently: Take 20 mg by mouth 3 (three) times daily. Takes prn) 90 tablet 11    triamcinolone acetonide 0.1% (KENALOG) 0.1 % cream AAA bid 454 g 0    pantoprazole (PROTONIX) 40 MG tablet Take 1 tablet (40 mg total) by mouth once daily. 90 tablet 1    [DISCONTINUED] ferrous gluconate (FERGON) 324 MG tablet Take 1 tablet (324 mg total) by mouth daily with breakfast. (Patient not taking: No sig reported) 90 tablet 3     No current facility-administered medications on file prior to visit.            Objective:            Physical Exam  Constitutional:       Appearance: Normal appearance.   HENT:      Head: Normocephalic and atraumatic.   Eyes:      General: No scleral icterus.     Extraocular Movements: Extraocular movements intact.      Pupils: Pupils are equal, round, and reactive to light.   Pulmonary:      Effort: Pulmonary effort is normal.      Breath sounds: No stridor.   Musculoskeletal:      Right lower leg: No edema.      Left lower leg: No edema.   Skin:     General: Skin is warm and dry.   Neurological:      General: No focal deficit present.      Mental Status: He is alert and oriented to person, place, and time.   Psychiatric:         Mood and Affect: Mood normal.         Behavior: Behavior normal.         Assessment:       1. Stage 3a chronic kidney disease    2. 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 Renal cyst        Plan:       1. Creatinine has shown slight intrinsic fluctuation ranging between 1.3 and 1.5 for the past several years.  Most recent creatinine is 1.3.  EGFR is essentially normal for his age.  He has no evidence of proteinuria or microalbuminuria.  His urinalysis is bland.  He is on a RAAS inhibitor.    He would likely benefit from the addition of an SGLT 2 inhibitor.    2. Blood pressure is well controlled on current regimen.    3. Renal ultrasound revealed a septated cyst.  No Bosniak score was reported.  Will plan to reimage in a year.        Yoni Haile MD

## 2022-06-28 ENCOUNTER — OFFICE VISIT (OUTPATIENT)
Dept: SPORTS MEDICINE | Facility: CLINIC | Age: 72
End: 2022-06-28
Payer: MEDICARE

## 2022-06-28 ENCOUNTER — TELEPHONE (OUTPATIENT)
Dept: SPORTS MEDICINE | Facility: CLINIC | Age: 72
End: 2022-06-28

## 2022-06-28 ENCOUNTER — HOSPITAL ENCOUNTER (OUTPATIENT)
Dept: RADIOLOGY | Facility: HOSPITAL | Age: 72
Discharge: HOME | End: 2022-06-28
Attending: STUDENT IN AN ORGANIZED HEALTH CARE EDUCATION/TRAINING PROGRAM
Payer: MEDICARE

## 2022-06-28 VITALS — BODY MASS INDEX: 27.86 KG/M2 | HEIGHT: 71 IN | WEIGHT: 199 LBS

## 2022-06-28 DIAGNOSIS — M25.522 LEFT ELBOW PAIN: ICD-10-CM

## 2022-06-28 DIAGNOSIS — M77.8 TENDINITIS OF LEFT TRICEPS: ICD-10-CM

## 2022-06-28 DIAGNOSIS — M25.571 RIGHT ANKLE PAIN, UNSPECIFIED CHRONICITY: ICD-10-CM

## 2022-06-28 DIAGNOSIS — M25.571 RIGHT ANKLE PAIN, UNSPECIFIED CHRONICITY: Primary | ICD-10-CM

## 2022-06-28 DIAGNOSIS — M76.60 ACHILLES TENDON PAIN: Primary | ICD-10-CM

## 2022-06-28 PROCEDURE — 73080 XR ELBOW COMPLETE 3 VIEW LEFT: ICD-10-PCS | Mod: 26,LT,, | Performed by: RADIOLOGY

## 2022-06-28 PROCEDURE — 3008F BODY MASS INDEX DOCD: CPT | Mod: CPTII,S$GLB,, | Performed by: STUDENT IN AN ORGANIZED HEALTH CARE EDUCATION/TRAINING PROGRAM

## 2022-06-28 PROCEDURE — 1101F PT FALLS ASSESS-DOCD LE1/YR: CPT | Mod: CPTII,S$GLB,, | Performed by: STUDENT IN AN ORGANIZED HEALTH CARE EDUCATION/TRAINING PROGRAM

## 2022-06-28 PROCEDURE — 99204 PR OFFICE/OUTPT VISIT, NEW, LEVL IV, 45-59 MIN: ICD-10-PCS | Mod: S$GLB,,, | Performed by: STUDENT IN AN ORGANIZED HEALTH CARE EDUCATION/TRAINING PROGRAM

## 2022-06-28 PROCEDURE — 73610 X-RAY EXAM OF ANKLE: CPT | Mod: TC,RT

## 2022-06-28 PROCEDURE — 99204 OFFICE O/P NEW MOD 45 MIN: CPT | Mod: S$GLB,,, | Performed by: STUDENT IN AN ORGANIZED HEALTH CARE EDUCATION/TRAINING PROGRAM

## 2022-06-28 PROCEDURE — 73080 X-RAY EXAM OF ELBOW: CPT | Mod: 26,LT,, | Performed by: RADIOLOGY

## 2022-06-28 PROCEDURE — 3288F PR FALLS RISK ASSESSMENT DOCUMENTED: ICD-10-PCS | Mod: CPTII,S$GLB,, | Performed by: STUDENT IN AN ORGANIZED HEALTH CARE EDUCATION/TRAINING PROGRAM

## 2022-06-28 PROCEDURE — 1159F MED LIST DOCD IN RCRD: CPT | Mod: CPTII,S$GLB,, | Performed by: STUDENT IN AN ORGANIZED HEALTH CARE EDUCATION/TRAINING PROGRAM

## 2022-06-28 PROCEDURE — 4010F PR ACE/ARB THEARPY RXD/TAKEN: ICD-10-PCS | Mod: CPTII,S$GLB,, | Performed by: STUDENT IN AN ORGANIZED HEALTH CARE EDUCATION/TRAINING PROGRAM

## 2022-06-28 PROCEDURE — 3061F PR NEG MICROALBUMINURIA RESULT DOCUMENTED/REVIEW: ICD-10-PCS | Mod: CPTII,S$GLB,, | Performed by: STUDENT IN AN ORGANIZED HEALTH CARE EDUCATION/TRAINING PROGRAM

## 2022-06-28 PROCEDURE — 73610 X-RAY EXAM OF ANKLE: CPT | Mod: 26,RT,, | Performed by: RADIOLOGY

## 2022-06-28 PROCEDURE — 73080 X-RAY EXAM OF ELBOW: CPT | Mod: TC,LT

## 2022-06-28 PROCEDURE — 3061F NEG MICROALBUMINURIA REV: CPT | Mod: CPTII,S$GLB,, | Performed by: STUDENT IN AN ORGANIZED HEALTH CARE EDUCATION/TRAINING PROGRAM

## 2022-06-28 PROCEDURE — 3072F PR LOW RISK FOR RETINOPATHY: ICD-10-PCS | Mod: CPTII,S$GLB,, | Performed by: STUDENT IN AN ORGANIZED HEALTH CARE EDUCATION/TRAINING PROGRAM

## 2022-06-28 PROCEDURE — 1125F PR PAIN SEVERITY QUANTIFIED, PAIN PRESENT: ICD-10-PCS | Mod: CPTII,S$GLB,, | Performed by: STUDENT IN AN ORGANIZED HEALTH CARE EDUCATION/TRAINING PROGRAM

## 2022-06-28 PROCEDURE — 1125F AMNT PAIN NOTED PAIN PRSNT: CPT | Mod: CPTII,S$GLB,, | Performed by: STUDENT IN AN ORGANIZED HEALTH CARE EDUCATION/TRAINING PROGRAM

## 2022-06-28 PROCEDURE — 73610 XR ANKLE COMPLETE 3 VIEW RIGHT: ICD-10-PCS | Mod: 26,RT,, | Performed by: RADIOLOGY

## 2022-06-28 PROCEDURE — 3288F FALL RISK ASSESSMENT DOCD: CPT | Mod: CPTII,S$GLB,, | Performed by: STUDENT IN AN ORGANIZED HEALTH CARE EDUCATION/TRAINING PROGRAM

## 2022-06-28 PROCEDURE — 1101F PR PT FALLS ASSESS DOC 0-1 FALLS W/OUT INJ PAST YR: ICD-10-PCS | Mod: CPTII,S$GLB,, | Performed by: STUDENT IN AN ORGANIZED HEALTH CARE EDUCATION/TRAINING PROGRAM

## 2022-06-28 PROCEDURE — 3044F PR MOST RECENT HEMOGLOBIN A1C LEVEL <7.0%: ICD-10-PCS | Mod: CPTII,S$GLB,, | Performed by: STUDENT IN AN ORGANIZED HEALTH CARE EDUCATION/TRAINING PROGRAM

## 2022-06-28 PROCEDURE — 99999 PR PBB SHADOW E&M-EST. PATIENT-LVL IV: ICD-10-PCS | Mod: PBBFAC,,, | Performed by: STUDENT IN AN ORGANIZED HEALTH CARE EDUCATION/TRAINING PROGRAM

## 2022-06-28 PROCEDURE — 4010F ACE/ARB THERAPY RXD/TAKEN: CPT | Mod: CPTII,S$GLB,, | Performed by: STUDENT IN AN ORGANIZED HEALTH CARE EDUCATION/TRAINING PROGRAM

## 2022-06-28 PROCEDURE — 3072F LOW RISK FOR RETINOPATHY: CPT | Mod: CPTII,S$GLB,, | Performed by: STUDENT IN AN ORGANIZED HEALTH CARE EDUCATION/TRAINING PROGRAM

## 2022-06-28 PROCEDURE — 99999 PR PBB SHADOW E&M-EST. PATIENT-LVL IV: CPT | Mod: PBBFAC,,, | Performed by: STUDENT IN AN ORGANIZED HEALTH CARE EDUCATION/TRAINING PROGRAM

## 2022-06-28 PROCEDURE — 1159F PR MEDICATION LIST DOCUMENTED IN MEDICAL RECORD: ICD-10-PCS | Mod: CPTII,S$GLB,, | Performed by: STUDENT IN AN ORGANIZED HEALTH CARE EDUCATION/TRAINING PROGRAM

## 2022-06-28 PROCEDURE — 3044F HG A1C LEVEL LT 7.0%: CPT | Mod: CPTII,S$GLB,, | Performed by: STUDENT IN AN ORGANIZED HEALTH CARE EDUCATION/TRAINING PROGRAM

## 2022-06-28 PROCEDURE — 3066F NEPHROPATHY DOC TX: CPT | Mod: CPTII,S$GLB,, | Performed by: STUDENT IN AN ORGANIZED HEALTH CARE EDUCATION/TRAINING PROGRAM

## 2022-06-28 PROCEDURE — 3066F PR DOCUMENTATION OF TREATMENT FOR NEPHROPATHY: ICD-10-PCS | Mod: CPTII,S$GLB,, | Performed by: STUDENT IN AN ORGANIZED HEALTH CARE EDUCATION/TRAINING PROGRAM

## 2022-06-28 PROCEDURE — 3008F PR BODY MASS INDEX (BMI) DOCUMENTED: ICD-10-PCS | Mod: CPTII,S$GLB,, | Performed by: STUDENT IN AN ORGANIZED HEALTH CARE EDUCATION/TRAINING PROGRAM

## 2022-06-28 NOTE — PROGRESS NOTES
Patient ID: Rigoberto Vasquez  YOB: 1950  MRN: [id number]    Chief Complaint: Pain of the Right Foot      Referred By: Self for Right foot pain    History of Present Illness: Rigoberto Vasquez is a left-hand dominant 71 y.o. male who presents today with right heel pain. Patient states that he has had right heel / foot pain for the last 3 months.  He states that he doesn't recall a specific JENNIFER.  He states that the pain is a constant sharp, burning and sometimes stabbing pain.  He states that the majority of the pain is at the posterior calcaneus region.  He reports that the pain is better in the morning and that a brace that he wears tends to help the pain.  He has tried a compounding cream on the area but it has not helped.  He states that as the day goes on the pain gets worse and prolonged inactivity and then going to be active causes an increase in pain.      The patient is active in none.  Occupation: Retired    Past Medical History:   Past Medical History:   Diagnosis Date    Acid reflux     [facility]    Angina pectoris     Back pain     BPH (benign prostatic hyperplasia)     blue    Cholesteatoma     Degenerative joint disease     Diverticulosis     Erectile dysfunction     History of elevated PSA 2/14    normalized, dr dave annually    History of pericarditis     Hypercholesteremia     Hypertension     Iron deficiency anemia     СВЕТЛАНА (obstructive sleep apnea) 5/17/2022    Pacemaker     complete av block;NO afib    Renal disorder     Squamous cell cancer of skin of mastoid region of scalp     dr jaida salgado    Type 2 diabetes mellitus     dr wyman    Urinary incontinence     when he was 7 Yrs old.      Past Surgical History:   Procedure Laterality Date    CARDIAC PACEMAKER PLACEMENT      COLONOSCOPY N/A 9/13/2019    Procedure: COLONOSCOPY;  Surgeon: Kna Ramsey III, MD;  Location: [facility];  Service: Endoscopy;  Laterality: N/A;    ESOPHAGOGASTRODUODENOSCOPY N/A  "9/13/2019    Procedure: ESOPHAGOGASTRODUODENOSCOPY (EGD);  Surgeon: Kan Ramsey III, MD;  Location: [facility] ENDO;  Service: Endoscopy;  Laterality: N/A;    ESOPHAGOGASTRODUODENOSCOPY N/A 9/3/2021    Procedure: EGD (ESOPHAGOGASTRODUODENOSCOPY);  Surgeon: Kaylene Pineda MD;  Location: [facility] ENDO;  Service: Endoscopy;  Laterality: N/A;    JOINT REPLACEMENT      KNEE CARTILAGE SURGERY Bilateral     NASAL SINUS SURGERY      SHOULDER ARTHROSCOPY Right     TONSILLECTOMY      TOTAL KNEE ARTHROPLASTY Left 05/15/2017    TRANSURETHRAL RESECTION OF PROSTATE      TYMPANOPLASTY      vesectomy       Family History   Problem Relation Age of Onset    Arthritis Mother     Hypertension Mother     Heart disease Father     Hypertension Father     Stroke Father     Diabetes Son     Diabetes Maternal Grandmother     Colon cancer Paternal Grandmother      Social History     Socioeconomic History    Marital status:      Spouse name: SAUL    Number of children: 2   Occupational History    Occupation: retired- [employer] .   Tobacco Use    Smoking status: Never Smoker    Smokeless tobacco: Never Used   Substance and Sexual Activity    Alcohol use: Yes     Comment: " once a month"    Drug use: No    Sexual activity: Yes     Partners: Female   Social History Narrative     . Lives with spouse. Has 2 children. Patient retired from [employer]. His son has type 1 diabetes.     Social Determinants of Health     Financial Resource Strain: Low Risk     Difficulty of Paying Living Expenses: Not hard at all   Food Insecurity: No Food Insecurity    Worried About Running Out of Food in the Last Year: Never true    Ran Out of Food in the Last Year: Never true   Transportation Needs: No Transportation Needs    Lack of Transportation (Medical): No    Lack of Transportation (Non-Medical): No   Physical Activity: Sufficiently Active    Days of Exercise per Week: 5 days    Minutes of " Exercise per Session: 60 min   Stress: No Stress Concern Present    Feeling of Stress : Not at all   Social Connections: Moderately Integrated    Frequency of Communication with Friends and Family: Three times a week    Frequency of Social Gatherings with Friends and Family: Twice a week    Attends Orthodox Services: 1 to 4 times per year    Active Member of Clubs or Organizations: No    Attends Club or Organization Meetings: Never    Marital Status:    Housing Stability: Low Risk     Unable to Pay for Housing in the Last Year: No    Number of Places Lived in the Last Year: 1    Unstable Housing in the Last Year: No     Medication List with Changes/Refills   Current Medications    ACETAMINOPHEN (TYLENOL 8 HOUR ORAL)    Take by mouth as needed.    AZELASTINE (ASTELIN) 137 MCG (0.1 %) NASAL SPRAY    2 sprays (274 mcg total) by Nasal route 2 (two) times daily.    BIFIDOBACTERIUM INFANTIS 4 MG CAP    Take 1 capsule by mouth Daily.    BLOOD SUGAR DIAGNOSTIC (ACCU-CHEK JAXON) STRP    Check BG 2-3 times daily. Accuchek jaxon strips    DUTASTERIDE (AVODART) 0.5 MG CAPSULE    Take 1 capsule (0.5 mg total) by mouth once daily.    FENOFIBRATE MICRONIZED (LOFIBRA) 200 MG CAP    Take 1 capsule (200 mg total) by mouth every evening.    FLUTICASONE PROPIONATE (FLONASE) 50 MCG/ACTUATION NASAL SPRAY    1 spray by Each Nostril route once daily.    GABAPENTIN (CMPD PAIN MANAGEMENT COMPOUND OINTMNENT THREE)    Apply bid prn pain    HYDROCORTISONE 2.5 % CREAM    Apply topically 2 (two) times daily.    INSULIN DETEMIR U-100 (LEVEMIR FLEXTOUCH U-100 INSULN) 100 UNIT/ML (3 ML) INPN PEN    16 units in the AM and 8 units in the PM    LATANOPROST 0.005 % OPHTHALMIC SOLUTION    Place 1 drop into both eyes once daily.    LEVOCETIRIZINE (XYZAL) 5 MG TABLET    Take 5 mg by mouth every evening.    LIRAGLUTIDE 0.6 MG/0.1 ML, 18 MG/3 ML, SUBQ PNIJ (VICTOZA 3-ALESHA) 0.6 MG/0.1 ML (18 MG/3 ML) PNIJ PEN    INJECT 1.8 MG DAILY     "LOSARTAN (COZAAR) 25 MG TABLET    Take 1 tablet (25 mg total) by mouth once daily.    LOVASTATIN (MEVACOR) 40 MG TABLET    Take 1 tablet by mouth Every evening.    METFORMIN (GLUCOPHAGE) 1000 MG TABLET    Take 1 tablet (1,000 mg total) by mouth once daily. Generic OK    MULTIVITAMIN CAPSULE    Take 1 capsule by mouth once daily.    ONDANSETRON (ZOFRAN-ODT) 8 MG TBDL    Take 1 tablet (8 mg total) by mouth every 6 (six) hours as needed (nausea).    PANTOPRAZOLE (PROTONIX) 40 MG TABLET    Take 1 tablet (40 mg total) by mouth once daily.    PEN NEEDLE, DIABETIC (BD ULTRA-FINE MINI PEN NEEDLE) 31 GAUGE X 3/16" NDLE    USE AS DIRECTED WITH INSULIN    PIOGLITAZONE (ACTOS) 30 MG TABLET    Take 1 tablet (30 mg total) by mouth once daily.    SILDENAFIL (REVATIO) 20 MG TAB    Take 1 tablet (20 mg total) by mouth 3 (three) times daily.    TRIAMCINOLONE ACETONIDE 0.1% (KENALOG) 0.1 % CREAM    AAA bid     Review of patient's allergies indicates:   Allergen Reactions    Aspirin      Stomach pain even with ppi    Meperidine      Other reaction(s): Stomach upset    Niacin      Other reaction(s): hot skin       Physical Exam:   Body mass index is 27.75 kg/m².    GENERAL: Well appearing, in no acute distress.  HEAD: Normocephalic and atraumatic.  ENT: External ears and nose grossly normal.  EYES: EOMI bilaterally  PULMONARY: Respirations are grossly even and non-labored.  NEURO: Awake, alert, and oriented x 3.  SKIN: No obvious rashes appreciated.  PSYCH: Mood & affect are appropriate.    Detailed MSK exam:   Right ankle exam  Swelling appreciated the insertional Achilles tendon of the right ankle and inability to do a single E leg or double leg heel raise good angle ankle range of motion noted otherwise no tenderness throughout the rest of the ankle no tenderness in the midsubstance Achilles no pain with resisted ankle ranges of motion some impingement signs appreciated with passive ankle dorsiflexion and plantar flexion with " impingement of Kager's fat pad.  Equivocal calcaneus squeeze    Left elbow exam  Mild tenderness over the insertional triceps pain with chair lift-off as well as resisted triceps extension no tenderness over lateral epicondyle no pain with resisted wrist extension 3rd digit extension resisted supination  Imaging:    Narrative & Impression  EXAMINATION:  XR ANKLE COMPLETE 3 VIEW RIGHT     CLINICAL HISTORY:  . Pain in right ankle and joints of right foot     TECHNIQUE:  Three views of the right ankle     COMPARISON:  None     FINDINGS:  No significant osseous, articular, or soft tissue abnormality is demonstrated.     Impression:     As above.        Electronically signed by: DENISHA Garcia MD  Date:                                            06/28/2022  Time:                                           11:05    Relevant imaging results were reviewed and interpreted by me and per my read as above.  This was discussed with the patient and / or family today.     Assessment:  Rigoberto Vasquez is a 71 y.o. male presenting today for insertional Achilles tendinopathy of the right ankle as well as left elbow pain most consistent with insertional triceps tendinitis.    Right Achilles tendinopathy  No previous treatment has been trying any compound medicine is used for arthritis with hands in the past.  Not very helpful at this time.  Is not walking with a limp currently pain is usually worse as the day progresses.  Does have weakness and pain with heel raising today.  Discussed heel lifts potential boot if still having issues for immobilization over short time and formal therapy focusing on eccentric.  We will get him in to [facility] at [place].  Follow-up with me in 6-8 weeks.      X-ray left elbow today discussed light weight and slow work back into triceps extensions as tolerated consider diagnostic ultrasound at follow-up if indicated.    Achilles tendon pain  -     Ambulatory referral/consult to Physical/Occupational  Therapy; Future; Expected date: 07/05/2022    Right ankle pain, unspecified chronicity  -     Ambulatory referral/consult to Physical/Occupational Therapy; Future; Expected date: 07/05/2022    Left elbow pain  -     X-Ray Elbow Complete Left; Future; Expected date: 06/28/2022    Tendinitis of left triceps         A copy of today's visit note has been sent to the referring provider.       Vaibhav Trejo MD    Disclaimer: This note was prepared using a voice recognition system and is likely to have sound alike errors within the text.

## 2022-06-28 NOTE — PATIENT INSTRUCTIONS
Assessment:  Rigoberto Vasquez is a 71 y.o. male   Chief Complaint   Patient presents with    Right Foot - Pain       Encounter Diagnoses   Name Primary?    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  Left elbow xray on way out   Follow up in 8 weeks    Follow-up:  8 weeks or sooner if there are any problems between now and then.    Thank you for choosing [facility] and Dr. Vaibhav Trejo for your orthopedic & sports medicine care. It is our goal to provide you with exceptional care that will help keep you healthy, active, and get you back in the game.    Please do not hesitate to reach out to us via email, phone, or MyChart with any questions, concerns, or feedback.    If you felt that you received exemplary care today, please consider leaving us feedback on [employer] at:  [email or web address]    If you are experiencing pain/discomfort ,or have questions after 5pm and would like to be connected to the [facility] on-call team, please call this number and specify which Sports Medicine provider is treating you: [phone]

## 2022-06-28 NOTE — TELEPHONE ENCOUNTER
Faxed and received email confirmation of receipt for PT referral to [facility] in [place].

## 2022-06-30 ENCOUNTER — HOSPITAL ENCOUNTER (OUTPATIENT)
Dept: RADIOLOGY | Facility: HOSPITAL | Age: 72
Discharge: HOME | End: 2022-06-30
Attending: OTOLARYNGOLOGY
Payer: MEDICARE

## 2022-06-30 ENCOUNTER — OFFICE VISIT (OUTPATIENT)
Dept: OTOLARYNGOLOGY | Facility: CLINIC | Age: 72
End: 2022-06-30
Payer: MEDICARE

## 2022-06-30 VITALS — SYSTOLIC BLOOD PRESSURE: 118 MMHG | TEMPERATURE: 98 F | HEART RATE: 65 BPM | DIASTOLIC BLOOD PRESSURE: 70 MMHG

## 2022-06-30 DIAGNOSIS — H71.92 CHOLESTEATOMA OF EAR, LEFT: ICD-10-CM

## 2022-06-30 DIAGNOSIS — H90.A32 MIXED CONDUCTIVE AND SENSORINEURAL HEARING LOSS OF LEFT EAR WITH RESTRICTED HEARING OF RIGHT EAR: ICD-10-CM

## 2022-06-30 DIAGNOSIS — H71.92 CHOLESTEATOMA OF EAR, LEFT: Primary | ICD-10-CM

## 2022-06-30 PROCEDURE — 3072F PR LOW RISK FOR RETINOPATHY: ICD-10-PCS | Mod: CPTII,S$GLB,, | Performed by: OTOLARYNGOLOGY

## 2022-06-30 PROCEDURE — 1159F MED LIST DOCD IN RCRD: CPT | Mod: CPTII,S$GLB,, | Performed by: OTOLARYNGOLOGY

## 2022-06-30 PROCEDURE — 3078F DIAST BP <80 MM HG: CPT | Mod: CPTII,S$GLB,, | Performed by: OTOLARYNGOLOGY

## 2022-06-30 PROCEDURE — 1101F PT FALLS ASSESS-DOCD LE1/YR: CPT | Mod: CPTII,S$GLB,, | Performed by: OTOLARYNGOLOGY

## 2022-06-30 PROCEDURE — 99213 OFFICE O/P EST LOW 20 MIN: CPT | Mod: S$GLB,,, | Performed by: OTOLARYNGOLOGY

## 2022-06-30 PROCEDURE — 70480 CT ORBIT/EAR/FOSSA W/O DYE: CPT | Mod: 26,,, | Performed by: RADIOLOGY

## 2022-06-30 PROCEDURE — 3288F FALL RISK ASSESSMENT DOCD: CPT | Mod: CPTII,S$GLB,, | Performed by: OTOLARYNGOLOGY

## 2022-06-30 PROCEDURE — 99999 PR PBB SHADOW E&M-EST. PATIENT-LVL III: ICD-10-PCS | Mod: PBBFAC,,, | Performed by: OTOLARYNGOLOGY

## 2022-06-30 PROCEDURE — 1126F AMNT PAIN NOTED NONE PRSNT: CPT | Mod: CPTII,S$GLB,, | Performed by: OTOLARYNGOLOGY

## 2022-06-30 PROCEDURE — 3061F NEG MICROALBUMINURIA REV: CPT | Mod: CPTII,S$GLB,, | Performed by: OTOLARYNGOLOGY

## 2022-06-30 PROCEDURE — 3074F SYST BP LT 130 MM HG: CPT | Mod: CPTII,S$GLB,, | Performed by: OTOLARYNGOLOGY

## 2022-06-30 PROCEDURE — 4010F ACE/ARB THERAPY RXD/TAKEN: CPT | Mod: CPTII,S$GLB,, | Performed by: OTOLARYNGOLOGY

## 2022-06-30 PROCEDURE — 3072F LOW RISK FOR RETINOPATHY: CPT | Mod: CPTII,S$GLB,, | Performed by: OTOLARYNGOLOGY

## 2022-06-30 PROCEDURE — 3044F HG A1C LEVEL LT 7.0%: CPT | Mod: CPTII,S$GLB,, | Performed by: OTOLARYNGOLOGY

## 2022-06-30 PROCEDURE — 3061F PR NEG MICROALBUMINURIA RESULT DOCUMENTED/REVIEW: ICD-10-PCS | Mod: CPTII,S$GLB,, | Performed by: OTOLARYNGOLOGY

## 2022-06-30 PROCEDURE — 3288F PR FALLS RISK ASSESSMENT DOCUMENTED: ICD-10-PCS | Mod: CPTII,S$GLB,, | Performed by: OTOLARYNGOLOGY

## 2022-06-30 PROCEDURE — 99999 PR PBB SHADOW E&M-EST. PATIENT-LVL III: CPT | Mod: PBBFAC,,, | Performed by: OTOLARYNGOLOGY

## 2022-06-30 PROCEDURE — 70480 CT ORBIT/EAR/FOSSA W/O DYE: CPT | Mod: TC

## 2022-06-30 PROCEDURE — 70480 CT TEMPORAL BONE WITHOUT CONTRAST: ICD-10-PCS | Mod: 26,,, | Performed by: RADIOLOGY

## 2022-06-30 PROCEDURE — 4010F PR ACE/ARB THEARPY RXD/TAKEN: ICD-10-PCS | Mod: CPTII,S$GLB,, | Performed by: OTOLARYNGOLOGY

## 2022-06-30 PROCEDURE — 1101F PR PT FALLS ASSESS DOC 0-1 FALLS W/OUT INJ PAST YR: ICD-10-PCS | Mod: CPTII,S$GLB,, | Performed by: OTOLARYNGOLOGY

## 2022-06-30 PROCEDURE — 3066F NEPHROPATHY DOC TX: CPT | Mod: CPTII,S$GLB,, | Performed by: OTOLARYNGOLOGY

## 2022-06-30 PROCEDURE — 1126F PR PAIN SEVERITY QUANTIFIED, NO PAIN PRESENT: ICD-10-PCS | Mod: CPTII,S$GLB,, | Performed by: OTOLARYNGOLOGY

## 2022-06-30 PROCEDURE — 3044F PR MOST RECENT HEMOGLOBIN A1C LEVEL <7.0%: ICD-10-PCS | Mod: CPTII,S$GLB,, | Performed by: OTOLARYNGOLOGY

## 2022-06-30 PROCEDURE — 1159F PR MEDICATION LIST DOCUMENTED IN MEDICAL RECORD: ICD-10-PCS | Mod: CPTII,S$GLB,, | Performed by: OTOLARYNGOLOGY

## 2022-06-30 PROCEDURE — 99213 PR OFFICE/OUTPT VISIT, EST, LEVL III, 20-29 MIN: ICD-10-PCS | Mod: S$GLB,,, | Performed by: OTOLARYNGOLOGY

## 2022-06-30 PROCEDURE — 3066F PR DOCUMENTATION OF TREATMENT FOR NEPHROPATHY: ICD-10-PCS | Mod: CPTII,S$GLB,, | Performed by: OTOLARYNGOLOGY

## 2022-06-30 PROCEDURE — 3074F PR MOST RECENT SYSTOLIC BLOOD PRESSURE < 130 MM HG: ICD-10-PCS | Mod: CPTII,S$GLB,, | Performed by: OTOLARYNGOLOGY

## 2022-06-30 PROCEDURE — 3078F PR MOST RECENT DIASTOLIC BLOOD PRESSURE < 80 MM HG: ICD-10-PCS | Mod: CPTII,S$GLB,, | Performed by: OTOLARYNGOLOGY

## 2022-07-01 NOTE — PROGRESS NOTES
Subjective:       Patient ID: Rigoberto Vasquez is a 71 y.o. male.    Chief Complaint: Follow-up (Review CT results)    Follow-up  Pertinent negatives include no chest pain, chills, fever or sore throat.          Rigoberto Vasquez is a 71 y.o. male presents for evaluation of possible left ear cholesteatoma.  He has a history of left CWU in 2015 with cartilage grafting fo the tympanic membrane.  He mainly has complaints regarding his right ear however.  He reports right ear fullness and fluctuating hearing loss.  He feeels his left ear has been stable symptom wise and e has not had any otorrhea.        6/30/22:   Here for 4 month checkup.  No change in symptoms.  Denies any otorrhea.  Complains mainly of right ear fullness.    Review of Systems   Constitutional: Negative for chills and fever.   HENT: Positive for ear pain and hearing loss. Negative for sore throat and trouble swallowing.    Respiratory: Negative for apnea and chest tightness.    Cardiovascular: Negative for chest pain.         Objective:      Physical Exam  Constitutional:       Appearance: Normal appearance.   HENT:      Head: Normocephalic and atraumatic.      Right Ear: External ear normal.      Left Ear: External ear normal.   Neurological:      Mental Status: He is alert.       Binocular Microscopy  Indications: perforation, pre op planning  Details: binocular microscopy used to examine both ears  Findings  AD: narrow EAC, posterior atelectatic TM with myringostapediopexy, prior perforation has healed   AS: cartilage graft to posterior TM with small perforation along posterior annulus.  No discharge.    CT temporal bones image independently reviewed by me and show: soft tissue density left middle ear otherwise normal scan.  Abnormality is rectangular in nature and likely represents cartilage given its appearance.  This is unchanged from prior CT 4 mo ago.           Audiogram tracings independently reviewed and discussed with patient    Assessment:        Problem List Items Addressed This Visit    None         Plan:         In summary this is a pleasant 71 y.o.with history of AS ear surgery.  Recent exam shows lateralized cartilage graft and perforation of left ear.  No active otorrhea  -offered middle ear exploration.  Declined at this time   -repeat CT is stable  - f/u as needed

## 2022-07-07 ENCOUNTER — PATIENT MESSAGE (OUTPATIENT)
Dept: ORTHOPEDICS | Facility: CLINIC | Age: 72
End: 2022-07-07
Payer: MEDICARE

## 2022-07-11 ENCOUNTER — PATIENT MESSAGE (OUTPATIENT)
Dept: OTOLARYNGOLOGY | Facility: CLINIC | Age: 72
End: 2022-07-11
Payer: MEDICARE

## 2022-07-12 RX ORDER — AZELASTINE 1 MG/ML
2 SPRAY, METERED NASAL 2 TIMES DAILY
Qty: 30 ML | Refills: 12 | Status: SHIPPED | OUTPATIENT
Start: 2022-07-12 | End: 2023-08-10 | Stop reason: SDUPTHER

## 2022-07-26 ENCOUNTER — OFFICE VISIT (OUTPATIENT)
Dept: PULMONOLOGY | Facility: CLINIC | Age: 72
End: 2022-07-26
Payer: MEDICARE

## 2022-07-26 ENCOUNTER — CLINICAL SUPPORT (OUTPATIENT)
Dept: PULMONOLOGY | Facility: CLINIC | Age: 72
End: 2022-07-26
Payer: MEDICARE

## 2022-07-26 VITALS
SYSTOLIC BLOOD PRESSURE: 116 MMHG | HEART RATE: 72 BPM | BODY MASS INDEX: 27.26 KG/M2 | WEIGHT: 194.69 LBS | OXYGEN SATURATION: 97 % | DIASTOLIC BLOOD PRESSURE: 74 MMHG | RESPIRATION RATE: 18 BRPM | HEIGHT: 71 IN

## 2022-07-26 DIAGNOSIS — E11.59 HYPERTENSION ASSOCIATED WITH DIABETES: Chronic | ICD-10-CM

## 2022-07-26 DIAGNOSIS — E78.5 HYPERLIPIDEMIA ASSOCIATED WITH TYPE 2 DIABETES MELLITUS: Chronic | ICD-10-CM

## 2022-07-26 DIAGNOSIS — I15.2 HYPERTENSION ASSOCIATED WITH DIABETES: Chronic | ICD-10-CM

## 2022-07-26 DIAGNOSIS — R06.00 NOCTURNAL DYSPNEA: ICD-10-CM

## 2022-07-26 DIAGNOSIS — E11.69 HYPERLIPIDEMIA ASSOCIATED WITH TYPE 2 DIABETES MELLITUS: Chronic | ICD-10-CM

## 2022-07-26 DIAGNOSIS — G47.33 OSA ON CPAP: ICD-10-CM

## 2022-07-26 DIAGNOSIS — Z79.4 TYPE 2 DIABETES MELLITUS WITH STAGE 3A CHRONIC KIDNEY DISEASE, WITH LONG-TERM CURRENT USE OF INSULIN: ICD-10-CM

## 2022-07-26 DIAGNOSIS — E11.22 TYPE 2 DIABETES MELLITUS WITH STAGE 3A CHRONIC KIDNEY DISEASE, WITH LONG-TERM CURRENT USE OF INSULIN: ICD-10-CM

## 2022-07-26 DIAGNOSIS — G47.33 OSA ON CPAP: Primary | ICD-10-CM

## 2022-07-26 DIAGNOSIS — N18.31 TYPE 2 DIABETES MELLITUS WITH STAGE 3A CHRONIC KIDNEY DISEASE, WITH LONG-TERM CURRENT USE OF INSULIN: ICD-10-CM

## 2022-07-26 DIAGNOSIS — J84.10 LUNG GRANULOMA: ICD-10-CM

## 2022-07-26 PROCEDURE — 3008F BODY MASS INDEX DOCD: CPT | Mod: CPTII,S$GLB,, | Performed by: INTERNAL MEDICINE

## 2022-07-26 PROCEDURE — 3074F SYST BP LT 130 MM HG: CPT | Mod: CPTII,S$GLB,, | Performed by: INTERNAL MEDICINE

## 2022-07-26 PROCEDURE — 1160F RVW MEDS BY RX/DR IN RCRD: CPT | Mod: CPTII,S$GLB,, | Performed by: INTERNAL MEDICINE

## 2022-07-26 PROCEDURE — 3078F DIAST BP <80 MM HG: CPT | Mod: CPTII,S$GLB,, | Performed by: INTERNAL MEDICINE

## 2022-07-26 PROCEDURE — 1160F PR REVIEW ALL MEDS BY PRESCRIBER/CLIN PHARMACIST DOCUMENTED: ICD-10-PCS | Mod: CPTII,S$GLB,, | Performed by: INTERNAL MEDICINE

## 2022-07-26 PROCEDURE — 3044F PR MOST RECENT HEMOGLOBIN A1C LEVEL <7.0%: ICD-10-PCS | Mod: CPTII,S$GLB,, | Performed by: INTERNAL MEDICINE

## 2022-07-26 PROCEDURE — 99999 PR PBB SHADOW E&M-EST. PATIENT-LVL I: CPT | Mod: PBBFAC,,,

## 2022-07-26 PROCEDURE — 3078F PR MOST RECENT DIASTOLIC BLOOD PRESSURE < 80 MM HG: ICD-10-PCS | Mod: CPTII,S$GLB,, | Performed by: INTERNAL MEDICINE

## 2022-07-26 PROCEDURE — 94762 N-INVAS EAR/PLS OXIMTRY CONT: CPT | Mod: S$GLB,,, | Performed by: INTERNAL MEDICINE

## 2022-07-26 PROCEDURE — 3066F NEPHROPATHY DOC TX: CPT | Mod: CPTII,S$GLB,, | Performed by: INTERNAL MEDICINE

## 2022-07-26 PROCEDURE — 99214 OFFICE O/P EST MOD 30 MIN: CPT | Mod: 25,S$GLB,, | Performed by: INTERNAL MEDICINE

## 2022-07-26 PROCEDURE — 3074F PR MOST RECENT SYSTOLIC BLOOD PRESSURE < 130 MM HG: ICD-10-PCS | Mod: CPTII,S$GLB,, | Performed by: INTERNAL MEDICINE

## 2022-07-26 PROCEDURE — 99999 PR PBB SHADOW E&M-EST. PATIENT-LVL V: CPT | Mod: PBBFAC,,, | Performed by: INTERNAL MEDICINE

## 2022-07-26 PROCEDURE — 3061F PR NEG MICROALBUMINURIA RESULT DOCUMENTED/REVIEW: ICD-10-PCS | Mod: CPTII,S$GLB,, | Performed by: INTERNAL MEDICINE

## 2022-07-26 PROCEDURE — 3288F PR FALLS RISK ASSESSMENT DOCUMENTED: ICD-10-PCS | Mod: CPTII,S$GLB,, | Performed by: INTERNAL MEDICINE

## 2022-07-26 PROCEDURE — 3008F PR BODY MASS INDEX (BMI) DOCUMENTED: ICD-10-PCS | Mod: CPTII,S$GLB,, | Performed by: INTERNAL MEDICINE

## 2022-07-26 PROCEDURE — 1101F PT FALLS ASSESS-DOCD LE1/YR: CPT | Mod: CPTII,S$GLB,, | Performed by: INTERNAL MEDICINE

## 2022-07-26 PROCEDURE — 3072F PR LOW RISK FOR RETINOPATHY: ICD-10-PCS | Mod: CPTII,S$GLB,, | Performed by: INTERNAL MEDICINE

## 2022-07-26 PROCEDURE — 4010F ACE/ARB THERAPY RXD/TAKEN: CPT | Mod: CPTII,S$GLB,, | Performed by: INTERNAL MEDICINE

## 2022-07-26 PROCEDURE — 99499 UNLISTED E&M SERVICE: CPT | Mod: S$GLB,,, | Performed by: INTERNAL MEDICINE

## 2022-07-26 PROCEDURE — 94762 PR NONINVASV OXYGEN SATUR, CONT: ICD-10-PCS | Mod: S$GLB,,, | Performed by: INTERNAL MEDICINE

## 2022-07-26 PROCEDURE — 3288F FALL RISK ASSESSMENT DOCD: CPT | Mod: CPTII,S$GLB,, | Performed by: INTERNAL MEDICINE

## 2022-07-26 PROCEDURE — 99499 RISK ADDL DX/OHS AUDIT: ICD-10-PCS | Mod: S$GLB,,, | Performed by: INTERNAL MEDICINE

## 2022-07-26 PROCEDURE — 3061F NEG MICROALBUMINURIA REV: CPT | Mod: CPTII,S$GLB,, | Performed by: INTERNAL MEDICINE

## 2022-07-26 PROCEDURE — 3066F PR DOCUMENTATION OF TREATMENT FOR NEPHROPATHY: ICD-10-PCS | Mod: CPTII,S$GLB,, | Performed by: INTERNAL MEDICINE

## 2022-07-26 PROCEDURE — 4010F PR ACE/ARB THEARPY RXD/TAKEN: ICD-10-PCS | Mod: CPTII,S$GLB,, | Performed by: INTERNAL MEDICINE

## 2022-07-26 PROCEDURE — 99214 PR OFFICE/OUTPT VISIT, EST, LEVL IV, 30-39 MIN: ICD-10-PCS | Mod: 25,S$GLB,, | Performed by: INTERNAL MEDICINE

## 2022-07-26 PROCEDURE — 99999 PR PBB SHADOW E&M-EST. PATIENT-LVL V: ICD-10-PCS | Mod: PBBFAC,,, | Performed by: INTERNAL MEDICINE

## 2022-07-26 PROCEDURE — 99999 PR PBB SHADOW E&M-EST. PATIENT-LVL I: ICD-10-PCS | Mod: PBBFAC,,,

## 2022-07-26 PROCEDURE — 1159F MED LIST DOCD IN RCRD: CPT | Mod: CPTII,S$GLB,, | Performed by: INTERNAL MEDICINE

## 2022-07-26 PROCEDURE — 1159F PR MEDICATION LIST DOCUMENTED IN MEDICAL RECORD: ICD-10-PCS | Mod: CPTII,S$GLB,, | Performed by: INTERNAL MEDICINE

## 2022-07-26 PROCEDURE — 1101F PR PT FALLS ASSESS DOC 0-1 FALLS W/OUT INJ PAST YR: ICD-10-PCS | Mod: CPTII,S$GLB,, | Performed by: INTERNAL MEDICINE

## 2022-07-26 PROCEDURE — 3072F LOW RISK FOR RETINOPATHY: CPT | Mod: CPTII,S$GLB,, | Performed by: INTERNAL MEDICINE

## 2022-07-26 PROCEDURE — 3044F HG A1C LEVEL LT 7.0%: CPT | Mod: CPTII,S$GLB,, | Performed by: INTERNAL MEDICINE

## 2022-07-26 NOTE — PROGRESS NOTES
No retinal tears or retinal detachment seen on clinical exam today. Reviewed the signs and symptoms of retinal tear/retinal detachment and the importance of calling for prompt evaluation should there be increasing floaters, new flashing lights, or decreasing peripheral vision in either eye at any time. Observation recommended. Patient issued monitor with instructions.   [id number]

## 2022-07-26 NOTE — PROGRESS NOTES
Subjective:      Patient ID: Rigoberto Vasquez is a 71 y.o. male.    Patient Active Problem List   Diagnosis    Osteoarthritis of knees, bilateral    Hypertension associated with diabetes    Type 2 diabetes mellitus with stage 3a chronic kidney disease, with long-term current use of insulin    Hyperlipidemia associated with type 2 diabetes mellitus    Macular hole    Open angle with borderline findings, low risk    High myopia    Optic disc anomaly    CHANDRAKANT (iron deficiency anemia)    Pacemaker    Conductive hearing loss in left ear    Choroidal nevus of right eye    Epiretinal membrane (ERM) of left eye    Overweight (BMI 25.0-29.9)    Macular hole of right eye    Open angle with borderline findings and low glaucoma risk in both eyes    Chronic LUQ pain    Nausea    Ectopic gastric mucosa    Open angle with borderline findings and high glaucoma risk in both eyes    History of skin cancer    History of heart block;AV block, 3rd degree    Benign prostatic hyperplasia (BPH) with straining on urination    Erectile dysfunction    Lung granuloma    Dyspepsia    Calcification of aorta    Cholesteatoma    СВЕТЛАНА on CPAP    Nocturnal dyspnea       he has been referred by No ref. provider found for evaluation and management for   Chief Complaint   Patient presents with    Sleep Apnea       Chief Complaint: Sleep Apnea      HPI:  He presents for СВЕТЛАНА with review 2/10/2022 Home Sleep Study mild obstructive sleep apnea AHI 12.1.   Patient symptomatic for obstructive sleep apnea with feeling tired and fatigued and snoring.   Patient reports somewhat restful sleep.  He denies morning headache.   He reports day time napping; duration 1 Hour  He denies recent weight gain.  Cardiovascular risk factors: diabetes, hypertension, hyperlipidemia and coronary artery disease  Bed time is 0930  Wake time is 0600 - 0700  Sleep onset is within 15 - 30 Minutes.  Sleep maintenance difficulties related to frequent  night time awakening  Wake after sleep onset occurs two times a night.  Nocturia occurs two times a night,   Sleep aids : YES , melatonin 1-2 times a week if working on things around his house, he will think about it, most nights able to go to sleep in 15-20 minutes.  Dry mouth :  NO  Sleep walking:  NO  Sleep talking :  NO  Sleep eating: NO  Vivid Dreams :  NO  Cataplexy :  NO       07/26/2022  Last visit 05/17/2022  Using device and benefits  AHI was 0.4  Episodes of noctural dyspnea  Had inhaaler in past, never used for night issues, had daytime fatiigue which resolved  Seen Dr Senior; PPM since 30 years old  Complete heart block  Never smoker  Las 6MWD reveiwed  Last PFT: hyperinflation      Previous Report Reviewed: lab reports and office notes     Past Medical History: The following portions of the patient's history were reviewed and updated as appropriate:   He  has a past surgical history that includes Shoulder arthroscopy (Right); Nasal sinus surgery; Cardiac pacemaker placement; Tonsillectomy; Transurethral resection of prostate; Knee cartilage surgery (Bilateral); Tympanoplasty; vesectomy; Total knee arthroplasty (Left, 05/15/2017); Joint replacement; Esophagogastroduodenoscopy (N/A, 9/13/2019); Colonoscopy (N/A, 9/13/2019); and Esophagogastroduodenoscopy (N/A, 9/3/2021).  His family history includes Arthritis in his mother; Colon cancer in his paternal grandmother; Diabetes in his maternal grandmother and son; Heart disease in his father; Hypertension in his father and mother; Stroke in his father.  He  reports that he has never smoked. He has never used smokeless tobacco. He reports current alcohol use. He reports that he does not use drugs.  He has a current medication list which includes the following prescription(s): acetaminophen, azelastine, bifidobacterium infantis, blood sugar diagnostic, dutasteride, fenofibrate micronized, fluticasone propionate, gabapentin, hydrocortisone, levemir flextouch  "u-100 insuln, latanoprost, levocetirizine, victoza 3-claudine, losartan, lovastatin, metformin, multivitamin, ondansetron, pantoprazole, pen needle, diabetic, pioglitazone, sildenafil, and triamcinolone acetonide 0.1%.  He is allergic to aspirin, meperidine, and niacin..    Review of Systems   Constitutional: Negative for fever, chills, weight loss, weight gain, activity change, appetite change, fatigue and night sweats.   HENT: Negative for postnasal drip, rhinorrhea, sinus pressure, voice change and congestion.    Eyes: Negative for redness and itching.   Respiratory: Positive for apnea and snoring. Negative for cough, sputum production, chest tightness, shortness of breath, wheezing, orthopnea, asthma nighttime symptoms, dyspnea on extertion, use of rescue inhaler and somnolence.    Cardiovascular: Negative.  Negative for chest pain, palpitations and leg swelling.   Genitourinary: Negative for difficulty urinating and hematuria.   Endocrine: Negative for cold intolerance and heat intolerance.    Musculoskeletal: Negative for arthralgias, gait problem, joint swelling and myalgias.   Skin: Negative.    Gastrointestinal: Negative for nausea, vomiting, abdominal pain and acid reflux.   Neurological: Negative for dizziness, weakness, light-headedness and headaches.   Hematological: Negative for adenopathy. No excessive bruising.   All other systems reviewed and are negative.     Objective:   /74   Pulse 72   Resp 18   Ht 5' 11" (1.803 m)   Wt 88.3 kg (194 lb 10.7 oz)   SpO2 97%   BMI 27.15 kg/m²   Physical Exam  Vitals and nursing note reviewed.   Constitutional:       Appearance: He is well-developed.   HENT:      Head: Normocephalic and atraumatic.   Eyes:      Conjunctiva/sclera: Conjunctivae normal.   Cardiovascular:      Rate and Rhythm: Normal rate and regular rhythm.      Heart sounds: Normal heart sounds.   Pulmonary:      Effort: Pulmonary effort is normal.      Breath sounds: Normal breath sounds. "   Abdominal:      General: Bowel sounds are normal.      Palpations: Abdomen is soft.   Musculoskeletal:         General: Normal range of motion.      Cervical back: Normal range of motion and neck supple.   Skin:     General: Skin is warm and dry.   Neurological:      Mental Status: He is alert and oriented to person, place, and time.      Deep Tendon Reflexes: Reflexes are normal and symmetric.   Psychiatric:         Behavior: Behavior normal.         Thought Content: Thought content normal.         Judgment: Judgment normal.         Personal Diagnostic Review  Review of labs, xray's, cardiology reports.      DOWNLOAD  06/14/2022 to 07/25/2022  Usage > 4 hrs was 100%  APAP 5-20      Assessment:     1. СВЕТЛАНА on CPAP    2. Lung granuloma    3. Hypertension associated with diabetes    4. Hyperlipidemia associated with type 2 diabetes mellitus    5. Type 2 diabetes mellitus with stage 3a chronic kidney disease, with long-term current use of insulin    6. Nocturnal dyspnea      Orders Placed This Encounter   Procedures    Complete PFT with bronchodilator     Standing Status:   Future     Standing Expiration Date:   9/5/2023     Order Specific Question:   Release to patient     Answer:   Immediate    PULSE OXIMETRY OVERNIGHT     Standing Status:   Future     Number of Occurrences:   1     Standing Expiration Date:   7/26/2023     Order Specific Question:   Reason for Test?     Answer:   O2 Qualification     Comments:   RA on CPAP     Order Specific Question:   Symptoms:     Answer:   Other     Plan:   Discussed diagnosis, its evaluation, treatment and usual course. All questions answered.  Problem List Items Addressed This Visit     Hypertension associated with diabetes (Chronic)     Stable on CRESTOR           Hyperlipidemia associated with type 2 diabetes mellitus (Chronic)     Stable on CRESTOR           Type 2 diabetes mellitus with stage 3a chronic kidney disease, with long-term current use of insulin     Stable :  insulin detemir           Lung granuloma     Radiological surveilance           СВЕТЛАНА on CPAP - Primary     APAP  5-20  Usage > 4 hrs was 100%  AHI 0.4   Using and benefits from PAP           Relevant Orders    Complete PFT with bronchodilator    PULSE OXIMETRY OVERNIGHT    Nocturnal dyspnea     Episodic noctural symptoms  Last PFT : hyperinflation  Never smoking             Relevant Orders    Complete PFT with bronchodilator    PULSE OXIMETRY OVERNIGHT          Follow up in about 3 months (around 10/26/2022), or ONSAT, PFT.    Thank you for the opportunity to participate in the care of this patient.

## 2022-07-27 NOTE — PROCEDURES
[facility] [address] * [place] [address]  Telephone: [phone]  Test date: 22 Start: 22 21:25:23 Rigoberto Vasquez  Doctor: Dr Sahu End: 22 05:50:11 [id number]  Oximetry: Summary Report  Comments: CPAP and ROOM AIR  Recording time: 08:24:48 Highest pulse: 82 Highest SpO2: 97%  Excluded samplin:06:52 Lowest pulse: 58 Lowest SpO2: 89%  Total valid samplin:17:56 Mean pulse: 62 Mean SpO2: 93.3%  1 S.D.: 3.2 1 S.D.: 1.4  Time with SpO2<90: 0:00:08, 0.0%  Time with SpO2<80: 0:00:00, 0.0%  Time with SpO2<70: 0:00:00, 0.0%  Time with SpO2<60: 0:00:00, 0.0%  Time with SpO2<89: 0:00:00, 0.0%  Time with SpO2 =>90: 8:17:48, 100.0%  Time with SpO2=>80 & <90: 0:00:08, 0.0%  Time with SpO2=>70 & <80: 0:00:00, 0.0%  Time with SpO2=>60 & <70: 0:00:00, 0.0%  There was no time spent with a saturation less than or equal to 88.  A desaturation event was defined as a decrease of saturation by 4 or more.  No events were excluded due to artifact.  There were 9 desaturation events over 3 minutes duration.  There were 7 desaturation events of less than 3 minutes duration during which:  The mean high was 95.7%. The mean low was 90.4%.  The number of these events that were:  > 0 & <10 seconds: 0 > 0 seconds: 7  =>10 & <20 seconds: 2 =>10 seconds: 7  =>20 & <30 seconds: 0 =>20 seconds: 5  =>30 & <40 seconds: 0 =>30 seconds: 5  =>40 & <50 seconds: 0 =>40 seconds: 5  =>50 & <60 seconds: 0 =>50 seconds: 5  =>60 seconds: 5 =>60 seconds: 5  The mean length of desaturation events that were >=10 sec & <=3 mins was: 86.9 sec.  Desaturation event index (events >=10 sec per sampled hour): 0.8  Desaturation event index (events >= 0 sec per sampled hour): 0.8    OVERNIGHT OXIMETRY REPORT:    Dictated by: Luciano Sahu MD  Test date: 2022  Dictated on: 2022      Comment: This test was performed on CPAP and Room Air     A desaturation event was defined as a decrease of saturation  by 4 or more.    REPORT SUMMARY  Total valid samplin:17:56   High SpO2: 97%    Low SpO2: 89%    Mean SpO2  93.3 %  Cumulative time with oxygen saturation less than 88% (TC88): 0:00:00    CLINICAL INTERPRETATION  Results are normal and  There is no significant nocturnal oxygen desaturation    Medicare Criteria Comments:   Oximetry test results suggest the patient does not fall under Medicare Group 1 Criteria. ( Arterial oxygen saturation at or below 88% for at least 5 minutes taken during sleep)  Luciano Sahu MD    Details about Medicare Group Criteria coverage can be found at http://www.cms.hhs.gov/manuals/downloads/

## 2022-07-28 ENCOUNTER — LAB VISIT (OUTPATIENT)
Dept: LAB | Facility: HOSPITAL | Age: 72
End: 2022-07-28
Attending: FAMILY MEDICINE
Payer: MEDICARE

## 2022-07-28 ENCOUNTER — TELEPHONE (OUTPATIENT)
Dept: INTERNAL MEDICINE | Facility: CLINIC | Age: 72
End: 2022-07-28
Payer: MEDICARE

## 2022-07-28 DIAGNOSIS — N18.30 TYPE 2 DIABETES MELLITUS WITH STAGE 3 CHRONIC KIDNEY DISEASE, WITH LONG-TERM CURRENT USE OF INSULIN, UNSPECIFIED WHETHER STAGE 3A OR 3B CKD: ICD-10-CM

## 2022-07-28 DIAGNOSIS — Z79.4 TYPE 2 DIABETES MELLITUS WITH STAGE 3 CHRONIC KIDNEY DISEASE, WITH LONG-TERM CURRENT USE OF INSULIN, UNSPECIFIED WHETHER STAGE 3A OR 3B CKD: ICD-10-CM

## 2022-07-28 DIAGNOSIS — E11.22 TYPE 2 DIABETES MELLITUS WITH STAGE 3 CHRONIC KIDNEY DISEASE, WITH LONG-TERM CURRENT USE OF INSULIN, UNSPECIFIED WHETHER STAGE 3A OR 3B CKD: ICD-10-CM

## 2022-07-28 LAB
ESTIMATED AVG GLUCOSE: 126 MG/DL (ref 68–131)
HBA1C MFR BLD: 6 % (ref 4–5.6)
VIT B12 SERPL-MCNC: 506 PG/ML (ref 210–950)

## 2022-07-28 PROCEDURE — 82607 VITAMIN B-12: CPT | Mod: GA | Performed by: FAMILY MEDICINE

## 2022-07-28 PROCEDURE — 36415 COLL VENOUS BLD VENIPUNCTURE: CPT | Mod: PO | Performed by: FAMILY MEDICINE

## 2022-07-28 PROCEDURE — 83036 HEMOGLOBIN GLYCOSYLATED A1C: CPT | Performed by: FAMILY MEDICINE

## 2022-07-28 NOTE — TELEPHONE ENCOUNTER
Attempted to call the pt on 7/28/22 to reschedule appt due to the provider out of clinic next week. No answer lvm stating appt will be canceled and give the clinic a call back to reschedule,\\AR

## 2022-08-10 ENCOUNTER — OFFICE VISIT (OUTPATIENT)
Dept: INTERNAL MEDICINE | Facility: CLINIC | Age: 72
End: 2022-08-10
Payer: MEDICARE

## 2022-08-10 VITALS
TEMPERATURE: 99 F | HEIGHT: 71 IN | DIASTOLIC BLOOD PRESSURE: 80 MMHG | SYSTOLIC BLOOD PRESSURE: 128 MMHG | WEIGHT: 201.06 LBS | BODY MASS INDEX: 28.15 KG/M2

## 2022-08-10 DIAGNOSIS — H35.341 MACULAR HOLE OF RIGHT EYE: ICD-10-CM

## 2022-08-10 DIAGNOSIS — G47.33 OSA ON CPAP: ICD-10-CM

## 2022-08-10 DIAGNOSIS — Z12.11 SCREEN FOR COLON CANCER: Primary | ICD-10-CM

## 2022-08-10 DIAGNOSIS — E66.3 OVERWEIGHT (BMI 25.0-29.9): ICD-10-CM

## 2022-08-10 DIAGNOSIS — E11.59 HYPERTENSION ASSOCIATED WITH DIABETES: Chronic | ICD-10-CM

## 2022-08-10 DIAGNOSIS — Z79.4 TYPE 2 DIABETES MELLITUS WITH STAGE 3A CHRONIC KIDNEY DISEASE, WITH LONG-TERM CURRENT USE OF INSULIN: ICD-10-CM

## 2022-08-10 DIAGNOSIS — Z95.0 PACEMAKER: Chronic | ICD-10-CM

## 2022-08-10 DIAGNOSIS — D50.9 IRON DEFICIENCY ANEMIA, UNSPECIFIED IRON DEFICIENCY ANEMIA TYPE: Chronic | ICD-10-CM

## 2022-08-10 DIAGNOSIS — E11.69 HYPERLIPIDEMIA ASSOCIATED WITH TYPE 2 DIABETES MELLITUS: Chronic | ICD-10-CM

## 2022-08-10 DIAGNOSIS — I15.2 HYPERTENSION ASSOCIATED WITH DIABETES: Chronic | ICD-10-CM

## 2022-08-10 DIAGNOSIS — N18.31 TYPE 2 DIABETES MELLITUS WITH STAGE 3A CHRONIC KIDNEY DISEASE, WITH LONG-TERM CURRENT USE OF INSULIN: ICD-10-CM

## 2022-08-10 DIAGNOSIS — H90.12 CONDUCTIVE HEARING LOSS OF LEFT EAR WITH UNRESTRICTED HEARING OF RIGHT EAR: ICD-10-CM

## 2022-08-10 DIAGNOSIS — E11.22 TYPE 2 DIABETES MELLITUS WITH STAGE 3A CHRONIC KIDNEY DISEASE, WITH LONG-TERM CURRENT USE OF INSULIN: ICD-10-CM

## 2022-08-10 DIAGNOSIS — J84.10 LUNG GRANULOMA: ICD-10-CM

## 2022-08-10 DIAGNOSIS — E78.5 HYPERLIPIDEMIA ASSOCIATED WITH TYPE 2 DIABETES MELLITUS: Chronic | ICD-10-CM

## 2022-08-10 DIAGNOSIS — H40.013 OPEN ANGLE WITH BORDERLINE FINDINGS AND LOW GLAUCOMA RISK IN BOTH EYES: ICD-10-CM

## 2022-08-10 PROCEDURE — 3072F PR LOW RISK FOR RETINOPATHY: ICD-10-PCS | Mod: CPTII,S$GLB,, | Performed by: PHYSICIAN ASSISTANT

## 2022-08-10 PROCEDURE — 3288F FALL RISK ASSESSMENT DOCD: CPT | Mod: CPTII,S$GLB,, | Performed by: PHYSICIAN ASSISTANT

## 2022-08-10 PROCEDURE — 3061F PR NEG MICROALBUMINURIA RESULT DOCUMENTED/REVIEW: ICD-10-PCS | Mod: CPTII,S$GLB,, | Performed by: PHYSICIAN ASSISTANT

## 2022-08-10 PROCEDURE — 1160F RVW MEDS BY RX/DR IN RCRD: CPT | Mod: CPTII,S$GLB,, | Performed by: PHYSICIAN ASSISTANT

## 2022-08-10 PROCEDURE — 3061F NEG MICROALBUMINURIA REV: CPT | Mod: CPTII,S$GLB,, | Performed by: PHYSICIAN ASSISTANT

## 2022-08-10 PROCEDURE — 3074F PR MOST RECENT SYSTOLIC BLOOD PRESSURE < 130 MM HG: ICD-10-PCS | Mod: CPTII,S$GLB,, | Performed by: PHYSICIAN ASSISTANT

## 2022-08-10 PROCEDURE — 1159F MED LIST DOCD IN RCRD: CPT | Mod: CPTII,S$GLB,, | Performed by: PHYSICIAN ASSISTANT

## 2022-08-10 PROCEDURE — 1126F PR PAIN SEVERITY QUANTIFIED, NO PAIN PRESENT: ICD-10-PCS | Mod: CPTII,S$GLB,, | Performed by: PHYSICIAN ASSISTANT

## 2022-08-10 PROCEDURE — 1126F AMNT PAIN NOTED NONE PRSNT: CPT | Mod: CPTII,S$GLB,, | Performed by: PHYSICIAN ASSISTANT

## 2022-08-10 PROCEDURE — 4010F ACE/ARB THERAPY RXD/TAKEN: CPT | Mod: CPTII,S$GLB,, | Performed by: PHYSICIAN ASSISTANT

## 2022-08-10 PROCEDURE — 3008F BODY MASS INDEX DOCD: CPT | Mod: CPTII,S$GLB,, | Performed by: PHYSICIAN ASSISTANT

## 2022-08-10 PROCEDURE — 1160F PR REVIEW ALL MEDS BY PRESCRIBER/CLIN PHARMACIST DOCUMENTED: ICD-10-PCS | Mod: CPTII,S$GLB,, | Performed by: PHYSICIAN ASSISTANT

## 2022-08-10 PROCEDURE — 99213 OFFICE O/P EST LOW 20 MIN: CPT | Mod: S$GLB,,, | Performed by: PHYSICIAN ASSISTANT

## 2022-08-10 PROCEDURE — 3072F LOW RISK FOR RETINOPATHY: CPT | Mod: CPTII,S$GLB,, | Performed by: PHYSICIAN ASSISTANT

## 2022-08-10 PROCEDURE — 3074F SYST BP LT 130 MM HG: CPT | Mod: CPTII,S$GLB,, | Performed by: PHYSICIAN ASSISTANT

## 2022-08-10 PROCEDURE — 1159F PR MEDICATION LIST DOCUMENTED IN MEDICAL RECORD: ICD-10-PCS | Mod: CPTII,S$GLB,, | Performed by: PHYSICIAN ASSISTANT

## 2022-08-10 PROCEDURE — 99213 PR OFFICE/OUTPT VISIT, EST, LEVL III, 20-29 MIN: ICD-10-PCS | Mod: S$GLB,,, | Performed by: PHYSICIAN ASSISTANT

## 2022-08-10 PROCEDURE — 3066F NEPHROPATHY DOC TX: CPT | Mod: CPTII,S$GLB,, | Performed by: PHYSICIAN ASSISTANT

## 2022-08-10 PROCEDURE — 3066F PR DOCUMENTATION OF TREATMENT FOR NEPHROPATHY: ICD-10-PCS | Mod: CPTII,S$GLB,, | Performed by: PHYSICIAN ASSISTANT

## 2022-08-10 PROCEDURE — 3288F PR FALLS RISK ASSESSMENT DOCUMENTED: ICD-10-PCS | Mod: CPTII,S$GLB,, | Performed by: PHYSICIAN ASSISTANT

## 2022-08-10 PROCEDURE — 4010F PR ACE/ARB THEARPY RXD/TAKEN: ICD-10-PCS | Mod: CPTII,S$GLB,, | Performed by: PHYSICIAN ASSISTANT

## 2022-08-10 PROCEDURE — 99999 PR PBB SHADOW E&M-EST. PATIENT-LVL V: ICD-10-PCS | Mod: PBBFAC,,, | Performed by: PHYSICIAN ASSISTANT

## 2022-08-10 PROCEDURE — 3079F PR MOST RECENT DIASTOLIC BLOOD PRESSURE 80-89 MM HG: ICD-10-PCS | Mod: CPTII,S$GLB,, | Performed by: PHYSICIAN ASSISTANT

## 2022-08-10 PROCEDURE — 3044F PR MOST RECENT HEMOGLOBIN A1C LEVEL <7.0%: ICD-10-PCS | Mod: CPTII,S$GLB,, | Performed by: PHYSICIAN ASSISTANT

## 2022-08-10 PROCEDURE — 3008F PR BODY MASS INDEX (BMI) DOCUMENTED: ICD-10-PCS | Mod: CPTII,S$GLB,, | Performed by: PHYSICIAN ASSISTANT

## 2022-08-10 PROCEDURE — 3079F DIAST BP 80-89 MM HG: CPT | Mod: CPTII,S$GLB,, | Performed by: PHYSICIAN ASSISTANT

## 2022-08-10 PROCEDURE — 99999 PR PBB SHADOW E&M-EST. PATIENT-LVL V: CPT | Mod: PBBFAC,,, | Performed by: PHYSICIAN ASSISTANT

## 2022-08-10 PROCEDURE — 1101F PR PT FALLS ASSESS DOC 0-1 FALLS W/OUT INJ PAST YR: ICD-10-PCS | Mod: CPTII,S$GLB,, | Performed by: PHYSICIAN ASSISTANT

## 2022-08-10 PROCEDURE — 1101F PT FALLS ASSESS-DOCD LE1/YR: CPT | Mod: CPTII,S$GLB,, | Performed by: PHYSICIAN ASSISTANT

## 2022-08-10 PROCEDURE — 3044F HG A1C LEVEL LT 7.0%: CPT | Mod: CPTII,S$GLB,, | Performed by: PHYSICIAN ASSISTANT

## 2022-08-10 NOTE — PROGRESS NOTES
Subjective:      Patient ID: Rigoberto Vasquez is a 72 y.o. male.    Chief Complaint: Follow-up (6m)    HPI   Mr. Vasquez is here today for his 6 month follow up with lab review.   BP good at home. Recent labs will be reviewed.   Up to date with cardiologist, DR. Senior. No chest pain or heart, heart palpitations, or sob.    Seen Dr. Sahu recently for pulm follow up, everything stable. Using cpap nightly and doing great on it.     Would like to lose another 10 lbs. Plateau.  3 days a week at the [facility]. 1.5hrs exercise 3 times a week.   Denies eating large meals at night.     Mood is good.     Patient Active Problem List   Diagnosis    Osteoarthritis of knees, bilateral    Hypertension associated with diabetes    Type 2 diabetes mellitus with stage 3a chronic kidney disease, with long-term current use of insulin    Hyperlipidemia associated with type 2 diabetes mellitus    Open angle with borderline findings, low risk    High myopia    Optic disc anomaly    CHANDRAKANT (iron deficiency anemia)    Pacemaker    Conductive hearing loss in left ear    Choroidal nevus of right eye    Epiretinal membrane (ERM) of left eye    Overweight (BMI 25.0-29.9)    Macular hole of right eye    Open angle with borderline findings and low glaucoma risk in both eyes    Chronic LUQ pain    Nausea    Ectopic gastric mucosa    Open angle with borderline findings and high glaucoma risk in both eyes    History of skin cancer    History of heart block;AV block, 3rd degree    Benign prostatic hyperplasia (BPH) with straining on urination    Erectile dysfunction    Lung granuloma    Dyspepsia    Calcification of aorta    Cholesteatoma    СВЕТЛАНА on CPAP    Nocturnal dyspnea       Current Outpatient Medications:     ACETAMINOPHEN (TYLENOL 8 HOUR ORAL), Take by mouth as needed., Disp: , Rfl:     azelastine (ASTELIN) 137 mcg (0.1 %) nasal spray, 2 sprays (274 mcg total) by Nasal route 2 (two) times daily., Disp: 30 mL, Rfl: 12     Bifidobacterium infantis 4 mg Cap, Take 1 capsule by mouth Daily., Disp: , Rfl:     blood sugar diagnostic (ACCU-CHEK JAXON) Strp, Check BG 2-3 times daily. Accuchek jaxon strips, Disp: 300 strip, Rfl: 3    dutasteride (AVODART) 0.5 mg capsule, Take 1 capsule (0.5 mg total) by mouth once daily., Disp: 90 capsule, Rfl: 3    fenofibrate micronized (LOFIBRA) 200 MG Cap, Take 1 capsule (200 mg total) by mouth every evening., Disp: 90 capsule, Rfl: 3    fluticasone propionate (FLONASE) 50 mcg/actuation nasal spray, 1 spray by Each Nostril route once daily., Disp: , Rfl:     gabapentin (CMPD PAIN MANAGEMENT COMPOUND OINTMNENT THREE), Apply bid prn pain, Disp: 100 g, Rfl: 11    hydrocortisone 2.5 % cream, Apply topically 2 (two) times daily., Disp: 3.5 g, Rfl: 5    insulin detemir U-100 (LEVEMIR FLEXTOUCH U-100 INSULN) 100 unit/mL (3 mL) InPn pen, 16 units in the AM and 8 units in the PM, Disp: 30 mL, Rfl: 3    latanoprost 0.005 % ophthalmic solution, Place 1 drop into both eyes once daily., Disp: 2.5 mL, Rfl: 6    levocetirizine (XYZAL) 5 MG tablet, Take 5 mg by mouth every evening., Disp: , Rfl:     liraglutide 0.6 mg/0.1 mL, 18 mg/3 mL, subq PNIJ (VICTOZA 3-ALESHA) 0.6 mg/0.1 mL (18 mg/3 mL) PnIj pen, INJECT 1.8 MG DAILY, Disp: 27 mL, Rfl: 0    losartan (COZAAR) 25 MG tablet, Take 1 tablet (25 mg total) by mouth once daily., Disp: 90 tablet, Rfl: 3    lovastatin (MEVACOR) 40 MG tablet, Take 1 tablet by mouth Every evening., Disp: 90 tablet, Rfl: 3    metFORMIN (GLUCOPHAGE) 1000 MG tablet, Take 1 tablet (1,000 mg total) by mouth once daily. Generic OK, Disp: 180 tablet, Rfl: 3    multivitamin capsule, Take 1 capsule by mouth once daily., Disp: , Rfl:     ondansetron (ZOFRAN-ODT) 8 MG TbDL, Take 1 tablet (8 mg total) by mouth every 6 (six) hours as needed (nausea)., Disp: 20 tablet, Rfl: 0    pantoprazole (PROTONIX) 40 MG tablet, Take 1 tablet (40 mg total) by mouth once daily., Disp: 90 tablet, Rfl: 1     "pen needle, diabetic (BD ULTRA-FINE MINI PEN NEEDLE) 31 gauge x 3/16" Ndle, USE AS DIRECTED WITH INSULIN, Disp: 90 each, Rfl: 4    pioglitazone (ACTOS) 30 MG tablet, Take 1 tablet (30 mg total) by mouth once daily., Disp: 90 tablet, Rfl: 0    triamcinolone acetonide 0.1% (KENALOG) 0.1 % cream, AAA bid, Disp: 454 g, Rfl: 0    sildenafil (REVATIO) 20 mg Tab, Take 1 tablet (20 mg total) by mouth 3 (three) times daily. (Patient taking differently: Take 20 mg by mouth 3 (three) times daily. Takes prn), Disp: 90 tablet, Rfl: 11    Review of Systems   Constitutional: Negative for activity change, appetite change, chills, diaphoresis, fatigue, fever and unexpected weight change.   HENT: Negative.  Negative for congestion, hearing loss, postnasal drip, rhinorrhea, sore throat, trouble swallowing and voice change.    Eyes: Negative.  Negative for visual disturbance.   Respiratory: Negative.  Negative for cough, choking, chest tightness and shortness of breath.    Cardiovascular: Negative for chest pain, palpitations and leg swelling.   Gastrointestinal: Negative for abdominal distention, abdominal pain, blood in stool, constipation, diarrhea, nausea and vomiting.   Endocrine: Negative for cold intolerance, heat intolerance, polydipsia and polyuria.   Genitourinary: Negative.  Negative for difficulty urinating and frequency.   Musculoskeletal: Negative for arthralgias, back pain, gait problem, joint swelling and myalgias.   Skin: Negative for color change, pallor, rash and wound.   Neurological: Negative for dizziness, tremors, weakness, light-headedness, numbness and headaches.   Hematological: Negative for adenopathy.   Psychiatric/Behavioral: Negative for behavioral problems, confusion, self-injury, sleep disturbance and suicidal ideas. The patient is not nervous/anxious.      Objective:   /80 (BP Location: Left arm, Patient Position: Sitting, BP Method: Large (Manual))   Temp 98.7 °F (37.1 °C) (Tympanic)   Ht 5' " "11" (1.803 m)   Wt 91.2 kg (201 lb 1 oz)   BMI 28.04 kg/m²     Physical Exam  Vitals and nursing note reviewed.   Constitutional:       Appearance: He is well-developed.   HENT:      Head: Normocephalic and atraumatic.      Right Ear: Tympanic membrane, ear canal and external ear normal.      Left Ear: External ear normal. No drainage. A PE tube is present. Tympanic membrane is perforated.      Nose: Nose normal.   Eyes:      General: No scleral icterus.        Right eye: No discharge.         Left eye: No discharge.      Conjunctiva/sclera: Conjunctivae normal.      Pupils: Pupils are equal, round, and reactive to light.   Neck:      Thyroid: No thyromegaly.      Vascular: Normal carotid pulses. No carotid bruit or JVD.   Cardiovascular:      Rate and Rhythm: Normal rate and regular rhythm.      Heart sounds: Normal heart sounds. No murmur heard.    No friction rub. No gallop.   Pulmonary:      Effort: Pulmonary effort is normal. No accessory muscle usage or respiratory distress.      Breath sounds: Normal breath sounds. No wheezing or rales.   Chest:      Chest wall: No tenderness.   Abdominal:      General: Bowel sounds are normal. There is no distension.      Palpations: Abdomen is soft. There is no mass.      Tenderness: There is no abdominal tenderness. There is no guarding or rebound.   Musculoskeletal:         General: No tenderness or deformity. Normal range of motion.      Cervical back: Normal range of motion and neck supple. No edema.   Lymphadenopathy:      Cervical: No cervical adenopathy.   Skin:     General: Skin is warm and dry.      Coloration: Skin is not pale.      Findings: No erythema or rash.      Nails: There is no clubbing.   Neurological:      Mental Status: He is alert and oriented to person, place, and time.      Cranial Nerves: No cranial nerve deficit.      Sensory: No sensory deficit.      Motor: No abnormal muscle tone.      Coordination: Coordination normal.      Deep Tendon " Reflexes: Reflexes are normal and symmetric. Reflexes normal.   Psychiatric:         Behavior: Behavior normal. Behavior is cooperative.         Thought Content: Thought content normal.         Judgment: Judgment normal.       No visits with results within 1 Day(s) from this visit.   Latest known visit with results is:   Lab Visit on 07/28/2022   Component Date Value Ref Range Status    Hemoglobin A1C 07/28/2022 6.0 (A) 4.0 - 5.6 % Final    Comment: ADA Screening Guidelines:  5.7-6.4%  Consistent with prediabetes  >or=6.5%  Consistent with diabetes    High levels of fetal hemoglobin interfere with the HbA1C  assay. Heterozygous hemoglobin variants (HbS, HgC, etc)do  not significantly interfere with this assay.   However, presence of multiple variants may affect accuracy.      Estimated Avg Glucose 07/28/2022 126  68 - 131 mg/dL Final    Vitamin B-12 07/28/2022 506  210 - 950 pg/mL Final     Lab Results   Component Value Date    WBC 6.50 01/24/2022    HGB 14.6 01/24/2022    HCT 45.8 01/24/2022     (H) 01/24/2022     01/24/2022         Assessment:     1. Screen for colon cancer    2. Overweight (BMI 25.0-29.9)    3. Type 2 diabetes mellitus with stage 3a chronic kidney disease, with long-term current use of insulin    4. Hyperlipidemia associated with type 2 diabetes mellitus    5. Hypertension associated with diabetes    6. Iron deficiency anemia, unspecified iron deficiency anemia type    7. Pacemaker    8. Open angle with borderline findings and low glaucoma risk in both eyes    9. Macular hole of right eye    10. Conductive hearing loss of left ear with unrestricted hearing of right ear    11. Lung granuloma    12. СВЕТЛАНА on CPAP      Plan:   Screen for colon cancer  -     Ambulatory referral/consult to Endo Procedure ; Future; Expected date: 08/11/2022  -due next month    Overweight (BMI 25.0-29.9)  -     Ambulatory referral/consult to [facility]; Future; Expected date:  08/17/2022  -smaller more frequent meals throughout the day  -100 ounces of water daily    Type 2 diabetes mellitus with stage 3a chronic kidney disease, with long-term current use of insulin  -     Hemoglobin A1C; Future; Expected date: 02/10/2023  -     Microalbumin/Creatinine Ratio, Urine; Future; Expected date: 02/10/2023  -     CBC Auto Differential; Future; Expected date: 02/10/2023  -     Comprehensive Metabolic Panel; Future; Expected date: 02/10/2023  -stable and controlled on current medications. B12 in good range.     Hyperlipidemia associated with type 2 diabetes mellitus  -     Lipid Panel; Future; Expected date: 02/10/2023    Hypertension associated with diabetes  -     Comprehensive Metabolic Panel; Future; Expected date: 02/10/2023    Iron deficiency anemia, unspecified iron deficiency anemia type  -stable    Pacemaker  -up to date with DR. Senior    Open angle with borderline findings and low glaucoma risk in both eyes  Macular hole of right eye  -up to date with ophthalmology    Conductive hearing loss of left ear with unrestricted hearing of right ear  -stable with hearing aid    Lung granuloma  СВЕТЛАНА on CPAP  -stable on CPAP nightly. Up to date with DR. Sahu        Follow up in about 6 months (around 2/10/2023), or if symptoms worsen or fail to improve.

## 2022-08-11 ENCOUNTER — PATIENT MESSAGE (OUTPATIENT)
Dept: ADMINISTRATIVE | Facility: OTHER | Age: 72
End: 2022-08-11
Payer: MEDICARE

## 2022-08-11 ENCOUNTER — HOSPITAL ENCOUNTER (OUTPATIENT)
Dept: PREADMISSION TESTING | Facility: HOSPITAL | Age: 72
Discharge: HOME | End: 2022-08-11
Attending: FAMILY MEDICINE
Payer: MEDICARE

## 2022-08-11 DIAGNOSIS — Z12.11 SCREEN FOR COLON CANCER: Primary | ICD-10-CM

## 2022-08-16 ENCOUNTER — OFFICE VISIT (OUTPATIENT)
Dept: OPHTHALMOLOGY | Facility: CLINIC | Age: 72
End: 2022-08-16
Payer: MEDICARE

## 2022-08-16 DIAGNOSIS — H40.013 OPEN ANGLE WITH BORDERLINE FINDINGS AND LOW GLAUCOMA RISK IN BOTH EYES: Primary | ICD-10-CM

## 2022-08-16 DIAGNOSIS — D31.31 CHOROIDAL NEVUS OF RIGHT EYE: ICD-10-CM

## 2022-08-16 DIAGNOSIS — Z79.4 CONTROLLED TYPE 2 DIABETES MELLITUS WITHOUT COMPLICATION, WITH LONG-TERM CURRENT USE OF INSULIN: ICD-10-CM

## 2022-08-16 DIAGNOSIS — H35.372 EPIRETINAL MEMBRANE (ERM) OF LEFT EYE: ICD-10-CM

## 2022-08-16 DIAGNOSIS — E11.9 CONTROLLED TYPE 2 DIABETES MELLITUS WITHOUT COMPLICATION, WITH LONG-TERM CURRENT USE OF INSULIN: ICD-10-CM

## 2022-08-16 PROCEDURE — 1159F MED LIST DOCD IN RCRD: CPT | Mod: CPTII,S$GLB,, | Performed by: OPHTHALMOLOGY

## 2022-08-16 PROCEDURE — 4010F ACE/ARB THERAPY RXD/TAKEN: CPT | Mod: CPTII,S$GLB,, | Performed by: OPHTHALMOLOGY

## 2022-08-16 PROCEDURE — 4010F PR ACE/ARB THEARPY RXD/TAKEN: ICD-10-PCS | Mod: CPTII,S$GLB,, | Performed by: OPHTHALMOLOGY

## 2022-08-16 PROCEDURE — 3061F NEG MICROALBUMINURIA REV: CPT | Mod: CPTII,S$GLB,, | Performed by: OPHTHALMOLOGY

## 2022-08-16 PROCEDURE — 99213 PR OFFICE/OUTPT VISIT, EST, LEVL III, 20-29 MIN: ICD-10-PCS | Mod: S$GLB,,, | Performed by: OPHTHALMOLOGY

## 2022-08-16 PROCEDURE — 1160F RVW MEDS BY RX/DR IN RCRD: CPT | Mod: CPTII,S$GLB,, | Performed by: OPHTHALMOLOGY

## 2022-08-16 PROCEDURE — 1159F PR MEDICATION LIST DOCUMENTED IN MEDICAL RECORD: ICD-10-PCS | Mod: CPTII,S$GLB,, | Performed by: OPHTHALMOLOGY

## 2022-08-16 PROCEDURE — 3044F HG A1C LEVEL LT 7.0%: CPT | Mod: CPTII,S$GLB,, | Performed by: OPHTHALMOLOGY

## 2022-08-16 PROCEDURE — 99999 PR PBB SHADOW E&M-EST. PATIENT-LVL III: ICD-10-PCS | Mod: PBBFAC,,, | Performed by: OPHTHALMOLOGY

## 2022-08-16 PROCEDURE — 3066F PR DOCUMENTATION OF TREATMENT FOR NEPHROPATHY: ICD-10-PCS | Mod: CPTII,S$GLB,, | Performed by: OPHTHALMOLOGY

## 2022-08-16 PROCEDURE — 99999 PR PBB SHADOW E&M-EST. PATIENT-LVL III: CPT | Mod: PBBFAC,,, | Performed by: OPHTHALMOLOGY

## 2022-08-16 PROCEDURE — 99213 OFFICE O/P EST LOW 20 MIN: CPT | Mod: S$GLB,,, | Performed by: OPHTHALMOLOGY

## 2022-08-16 PROCEDURE — 3066F NEPHROPATHY DOC TX: CPT | Mod: CPTII,S$GLB,, | Performed by: OPHTHALMOLOGY

## 2022-08-16 PROCEDURE — 3044F PR MOST RECENT HEMOGLOBIN A1C LEVEL <7.0%: ICD-10-PCS | Mod: CPTII,S$GLB,, | Performed by: OPHTHALMOLOGY

## 2022-08-16 PROCEDURE — 1160F PR REVIEW ALL MEDS BY PRESCRIBER/CLIN PHARMACIST DOCUMENTED: ICD-10-PCS | Mod: CPTII,S$GLB,, | Performed by: OPHTHALMOLOGY

## 2022-08-16 PROCEDURE — 3061F PR NEG MICROALBUMINURIA RESULT DOCUMENTED/REVIEW: ICD-10-PCS | Mod: CPTII,S$GLB,, | Performed by: OPHTHALMOLOGY

## 2022-08-16 RX ORDER — LATANOPROST 50 UG/ML
1 SOLUTION/ DROPS OPHTHALMIC DAILY
Qty: 3 ML | Refills: 4 | Status: SHIPPED | OUTPATIENT
Start: 2022-08-16 | End: 2022-12-05 | Stop reason: SDUPTHER

## 2022-08-18 ENCOUNTER — OFFICE VISIT (OUTPATIENT)
Dept: PRIMARY CARE CLINIC | Facility: CLINIC | Age: 72
End: 2022-08-18
Payer: MEDICARE

## 2022-08-18 VITALS
SYSTOLIC BLOOD PRESSURE: 110 MMHG | RESPIRATION RATE: 18 BRPM | WEIGHT: 198.44 LBS | BODY MASS INDEX: 27.67 KG/M2 | OXYGEN SATURATION: 100 % | HEART RATE: 80 BPM | DIASTOLIC BLOOD PRESSURE: 78 MMHG

## 2022-08-18 DIAGNOSIS — N18.31 TYPE 2 DIABETES MELLITUS WITH STAGE 3A CHRONIC KIDNEY DISEASE, WITH LONG-TERM CURRENT USE OF INSULIN: ICD-10-CM

## 2022-08-18 DIAGNOSIS — E11.59 HYPERTENSION ASSOCIATED WITH DIABETES: Chronic | ICD-10-CM

## 2022-08-18 DIAGNOSIS — I15.2 HYPERTENSION ASSOCIATED WITH DIABETES: Chronic | ICD-10-CM

## 2022-08-18 DIAGNOSIS — E66.3 OVERWEIGHT (BMI 25.0-29.9): ICD-10-CM

## 2022-08-18 DIAGNOSIS — I70.0 CALCIFICATION OF AORTA: Primary | ICD-10-CM

## 2022-08-18 DIAGNOSIS — E11.22 TYPE 2 DIABETES MELLITUS WITH STAGE 3A CHRONIC KIDNEY DISEASE, WITH LONG-TERM CURRENT USE OF INSULIN: ICD-10-CM

## 2022-08-18 DIAGNOSIS — E78.5 HYPERLIPIDEMIA ASSOCIATED WITH TYPE 2 DIABETES MELLITUS: Chronic | ICD-10-CM

## 2022-08-18 DIAGNOSIS — E11.69 HYPERLIPIDEMIA ASSOCIATED WITH TYPE 2 DIABETES MELLITUS: Chronic | ICD-10-CM

## 2022-08-18 DIAGNOSIS — Z79.4 TYPE 2 DIABETES MELLITUS WITH STAGE 3A CHRONIC KIDNEY DISEASE, WITH LONG-TERM CURRENT USE OF INSULIN: ICD-10-CM

## 2022-08-18 PROCEDURE — 1101F PT FALLS ASSESS-DOCD LE1/YR: CPT | Mod: CPTII,S$GLB,, | Performed by: PHYSICIAN ASSISTANT

## 2022-08-18 PROCEDURE — 99999 PR PBB SHADOW E&M-EST. PATIENT-LVL III: CPT | Mod: PBBFAC,,, | Performed by: PHYSICIAN ASSISTANT

## 2022-08-18 PROCEDURE — 4010F ACE/ARB THERAPY RXD/TAKEN: CPT | Mod: CPTII,S$GLB,, | Performed by: PHYSICIAN ASSISTANT

## 2022-08-18 PROCEDURE — 3074F PR MOST RECENT SYSTOLIC BLOOD PRESSURE < 130 MM HG: ICD-10-PCS | Mod: CPTII,S$GLB,, | Performed by: PHYSICIAN ASSISTANT

## 2022-08-18 PROCEDURE — 3008F BODY MASS INDEX DOCD: CPT | Mod: CPTII,S$GLB,, | Performed by: PHYSICIAN ASSISTANT

## 2022-08-18 PROCEDURE — 3044F PR MOST RECENT HEMOGLOBIN A1C LEVEL <7.0%: ICD-10-PCS | Mod: CPTII,S$GLB,, | Performed by: PHYSICIAN ASSISTANT

## 2022-08-18 PROCEDURE — 3066F PR DOCUMENTATION OF TREATMENT FOR NEPHROPATHY: ICD-10-PCS | Mod: CPTII,S$GLB,, | Performed by: PHYSICIAN ASSISTANT

## 2022-08-18 PROCEDURE — 3061F PR NEG MICROALBUMINURIA RESULT DOCUMENTED/REVIEW: ICD-10-PCS | Mod: CPTII,S$GLB,, | Performed by: PHYSICIAN ASSISTANT

## 2022-08-18 PROCEDURE — 3072F PR LOW RISK FOR RETINOPATHY: ICD-10-PCS | Mod: CPTII,S$GLB,, | Performed by: PHYSICIAN ASSISTANT

## 2022-08-18 PROCEDURE — 3072F LOW RISK FOR RETINOPATHY: CPT | Mod: CPTII,S$GLB,, | Performed by: PHYSICIAN ASSISTANT

## 2022-08-18 PROCEDURE — 3008F PR BODY MASS INDEX (BMI) DOCUMENTED: ICD-10-PCS | Mod: CPTII,S$GLB,, | Performed by: PHYSICIAN ASSISTANT

## 2022-08-18 PROCEDURE — 3044F HG A1C LEVEL LT 7.0%: CPT | Mod: CPTII,S$GLB,, | Performed by: PHYSICIAN ASSISTANT

## 2022-08-18 PROCEDURE — 1101F PR PT FALLS ASSESS DOC 0-1 FALLS W/OUT INJ PAST YR: ICD-10-PCS | Mod: CPTII,S$GLB,, | Performed by: PHYSICIAN ASSISTANT

## 2022-08-18 PROCEDURE — 3061F NEG MICROALBUMINURIA REV: CPT | Mod: CPTII,S$GLB,, | Performed by: PHYSICIAN ASSISTANT

## 2022-08-18 PROCEDURE — 99215 OFFICE O/P EST HI 40 MIN: CPT | Mod: S$GLB,,, | Performed by: PHYSICIAN ASSISTANT

## 2022-08-18 PROCEDURE — 99215 PR OFFICE/OUTPT VISIT, EST, LEVL V, 40-54 MIN: ICD-10-PCS | Mod: S$GLB,,, | Performed by: PHYSICIAN ASSISTANT

## 2022-08-18 PROCEDURE — 3288F PR FALLS RISK ASSESSMENT DOCUMENTED: ICD-10-PCS | Mod: CPTII,S$GLB,, | Performed by: PHYSICIAN ASSISTANT

## 2022-08-18 PROCEDURE — 1126F PR PAIN SEVERITY QUANTIFIED, NO PAIN PRESENT: ICD-10-PCS | Mod: CPTII,S$GLB,, | Performed by: PHYSICIAN ASSISTANT

## 2022-08-18 PROCEDURE — 3066F NEPHROPATHY DOC TX: CPT | Mod: CPTII,S$GLB,, | Performed by: PHYSICIAN ASSISTANT

## 2022-08-18 PROCEDURE — 3288F FALL RISK ASSESSMENT DOCD: CPT | Mod: CPTII,S$GLB,, | Performed by: PHYSICIAN ASSISTANT

## 2022-08-18 PROCEDURE — 99999 PR PBB SHADOW E&M-EST. PATIENT-LVL III: ICD-10-PCS | Mod: PBBFAC,,, | Performed by: PHYSICIAN ASSISTANT

## 2022-08-18 PROCEDURE — 3074F SYST BP LT 130 MM HG: CPT | Mod: CPTII,S$GLB,, | Performed by: PHYSICIAN ASSISTANT

## 2022-08-18 PROCEDURE — 3078F PR MOST RECENT DIASTOLIC BLOOD PRESSURE < 80 MM HG: ICD-10-PCS | Mod: CPTII,S$GLB,, | Performed by: PHYSICIAN ASSISTANT

## 2022-08-18 PROCEDURE — 1126F AMNT PAIN NOTED NONE PRSNT: CPT | Mod: CPTII,S$GLB,, | Performed by: PHYSICIAN ASSISTANT

## 2022-08-18 PROCEDURE — 4010F PR ACE/ARB THEARPY RXD/TAKEN: ICD-10-PCS | Mod: CPTII,S$GLB,, | Performed by: PHYSICIAN ASSISTANT

## 2022-08-18 PROCEDURE — 3078F DIAST BP <80 MM HG: CPT | Mod: CPTII,S$GLB,, | Performed by: PHYSICIAN ASSISTANT

## 2022-08-18 NOTE — PROGRESS NOTES
Subjective:       Patient ID: Rigoberto Vasquez is a 72 y.o. male.    HPI    Lifestyle related medical issues:   Patient comes in today to discuss DMII and some of his other chronic illness and look in to lifestyle changes   Has had DMII x 20 years or so   On 3 medications including insulin     Past Medical History:   Diagnosis Date    Acid reflux     [facility]    Angina pectoris     Back pain     BPH (benign prostatic hyperplasia)     blue    Cholesteatoma     Degenerative joint disease     Diverticulosis     Erectile dysfunction     History of elevated PSA 2/14    normalized, dr dave annually    History of pericarditis     Hypercholesteremia     Hypertension     Iron deficiency anemia     СВЕТЛАНА (obstructive sleep apnea) 5/17/2022    Pacemaker     complete av block;NO afib    Renal disorder     Squamous cell cancer of skin of mastoid region of scalp     dr jaida salgado    Type 2 diabetes mellitus     dr wyman    Urinary incontinence     when he was 7 Yrs old.      Social Determinants of Health with Concerns     No concerns present     Past Surgical History:   Procedure Laterality Date    CARDIAC PACEMAKER PLACEMENT      COLONOSCOPY N/A 9/13/2019    Procedure: COLONOSCOPY;  Surgeon: Kan Ramsey III, MD;  Location: [facility];  Service: Endoscopy;  Laterality: N/A;    ESOPHAGOGASTRODUODENOSCOPY N/A 9/13/2019    Procedure: ESOPHAGOGASTRODUODENOSCOPY (EGD);  Surgeon: Kan Ramsey III, MD;  Location: [facility];  Service: Endoscopy;  Laterality: N/A;    ESOPHAGOGASTRODUODENOSCOPY N/A 9/3/2021    Procedure: EGD (ESOPHAGOGASTRODUODENOSCOPY);  Surgeon: Kaylene Pineda MD;  Location: [facility];  Service: Endoscopy;  Laterality: N/A;    JOINT REPLACEMENT      KNEE CARTILAGE SURGERY Bilateral     NASAL SINUS SURGERY      SHOULDER ARTHROSCOPY Right     TONSILLECTOMY      TOTAL KNEE ARTHROPLASTY Left 05/15/2017    TRANSURETHRAL RESECTION OF PROSTATE      TYMPANOPLASTY      vesectomy        Family History   Problem Relation Age of Onset    Arthritis Mother     Hypertension Mother     Heart disease Father     Hypertension Father     Stroke Father     Diabetes Son     Diabetes Maternal Grandmother     Colon cancer Paternal Grandmother          Physical activity: good, at [facility]   Diet:   Cheerios, lactaid 2% milk , with splenda and 1/2 banana    Drink glucerna   Sleep: no issues   Stress: n/a  Overall wellbeing: good   Barriers to goals: medication, medical issues           Health goal- to reduce/stop insulin and keep A1c controlled     Wt Readings from Last 3 Encounters:   08/18/22 90 kg (198 lb 6.6 oz)   08/10/22 91.2 kg (201 lb 1 oz)   07/26/22 88.3 kg (194 lb 10.7 oz)       Current stage of change prep-   Next stage of change action       Current Outpatient Medications   Medication Instructions    ACETAMINOPHEN (TYLENOL 8 HOUR ORAL) Oral, As needed (PRN)    azelastine (ASTELIN) 274 mcg, Nasal, 2 times daily    Bifidobacterium infantis 4 mg Cap 1 capsule, Daily    blood sugar diagnostic (ACCU-CHEK JAXON) Strp Check BG 2-3 times daily. Accuchek jaxon strips    dutasteride (AVODART) 0.5 mg, Oral, Daily    fenofibrate micronized (LOFIBRA) 200 mg, Oral, Nightly    fluticasone propionate (FLONASE) 50 mcg/actuation nasal spray 1 spray, Each Nostril, Daily    gabapentin (CMPD PAIN MANAGEMENT COMPOUND OINTMNENT THREE) Apply bid prn pain    hydrocortisone 2.5 % cream Topical (Top), 2 times daily    insulin detemir U-100 (LEVEMIR FLEXTOUCH U-100 INSULN) 100 unit/mL (3 mL) InPn pen 16 units in the AM and 8 units in the PM    latanoprost 0.005 % ophthalmic solution 1 drop, Both Eyes, Daily    levocetirizine (XYZAL) 5 mg, Oral, Nightly    liraglutide 0.6 mg/0.1 mL, 18 mg/3 mL, subq PNIJ (VICTOZA 3-ALESHA) 0.6 mg/0.1 mL (18 mg/3 mL) PnIj pen INJECT 1.8 MG DAILY    losartan (COZAAR) 25 mg, Oral, Daily    lovastatin (MEVACOR) 40 MG tablet Take 1 tablet by mouth Every evening.    metFORMIN  "(GLUCOPHAGE) 1,000 mg, Oral, Daily, Generic OK    multivitamin capsule 1 capsule, Oral, Daily    ondansetron (ZOFRAN-ODT) 8 mg, Oral, Every 6 hours PRN    pantoprazole (PROTONIX) 40 mg, Oral, Daily    pen needle, diabetic (BD ULTRA-FINE MINI PEN NEEDLE) 31 gauge x 3/16" Ndle USE AS DIRECTED WITH INSULIN    pioglitazone (ACTOS) 30 mg, Oral, Daily    sildenafil (REVATIO) 20 mg, Oral, 3 times daily    triamcinolone acetonide 0.1% (KENALOG) 0.1 % cream AAA bid            Review of Systems   Constitutional: Negative for fatigue, fever and unexpected weight change.   HENT: Negative for sore throat and trouble swallowing.    Respiratory: Negative for cough and shortness of breath.    Cardiovascular: Negative for chest pain.   Gastrointestinal: Negative for abdominal pain.   Musculoskeletal: Positive for arthralgias.   Hematological: Negative for adenopathy. Does not bruise/bleed easily.   All other systems reviewed and are negative.      Objective:   /78   Pulse 80   Resp 18   Wt 90 kg (198 lb 6.6 oz)   SpO2 100%   BMI 27.67 kg/m²      Physical Exam  Constitutional:       General: He is not in acute distress.     Appearance: Normal appearance. He is well-developed.   HENT:      Head: Normocephalic.   Neurological:      General: No focal deficit present.      Mental Status: He is alert.           Lab Results   Component Value Date    WBC 6.50 01/24/2022    HGB 14.6 01/24/2022    HCT 45.8 01/24/2022     01/24/2022    CHOL 122 01/24/2022    TRIG 59 01/24/2022    HDL 51 01/24/2022    ALT 10 03/03/2022    AST 20 03/03/2022     06/23/2022    K 4.3 06/23/2022     06/23/2022    CREATININE 1.3 06/23/2022    BUN 19 06/23/2022    CO2 29 06/23/2022    TSH 1.837 12/27/2021    PSA 1.4 06/25/2020    INR 1.1 07/02/2018    HGBA1C 6.0 (H) 07/28/2022       Assessment:       1. Calcification of aorta    2. Overweight (BMI 25.0-29.9)    3. Hyperlipidemia associated with type 2 diabetes mellitus    4. " Hypertension associated with diabetes        Plan:   Calcification of aorta    Overweight (BMI 25.0-29.9)  -     Ambulatory referral/consult to [facility]    Hyperlipidemia associated with type 2 diabetes mellitus    Hypertension associated with diabetes       Increase water     Goal is to optimize diet control to remove medications if possible   Focus on veggie/fruit heavy   Mediterranean focused   Handouts for diabetes diet  Went through grocery list , given hard copy of lists     Follow up 4 weeks     Time spent: 60 minutes in face to face and with review prior and after of chart notes from specialists, in regards to diagnosis, prognosis, review of lab and test results, benefits of treatment as well as management of disease, counseling of patient and coordination of care between various health care providers .

## 2022-08-23 ENCOUNTER — OFFICE VISIT (OUTPATIENT)
Dept: ORTHOPEDICS | Facility: CLINIC | Age: 72
End: 2022-08-23
Payer: MEDICARE

## 2022-08-23 ENCOUNTER — LAB VISIT (OUTPATIENT)
Dept: LAB | Facility: HOSPITAL | Age: 72
End: 2022-08-23
Attending: UROLOGY
Payer: MEDICARE

## 2022-08-23 VITALS — HEIGHT: 71 IN | WEIGHT: 199.31 LBS | BODY MASS INDEX: 27.9 KG/M2 | RESPIRATION RATE: 20 BRPM

## 2022-08-23 DIAGNOSIS — M25.571 CHRONIC PAIN OF RIGHT ANKLE: ICD-10-CM

## 2022-08-23 DIAGNOSIS — M77.02 MEDIAL EPICONDYLITIS OF ELBOW, LEFT: Primary | ICD-10-CM

## 2022-08-23 DIAGNOSIS — G89.29 CHRONIC PAIN OF RIGHT ANKLE: ICD-10-CM

## 2022-08-23 DIAGNOSIS — R39.16 BENIGN PROSTATIC HYPERPLASIA (BPH) WITH STRAINING ON URINATION: ICD-10-CM

## 2022-08-23 DIAGNOSIS — N40.1 BENIGN PROSTATIC HYPERPLASIA (BPH) WITH STRAINING ON URINATION: ICD-10-CM

## 2022-08-23 DIAGNOSIS — M76.60 INSERTIONAL ACHILLES TENDINOPATHY: ICD-10-CM

## 2022-08-23 LAB — COMPLEXED PSA SERPL-MCNC: 0.67 NG/ML (ref 0–4)

## 2022-08-23 PROCEDURE — 1159F PR MEDICATION LIST DOCUMENTED IN MEDICAL RECORD: ICD-10-PCS | Mod: CPTII,S$GLB,, | Performed by: STUDENT IN AN ORGANIZED HEALTH CARE EDUCATION/TRAINING PROGRAM

## 2022-08-23 PROCEDURE — 3288F FALL RISK ASSESSMENT DOCD: CPT | Mod: CPTII,S$GLB,, | Performed by: STUDENT IN AN ORGANIZED HEALTH CARE EDUCATION/TRAINING PROGRAM

## 2022-08-23 PROCEDURE — 3072F PR LOW RISK FOR RETINOPATHY: ICD-10-PCS | Mod: CPTII,S$GLB,, | Performed by: STUDENT IN AN ORGANIZED HEALTH CARE EDUCATION/TRAINING PROGRAM

## 2022-08-23 PROCEDURE — 99999 PR PBB SHADOW E&M-EST. PATIENT-LVL III: ICD-10-PCS | Mod: PBBFAC,,, | Performed by: STUDENT IN AN ORGANIZED HEALTH CARE EDUCATION/TRAINING PROGRAM

## 2022-08-23 PROCEDURE — 1159F MED LIST DOCD IN RCRD: CPT | Mod: CPTII,S$GLB,, | Performed by: STUDENT IN AN ORGANIZED HEALTH CARE EDUCATION/TRAINING PROGRAM

## 2022-08-23 PROCEDURE — 3061F PR NEG MICROALBUMINURIA RESULT DOCUMENTED/REVIEW: ICD-10-PCS | Mod: CPTII,S$GLB,, | Performed by: STUDENT IN AN ORGANIZED HEALTH CARE EDUCATION/TRAINING PROGRAM

## 2022-08-23 PROCEDURE — 3066F NEPHROPATHY DOC TX: CPT | Mod: CPTII,S$GLB,, | Performed by: STUDENT IN AN ORGANIZED HEALTH CARE EDUCATION/TRAINING PROGRAM

## 2022-08-23 PROCEDURE — 1125F AMNT PAIN NOTED PAIN PRSNT: CPT | Mod: CPTII,S$GLB,, | Performed by: STUDENT IN AN ORGANIZED HEALTH CARE EDUCATION/TRAINING PROGRAM

## 2022-08-23 PROCEDURE — 1125F PR PAIN SEVERITY QUANTIFIED, PAIN PRESENT: ICD-10-PCS | Mod: CPTII,S$GLB,, | Performed by: STUDENT IN AN ORGANIZED HEALTH CARE EDUCATION/TRAINING PROGRAM

## 2022-08-23 PROCEDURE — 3072F LOW RISK FOR RETINOPATHY: CPT | Mod: CPTII,S$GLB,, | Performed by: STUDENT IN AN ORGANIZED HEALTH CARE EDUCATION/TRAINING PROGRAM

## 2022-08-23 PROCEDURE — 3044F HG A1C LEVEL LT 7.0%: CPT | Mod: CPTII,S$GLB,, | Performed by: STUDENT IN AN ORGANIZED HEALTH CARE EDUCATION/TRAINING PROGRAM

## 2022-08-23 PROCEDURE — 1101F PR PT FALLS ASSESS DOC 0-1 FALLS W/OUT INJ PAST YR: ICD-10-PCS | Mod: CPTII,S$GLB,, | Performed by: STUDENT IN AN ORGANIZED HEALTH CARE EDUCATION/TRAINING PROGRAM

## 2022-08-23 PROCEDURE — 3066F PR DOCUMENTATION OF TREATMENT FOR NEPHROPATHY: ICD-10-PCS | Mod: CPTII,S$GLB,, | Performed by: STUDENT IN AN ORGANIZED HEALTH CARE EDUCATION/TRAINING PROGRAM

## 2022-08-23 PROCEDURE — 3061F NEG MICROALBUMINURIA REV: CPT | Mod: CPTII,S$GLB,, | Performed by: STUDENT IN AN ORGANIZED HEALTH CARE EDUCATION/TRAINING PROGRAM

## 2022-08-23 PROCEDURE — 3288F PR FALLS RISK ASSESSMENT DOCUMENTED: ICD-10-PCS | Mod: CPTII,S$GLB,, | Performed by: STUDENT IN AN ORGANIZED HEALTH CARE EDUCATION/TRAINING PROGRAM

## 2022-08-23 PROCEDURE — 4010F ACE/ARB THERAPY RXD/TAKEN: CPT | Mod: CPTII,S$GLB,, | Performed by: STUDENT IN AN ORGANIZED HEALTH CARE EDUCATION/TRAINING PROGRAM

## 2022-08-23 PROCEDURE — 36415 COLL VENOUS BLD VENIPUNCTURE: CPT | Mod: PO | Performed by: UROLOGY

## 2022-08-23 PROCEDURE — 99214 PR OFFICE/OUTPT VISIT, EST, LEVL IV, 30-39 MIN: ICD-10-PCS | Mod: S$GLB,,, | Performed by: STUDENT IN AN ORGANIZED HEALTH CARE EDUCATION/TRAINING PROGRAM

## 2022-08-23 PROCEDURE — 1101F PT FALLS ASSESS-DOCD LE1/YR: CPT | Mod: CPTII,S$GLB,, | Performed by: STUDENT IN AN ORGANIZED HEALTH CARE EDUCATION/TRAINING PROGRAM

## 2022-08-23 PROCEDURE — 99214 OFFICE O/P EST MOD 30 MIN: CPT | Mod: S$GLB,,, | Performed by: STUDENT IN AN ORGANIZED HEALTH CARE EDUCATION/TRAINING PROGRAM

## 2022-08-23 PROCEDURE — 3008F PR BODY MASS INDEX (BMI) DOCUMENTED: ICD-10-PCS | Mod: CPTII,S$GLB,, | Performed by: STUDENT IN AN ORGANIZED HEALTH CARE EDUCATION/TRAINING PROGRAM

## 2022-08-23 PROCEDURE — 84153 ASSAY OF PSA TOTAL: CPT | Performed by: UROLOGY

## 2022-08-23 PROCEDURE — 4010F PR ACE/ARB THEARPY RXD/TAKEN: ICD-10-PCS | Mod: CPTII,S$GLB,, | Performed by: STUDENT IN AN ORGANIZED HEALTH CARE EDUCATION/TRAINING PROGRAM

## 2022-08-23 PROCEDURE — 99999 PR PBB SHADOW E&M-EST. PATIENT-LVL III: CPT | Mod: PBBFAC,,, | Performed by: STUDENT IN AN ORGANIZED HEALTH CARE EDUCATION/TRAINING PROGRAM

## 2022-08-23 PROCEDURE — 3008F BODY MASS INDEX DOCD: CPT | Mod: CPTII,S$GLB,, | Performed by: STUDENT IN AN ORGANIZED HEALTH CARE EDUCATION/TRAINING PROGRAM

## 2022-08-23 PROCEDURE — 3044F PR MOST RECENT HEMOGLOBIN A1C LEVEL <7.0%: ICD-10-PCS | Mod: CPTII,S$GLB,, | Performed by: STUDENT IN AN ORGANIZED HEALTH CARE EDUCATION/TRAINING PROGRAM

## 2022-08-23 NOTE — PROGRESS NOTES
Patient ID: Rigoberto Vasquez  YOB: 1950  MRN: [id number]    Chief Complaint: Follow-up (Right ankle - achilles )    History of Present Illness: Rigoberto Vasquez is a left-hand dominant 72 y.o. male who presents today with 1/10 pain c/o Follow-up Right ankle - achilles, left posterior elbow.     Patient states intermittent stinging has been 4-5 months. He has tried home therapy , it worsen with walking, standing, morning or night. Rigoberto has finished formal PT for his achilles and has continued with HEP and activity at this time. He completed this 3 days a week and is able to complete all desired activity. He feels as if the pain in the achilles and elbow have imrpoved since last visit.     The patient is active in none.  Occupation: Retired     Past Medical History:   Past Medical History:   Diagnosis Date    Acid reflux     gi [facility]    Angina pectoris     Back pain     BPH (benign prostatic hyperplasia)     blue    Cholesteatoma     Degenerative joint disease     Diverticulosis     Erectile dysfunction     History of elevated PSA 2/14    normalized, dr dave annually    History of pericarditis     Hypercholesteremia     Hypertension     Iron deficiency anemia     СВЕТЛАНА (obstructive sleep apnea) 5/17/2022    Pacemaker     complete av block;NO afib    Renal disorder     Squamous cell cancer of skin of mastoid region of scalp     dr jaida salgado    Type 2 diabetes mellitus     dr wyman    Urinary incontinence     when he was 7 Yrs old.      Past Surgical History:   Procedure Laterality Date    CARDIAC PACEMAKER PLACEMENT      COLONOSCOPY N/A 9/13/2019    Procedure: COLONOSCOPY;  Surgeon: Kan Ramsey III, MD;  Location: [facility];  Service: Endoscopy;  Laterality: N/A;    ESOPHAGOGASTRODUODENOSCOPY N/A 9/13/2019    Procedure: ESOPHAGOGASTRODUODENOSCOPY (EGD);  Surgeon: Kan Ramsey III, MD;  Location: [facility];  Service: Endoscopy;  Laterality: N/A;     "ESOPHAGOGASTRODUODENOSCOPY N/A 9/3/2021    Procedure: EGD (ESOPHAGOGASTRODUODENOSCOPY);  Surgeon: Kaylene Pineda MD;  Location: [facility];  Service: Endoscopy;  Laterality: N/A;    JOINT REPLACEMENT      KNEE CARTILAGE SURGERY Bilateral     NASAL SINUS SURGERY      SHOULDER ARTHROSCOPY Right     TONSILLECTOMY      TOTAL KNEE ARTHROPLASTY Left 05/15/2017    TRANSURETHRAL RESECTION OF PROSTATE      TYMPANOPLASTY      vesectomy       Family History   Problem Relation Age of Onset    Arthritis Mother     Hypertension Mother     Heart disease Father     Hypertension Father     Stroke Father     Diabetes Son     Diabetes Maternal Grandmother     Colon cancer Paternal Grandmother      Social History     Socioeconomic History    Marital status:      Spouse name: SAUL    Number of children: 2   Occupational History    Occupation: retired- [employer] .   Tobacco Use    Smoking status: Never Smoker    Smokeless tobacco: Never Used   Substance and Sexual Activity    Alcohol use: Yes     Comment: " once a month"    Drug use: No    Sexual activity: Yes     Partners: Female   Social History Narrative     . Lives with spouse. Has 2 children. Patient retired from [employer]. His son has type 1 diabetes.     Social Determinants of Health     Financial Resource Strain: Low Risk     Difficulty of Paying Living Expenses: Not hard at all   Food Insecurity: No Food Insecurity    Worried About Running Out of Food in the Last Year: Never true    Ran Out of Food in the Last Year: Never true   Transportation Needs: No Transportation Needs    Lack of Transportation (Medical): No    Lack of Transportation (Non-Medical): No   Physical Activity: Sufficiently Active    Days of Exercise per Week: 5 days    Minutes of Exercise per Session: 60 min   Stress: No Stress Concern Present    Feeling of Stress : Not at all   Social Connections: Moderately Integrated    Frequency of " Communication with Friends and Family: Three times a week    Frequency of Social Gatherings with Friends and Family: Twice a week    Attends [employer] Services: 1 to 4 times per year    Active Member of Clubs or Organizations: No    Attends Club or Organization Meetings: Never    Marital Status:    Housing Stability: Low Risk     Unable to Pay for Housing in the Last Year: No    Number of Places Lived in the Last Year: 1    Unstable Housing in the Last Year: No     Medication List with Changes/Refills   Current Medications    ACETAMINOPHEN (TYLENOL 8 HOUR ORAL)    Take by mouth as needed.    AZELASTINE (ASTELIN) 137 MCG (0.1 %) NASAL SPRAY    2 sprays (274 mcg total) by Nasal route 2 (two) times daily.    BIFIDOBACTERIUM INFANTIS 4 MG CAP    Take 1 capsule by mouth Daily.    BLOOD SUGAR DIAGNOSTIC (ACCU-CHEK JAXON) STRP    Check BG 2-3 times daily. Accuchek jaxon strips    DUTASTERIDE (AVODART) 0.5 MG CAPSULE    Take 1 capsule (0.5 mg total) by mouth once daily.    FENOFIBRATE MICRONIZED (LOFIBRA) 200 MG CAP    Take 1 capsule (200 mg total) by mouth every evening.    FLUTICASONE PROPIONATE (FLONASE) 50 MCG/ACTUATION NASAL SPRAY    1 spray by Each Nostril route once daily.    GABAPENTIN (CMPD PAIN MANAGEMENT COMPOUND OINTMNENT THREE)    Apply bid prn pain    HYDROCORTISONE 2.5 % CREAM    Apply topically 2 (two) times daily.    INSULIN DETEMIR U-100 (LEVEMIR FLEXTOUCH U-100 INSULN) 100 UNIT/ML (3 ML) INPN PEN    16 units in the AM and 8 units in the PM    LATANOPROST 0.005 % OPHTHALMIC SOLUTION    Place 1 drop into both eyes once daily.    LEVOCETIRIZINE (XYZAL) 5 MG TABLET    Take 5 mg by mouth every evening.    LIRAGLUTIDE 0.6 MG/0.1 ML, 18 MG/3 ML, SUBQ PNIJ (VICTOZA 3-ALESHA) 0.6 MG/0.1 ML (18 MG/3 ML) PNIJ PEN    INJECT 1.8 MG DAILY    LOSARTAN (COZAAR) 25 MG TABLET    Take 1 tablet (25 mg total) by mouth once daily.    LOVASTATIN (MEVACOR) 40 MG TABLET    Take 1 tablet by mouth Every evening.     "METFORMIN (GLUCOPHAGE) 1000 MG TABLET    Take 1 tablet (1,000 mg total) by mouth once daily. Generic OK    MULTIVITAMIN CAPSULE    Take 1 capsule by mouth once daily.    ONDANSETRON (ZOFRAN-ODT) 8 MG TBDL    Take 1 tablet (8 mg total) by mouth every 6 (six) hours as needed (nausea).    PANTOPRAZOLE (PROTONIX) 40 MG TABLET    Take 1 tablet (40 mg total) by mouth once daily.    PEN NEEDLE, DIABETIC (BD ULTRA-FINE MINI PEN NEEDLE) 31 GAUGE X 3/16" NDLE    USE AS DIRECTED WITH INSULIN    PIOGLITAZONE (ACTOS) 30 MG TABLET    Take 1 tablet (30 mg total) by mouth once daily.    SILDENAFIL (REVATIO) 20 MG TAB    Take 1 tablet (20 mg total) by mouth 3 (three) times daily.    TRIAMCINOLONE ACETONIDE 0.1% (KENALOG) 0.1 % CREAM    AAA bid     Review of patient's allergies indicates:   Allergen Reactions    Aspirin      Stomach pain even with ppi    Meperidine      Other reaction(s): Stomach upset    Niacin      Other reaction(s): hot skin       Physical Exam:   Body mass index is 27.8 kg/m².    GENERAL: Well appearing, in no acute distress.  HEAD: Normocephalic and atraumatic.  ENT: External ears and nose grossly normal.  EYES: EOMI bilaterally  PULMONARY: Respirations are grossly even and non-labored.  NEURO: Awake, alert, and oriented x 3.  SKIN: No obvious rashes appreciated.  PSYCH: Mood & affect are appropriate.    Detailed MSK exam:     Right ankle exam  Able to do a single leg and double leg heel raise five times without pain. Moderate balance impairment noted bilaterally     Left elbow exam  Minimal tenderness over the insertional triceps. No pain with chair lift-off as well as resisted triceps extension  Tenderness over the medial epicondyle and common flexor tendon negative Tinel's at the cubital tunnel no pain with valgus stress or milking maneuver of the UCL.  No pain with resisted claw hand resisted wrist flexion motor function median ulnar radial nerve all intact 2+ radial pulse    Imaging:    No new " imaging    Assessment:  Rigoberto Vasquez is a 72 y.o. male presents for follow-up today for right insertional Achilles tendinopathy.  Doing much better able to do single leg heel rise today multiple times without much issues.  Mild pain at the end of the day, but otherwise very happy with results.  Pain in the left elbow is more consistent with medial epicondylitis and common flexor tendon pain.  Unable to really elicit with any strength testing today and appears to be more secondary to him resting on his elbow could be minimal cubital tunnel to.  Discussed resting on a pillow when he is sitting down for long period of time and resting his arm in that position as it seems when he is most symptomatic otherwise continue with progression back into weightlifting and exercise and follow up with me if having worsening symptoms in the future.        Medial epicondylitis of elbow, left    Insertional Achilles tendinopathy    Chronic pain of right ankle           Vaibhav Trejo MD    Disclaimer: This note was prepared using a voice recognition system and is likely to have sound alike errors within the text.

## 2022-08-23 NOTE — PROGRESS NOTES
Patient ID: Rigoberto Vasquez  YOB: 1950  MRN: [id number]    Chief Complaint: Follow-up (Right ankle - achilles )      History of Present Illness: Rigoberto Vasquez is a left-hand dominant 72 y.o. male who presents today with 1/10 pain c/o Follow-up Right ankle - achilles.     Patient states intermittent stinging has been 4-5 months. He has tried home therapy , it worsen with walking, standing, morning or night.         The patient is active in none.  Occupation: Retired     ***    Past Medical History:   Past Medical History:   Diagnosis Date    Acid reflux     gi [facility]    Angina pectoris     Back pain     BPH (benign prostatic hyperplasia)     blue    Cholesteatoma     Degenerative joint disease     Diverticulosis     Erectile dysfunction     History of elevated PSA 2/14    normalized, dr dave annually    History of pericarditis     Hypercholesteremia     Hypertension     Iron deficiency anemia     СВЕТЛАНА (obstructive sleep apnea) 5/17/2022    Pacemaker     complete av block;NO afib    Renal disorder     Squamous cell cancer of skin of mastoid region of scalp     dr jaida salgado    Type 2 diabetes mellitus     dr wyman    Urinary incontinence     when he was 7 Yrs old.      Past Surgical History:   Procedure Laterality Date    CARDIAC PACEMAKER PLACEMENT      COLONOSCOPY N/A 9/13/2019    Procedure: COLONOSCOPY;  Surgeon: Kan Ramsey III, MD;  [facility];  Service: Endoscopy;  Laterality: N/A;    ESOPHAGOGASTRODUODENOSCOPY N/A 9/13/2019    Procedure: ESOPHAGOGASTRODUODENOSCOPY (EGD);  Surgeon: Kan Ramsey III, MD;  Location[facility];  Service: Endoscopy;  Laterality: N/A;    ESOPHAGOGASTRODUODENOSCOPY N/A 9/3/2021    Procedure: EGD (ESOPHAGOGASTRODUODENOSCOPY);  Surgeon: Kaylene Pineda MD;  Location: [facility];  Service: Endoscopy;  Laterality: N/A;    JOINT REPLACEMENT      KNEE CARTILAGE SURGERY Bilateral     NASAL SINUS SURGERY      SHOULDER  "ARTHROSCOPY Right     TONSILLECTOMY      TOTAL KNEE ARTHROPLASTY Left 05/15/2017    TRANSURETHRAL RESECTION OF PROSTATE      TYMPANOPLASTY      vesectomy       Family History   Problem Relation Age of Onset    Arthritis Mother     Hypertension Mother     Heart disease Father     Hypertension Father     Stroke Father     Diabetes Son     Diabetes Maternal Grandmother     Colon cancer Paternal Grandmother      Social History     Socioeconomic History    Marital status:      Spouse name: SAUL    Number of children: 2   Occupational History    Occupation: retired- [employer] .   Tobacco Use    Smoking status: Never Smoker    Smokeless tobacco: Never Used   Substance and Sexual Activity    Alcohol use: Yes     Comment: " once a month"    Drug use: No    Sexual activity: Yes     Partners: Female   Social History Narrative     . Lives with spouse. Has 2 children. Patient retired from [employer]. His son has type 1 diabetes.     Social Determinants of Health     Financial Resource Strain: Low Risk     Difficulty of Paying Living Expenses: Not hard at all   Food Insecurity: No Food Insecurity    Worried About Running Out of Food in the Last Year: Never true    Ran Out of Food in the Last Year: Never true   Transportation Needs: No Transportation Needs    Lack of Transportation (Medical): No    Lack of Transportation (Non-Medical): No   Physical Activity: Sufficiently Active    Days of Exercise per Week: 5 days    Minutes of Exercise per Session: 60 min   Stress: No Stress Concern Present    Feeling of Stress : Not at all   Social Connections: Moderately Integrated    Frequency of Communication with Friends and Family: Three times a week    Frequency of Social Gatherings with Friends and Family: Twice a week    Attends [employer] Services: 1 to 4 times per year    Active Member of Clubs or Organizations: No    Attends Club or Organization Meetings: Never    " Marital Status:    Housing Stability: Low Risk     Unable to Pay for Housing in the Last Year: No    Number of Places Lived in the Last Year: 1    Unstable Housing in the Last Year: No     Medication List with Changes/Refills   Current Medications    ACETAMINOPHEN (TYLENOL 8 HOUR ORAL)    Take by mouth as needed.    AZELASTINE (ASTELIN) 137 MCG (0.1 %) NASAL SPRAY    2 sprays (274 mcg total) by Nasal route 2 (two) times daily.    BIFIDOBACTERIUM INFANTIS 4 MG CAP    Take 1 capsule by mouth Daily.    BLOOD SUGAR DIAGNOSTIC (ACCU-CHEK JAXON) STRP    Check BG 2-3 times daily. Accuchek jaxon strips    DUTASTERIDE (AVODART) 0.5 MG CAPSULE    Take 1 capsule (0.5 mg total) by mouth once daily.    FENOFIBRATE MICRONIZED (LOFIBRA) 200 MG CAP    Take 1 capsule (200 mg total) by mouth every evening.    FLUTICASONE PROPIONATE (FLONASE) 50 MCG/ACTUATION NASAL SPRAY    1 spray by Each Nostril route once daily.    GABAPENTIN (CMPD PAIN MANAGEMENT COMPOUND OINTMNENT THREE)    Apply bid prn pain    HYDROCORTISONE 2.5 % CREAM    Apply topically 2 (two) times daily.    INSULIN DETEMIR U-100 (LEVEMIR FLEXTOUCH U-100 INSULN) 100 UNIT/ML (3 ML) INPN PEN    16 units in the AM and 8 units in the PM    LATANOPROST 0.005 % OPHTHALMIC SOLUTION    Place 1 drop into both eyes once daily.    LEVOCETIRIZINE (XYZAL) 5 MG TABLET    Take 5 mg by mouth every evening.    LIRAGLUTIDE 0.6 MG/0.1 ML, 18 MG/3 ML, SUBQ PNIJ (VICTOZA 3-ALESHA) 0.6 MG/0.1 ML (18 MG/3 ML) PNIJ PEN    INJECT 1.8 MG DAILY    LOSARTAN (COZAAR) 25 MG TABLET    Take 1 tablet (25 mg total) by mouth once daily.    LOVASTATIN (MEVACOR) 40 MG TABLET    Take 1 tablet by mouth Every evening.    METFORMIN (GLUCOPHAGE) 1000 MG TABLET    Take 1 tablet (1,000 mg total) by mouth once daily. Generic OK    MULTIVITAMIN CAPSULE    Take 1 capsule by mouth once daily.    ONDANSETRON (ZOFRAN-ODT) 8 MG TBDL    Take 1 tablet (8 mg total) by mouth every 6 (six) hours as needed (nausea).     "PANTOPRAZOLE (PROTONIX) 40 MG TABLET    Take 1 tablet (40 mg total) by mouth once daily.    PEN NEEDLE, DIABETIC (BD ULTRA-FINE MINI PEN NEEDLE) 31 GAUGE X 3/16" NDLE    USE AS DIRECTED WITH INSULIN    PIOGLITAZONE (ACTOS) 30 MG TABLET    Take 1 tablet (30 mg total) by mouth once daily.    SILDENAFIL (REVATIO) 20 MG TAB    Take 1 tablet (20 mg total) by mouth 3 (three) times daily.    TRIAMCINOLONE ACETONIDE 0.1% (KENALOG) 0.1 % CREAM    AAA bid     Review of patient's allergies indicates:   Allergen Reactions    Aspirin      Stomach pain even with ppi    Meperidine      Other reaction(s): Stomach upset    Niacin      Other reaction(s): hot skin       Physical Exam:   Body mass index is 27.8 kg/m².    GENERAL: Well appearing, in no acute distress.  HEAD: Normocephalic and atraumatic.  ENT: External ears and nose grossly normal.  EYES: EOMI bilaterally  PULMONARY: Respirations are grossly even and non-labored.  NEURO: Awake, alert, and oriented x 3.  SKIN: No obvious rashes appreciated.  PSYCH: Mood & affect are appropriate.    Detailed MSK exam:     ***    Imaging:  CT Temporal Bone without contrast  Narrative: EXAMINATION:  CT TEMPORAL BONE WITHOUT CONTRAST    CLINICAL HISTORY:  Cholesteatoma, surgical planning;surveillance of soft tissue in left ear;Unspecified cholesteatoma, left ear    TECHNIQUE:  Low dose axial images were acquired through the temporal bones and skull base without contrast administration.  Coronal and sagittal reconstructions were performed.    COMPARISON:  Temporal bone CT from January of this year.    FINDINGS:  Right Temporal Bone:    *External ear: Minimal debris/wax noted.    *Middle ear: Normal.    *Petrous temporal bone/mastoid air cells: Small amount of fluid at the right mastoid tip, unchanged.  No fracture.    *Inner ear: Normal.    *IAC/CPA: Normal.    *Other: N/A.    Left Temporal Bone:    *External ear: Mild debris/wax noted    *Middle ear: Unchanged focal soft tissue thickening " involving the mesotympanum/hypotympanum.  Ossicular chain appears intact.  No osseous erosion.  Primary differential considerations include cholesteatoma, otitis media, sequelae of postsurgical changes, etc    *Petrous temporal bone/mastoid air cells: Small fluid in the left mastoid air cells, unchanged..  No fracture.    *Inner ear: Normal.    *IAC/CPA: Normal.    *Other: N/A.    Intracranial Compartment (limited evaluation): Normal.    Skull/Extracranial Contents (limited evaluation): Left maxillary sinusitis, unchanged.  Impression: Stable CT from January of this year as above.    Unchanged area of soft tissue thickening involving the left middle ear with differential considerations unchanged.    Small bilateral mastoid effusions, left maxillary sinusitis, and other stable findings as above.    Electronically signed by: Oseas Emmanuel MD  Date:    06/30/2022  Time:    11:55    ***    Relevant imaging results were reviewed and interpreted by me and per my read ***.  This was discussed with the patient and / or family today.     Assessment:  Rigoberto Vasquez is a 72 y.o. male ***    There are no diagnoses linked to this encounter.       Vaibhav Trejo MD    Disclaimer: This note was prepared using a voice recognition system and is likely to have sound alike errors within the text.

## 2022-08-29 ENCOUNTER — OFFICE VISIT (OUTPATIENT)
Dept: UROLOGY | Facility: CLINIC | Age: 72
End: 2022-08-29
Payer: MEDICARE

## 2022-08-29 VITALS
HEIGHT: 71 IN | TEMPERATURE: 97 F | BODY MASS INDEX: 27.86 KG/M2 | HEART RATE: 85 BPM | WEIGHT: 199 LBS | SYSTOLIC BLOOD PRESSURE: 111 MMHG | DIASTOLIC BLOOD PRESSURE: 72 MMHG

## 2022-08-29 DIAGNOSIS — R39.16 BENIGN PROSTATIC HYPERPLASIA (BPH) WITH STRAINING ON URINATION: Primary | ICD-10-CM

## 2022-08-29 DIAGNOSIS — N52.9 ERECTILE DYSFUNCTION, UNSPECIFIED ERECTILE DYSFUNCTION TYPE: ICD-10-CM

## 2022-08-29 DIAGNOSIS — N40.1 BENIGN PROSTATIC HYPERPLASIA (BPH) WITH STRAINING ON URINATION: Primary | ICD-10-CM

## 2022-08-29 PROCEDURE — 99499 UNLISTED E&M SERVICE: CPT | Mod: HCNC,S$GLB,, | Performed by: UROLOGY

## 2022-08-29 PROCEDURE — 4010F ACE/ARB THERAPY RXD/TAKEN: CPT | Mod: CPTII,S$GLB,, | Performed by: UROLOGY

## 2022-08-29 PROCEDURE — 1126F AMNT PAIN NOTED NONE PRSNT: CPT | Mod: CPTII,S$GLB,, | Performed by: UROLOGY

## 2022-08-29 PROCEDURE — 3044F HG A1C LEVEL LT 7.0%: CPT | Mod: CPTII,S$GLB,, | Performed by: UROLOGY

## 2022-08-29 PROCEDURE — 1126F PR PAIN SEVERITY QUANTIFIED, NO PAIN PRESENT: ICD-10-PCS | Mod: CPTII,S$GLB,, | Performed by: UROLOGY

## 2022-08-29 PROCEDURE — 99999 PR PBB SHADOW E&M-EST. PATIENT-LVL IV: CPT | Mod: PBBFAC,,, | Performed by: UROLOGY

## 2022-08-29 PROCEDURE — 99214 OFFICE O/P EST MOD 30 MIN: CPT | Mod: S$GLB,,, | Performed by: UROLOGY

## 2022-08-29 PROCEDURE — 3066F NEPHROPATHY DOC TX: CPT | Mod: CPTII,S$GLB,, | Performed by: UROLOGY

## 2022-08-29 PROCEDURE — 3078F DIAST BP <80 MM HG: CPT | Mod: CPTII,S$GLB,, | Performed by: UROLOGY

## 2022-08-29 PROCEDURE — 99499 RISK ADDL DX/OHS AUDIT: ICD-10-PCS | Mod: HCNC,S$GLB,, | Performed by: UROLOGY

## 2022-08-29 PROCEDURE — 3074F PR MOST RECENT SYSTOLIC BLOOD PRESSURE < 130 MM HG: ICD-10-PCS | Mod: CPTII,S$GLB,, | Performed by: UROLOGY

## 2022-08-29 PROCEDURE — 3008F BODY MASS INDEX DOCD: CPT | Mod: CPTII,S$GLB,, | Performed by: UROLOGY

## 2022-08-29 PROCEDURE — 3061F PR NEG MICROALBUMINURIA RESULT DOCUMENTED/REVIEW: ICD-10-PCS | Mod: CPTII,S$GLB,, | Performed by: UROLOGY

## 2022-08-29 PROCEDURE — 1101F PT FALLS ASSESS-DOCD LE1/YR: CPT | Mod: CPTII,S$GLB,, | Performed by: UROLOGY

## 2022-08-29 PROCEDURE — 4010F PR ACE/ARB THEARPY RXD/TAKEN: ICD-10-PCS | Mod: CPTII,S$GLB,, | Performed by: UROLOGY

## 2022-08-29 PROCEDURE — 1160F PR REVIEW ALL MEDS BY PRESCRIBER/CLIN PHARMACIST DOCUMENTED: ICD-10-PCS | Mod: CPTII,S$GLB,, | Performed by: UROLOGY

## 2022-08-29 PROCEDURE — 3072F LOW RISK FOR RETINOPATHY: CPT | Mod: CPTII,S$GLB,, | Performed by: UROLOGY

## 2022-08-29 PROCEDURE — 3078F PR MOST RECENT DIASTOLIC BLOOD PRESSURE < 80 MM HG: ICD-10-PCS | Mod: CPTII,S$GLB,, | Performed by: UROLOGY

## 2022-08-29 PROCEDURE — 1160F RVW MEDS BY RX/DR IN RCRD: CPT | Mod: CPTII,S$GLB,, | Performed by: UROLOGY

## 2022-08-29 PROCEDURE — 3044F PR MOST RECENT HEMOGLOBIN A1C LEVEL <7.0%: ICD-10-PCS | Mod: CPTII,S$GLB,, | Performed by: UROLOGY

## 2022-08-29 PROCEDURE — 99214 PR OFFICE/OUTPT VISIT, EST, LEVL IV, 30-39 MIN: ICD-10-PCS | Mod: S$GLB,,, | Performed by: UROLOGY

## 2022-08-29 PROCEDURE — 3008F PR BODY MASS INDEX (BMI) DOCUMENTED: ICD-10-PCS | Mod: CPTII,S$GLB,, | Performed by: UROLOGY

## 2022-08-29 PROCEDURE — 3072F PR LOW RISK FOR RETINOPATHY: ICD-10-PCS | Mod: CPTII,S$GLB,, | Performed by: UROLOGY

## 2022-08-29 PROCEDURE — 1101F PR PT FALLS ASSESS DOC 0-1 FALLS W/OUT INJ PAST YR: ICD-10-PCS | Mod: CPTII,S$GLB,, | Performed by: UROLOGY

## 2022-08-29 PROCEDURE — 1159F PR MEDICATION LIST DOCUMENTED IN MEDICAL RECORD: ICD-10-PCS | Mod: CPTII,S$GLB,, | Performed by: UROLOGY

## 2022-08-29 PROCEDURE — 3066F PR DOCUMENTATION OF TREATMENT FOR NEPHROPATHY: ICD-10-PCS | Mod: CPTII,S$GLB,, | Performed by: UROLOGY

## 2022-08-29 PROCEDURE — 3074F SYST BP LT 130 MM HG: CPT | Mod: CPTII,S$GLB,, | Performed by: UROLOGY

## 2022-08-29 PROCEDURE — 3288F PR FALLS RISK ASSESSMENT DOCUMENTED: ICD-10-PCS | Mod: CPTII,S$GLB,, | Performed by: UROLOGY

## 2022-08-29 PROCEDURE — 3061F NEG MICROALBUMINURIA REV: CPT | Mod: CPTII,S$GLB,, | Performed by: UROLOGY

## 2022-08-29 PROCEDURE — 3288F FALL RISK ASSESSMENT DOCD: CPT | Mod: CPTII,S$GLB,, | Performed by: UROLOGY

## 2022-08-29 PROCEDURE — 99999 PR PBB SHADOW E&M-EST. PATIENT-LVL IV: ICD-10-PCS | Mod: PBBFAC,,, | Performed by: UROLOGY

## 2022-08-29 PROCEDURE — 1159F MED LIST DOCD IN RCRD: CPT | Mod: CPTII,S$GLB,, | Performed by: UROLOGY

## 2022-08-29 RX ORDER — SILDENAFIL CITRATE 20 MG/1
20 TABLET ORAL DAILY PRN
Qty: 90 TABLET | Refills: 11 | Status: SHIPPED | OUTPATIENT
Start: 2022-08-29 | End: 2022-12-08 | Stop reason: SDUPTHER

## 2022-08-29 RX ORDER — TAMSULOSIN HYDROCHLORIDE 0.4 MG/1
0.4 CAPSULE ORAL DAILY
Qty: 90 CAPSULE | Refills: 3 | Status: SHIPPED | OUTPATIENT
Start: 2022-08-29 | End: 2022-12-08

## 2022-08-29 NOTE — PROGRESS NOTES
Chief Complaint:   Encounter Diagnoses   Name Primary?    Benign prostatic hyperplasia (BPH) with straining on urination Yes    Erectile dysfunction, unspecified erectile dysfunction type        HPI:   8/29/22- patient lately has had some difficulty with his flow, it seems to be getting worse.  In addition 80 mg of sildenafil is not keeping an erection as long as it had before.  Possibly had an episode of hematospermia recently, has had retrograde ejaculation on occasion.  69-year-old gentleman who reports due to BPH type symptoms.  Patient on tamsulosin after the last 6 months, with no significant improvement.  This happened approximately 14-15 years ago he was having significant issues which required Florence catheterization.  Outside urologist did a TURP, symptoms seem to improve following that.  Patient's also complains of erectile dysfunction which he takes over-the-counter medications for.  One of his biggest complaints is retrograde ejaculation, which is most likely secondary to the tamsulosin.  Patient states he also has some decreased sensation with erections.  He is a significant diabetic Farideh lost from night, with no significant frequency or urgency issues during the daytime.  He states his biggest complaint is difficulty starting and stopping during his stream.  He has had no other urological history, no family history of urological cancers or stones, except for BPH in his father.  No gross hematuria, he has never been a smoker    Allergies:  Aspirin, Meperidine, and Niacin    Medications:  has a current medication list which includes the following prescription(s): acetaminophen, azelastine, bifidobacterium infantis, blood sugar diagnostic, dutasteride, fenofibrate micronized, fluticasone propionate, gabapentin, hydrocortisone, levemir flextouch u-100 insuln, latanoprost, levocetirizine, victoza 3-claudine, losartan, lovastatin, metformin, multivitamin, ondansetron, pantoprazole, pen needle, diabetic,  pioglitazone, triamcinolone acetonide 0.1%, and sildenafil.    Review of Systems:  General: No fever, chills, fatigability, or weight loss.  Skin: No rashes, itching, or changes in color or texture of skin.  Chest: Denies MEEK, cyanosis, wheezing, cough, and sputum production.  Abdomen: Appetite fine. No weight loss. Denies diarrhea, abdominal pain, hematemesis, or blood in stool.  Musculoskeletal: No joint stiffness or swelling. Denies back pain.  : As above.  All other review of systems negative.    PMH:   has a past medical history of Acid reflux, Angina pectoris, Back pain, BPH (benign prostatic hyperplasia), Cholesteatoma, Degenerative joint disease, Diverticulosis, Erectile dysfunction, History of elevated PSA (2/14), History of pericarditis, Hypercholesteremia, Hypertension, Iron deficiency anemia, СВЕТЛАНА (obstructive sleep apnea) (5/17/2022), Pacemaker, Renal disorder, Squamous cell cancer of skin of mastoid region of scalp, Type 2 diabetes mellitus, Urinary incontinence, and when he was 7 Yrs old..    PSH:   has a past surgical history that includes Shoulder arthroscopy (Right); Nasal sinus surgery; Cardiac pacemaker placement; Tonsillectomy; Transurethral resection of prostate; Knee cartilage surgery (Bilateral); Tympanoplasty; vesectomy; Total knee arthroplasty (Left, 05/15/2017); Joint replacement; Esophagogastroduodenoscopy (N/A, 9/13/2019); Colonoscopy (N/A, 9/13/2019); and Esophagogastroduodenoscopy (N/A, 9/3/2021).    FamHx: family history includes Arthritis in his mother; Colon cancer in his paternal grandmother; Diabetes in his maternal grandmother and son; Heart disease in his father; Hypertension in his father and mother; Stroke in his father.    SocHx:  reports that he has never smoked. He has never used smokeless tobacco. He reports current alcohol use. He reports that he does not use drugs.      Physical Exam:  Vitals:    08/29/22 1506   BP: 111/72   Pulse: 85   Temp: 97.4 °F (36.3 °C)      General: A&Ox3, no apparent distress, no deformities  Neck: No masses, normal ROM  Lungs: normal inspiration, no use of accessory muscles  Heart: normal pulse, no arrhythmias  Abdomen: Soft, NT, ND, no masses, no hernias, no hepatosplenomegaly  Skin: The skin is warm and dry. No jaundice.  Ext: No c/c/e.    Labs/Studies:   pvr 49 ml 8/21  psa 0.67 8/22  DIANN complicated left renal cyst 6/22    Impression/Plan:         1.  BPH- dutasteride alone is no longer controlling as well as it had, he has been off tamsulosin for some time now.  Prior to pursuing any other than forms of aggressive therapy he would like to get back on the tamsulosin.  He knows this may cause orthostatic hypotension or dizziness.  He will stop the medication and contact our office if this occurs.  He also knows not to take the sildenafil around the tamsulosin and for at least 12 hours.  Reassess in 3 months with a uroflow and postvoid residual and if stable extend back to yearly from there, of note with hematospermia his PSA was normal.  If this all fails, patient could be a candidate for TURP would need cystoscopy prior to.  Call with any complaints prior to the next appointment.    2.  Erectile dysfunction- 80 mg of sildenafil is not working as effectively, therefore will increase to 100 mg.  He may consider TriMix in the future, would rather hold this course for now though.

## 2022-09-06 DIAGNOSIS — R39.16 BENIGN PROSTATIC HYPERPLASIA (BPH) WITH STRAINING ON URINATION: ICD-10-CM

## 2022-09-06 DIAGNOSIS — N40.1 BENIGN PROSTATIC HYPERPLASIA (BPH) WITH STRAINING ON URINATION: ICD-10-CM

## 2022-09-06 RX ORDER — DUTASTERIDE 0.5 MG/1
0.5 CAPSULE, LIQUID FILLED ORAL DAILY
Qty: 90 CAPSULE | Refills: 3 | Status: CANCELLED | OUTPATIENT
Start: 2022-09-06

## 2022-09-09 DIAGNOSIS — N40.1 BENIGN PROSTATIC HYPERPLASIA (BPH) WITH STRAINING ON URINATION: ICD-10-CM

## 2022-09-09 DIAGNOSIS — R39.16 BENIGN PROSTATIC HYPERPLASIA (BPH) WITH STRAINING ON URINATION: ICD-10-CM

## 2022-09-09 RX ORDER — DUTASTERIDE 0.5 MG/1
0.5 CAPSULE, LIQUID FILLED ORAL DAILY
Qty: 90 CAPSULE | Refills: 3 | Status: SHIPPED | OUTPATIENT
Start: 2022-09-09 | End: 2023-09-05 | Stop reason: SDUPTHER

## 2022-09-22 ENCOUNTER — ANESTHESIA (OUTPATIENT)
Dept: ENDOSCOPY | Facility: HOSPITAL | Age: 72
End: 2022-09-22
Payer: MEDICARE

## 2022-09-22 ENCOUNTER — ANESTHESIA EVENT (OUTPATIENT)
Dept: ENDOSCOPY | Facility: HOSPITAL | Age: 72
End: 2022-09-22
Payer: MEDICARE

## 2022-09-22 ENCOUNTER — HOSPITAL ENCOUNTER (OUTPATIENT)
Facility: HOSPITAL | Age: 72
Discharge: HOME | End: 2022-09-22
Attending: INTERNAL MEDICINE | Admitting: INTERNAL MEDICINE
Payer: MEDICARE

## 2022-09-22 VITALS
WEIGHT: 195 LBS | DIASTOLIC BLOOD PRESSURE: 65 MMHG | BODY MASS INDEX: 27.3 KG/M2 | TEMPERATURE: 98 F | HEIGHT: 71 IN | HEART RATE: 65 BPM | SYSTOLIC BLOOD PRESSURE: 140 MMHG | RESPIRATION RATE: 17 BRPM | OXYGEN SATURATION: 97 %

## 2022-09-22 DIAGNOSIS — Z12.11 COLON CANCER SCREENING: ICD-10-CM

## 2022-09-22 LAB — GLUCOSE SERPL-MCNC: 80 MG/DL (ref 70–110)

## 2022-09-22 PROCEDURE — 37000009 HC ANESTHESIA EA ADD 15 MINS: Performed by: INTERNAL MEDICINE

## 2022-09-22 PROCEDURE — 88305 TISSUE EXAM BY PATHOLOGIST: CPT | Mod: 26,,, | Performed by: PATHOLOGY

## 2022-09-22 PROCEDURE — 45380 PR COLONOSCOPY,BIOPSY: ICD-10-PCS | Mod: 59,PT,, | Performed by: INTERNAL MEDICINE

## 2022-09-22 PROCEDURE — 45385 COLONOSCOPY W/LESION REMOVAL: CPT | Mod: PT,,, | Performed by: INTERNAL MEDICINE

## 2022-09-22 PROCEDURE — 27201089 HC SNARE, DISP (ANY): Performed by: INTERNAL MEDICINE

## 2022-09-22 PROCEDURE — 88305 TISSUE EXAM BY PATHOLOGIST: ICD-10-PCS | Mod: 26,,, | Performed by: PATHOLOGY

## 2022-09-22 PROCEDURE — 27201012 HC FORCEPS, HOT/COLD, DISP: Performed by: INTERNAL MEDICINE

## 2022-09-22 PROCEDURE — 45385 PR COLONOSCOPY,REMV LESN,SNARE: ICD-10-PCS | Mod: PT,,, | Performed by: INTERNAL MEDICINE

## 2022-09-22 PROCEDURE — 45380 COLONOSCOPY AND BIOPSY: CPT | Mod: 59,PT,, | Performed by: INTERNAL MEDICINE

## 2022-09-22 PROCEDURE — 25000003 PHARM REV CODE 250: Performed by: NURSE ANESTHETIST, CERTIFIED REGISTERED

## 2022-09-22 PROCEDURE — 88305 TISSUE EXAM BY PATHOLOGIST: CPT | Performed by: PATHOLOGY

## 2022-09-22 PROCEDURE — 45380 COLONOSCOPY AND BIOPSY: CPT | Mod: 59,PT | Performed by: INTERNAL MEDICINE

## 2022-09-22 PROCEDURE — 82962 GLUCOSE BLOOD TEST: CPT | Performed by: INTERNAL MEDICINE

## 2022-09-22 PROCEDURE — 63600175 PHARM REV CODE 636 W HCPCS: Performed by: NURSE ANESTHETIST, CERTIFIED REGISTERED

## 2022-09-22 PROCEDURE — 45385 COLONOSCOPY W/LESION REMOVAL: CPT | Mod: PT | Performed by: INTERNAL MEDICINE

## 2022-09-22 PROCEDURE — 37000008 HC ANESTHESIA 1ST 15 MINUTES: Performed by: INTERNAL MEDICINE

## 2022-09-22 RX ORDER — LIDOCAINE HYDROCHLORIDE 20 MG/ML
INJECTION, SOLUTION EPIDURAL; INFILTRATION; INTRACAUDAL; PERINEURAL
Status: DISCONTINUED | OUTPATIENT
Start: 2022-09-22 | End: 2022-09-22

## 2022-09-22 RX ORDER — SODIUM CHLORIDE, SODIUM LACTATE, POTASSIUM CHLORIDE, CALCIUM CHLORIDE 600; 310; 30; 20 MG/100ML; MG/100ML; MG/100ML; MG/100ML
INJECTION, SOLUTION INTRAVENOUS CONTINUOUS
Status: DISCONTINUED | OUTPATIENT
Start: 2022-09-22 | End: 2022-09-22 | Stop reason: HOSPADM

## 2022-09-22 RX ORDER — PROPOFOL 10 MG/ML
VIAL (ML) INTRAVENOUS
Status: DISCONTINUED | OUTPATIENT
Start: 2022-09-22 | End: 2022-09-22

## 2022-09-22 RX ORDER — SODIUM CHLORIDE 9 MG/ML
INJECTION, SOLUTION INTRAVENOUS CONTINUOUS
Status: DISCONTINUED | OUTPATIENT
Start: 2022-09-22 | End: 2022-09-22 | Stop reason: HOSPADM

## 2022-09-22 RX ADMIN — PROPOFOL 100 MG: 10 INJECTION, EMULSION INTRAVENOUS at 06:09

## 2022-09-22 RX ADMIN — PROPOFOL 50 MG: 10 INJECTION, EMULSION INTRAVENOUS at 06:09

## 2022-09-22 RX ADMIN — LIDOCAINE HYDROCHLORIDE 40 MG: 20 INJECTION, SOLUTION EPIDURAL; INFILTRATION; INTRACAUDAL; PERINEURAL at 06:09

## 2022-09-22 RX ADMIN — SODIUM CHLORIDE, POTASSIUM CHLORIDE, SODIUM LACTATE AND CALCIUM CHLORIDE: 600; 310; 30; 20 INJECTION, SOLUTION INTRAVENOUS at 06:09

## 2022-09-22 RX ADMIN — PROPOFOL 50 MG: 10 INJECTION, EMULSION INTRAVENOUS at 07:09

## 2022-09-22 RX ADMIN — PROPOFOL 5 MG: 10 INJECTION, EMULSION INTRAVENOUS at 06:09

## 2022-09-22 NOTE — ANESTHESIA PREPROCEDURE EVALUATION
09/22/2022  Rigoberto Vasquez is a 72 y.o., male.      Pre-op Assessment    I have reviewed the Patient Summary Reports.     I have reviewed the Nursing Notes. I have reviewed the NPO Status.   I have reviewed the Medications.     Review of Systems  Anesthesia Hx:  No problems with previous Anesthesia    Social:  Non-Smoker    Hematology/Oncology:         -- Anemia: Hematology Comments: Iron deficiency    Cardiovascular:   Exercise tolerance: good Pacemaker Hypertension Angina hyperlipidemia    Pulmonary:   Sleep Apnea, CPAP    Renal/:   Denies Chronic Renal Disease.     Hepatic/GI:   Bowel Prep. GERD, well controlled    Musculoskeletal:   Arthritis     Neurological:  Neurology Normal    Endocrine:   Diabetes, well controlled, type 2        Physical Exam  General: Well nourished, Cooperative, Alert and Oriented    Airway:  Mallampati: II   Mouth Opening: Normal  TM Distance: Normal  Tongue: Normal    Dental:  Intact    Chest/Lungs:  Clear to auscultation    Heart:  Rate: Normal        Anesthesia Plan  Type of Anesthesia, risks & benefits discussed:    Anesthesia Type: MAC  Intra-op Monitoring Plan: Standard ASA Monitors  Post Op Pain Control Plan: multimodal analgesia  Induction:  IV  Informed Consent: Informed consent signed with the Patient and all parties understand the risks and agree with anesthesia plan.  All questions answered.   ASA Score: 3  Day of Surgery Review of History & Physical: H&P Update referred to the surgeon/provider.I have interviewed and examined the patient. I have reviewed the patient's H&P dated: There are no significant changes.     Ready For Surgery From Anesthesia Perspective.     .

## 2022-09-22 NOTE — PROVATION PATIENT INSTRUCTIONS
Discharge Summary/Instructions after an Endoscopic Procedure  Patient Name: Rigoberto Vasquez  Patient MRN: [id number]  Patient YOB: 1950 Thursday, September 22, 2022 Chris Garcia MD  Dear patient,  As a result of recent federal legislation (The Federal Cures Act), you may   receive lab or pathology results from your procedure in your [facility]   account before your physician is able to contact you. Your physician or   their representative will relay the results to you with their   recommendations at their soonest availability.  Thank you,  RESTRICTIONS:  During your procedure today, you received medications for sedation.  These   medications may affect your judgment, balance and coordination.  Therefore,   for 24 hours, you have the following restrictions:   - DO NOT drive a car, operate machinery, make legal/financial decisions,   sign important papers or drink alcohol.    ACTIVITY:  Today: no heavy lifting, straining or running due to procedural   sedation/anesthesia.  The following day: return to full activity including work.  DIET:  Eat and drink normally unless instructed otherwise.     TREATMENT FOR COMMON SIDE EFFECTS:  - Mild abdominal pain, nausea, belching, bloating or excessive gas:  rest,   eat lightly and use a heating pad.  - Sore Throat: treat with throat lozenges and/or gargle with warm salt   water.  - Because air was used during the procedure, expelling large amounts of air   from your rectum or belching is normal.  - If a bowel prep was taken, you may not have a bowel movement for 1-3 days.    This is normal.  SYMPTOMS TO WATCH FOR AND REPORT TO YOUR PHYSICIAN:  1. Abdominal pain or bloating, other than gas cramps.  2. Chest pain.  3. Back pain.  4. Signs of infection such as: chills or fever occurring within 24 hours   after the procedure.  5. Rectal bleeding, which would show as bright red, maroon, or black stools.   (A tablespoon of blood from the rectum is not serious,  especially if   hemorrhoids are present.)  6. Vomiting.  7. Weakness or dizziness.  GO DIRECTLY TO THE NEAREST EMERGENCY ROOM IF YOU HAVE ANY OF THE FOLLOWING:      Difficulty breathing              Chills and/or fever over 101 F   Persistent vomiting and/or vomiting blood   Severe abdominal pain   Severe chest pain   Black, tarry stools   Bleeding- more than one tablespoon   Any other symptom or condition that you feel may need urgent attention  Your doctor recommends these additional instructions:  If any biopsies were taken, your doctors clinic will contact you in 1 to 2   weeks with any results.  - Discharge patient to home (via wheelchair).   - Resume previous diet.   - Continue present medications.   - Await pathology results.   - Repeat colonoscopy in 5 years for surveillance.   - Return to referring physician.  For questions, problems or results please call your physician Chris Garcia MD at Work:  [phone]  If you have any questions about the above instructions, call the GI   department at [phone] or call the endoscopy unit at [phone]   from 7am until 3 pm.  [facility] [place], EMERGENCY ROOM PHONE NUMBER:   [phone]  IF A COMPLICATION OR EMERGENCY SITUATION ARISES AND YOU ARE UNABLE TO REACH   YOUR PHYSICIAN - GO DIRECTLY TO THE EMERGENCY ROOM.  I have read or have had read to me these discharge instructions for my   procedure and have received a written copy.  I understand these   instructions and will follow-up with my physician if I have any questions.     __________________________________       _____________________________________  Nurse Signature                                          Patient/Designated   Responsible Party Signature  MD Chris Zelaya MD  9/22/2022 7:10:59 AM  This report has been verified and signed electronically.  Dear patient,  As a result of recent federal legislation (The Federal Cures Act), you  may   receive lab or pathology results from your procedure in your [facility]   account before your physician is able to contact you. Your physician or   their representative will relay the results to you with their   recommendations at their soonest availability.  Thank you,  PROVATION

## 2022-09-22 NOTE — ANESTHESIA POSTPROCEDURE EVALUATION
Anesthesia Post Evaluation    Patient: Rigoberto Vasquez    Procedure(s) Performed: Procedure(s) (LRB):  COLONOSCOPY (N/A)    Final Anesthesia Type: MAC      Patient location during evaluation: GI PACU  Patient participation: Yes- Able to Participate  Level of consciousness: awake and alert  Post-procedure vital signs: reviewed and stable  Pain management: adequate  Airway patency: patent  СВЕТЛАНА mitigation strategies: Multimodal analgesia  PONV status at discharge: No PONV  Anesthetic complications: no      Cardiovascular status: hemodynamically stable  Respiratory status: unassisted, spontaneous ventilation and room air  Hydration status: euvolemic  Follow-up not needed.          Vitals Value Taken Time   /67 09/22/22 0710   Temp 36.7 °C (98.1 °F) 09/22/22 0710   Pulse 69 09/22/22 0710   Resp 14 09/22/22 0710   SpO2 93 % 09/22/22 0710         No case tracking events are documented in the log.      Pain/Berna Score: Berna Score: 6 (9/22/2022  7:10 AM)

## 2022-09-22 NOTE — TRANSFER OF CARE
"Anesthesia Transfer of Care Note    Patient: Rigoberto Vasquez    Procedure(s) Performed: Procedure(s) (LRB):  COLONOSCOPY (N/A)    Patient location: GI    Anesthesia Type: MAC    Transport from OR: Transported from OR on room air with adequate spontaneous ventilation    Post pain: adequate analgesia    Post assessment: no apparent anesthetic complications    Post vital signs: stable    Level of consciousness: awake    Nausea/Vomiting: no nausea/vomiting    Complications: none    Transfer of care protocol was followed      Last vitals:   Visit Vitals  BP (!) 166/89   Pulse 82   Temp 36.7 °C (98.1 °F) (Temporal)   Resp 18   Ht 5' 11" (1.803 m)   Wt 88.5 kg (195 lb)   SpO2 98%   BMI 27.20 kg/m²     "

## 2022-09-22 NOTE — H&P
Short Stay Endoscopy History and Physical    PCP - Jesse Grimes MD  Referring Physician - Jesse Grimes MD  [id number] [facility]  [place] [address]    Procedure - Colonoscopy  ASA - per anesthesia  Mallampati - per anesthesia  History of Anesthesia problems - no  Family history Anesthesia problems -  no   Plan of anesthesia - General    HPI  72 y.o. male  Reason for procedure: surveillance for polyps    H/o polyps      ROS:  Constitutional: No fevers, chills, No weight loss  CV: No chest pain  Pulm: No cough, No shortness of breath  GI: see HPI    Medical History:  has a past medical history of Acid reflux, Angina pectoris, Back pain, BPH (benign prostatic hyperplasia), Cholesteatoma, Degenerative joint disease, Diverticulosis, Erectile dysfunction, History of elevated PSA (2/14), History of pericarditis, Hypercholesteremia, Hypertension, Iron deficiency anemia, СВЕТЛАНА (obstructive sleep apnea) (5/17/2022), Pacemaker, Renal disorder, Squamous cell cancer of skin of mastoid region of scalp, Type 2 diabetes mellitus, Urinary incontinence, and when he was 7 Yrs old..    Surgical History:  has a past surgical history that includes Shoulder arthroscopy (Right); Nasal sinus surgery; Cardiac pacemaker placement; Tonsillectomy; Transurethral resection of prostate; Knee cartilage surgery (Bilateral); Tympanoplasty; vesectomy; Total knee arthroplasty (Left, 05/15/2017); Joint replacement; Esophagogastroduodenoscopy (N/A, 9/13/2019); Colonoscopy (N/A, 9/13/2019); and Esophagogastroduodenoscopy (N/A, 9/3/2021).    Family History: family history includes Arthritis in his mother; Colon cancer in his paternal grandmother; Diabetes in his maternal grandmother and son; Heart disease in his father; Hypertension in his father and mother; Stroke in his father..    Social History:  reports that he has never smoked. He has never used smokeless tobacco. He reports current alcohol use. He reports that he does not use  drugs.    Review of patient's allergies indicates:   Allergen Reactions    Aspirin      Stomach pain even with ppi    Meperidine      Other reaction(s): Stomach upset    Niacin      Other reaction(s): hot skin       Medications:   Medications Prior to Admission   Medication Sig Dispense Refill Last Dose    ACETAMINOPHEN (TYLENOL 8 HOUR ORAL) Take by mouth as needed.   9/21/2022    azelastine (ASTELIN) 137 mcg (0.1 %) nasal spray 2 sprays (274 mcg total) by Nasal route 2 (two) times daily. 30 mL 12 9/21/2022    fenofibrate micronized (LOFIBRA) 200 MG Cap Take 1 capsule (200 mg total) by mouth every evening. 90 capsule 3 9/21/2022    fluticasone propionate (FLONASE) 50 mcg/actuation nasal spray 1 spray by Each Nostril route once daily.   9/21/2022    gabapentin (CMPD PAIN MANAGEMENT COMPOUND OINTMNENT THREE) Apply bid prn pain 100 g 11 Past Week    hydrocortisone 2.5 % cream Apply topically 2 (two) times daily. 3.5 g 5 9/21/2022    insulin detemir U-100 (LEVEMIR FLEXTOUCH U-100 INSULN) 100 unit/mL (3 mL) InPn pen 16 units in the AM and 8 units in the PM 30 mL 3 9/21/2022    levocetirizine (XYZAL) 5 MG tablet Take 5 mg by mouth every evening.   9/21/2022    liraglutide 0.6 mg/0.1 mL, 18 mg/3 mL, subq PNIJ (VICTOZA 3-ALESHA) 0.6 mg/0.1 mL (18 mg/3 mL) PnIj pen INJECT 1.8 MG DAILY 27 mL 0 9/21/2022    losartan (COZAAR) 25 MG tablet Take 1 tablet (25 mg total) by mouth once daily. 90 tablet 3 9/21/2022    lovastatin (MEVACOR) 40 MG tablet Take 1 tablet by mouth Every evening. 90 tablet 3 9/21/2022    metFORMIN (GLUCOPHAGE) 1000 MG tablet Take 1 tablet (1,000 mg total) by mouth once daily. Generic  tablet 3 9/21/2022    multivitamin capsule Take 1 capsule by mouth once daily.   9/21/2022    pantoprazole (PROTONIX) 40 MG tablet Take 1 tablet (40 mg total) by mouth once daily. 90 tablet 1 9/21/2022    pioglitazone (ACTOS) 30 MG tablet Take 1 tablet (30 mg total) by mouth once daily. 90 tablet 0 9/21/2022    tamsulosin  "(FLOMAX) 0.4 mg Cap Take 1 capsule (0.4 mg total) by mouth once daily. 90 capsule 3 9/21/2022    triamcinolone acetonide 0.1% (KENALOG) 0.1 % cream AAA bid 454 g 0 9/21/2022    Bifidobacterium infantis 4 mg Cap Take 1 capsule by mouth Daily.   More than a month    blood sugar diagnostic (ACCU-CHEK JAXON) Strp Check BG 2-3 times daily. Accuchek jaxon strips 300 strip 3     dutasteride (AVODART) 0.5 mg capsule Take 1 capsule (0.5 mg total) by mouth once daily. 90 capsule 3     latanoprost 0.005 % ophthalmic solution Place 1 drop into both eyes once daily. 3 mL 4     ondansetron (ZOFRAN-ODT) 8 MG TbDL Take 1 tablet (8 mg total) by mouth every 6 (six) hours as needed (nausea). 20 tablet 0 More than a month    pen needle, diabetic (BD ULTRA-FINE MINI PEN NEEDLE) 31 gauge x 3/16" Ndle USE AS DIRECTED WITH INSULIN 90 each 4     sildenafil (REVATIO) 20 mg Tab Take 1 tablet (20 mg total) by mouth daily as needed (erectile dysfunction). Takes prn 90 tablet 11 More than a month       Physical Exam:    Vital Signs:   Vitals:    09/22/22 0635   BP: (!) 166/89   Pulse:    Resp:    Temp:        General Appearance: Well appearing in no acute distress  Abdomen: Soft, non tender, non distended with normal bowel sounds, no masses    Labs:  Lab Results   Component Value Date    WBC 6.50 01/24/2022    HGB 14.6 01/24/2022    HCT 45.8 01/24/2022     01/24/2022    CHOL 122 01/24/2022    TRIG 59 01/24/2022    HDL 51 01/24/2022    ALT 10 03/03/2022    AST 20 03/03/2022     06/23/2022    K 4.3 06/23/2022     06/23/2022    CREATININE 1.3 06/23/2022    BUN 19 06/23/2022    CO2 29 06/23/2022    TSH 1.837 12/27/2021    PSA 1.4 06/25/2020    INR 1.1 07/02/2018    HGBA1C 6.0 (H) 07/28/2022       I have explained the risks and benefits of this endoscopic procedure to the patient including but not limited to bleeding, inflammation, infection, perforation, and death.    SEDATION PLAN: per anesthesia      History reviewed, vital " signs satisfactory, cardiopulmonary status satisfactory, sedation options, risks and plans have been discussed with the patient  All their questions were answered and the patient agrees to the sedation procedures as planned and the patient is deemed an appropriate candidate for the sedation as planned.    Procedure explained to patient, informed consent obtained and placed in chart.        Chris Garcia MD

## 2022-09-22 NOTE — DISCHARGE SUMMARY
[facility] - Endoscopy (Hospital)  Discharge Note  Short Stay    Procedure(s) (LRB):  COLONOSCOPY (N/A)    OUTCOME: Patient tolerated treatment/procedure well without complication and is now ready for discharge.    DISPOSITION: Home or Self Care    FINAL DIAGNOSIS:  Colon cancer screening    FOLLOWUP: With primary care provider    DISCHARGE INSTRUCTIONS:  No discharge procedures on file.

## 2022-09-26 ENCOUNTER — OFFICE VISIT (OUTPATIENT)
Dept: OTOLARYNGOLOGY | Facility: CLINIC | Age: 72
End: 2022-09-26
Payer: MEDICARE

## 2022-09-26 ENCOUNTER — PATIENT MESSAGE (OUTPATIENT)
Dept: HEPATOLOGY | Facility: CLINIC | Age: 72
End: 2022-09-26
Payer: MEDICARE

## 2022-09-26 VITALS
BODY MASS INDEX: 26.35 KG/M2 | TEMPERATURE: 98 F | SYSTOLIC BLOOD PRESSURE: 136 MMHG | HEART RATE: 79 BPM | HEIGHT: 71 IN | WEIGHT: 188.25 LBS | DIASTOLIC BLOOD PRESSURE: 81 MMHG

## 2022-09-26 DIAGNOSIS — R04.0 EPISTAXIS: Primary | ICD-10-CM

## 2022-09-26 LAB
FINAL PATHOLOGIC DIAGNOSIS: NORMAL
GROSS: NORMAL
Lab: NORMAL

## 2022-09-26 PROCEDURE — 3008F PR BODY MASS INDEX (BMI) DOCUMENTED: ICD-10-PCS | Mod: CPTII,S$GLB,, | Performed by: OTOLARYNGOLOGY

## 2022-09-26 PROCEDURE — 3079F PR MOST RECENT DIASTOLIC BLOOD PRESSURE 80-89 MM HG: ICD-10-PCS | Mod: CPTII,S$GLB,, | Performed by: OTOLARYNGOLOGY

## 2022-09-26 PROCEDURE — 31238 NSL/SINS NDSC SRG NSL HEMRRG: CPT | Mod: RT,S$GLB,, | Performed by: OTOLARYNGOLOGY

## 2022-09-26 PROCEDURE — 3044F HG A1C LEVEL LT 7.0%: CPT | Mod: CPTII,S$GLB,, | Performed by: OTOLARYNGOLOGY

## 2022-09-26 PROCEDURE — 3066F PR DOCUMENTATION OF TREATMENT FOR NEPHROPATHY: ICD-10-PCS | Mod: CPTII,S$GLB,, | Performed by: OTOLARYNGOLOGY

## 2022-09-26 PROCEDURE — 3066F NEPHROPATHY DOC TX: CPT | Mod: CPTII,S$GLB,, | Performed by: OTOLARYNGOLOGY

## 2022-09-26 PROCEDURE — 3061F PR NEG MICROALBUMINURIA RESULT DOCUMENTED/REVIEW: ICD-10-PCS | Mod: CPTII,S$GLB,, | Performed by: OTOLARYNGOLOGY

## 2022-09-26 PROCEDURE — 3044F PR MOST RECENT HEMOGLOBIN A1C LEVEL <7.0%: ICD-10-PCS | Mod: CPTII,S$GLB,, | Performed by: OTOLARYNGOLOGY

## 2022-09-26 PROCEDURE — 3072F LOW RISK FOR RETINOPATHY: CPT | Mod: CPTII,S$GLB,, | Performed by: OTOLARYNGOLOGY

## 2022-09-26 PROCEDURE — 3079F DIAST BP 80-89 MM HG: CPT | Mod: CPTII,S$GLB,, | Performed by: OTOLARYNGOLOGY

## 2022-09-26 PROCEDURE — 3288F FALL RISK ASSESSMENT DOCD: CPT | Mod: CPTII,S$GLB,, | Performed by: OTOLARYNGOLOGY

## 2022-09-26 PROCEDURE — 1101F PT FALLS ASSESS-DOCD LE1/YR: CPT | Mod: CPTII,S$GLB,, | Performed by: OTOLARYNGOLOGY

## 2022-09-26 PROCEDURE — 3061F NEG MICROALBUMINURIA REV: CPT | Mod: CPTII,S$GLB,, | Performed by: OTOLARYNGOLOGY

## 2022-09-26 PROCEDURE — 3008F BODY MASS INDEX DOCD: CPT | Mod: CPTII,S$GLB,, | Performed by: OTOLARYNGOLOGY

## 2022-09-26 PROCEDURE — 99213 OFFICE O/P EST LOW 20 MIN: CPT | Mod: 25,S$GLB,, | Performed by: OTOLARYNGOLOGY

## 2022-09-26 PROCEDURE — 1126F AMNT PAIN NOTED NONE PRSNT: CPT | Mod: CPTII,S$GLB,, | Performed by: OTOLARYNGOLOGY

## 2022-09-26 PROCEDURE — 99213 PR OFFICE/OUTPT VISIT, EST, LEVL III, 20-29 MIN: ICD-10-PCS | Mod: 25,S$GLB,, | Performed by: OTOLARYNGOLOGY

## 2022-09-26 PROCEDURE — 3075F SYST BP GE 130 - 139MM HG: CPT | Mod: CPTII,S$GLB,, | Performed by: OTOLARYNGOLOGY

## 2022-09-26 PROCEDURE — 3288F PR FALLS RISK ASSESSMENT DOCUMENTED: ICD-10-PCS | Mod: CPTII,S$GLB,, | Performed by: OTOLARYNGOLOGY

## 2022-09-26 PROCEDURE — 3075F PR MOST RECENT SYSTOLIC BLOOD PRESS GE 130-139MM HG: ICD-10-PCS | Mod: CPTII,S$GLB,, | Performed by: OTOLARYNGOLOGY

## 2022-09-26 PROCEDURE — 1159F MED LIST DOCD IN RCRD: CPT | Mod: CPTII,S$GLB,, | Performed by: OTOLARYNGOLOGY

## 2022-09-26 PROCEDURE — 31238 PR NASAL/SINUS ENDOSCOPY,W/CONTROL NASAL HEM: ICD-10-PCS | Mod: RT,S$GLB,, | Performed by: OTOLARYNGOLOGY

## 2022-09-26 PROCEDURE — 4010F ACE/ARB THERAPY RXD/TAKEN: CPT | Mod: CPTII,S$GLB,, | Performed by: OTOLARYNGOLOGY

## 2022-09-26 PROCEDURE — 1159F PR MEDICATION LIST DOCUMENTED IN MEDICAL RECORD: ICD-10-PCS | Mod: CPTII,S$GLB,, | Performed by: OTOLARYNGOLOGY

## 2022-09-26 PROCEDURE — 99999 PR PBB SHADOW E&M-EST. PATIENT-LVL IV: ICD-10-PCS | Mod: PBBFAC,,, | Performed by: OTOLARYNGOLOGY

## 2022-09-26 PROCEDURE — 99999 PR PBB SHADOW E&M-EST. PATIENT-LVL IV: CPT | Mod: PBBFAC,,, | Performed by: OTOLARYNGOLOGY

## 2022-09-26 PROCEDURE — 1126F PR PAIN SEVERITY QUANTIFIED, NO PAIN PRESENT: ICD-10-PCS | Mod: CPTII,S$GLB,, | Performed by: OTOLARYNGOLOGY

## 2022-09-26 PROCEDURE — 3072F PR LOW RISK FOR RETINOPATHY: ICD-10-PCS | Mod: CPTII,S$GLB,, | Performed by: OTOLARYNGOLOGY

## 2022-09-26 PROCEDURE — 4010F PR ACE/ARB THEARPY RXD/TAKEN: ICD-10-PCS | Mod: CPTII,S$GLB,, | Performed by: OTOLARYNGOLOGY

## 2022-09-26 PROCEDURE — 1101F PR PT FALLS ASSESS DOC 0-1 FALLS W/OUT INJ PAST YR: ICD-10-PCS | Mod: CPTII,S$GLB,, | Performed by: OTOLARYNGOLOGY

## 2022-09-26 NOTE — PROGRESS NOTES
"Referring Provider:    Aaareferral Self  No address on file  Subjective:   Patient: Rigoberto Vasquez [id number], :1950   Visit date:2022 9:19 AM    Chief Complaint: see below  HPI:       Epistaxis (Right side )    Prior notes reviewed  Clinical documentation obtained by nursing staff reviewed.     Epistaxis (negative unless checked off):    [] History of intermittent bleeding several months or years  [x] Right side  [] Left side  [] Bilateral   [] Bleeding isolated to blood in mucus or on tissues  [] Bleeding more than 1/4 cup of blood at any isolated bleed  [] Ever Required a blood transfusion due to nose bleeds  [] Primarily Posterior Bleeding  [] History of coagulation disorders/bleeding when having teeth cleaning  [] Currently on anticoagulant medications:   [] Asprin   [] Warfarin   [] Plavix   [] Other  [] Recent trauma or surgery      Blood pressure:   BP Readings from Last 3 Encounters:   22 136/81   22 (!) 140/65   22 111/72       Bleed last week and again this am.  Notes that SBP was significantly elevated.  Has had higher pressures since bowel prep last week for colonoscopy (high Na).       Objective:     Physical Exam:  Vitals:  /81   Pulse 79   Temp 97.7 °F (36.5 °C) (Temporal)   Ht 5' 11" (1.803 m)   Wt 85.4 kg (188 lb 4.4 oz)   BMI 26.26 kg/m²   General appearance:  Well developed, well nourished    Ears:  Otoscopy of external auditory canals and tympanic membranes was normal, clinical speech reception thresholds grossly intact, no mass/lesion of auricle.    Nose:  No masses/lesions of external nose, nasal mucosa, septum, and turbinates were within normal limits.    Mouth:  No mass/lesion of lips, teeth, gums, hard/soft palate, tongue, tonsils, or oropharynx.    Neck & Lymphatics:  No cervical lymphadenopathy, no neck mass/crepitus/ asymmetry, trachea is midline, no thyroid enlargement/tenderness/mass.      Patient: Rigoberto Vasquez [id number], :1950  Procedure " date:9/26/2022  Patient's medications, allergies, past medical, surgical, social and family histories were reviewed and updated as appropriate.  Chief Complaint:  Epistaxis (Right side )    HPI:  Rigoberto is a 72 y.o. male with the history of present illness as discussed in the clinic note from today.  Anterior rhinoscopy is insufficient to account for symptoms.     Procedure: Risks, benefits, and alternatives of the procedure were discussed with the patient, and the patient consented to the endoscopic control of epistaxis.  The nasal cavity was sprayed with a topical decongestant and topical anesthetic. After adequate anesthesia was obtained, the rigid endoscope was passed into each nostril independently.  Each nasal cavity was visualized with inspection of the turbinates, middle and superior meatus and the sphenoethmoidal recess.    Endoscopic control of epistaxis was performed for the right side(s). Endoscopically the sinonasal structures were evaluated.  The concerning area(s) for bleeding were addressed with silver nitrate applied topically to the area(s) and at the end of the procedure there was no further bleeding from the nose and sinuses.  The patient tolerated the procedure well with no complications.    Nasal Findings  -     The area(s) causing the epistaxis (and cauterized today) were found to be arising from prominent blood vessels located at the caudal septum in Kiesselbach's plexus (Little's area) on the right side.  -     The nasal mucosa has normal mucosa bilaterally  -     Nasal secretions: - no discolored secretions noted - bilaterally  -     Nasal septum: no significant deviation and no perforation appreciated   -     Inferior turbinate: - normal mucosa without significant hypertrophy - bilaterally  -     Other findings: - no mass or obstructive lesion -        []  Data Reviewed:    Lab Results   Component Value Date    WBC 6.50 01/24/2022    HGB 14.6 01/24/2022    HCT 45.8 01/24/2022     (H)  01/24/2022    EOSINOPHIL 1.5 01/24/2022                 Assessment & Plan:   There are no diagnoses linked to this encounter.    -     Epistaxis - Rigoberto has right sided epistaxis.  There  is not active bleeding currently.  Cauterization was deemed necessary at the current clinic visit.    We discussed preventive measures such as the application of moisturizing gel to the nose (ie. AYR nasal gel) at night and saline spray in the daytime.  We discussed other issues which can cause recurrence of nosebleeds, such as NSAIDs, aggressive nose blowing/wiping, etc.  If there is further bleeding he should return to the clinic for re-evaluation.  Conservative measures for nosebleeds include pinching nose, ice to area, and Afrin.  We will see Rigoberto back if there are further issues (as needed).      Thank you for allowing me to participate in the care of Rigoberto.       Rigoberto Lopez MD, FACS  [facility]   [facility]  [address]  [place] [address]  P: [phone]  F: [phone]

## 2022-10-05 ENCOUNTER — OFFICE VISIT (OUTPATIENT)
Dept: DERMATOLOGY | Facility: CLINIC | Age: 72
End: 2022-10-05
Payer: MEDICARE

## 2022-10-05 DIAGNOSIS — L82.1 SEBORRHEIC KERATOSES: ICD-10-CM

## 2022-10-05 DIAGNOSIS — Z12.83 SCREENING, MALIGNANT NEOPLASM, SKIN: ICD-10-CM

## 2022-10-05 DIAGNOSIS — D18.01 CHERRY ANGIOMA: ICD-10-CM

## 2022-10-05 DIAGNOSIS — L57.0 ACTINIC KERATOSES: Primary | ICD-10-CM

## 2022-10-05 PROCEDURE — 3066F NEPHROPATHY DOC TX: CPT | Mod: CPTII,S$GLB,, | Performed by: STUDENT IN AN ORGANIZED HEALTH CARE EDUCATION/TRAINING PROGRAM

## 2022-10-05 PROCEDURE — 1101F PT FALLS ASSESS-DOCD LE1/YR: CPT | Mod: CPTII,S$GLB,, | Performed by: STUDENT IN AN ORGANIZED HEALTH CARE EDUCATION/TRAINING PROGRAM

## 2022-10-05 PROCEDURE — 17003 DESTRUCTION, PREMALIGNANT LESIONS; SECOND THROUGH 14 LESIONS: ICD-10-PCS | Mod: S$GLB,,, | Performed by: STUDENT IN AN ORGANIZED HEALTH CARE EDUCATION/TRAINING PROGRAM

## 2022-10-05 PROCEDURE — 1159F MED LIST DOCD IN RCRD: CPT | Mod: CPTII,S$GLB,, | Performed by: STUDENT IN AN ORGANIZED HEALTH CARE EDUCATION/TRAINING PROGRAM

## 2022-10-05 PROCEDURE — 1101F PR PT FALLS ASSESS DOC 0-1 FALLS W/OUT INJ PAST YR: ICD-10-PCS | Mod: CPTII,S$GLB,, | Performed by: STUDENT IN AN ORGANIZED HEALTH CARE EDUCATION/TRAINING PROGRAM

## 2022-10-05 PROCEDURE — 1160F RVW MEDS BY RX/DR IN RCRD: CPT | Mod: CPTII,S$GLB,, | Performed by: STUDENT IN AN ORGANIZED HEALTH CARE EDUCATION/TRAINING PROGRAM

## 2022-10-05 PROCEDURE — 17003 DESTRUCT PREMALG LES 2-14: CPT | Mod: S$GLB,,, | Performed by: STUDENT IN AN ORGANIZED HEALTH CARE EDUCATION/TRAINING PROGRAM

## 2022-10-05 PROCEDURE — 3061F PR NEG MICROALBUMINURIA RESULT DOCUMENTED/REVIEW: ICD-10-PCS | Mod: CPTII,S$GLB,, | Performed by: STUDENT IN AN ORGANIZED HEALTH CARE EDUCATION/TRAINING PROGRAM

## 2022-10-05 PROCEDURE — 3044F HG A1C LEVEL LT 7.0%: CPT | Mod: CPTII,S$GLB,, | Performed by: STUDENT IN AN ORGANIZED HEALTH CARE EDUCATION/TRAINING PROGRAM

## 2022-10-05 PROCEDURE — 99999 PR PBB SHADOW E&M-EST. PATIENT-LVL III: ICD-10-PCS | Mod: PBBFAC,,, | Performed by: STUDENT IN AN ORGANIZED HEALTH CARE EDUCATION/TRAINING PROGRAM

## 2022-10-05 PROCEDURE — 3288F FALL RISK ASSESSMENT DOCD: CPT | Mod: CPTII,S$GLB,, | Performed by: STUDENT IN AN ORGANIZED HEALTH CARE EDUCATION/TRAINING PROGRAM

## 2022-10-05 PROCEDURE — 3061F NEG MICROALBUMINURIA REV: CPT | Mod: CPTII,S$GLB,, | Performed by: STUDENT IN AN ORGANIZED HEALTH CARE EDUCATION/TRAINING PROGRAM

## 2022-10-05 PROCEDURE — 1160F PR REVIEW ALL MEDS BY PRESCRIBER/CLIN PHARMACIST DOCUMENTED: ICD-10-PCS | Mod: CPTII,S$GLB,, | Performed by: STUDENT IN AN ORGANIZED HEALTH CARE EDUCATION/TRAINING PROGRAM

## 2022-10-05 PROCEDURE — 17000 DESTRUCT PREMALG LESION: CPT | Mod: S$GLB,,, | Performed by: STUDENT IN AN ORGANIZED HEALTH CARE EDUCATION/TRAINING PROGRAM

## 2022-10-05 PROCEDURE — 3072F LOW RISK FOR RETINOPATHY: CPT | Mod: CPTII,S$GLB,, | Performed by: STUDENT IN AN ORGANIZED HEALTH CARE EDUCATION/TRAINING PROGRAM

## 2022-10-05 PROCEDURE — 1159F PR MEDICATION LIST DOCUMENTED IN MEDICAL RECORD: ICD-10-PCS | Mod: CPTII,S$GLB,, | Performed by: STUDENT IN AN ORGANIZED HEALTH CARE EDUCATION/TRAINING PROGRAM

## 2022-10-05 PROCEDURE — 4010F PR ACE/ARB THEARPY RXD/TAKEN: ICD-10-PCS | Mod: CPTII,S$GLB,, | Performed by: STUDENT IN AN ORGANIZED HEALTH CARE EDUCATION/TRAINING PROGRAM

## 2022-10-05 PROCEDURE — 3072F PR LOW RISK FOR RETINOPATHY: ICD-10-PCS | Mod: CPTII,S$GLB,, | Performed by: STUDENT IN AN ORGANIZED HEALTH CARE EDUCATION/TRAINING PROGRAM

## 2022-10-05 PROCEDURE — 1126F PR PAIN SEVERITY QUANTIFIED, NO PAIN PRESENT: ICD-10-PCS | Mod: CPTII,S$GLB,, | Performed by: STUDENT IN AN ORGANIZED HEALTH CARE EDUCATION/TRAINING PROGRAM

## 2022-10-05 PROCEDURE — 3044F PR MOST RECENT HEMOGLOBIN A1C LEVEL <7.0%: ICD-10-PCS | Mod: CPTII,S$GLB,, | Performed by: STUDENT IN AN ORGANIZED HEALTH CARE EDUCATION/TRAINING PROGRAM

## 2022-10-05 PROCEDURE — 3066F PR DOCUMENTATION OF TREATMENT FOR NEPHROPATHY: ICD-10-PCS | Mod: CPTII,S$GLB,, | Performed by: STUDENT IN AN ORGANIZED HEALTH CARE EDUCATION/TRAINING PROGRAM

## 2022-10-05 PROCEDURE — 4010F ACE/ARB THERAPY RXD/TAKEN: CPT | Mod: CPTII,S$GLB,, | Performed by: STUDENT IN AN ORGANIZED HEALTH CARE EDUCATION/TRAINING PROGRAM

## 2022-10-05 PROCEDURE — 99213 PR OFFICE/OUTPT VISIT, EST, LEVL III, 20-29 MIN: ICD-10-PCS | Mod: 25,S$GLB,, | Performed by: STUDENT IN AN ORGANIZED HEALTH CARE EDUCATION/TRAINING PROGRAM

## 2022-10-05 PROCEDURE — 1126F AMNT PAIN NOTED NONE PRSNT: CPT | Mod: CPTII,S$GLB,, | Performed by: STUDENT IN AN ORGANIZED HEALTH CARE EDUCATION/TRAINING PROGRAM

## 2022-10-05 PROCEDURE — 99999 PR PBB SHADOW E&M-EST. PATIENT-LVL III: CPT | Mod: PBBFAC,,, | Performed by: STUDENT IN AN ORGANIZED HEALTH CARE EDUCATION/TRAINING PROGRAM

## 2022-10-05 PROCEDURE — 17000 PR DESTRUCTION(LASER SURGERY,CRYOSURGERY,CHEMOSURGERY),PREMALIGNANT LESIONS,FIRST LESION: ICD-10-PCS | Mod: S$GLB,,, | Performed by: STUDENT IN AN ORGANIZED HEALTH CARE EDUCATION/TRAINING PROGRAM

## 2022-10-05 PROCEDURE — 99213 OFFICE O/P EST LOW 20 MIN: CPT | Mod: 25,S$GLB,, | Performed by: STUDENT IN AN ORGANIZED HEALTH CARE EDUCATION/TRAINING PROGRAM

## 2022-10-05 PROCEDURE — 3288F PR FALLS RISK ASSESSMENT DOCUMENTED: ICD-10-PCS | Mod: CPTII,S$GLB,, | Performed by: STUDENT IN AN ORGANIZED HEALTH CARE EDUCATION/TRAINING PROGRAM

## 2022-10-05 NOTE — PROGRESS NOTES
Subjective:       Patient ID:  Rigoberto Vasquez is a 72 y.o. male who presents for   Chief Complaint   Patient presents with    Skin Check     FBSE       History of Present Illness: The patient presents for follow up of skin check.    The patient was last seen on: 10/13/21. Reports having a red, scaly and crusted lesion on the right temple area on the face. Denies any other rapidly growing, bleeding or non healing skin lesions.       Review of Systems   Constitutional:  Negative for fever and chills.   Skin:  Negative for itching, rash and dry skin.      Objective:    Physical Exam   Constitutional: He appears well-developed and well-nourished. No distress.   Neurological: He is alert and oriented to person, place, and time. He is not disoriented.   Psychiatric: He has a normal mood and affect.   Skin:   Areas Examined (abnormalities noted in diagram):   Scalp / Hair Palpated and Inspected  Head / Face Inspection Performed  Neck Inspection Performed  Chest / Axilla Inspection Performed  Abdomen Inspection Performed  Genitals / Buttocks / Groin Inspection Performed  Back Inspection Performed  RUE Inspected  LUE Inspection Performed  RLE Inspected  LLE Inspection Performed  Nails and Digits Inspection Performed                 Diagram Legend     Erythematous scaling macule/papule c/w actinic keratosis       Vascular papule c/w angioma      Pigmented verrucoid papule/plaque c/w seborrheic keratosis      Yellow umbilicated papule c/w sebaceous hyperplasia      Irregularly shaped tan macule c/w lentigo     1-2 mm smooth white papules consistent with Milia      Movable subcutaneous cyst with punctum c/w epidermal inclusion cyst      Subcutaneous movable cyst c/w pilar cyst      Firm pink to brown papule c/w dermatofibroma      Pedunculated fleshy papule(s) c/w skin tag(s)      Evenly pigmented macule c/w junctional nevus     Mildly variegated pigmented, slightly irregular-bordered macule c/w mildly atypical nevus       Flesh colored to evenly pigmented papule c/w intradermal nevus       Pink pearly papule/plaque c/w basal cell carcinoma      Erythematous hyperkeratotic cursted plaque c/w SCC      Surgical scar with no sign of skin cancer recurrence      Open and closed comedones      Inflammatory papules and pustules      Verrucoid papule consistent consistent with wart     Erythematous eczematous patches and plaques     Dystrophic onycholytic nail with subungual debris c/w onychomycosis     Umbilicated papule    Erythematous-base heme-crusted tan verrucoid plaque consistent with inflamed seborrheic keratosis     Erythematous Silvery Scaling Plaque c/w Psoriasis     See annotation      Assessment / Plan:        Actinic keratoses  Cryosurgery Procedure Note    Verbal consent from the patient is obtained including, but not limited to, risk of hypopigmentation/hyperpigmentation, scar, recurrence of lesion. The patient is aware of the precancerous quality and need for treatment of these lesions. Liquid nitrogen cryosurgery is applied to the 2 actinic keratoses, as detailed in the physical exam, to produce a freeze injury. The patient is aware that blisters may form and is instructed on wound care with gentle cleansing and use of vaseline ointment to keep moist until healed. The patient is supplied a handout on cryosurgery and is instructed to call if lesions do not completely resolve.    Seborrheic keratoses  These are benign inherited growths without a malignant potential. Reassurance given to patient. No treatment is necessary.     Cherry angioma  This is a benign vascular lesion. Reassurance given. No treatment required.     Screening, malignant neoplasm, skin  Upper and lower body skin examination performed today including at least 10 points as noted in physical examination. No lesions suspicious for malignancy noted.  Reassurance provided.    Instructed patient to observe lesion(s) for changes and follow up in clinic if changes  are noted. Patient to monitor skin at home for new or changing lesions and follow up in clinic if noted.    Discussed ABCDE's of moles and brochure provided.         Follow up in about 1 year (around 10/5/2023).

## 2022-11-08 ENCOUNTER — CLINICAL SUPPORT (OUTPATIENT)
Dept: PULMONOLOGY | Facility: CLINIC | Age: 72
End: 2022-11-08
Payer: MEDICARE

## 2022-11-08 ENCOUNTER — OFFICE VISIT (OUTPATIENT)
Dept: PULMONOLOGY | Facility: CLINIC | Age: 72
End: 2022-11-08
Payer: MEDICARE

## 2022-11-08 VITALS
RESPIRATION RATE: 16 BRPM | DIASTOLIC BLOOD PRESSURE: 70 MMHG | BODY MASS INDEX: 27.47 KG/M2 | SYSTOLIC BLOOD PRESSURE: 134 MMHG | HEIGHT: 71 IN | OXYGEN SATURATION: 95 % | WEIGHT: 196.19 LBS | HEART RATE: 64 BPM

## 2022-11-08 DIAGNOSIS — E11.22 TYPE 2 DIABETES MELLITUS WITH STAGE 3A CHRONIC KIDNEY DISEASE, WITH LONG-TERM CURRENT USE OF INSULIN: ICD-10-CM

## 2022-11-08 DIAGNOSIS — R06.00 NOCTURNAL DYSPNEA: ICD-10-CM

## 2022-11-08 DIAGNOSIS — J84.10 LUNG GRANULOMA: Primary | ICD-10-CM

## 2022-11-08 DIAGNOSIS — Z79.4 TYPE 2 DIABETES MELLITUS WITH STAGE 3A CHRONIC KIDNEY DISEASE, WITH LONG-TERM CURRENT USE OF INSULIN: ICD-10-CM

## 2022-11-08 DIAGNOSIS — G47.33 OSA ON CPAP: ICD-10-CM

## 2022-11-08 DIAGNOSIS — E78.5 HYPERLIPIDEMIA ASSOCIATED WITH TYPE 2 DIABETES MELLITUS: Chronic | ICD-10-CM

## 2022-11-08 DIAGNOSIS — E11.59 HYPERTENSION ASSOCIATED WITH DIABETES: Chronic | ICD-10-CM

## 2022-11-08 DIAGNOSIS — N18.31 TYPE 2 DIABETES MELLITUS WITH STAGE 3A CHRONIC KIDNEY DISEASE, WITH LONG-TERM CURRENT USE OF INSULIN: ICD-10-CM

## 2022-11-08 DIAGNOSIS — I70.0 CALCIFICATION OF AORTA: ICD-10-CM

## 2022-11-08 DIAGNOSIS — E11.69 HYPERLIPIDEMIA ASSOCIATED WITH TYPE 2 DIABETES MELLITUS: Chronic | ICD-10-CM

## 2022-11-08 DIAGNOSIS — I15.2 HYPERTENSION ASSOCIATED WITH DIABETES: Chronic | ICD-10-CM

## 2022-11-08 LAB
BRPFT: NORMAL
DLCO ADJ PRE: 21.24 ML/(MIN*MMHG)
DLCO SINGLE BREATH LLN: 20.79
DLCO SINGLE BREATH PRE REF: 76.6 %
DLCO SINGLE BREATH REF: 27.72
DLCOC SBVA LLN: 2.67
DLCOC SBVA PRE REF: 102 %
DLCOC SBVA REF: 3.8
DLCOC SINGLE BREATH LLN: 20.79
DLCOC SINGLE BREATH PRE REF: 76.6 %
DLCOC SINGLE BREATH REF: 27.72
DLCOVA LLN: 2.67
DLCOVA PRE REF: 102 %
DLCOVA PRE: 3.87 ML/(MIN*MMHG*L)
DLCOVA REF: 3.8
DLVAADJ PRE: 3.87 ML/(MIN*MMHG*L)
ERV LLN: -16448.93
ERV PRE REF: 112.5 %
ERV REF: 1.07
FEF 25 75 CHG: 18.7 %
FEF 25 75 LLN: 1.04
FEF 25 75 POST REF: 130.9 %
FEF 25 75 PRE REF: 110.3 %
FEF 25 75 REF: 2.44
FET100 CHG: -29.7 %
FEV1 CHG: 0.8 %
FEV1 FVC CHG: 5.5 %
FEV1 FVC LLN: 62
FEV1 FVC POST REF: 103.2 %
FEV1 FVC PRE REF: 97.9 %
FEV1 FVC REF: 76
FEV1 LLN: 2.34
FEV1 POST REF: 106.9 %
FEV1 PRE REF: 106.1 %
FEV1 REF: 3.26
FRCPLETH LLN: 2.77
FRCPLETH PREREF: 99.6 %
FRCPLETH REF: 3.76
FVC CHG: -4.5 %
FVC LLN: 3.2
FVC POST REF: 103 %
FVC PRE REF: 107.8 %
FVC REF: 4.33
IVC PRE: 3.92 L
IVC SINGLE BREATH LLN: 3.2
IVC SINGLE BREATH PRE REF: 90.7 %
IVC SINGLE BREATH REF: 4.33
MVV LLN: 108
MVV PRE REF: 79.1 %
MVV REF: 128
PEF CHG: 13.6 %
PEF LLN: 6.08
PEF POST REF: 100.4 %
PEF PRE REF: 88.4 %
PEF REF: 8.45
POST FEF 25 75: 3.19 L/S
POST FET 100: 9.83 SEC
POST FEV1 FVC: 78.09 %
POST FEV1: 3.48 L
POST FVC: 4.46 L
POST PEF: 8.49 L/S
PRE DLCO: 21.24 ML/(MIN*MMHG)
PRE ERV: 1.21 L
PRE FEF 25 75: 2.69 L/S
PRE FET 100: 13.99 SEC
PRE FEV1 FVC: 74.04 %
PRE FEV1: 3.46 L
PRE FRC PL: 3.75 L
PRE FVC: 4.67 L
PRE MVV: 101 L/MIN
PRE PEF: 7.47 L/S
PRE RV: 2.15 L
PRE TLC: 6.9 L
RAW LLN: 3.06
RAW PRE REF: 115.5 %
RAW PRE: 3.53 CMH2O*S/L
RAW REF: 3.06
RV LLN: 2.02
RV PRE REF: 79.9 %
RV REF: 2.69
RVTLC LLN: 33
RVTLC PRE REF: 74.8 %
RVTLC PRE: 31.16 %
RVTLC REF: 42
TLC LLN: 6.15
TLC PRE REF: 94.4 %
TLC REF: 7.3
VA PRE: 5.49 L
VA SINGLE BREATH LLN: 7.15
VA SINGLE BREATH PRE REF: 76.7 %
VA SINGLE BREATH REF: 7.15
VC LLN: 3.2
VC PRE REF: 109.7 %
VC PRE: 4.75 L
VC REF: 4.33
VTGRAWPRE: 3.59 L

## 2022-11-08 PROCEDURE — 3066F PR DOCUMENTATION OF TREATMENT FOR NEPHROPATHY: ICD-10-PCS | Mod: CPTII,S$GLB,, | Performed by: INTERNAL MEDICINE

## 2022-11-08 PROCEDURE — 99999 PR PBB SHADOW E&M-EST. PATIENT-LVL V: ICD-10-PCS | Mod: PBBFAC,,, | Performed by: INTERNAL MEDICINE

## 2022-11-08 PROCEDURE — 94729 PR C02/MEMBANE DIFFUSE CAPACITY: ICD-10-PCS | Mod: S$GLB,,, | Performed by: INTERNAL MEDICINE

## 2022-11-08 PROCEDURE — 94060 PR EVAL OF BRONCHOSPASM: ICD-10-PCS | Mod: S$GLB,,, | Performed by: INTERNAL MEDICINE

## 2022-11-08 PROCEDURE — 3044F HG A1C LEVEL LT 7.0%: CPT | Mod: CPTII,S$GLB,, | Performed by: INTERNAL MEDICINE

## 2022-11-08 PROCEDURE — 3288F PR FALLS RISK ASSESSMENT DOCUMENTED: ICD-10-PCS | Mod: CPTII,S$GLB,, | Performed by: INTERNAL MEDICINE

## 2022-11-08 PROCEDURE — 94726 PULM FUNCT TST PLETHYSMOGRAP: ICD-10-PCS | Mod: S$GLB,,, | Performed by: INTERNAL MEDICINE

## 2022-11-08 PROCEDURE — 3072F LOW RISK FOR RETINOPATHY: CPT | Mod: CPTII,S$GLB,, | Performed by: INTERNAL MEDICINE

## 2022-11-08 PROCEDURE — 1101F PR PT FALLS ASSESS DOC 0-1 FALLS W/OUT INJ PAST YR: ICD-10-PCS | Mod: CPTII,S$GLB,, | Performed by: INTERNAL MEDICINE

## 2022-11-08 PROCEDURE — 3044F PR MOST RECENT HEMOGLOBIN A1C LEVEL <7.0%: ICD-10-PCS | Mod: CPTII,S$GLB,, | Performed by: INTERNAL MEDICINE

## 2022-11-08 PROCEDURE — 94060 EVALUATION OF WHEEZING: CPT | Mod: S$GLB,,, | Performed by: INTERNAL MEDICINE

## 2022-11-08 PROCEDURE — 1159F PR MEDICATION LIST DOCUMENTED IN MEDICAL RECORD: ICD-10-PCS | Mod: CPTII,S$GLB,, | Performed by: INTERNAL MEDICINE

## 2022-11-08 PROCEDURE — 3008F BODY MASS INDEX DOCD: CPT | Mod: CPTII,S$GLB,, | Performed by: INTERNAL MEDICINE

## 2022-11-08 PROCEDURE — 3008F PR BODY MASS INDEX (BMI) DOCUMENTED: ICD-10-PCS | Mod: CPTII,S$GLB,, | Performed by: INTERNAL MEDICINE

## 2022-11-08 PROCEDURE — 4010F ACE/ARB THERAPY RXD/TAKEN: CPT | Mod: CPTII,S$GLB,, | Performed by: INTERNAL MEDICINE

## 2022-11-08 PROCEDURE — 3078F PR MOST RECENT DIASTOLIC BLOOD PRESSURE < 80 MM HG: ICD-10-PCS | Mod: CPTII,S$GLB,, | Performed by: INTERNAL MEDICINE

## 2022-11-08 PROCEDURE — 3075F SYST BP GE 130 - 139MM HG: CPT | Mod: CPTII,S$GLB,, | Performed by: INTERNAL MEDICINE

## 2022-11-08 PROCEDURE — 1160F RVW MEDS BY RX/DR IN RCRD: CPT | Mod: CPTII,S$GLB,, | Performed by: INTERNAL MEDICINE

## 2022-11-08 PROCEDURE — 3288F FALL RISK ASSESSMENT DOCD: CPT | Mod: CPTII,S$GLB,, | Performed by: INTERNAL MEDICINE

## 2022-11-08 PROCEDURE — 94729 DIFFUSING CAPACITY: CPT | Mod: S$GLB,,, | Performed by: INTERNAL MEDICINE

## 2022-11-08 PROCEDURE — 3075F PR MOST RECENT SYSTOLIC BLOOD PRESS GE 130-139MM HG: ICD-10-PCS | Mod: CPTII,S$GLB,, | Performed by: INTERNAL MEDICINE

## 2022-11-08 PROCEDURE — 94726 PLETHYSMOGRAPHY LUNG VOLUMES: CPT | Mod: S$GLB,,, | Performed by: INTERNAL MEDICINE

## 2022-11-08 PROCEDURE — 1159F MED LIST DOCD IN RCRD: CPT | Mod: CPTII,S$GLB,, | Performed by: INTERNAL MEDICINE

## 2022-11-08 PROCEDURE — 4010F PR ACE/ARB THEARPY RXD/TAKEN: ICD-10-PCS | Mod: CPTII,S$GLB,, | Performed by: INTERNAL MEDICINE

## 2022-11-08 PROCEDURE — 3072F PR LOW RISK FOR RETINOPATHY: ICD-10-PCS | Mod: CPTII,S$GLB,, | Performed by: INTERNAL MEDICINE

## 2022-11-08 PROCEDURE — 1101F PT FALLS ASSESS-DOCD LE1/YR: CPT | Mod: CPTII,S$GLB,, | Performed by: INTERNAL MEDICINE

## 2022-11-08 PROCEDURE — 99214 OFFICE O/P EST MOD 30 MIN: CPT | Mod: 25,S$GLB,, | Performed by: INTERNAL MEDICINE

## 2022-11-08 PROCEDURE — 3061F PR NEG MICROALBUMINURIA RESULT DOCUMENTED/REVIEW: ICD-10-PCS | Mod: CPTII,S$GLB,, | Performed by: INTERNAL MEDICINE

## 2022-11-08 PROCEDURE — 1160F PR REVIEW ALL MEDS BY PRESCRIBER/CLIN PHARMACIST DOCUMENTED: ICD-10-PCS | Mod: CPTII,S$GLB,, | Performed by: INTERNAL MEDICINE

## 2022-11-08 PROCEDURE — 99999 PR PBB SHADOW E&M-EST. PATIENT-LVL V: CPT | Mod: PBBFAC,,, | Performed by: INTERNAL MEDICINE

## 2022-11-08 PROCEDURE — 3078F DIAST BP <80 MM HG: CPT | Mod: CPTII,S$GLB,, | Performed by: INTERNAL MEDICINE

## 2022-11-08 PROCEDURE — 3061F NEG MICROALBUMINURIA REV: CPT | Mod: CPTII,S$GLB,, | Performed by: INTERNAL MEDICINE

## 2022-11-08 PROCEDURE — 99214 PR OFFICE/OUTPT VISIT, EST, LEVL IV, 30-39 MIN: ICD-10-PCS | Mod: 25,S$GLB,, | Performed by: INTERNAL MEDICINE

## 2022-11-08 PROCEDURE — 3066F NEPHROPATHY DOC TX: CPT | Mod: CPTII,S$GLB,, | Performed by: INTERNAL MEDICINE

## 2022-11-08 RX ORDER — POLYETHYLENE GLYCOL-3350 AND ELECTROLYTES WITH FLAVOR PACK 240; 5.84; 2.98; 6.72; 22.72 G/278.26G; G/278.26G; G/278.26G; G/278.26G; G/278.26G
4000 POWDER, FOR SOLUTION ORAL
COMMUNITY
Start: 2022-08-12 | End: 2023-03-22

## 2022-11-08 NOTE — PROGRESS NOTES
Subjective:      Patient ID: Rigoberto Vasquez is a 72 y.o. male.    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    Choroidal nevus of right eye    Epiretinal membrane (ERM) of left eye    Overweight (BMI 25.0-29.9)    Macular hole of right eye    Open angle with borderline findings and low glaucoma risk in both eyes    Chronic LUQ pain    Nausea    Ectopic gastric mucosa    Open angle with borderline findings and high glaucoma risk in both eyes    History of skin cancer    History of heart block;AV block, 3rd degree    Benign prostatic hyperplasia (BPH) with straining on urination    Erectile dysfunction    Lung granuloma    Dyspepsia    Calcification of aorta    Cholesteatoma    СВЕТЛАНА on CPAP    Nocturnal dyspnea    Colon cancer screening       he has been referred by Luciano Sahu MD for evaluation and management for   Chief Complaint   Patient presents with    Apnea         Chief Complaint: Apnea      HPI:  He presents for СВЕТЛАНА with review 2/10/2022 Home Sleep Study mild obstructive sleep apnea AHI 12.1.   Patient symptomatic for obstructive sleep apnea with feeling tired and fatigued and snoring.   Patient reports somewhat restful sleep.  He denies morning headache.   He reports day time napping; duration 1 Hour  He denies recent weight gain.  Cardiovascular risk factors: diabetes, hypertension, hyperlipidemia and coronary artery disease  Bed time is 0930  Wake time is 0600 - 0700  Sleep onset is within 15 - 30 Minutes.  Sleep maintenance difficulties related to frequent night time awakening  Wake after  sleep onset occurs two times a night.  Nocturia occurs two times a night,   Sleep aids : YES , melatonin 1-2 times a week if working on things around his house, he will think about it, most nights able to go to sleep in 15-20 minutes.  Dry mouth :  NO  Sleep walking:  NO  Sleep talking :  NO  Sleep eating: NO  Vivid Dreams :  NO  Cataplexy :  NO       07/26/2022  Last visit 05/17/2022  Using device and benefits  AHI was 0.4  Episodes of noctural dyspnea  Had inhaaler in past, never used for night issues, had daytime fatiigue which resolved  Seen Dr Senior; PPM since 30 years old  Complete heart block  Never smoker  Las 6MWD reveiwed  Last PFT: hyperinflation    11/08/2022  Last seen 07/26/2022  No resp issues  No cough, No SOB  PFT: Normal  Using CPAP Usage > 4 hrs was 100%          Previous Report Reviewed: lab reports and office notes     Past Medical History: The following portions of the patient's history were reviewed and updated as appropriate:   He  has a past surgical history that includes Shoulder arthroscopy (Right); Nasal sinus surgery; Cardiac pacemaker placement; Tonsillectomy; Transurethral resection of prostate; Knee cartilage surgery (Bilateral); Tympanoplasty; vesectomy; Total knee arthroplasty (Left, 05/15/2017); Joint replacement; Esophagogastroduodenoscopy (N/A, 9/13/2019); Colonoscopy (N/A, 9/13/2019); Esophagogastroduodenoscopy (N/A, 9/3/2021); and Colonoscopy (N/A, 9/22/2022).  His family history includes Arthritis in his mother; Colon cancer in his paternal grandmother; Diabetes in his maternal grandmother and son; Heart disease in his father; Hypertension in his father and mother; Stroke in his father.  He  reports that he has never smoked. He has never used smokeless tobacco. He reports current alcohol use. He reports that he does not use drugs.  He has a current medication list which includes the following prescription(s): acetaminophen, azelastine, blood sugar diagnostic, dutasteride,  "fenofibrate micronized, fluticasone propionate, gabapentin, gavilyte-c, hydrocortisone, levemir flextouch u-100 insuln, latanoprost, levocetirizine, victoza 3-claudine, losartan, lovastatin, metformin, multivitamin, ondansetron, pen needle, diabetic, pioglitazone, sildenafil, tamsulosin, triamcinolone acetonide 0.1%, and pantoprazole.  He is allergic to aspirin, meperidine, and niacin..    Review of Systems   Constitutional:  Negative for fever, chills, weight loss, weight gain, activity change, appetite change, fatigue and night sweats.   HENT:  Negative for postnasal drip, rhinorrhea, sinus pressure, voice change and congestion.    Eyes:  Negative for redness and itching.   Respiratory:  Positive for apnea and snoring. Negative for cough, sputum production, chest tightness, shortness of breath, wheezing, orthopnea, asthma nighttime symptoms, dyspnea on extertion, use of rescue inhaler and somnolence.    Cardiovascular: Negative.  Negative for chest pain, palpitations and leg swelling.   Genitourinary:  Negative for difficulty urinating and hematuria.   Endocrine:  Negative for cold intolerance and heat intolerance.    Musculoskeletal:  Negative for arthralgias, gait problem, joint swelling and myalgias.   Skin: Negative.    Gastrointestinal:  Negative for nausea, vomiting, abdominal pain and acid reflux.   Neurological:  Negative for dizziness, weakness, light-headedness and headaches.   Hematological:  Negative for adenopathy. No excessive bruising.   All other systems reviewed and are negative.   Objective:   /70   Pulse 64   Resp 16   Ht 5' 11" (1.803 m)   Wt 89 kg (196 lb 3.4 oz)   SpO2 95%   BMI 27.37 kg/m²   Physical Exam  Vitals and nursing note reviewed.   Constitutional:       Appearance: He is well-developed.   HENT:      Head: Normocephalic and atraumatic.   Eyes:      Conjunctiva/sclera: Conjunctivae normal.   Cardiovascular:      Rate and Rhythm: Normal rate and regular rhythm.      Heart " sounds: Normal heart sounds.   Pulmonary:      Effort: Pulmonary effort is normal.      Breath sounds: Normal breath sounds.   Abdominal:      General: Bowel sounds are normal.      Palpations: Abdomen is soft.   Musculoskeletal:         General: Normal range of motion.      Cervical back: Normal range of motion and neck supple.   Skin:     General: Skin is warm and dry.   Neurological:      Mental Status: He is alert and oriented to person, place, and time.      Deep Tendon Reflexes: Reflexes are normal and symmetric.   Psychiatric:         Behavior: Behavior normal.         Thought Content: Thought content normal.         Judgment: Judgment normal.       Personal Diagnostic Review  Review of labs, xray's, cardiology reports.      DOWNLOAD  06/14/2022 to 07/25/2022  Usage > 4 hrs was 100%  APAP 5-20    FEV1: 3.46L( 105.1%), FVC 4.67L( 107.8%)  FEV1/FVC 74  TLC 6.90L( 94.4%)  DLCO 21.24( 76.6%)         [facility] 71Years  07- -   [id number]  Usage days 106/107(99%)   ? 4 hours days 105(98%)   ? 4 hours days 1(1%)   usage hours 931.8   Average usage hours (total days) 8.7   Average usage hours (days used) 8.8   Median usage hours (days used) 8.9   Total usage (since setup date) 4 months 27 days     Assessment:     1. Lung granuloma    2. Hypertension associated with diabetes    3. Hyperlipidemia associated with type 2 diabetes mellitus    4. Calcification of aorta    5. Nocturnal dyspnea    6. Type 2 diabetes mellitus with stage 3a chronic kidney disease, with long-term current use of insulin    7. СВЕТЛАНА on CPAP      Orders Placed This Encounter   Procedures    X-Ray Chest PA And Lateral     Standing Status:   Future     Standing Expiration Date:   11/8/2023       Plan:   Discussed diagnosis, its evaluation, treatment and usual course. All questions answered.  Problem List Items Addressed This Visit       Hypertension associated with diabetes (Chronic)     Stable COZAAR         Hyperlipidemia associated  with type 2 diabetes mellitus (Chronic)     Stable on MEVACOR, LOFIBRA         Type 2 diabetes mellitus with stage 3a chronic kidney disease, with long-term current use of insulin     Stable : insulin detemir, Actos, metformin, VICTOZA         Lung granuloma - Primary     Radiological surveilance         Relevant Orders    X-Ray Chest PA And Lateral    Calcification of aorta     Follow heart healthy diet. regular exercise.          СВЕТЛАНА on CPAP     APAP  5-20  07/25/2022 to 11/08/2022  Usage > 4 hrs was 98%  FFM  AHI 0. 3  Using and benefits from PAP         Nocturnal dyspnea     Episodic noctural symptoms  Last PFT : hyperinflation  Never smoking               Follow up in about 1 year (around 11/8/2023), or cxr, download, See Elif about mask.    This note was prepared using voice recognition system and is likely to have sound alike errors that may have been overlooked even after proof reading.  Please call me with any questions    Discussed diagnosis, its evaluation, treatment and usual course. All questions answered.    Thank you for the courtesy of participating in the care of this patient    Luciano Sahu MD

## 2022-11-08 NOTE — PATIENT INSTRUCTIONS
[employer] Recalls Certain Masks for BiPAP, CPAP Machines Due to Safety Issue with Magnets That May Affect Certain Medical Devices  Share  Tweet  Linkedin  Email  Print     The FDA has identified this as a Class I recall, the most serious type of recall. Use of these devices may cause serious injuries or death.    Recalled Product   Product Names:  Traci View Full Face Mask  DreamWisp Nasal Mask  DreamWear Full Face Mask  Wisp and Wisp Youth Nasal Mask  Therapy Mask 3100 NC/SP  Serial and Model Numbers: See recall database entries  Distribution Dates: January 1, 2015, to September 9, 2022  Devices Recalled in the [place] [id number] units   Date Initiated by Firm: August 2, 2022  Traci view full face mask, dreamwisp nasal mask, dreamwear full face mask, wisp and wisp youth nasal mask, therapy mask 3100 NC/SP  Device Use  The Traci Full Face, DreamWisp Nasal, DreamWear Full Face, Wisp and Wisp Youth Nasal, and Therapy Mask 3100 masks are worn by a person using bilevel positive airway pressure (also known as Bilevel PAP, BiPAP, or BPAP) machines and continuous positive airway pressure (CPAP) machines to support breathing. The recalled masks are for single patient use in the home or multi-patient use in the hospital or other clinical environment.    The recalled [employer] masks may be used with other manufacturers BiPAP and CPAP machines. Users of any BiPAP or CPAP machine should check to see whether their mask is one of the recalled [employer] masks.     Reason for Recall   [employer] ([employer]) is recalling certain Traci Full Face, DreamWisp Nasal, DreamWear Full Face, Wisp and Wisp Youth Nasal, and Therapy Mask 3100 masks due to a serious safety concern. The recalled masks have magnetic headgear clips to hold them in place. The magnets can potentially cause injury or death if people who use them, or people near a person using a recalled mask, have certain implanted metallic medical devices or metallic  objects in the body.     The FDA issued a safety communication, Certain [employer] Masks for BiPAP, CPAP Machines Recalled Due to Safety Issue with Magnets That May Affect Certain Medical Devices about this topic which includes more information on the types of medical devices and objects that may be affected.     As of September 9, 2022, there have been 43 complaints, 14 of those were serious injuries, associated with this issue. The FDA is not aware of any associated deaths.     Who May be Affected   People who use the recalled masks  People near a person using the mask  Health care personnel providing care for patients using the recalled masks   What to Do   [employer] issued a press releaseExternal Link Disclaimer about this issue on September 6, 2022. The press release offered the following recommendations:     Stop use of the recalled mask and switch to a non-magnetic mask if available, if the wearer or someone near them when using a recalled mask have any of the implanted metallic medical devices or metallic objects in the body listed that may be affected by the magnets in the masks. The list of devices includes (but is not limited to):    Pacemakers  Implantable cardioverter defibrillators (ICD)  Neurostimulators  Magnetic metallic implants/electrodes/valves placed in upper limbs, torso, or higher (that is, neck and head)  Cerebral spinal fluid (CSF) shunts (such as ventriculo peritoneal () shunt)  Aneurysm clips  Embolic coils  Intracranial aneurysm intravascular flow disruption devices  Metallic cranial plates, screws, kentrell hole covers, and bone substitute devices  Metallic splinters in the eye  Ocular implants (such as glaucoma implants, retinal implants)  Certain contact lenses with metal  Implants to restore hearing or balance that have an implanted magnet (such as cochlear implants, implanted bone conduction hearing devices, and auditory brainstem implants)  Magnetic denture  attachments  Metallic gastrointestinal clips  Metallic stents (such as aneurysm, coronary, tracheobronchial, biliary)  Implantable ports and pumps (such as insulin pumps)  Hypoglossal nerve stimulators  Devices labeled as MR (Magnetic Resonance) unsafe  Magnetic metallic implants not labeled for MR or not evaluated for safety in a magnetic field  Patients should consult a physician immediately to determine if another mask can be used for their therapy.   Switch to a non-magnetic mask if available, for continued therapy.   Dispose of the mask that has magnets after an alternative is obtained.  Continue using masks according to the updated instructions and labeling if patients or people in close proximity to them do not have implanted metallic medical devices or metallic objects in the body.  The FDAs safety communication Certain [employer] Masks for BiPAP, CPAP Machines Recalled Due to Safety Issue with Magnets That May Affect Certain Medical Devices offers additional recommendations for health care providers, patients, and their caregivers.    Contact Information   Patients and health care providers with questions may contact [employer] customer service at [phone], (Monday - Friday; 8:30 AM ET to 8:00 PM ET) for more information about non-magnetic mask options.     Additional Resources:   Medical Device Recall Database Entries   Certain [employer] Masks for BiPAP, CPAP Machines Recalled Due to Safety Issue with Magnets That May Affect Certain Medical Devices  [employer] alerts customers worldwide of updated instructions and labeling of specific sleep therapy masks that contain magnetic headgear clips due to potential risk of serious injuryExternal Link Disclaimer  How do I report a problem?  Health care professionals and consumers may report adverse reactions or quality problems they experienced using these devices to MedWatch: The FDA Safety Information and Adverse Event  Reporting Program using an online form, regular mail, or FAX.

## 2022-11-08 NOTE — ASSESSMENT & PLAN NOTE
APAP  5-20  07/25/2022 to 11/08/2022  Usage > 4 hrs was 98%  FFM  AHI 0. 3  Using and benefits from PAP

## 2022-11-09 NOTE — PROGRESS NOTES
HTN: Patients last home BP reading on 2/5/22 was 126/73. Remote BP will be entered.     
Statement Selected

## 2022-11-16 ENCOUNTER — TELEPHONE (OUTPATIENT)
Dept: ADMINISTRATIVE | Facility: HOSPITAL | Age: 72
End: 2022-11-16
Payer: MEDICARE

## 2022-11-17 ENCOUNTER — HOSPITAL ENCOUNTER (OUTPATIENT)
Dept: RADIOLOGY | Facility: HOSPITAL | Age: 72
Discharge: HOME | End: 2022-11-17
Attending: INTERNAL MEDICINE
Payer: MEDICARE

## 2022-11-17 DIAGNOSIS — J84.10 LUNG GRANULOMA: ICD-10-CM

## 2022-11-17 PROCEDURE — 71046 X-RAY EXAM CHEST 2 VIEWS: CPT | Mod: TC,PO

## 2022-11-17 PROCEDURE — 71046 XR CHEST PA AND LATERAL: ICD-10-PCS | Mod: 26,,, | Performed by: RADIOLOGY

## 2022-11-17 PROCEDURE — 71046 X-RAY EXAM CHEST 2 VIEWS: CPT | Mod: 26,,, | Performed by: RADIOLOGY

## 2022-11-28 ENCOUNTER — OFFICE VISIT (OUTPATIENT)
Dept: OTOLARYNGOLOGY | Facility: CLINIC | Age: 72
End: 2022-11-28
Payer: MEDICARE

## 2022-11-28 VITALS — TEMPERATURE: 97 F | BODY MASS INDEX: 27.58 KG/M2 | WEIGHT: 197.75 LBS

## 2022-11-28 DIAGNOSIS — J34.89 SINUS PRESSURE: Primary | ICD-10-CM

## 2022-11-28 DIAGNOSIS — R09.81 NASAL CONGESTION: ICD-10-CM

## 2022-11-28 PROCEDURE — 3061F NEG MICROALBUMINURIA REV: CPT | Mod: CPTII,S$GLB,, | Performed by: PHYSICIAN ASSISTANT

## 2022-11-28 PROCEDURE — 99213 PR OFFICE/OUTPT VISIT, EST, LEVL III, 20-29 MIN: ICD-10-PCS | Mod: S$GLB,,, | Performed by: PHYSICIAN ASSISTANT

## 2022-11-28 PROCEDURE — 3008F PR BODY MASS INDEX (BMI) DOCUMENTED: ICD-10-PCS | Mod: CPTII,S$GLB,, | Performed by: PHYSICIAN ASSISTANT

## 2022-11-28 PROCEDURE — 3072F LOW RISK FOR RETINOPATHY: CPT | Mod: CPTII,S$GLB,, | Performed by: PHYSICIAN ASSISTANT

## 2022-11-28 PROCEDURE — 99213 OFFICE O/P EST LOW 20 MIN: CPT | Mod: S$GLB,,, | Performed by: PHYSICIAN ASSISTANT

## 2022-11-28 PROCEDURE — 3061F PR NEG MICROALBUMINURIA RESULT DOCUMENTED/REVIEW: ICD-10-PCS | Mod: CPTII,S$GLB,, | Performed by: PHYSICIAN ASSISTANT

## 2022-11-28 PROCEDURE — 1159F PR MEDICATION LIST DOCUMENTED IN MEDICAL RECORD: ICD-10-PCS | Mod: CPTII,S$GLB,, | Performed by: PHYSICIAN ASSISTANT

## 2022-11-28 PROCEDURE — 4010F ACE/ARB THERAPY RXD/TAKEN: CPT | Mod: CPTII,S$GLB,, | Performed by: PHYSICIAN ASSISTANT

## 2022-11-28 PROCEDURE — 4010F PR ACE/ARB THEARPY RXD/TAKEN: ICD-10-PCS | Mod: CPTII,S$GLB,, | Performed by: PHYSICIAN ASSISTANT

## 2022-11-28 PROCEDURE — 1159F MED LIST DOCD IN RCRD: CPT | Mod: CPTII,S$GLB,, | Performed by: PHYSICIAN ASSISTANT

## 2022-11-28 PROCEDURE — 3008F BODY MASS INDEX DOCD: CPT | Mod: CPTII,S$GLB,, | Performed by: PHYSICIAN ASSISTANT

## 2022-11-28 PROCEDURE — 99999 PR PBB SHADOW E&M-EST. PATIENT-LVL III: ICD-10-PCS | Mod: PBBFAC,,, | Performed by: PHYSICIAN ASSISTANT

## 2022-11-28 PROCEDURE — 3044F HG A1C LEVEL LT 7.0%: CPT | Mod: CPTII,S$GLB,, | Performed by: PHYSICIAN ASSISTANT

## 2022-11-28 PROCEDURE — 3066F PR DOCUMENTATION OF TREATMENT FOR NEPHROPATHY: ICD-10-PCS | Mod: CPTII,S$GLB,, | Performed by: PHYSICIAN ASSISTANT

## 2022-11-28 PROCEDURE — 3044F PR MOST RECENT HEMOGLOBIN A1C LEVEL <7.0%: ICD-10-PCS | Mod: CPTII,S$GLB,, | Performed by: PHYSICIAN ASSISTANT

## 2022-11-28 PROCEDURE — 3072F PR LOW RISK FOR RETINOPATHY: ICD-10-PCS | Mod: CPTII,S$GLB,, | Performed by: PHYSICIAN ASSISTANT

## 2022-11-28 PROCEDURE — 3066F NEPHROPATHY DOC TX: CPT | Mod: CPTII,S$GLB,, | Performed by: PHYSICIAN ASSISTANT

## 2022-11-28 PROCEDURE — 99999 PR PBB SHADOW E&M-EST. PATIENT-LVL III: CPT | Mod: PBBFAC,,, | Performed by: PHYSICIAN ASSISTANT

## 2022-11-28 RX ORDER — AMOXICILLIN AND CLAVULANATE POTASSIUM 875; 125 MG/1; MG/1
1 TABLET, FILM COATED ORAL 2 TIMES DAILY
Qty: 20 TABLET | Refills: 0 | Status: SHIPPED | OUTPATIENT
Start: 2022-11-28 | End: 2022-12-08

## 2022-11-28 RX ORDER — PREDNISONE 20 MG/1
TABLET ORAL
Qty: 21 TABLET | Refills: 0 | Status: ON HOLD | OUTPATIENT
Start: 2022-11-28 | End: 2023-03-15

## 2022-11-28 NOTE — PROGRESS NOTES
Subjective:   Patient ID: Rigoberto Vasquez is a 72 y.o. male.    Chief Complaint: Ear Fullness and Sinus Problem (- greenish drainage)      MAJOR SYMPTOMS:  Yes  Purulent anterior nasal discharge  Yes  Purulent or discolored posterior nasal discharge  Yes  Nasal congestion or obstruction  Yes  Facial Congestion or fullness  Yes Hyposmia or anosmia  No  Fever    MINOR SYMPTOMS:  Yes  Headache  Yes  Ear pain, pressure, or fullness  Yes Halitosis  No  Dental pain  Yes  Fatigue    The diagnosis of acute sinusitis is based on the presence of at least 2 major or 1 major and at least 2 minor symptoms.  Rigoberto does meet this criteria.  Clinical Practice Guideline for Acute Bacterial Rhinosinusitis in Children and Adults. Clin Infect Dis 2012; 54:e72    Onset:  3 week ago  Exacerbating factors: No  Relieving factors: No  Timing:  worsening       Review of patient's allergies indicates:   Allergen Reactions    Aspirin      Stomach pain even with ppi    Meperidine      Other reaction(s): Stomach upset    Niacin      Other reaction(s): hot skin           Review of Systems   Constitutional:  Positive for fatigue. Negative for fever and unexpected weight change.   HENT:  Positive for congestion, sinus pressure, sinus pain and tinnitus. Negative for ear discharge, ear pain, facial swelling, hearing loss, nosebleeds, postnasal drip, rhinorrhea, sneezing, sore throat, trouble swallowing and voice change.    Eyes:  Negative for discharge, redness and itching.   Respiratory:  Negative for cough, choking, shortness of breath and wheezing.    Cardiovascular:  Negative for chest pain and palpitations.   Gastrointestinal:  Negative for abdominal pain.        No reflux.   Musculoskeletal:  Negative for neck pain.   Neurological:  Positive for headaches. Negative for dizziness, facial asymmetry and light-headedness.   Hematological:  Negative for adenopathy. Does not bruise/bleed easily.   Psychiatric/Behavioral:  Negative for agitation,  behavioral problems, confusion and decreased concentration.        Objective:   Temp 97.2 °F (36.2 °C)   Wt 89.7 kg (197 lb 12 oz)   BMI 27.58 kg/m²     Physical Exam  Constitutional:       General: He is not in acute distress.     Appearance: He is well-developed.   HENT:      Head: Normocephalic and atraumatic.      Jaw: No trismus.      Right Ear: Hearing, tympanic membrane, ear canal and external ear normal.      Left Ear: Hearing, tympanic membrane, ear canal and external ear normal.      Nose: Mucosal edema present. No nasal deformity, septal deviation or rhinorrhea.      Right Sinus: Maxillary sinus tenderness and frontal sinus tenderness present.      Mouth/Throat:      Dentition: Normal dentition.      Pharynx: Uvula midline. No oropharyngeal exudate or uvula swelling.   Eyes:      General: No scleral icterus.     Conjunctiva/sclera: Conjunctivae normal.      Right eye: Right conjunctiva is not injected. No chemosis.     Left eye: Left conjunctiva is not injected. No chemosis.     Pupils: Pupils are equal, round, and reactive to light.   Neck:      Thyroid: No thyroid mass or thyromegaly.      Trachea: Trachea and phonation normal. No tracheal tenderness or tracheal deviation.   Pulmonary:      Effort: Pulmonary effort is normal. No accessory muscle usage or respiratory distress.      Breath sounds: No stridor.   Lymphadenopathy:      Head:      Right side of head: No submental, submandibular, preauricular or posterior auricular adenopathy.      Left side of head: No submental, submandibular, preauricular or posterior auricular adenopathy.      Cervical: No cervical adenopathy.      Right cervical: No superficial or deep cervical adenopathy.     Left cervical: No superficial or deep cervical adenopathy.   Skin:     General: Skin is warm and dry.      Findings: No erythema or rash.   Neurological:      Mental Status: He is alert and oriented to person, place, and time.      Cranial Nerves: No cranial nerve  deficit.   Psychiatric:         Behavior: Behavior normal.         Thought Content: Thought content normal.          Assessment:     1. Sinus pressure    2. Nasal congestion        Plan:     Sinus pressure    Nasal congestion    Other orders  -     amoxicillin-clavulanate 875-125mg (AUGMENTIN) 875-125 mg per tablet; Take 1 tablet by mouth 2 (two) times daily. for 10 days  Dispense: 20 tablet; Refill: 0  -     predniSONE (DELTASONE) 20 MG tablet; Take 3 tab in am x 3d, then 2 tab in am x 3d, then 1 tab in am x 3d, then 1/2 tab in am x 3d  Dispense: 21 tablet; Refill: 0      I have treated him with a course of oral steroids as well as oral antibiotics. I will see him back in 2 weeks or sooner if needed.

## 2022-11-30 LAB — POCT GLUCOSE: 80 MG/DL (ref 70–110)

## 2022-12-08 ENCOUNTER — OFFICE VISIT (OUTPATIENT)
Dept: UROLOGY | Facility: CLINIC | Age: 72
End: 2022-12-08
Payer: MEDICARE

## 2022-12-08 VITALS
WEIGHT: 195.75 LBS | BODY MASS INDEX: 27.3 KG/M2 | DIASTOLIC BLOOD PRESSURE: 78 MMHG | SYSTOLIC BLOOD PRESSURE: 138 MMHG | HEART RATE: 73 BPM

## 2022-12-08 DIAGNOSIS — N52.9 ERECTILE DYSFUNCTION, UNSPECIFIED ERECTILE DYSFUNCTION TYPE: ICD-10-CM

## 2022-12-08 DIAGNOSIS — N40.1 BENIGN PROSTATIC HYPERPLASIA (BPH) WITH STRAINING ON URINATION: Primary | ICD-10-CM

## 2022-12-08 DIAGNOSIS — R39.16 BENIGN PROSTATIC HYPERPLASIA (BPH) WITH STRAINING ON URINATION: Primary | ICD-10-CM

## 2022-12-08 PROCEDURE — 4010F PR ACE/ARB THEARPY RXD/TAKEN: ICD-10-PCS | Mod: CPTII,S$GLB,, | Performed by: UROLOGY

## 2022-12-08 PROCEDURE — 99214 OFFICE O/P EST MOD 30 MIN: CPT | Mod: S$GLB,,, | Performed by: UROLOGY

## 2022-12-08 PROCEDURE — 3066F PR DOCUMENTATION OF TREATMENT FOR NEPHROPATHY: ICD-10-PCS | Mod: CPTII,S$GLB,, | Performed by: UROLOGY

## 2022-12-08 PROCEDURE — 1126F AMNT PAIN NOTED NONE PRSNT: CPT | Mod: CPTII,S$GLB,, | Performed by: UROLOGY

## 2022-12-08 PROCEDURE — 3072F LOW RISK FOR RETINOPATHY: CPT | Mod: CPTII,S$GLB,, | Performed by: UROLOGY

## 2022-12-08 PROCEDURE — 99499 UNLISTED E&M SERVICE: CPT | Mod: HCNC,S$GLB,, | Performed by: UROLOGY

## 2022-12-08 PROCEDURE — 3072F PR LOW RISK FOR RETINOPATHY: ICD-10-PCS | Mod: CPTII,S$GLB,, | Performed by: UROLOGY

## 2022-12-08 PROCEDURE — 1101F PR PT FALLS ASSESS DOC 0-1 FALLS W/OUT INJ PAST YR: ICD-10-PCS | Mod: CPTII,S$GLB,, | Performed by: UROLOGY

## 2022-12-08 PROCEDURE — 3044F PR MOST RECENT HEMOGLOBIN A1C LEVEL <7.0%: ICD-10-PCS | Mod: CPTII,S$GLB,, | Performed by: UROLOGY

## 2022-12-08 PROCEDURE — 1159F MED LIST DOCD IN RCRD: CPT | Mod: CPTII,S$GLB,, | Performed by: UROLOGY

## 2022-12-08 PROCEDURE — 1160F PR REVIEW ALL MEDS BY PRESCRIBER/CLIN PHARMACIST DOCUMENTED: ICD-10-PCS | Mod: CPTII,S$GLB,, | Performed by: UROLOGY

## 2022-12-08 PROCEDURE — 1159F PR MEDICATION LIST DOCUMENTED IN MEDICAL RECORD: ICD-10-PCS | Mod: CPTII,S$GLB,, | Performed by: UROLOGY

## 2022-12-08 PROCEDURE — 1160F RVW MEDS BY RX/DR IN RCRD: CPT | Mod: CPTII,S$GLB,, | Performed by: UROLOGY

## 2022-12-08 PROCEDURE — 4010F ACE/ARB THERAPY RXD/TAKEN: CPT | Mod: CPTII,S$GLB,, | Performed by: UROLOGY

## 2022-12-08 PROCEDURE — 99999 PR PBB SHADOW E&M-EST. PATIENT-LVL IV: ICD-10-PCS | Mod: PBBFAC,,, | Performed by: UROLOGY

## 2022-12-08 PROCEDURE — 3288F PR FALLS RISK ASSESSMENT DOCUMENTED: ICD-10-PCS | Mod: CPTII,S$GLB,, | Performed by: UROLOGY

## 2022-12-08 PROCEDURE — 3008F BODY MASS INDEX DOCD: CPT | Mod: CPTII,S$GLB,, | Performed by: UROLOGY

## 2022-12-08 PROCEDURE — 3078F DIAST BP <80 MM HG: CPT | Mod: CPTII,S$GLB,, | Performed by: UROLOGY

## 2022-12-08 PROCEDURE — 99999 PR PBB SHADOW E&M-EST. PATIENT-LVL IV: CPT | Mod: PBBFAC,,, | Performed by: UROLOGY

## 2022-12-08 PROCEDURE — 1126F PR PAIN SEVERITY QUANTIFIED, NO PAIN PRESENT: ICD-10-PCS | Mod: CPTII,S$GLB,, | Performed by: UROLOGY

## 2022-12-08 PROCEDURE — 3008F PR BODY MASS INDEX (BMI) DOCUMENTED: ICD-10-PCS | Mod: CPTII,S$GLB,, | Performed by: UROLOGY

## 2022-12-08 PROCEDURE — 3078F PR MOST RECENT DIASTOLIC BLOOD PRESSURE < 80 MM HG: ICD-10-PCS | Mod: CPTII,S$GLB,, | Performed by: UROLOGY

## 2022-12-08 PROCEDURE — 3061F PR NEG MICROALBUMINURIA RESULT DOCUMENTED/REVIEW: ICD-10-PCS | Mod: CPTII,S$GLB,, | Performed by: UROLOGY

## 2022-12-08 PROCEDURE — 3066F NEPHROPATHY DOC TX: CPT | Mod: CPTII,S$GLB,, | Performed by: UROLOGY

## 2022-12-08 PROCEDURE — 1101F PT FALLS ASSESS-DOCD LE1/YR: CPT | Mod: CPTII,S$GLB,, | Performed by: UROLOGY

## 2022-12-08 PROCEDURE — 3075F SYST BP GE 130 - 139MM HG: CPT | Mod: CPTII,S$GLB,, | Performed by: UROLOGY

## 2022-12-08 PROCEDURE — 3044F HG A1C LEVEL LT 7.0%: CPT | Mod: CPTII,S$GLB,, | Performed by: UROLOGY

## 2022-12-08 PROCEDURE — 3288F FALL RISK ASSESSMENT DOCD: CPT | Mod: CPTII,S$GLB,, | Performed by: UROLOGY

## 2022-12-08 PROCEDURE — 3075F PR MOST RECENT SYSTOLIC BLOOD PRESS GE 130-139MM HG: ICD-10-PCS | Mod: CPTII,S$GLB,, | Performed by: UROLOGY

## 2022-12-08 PROCEDURE — 3061F NEG MICROALBUMINURIA REV: CPT | Mod: CPTII,S$GLB,, | Performed by: UROLOGY

## 2022-12-08 PROCEDURE — 99214 PR OFFICE/OUTPT VISIT, EST, LEVL IV, 30-39 MIN: ICD-10-PCS | Mod: S$GLB,,, | Performed by: UROLOGY

## 2022-12-08 PROCEDURE — 99499 RISK ADDL DX/OHS AUDIT: ICD-10-PCS | Mod: HCNC,S$GLB,, | Performed by: UROLOGY

## 2022-12-08 RX ORDER — SILDENAFIL CITRATE 20 MG/1
20 TABLET ORAL DAILY PRN
Qty: 90 TABLET | Refills: 11 | Status: SHIPPED | OUTPATIENT
Start: 2022-12-08 | End: 2024-03-26 | Stop reason: SDUPTHER

## 2022-12-08 RX ORDER — SILDENAFIL CITRATE 20 MG/1
20 TABLET ORAL DAILY PRN
Qty: 90 TABLET | Refills: 11 | Status: SHIPPED | OUTPATIENT
Start: 2022-12-08 | End: 2022-12-08

## 2022-12-08 NOTE — PROGRESS NOTES
Chief Complaint:   Encounter Diagnoses   Name Primary?    Benign prostatic hyperplasia (BPH) with straining on urination Yes    Erectile dysfunction, unspecified erectile dysfunction type        HPI:   12/8/22- no real change with tamsulosin and doesn't want surgery.  No real urgency more decrease in flow.  100 mg of sildenafil is working well.  69-year-old gentleman who reports due to BPH type symptoms.  Patient on tamsulosin after the last 6 months, with no significant improvement.  This happened approximately 14-15 years ago he was having significant issues which required Florence catheterization.  Outside urologist did a TURP, symptoms seem to improve following that.  Patient's also complains of erectile dysfunction which he takes over-the-counter medications for.  One of his biggest complaints is retrograde ejaculation, which is most likely secondary to the tamsulosin.  Patient states he also has some decreased sensation with erections.  He is a significant diabetic Farideh lost from night, with no significant frequency or urgency issues during the daytime.  He states his biggest complaint is difficulty starting and stopping during his stream.  He has had no other urological history, no family history of urological cancers or stones, except for BPH in his father.  No gross hematuria, he has never been a smoker    Allergies:  Aspirin, Meperidine, and Niacin    Medications:  has a current medication list which includes the following prescription(s): acetaminophen, amoxicillin-clavulanate 875-125mg, azelastine, blood sugar diagnostic, dutasteride, fenofibrate micronized, fluticasone propionate, gabapentin, gavilyte-c, hydrocortisone, levemir flextouch u-100 insuln, latanoprost, levocetirizine, victoza 3-claudine, losartan, lovastatin, metformin, multivitamin, ondansetron, pen needle, diabetic, pioglitazone, prednisone, sildenafil, tamsulosin, triamcinolone acetonide 0.1%, and pantoprazole.    Review of  Systems:  General: No fever, chills, fatigability, or weight loss.  Skin: No rashes, itching, or changes in color or texture of skin.  Chest: Denies MEEK, cyanosis, wheezing, cough, and sputum production.  Abdomen: Appetite fine. No weight loss. Denies diarrhea, abdominal pain, hematemesis, or blood in stool.  Musculoskeletal: No joint stiffness or swelling. Denies back pain.  : As above.  All other review of systems negative.    PMH:   has a past medical history of Acid reflux, Angina pectoris, Back pain, BPH (benign prostatic hyperplasia), Cholesteatoma, Degenerative joint disease, Diverticulosis, Erectile dysfunction, History of elevated PSA (2/14), History of pericarditis, Hypercholesteremia, Hypertension, Iron deficiency anemia, СВЕТЛАНА (obstructive sleep apnea) (5/17/2022), Pacemaker, Renal disorder, Squamous cell cancer of skin of mastoid region of scalp, Type 2 diabetes mellitus, Urinary incontinence, and when he was 7 Yrs old..    PSH:   has a past surgical history that includes Shoulder arthroscopy (Right); Nasal sinus surgery; Cardiac pacemaker placement; Tonsillectomy; Transurethral resection of prostate; Knee cartilage surgery (Bilateral); Tympanoplasty; vesectomy; Total knee arthroplasty (Left, 05/15/2017); Joint replacement; Esophagogastroduodenoscopy (N/A, 9/13/2019); Colonoscopy (N/A, 9/13/2019); Esophagogastroduodenoscopy (N/A, 9/3/2021); and Colonoscopy (N/A, 9/22/2022).    FamHx: family history includes Arthritis in his mother; Colon cancer in his paternal grandmother; Diabetes in his maternal grandmother and son; Heart disease in his father; Hypertension in his father and mother; Stroke in his father.    SocHx:  reports that he has never smoked. He has never used smokeless tobacco. He reports current alcohol use. He reports that he does not use drugs.      Physical Exam:  Vitals:    12/08/22 0848   BP: 138/78   Pulse: 73     General: A&Ox3, no apparent distress, no deformities  Neck: No masses,  normal ROM  Lungs: normal inspiration, no use of accessory muscles  Heart: normal pulse, no arrhythmias  Abdomen: Soft, NT, ND, no masses, no hernias, no hepatosplenomegaly  Skin: The skin is warm and dry. No jaundice.  Ext: No c/c/e.    Labs/Studies:   pvr 49 ml 8/21  psa 0.67 8/22  DIANN complicated left renal cyst 6/22    Impression/Plan:       1.  BPH- dutasteride alone is just as good as with tamsulosin and will hold it for now.  No need for surgery as of yet, continue dutasteride alone.  I will see him again in 6 months and if stable yearly from there.  Call with any issues in the meantime.    2.  Erectile dysfunction- 100 mg of sildenafil is working well and will send in a refill to his pharmacy.

## 2022-12-12 ENCOUNTER — HOSPITAL ENCOUNTER (OUTPATIENT)
Dept: RADIOLOGY | Facility: HOSPITAL | Age: 72
Discharge: HOME | End: 2022-12-12
Attending: PHYSICIAN ASSISTANT
Payer: MEDICARE

## 2022-12-12 ENCOUNTER — OFFICE VISIT (OUTPATIENT)
Dept: OTOLARYNGOLOGY | Facility: CLINIC | Age: 72
End: 2022-12-12
Payer: MEDICARE

## 2022-12-12 ENCOUNTER — CLINICAL SUPPORT (OUTPATIENT)
Dept: AUDIOLOGY | Facility: CLINIC | Age: 72
End: 2022-12-12
Payer: MEDICARE

## 2022-12-12 VITALS — WEIGHT: 195.75 LBS | BODY MASS INDEX: 27.3 KG/M2 | TEMPERATURE: 98 F

## 2022-12-12 DIAGNOSIS — H69.93 ETD (EUSTACHIAN TUBE DYSFUNCTION), BILATERAL: ICD-10-CM

## 2022-12-12 DIAGNOSIS — H69.93 ETD (EUSTACHIAN TUBE DYSFUNCTION), BILATERAL: Primary | ICD-10-CM

## 2022-12-12 DIAGNOSIS — J34.89 SINUS PRESSURE: ICD-10-CM

## 2022-12-12 DIAGNOSIS — J34.89 SINUS PRESSURE: Primary | ICD-10-CM

## 2022-12-12 PROCEDURE — 3044F PR MOST RECENT HEMOGLOBIN A1C LEVEL <7.0%: ICD-10-PCS | Mod: HCNC,CPTII,S$GLB, | Performed by: PHYSICIAN ASSISTANT

## 2022-12-12 PROCEDURE — 70220 X-RAY EXAM OF SINUSES: CPT | Mod: TC,HCNC

## 2022-12-12 PROCEDURE — 92567 TYMPANOMETRY: CPT | Mod: HCNC,S$GLB,, | Performed by: AUDIOLOGIST-HEARING AID FITTER

## 2022-12-12 PROCEDURE — 3066F PR DOCUMENTATION OF TREATMENT FOR NEPHROPATHY: ICD-10-PCS | Mod: HCNC,CPTII,S$GLB, | Performed by: PHYSICIAN ASSISTANT

## 2022-12-12 PROCEDURE — 1126F PR PAIN SEVERITY QUANTIFIED, NO PAIN PRESENT: ICD-10-PCS | Mod: HCNC,CPTII,S$GLB, | Performed by: PHYSICIAN ASSISTANT

## 2022-12-12 PROCEDURE — 4010F ACE/ARB THERAPY RXD/TAKEN: CPT | Mod: HCNC,CPTII,S$GLB, | Performed by: PHYSICIAN ASSISTANT

## 2022-12-12 PROCEDURE — 92567 PR TYMPA2METRY: ICD-10-PCS | Mod: HCNC,S$GLB,, | Performed by: AUDIOLOGIST-HEARING AID FITTER

## 2022-12-12 PROCEDURE — 3066F NEPHROPATHY DOC TX: CPT | Mod: HCNC,CPTII,S$GLB, | Performed by: PHYSICIAN ASSISTANT

## 2022-12-12 PROCEDURE — 1101F PT FALLS ASSESS-DOCD LE1/YR: CPT | Mod: HCNC,CPTII,S$GLB, | Performed by: PHYSICIAN ASSISTANT

## 2022-12-12 PROCEDURE — 3008F PR BODY MASS INDEX (BMI) DOCUMENTED: ICD-10-PCS | Mod: HCNC,CPTII,S$GLB, | Performed by: PHYSICIAN ASSISTANT

## 2022-12-12 PROCEDURE — 70220 XR SINUSES MIN 3 VIEWS: ICD-10-PCS | Mod: 26,HCNC,, | Performed by: RADIOLOGY

## 2022-12-12 PROCEDURE — 1101F PR PT FALLS ASSESS DOC 0-1 FALLS W/OUT INJ PAST YR: ICD-10-PCS | Mod: HCNC,CPTII,S$GLB, | Performed by: PHYSICIAN ASSISTANT

## 2022-12-12 PROCEDURE — 99213 PR OFFICE/OUTPT VISIT, EST, LEVL III, 20-29 MIN: ICD-10-PCS | Mod: HCNC,S$GLB,, | Performed by: PHYSICIAN ASSISTANT

## 2022-12-12 PROCEDURE — 3072F PR LOW RISK FOR RETINOPATHY: ICD-10-PCS | Mod: HCNC,CPTII,S$GLB, | Performed by: PHYSICIAN ASSISTANT

## 2022-12-12 PROCEDURE — 70220 X-RAY EXAM OF SINUSES: CPT | Mod: 26,HCNC,, | Performed by: RADIOLOGY

## 2022-12-12 PROCEDURE — 4010F PR ACE/ARB THEARPY RXD/TAKEN: ICD-10-PCS | Mod: HCNC,CPTII,S$GLB, | Performed by: PHYSICIAN ASSISTANT

## 2022-12-12 PROCEDURE — 3288F PR FALLS RISK ASSESSMENT DOCUMENTED: ICD-10-PCS | Mod: HCNC,CPTII,S$GLB, | Performed by: PHYSICIAN ASSISTANT

## 2022-12-12 PROCEDURE — 99999 PR PBB SHADOW E&M-EST. PATIENT-LVL III: ICD-10-PCS | Mod: PBBFAC,HCNC,, | Performed by: PHYSICIAN ASSISTANT

## 2022-12-12 PROCEDURE — 99999 PR PBB SHADOW E&M-EST. PATIENT-LVL III: CPT | Mod: PBBFAC,HCNC,, | Performed by: PHYSICIAN ASSISTANT

## 2022-12-12 PROCEDURE — 99213 OFFICE O/P EST LOW 20 MIN: CPT | Mod: HCNC,S$GLB,, | Performed by: PHYSICIAN ASSISTANT

## 2022-12-12 PROCEDURE — 3061F PR NEG MICROALBUMINURIA RESULT DOCUMENTED/REVIEW: ICD-10-PCS | Mod: HCNC,CPTII,S$GLB, | Performed by: PHYSICIAN ASSISTANT

## 2022-12-12 PROCEDURE — 3044F HG A1C LEVEL LT 7.0%: CPT | Mod: HCNC,CPTII,S$GLB, | Performed by: PHYSICIAN ASSISTANT

## 2022-12-12 PROCEDURE — 3008F BODY MASS INDEX DOCD: CPT | Mod: HCNC,CPTII,S$GLB, | Performed by: PHYSICIAN ASSISTANT

## 2022-12-12 PROCEDURE — 3072F LOW RISK FOR RETINOPATHY: CPT | Mod: HCNC,CPTII,S$GLB, | Performed by: PHYSICIAN ASSISTANT

## 2022-12-12 PROCEDURE — 3061F NEG MICROALBUMINURIA REV: CPT | Mod: HCNC,CPTII,S$GLB, | Performed by: PHYSICIAN ASSISTANT

## 2022-12-12 PROCEDURE — 1126F AMNT PAIN NOTED NONE PRSNT: CPT | Mod: HCNC,CPTII,S$GLB, | Performed by: PHYSICIAN ASSISTANT

## 2022-12-12 PROCEDURE — 3288F FALL RISK ASSESSMENT DOCD: CPT | Mod: HCNC,CPTII,S$GLB, | Performed by: PHYSICIAN ASSISTANT

## 2022-12-12 NOTE — PROGRESS NOTES
uSubjective:   Patient ID: Rigoberto Vasquez is a 72 y.o. male.    Chief Complaint: Ear Fullness (Pt c/o bilateral ear fullness x1 month. Right worse than left. C/o off balance, decreased hearing)    Mr. Vasquez is a very pleasant 73 yo male here to see me today with continued ear fullness, nasal congestion and facial pressure. I saw him 2 weeks ago and treated him with oral antibiotics and asteroids. He states that he had some improvement but still having green discharge    Review of patient's allergies indicates:   Allergen Reactions    Aspirin      Stomach pain even with ppi    Meperidine      Other reaction(s): Stomach upset    Niacin      Other reaction(s): hot skin           Review of Systems   Constitutional:  Negative for fatigue, fever and unexpected weight change.   HENT:  Positive for congestion, ear pain (ear fullness) and rhinorrhea. Negative for ear discharge, facial swelling, hearing loss, nosebleeds, postnasal drip, sinus pressure, sneezing, sore throat, tinnitus, trouble swallowing and voice change.    Eyes:  Negative for discharge, redness and itching.   Respiratory:  Negative for cough, choking, shortness of breath and wheezing.    Cardiovascular:  Negative for chest pain and palpitations.   Gastrointestinal:  Negative for abdominal pain.        No reflux.   Musculoskeletal:  Negative for neck pain.   Neurological:  Negative for dizziness, facial asymmetry, light-headedness and headaches.   Hematological:  Negative for adenopathy. Does not bruise/bleed easily.   Psychiatric/Behavioral:  Negative for agitation, behavioral problems, confusion and decreased concentration.        Objective:   Temp 98.1 °F (36.7 °C) (Temporal)   Wt 88.8 kg (195 lb 12.3 oz)   BMI 27.30 kg/m²     Physical Exam     Imaging : Tympanometry revealed Type C in the right ear, and Type C in the left ear.      Right Ear: 0.4 ml@ -360 daPa, with ear canal volume of: 1.2     Left Ear:  0.3 ml@ -185 daPa, with ear canal volume of:  4.2    Assessment:     1. Sinus pressure    2. ETD (Eustachian tube dysfunction), bilateral        Plan:     Sinus pressure  -     X-Ray Sinuses Min 3 Views; Future; Expected date: 12/12/2022    ETD (Eustachian tube dysfunction), bilateral    We had a long discussion regarding the anatomy and function of the eustachian tube.  We discussed that the eustachian tube acts as a pump to keep the appropriate amount of pressure behind the ear drum.  I gave the patient a prescription for a nasal steroid spray to be used on a daily basis, and we discussed that it will take 2-3 weeks of daily use to achieve maximal effectiveness.     Sinus pressure: he has recently completed oral antibiotics and steroids. I have ordered sinus xray to look for sinus disease. Will order additional medications if needed.

## 2022-12-12 NOTE — PROGRESS NOTES
Tympanometry revealed Type C in the right ear, and Type C in the left ear.     Right Ear: 0.4 ml@ -360 daPa, with ear canal volume of: 1.2    Left Ear:  0.3 ml@ -185 daPa, with ear canal volume of: 4.2    Patient was counseled with results.

## 2022-12-13 ENCOUNTER — TELEPHONE (OUTPATIENT)
Dept: OTOLARYNGOLOGY | Facility: CLINIC | Age: 72
End: 2022-12-13
Payer: MEDICARE

## 2022-12-13 ENCOUNTER — OFFICE VISIT (OUTPATIENT)
Dept: OPHTHALMOLOGY | Facility: CLINIC | Age: 72
End: 2022-12-13
Payer: MEDICARE

## 2022-12-13 DIAGNOSIS — E11.9 CONTROLLED TYPE 2 DIABETES MELLITUS WITHOUT COMPLICATION, WITH LONG-TERM CURRENT USE OF INSULIN: ICD-10-CM

## 2022-12-13 DIAGNOSIS — H35.372 EPIRETINAL MEMBRANE (ERM) OF LEFT EYE: ICD-10-CM

## 2022-12-13 DIAGNOSIS — D31.31 CHOROIDAL NEVUS OF RIGHT EYE: ICD-10-CM

## 2022-12-13 DIAGNOSIS — H40.013 OPEN ANGLE WITH BORDERLINE FINDINGS AND LOW GLAUCOMA RISK IN BOTH EYES: Primary | ICD-10-CM

## 2022-12-13 DIAGNOSIS — Z79.4 CONTROLLED TYPE 2 DIABETES MELLITUS WITHOUT COMPLICATION, WITH LONG-TERM CURRENT USE OF INSULIN: ICD-10-CM

## 2022-12-13 DIAGNOSIS — H35.341 LAMELLAR MACULAR HOLE OF RIGHT EYE: ICD-10-CM

## 2022-12-13 PROCEDURE — 3044F PR MOST RECENT HEMOGLOBIN A1C LEVEL <7.0%: ICD-10-PCS | Mod: HCNC,CPTII,S$GLB, | Performed by: OPHTHALMOLOGY

## 2022-12-13 PROCEDURE — 1159F PR MEDICATION LIST DOCUMENTED IN MEDICAL RECORD: ICD-10-PCS | Mod: HCNC,CPTII,S$GLB, | Performed by: OPHTHALMOLOGY

## 2022-12-13 PROCEDURE — 1126F AMNT PAIN NOTED NONE PRSNT: CPT | Mod: HCNC,CPTII,S$GLB, | Performed by: OPHTHALMOLOGY

## 2022-12-13 PROCEDURE — 4010F ACE/ARB THERAPY RXD/TAKEN: CPT | Mod: HCNC,CPTII,S$GLB, | Performed by: OPHTHALMOLOGY

## 2022-12-13 PROCEDURE — 99999 PR PBB SHADOW E&M-EST. PATIENT-LVL III: ICD-10-PCS | Mod: PBBFAC,HCNC,, | Performed by: OPHTHALMOLOGY

## 2022-12-13 PROCEDURE — 99214 PR OFFICE/OUTPT VISIT, EST, LEVL IV, 30-39 MIN: ICD-10-PCS | Mod: HCNC,S$GLB,, | Performed by: OPHTHALMOLOGY

## 2022-12-13 PROCEDURE — 99214 OFFICE O/P EST MOD 30 MIN: CPT | Mod: HCNC,S$GLB,, | Performed by: OPHTHALMOLOGY

## 2022-12-13 PROCEDURE — 1160F PR REVIEW ALL MEDS BY PRESCRIBER/CLIN PHARMACIST DOCUMENTED: ICD-10-PCS | Mod: HCNC,CPTII,S$GLB, | Performed by: OPHTHALMOLOGY

## 2022-12-13 PROCEDURE — 1159F MED LIST DOCD IN RCRD: CPT | Mod: HCNC,CPTII,S$GLB, | Performed by: OPHTHALMOLOGY

## 2022-12-13 PROCEDURE — 3066F PR DOCUMENTATION OF TREATMENT FOR NEPHROPATHY: ICD-10-PCS | Mod: HCNC,CPTII,S$GLB, | Performed by: OPHTHALMOLOGY

## 2022-12-13 PROCEDURE — 3066F NEPHROPATHY DOC TX: CPT | Mod: HCNC,CPTII,S$GLB, | Performed by: OPHTHALMOLOGY

## 2022-12-13 PROCEDURE — 99999 PR PBB SHADOW E&M-EST. PATIENT-LVL III: CPT | Mod: PBBFAC,HCNC,, | Performed by: OPHTHALMOLOGY

## 2022-12-13 PROCEDURE — 4010F PR ACE/ARB THEARPY RXD/TAKEN: ICD-10-PCS | Mod: HCNC,CPTII,S$GLB, | Performed by: OPHTHALMOLOGY

## 2022-12-13 PROCEDURE — 1160F RVW MEDS BY RX/DR IN RCRD: CPT | Mod: HCNC,CPTII,S$GLB, | Performed by: OPHTHALMOLOGY

## 2022-12-13 PROCEDURE — 3061F PR NEG MICROALBUMINURIA RESULT DOCUMENTED/REVIEW: ICD-10-PCS | Mod: HCNC,CPTII,S$GLB, | Performed by: OPHTHALMOLOGY

## 2022-12-13 PROCEDURE — 1126F PR PAIN SEVERITY QUANTIFIED, NO PAIN PRESENT: ICD-10-PCS | Mod: HCNC,CPTII,S$GLB, | Performed by: OPHTHALMOLOGY

## 2022-12-13 PROCEDURE — 3044F HG A1C LEVEL LT 7.0%: CPT | Mod: HCNC,CPTII,S$GLB, | Performed by: OPHTHALMOLOGY

## 2022-12-13 PROCEDURE — 3061F NEG MICROALBUMINURIA REV: CPT | Mod: HCNC,CPTII,S$GLB, | Performed by: OPHTHALMOLOGY

## 2022-12-13 RX ORDER — LEVOFLOXACIN 500 MG/1
500 TABLET, FILM COATED ORAL DAILY
Qty: 14 TABLET | Refills: 0 | Status: SHIPPED | OUTPATIENT
Start: 2022-12-13 | End: 2022-12-27

## 2022-12-13 NOTE — TELEPHONE ENCOUNTER
I have called and spoke to patient and will put him on a longer course of oral antibiotics and follow up with him in 2 weeks. He understands and agrees with plan of care.        Narrative & Impression  EXAMINATION:  XR SINUSES MIN 3 VIEWS     CLINICAL HISTORY:  Other specified disorders of nose and nasal sinuses     TECHNIQUE:  AP, lateral, and Rashid views of the paranasal sinuses were performed     COMPARISON:  None.     FINDINGS:  Air-fluid levels are present in the bilateral maxillary sinuses, concerning for acute sinusitis.  No acute osseous findings demonstrated.     Impression:     Findings concerning for acute bilateral maxillary sinusitis.  Further evaluation could be obtained with CT as clinically warranted.        Electronically signed by: Pineda Clark MD  Date:                                            12/12/2022  Time:                                           14:35           Exam Ended: 12/12/22 14:29 Last Resulted: 12/12/22 14:35              Suzi Rodriguez

## 2022-12-13 NOTE — PROGRESS NOTES
HPI     Follow-up            Comments: Patient here for 4 month IOP check           Comments    Patient states no pain or discomfort.  Patient states he has noticed   change in VA OU. Patient has been compliant with gtts. No other ocular   complaints at this time.     DM since 1999   NO DBR   2. Mac hole od   3. erm od  4. Choroidal nevus od   5. HIGH MYOPE(-6)   6. SCOAG   iop 25/ 23 ou on po steroid   anamolous myopic disc   ASYMETRIC C/D 0.7 / 0.5    // 641  NO FAMILY HISTORY   HVF 3/22/2022      GCL 31/28 5/3/22      Latanoprost qam OU            Last edited by Raina Arellano MA on 12/13/2022  2:29 PM.            Assessment /Plan     For exam results, see Encounter Report.      ICD-10-CM ICD-9-CM    1. Open angle with borderline findings and low glaucoma risk in both eyes  H40.013 365.01 Slight increase in IOP, yet thick CCT   Follow       2. Controlled type 2 diabetes mellitus without complication, with long-term current use of insulin  E11.9 250.00 Not due for dilation at this time     Z79.4 V58.67       3. Lamellar macular hole of right eye  H35.341 362.54 Followed in the past with Dr Epperson       4. Choroidal nevus of right eye  D31.31 224.6 Followed with Dr. Epperson       5. Epiretinal membrane (ERM) of left eye  H35.372 362.56           RETURN TO CLINIC 4 month IOP     Latanoprost qam OU

## 2022-12-22 ENCOUNTER — PATIENT OUTREACH (OUTPATIENT)
Dept: ADMINISTRATIVE | Facility: HOSPITAL | Age: 72
End: 2022-12-22
Payer: MEDICARE

## 2023-01-04 ENCOUNTER — PATIENT MESSAGE (OUTPATIENT)
Dept: GASTROENTEROLOGY | Facility: CLINIC | Age: 73
End: 2023-01-04
Payer: MEDICARE

## 2023-01-13 ENCOUNTER — TELEPHONE (OUTPATIENT)
Dept: ADMINISTRATIVE | Facility: HOSPITAL | Age: 73
End: 2023-01-13
Payer: MEDICARE

## 2023-01-23 DIAGNOSIS — R11.0 NAUSEA: ICD-10-CM

## 2023-01-23 DIAGNOSIS — R10.13 DYSPEPSIA: ICD-10-CM

## 2023-01-23 RX ORDER — PANTOPRAZOLE SODIUM 40 MG/1
40 TABLET, DELAYED RELEASE ORAL DAILY
Qty: 90 TABLET | Refills: 1 | Status: SHIPPED | OUTPATIENT
Start: 2023-01-23 | End: 2023-07-05 | Stop reason: SDUPTHER

## 2023-01-30 ENCOUNTER — OFFICE VISIT (OUTPATIENT)
Dept: OTOLARYNGOLOGY | Facility: CLINIC | Age: 73
End: 2023-01-30
Payer: MEDICARE

## 2023-01-30 VITALS — WEIGHT: 196.44 LBS | TEMPERATURE: 98 F | BODY MASS INDEX: 27.4 KG/M2

## 2023-01-30 DIAGNOSIS — H60.312 DIFFUSE OTITIS EXTERNA OF LEFT EAR, UNSPECIFIED CHRONICITY: ICD-10-CM

## 2023-01-30 DIAGNOSIS — H92.02 OTALGIA, LEFT: Primary | ICD-10-CM

## 2023-01-30 PROCEDURE — 99999 PR PBB SHADOW E&M-EST. PATIENT-LVL III: ICD-10-PCS | Mod: PBBFAC,HCNC,, | Performed by: PHYSICIAN ASSISTANT

## 2023-01-30 PROCEDURE — 1125F AMNT PAIN NOTED PAIN PRSNT: CPT | Mod: HCNC,CPTII,S$GLB, | Performed by: PHYSICIAN ASSISTANT

## 2023-01-30 PROCEDURE — 3072F LOW RISK FOR RETINOPATHY: CPT | Mod: HCNC,CPTII,S$GLB, | Performed by: PHYSICIAN ASSISTANT

## 2023-01-30 PROCEDURE — 99213 OFFICE O/P EST LOW 20 MIN: CPT | Mod: HCNC,S$GLB,, | Performed by: PHYSICIAN ASSISTANT

## 2023-01-30 PROCEDURE — 1101F PT FALLS ASSESS-DOCD LE1/YR: CPT | Mod: HCNC,CPTII,S$GLB, | Performed by: PHYSICIAN ASSISTANT

## 2023-01-30 PROCEDURE — 3008F BODY MASS INDEX DOCD: CPT | Mod: HCNC,CPTII,S$GLB, | Performed by: PHYSICIAN ASSISTANT

## 2023-01-30 PROCEDURE — 1159F PR MEDICATION LIST DOCUMENTED IN MEDICAL RECORD: ICD-10-PCS | Mod: HCNC,CPTII,S$GLB, | Performed by: PHYSICIAN ASSISTANT

## 2023-01-30 PROCEDURE — 1125F PR PAIN SEVERITY QUANTIFIED, PAIN PRESENT: ICD-10-PCS | Mod: HCNC,CPTII,S$GLB, | Performed by: PHYSICIAN ASSISTANT

## 2023-01-30 PROCEDURE — 1159F MED LIST DOCD IN RCRD: CPT | Mod: HCNC,CPTII,S$GLB, | Performed by: PHYSICIAN ASSISTANT

## 2023-01-30 PROCEDURE — 99999 PR PBB SHADOW E&M-EST. PATIENT-LVL III: CPT | Mod: PBBFAC,HCNC,, | Performed by: PHYSICIAN ASSISTANT

## 2023-01-30 PROCEDURE — 1101F PR PT FALLS ASSESS DOC 0-1 FALLS W/OUT INJ PAST YR: ICD-10-PCS | Mod: HCNC,CPTII,S$GLB, | Performed by: PHYSICIAN ASSISTANT

## 2023-01-30 PROCEDURE — 3008F PR BODY MASS INDEX (BMI) DOCUMENTED: ICD-10-PCS | Mod: HCNC,CPTII,S$GLB, | Performed by: PHYSICIAN ASSISTANT

## 2023-01-30 PROCEDURE — 3072F PR LOW RISK FOR RETINOPATHY: ICD-10-PCS | Mod: HCNC,CPTII,S$GLB, | Performed by: PHYSICIAN ASSISTANT

## 2023-01-30 PROCEDURE — 3288F FALL RISK ASSESSMENT DOCD: CPT | Mod: HCNC,CPTII,S$GLB, | Performed by: PHYSICIAN ASSISTANT

## 2023-01-30 PROCEDURE — 99213 PR OFFICE/OUTPT VISIT, EST, LEVL III, 20-29 MIN: ICD-10-PCS | Mod: HCNC,S$GLB,, | Performed by: PHYSICIAN ASSISTANT

## 2023-01-30 PROCEDURE — 3288F PR FALLS RISK ASSESSMENT DOCUMENTED: ICD-10-PCS | Mod: HCNC,CPTII,S$GLB, | Performed by: PHYSICIAN ASSISTANT

## 2023-01-30 RX ORDER — NEOMYCIN SULFATE, POLYMYXIN B SULFATE AND HYDROCORTISONE 10; 3.5; 1 MG/ML; MG/ML; [USP'U]/ML
3 SUSPENSION/ DROPS AURICULAR (OTIC) 4 TIMES DAILY
Qty: 8 ML | Refills: 0 | Status: SHIPPED | OUTPATIENT
Start: 2023-01-30 | End: 2023-02-09

## 2023-01-30 NOTE — PROGRESS NOTES
"cortSubjective:   Patient ID: Rigoberto Vasquez is a 72 y.o. male.    Chief Complaint: Otitis Media     Mr. Vasquez is a very pleasant 71 yo male here to see me today with complaints of left ear pain. Patient states that He has a PET and states that his ear began hurting over the weekend. HE thought it was TMJ related but pain has persisted. He poured hydrogen peroxide in his left ear and "heard lots of popping." He states that improve pain. He does have a h/o cholestaoma and is followed by Dr. Gudino.     Otitis Media:    Associated symptoms: Ear pain.  No dizziness, no fever, no headaches, no congestion, no postnasal drip, no shortness of breath, no rhinorrhea, no sore throat and no wheezing.  Review of patient's allergies indicates:   Allergen Reactions    Aspirin      Stomach pain even with ppi    Meperidine      Other reaction(s): Stomach upset    Niacin      Other reaction(s): hot skin           Review of Systems   Constitutional:  Negative for fatigue, fever and unexpected weight change.   HENT:  Positive for ear pain. Negative for congestion, ear discharge, facial swelling, hearing loss, nosebleeds, postnasal drip, rhinorrhea, sinus pressure, sneezing, sore throat, tinnitus, trouble swallowing and voice change.    Eyes:  Negative for discharge, redness and itching.   Respiratory:  Negative for cough, choking, shortness of breath and wheezing.    Cardiovascular:  Negative for chest pain and palpitations.   Gastrointestinal:  Negative for abdominal pain.        No reflux.   Musculoskeletal:  Negative for neck pain.   Neurological:  Negative for dizziness, facial asymmetry, light-headedness and headaches.   Hematological:  Negative for adenopathy. Does not bruise/bleed easily.   Psychiatric/Behavioral:  Negative for agitation, behavioral problems, confusion and decreased concentration.        Objective:   Temp 98.1 °F (36.7 °C) (Temporal)   Wt 89.1 kg (196 lb 6.9 oz)   BMI 27.40 kg/m²     Physical " Exam  Constitutional:       General: He is not in acute distress.     Appearance: He is well-developed.   HENT:      Head: Normocephalic and atraumatic.      Jaw: No trismus.      Right Ear: Hearing, tympanic membrane, ear canal and external ear normal.      Left Ear: Hearing, tympanic membrane, ear canal and external ear normal. No PE tube.      Ears:      Comments: Debris in left EAC, unable to obtain culture     Nose: Nose normal. No nasal deformity, septal deviation, mucosal edema or rhinorrhea.      Mouth/Throat:      Dentition: Normal dentition.      Pharynx: Uvula midline. No oropharyngeal exudate or uvula swelling.   Eyes:      General: No scleral icterus.     Conjunctiva/sclera: Conjunctivae normal.      Right eye: Right conjunctiva is not injected. No chemosis.     Left eye: Left conjunctiva is not injected. No chemosis.     Pupils: Pupils are equal, round, and reactive to light.   Neck:      Thyroid: No thyroid mass or thyromegaly.      Trachea: Trachea and phonation normal. No tracheal tenderness or tracheal deviation.   Pulmonary:      Effort: Pulmonary effort is normal. No accessory muscle usage or respiratory distress.      Breath sounds: No stridor.   Lymphadenopathy:      Head:      Right side of head: No submental, submandibular, preauricular or posterior auricular adenopathy.      Left side of head: No submental, submandibular, preauricular or posterior auricular adenopathy.      Cervical: No cervical adenopathy.      Right cervical: No superficial or deep cervical adenopathy.     Left cervical: No superficial or deep cervical adenopathy.   Skin:     General: Skin is warm and dry.      Findings: No erythema or rash.   Neurological:      Mental Status: He is alert and oriented to person, place, and time.      Cranial Nerves: No cranial nerve deficit.   Psychiatric:         Behavior: Behavior normal.         Thought Content: Thought content normal.            Assessment:     1. Otalgia, left    2.  Diffuse otitis externa of left ear, unspecified chronicity        Plan:     Otalgia, left    Diffuse otitis externa of left ear, unspecified chronicity    Other orders  -     neomycin-polymyxin-hydrocortisone (CORTISPORIN) 3.5-10,000-1 mg/mL-unit/mL-% otic suspension; Place 3 drops into the left ear 4 (four) times daily. for 10 days  Dispense: 8 mL; Refill: 0    I have started him on antibiotic ear drops with steroid to help with irritation and pain. Unable to obtain drainage for culture. I will see him back next week for recheck or sooner if needed.

## 2023-02-06 ENCOUNTER — PATIENT OUTREACH (OUTPATIENT)
Dept: ADMINISTRATIVE | Facility: HOSPITAL | Age: 73
End: 2023-02-06
Payer: MEDICARE

## 2023-02-06 ENCOUNTER — OFFICE VISIT (OUTPATIENT)
Dept: OTOLARYNGOLOGY | Facility: CLINIC | Age: 73
End: 2023-02-06
Payer: MEDICARE

## 2023-02-06 VITALS — WEIGHT: 196.63 LBS | BODY MASS INDEX: 27.43 KG/M2 | TEMPERATURE: 99 F

## 2023-02-06 DIAGNOSIS — H92.12 OTORRHEA OF LEFT EAR: Primary | ICD-10-CM

## 2023-02-06 DIAGNOSIS — H92.02 OTALGIA, LEFT: ICD-10-CM

## 2023-02-06 PROCEDURE — 1159F MED LIST DOCD IN RCRD: CPT | Mod: HCNC,CPTII,S$GLB, | Performed by: PHYSICIAN ASSISTANT

## 2023-02-06 PROCEDURE — 3008F BODY MASS INDEX DOCD: CPT | Mod: HCNC,CPTII,S$GLB, | Performed by: PHYSICIAN ASSISTANT

## 2023-02-06 PROCEDURE — 3072F PR LOW RISK FOR RETINOPATHY: ICD-10-PCS | Mod: HCNC,CPTII,S$GLB, | Performed by: PHYSICIAN ASSISTANT

## 2023-02-06 PROCEDURE — 1101F PT FALLS ASSESS-DOCD LE1/YR: CPT | Mod: HCNC,CPTII,S$GLB, | Performed by: PHYSICIAN ASSISTANT

## 2023-02-06 PROCEDURE — 3008F PR BODY MASS INDEX (BMI) DOCUMENTED: ICD-10-PCS | Mod: HCNC,CPTII,S$GLB, | Performed by: PHYSICIAN ASSISTANT

## 2023-02-06 PROCEDURE — 99213 PR OFFICE/OUTPT VISIT, EST, LEVL III, 20-29 MIN: ICD-10-PCS | Mod: 25,HCNC,S$GLB, | Performed by: PHYSICIAN ASSISTANT

## 2023-02-06 PROCEDURE — 3288F FALL RISK ASSESSMENT DOCD: CPT | Mod: HCNC,CPTII,S$GLB, | Performed by: PHYSICIAN ASSISTANT

## 2023-02-06 PROCEDURE — 99999 PR PBB SHADOW E&M-EST. PATIENT-LVL III: CPT | Mod: PBBFAC,HCNC,, | Performed by: PHYSICIAN ASSISTANT

## 2023-02-06 PROCEDURE — 3288F PR FALLS RISK ASSESSMENT DOCUMENTED: ICD-10-PCS | Mod: HCNC,CPTII,S$GLB, | Performed by: PHYSICIAN ASSISTANT

## 2023-02-06 PROCEDURE — 99213 OFFICE O/P EST LOW 20 MIN: CPT | Mod: 25,HCNC,S$GLB, | Performed by: PHYSICIAN ASSISTANT

## 2023-02-06 PROCEDURE — 87070 CULTURE OTHR SPECIMN AEROBIC: CPT | Mod: HCNC | Performed by: PHYSICIAN ASSISTANT

## 2023-02-06 PROCEDURE — 92567 TYMPANOMETRY: CPT | Mod: HCNC,S$GLB,, | Performed by: PHYSICIAN ASSISTANT

## 2023-02-06 PROCEDURE — 92567 PR TYMPA2METRY: ICD-10-PCS | Mod: HCNC,S$GLB,, | Performed by: PHYSICIAN ASSISTANT

## 2023-02-06 PROCEDURE — 99999 PR PBB SHADOW E&M-EST. PATIENT-LVL III: ICD-10-PCS | Mod: PBBFAC,HCNC,, | Performed by: PHYSICIAN ASSISTANT

## 2023-02-06 PROCEDURE — 3072F LOW RISK FOR RETINOPATHY: CPT | Mod: HCNC,CPTII,S$GLB, | Performed by: PHYSICIAN ASSISTANT

## 2023-02-06 PROCEDURE — 1101F PR PT FALLS ASSESS DOC 0-1 FALLS W/OUT INJ PAST YR: ICD-10-PCS | Mod: HCNC,CPTII,S$GLB, | Performed by: PHYSICIAN ASSISTANT

## 2023-02-06 PROCEDURE — 1159F PR MEDICATION LIST DOCUMENTED IN MEDICAL RECORD: ICD-10-PCS | Mod: HCNC,CPTII,S$GLB, | Performed by: PHYSICIAN ASSISTANT

## 2023-02-06 NOTE — PROGRESS NOTES
"Subjective:   Patient ID: Rigoberto Vasquez is a 72 y.o. male.    Chief Complaint: Otitis Media (Left ear )    Mr. Vasquez is a very pleasant 71 yo male here for OE follow up. He states he is feeling much better but still feels "full." He was last seen 1/30/23 and I started him on Cortisporin. I was unable to obtain  culture last week.  He has a history of left CWU in 2015 with cartilage grafting fo the tympanic membrane.    Review of patient's allergies indicates:   Allergen Reactions    Aspirin      Stomach pain even with ppi    Meperidine      Other reaction(s): Stomach upset    Niacin      Other reaction(s): hot skin           Review of Systems   Constitutional:  Negative for fatigue, fever and unexpected weight change.   HENT:  Positive for ear discharge and hearing loss. Negative for congestion, ear pain, facial swelling, nosebleeds, postnasal drip, rhinorrhea, sinus pressure, sneezing, sore throat, tinnitus, trouble swallowing and voice change.    Eyes:  Negative for discharge, redness and itching.   Respiratory:  Negative for cough, choking, shortness of breath and wheezing.    Cardiovascular:  Negative for chest pain and palpitations.   Gastrointestinal:  Negative for abdominal pain.        No reflux.   Musculoskeletal:  Negative for neck pain.   Neurological:  Negative for dizziness, facial asymmetry, light-headedness and headaches.   Hematological:  Negative for adenopathy. Does not bruise/bleed easily.   Psychiatric/Behavioral:  Negative for agitation, behavioral problems, confusion and decreased concentration.        Objective:   Temp 98.5 °F (36.9 °C)   Wt 89.2 kg (196 lb 10.4 oz)   BMI 27.43 kg/m²     Physical Exam  Constitutional:       General: He is not in acute distress.     Appearance: He is well-developed.   HENT:      Head: Normocephalic and atraumatic.      Jaw: No trismus.      Right Ear: Hearing, tympanic membrane, ear canal and external ear normal.      Left Ear: Hearing, tympanic membrane, " ear canal and external ear normal. Drainage (sent for culture, fluid suctioned) present.      Nose: Nose normal. No nasal deformity, septal deviation, mucosal edema or rhinorrhea.      Mouth/Throat:      Dentition: Normal dentition.      Pharynx: Uvula midline. No oropharyngeal exudate or uvula swelling.   Eyes:      General: No scleral icterus.     Conjunctiva/sclera: Conjunctivae normal.      Right eye: Right conjunctiva is not injected. No chemosis.     Left eye: Left conjunctiva is not injected. No chemosis.     Pupils: Pupils are equal, round, and reactive to light.   Neck:      Thyroid: No thyroid mass or thyromegaly.      Trachea: Trachea and phonation normal. No tracheal tenderness or tracheal deviation.   Pulmonary:      Effort: Pulmonary effort is normal. No accessory muscle usage or respiratory distress.      Breath sounds: No stridor.   Lymphadenopathy:      Head:      Right side of head: No submental, submandibular, preauricular or posterior auricular adenopathy.      Left side of head: No submental, submandibular, preauricular or posterior auricular adenopathy.      Cervical: No cervical adenopathy.      Right cervical: No superficial or deep cervical adenopathy.     Left cervical: No superficial or deep cervical adenopathy.   Skin:     General: Skin is warm and dry.      Findings: No erythema or rash.   Neurological:      Mental Status: He is alert and oriented to person, place, and time.      Cranial Nerves: No cranial nerve deficit.   Psychiatric:         Behavior: Behavior normal.         Thought Content: Thought content normal.            Assessment:     1. Otorrhea of left ear    2. Otalgia, left        Plan:     I have sent fluid for culture and debrided canal as much as I could. Will order antibiotics accordingly. I would like him to return to clinic next week to see a MD for recheck.

## 2023-02-07 DIAGNOSIS — Z00.00 ENCOUNTER FOR MEDICARE ANNUAL WELLNESS EXAM: ICD-10-CM

## 2023-02-08 ENCOUNTER — LAB VISIT (OUTPATIENT)
Dept: LAB | Facility: HOSPITAL | Age: 73
End: 2023-02-08
Attending: PHYSICIAN ASSISTANT
Payer: MEDICARE

## 2023-02-08 DIAGNOSIS — N18.31 TYPE 2 DIABETES MELLITUS WITH STAGE 3A CHRONIC KIDNEY DISEASE, WITH LONG-TERM CURRENT USE OF INSULIN: ICD-10-CM

## 2023-02-08 DIAGNOSIS — Z79.4 TYPE 2 DIABETES MELLITUS WITH STAGE 3A CHRONIC KIDNEY DISEASE, WITH LONG-TERM CURRENT USE OF INSULIN: ICD-10-CM

## 2023-02-08 DIAGNOSIS — E11.22 TYPE 2 DIABETES MELLITUS WITH STAGE 3A CHRONIC KIDNEY DISEASE, WITH LONG-TERM CURRENT USE OF INSULIN: ICD-10-CM

## 2023-02-08 LAB
ALBUMIN/CREAT UR: 10 UG/MG (ref 0–30)
CREAT UR-MCNC: 60 MG/DL (ref 23–375)
MICROALBUMIN UR DL<=1MG/L-MCNC: 6 UG/ML

## 2023-02-08 PROCEDURE — 82570 ASSAY OF URINE CREATININE: CPT | Mod: HCNC | Performed by: PHYSICIAN ASSISTANT

## 2023-02-09 ENCOUNTER — PATIENT MESSAGE (OUTPATIENT)
Dept: INTERNAL MEDICINE | Facility: CLINIC | Age: 73
End: 2023-02-09
Payer: MEDICARE

## 2023-02-09 ENCOUNTER — TELEPHONE (OUTPATIENT)
Dept: INTERNAL MEDICINE | Facility: CLINIC | Age: 73
End: 2023-02-09
Payer: MEDICARE

## 2023-02-09 ENCOUNTER — PATIENT MESSAGE (OUTPATIENT)
Dept: OTOLARYNGOLOGY | Facility: CLINIC | Age: 73
End: 2023-02-09
Payer: MEDICARE

## 2023-02-09 DIAGNOSIS — Z00.00 ENCOUNTER FOR MEDICARE ANNUAL WELLNESS EXAM: ICD-10-CM

## 2023-02-09 NOTE — TELEPHONE ENCOUNTER
I spoke with the patient informing him that Dr Grimes had approved his refill request for his gabapentin ointment.   Patient would like refill sent to [facility]     Fax: [phone]  Office:[phone]      Patient was informed to contact his local pharmacy for  time. The patient stated understanding

## 2023-02-09 NOTE — TELEPHONE ENCOUNTER
----- Message from Guillermo Ludwig sent at 2/9/2023 10:37 AM CST -----  Contact: E[place]/ [facility]  Ely with [facility] needing to clarify strength and ingredient and directions for gabapentin ([employer]). Please give Ely a callback at [phone].

## 2023-02-09 NOTE — TELEPHONE ENCOUNTER
I spoke with the pharmacist at [facility]. Clarified patient prescription is as   With compounded meloxicam (as previously prescribed)    Pharmacist stated understanding

## 2023-02-10 LAB — BACTERIA SPEC AEROBE CULT: NO GROWTH

## 2023-02-13 ENCOUNTER — OFFICE VISIT (OUTPATIENT)
Dept: OTOLARYNGOLOGY | Facility: CLINIC | Age: 73
End: 2023-02-13
Payer: MEDICARE

## 2023-02-13 ENCOUNTER — OFFICE VISIT (OUTPATIENT)
Dept: INTERNAL MEDICINE | Facility: CLINIC | Age: 73
End: 2023-02-13
Payer: MEDICARE

## 2023-02-13 VITALS
SYSTOLIC BLOOD PRESSURE: 130 MMHG | WEIGHT: 186.75 LBS | DIASTOLIC BLOOD PRESSURE: 70 MMHG | HEIGHT: 71 IN | TEMPERATURE: 99 F | OXYGEN SATURATION: 97 % | BODY MASS INDEX: 26.15 KG/M2 | HEART RATE: 71 BPM

## 2023-02-13 VITALS — HEART RATE: 84 BPM | TEMPERATURE: 98 F | SYSTOLIC BLOOD PRESSURE: 126 MMHG | DIASTOLIC BLOOD PRESSURE: 72 MMHG

## 2023-02-13 DIAGNOSIS — E11.22 TYPE 2 DIABETES MELLITUS WITH STAGE 3A CHRONIC KIDNEY DISEASE, WITH LONG-TERM CURRENT USE OF INSULIN: ICD-10-CM

## 2023-02-13 DIAGNOSIS — H90.A32 MIXED CONDUCTIVE AND SENSORINEURAL HEARING LOSS OF LEFT EAR WITH RESTRICTED HEARING OF RIGHT EAR: ICD-10-CM

## 2023-02-13 DIAGNOSIS — R39.16 BENIGN PROSTATIC HYPERPLASIA (BPH) WITH STRAINING ON URINATION: ICD-10-CM

## 2023-02-13 DIAGNOSIS — Z86.79 HISTORY OF HEART BLOCK: ICD-10-CM

## 2023-02-13 DIAGNOSIS — E78.5 HYPERLIPIDEMIA ASSOCIATED WITH TYPE 2 DIABETES MELLITUS: Chronic | ICD-10-CM

## 2023-02-13 DIAGNOSIS — H71.92 CHOLESTEATOMA OF EAR, LEFT: ICD-10-CM

## 2023-02-13 DIAGNOSIS — H92.02 OTALGIA, LEFT: ICD-10-CM

## 2023-02-13 DIAGNOSIS — N40.1 BENIGN PROSTATIC HYPERPLASIA (BPH) WITH STRAINING ON URINATION: ICD-10-CM

## 2023-02-13 DIAGNOSIS — E66.3 OVERWEIGHT (BMI 25.0-29.9): ICD-10-CM

## 2023-02-13 DIAGNOSIS — E11.59 HYPERTENSION ASSOCIATED WITH DIABETES: Primary | Chronic | ICD-10-CM

## 2023-02-13 DIAGNOSIS — D50.9 IRON DEFICIENCY ANEMIA, UNSPECIFIED IRON DEFICIENCY ANEMIA TYPE: Chronic | ICD-10-CM

## 2023-02-13 DIAGNOSIS — J84.10 LUNG GRANULOMA: ICD-10-CM

## 2023-02-13 DIAGNOSIS — N18.31 TYPE 2 DIABETES MELLITUS WITH STAGE 3A CHRONIC KIDNEY DISEASE, WITH LONG-TERM CURRENT USE OF INSULIN: ICD-10-CM

## 2023-02-13 DIAGNOSIS — Z79.4 TYPE 2 DIABETES MELLITUS WITH STAGE 3A CHRONIC KIDNEY DISEASE, WITH LONG-TERM CURRENT USE OF INSULIN: ICD-10-CM

## 2023-02-13 DIAGNOSIS — M17.0 OSTEOARTHRITIS OF BOTH KNEES, UNSPECIFIED OSTEOARTHRITIS TYPE: ICD-10-CM

## 2023-02-13 DIAGNOSIS — G47.33 OSA ON CPAP: ICD-10-CM

## 2023-02-13 DIAGNOSIS — N52.9 ERECTILE DYSFUNCTION, UNSPECIFIED ERECTILE DYSFUNCTION TYPE: ICD-10-CM

## 2023-02-13 DIAGNOSIS — I70.0 CALCIFICATION OF AORTA: ICD-10-CM

## 2023-02-13 DIAGNOSIS — Z95.0 PACEMAKER: Chronic | ICD-10-CM

## 2023-02-13 DIAGNOSIS — E11.69 HYPERLIPIDEMIA ASSOCIATED WITH TYPE 2 DIABETES MELLITUS: Chronic | ICD-10-CM

## 2023-02-13 DIAGNOSIS — R10.13 DYSPEPSIA: ICD-10-CM

## 2023-02-13 DIAGNOSIS — H92.12 OTORRHEA OF LEFT EAR: Primary | ICD-10-CM

## 2023-02-13 DIAGNOSIS — I15.2 HYPERTENSION ASSOCIATED WITH DIABETES: Primary | Chronic | ICD-10-CM

## 2023-02-13 DIAGNOSIS — Z85.828 HISTORY OF SKIN CANCER: ICD-10-CM

## 2023-02-13 PROBLEM — Z12.11 COLON CANCER SCREENING: Status: RESOLVED | Noted: 2022-09-22 | Resolved: 2023-02-13

## 2023-02-13 PROCEDURE — 99999 PR PBB SHADOW E&M-EST. PATIENT-LVL III: ICD-10-PCS | Mod: PBBFAC,HCNC,, | Performed by: OTOLARYNGOLOGY

## 2023-02-13 PROCEDURE — 99999 PR PBB SHADOW E&M-EST. PATIENT-LVL V: CPT | Mod: PBBFAC,HCNC,, | Performed by: NURSE PRACTITIONER

## 2023-02-13 PROCEDURE — 3061F PR NEG MICROALBUMINURIA RESULT DOCUMENTED/REVIEW: ICD-10-PCS | Mod: HCNC,CPTII,S$GLB, | Performed by: OTOLARYNGOLOGY

## 2023-02-13 PROCEDURE — 1159F PR MEDICATION LIST DOCUMENTED IN MEDICAL RECORD: ICD-10-PCS | Mod: HCNC,CPTII,S$GLB, | Performed by: NURSE PRACTITIONER

## 2023-02-13 PROCEDURE — 3072F LOW RISK FOR RETINOPATHY: CPT | Mod: HCNC,CPTII,S$GLB, | Performed by: OTOLARYNGOLOGY

## 2023-02-13 PROCEDURE — 3288F FALL RISK ASSESSMENT DOCD: CPT | Mod: HCNC,CPTII,S$GLB, | Performed by: OTOLARYNGOLOGY

## 2023-02-13 PROCEDURE — 1101F PR PT FALLS ASSESS DOC 0-1 FALLS W/OUT INJ PAST YR: ICD-10-PCS | Mod: HCNC,CPTII,S$GLB, | Performed by: NURSE PRACTITIONER

## 2023-02-13 PROCEDURE — 1126F PR PAIN SEVERITY QUANTIFIED, NO PAIN PRESENT: ICD-10-PCS | Mod: HCNC,CPTII,S$GLB, | Performed by: NURSE PRACTITIONER

## 2023-02-13 PROCEDURE — 3078F DIAST BP <80 MM HG: CPT | Mod: HCNC,CPTII,S$GLB, | Performed by: OTOLARYNGOLOGY

## 2023-02-13 PROCEDURE — 3288F FALL RISK ASSESSMENT DOCD: CPT | Mod: HCNC,CPTII,S$GLB, | Performed by: NURSE PRACTITIONER

## 2023-02-13 PROCEDURE — 3008F PR BODY MASS INDEX (BMI) DOCUMENTED: ICD-10-PCS | Mod: HCNC,CPTII,S$GLB, | Performed by: NURSE PRACTITIONER

## 2023-02-13 PROCEDURE — 3008F BODY MASS INDEX DOCD: CPT | Mod: HCNC,CPTII,S$GLB, | Performed by: NURSE PRACTITIONER

## 2023-02-13 PROCEDURE — 3044F HG A1C LEVEL LT 7.0%: CPT | Mod: HCNC,CPTII,S$GLB, | Performed by: NURSE PRACTITIONER

## 2023-02-13 PROCEDURE — 1101F PT FALLS ASSESS-DOCD LE1/YR: CPT | Mod: HCNC,CPTII,S$GLB, | Performed by: NURSE PRACTITIONER

## 2023-02-13 PROCEDURE — 3061F NEG MICROALBUMINURIA REV: CPT | Mod: HCNC,CPTII,S$GLB, | Performed by: NURSE PRACTITIONER

## 2023-02-13 PROCEDURE — 99999 PR PBB SHADOW E&M-EST. PATIENT-LVL V: ICD-10-PCS | Mod: PBBFAC,HCNC,, | Performed by: NURSE PRACTITIONER

## 2023-02-13 PROCEDURE — 3288F PR FALLS RISK ASSESSMENT DOCUMENTED: ICD-10-PCS | Mod: HCNC,CPTII,S$GLB, | Performed by: OTOLARYNGOLOGY

## 2023-02-13 PROCEDURE — 3066F PR DOCUMENTATION OF TREATMENT FOR NEPHROPATHY: ICD-10-PCS | Mod: HCNC,CPTII,S$GLB, | Performed by: OTOLARYNGOLOGY

## 2023-02-13 PROCEDURE — 3075F PR MOST RECENT SYSTOLIC BLOOD PRESS GE 130-139MM HG: ICD-10-PCS | Mod: HCNC,CPTII,S$GLB, | Performed by: NURSE PRACTITIONER

## 2023-02-13 PROCEDURE — 1101F PR PT FALLS ASSESS DOC 0-1 FALLS W/OUT INJ PAST YR: ICD-10-PCS | Mod: HCNC,CPTII,S$GLB, | Performed by: OTOLARYNGOLOGY

## 2023-02-13 PROCEDURE — 3078F PR MOST RECENT DIASTOLIC BLOOD PRESSURE < 80 MM HG: ICD-10-PCS | Mod: HCNC,CPTII,S$GLB, | Performed by: OTOLARYNGOLOGY

## 2023-02-13 PROCEDURE — 3078F DIAST BP <80 MM HG: CPT | Mod: HCNC,CPTII,S$GLB, | Performed by: NURSE PRACTITIONER

## 2023-02-13 PROCEDURE — 3061F NEG MICROALBUMINURIA REV: CPT | Mod: HCNC,CPTII,S$GLB, | Performed by: OTOLARYNGOLOGY

## 2023-02-13 PROCEDURE — 3044F HG A1C LEVEL LT 7.0%: CPT | Mod: HCNC,CPTII,S$GLB, | Performed by: OTOLARYNGOLOGY

## 2023-02-13 PROCEDURE — 3044F PR MOST RECENT HEMOGLOBIN A1C LEVEL <7.0%: ICD-10-PCS | Mod: HCNC,CPTII,S$GLB, | Performed by: OTOLARYNGOLOGY

## 2023-02-13 PROCEDURE — 99214 OFFICE O/P EST MOD 30 MIN: CPT | Mod: HCNC,S$GLB,, | Performed by: NURSE PRACTITIONER

## 2023-02-13 PROCEDURE — 3078F PR MOST RECENT DIASTOLIC BLOOD PRESSURE < 80 MM HG: ICD-10-PCS | Mod: HCNC,CPTII,S$GLB, | Performed by: NURSE PRACTITIONER

## 2023-02-13 PROCEDURE — 3066F PR DOCUMENTATION OF TREATMENT FOR NEPHROPATHY: ICD-10-PCS | Mod: HCNC,CPTII,S$GLB, | Performed by: NURSE PRACTITIONER

## 2023-02-13 PROCEDURE — 3072F PR LOW RISK FOR RETINOPATHY: ICD-10-PCS | Mod: HCNC,CPTII,S$GLB, | Performed by: NURSE PRACTITIONER

## 2023-02-13 PROCEDURE — 3072F LOW RISK FOR RETINOPATHY: CPT | Mod: HCNC,CPTII,S$GLB, | Performed by: NURSE PRACTITIONER

## 2023-02-13 PROCEDURE — 3066F NEPHROPATHY DOC TX: CPT | Mod: HCNC,CPTII,S$GLB, | Performed by: NURSE PRACTITIONER

## 2023-02-13 PROCEDURE — 3061F PR NEG MICROALBUMINURIA RESULT DOCUMENTED/REVIEW: ICD-10-PCS | Mod: HCNC,CPTII,S$GLB, | Performed by: NURSE PRACTITIONER

## 2023-02-13 PROCEDURE — 3074F PR MOST RECENT SYSTOLIC BLOOD PRESSURE < 130 MM HG: ICD-10-PCS | Mod: HCNC,CPTII,S$GLB, | Performed by: OTOLARYNGOLOGY

## 2023-02-13 PROCEDURE — 3288F PR FALLS RISK ASSESSMENT DOCUMENTED: ICD-10-PCS | Mod: HCNC,CPTII,S$GLB, | Performed by: NURSE PRACTITIONER

## 2023-02-13 PROCEDURE — 1159F MED LIST DOCD IN RCRD: CPT | Mod: HCNC,CPTII,S$GLB, | Performed by: NURSE PRACTITIONER

## 2023-02-13 PROCEDURE — 1101F PT FALLS ASSESS-DOCD LE1/YR: CPT | Mod: HCNC,CPTII,S$GLB, | Performed by: OTOLARYNGOLOGY

## 2023-02-13 PROCEDURE — 99999 PR PBB SHADOW E&M-EST. PATIENT-LVL III: CPT | Mod: PBBFAC,HCNC,, | Performed by: OTOLARYNGOLOGY

## 2023-02-13 PROCEDURE — 99214 PR OFFICE/OUTPT VISIT, EST, LEVL IV, 30-39 MIN: ICD-10-PCS | Mod: HCNC,S$GLB,, | Performed by: OTOLARYNGOLOGY

## 2023-02-13 PROCEDURE — 3075F SYST BP GE 130 - 139MM HG: CPT | Mod: HCNC,CPTII,S$GLB, | Performed by: NURSE PRACTITIONER

## 2023-02-13 PROCEDURE — 3044F PR MOST RECENT HEMOGLOBIN A1C LEVEL <7.0%: ICD-10-PCS | Mod: HCNC,CPTII,S$GLB, | Performed by: NURSE PRACTITIONER

## 2023-02-13 PROCEDURE — 3074F SYST BP LT 130 MM HG: CPT | Mod: HCNC,CPTII,S$GLB, | Performed by: OTOLARYNGOLOGY

## 2023-02-13 PROCEDURE — 3066F NEPHROPATHY DOC TX: CPT | Mod: HCNC,CPTII,S$GLB, | Performed by: OTOLARYNGOLOGY

## 2023-02-13 PROCEDURE — 3072F PR LOW RISK FOR RETINOPATHY: ICD-10-PCS | Mod: HCNC,CPTII,S$GLB, | Performed by: OTOLARYNGOLOGY

## 2023-02-13 PROCEDURE — 99214 PR OFFICE/OUTPT VISIT, EST, LEVL IV, 30-39 MIN: ICD-10-PCS | Mod: HCNC,S$GLB,, | Performed by: NURSE PRACTITIONER

## 2023-02-13 PROCEDURE — 99214 OFFICE O/P EST MOD 30 MIN: CPT | Mod: HCNC,S$GLB,, | Performed by: OTOLARYNGOLOGY

## 2023-02-13 PROCEDURE — 1126F AMNT PAIN NOTED NONE PRSNT: CPT | Mod: HCNC,CPTII,S$GLB, | Performed by: NURSE PRACTITIONER

## 2023-02-13 RX ORDER — CIPROFLOXACIN AND DEXAMETHASONE 3; 1 MG/ML; MG/ML
4 SUSPENSION/ DROPS AURICULAR (OTIC) 2 TIMES DAILY
Qty: 7.5 ML | Refills: 0 | Status: SHIPPED | OUTPATIENT
Start: 2023-02-13 | End: 2023-02-23

## 2023-02-13 NOTE — PROGRESS NOTES
Subjective:       Patient ID: Rigoberto Vasquez is a 72 y.o. male.    Chief Complaint: Follow-up    Follow-up  Pertinent negatives include no abdominal pain, arthralgias, chest pain, chills, congestion, coughing, diaphoresis, fatigue, fever, headaches, joint swelling, nausea, neck pain, numbness, sore throat, vomiting or weakness.     Htn- stable and controlled on current regime  HLD-stable on fenofibrate  Angina/pacemaker- UTD w/ cards  ED/BPH-sees uro  Anemia- no s/s of bleeding easy bruising  Dm - stable on current meds. No symptoms  Cholesteatoma/ear infection-seeing ENT- saw today    Past Medical History:   Diagnosis Date    Acid reflux     [facility]    Angina pectoris     Back pain     BPH (benign prostatic hyperplasia)     blue    Cholesteatoma     Degenerative joint disease     Diverticulosis     Erectile dysfunction     History of elevated PSA 2/14    normalized, dr dave annually    History of pericarditis     Hypercholesteremia     Hypertension     Iron deficiency anemia     СВЕТЛАНА (obstructive sleep apnea) 5/17/2022    Pacemaker     complete av block;NO afib    Renal disorder     Squamous cell cancer of skin of mastoid region of scalp     dr jaida salgado    Type 2 diabetes mellitus     dr wyman    Urinary incontinence     when he was 7 Yrs old.      Past Surgical History:   Procedure Laterality Date    CARDIAC PACEMAKER PLACEMENT      COLONOSCOPY N/A 9/13/2019    Procedure: COLONOSCOPY;  Surgeon: Kan Ramsey III, MD;  Location: [facility];  Service: Endoscopy;  Laterality: N/A;    COLONOSCOPY N/A 9/22/2022    Procedure: COLONOSCOPY;  Surgeon: Chris Garcia MD;  Location: [facility];  Service: Endoscopy;  Laterality: N/A;    ESOPHAGOGASTRODUODENOSCOPY N/A 9/13/2019    Procedure: ESOPHAGOGASTRODUODENOSCOPY (EGD);  Surgeon: Kan Ramsey III, MD;  Location: [facility];  Service: Endoscopy;  Laterality: N/A;    ESOPHAGOGASTRODUODENOSCOPY N/A 9/3/2021    Procedure: EGD  "(ESOPHAGOGASTRODUODENOSCOPY);  Surgeon: Kaylene Pineda MD;  Location: [facility];  Service: Endoscopy;  Laterality: N/A;    JOINT REPLACEMENT      KNEE CARTILAGE SURGERY Bilateral     NASAL SINUS SURGERY      SHOULDER ARTHROSCOPY Right     TONSILLECTOMY      TOTAL KNEE ARTHROPLASTY Left 05/15/2017    TRANSURETHRAL RESECTION OF PROSTATE      TYMPANOPLASTY      vesectomy         Social History     Socioeconomic History    Marital status:      Spouse name: SAUL    Number of children: 2   Occupational History    Occupation: retired- [employer] .   Tobacco Use    Smoking status: Never    Smokeless tobacco: Never   Substance and Sexual Activity    Alcohol use: Yes     Comment: " once a month"    Drug use: No    Sexual activity: Yes     Partners: Female   Social History Narrative     . Lives with spouse. Has 2 children. Patient retired from [employer]. His son has type 1 diabetes.     Review of patient's allergies indicates:   Allergen Reactions    Aspirin      Stomach pain even with ppi    Meperidine      Other reaction(s): Stomach upset    Niacin      Other reaction(s): hot skin     Current Outpatient Medications   Medication Sig    ACETAMINOPHEN (TYLENOL 8 HOUR ORAL) Take by mouth as needed.    azelastine (ASTELIN) 137 mcg (0.1 %) nasal spray 2 sprays (274 mcg total) by Nasal route 2 (two) times daily.    blood sugar diagnostic (ACCU-CHEK JAXON) Strp Check BG 2-3 times daily. Accuchek jaxon strips    ciprofloxacin-dexAMETHasone 0.3-0.1% (CIPRODEX) 0.3-0.1 % DrpS Place 4 drops into the left ear 2 (two) times daily. for 10 days    dutasteride (AVODART) 0.5 mg capsule Take 1 capsule (0.5 mg total) by mouth once daily.    fenofibrate micronized (LOFIBRA) 200 MG Cap Take 1 capsule (200 mg total) by mouth every evening.    fluticasone propionate (FLONASE) 50 mcg/actuation nasal spray 1 spray by Each Nostril route once daily.    gabapentin (CMPD PAIN MANAGEMENT COMPOUND OINTMNENT THREE) " "Apply bid prn pain    GAVILYTE-C 240-22.72-6.72 -5.84 gram SolR Take 4,000 mLs by mouth.    hydrocortisone 2.5 % cream Apply topically 2 (two) times daily.    insulin detemir U-100 (LEVEMIR FLEXTOUCH U-100 INSULN) 100 unit/mL (3 mL) InPn pen 16 units in the AM and 8 units in the PM    latanoprost 0.005 % ophthalmic solution Place 1 drop into both eyes once daily.    levocetirizine (XYZAL) 5 MG tablet Take 5 mg by mouth every evening.    liraglutide 0.6 mg/0.1 mL, 18 mg/3 mL, subq PNIJ (VICTOZA 3-ALESHA) 0.6 mg/0.1 mL (18 mg/3 mL) PnIj pen INJECT 1.8 MG DAILY    lovastatin (MEVACOR) 40 MG tablet Take 1 tablet by mouth Every evening.    metFORMIN (GLUCOPHAGE) 1000 MG tablet Take 1 tablet (1,000 mg total) by mouth once daily. Generic OK    multivitamin capsule Take 1 capsule by mouth once daily.    ondansetron (ZOFRAN-ODT) 8 MG TbDL Take 1 tablet (8 mg total) by mouth every 6 (six) hours as needed (nausea).    pantoprazole (PROTONIX) 40 MG tablet Take 1 tablet (40 mg total) by mouth once daily.    pen needle, diabetic (BD ULTRA-FINE MINI PEN NEEDLE) 31 gauge x 3/16" Ndle USE AS DIRECTED WITH INSULIN    pioglitazone (ACTOS) 30 MG tablet Take 1 tablet (30 mg total) by mouth once daily.    predniSONE (DELTASONE) 20 MG tablet Take 3 tab in am x 3d, then 2 tab in am x 3d, then 1 tab in am x 3d, then 1/2 tab in am x 3d    sildenafil (REVATIO) 20 mg Tab Take 1 tablet (20 mg total) by mouth daily as needed. Takes prn    triamcinolone acetonide 0.1% (KENALOG) 0.1 % cream AAA bid    losartan (COZAAR) 25 MG tablet Take 1 tablet (25 mg total) by mouth once daily.     No current facility-administered medications for this visit.           Review of Systems   Constitutional:  Negative for activity change, appetite change, chills, diaphoresis, fatigue, fever and unexpected weight change.   HENT:  Negative for congestion, ear pain, postnasal drip, rhinorrhea, sinus pressure, sinus pain, sneezing, sore throat, tinnitus, trouble swallowing " and voice change.    Eyes:  Negative for photophobia, pain and visual disturbance.   Respiratory:  Negative for cough, chest tightness, shortness of breath and wheezing.    Cardiovascular:  Negative for chest pain, palpitations and leg swelling.   Gastrointestinal:  Negative for abdominal distention, abdominal pain, constipation, diarrhea, nausea and vomiting.   Genitourinary:  Negative for decreased urine volume, difficulty urinating, dysuria, flank pain, frequency, hematuria and urgency.   Musculoskeletal:  Negative for arthralgias, back pain, joint swelling, neck pain and neck stiffness.   Allergic/Immunologic: Negative for immunocompromised state.   Neurological:  Negative for dizziness, tremors, seizures, syncope, facial asymmetry, speech difficulty, weakness, light-headedness, numbness and headaches.   Hematological:  Negative for adenopathy. Does not bruise/bleed easily.   Psychiatric/Behavioral:  Negative for confusion and sleep disturbance.      Objective:      Physical Exam  HENT:      Head: Normocephalic and atraumatic.      Right Ear: Tympanic membrane normal.      Left Ear: Tympanic membrane normal.   Eyes:      Conjunctiva/sclera: Conjunctivae normal.   Cardiovascular:      Rate and Rhythm: Normal rate and regular rhythm.      Heart sounds: Normal heart sounds.   Pulmonary:      Effort: Pulmonary effort is normal.      Breath sounds: Normal breath sounds.   Abdominal:      General: Bowel sounds are normal.      Palpations: Abdomen is soft.   Musculoskeletal:         General: Normal range of motion.      Cervical back: Normal range of motion and neck supple.   Skin:     General: Skin is warm and dry.   Neurological:      Mental Status: He is alert and oriented to person, place, and time.       Assessment:     Vitals:    02/13/23 1451   BP: 130/70   Pulse: 71   Temp: 98.5 °F (36.9 °C)         1. Hypertension associated with diabetes    2. Hyperlipidemia associated with type 2 diabetes mellitus    3.  History of heart block;AV block, 3rd degree    4. Pacemaker    5. Calcification of aorta    6. Lung granuloma    7. Benign prostatic hyperplasia (BPH) with straining on urination    8. Erectile dysfunction, unspecified erectile dysfunction type    9. History of skin cancer    10. Iron deficiency anemia, unspecified iron deficiency anemia type    11. Overweight (BMI 25.0-29.9)    12. Type 2 diabetes mellitus with stage 3a chronic kidney disease, with long-term current use of insulin    13. Dyspepsia    14. Osteoarthritis of both knees, unspecified osteoarthritis type    15. СВЕТЛАНА on CPAP          Plan:   Hypertension associated with diabetes    Hyperlipidemia associated with type 2 diabetes mellitus  -     Lipid Panel; Future; Expected date: 08/12/2023  -     Comprehensive Metabolic Panel; Future; Expected date: 08/12/2023    History of heart block;AV block, 3rd degree    Pacemaker    Calcification of aorta    Lung granuloma    Benign prostatic hyperplasia (BPH) with straining on urination    Erectile dysfunction, unspecified erectile dysfunction type    History of skin cancer    Iron deficiency anemia, unspecified iron deficiency anemia type  -     CBC Auto Differential; Future; Expected date: 08/12/2023    Overweight (BMI 25.0-29.9)    Type 2 diabetes mellitus with stage 3a chronic kidney disease, with long-term current use of insulin  -     Hemoglobin A1C; Future; Expected date: 08/12/2023    Dyspepsia    Osteoarthritis of both knees, unspecified osteoarthritis type    СВЕТЛАНА on CPAP      He is doing well overall  Continue specialty care  Return in 6 mo w lab

## 2023-02-13 NOTE — PROGRESS NOTES
"Subjective:   Patient ID: Rigoberto Vasquez is a 72 y.o. male.    Chief Complaint: Other (Follow up left year drainage, reports still draining )    Mr. Vasquez is a very pleasant 71 yo male here for OE follow up. He states he is feeling much better but still feels "full." He was last seen 1/30/23 and I started him on Cortisporin. I was unable to obtain  culture last week.  He has a history of left CWU in 2015 with cartilage grafting fo the tympanic membrane.    Review of patient's allergies indicates:   Allergen Reactions    Aspirin      Stomach pain even with ppi    Meperidine      Other reaction(s): Stomach upset    Niacin      Other reaction(s): hot skin           Review of Systems   Constitutional:  Negative for fatigue, fever and unexpected weight change.   HENT:  Positive for ear discharge and hearing loss. Negative for congestion, ear pain, facial swelling, nosebleeds, postnasal drip, rhinorrhea, sinus pressure, sneezing, sore throat, tinnitus, trouble swallowing and voice change.    Eyes:  Negative for discharge, redness and itching.   Respiratory:  Negative for cough, choking, shortness of breath and wheezing.    Cardiovascular:  Negative for chest pain and palpitations.   Gastrointestinal:  Negative for abdominal pain.        No reflux.   Musculoskeletal:  Negative for neck pain.   Neurological:  Negative for dizziness, facial asymmetry, light-headedness and headaches.   Hematological:  Negative for adenopathy. Does not bruise/bleed easily.   Psychiatric/Behavioral:  Negative for agitation, behavioral problems, confusion and decreased concentration.        Objective:   /72   Pulse 84   Temp 97.6 °F (36.4 °C) (Temporal)     Physical Exam  Constitutional:       General: He is not in acute distress.     Appearance: He is well-developed.   HENT:      Head: Normocephalic and atraumatic.      Jaw: No trismus.      Right Ear: Hearing, tympanic membrane, ear canal and external ear normal.      Left Ear: " Hearing, tympanic membrane, ear canal and external ear normal. Drainage (sent for culture, fluid suctioned) present. A PE tube is present.      Ears:      Comments: Perforation with keratinaceous debris concerning for cholesteatoma     Nose: Nose normal. No nasal deformity, septal deviation, mucosal edema or rhinorrhea.      Mouth/Throat:      Dentition: Normal dentition.      Pharynx: Uvula midline. No oropharyngeal exudate or uvula swelling.   Eyes:      General: No scleral icterus.     Conjunctiva/sclera: Conjunctivae normal.      Right eye: Right conjunctiva is not injected. No chemosis.     Left eye: Left conjunctiva is not injected. No chemosis.     Pupils: Pupils are equal, round, and reactive to light.   Neck:      Thyroid: No thyroid mass or thyromegaly.      Trachea: Trachea and phonation normal. No tracheal tenderness or tracheal deviation.   Pulmonary:      Effort: Pulmonary effort is normal. No accessory muscle usage or respiratory distress.      Breath sounds: No stridor.   Lymphadenopathy:      Head:      Right side of head: No submental, submandibular, preauricular or posterior auricular adenopathy.      Left side of head: No submental, submandibular, preauricular or posterior auricular adenopathy.      Cervical: No cervical adenopathy.      Right cervical: No superficial or deep cervical adenopathy.     Left cervical: No superficial or deep cervical adenopathy.   Skin:     General: Skin is warm and dry.      Findings: No erythema or rash.   Neurological:      Mental Status: He is alert and oriented to person, place, and time.      Cranial Nerves: No cranial nerve deficit.   Psychiatric:         Behavior: Behavior normal.         Thought Content: Thought content normal.        CT Temporal Bone without contrast  Order: [id number]  Status: Final result     Visible to patient: Yes (seen)     Next appt: Today at 03:00 PM in Internal Medicine (Ann Orlando NP)     Dx: Cholesteatoma of ear, left      0 Result Notes  Details    Reading Physician Reading Date Result Priority   Oseas Emmanuel MD  [phone] 6/30/2022 Routine     Narrative & Impression  EXAMINATION:  CT TEMPORAL BONE WITHOUT CONTRAST     CLINICAL HISTORY:  Cholesteatoma, surgical planning;surveillance of soft tissue in left ear;Unspecified cholesteatoma, left ear     TECHNIQUE:  Low dose axial images were acquired through the temporal bones and skull base without contrast administration.  Coronal and sagittal reconstructions were performed.     COMPARISON:  Temporal bone CT from January of this year.     FINDINGS:  Right Temporal Bone:     *External ear: Minimal debris/wax noted.     *Middle ear: Normal.     *Petrous temporal bone/mastoid air cells: Small amount of fluid at the right mastoid tip, unchanged.  No fracture.     *Inner ear: Normal.     *IAC/CPA: Normal.     *Other: N/A.     Left Temporal Bone:     *External ear: Mild debris/wax noted     *Middle ear: Unchanged focal soft tissue thickening involving the mesotympanum/hypotympanum.  Ossicular chain appears intact.  No osseous erosion.  Primary differential considerations include cholesteatoma, otitis media, sequelae of postsurgical changes, etc     *Petrous temporal bone/mastoid air cells: Small fluid in the left mastoid air cells, unchanged..  No fracture.     *Inner ear: Normal.     *IAC/CPA: Normal.     *Other: N/A.     Intracranial Compartment (limited evaluation): Normal.     Skull/Extracranial Contents (limited evaluation): Left maxillary sinusitis, unchanged.     Impression:     Stable CT from January of this year as above.     Unchanged area of soft tissue thickening involving the left middle ear with differential considerations unchanged.     Small bilateral mastoid effusions, left maxillary sinusitis, and other stable findings as above.        Electronically signed by: Oseas Emmanuel MD  Date:                                            06/30/2022  Time:                                            11:55       Assessment:     1. Otorrhea of left ear    2. Otalgia, left    3. Cholesteatoma of ear, left    4. Mixed conductive and sensorineural hearing loss of left ear with restricted hearing of right ear          Plan:     I have sent fluid for culture and debrided canal as much as I could. Will order antibiotics accordingly. I would like him to return to clinic next week to see a MD for recheck.     2/13/23 update:  Persistent left sided otorrhea, did not respond to cortisporin drops.  Agreed to change drops to ciprodex and have him f/u with Dr. Gudino.  His persistent drainage is concerning for recurrent cholesteatoma.

## 2023-02-16 ENCOUNTER — OFFICE VISIT (OUTPATIENT)
Dept: OTOLARYNGOLOGY | Facility: CLINIC | Age: 73
End: 2023-02-16
Payer: MEDICARE

## 2023-02-16 VITALS
BODY MASS INDEX: 27.67 KG/M2 | DIASTOLIC BLOOD PRESSURE: 77 MMHG | WEIGHT: 198.44 LBS | SYSTOLIC BLOOD PRESSURE: 129 MMHG | TEMPERATURE: 97 F | HEART RATE: 78 BPM

## 2023-02-16 DIAGNOSIS — H71.92 CHOLESTEATOMA OF EAR, LEFT: Primary | ICD-10-CM

## 2023-02-16 PROCEDURE — 3008F PR BODY MASS INDEX (BMI) DOCUMENTED: ICD-10-PCS | Mod: HCNC,CPTII,S$GLB, | Performed by: OTOLARYNGOLOGY

## 2023-02-16 PROCEDURE — 1101F PT FALLS ASSESS-DOCD LE1/YR: CPT | Mod: HCNC,CPTII,S$GLB, | Performed by: OTOLARYNGOLOGY

## 2023-02-16 PROCEDURE — 3078F PR MOST RECENT DIASTOLIC BLOOD PRESSURE < 80 MM HG: ICD-10-PCS | Mod: HCNC,CPTII,S$GLB, | Performed by: OTOLARYNGOLOGY

## 2023-02-16 PROCEDURE — 3288F PR FALLS RISK ASSESSMENT DOCUMENTED: ICD-10-PCS | Mod: HCNC,CPTII,S$GLB, | Performed by: OTOLARYNGOLOGY

## 2023-02-16 PROCEDURE — 3288F FALL RISK ASSESSMENT DOCD: CPT | Mod: HCNC,CPTII,S$GLB, | Performed by: OTOLARYNGOLOGY

## 2023-02-16 PROCEDURE — 92504 PR EAR MICROSCOPY EXAMINATION: ICD-10-PCS | Mod: HCNC,S$GLB,, | Performed by: OTOLARYNGOLOGY

## 2023-02-16 PROCEDURE — 99999 PR PBB SHADOW E&M-EST. PATIENT-LVL III: ICD-10-PCS | Mod: PBBFAC,HCNC,, | Performed by: OTOLARYNGOLOGY

## 2023-02-16 PROCEDURE — 92504 EAR MICROSCOPY EXAMINATION: CPT | Mod: HCNC,S$GLB,, | Performed by: OTOLARYNGOLOGY

## 2023-02-16 PROCEDURE — 3008F BODY MASS INDEX DOCD: CPT | Mod: HCNC,CPTII,S$GLB, | Performed by: OTOLARYNGOLOGY

## 2023-02-16 PROCEDURE — 3061F PR NEG MICROALBUMINURIA RESULT DOCUMENTED/REVIEW: ICD-10-PCS | Mod: HCNC,CPTII,S$GLB, | Performed by: OTOLARYNGOLOGY

## 2023-02-16 PROCEDURE — 3072F LOW RISK FOR RETINOPATHY: CPT | Mod: HCNC,CPTII,S$GLB, | Performed by: OTOLARYNGOLOGY

## 2023-02-16 PROCEDURE — 99214 PR OFFICE/OUTPT VISIT, EST, LEVL IV, 30-39 MIN: ICD-10-PCS | Mod: 57,25,HCNC,S$GLB | Performed by: OTOLARYNGOLOGY

## 2023-02-16 PROCEDURE — 1101F PR PT FALLS ASSESS DOC 0-1 FALLS W/OUT INJ PAST YR: ICD-10-PCS | Mod: HCNC,CPTII,S$GLB, | Performed by: OTOLARYNGOLOGY

## 2023-02-16 PROCEDURE — 99214 OFFICE O/P EST MOD 30 MIN: CPT | Mod: 57,25,HCNC,S$GLB | Performed by: OTOLARYNGOLOGY

## 2023-02-16 PROCEDURE — 99999 PR PBB SHADOW E&M-EST. PATIENT-LVL III: CPT | Mod: PBBFAC,HCNC,, | Performed by: OTOLARYNGOLOGY

## 2023-02-16 PROCEDURE — 3074F SYST BP LT 130 MM HG: CPT | Mod: HCNC,CPTII,S$GLB, | Performed by: OTOLARYNGOLOGY

## 2023-02-16 PROCEDURE — 3044F PR MOST RECENT HEMOGLOBIN A1C LEVEL <7.0%: ICD-10-PCS | Mod: HCNC,CPTII,S$GLB, | Performed by: OTOLARYNGOLOGY

## 2023-02-16 PROCEDURE — 3078F DIAST BP <80 MM HG: CPT | Mod: HCNC,CPTII,S$GLB, | Performed by: OTOLARYNGOLOGY

## 2023-02-16 PROCEDURE — 3072F PR LOW RISK FOR RETINOPATHY: ICD-10-PCS | Mod: HCNC,CPTII,S$GLB, | Performed by: OTOLARYNGOLOGY

## 2023-02-16 PROCEDURE — 3044F HG A1C LEVEL LT 7.0%: CPT | Mod: HCNC,CPTII,S$GLB, | Performed by: OTOLARYNGOLOGY

## 2023-02-16 PROCEDURE — 3066F NEPHROPATHY DOC TX: CPT | Mod: HCNC,CPTII,S$GLB, | Performed by: OTOLARYNGOLOGY

## 2023-02-16 PROCEDURE — 3061F NEG MICROALBUMINURIA REV: CPT | Mod: HCNC,CPTII,S$GLB, | Performed by: OTOLARYNGOLOGY

## 2023-02-16 PROCEDURE — 3074F PR MOST RECENT SYSTOLIC BLOOD PRESSURE < 130 MM HG: ICD-10-PCS | Mod: HCNC,CPTII,S$GLB, | Performed by: OTOLARYNGOLOGY

## 2023-02-16 PROCEDURE — 3066F PR DOCUMENTATION OF TREATMENT FOR NEPHROPATHY: ICD-10-PCS | Mod: HCNC,CPTII,S$GLB, | Performed by: OTOLARYNGOLOGY

## 2023-02-17 ENCOUNTER — PATIENT MESSAGE (OUTPATIENT)
Dept: OTOLARYNGOLOGY | Facility: CLINIC | Age: 73
End: 2023-02-17
Payer: MEDICARE

## 2023-02-17 ENCOUNTER — DOCUMENTATION ONLY (OUTPATIENT)
Dept: PREADMISSION TESTING | Facility: HOSPITAL | Age: 73
End: 2023-02-17
Payer: MEDICARE

## 2023-02-17 NOTE — PROGRESS NOTES
Patient's chart reviewed for PAT as he has an upcoming surgery with Dr. Gudino on 3/16.  Patient is noted to have a permanent pacemaker for h/o 3rd degree heart block, for which he is pacemaker dependent. Because of this, his surgery cannot be done at [facility], and per d/w Dr. Gudino, he will need to be moved to [place].  Staff message was sent to Dr. Gudino to facilitate moving surgery to [place], and notifying the patient of new details.  Surgery case placed in the depot.

## 2023-02-18 NOTE — PROGRESS NOTES
Subjective:       Patient ID: Rigoberto Vasquez is a 72 y.o. male.    Chief Complaint: Other    HPI     Rigoberto Vasquez is a 72 y.o. male hx of left ear CWU tmastoid in 2015 presents for recent otorrhea. Some concerns for cholesteatoma noted on exam by Dr. Goldstein.  Reports feels infection has resolved since starting ear drops.    Review of Systems   Constitutional:  Negative for chills and fever.   HENT:  Positive for ear discharge and hearing loss. Negative for sore throat and trouble swallowing.    Respiratory:  Negative for apnea and chest tightness.    Cardiovascular:  Negative for chest pain.       Objective:      Physical Exam  Vitals and nursing note reviewed.   Constitutional:       Appearance: Normal appearance.   HENT:      Head: Normocephalic and atraumatic.      Right Ear: There is no impacted cerumen.      Left Ear: There is no impacted cerumen.   Neurological:      Mental Status: He is alert.       Binocular Microscopy  Indications: perforation, pre op planning  Details: binocular microscopy used to examine both ears  Findings  AD: moderate retraction of TM with mryingosstapediopexy  AS: cartilage graft posterior, with perforation anterior, appearsto be keratin ingrowth into middle ear consistent with cholestatoma    Data Reviewed:    CT temporal bones image independently reviewed by me and show: last CT from 6/22 shows CWU post operative changes without cholesteatoma    Assessment:       Problem List Items Addressed This Visit    None  Visit Diagnoses       Cholesteatoma of ear, left    -  Primary    Relevant Orders    Case Request Operating Room: TYMPANOPLASTY with mastoidectomy (Completed)              Plan:         Exam consistent with cholesteatoma   Recommend revision tmastoid of left ear    Needs pre op audio

## 2023-02-22 ENCOUNTER — TELEPHONE (OUTPATIENT)
Dept: OTOLARYNGOLOGY | Facility: CLINIC | Age: 73
End: 2023-02-22

## 2023-02-22 DIAGNOSIS — H92.12 OTORRHEA OF LEFT EAR: ICD-10-CM

## 2023-02-22 DIAGNOSIS — H92.02 OTALGIA, LEFT: ICD-10-CM

## 2023-02-22 DIAGNOSIS — H90.A32 MIXED CONDUCTIVE AND SENSORINEURAL HEARING LOSS OF LEFT EAR WITH RESTRICTED HEARING OF RIGHT EAR: ICD-10-CM

## 2023-02-22 DIAGNOSIS — H69.93 ETD (EUSTACHIAN TUBE DYSFUNCTION), BILATERAL: ICD-10-CM

## 2023-02-22 DIAGNOSIS — H71.92 CHOLESTEATOMA OF EAR, LEFT: Primary | ICD-10-CM

## 2023-02-22 DIAGNOSIS — H60.312 DIFFUSE OTITIS EXTERNA OF LEFT EAR, UNSPECIFIED CHRONICITY: ICD-10-CM

## 2023-03-01 ENCOUNTER — PATIENT MESSAGE (OUTPATIENT)
Dept: SURGERY | Facility: HOSPITAL | Age: 73
End: 2023-03-01
Payer: MEDICARE

## 2023-03-01 DIAGNOSIS — H71.92 CHOLESTEATOMA OF LEFT EAR: Primary | ICD-10-CM

## 2023-03-01 DIAGNOSIS — Z01.810 PRE-OPERATIVE CARDIOVASCULAR EXAMINATION: ICD-10-CM

## 2023-03-02 ENCOUNTER — PATIENT MESSAGE (OUTPATIENT)
Dept: SURGERY | Facility: HOSPITAL | Age: 73
End: 2023-03-02
Payer: MEDICARE

## 2023-03-02 NOTE — TELEPHONE ENCOUNTER
Could we get him scheduled in , he is having surgery in [place] but should get his EKG close to home

## 2023-03-03 ENCOUNTER — PATIENT MESSAGE (OUTPATIENT)
Dept: GASTROENTEROLOGY | Facility: CLINIC | Age: 73
End: 2023-03-03
Payer: MEDICARE

## 2023-03-06 ENCOUNTER — TELEPHONE (OUTPATIENT)
Dept: OTOLARYNGOLOGY | Facility: CLINIC | Age: 73
End: 2023-03-06
Payer: MEDICARE

## 2023-03-06 ENCOUNTER — CLINICAL SUPPORT (OUTPATIENT)
Dept: AUDIOLOGY | Facility: CLINIC | Age: 73
End: 2023-03-06
Payer: MEDICARE

## 2023-03-06 ENCOUNTER — HOSPITAL ENCOUNTER (OUTPATIENT)
Dept: CARDIOLOGY | Facility: HOSPITAL | Age: 73
Discharge: HOME | End: 2023-03-06
Attending: OTOLARYNGOLOGY
Payer: MEDICARE

## 2023-03-06 DIAGNOSIS — H71.92 CHOLESTEATOMA OF EAR, LEFT: ICD-10-CM

## 2023-03-06 DIAGNOSIS — H71.92 CHOLESTEATOMA OF EAR, LEFT: Primary | ICD-10-CM

## 2023-03-06 DIAGNOSIS — H90.6 MIXED HEARING LOSS, BILATERAL: Primary | ICD-10-CM

## 2023-03-06 DIAGNOSIS — Z01.810 PRE-OPERATIVE CARDIOVASCULAR EXAMINATION: ICD-10-CM

## 2023-03-06 PROCEDURE — 92567 TYMPANOMETRY: CPT | Mod: HCNC,S$GLB,,

## 2023-03-06 PROCEDURE — 92557 PR COMPREHENSIVE HEARING TEST: ICD-10-PCS | Mod: HCNC,S$GLB,,

## 2023-03-06 PROCEDURE — 93005 ELECTROCARDIOGRAM TRACING: CPT | Mod: HCNC

## 2023-03-06 PROCEDURE — 99999 PR PBB SHADOW E&M-EST. PATIENT-LVL I: ICD-10-PCS | Mod: PBBFAC,HCNC,,

## 2023-03-06 PROCEDURE — 92557 COMPREHENSIVE HEARING TEST: CPT | Mod: HCNC,S$GLB,,

## 2023-03-06 PROCEDURE — 99999 PR PBB SHADOW E&M-EST. PATIENT-LVL I: CPT | Mod: PBBFAC,HCNC,,

## 2023-03-06 PROCEDURE — 93010 EKG 12-LEAD: ICD-10-PCS | Mod: HCNC,,, | Performed by: INTERNAL MEDICINE

## 2023-03-06 PROCEDURE — 93010 ELECTROCARDIOGRAM REPORT: CPT | Mod: HCNC,,, | Performed by: INTERNAL MEDICINE

## 2023-03-06 PROCEDURE — 92567 PR TYMPA2METRY: ICD-10-PCS | Mod: HCNC,S$GLB,,

## 2023-03-06 NOTE — PROGRESS NOTES
Referring Provider: MD Rigoberto Gudino Pedro was seen 03/06/2023 for an audiological evaluation. Previous audiological evaluation on 10/08/2020 revealed a mild mixed hearing loss 250-8000 Hz for the right ear, and a moderate-to-severe mixed hearing loss 250-8000 Hz for the left ear. Patient complains of a slightly decline in his hearing since his last evaluation in 2020. He wears hearing aids from [facility].     Otoscopy revealed clear canals with visualization of the tympanic membrane in both ears. Tympanograms were Type A for the right ear and unable to obtain for the left ear. Audiometry revealed a mild-to-moderate mixed hearing loss for the right ear, and a moderate mixed hearing loss rising to slight conductive hearing loss at 1000 Hz sloping to a profound mixed hearing loss for the left ear. Speech Reception Thresholds were  40 dBHL for the right ear and 30 dBHL for the left ear. Word recognition scores were good for the right ear and good for the left ear.    Patient was counseled on the above findings.    Recommendations:  Follow-up with ENT, as scheduled.  Repeat audiological evaluation per ENT, or sooner if needed.         Stage 2: Number Of Blocks?: 0

## 2023-03-10 ENCOUNTER — PATIENT MESSAGE (OUTPATIENT)
Dept: OTOLARYNGOLOGY | Facility: CLINIC | Age: 73
End: 2023-03-10
Payer: MEDICARE

## 2023-03-10 ENCOUNTER — TELEPHONE (OUTPATIENT)
Dept: OTOLARYNGOLOGY | Facility: CLINIC | Age: 73
End: 2023-03-10
Payer: MEDICARE

## 2023-03-14 ENCOUNTER — TELEPHONE (OUTPATIENT)
Dept: OTOLARYNGOLOGY | Facility: CLINIC | Age: 73
End: 2023-03-14
Payer: MEDICARE

## 2023-03-14 NOTE — PRE-PROCEDURE INSTRUCTIONS
PREOP INSTRUCTIONS:  No food,milk or milk products for 8 hours before surgery.  Clear liquids like water,gatorade,apple juice are allowed up until 2 hours before surgery.  Shower instructions as well as directions to the Surgery Center were given.  Encouraged to wear loose fitting,comfortable clothing.  Medication instructions for pm prior to and am of procedure reviewed.  Instructed to avoid taking vitamins,supplements,aspirin and ibuprofen the morning of surgery.    Patient denies any side effects or issues with anesthesia or sedation.     Patient uses CPAP    PATIENT HAS [employer] PACEMAKER.MESSAGE LEFT AT EXTENSION 56183 ([facility]) AT 2:25 Tuesday 3/14/2023

## 2023-03-15 ENCOUNTER — ANESTHESIA (OUTPATIENT)
Dept: SURGERY | Facility: HOSPITAL | Age: 73
End: 2023-03-15
Payer: MEDICARE

## 2023-03-15 ENCOUNTER — HOSPITAL ENCOUNTER (OUTPATIENT)
Facility: HOSPITAL | Age: 73
Discharge: HOME | End: 2023-03-15
Attending: OTOLARYNGOLOGY | Admitting: OTOLARYNGOLOGY
Payer: MEDICARE

## 2023-03-15 ENCOUNTER — ANESTHESIA EVENT (OUTPATIENT)
Dept: SURGERY | Facility: HOSPITAL | Age: 73
End: 2023-03-15
Payer: MEDICARE

## 2023-03-15 VITALS
WEIGHT: 198 LBS | DIASTOLIC BLOOD PRESSURE: 71 MMHG | OXYGEN SATURATION: 96 % | HEART RATE: 72 BPM | RESPIRATION RATE: 15 BRPM | TEMPERATURE: 97 F | SYSTOLIC BLOOD PRESSURE: 138 MMHG | BODY MASS INDEX: 27.62 KG/M2

## 2023-03-15 DIAGNOSIS — H71.92 CHOLESTEATOMA OF EAR, LEFT: Primary | ICD-10-CM

## 2023-03-15 DIAGNOSIS — H71.90 CHOLESTEATOMA: ICD-10-CM

## 2023-03-15 LAB
POCT GLUCOSE: 89 MG/DL (ref 70–110)
POCT GLUCOSE: 92 MG/DL (ref 70–110)

## 2023-03-15 PROCEDURE — 63600175 PHARM REV CODE 636 W HCPCS: Mod: HCNC | Performed by: STUDENT IN AN ORGANIZED HEALTH CARE EDUCATION/TRAINING PROGRAM

## 2023-03-15 PROCEDURE — 25000003 PHARM REV CODE 250: Mod: HCNC | Performed by: STUDENT IN AN ORGANIZED HEALTH CARE EDUCATION/TRAINING PROGRAM

## 2023-03-15 PROCEDURE — D9220A PRA ANESTHESIA: ICD-10-PCS | Mod: HCNC,ANES,, | Performed by: ANESTHESIOLOGY

## 2023-03-15 PROCEDURE — 69644 PR TYMPANOPLAS/MASTOID,INTCT WALL,REBLD: ICD-10-PCS | Mod: HCNC,LT,, | Performed by: OTOLARYNGOLOGY

## 2023-03-15 PROCEDURE — 37000009 HC ANESTHESIA EA ADD 15 MINS: Mod: HCNC | Performed by: OTOLARYNGOLOGY

## 2023-03-15 PROCEDURE — 82962 GLUCOSE BLOOD TEST: CPT | Mod: HCNC,91 | Performed by: OTOLARYNGOLOGY

## 2023-03-15 PROCEDURE — 69644 REVISE MIDDLE EAR & MASTOID: CPT | Mod: HCNC,LT,, | Performed by: OTOLARYNGOLOGY

## 2023-03-15 PROCEDURE — 71000015 HC POSTOP RECOV 1ST HR: Mod: HCNC | Performed by: OTOLARYNGOLOGY

## 2023-03-15 PROCEDURE — D9220A PRA ANESTHESIA: Mod: HCNC,ANES,, | Performed by: ANESTHESIOLOGY

## 2023-03-15 PROCEDURE — 69436 CREATE EARDRUM OPENING: CPT | Mod: 59,HCNC,RT, | Performed by: OTOLARYNGOLOGY

## 2023-03-15 PROCEDURE — 27201423 OPTIME MED/SURG SUP & DEVICES STERILE SUPPLY: Mod: HCNC | Performed by: OTOLARYNGOLOGY

## 2023-03-15 PROCEDURE — 36000708 HC OR TIME LEV III 1ST 15 MIN: Mod: HCNC | Performed by: OTOLARYNGOLOGY

## 2023-03-15 PROCEDURE — 63600175 PHARM REV CODE 636 W HCPCS: Mod: HCNC | Performed by: NURSE ANESTHETIST, CERTIFIED REGISTERED

## 2023-03-15 PROCEDURE — 36000709 HC OR TIME LEV III EA ADD 15 MIN: Mod: HCNC | Performed by: OTOLARYNGOLOGY

## 2023-03-15 PROCEDURE — 71000033 HC RECOVERY, INTIAL HOUR: Mod: HCNC | Performed by: OTOLARYNGOLOGY

## 2023-03-15 PROCEDURE — 69436 PR CREATE EARDRUM OPENING,GEN ANESTH: ICD-10-PCS | Mod: 59,HCNC,RT, | Performed by: OTOLARYNGOLOGY

## 2023-03-15 PROCEDURE — D9220A PRA ANESTHESIA: Mod: HCNC,CRNA,, | Performed by: STUDENT IN AN ORGANIZED HEALTH CARE EDUCATION/TRAINING PROGRAM

## 2023-03-15 PROCEDURE — 82962 GLUCOSE BLOOD TEST: CPT | Mod: HCNC | Performed by: OTOLARYNGOLOGY

## 2023-03-15 PROCEDURE — D9220A PRA ANESTHESIA: ICD-10-PCS | Mod: HCNC,CRNA,, | Performed by: STUDENT IN AN ORGANIZED HEALTH CARE EDUCATION/TRAINING PROGRAM

## 2023-03-15 PROCEDURE — 71000016 HC POSTOP RECOV ADDL HR: Mod: HCNC | Performed by: OTOLARYNGOLOGY

## 2023-03-15 PROCEDURE — 37000008 HC ANESTHESIA 1ST 15 MINUTES: Mod: HCNC | Performed by: OTOLARYNGOLOGY

## 2023-03-15 PROCEDURE — 25000003 PHARM REV CODE 250: Mod: HCNC | Performed by: NURSE ANESTHETIST, CERTIFIED REGISTERED

## 2023-03-15 PROCEDURE — 25000003 PHARM REV CODE 250: Mod: HCNC | Performed by: OTOLARYNGOLOGY

## 2023-03-15 DEVICE — TUBE GOODE T-TUBE 1.1MM I.D.: Type: IMPLANTABLE DEVICE | Site: EAR | Status: FUNCTIONAL

## 2023-03-15 RX ORDER — SODIUM CHLORIDE 9 MG/ML
INJECTION, SOLUTION INTRAVENOUS CONTINUOUS PRN
Status: DISCONTINUED | OUTPATIENT
Start: 2023-03-15 | End: 2023-03-15

## 2023-03-15 RX ORDER — SODIUM CHLORIDE 9 MG/ML
INJECTION, SOLUTION INTRAVENOUS CONTINUOUS
Status: DISCONTINUED | OUTPATIENT
Start: 2023-03-15 | End: 2023-03-15 | Stop reason: HOSPADM

## 2023-03-15 RX ORDER — DEXMEDETOMIDINE HYDROCHLORIDE 100 UG/ML
INJECTION, SOLUTION INTRAVENOUS
Status: DISCONTINUED | OUTPATIENT
Start: 2023-03-15 | End: 2023-03-15

## 2023-03-15 RX ORDER — HYDROCODONE BITARTRATE AND ACETAMINOPHEN 5; 325 MG/1; MG/1
1 TABLET ORAL ONCE
Status: COMPLETED | OUTPATIENT
Start: 2023-03-15 | End: 2023-03-15

## 2023-03-15 RX ORDER — PHENYLEPHRINE HCL IN 0.9% NACL 1 MG/10 ML
SYRINGE (ML) INTRAVENOUS
Status: DISCONTINUED | OUTPATIENT
Start: 2023-03-15 | End: 2023-03-15

## 2023-03-15 RX ORDER — ROCURONIUM BROMIDE 10 MG/ML
INJECTION, SOLUTION INTRAVENOUS
Status: DISCONTINUED | OUTPATIENT
Start: 2023-03-15 | End: 2023-03-15

## 2023-03-15 RX ORDER — OFLOXACIN 3 MG/ML
SOLUTION/ DROPS OPHTHALMIC
Status: DISCONTINUED | OUTPATIENT
Start: 2023-03-15 | End: 2023-03-15 | Stop reason: HOSPADM

## 2023-03-15 RX ORDER — HYDROCODONE BITARTRATE AND ACETAMINOPHEN 5; 325 MG/1; MG/1
TABLET ORAL
Status: DISCONTINUED
Start: 2023-03-15 | End: 2023-03-15 | Stop reason: HOSPADM

## 2023-03-15 RX ORDER — HALOPERIDOL 5 MG/ML
0.5 INJECTION INTRAMUSCULAR EVERY 10 MIN PRN
Status: DISCONTINUED | OUTPATIENT
Start: 2023-03-15 | End: 2023-03-15 | Stop reason: HOSPADM

## 2023-03-15 RX ORDER — ONDANSETRON 2 MG/ML
8 INJECTION INTRAMUSCULAR; INTRAVENOUS ONCE
Status: COMPLETED | OUTPATIENT
Start: 2023-03-15 | End: 2023-03-15

## 2023-03-15 RX ORDER — LIDOCAINE HCL/PF 100 MG/5ML
SYRINGE (ML) INTRAVENOUS
Status: DISCONTINUED | OUTPATIENT
Start: 2023-03-15 | End: 2023-03-15

## 2023-03-15 RX ORDER — ONDANSETRON 2 MG/ML
INJECTION INTRAMUSCULAR; INTRAVENOUS
Status: DISCONTINUED
Start: 2023-03-15 | End: 2023-03-15 | Stop reason: HOSPADM

## 2023-03-15 RX ORDER — LIDOCAINE HYDROCHLORIDE 10 MG/ML
1 INJECTION, SOLUTION EPIDURAL; INFILTRATION; INTRACAUDAL; PERINEURAL ONCE AS NEEDED
Status: DISCONTINUED | OUTPATIENT
Start: 2023-03-15 | End: 2023-03-15 | Stop reason: HOSPADM

## 2023-03-15 RX ORDER — HYDROCODONE BITARTRATE AND ACETAMINOPHEN 5; 325 MG/1; MG/1
1 TABLET ORAL EVERY 6 HOURS PRN
Qty: 20 TABLET | Refills: 0 | Status: SHIPPED | OUTPATIENT
Start: 2023-03-15 | End: 2023-08-11

## 2023-03-15 RX ORDER — CIPROFLOXACIN AND DEXAMETHASONE 3; 1 MG/ML; MG/ML
4 SUSPENSION/ DROPS AURICULAR (OTIC) 2 TIMES DAILY
Qty: 7.5 ML | Refills: 0 | Status: SHIPPED | OUTPATIENT
Start: 2023-03-22 | End: 2023-03-29 | Stop reason: SDUPTHER

## 2023-03-15 RX ORDER — SODIUM CHLORIDE 0.9 % (FLUSH) 0.9 %
3 SYRINGE (ML) INJECTION
Status: DISCONTINUED | OUTPATIENT
Start: 2023-03-15 | End: 2023-03-15 | Stop reason: HOSPADM

## 2023-03-15 RX ORDER — LIDOCAINE HYDROCHLORIDE AND EPINEPHRINE 10; 10 MG/ML; UG/ML
INJECTION, SOLUTION INFILTRATION; PERINEURAL
Status: DISCONTINUED | OUTPATIENT
Start: 2023-03-15 | End: 2023-03-15 | Stop reason: HOSPADM

## 2023-03-15 RX ORDER — FENTANYL CITRATE 50 UG/ML
INJECTION, SOLUTION INTRAMUSCULAR; INTRAVENOUS
Status: DISCONTINUED | OUTPATIENT
Start: 2023-03-15 | End: 2023-03-15

## 2023-03-15 RX ORDER — PHENYLEPHRINE HYDROCHLORIDE 10 MG/ML
INJECTION INTRAVENOUS
Status: DISCONTINUED | OUTPATIENT
Start: 2023-03-15 | End: 2023-03-15

## 2023-03-15 RX ORDER — CEFAZOLIN SODIUM 1 G/3ML
INJECTION, POWDER, FOR SOLUTION INTRAMUSCULAR; INTRAVENOUS
Status: DISCONTINUED | OUTPATIENT
Start: 2023-03-15 | End: 2023-03-15

## 2023-03-15 RX ORDER — CEPHALEXIN 500 MG/1
500 CAPSULE ORAL EVERY 12 HOURS
Qty: 10 CAPSULE | Refills: 0 | Status: SHIPPED | OUTPATIENT
Start: 2023-03-15 | End: 2023-03-20

## 2023-03-15 RX ORDER — MIDAZOLAM HYDROCHLORIDE 1 MG/ML
INJECTION INTRAMUSCULAR; INTRAVENOUS
Status: DISCONTINUED | OUTPATIENT
Start: 2023-03-15 | End: 2023-03-15

## 2023-03-15 RX ORDER — DEXAMETHASONE SODIUM PHOSPHATE 4 MG/ML
INJECTION, SOLUTION INTRA-ARTICULAR; INTRALESIONAL; INTRAMUSCULAR; INTRAVENOUS; SOFT TISSUE
Status: DISCONTINUED | OUTPATIENT
Start: 2023-03-15 | End: 2023-03-15

## 2023-03-15 RX ORDER — HYDROMORPHONE HYDROCHLORIDE 1 MG/ML
0.2 INJECTION, SOLUTION INTRAMUSCULAR; INTRAVENOUS; SUBCUTANEOUS EVERY 5 MIN PRN
Status: DISCONTINUED | OUTPATIENT
Start: 2023-03-15 | End: 2023-03-15 | Stop reason: HOSPADM

## 2023-03-15 RX ORDER — PROPOFOL 10 MG/ML
VIAL (ML) INTRAVENOUS
Status: DISCONTINUED | OUTPATIENT
Start: 2023-03-15 | End: 2023-03-15

## 2023-03-15 RX ORDER — SUCCINYLCHOLINE CHLORIDE 20 MG/ML
INJECTION INTRAMUSCULAR; INTRAVENOUS
Status: DISCONTINUED | OUTPATIENT
Start: 2023-03-15 | End: 2023-03-15

## 2023-03-15 RX ORDER — ONDANSETRON 4 MG/1
4 TABLET, ORALLY DISINTEGRATING ORAL EVERY 6 HOURS PRN
Qty: 20 TABLET | Refills: 0 | Status: SHIPPED | OUTPATIENT
Start: 2023-03-15 | End: 2023-08-11

## 2023-03-15 RX ADMIN — HYDROCODONE BITARTRATE AND ACETAMINOPHEN 1 TABLET: 5; 325 TABLET ORAL at 09:03

## 2023-03-15 RX ADMIN — MIDAZOLAM HYDROCHLORIDE 1 MG: 1 INJECTION, SOLUTION INTRAMUSCULAR; INTRAVENOUS at 04:03

## 2023-03-15 RX ADMIN — LIDOCAINE HYDROCHLORIDE 50 MG: 20 INJECTION, SOLUTION INTRAVENOUS at 04:03

## 2023-03-15 RX ADMIN — DEXMEDETOMIDINE 12 MCG: 100 INJECTION, SOLUTION INTRAVENOUS at 05:03

## 2023-03-15 RX ADMIN — PROPOFOL 200 MG: 10 INJECTION, EMULSION INTRAVENOUS at 04:03

## 2023-03-15 RX ADMIN — CEFAZOLIN 2 G: 2 INJECTION, POWDER, FOR SOLUTION INTRAMUSCULAR; INTRAVENOUS at 04:03

## 2023-03-15 RX ADMIN — PHENYLEPHRINE HYDROCHLORIDE 100 MCG: 10 INJECTION INTRAVENOUS at 07:03

## 2023-03-15 RX ADMIN — SUCCINYLCHOLINE CHLORIDE 140 MG: 20 INJECTION, SOLUTION INTRAMUSCULAR; INTRAVENOUS at 04:03

## 2023-03-15 RX ADMIN — DEXAMETHASONE SODIUM PHOSPHATE 8 MG: 4 INJECTION, SOLUTION INTRAMUSCULAR; INTRAVENOUS at 04:03

## 2023-03-15 RX ADMIN — PROPOFOL 150 MG: 10 INJECTION, EMULSION INTRAVENOUS at 05:03

## 2023-03-15 RX ADMIN — FENTANYL CITRATE 100 MCG: 50 INJECTION, SOLUTION INTRAMUSCULAR; INTRAVENOUS at 04:03

## 2023-03-15 RX ADMIN — SODIUM CHLORIDE: 0.9 INJECTION, SOLUTION INTRAVENOUS at 04:03

## 2023-03-15 RX ADMIN — SODIUM CHLORIDE: 0.9 INJECTION, SOLUTION INTRAVENOUS at 05:03

## 2023-03-15 RX ADMIN — Medication 100 MCG: at 04:03

## 2023-03-15 RX ADMIN — DEXMEDETOMIDINE 8 MCG: 100 INJECTION, SOLUTION INTRAVENOUS at 05:03

## 2023-03-15 RX ADMIN — PHENYLEPHRINE HYDROCHLORIDE 100 MCG: 10 INJECTION INTRAVENOUS at 05:03

## 2023-03-15 RX ADMIN — ROCURONIUM BROMIDE 5 MG: 10 INJECTION INTRAVENOUS at 04:03

## 2023-03-15 RX ADMIN — SODIUM CHLORIDE 0.4 MCG/KG/MIN: 9 INJECTION, SOLUTION INTRAVENOUS at 05:03

## 2023-03-15 RX ADMIN — ONDANSETRON 8 MG: 2 INJECTION INTRAMUSCULAR; INTRAVENOUS at 09:03

## 2023-03-15 NOTE — ANESTHESIA PROCEDURE NOTES
Intubation    Date/Time: 3/15/2023 4:24 PM  Performed by: Dorothea Storey CRNA  Authorized by: Faisal Rea MD     Intubation:     Induction:  Intravenous    Intubated:  Postinduction    Mask Ventilation:  Easy with oral airway    Attempts:  1    Attempted By:  CRNA    Method of Intubation:  Video laryngoscopy    Blade:  Ott 3    Laryngeal View Grade: Grade IIA - cords partially seen      Difficult Airway Encountered?: No      Complications:  None    Airway Device:  Oral endotracheal tube    Airway Device Size:  7.5    Style/Cuff Inflation:  Cuffed (inflated to minimal occlusive pressure)    Tube secured:  22    Secured at:  The lips    Placement Verified By:  Capnometry    Complicating Factors:  None    Findings Post-Intubation:  BS equal bilateral and atraumatic/condition of teeth unchanged

## 2023-03-15 NOTE — ANESTHESIA PREPROCEDURE EVALUATION
03/15/2023  Pre-operative evaluation for Procedure(s) (LRB):  TYMPANOPLASTY, WITH MASTOIDECTOMY (Left)  INSERTION, TYMPANOSTOMY TUBE (Right)    Rigoberto Vaqsuez is a 72 y.o. male pmhx/o CHB (s/p PPM, pacer dependent), IDDM2 (took 6/8mg Lantus last night, none this AM), СВЕТЛАНА, HTN here for above procedure.     Plan on magnet for PPM management (VOO) per device clinic.     Patient Active Problem List   Diagnosis    Osteoarthritis of knees, bilateral    Hypertension associated with diabetes    Type 2 diabetes mellitus with stage 3a chronic kidney disease, with long-term current use of insulin    Hyperlipidemia associated with type 2 diabetes mellitus    Open angle with borderline findings, low risk    High myopia    Optic disc anomaly    CHANDRAKANT (iron deficiency anemia)    Pacemaker    Conductive hearing loss in left ear    Choroidal nevus of right eye    Epiretinal membrane (ERM) of left eye    Overweight (BMI 25.0-29.9)    Macular hole of right eye    Open angle with borderline findings and low glaucoma risk in both eyes    Chronic LUQ pain    Nausea    Ectopic gastric mucosa    Open angle with borderline findings and high glaucoma risk in both eyes    History of skin cancer    History of heart block;AV block, 3rd degree    Benign prostatic hyperplasia (BPH) with straining on urination    Erectile dysfunction    Lung granuloma    Dyspepsia    Calcification of aorta    Cholesteatoma    СВЕТЛАНА on CPAP    Nocturnal dyspnea       Review of patient's allergies indicates:   Allergen Reactions    Aspirin      Stomach pain even with ppi    Meperidine      Other reaction(s): Stomach upset    Niacin      Other reaction(s): hot skin       No current facility-administered medications on file prior to encounter.     Current Outpatient Medications on File Prior to Encounter   Medication Sig Dispense  Refill    ACETAMINOPHEN (TYLENOL 8 HOUR ORAL) Take by mouth as needed.      azelastine (ASTELIN) 137 mcg (0.1 %) nasal spray 2 sprays (274 mcg total) by Nasal route 2 (two) times daily. 30 mL 12    dutasteride (AVODART) 0.5 mg capsule Take 1 capsule (0.5 mg total) by mouth once daily. 90 capsule 3    fenofibrate micronized (LOFIBRA) 200 MG Cap Take 1 capsule (200 mg total) by mouth every evening. 90 capsule 3    fluticasone propionate (FLONASE) 50 mcg/actuation nasal spray 1 spray by Each Nostril route once daily.      gabapentin (CMPD PAIN MANAGEMENT COMPOUND OINTMNENT THREE) Apply bid prn pain 100 g 11    insulin detemir U-100 (LEVEMIR FLEXTOUCH U-100 INSULN) 100 unit/mL (3 mL) InPn pen 16 units in the AM and 8 units in the PM (Patient taking differently: 16 units in the AM and 4 units in the PM) 30 mL 3    latanoprost 0.005 % ophthalmic solution Place 1 drop into both eyes once daily. 3 mL 4    levocetirizine (XYZAL) 5 MG tablet Take 5 mg by mouth every evening.      liraglutide 0.6 mg/0.1 mL, 18 mg/3 mL, subq PNIJ (VICTOZA 3-ALESHA) 0.6 mg/0.1 mL (18 mg/3 mL) PnIj pen INJECT 1.8 MG DAILY (Patient taking differently: every morning. INJECT 1.8 MG DAILY) 27 mL 0    losartan (COZAAR) 25 MG tablet Take 1 tablet (25 mg total) by mouth once daily. (Patient taking differently: Take 25 mg by mouth nightly.) 90 tablet 3    lovastatin (MEVACOR) 40 MG tablet Take 1 tablet by mouth Every evening. 90 tablet 3    metFORMIN (GLUCOPHAGE) 1000 MG tablet Take 1 tablet (1,000 mg total) by mouth once daily. Generic OK 90 tablet 1    multivitamin capsule Take 1 capsule by mouth once daily.      pantoprazole (PROTONIX) 40 MG tablet Take 1 tablet (40 mg total) by mouth once daily. 90 tablet 1    pioglitazone (ACTOS) 30 MG tablet Take 1 tablet (30 mg total) by mouth once daily. 90 tablet 4    sildenafil (REVATIO) 20 mg Tab Take 1 tablet (20 mg total) by mouth daily as needed. Takes prn 90 tablet 11    blood sugar  "diagnostic (ACCU-CHEK JAXON) Strp Check BG 2-3 times daily. Accuchek jaxon strips 300 strip 3    DROPLET PEN NEEDLE 31 gauge x 3/16" Ndle USE AS DIRECTED  WITH  INSULIN 200 each 3    GAVILYTE-C 240-22.72-6.72 -5.84 gram SolR Take 4,000 mLs by mouth.      hydrocortisone 2.5 % cream Apply topically 2 (two) times daily. 3.5 g 5    ondansetron (ZOFRAN-ODT) 8 MG TbDL Take 1 tablet (8 mg total) by mouth every 6 (six) hours as needed (nausea). 20 tablet 0    triamcinolone acetonide 0.1% (KENALOG) 0.1 % cream AAA bid 454 g 0       Past Surgical History:   Procedure Laterality Date    CARDIAC PACEMAKER PLACEMENT      COLONOSCOPY N/A 9/13/2019    Procedure: COLONOSCOPY;  Surgeon: Kan Ramsey III, MD;  Location: [facility];  Service: Endoscopy;  Laterality: N/A;    COLONOSCOPY N/A 9/22/2022    Procedure: COLONOSCOPY;  Surgeon: Chris Garcia MD;  Location: [facility];  Service: Endoscopy;  Laterality: N/A;    ESOPHAGOGASTRODUODENOSCOPY N/A 9/13/2019    Procedure: ESOPHAGOGASTRODUODENOSCOPY (EGD);  Surgeon: Kan Ramsey III, MD;  Location: [facility];  Service: Endoscopy;  Laterality: N/A;    ESOPHAGOGASTRODUODENOSCOPY N/A 9/3/2021    Procedure: EGD (ESOPHAGOGASTRODUODENOSCOPY);  Surgeon: Kaylene Pineda MD;  Location: [facility];  Service: Endoscopy;  Laterality: N/A;    JOINT REPLACEMENT      KNEE CARTILAGE SURGERY Bilateral     NASAL SINUS SURGERY      SHOULDER ARTHROSCOPY Right     TONSILLECTOMY      TOTAL KNEE ARTHROPLASTY Left 05/15/2017    TRANSURETHRAL RESECTION OF PROSTATE      TYMPANOPLASTY      vesectomy         Social History     Socioeconomic History    Marital status:      Spouse name: SAUL    Number of children: 2   Occupational History    Occupation: retired- [employer] .   Tobacco Use    Smoking status: Never    Smokeless tobacco: Never   Substance and Sexual Activity    Alcohol use: Yes     Comment: " once a month"    Drug use: No    Sexual " activity: Yes     Partners: Female   Social History Narrative     . Lives with spouse. Has 2 children. Patient retired from [employer]. His son has type 1 diabetes.         CBC: No results for input(s): WBC, RBC, HGB, HCT, PLT, MCV, MCH, MCHC in the last 72 hours.    CMP: No results for input(s): NA, K, CL, CO2, BUN, CREATININE, GLU, MG, PHOS, CALCIUM, ALBUMIN, PROT, ALKPHOS, ALT, AST, BILITOT in the last 72 hours.    INR  No results for input(s): PT, INR, PROTIME, APTT in the last 72 hours.        Diagnostic Studies:      EKD Echo:  No results found. However, due to the size of the patient record, not all encounters were searched. Please check Results Review for a complete set of results.        Pre-op Assessment    I have reviewed the Patient Summary Reports.    I have reviewed the NPO Status.   I have reviewed the Medications.     Review of Systems  Anesthesia Hx:  History of prior surgery of interest to airway management or planning: Denies Family Hx of Anesthesia complications.   Denies Personal Hx of Anesthesia complications.       Physical Exam  General: Well nourished, Cooperative, Alert and Oriented    Airway:  Mallampati: III / II  Mouth Opening: Normal  TM Distance: Normal  Tongue: Normal  Neck ROM: Normal ROM    Dental:  Intact    Chest/Lungs:  Clear to auscultation, Normal Respiratory Rate    Heart:  Rate: Normal  Rhythm: Regular Rhythm        Anesthesia Plan  Type of Anesthesia, risks & benefits discussed:    Anesthesia Type: Gen ETT  Intra-op Monitoring Plan: Standard ASA Monitors  Post Op Pain Control Plan: multimodal analgesia  Induction:  IV  Airway Plan: Direct and Video  Informed Consent: Informed consent signed with the Patient and all parties understand the risks and agree with anesthesia plan.  All questions answered.   ASA Score: 3  Day of Surgery Review of History & Physical: H&P Update referred to the surgeon/provider.    Ready For Surgery From Anesthesia Perspective.      .

## 2023-03-15 NOTE — PLAN OF CARE
Chart reviewed.  No orders in [facility] for surgery today. Preop nursing care completed per standard nursing orders. Safe surgery checklist complete. Paged ENT resident with Dr. Tsang to inform them of need for orders. Wife at bedside and to take glasses, cell phone, and wallet. Clothing placed in postop locker per pt request. Waiting for admission order, surgery consent, site selene, H&P update, and anesthesia consent prior to surgery. Call bell within reach. Instructed pt to call for assistance.

## 2023-03-16 NOTE — OP NOTE
[facility] - Surgery ([facility])  Surgery Department  Operative Note    SUMMARY     Date of Procedure: 3/15/2023     Procedure:  Left Tympanoplasty with mastoidectomy and ossicular reconstruction  Right Myringotomy with tube placement      Surgeon(s) and Role:     * Jose L Gudino MD - Primary     * Luís Neves MD - Resident - Assisting        Pre-Operative Diagnosis: Cholesteatoma of ear, left [H71.92]  Mixed conductive and sensorineural hearing loss of left ear with restricted hearing of right ear [H90.A32]  Diffuse otitis externa of left ear, unspecified chronicity [H60.312]  Otorrhea of left ear [H92.12]  Otalgia, left [H92.02]  ETD (Eustachian tube dysfunction), bilateral [H69.83]    Post-Operative Diagnosis: Post-Op Diagnosis Codes:     * Cholesteatoma of ear, left [H71.92]     * Mixed conductive and sensorineural hearing loss of left ear with restricted hearing of right ear [H90.A32]     * Diffuse otitis externa of left ear, unspecified chronicity [H60.312]     * Otorrhea of left ear [H92.12]     * Otalgia, left [H92.02]     * ETD (Eustachian tube dysfunction), bilateral [H69.83]    Anesthesia: General    Operative Findings (including complications, if any):   Left ear   Anterior superior perforation with PE tube in place  Cartilage graft of TM lateralized with tortuous ear canal limiting anterior view.  Keratin collection on anterior underside of cartilage and ingrowth between cartilage and malleus  No sign of disease in mastoid, mild revision performed  After cartilage removal total perforation of TM present, lateral graft with temporalis fascia and canaloplasty performed.    OCR perfomed using resculpted cartilage to bridge distance from capitulum to newly graft temporalis fascia graft    Right ear   1. Retracted tympanic membrane without effusion, PE tube placed    Description of Technical Procedures:     The patient was brought to the operating room general anesthesia was induced.  The bed was turned  180°.  The facial nerve monitor was set up and used for the duration of procedure.  The left ear was sterilely prepped and draped in standard fashion for otologic surgery.      Using the operating microscope the ear canal was examined with a speculum.  The ear canal was tortuous with a very limited view of the anterior half the tympanic membrane.  There was a cartilage graft filling most of the posterior half of the tympanic membrane.  Anterior to this there was a T-tube within a 30% perforation.  There appeared to be keratin debris on the under side of the anterior aspect of the cartilage graft along the perforation.    A vascular strip incision was made approximately 4 mm from the annulus and carried back towards the meatus.  Then proceeded behind the ear.  A C-shaped postauricular incision was made in the patient's previous scar.  Soft tissue flaps were raised anteriorly and posteriorly.  There was ample temporalis fascia to harvest which was taken and set aside on a block.  A T-shaped periosteal incision was made along the mastoid cortex.  The mastoid periosteum was elevated the previous mastoid cavity was exposed.  The osseous ear canal was identified.  Our vascular strip incision was found and flipped out of the ear.  A retractor was replaced to allow visualization of the osseous ear canal from the postauricular view.    The mastoid cavity was examined and we were unable to see the antrum from her current view.  Using a precision cutting bur the mastoid cavity was revised and the antrum was opened.  There was no cholesteatoma identified in the mastoid antrum.  We then proceeded to address the middle ear at this time.    Due to the tortuous nature of the ear canal limiting our view decided to proceed with a canal plasty.  An incision was made along the anterior aspect of the ear canal skin at the lateral part of the osseous ear canal.  The ear canal skin was then elevated down to the annulus.  An aluminum guard  was used to protect the skin.  Canal plasty was performed using cutting and eugenia burs to create a wider view.  After this was completed the guard was removed.  The skin was replaced back in its original position.  Then tympanomeatal incisions were made at 6 o'clock as well as at 12 o'clock.  The flap was elevated and the middle ear was entered with sharp microdissection.  There was epithelium growing along the entire backside of the cartilage graft.  Therefore this was  from the remaining tympanic membrane including the malleus and removed in its entirety.  After removal this resulted in a near total perforation.  The middle ear was examined.  There was a piece of silastic along the cochlear promontory which was removed.  The incus appeared to be eroded and the stapes capitulum was intact and mobile.  There was no connection between the incus and stapes.  Due to the now near total perforation I decided to proceed with a lateral graft.  The ear canal skin was then removed and set aside on a tongue blade in saline.  A further more extensive canal plasty was performed to make sure that there were no acute angles along the anterior annulus and that the whole annulus could be viewed while looking through the ear canal.  This was done prevent lateralization of graft placement.  Once this was complete all epithelium was carefully removed from the annulus.  Temporalis fascia graft was then cut to size to fit the entire drum.  A slit was cut for the malleus.  This was placed in a overlay fashion while placing it under and medial to the malleus.  There was just a small amount of overlap of the graft onto the ear canal wall and annulus.  Posteriorly the graft was then raised up to expose the posterior middle ear.  Using the previous cartilage graft I cut out a piece in thinned it significantly to fit properly over the stapes.  This was then grafted between the stapes capitulum and the fascia to serve as ossicular  reconstruction.  The anterior canal skin was then replaced.  Gelfoam was used to secure both the graft and the canal skin.  The vascular strip flap was replaced in its original position.  Periosteum of the mastoid was closed.  The ear canal was filled with Gelfoam.  The dermis was closed.  A Mellette dressing was applied.      Turning our attention to the right ear the operating microscope was used to examine the ear canal.  Tympanic membrane was intact but globally retracted.  There was adhesions of the drum to the stapes.  A myringotomy incision was made inferiorly in the drum.  In a T-tube was placed using a forceps and micro pick.  Ofloxacin drops were applied.  At this point we were done with the procedure and the patient was turned over to Anesthesia awakened from the surgery.    EBL: 30ml           Implants:   Implant Name Type Inv. Item Serial No.  Lot No. LRB No. Used Action   TUBE ESTUARDO T-TUBE 1.1MM I.D. - [id number]  TUBE ESTUARDO T-TUBE 1.1MM I.D.  [facility] [id number] Right 1 Implanted       Specimens:   Specimen (24h ago, onward)      None                    Condition: Good    Disposition: PACU - hemodynamically stable.    Attestation: I was present and scrubbed for the entire procedure.

## 2023-03-16 NOTE — PROGRESS NOTES
All discharge instructions reviewed with patient and his wife. All questions and concerns addressed. Patient dressed and ready for discharge. All personal belongings with patient. All PIVs removed. Patient wheeled out via wheelchair by PCT at this time.

## 2023-03-16 NOTE — TRANSFER OF CARE
Anesthesia Transfer of Care Note    Patient: Rigoberto Vasquez    Procedure(s) Performed: Procedure(s) (LRB):  TYMPANOPLASTY, WITH MASTOIDECTOMY (Left)  INSERTION, TYMPANOSTOMY TUBE (Right)    Patient location: PACU    Anesthesia Type: general    Transport from OR: Transported from OR on 6-10 L/min O2 by face mask with adequate spontaneous ventilation    Post pain: adequate analgesia    Post assessment: no apparent anesthetic complications and tolerated procedure well    Post vital signs: stable    Level of consciousness: awake, alert and oriented    Nausea/Vomiting: no nausea/vomiting    Complications: none    Transfer of care protocol was followed      Last vitals:   Visit Vitals  BP (!) 131/71 (BP Location: Right arm, Patient Position: Lying)   Pulse 77   Temp 37.2 °C (98.9 °F) (Oral)   Resp 18   Wt 89.8 kg (198 lb)   SpO2 98%   BMI 27.62 kg/m²

## 2023-03-16 NOTE — ANESTHESIA POSTPROCEDURE EVALUATION
Anesthesia Post Evaluation    Patient: Rigoberto Vasquez    Procedure(s) Performed: Procedure(s) (LRB):  TYMPANOPLASTY, WITH MASTOIDECTOMY (Left)  INSERTION, TYMPANOSTOMY TUBE (Right)    Final Anesthesia Type: general      Patient location during evaluation: PACU  Patient participation: Yes- Able to Participate  Level of consciousness: awake and alert  Post-procedure vital signs: reviewed and stable  Pain management: adequate  Airway patency: patent    PONV status at discharge: No PONV  Anesthetic complications: no      Cardiovascular status: blood pressure returned to baseline  Respiratory status: unassisted  Hydration status: euvolemic  Follow-up not needed.          Vitals Value Taken Time   /71 03/15/23 2102   Temp 36.2 °C (97.2 °F) 03/15/23 2015   Pulse 72 03/15/23 2109   Resp 15 03/15/23 2114   SpO2 98 % 03/15/23 2109   Vitals shown include unvalidated device data.      Event Time   Out of Recovery 20:30:00         Pain/Berna Score: Pain Rating Prior to Med Admin: 6 (3/15/2023  9:14 PM)  Berna Score: 9 (3/15/2023  8:15 PM)

## 2023-03-16 NOTE — BRIEF OP NOTE
[facility] - Surgery ([facility])  Brief Operative Note    Surgery Date: 3/15/2023     Surgeon(s) and Role:     * Jose L Gudino MD - Primary     * Luís Neves MD - Resident - Assisting        Pre-op Diagnosis:  Cholesteatoma of ear, left [H71.92]  Mixed conductive and sensorineural hearing loss of left ear with restricted hearing of right ear [H90.A32]  Diffuse otitis externa of left ear, unspecified chronicity [H60.312]  Otorrhea of left ear [H92.12]  Otalgia, left [H92.02]  ETD (Eustachian tube dysfunction), bilateral [H69.83]    Post-op Diagnosis:  Post-Op Diagnosis Codes:     * Cholesteatoma of ear, left [H71.92]     * Mixed conductive and sensorineural hearing loss of left ear with restricted hearing of right ear [H90.A32]     * Diffuse otitis externa of left ear, unspecified chronicity [H60.312]     * Otorrhea of left ear [H92.12]     * Otalgia, left [H92.02]     * ETD (Eustachian tube dysfunction), bilateral [H69.83]    Procedure(s) (LRB):  TYMPANOPLASTY, WITH MASTOIDECTOMY (Left)  INSERTION, TYMPANOSTOMY TUBE (Right)    Anesthesia: General      Estimated Blood Loss: * No values recorded between 3/15/2023  4:51 PM and 3/15/2023  8:04 PM *         Specimens:   Specimen (24h ago, onward)      None              Discharge Note    OUTCOME: Patient tolerated treatment/procedure well without complication and is now ready for discharge.    DISPOSITION: Home or Self Care    FINAL DIAGNOSIS:  Cholesteatoma of ear, left    FOLLOWUP: In clinic    DISCHARGE INSTRUCTIONS:  No discharge procedures on file.

## 2023-03-21 RX ORDER — LOVASTATIN 40 MG/1
TABLET ORAL
Qty: 90 TABLET | Refills: 0 | Status: SHIPPED | OUTPATIENT
Start: 2023-03-21 | End: 2023-04-24 | Stop reason: SDUPTHER

## 2023-03-21 NOTE — TELEPHONE ENCOUNTER
Refill Decision Note   Rigoberto Vasquez  is requesting a refill authorization.  Brief Assessment and Rationale for Refill:  Approve     Medication Therapy Plan:       Medication Reconciliation Completed: No   Comments:     No Care Gaps recommended.     Note composed:1:49 PM 03/21/2023

## 2023-03-21 NOTE — TELEPHONE ENCOUNTER
No new care gaps identified.  [facility] Embedded Care Gaps. Reference number: [id number]. 3/21/2023   1:46:33 PM CDT

## 2023-03-22 ENCOUNTER — OFFICE VISIT (OUTPATIENT)
Dept: OTOLARYNGOLOGY | Facility: CLINIC | Age: 73
End: 2023-03-22
Payer: MEDICARE

## 2023-03-22 VITALS
HEART RATE: 76 BPM | WEIGHT: 196.88 LBS | DIASTOLIC BLOOD PRESSURE: 88 MMHG | BODY MASS INDEX: 27.56 KG/M2 | HEIGHT: 71 IN | SYSTOLIC BLOOD PRESSURE: 140 MMHG | TEMPERATURE: 99 F

## 2023-03-22 DIAGNOSIS — H71.92 CHOLESTEATOMA OF EAR, LEFT: Primary | ICD-10-CM

## 2023-03-22 DIAGNOSIS — Z96.22 S/P TYMPANOSTOMY TUBE PLACEMENT: ICD-10-CM

## 2023-03-22 DIAGNOSIS — Z98.890 HX OF TYMPANOMASTOIDECTOMY: ICD-10-CM

## 2023-03-22 PROCEDURE — 1125F PR PAIN SEVERITY QUANTIFIED, PAIN PRESENT: ICD-10-PCS | Mod: HCNC,CPTII,S$GLB, | Performed by: PHYSICIAN ASSISTANT

## 2023-03-22 PROCEDURE — 3077F PR MOST RECENT SYSTOLIC BLOOD PRESSURE >= 140 MM HG: ICD-10-PCS | Mod: HCNC,CPTII,S$GLB, | Performed by: PHYSICIAN ASSISTANT

## 2023-03-22 PROCEDURE — 3044F HG A1C LEVEL LT 7.0%: CPT | Mod: HCNC,CPTII,S$GLB, | Performed by: PHYSICIAN ASSISTANT

## 2023-03-22 PROCEDURE — 3288F FALL RISK ASSESSMENT DOCD: CPT | Mod: HCNC,CPTII,S$GLB, | Performed by: PHYSICIAN ASSISTANT

## 2023-03-22 PROCEDURE — 3061F NEG MICROALBUMINURIA REV: CPT | Mod: HCNC,CPTII,S$GLB, | Performed by: PHYSICIAN ASSISTANT

## 2023-03-22 PROCEDURE — 3079F DIAST BP 80-89 MM HG: CPT | Mod: HCNC,CPTII,S$GLB, | Performed by: PHYSICIAN ASSISTANT

## 2023-03-22 PROCEDURE — 3008F BODY MASS INDEX DOCD: CPT | Mod: HCNC,CPTII,S$GLB, | Performed by: PHYSICIAN ASSISTANT

## 2023-03-22 PROCEDURE — 1101F PR PT FALLS ASSESS DOC 0-1 FALLS W/OUT INJ PAST YR: ICD-10-PCS | Mod: HCNC,CPTII,S$GLB, | Performed by: PHYSICIAN ASSISTANT

## 2023-03-22 PROCEDURE — 3044F PR MOST RECENT HEMOGLOBIN A1C LEVEL <7.0%: ICD-10-PCS | Mod: HCNC,CPTII,S$GLB, | Performed by: PHYSICIAN ASSISTANT

## 2023-03-22 PROCEDURE — 99024 PR POST-OP FOLLOW-UP VISIT: ICD-10-PCS | Mod: HCNC,S$GLB,, | Performed by: PHYSICIAN ASSISTANT

## 2023-03-22 PROCEDURE — 1101F PT FALLS ASSESS-DOCD LE1/YR: CPT | Mod: HCNC,CPTII,S$GLB, | Performed by: PHYSICIAN ASSISTANT

## 2023-03-22 PROCEDURE — 3288F PR FALLS RISK ASSESSMENT DOCUMENTED: ICD-10-PCS | Mod: HCNC,CPTII,S$GLB, | Performed by: PHYSICIAN ASSISTANT

## 2023-03-22 PROCEDURE — 1125F AMNT PAIN NOTED PAIN PRSNT: CPT | Mod: HCNC,CPTII,S$GLB, | Performed by: PHYSICIAN ASSISTANT

## 2023-03-22 PROCEDURE — 3008F PR BODY MASS INDEX (BMI) DOCUMENTED: ICD-10-PCS | Mod: HCNC,CPTII,S$GLB, | Performed by: PHYSICIAN ASSISTANT

## 2023-03-22 PROCEDURE — 99999 PR PBB SHADOW E&M-EST. PATIENT-LVL III: CPT | Mod: PBBFAC,HCNC,, | Performed by: PHYSICIAN ASSISTANT

## 2023-03-22 PROCEDURE — 99024 POSTOP FOLLOW-UP VISIT: CPT | Mod: HCNC,S$GLB,, | Performed by: PHYSICIAN ASSISTANT

## 2023-03-22 PROCEDURE — 3079F PR MOST RECENT DIASTOLIC BLOOD PRESSURE 80-89 MM HG: ICD-10-PCS | Mod: HCNC,CPTII,S$GLB, | Performed by: PHYSICIAN ASSISTANT

## 2023-03-22 PROCEDURE — 99999 PR PBB SHADOW E&M-EST. PATIENT-LVL III: ICD-10-PCS | Mod: PBBFAC,HCNC,, | Performed by: PHYSICIAN ASSISTANT

## 2023-03-22 PROCEDURE — 3066F NEPHROPATHY DOC TX: CPT | Mod: HCNC,CPTII,S$GLB, | Performed by: PHYSICIAN ASSISTANT

## 2023-03-22 PROCEDURE — 3061F PR NEG MICROALBUMINURIA RESULT DOCUMENTED/REVIEW: ICD-10-PCS | Mod: HCNC,CPTII,S$GLB, | Performed by: PHYSICIAN ASSISTANT

## 2023-03-22 PROCEDURE — 3066F PR DOCUMENTATION OF TREATMENT FOR NEPHROPATHY: ICD-10-PCS | Mod: HCNC,CPTII,S$GLB, | Performed by: PHYSICIAN ASSISTANT

## 2023-03-22 PROCEDURE — 3072F PR LOW RISK FOR RETINOPATHY: ICD-10-PCS | Mod: HCNC,CPTII,S$GLB, | Performed by: PHYSICIAN ASSISTANT

## 2023-03-22 PROCEDURE — 3072F LOW RISK FOR RETINOPATHY: CPT | Mod: HCNC,CPTII,S$GLB, | Performed by: PHYSICIAN ASSISTANT

## 2023-03-22 PROCEDURE — 3077F SYST BP >= 140 MM HG: CPT | Mod: HCNC,CPTII,S$GLB, | Performed by: PHYSICIAN ASSISTANT

## 2023-03-22 NOTE — PROGRESS NOTES
Subjective:   Patient: Rigoberto Vasquez [id number], :1950   Visit date:3/22/2023 2:17 PM    Chief Complaint:  Follow-up (Tympanoplasty w/ mastoidectomy)    HPI:  Rigoberto is a 72 y.o. male who is here for follow-up after surgery:    Subjective: Doing better each day.  Now taking just Tylenol as needed for pain.  He has not used any ear drops in left ear but has been using them in right ear (scant drainage).  He feels unsteady with sudden changes in position.  No fever.  Mild discomfort in postauricular area but no redness or drainage from incision.    Surgery date: 3/15/2023    Operations performed:   Left Tympanoplasty with mastoidectomy and ossicular reconstruction  Right Myringotomy with tube placement           Review of Systems:  -     Allergic/Immunologic: is allergic to aspirin, meperidine, and niacin..  -     Constitutional: Current temp: 98.6 °F (37 °C) (Tympanic)    His meds, allergies, medical, surgical, social & family histories were reviewed & updated:  -     He has a current medication list which includes the following prescription(s): acetaminophen, azelastine, blood sugar diagnostic, ciprofloxacin-dexamethasone 0.3-0.1%, droplet pen needle, dutasteride, fenofibrate micronized, fluticasone propionate, gabapentin, hydrocodone-acetaminophen, hydrocortisone, levemir flextouch u-100 insuln, latanoprost, levocetirizine, victoza 3-claudine, lovastatin, metformin, multivitamin, ondansetron, ondansetron, pantoprazole, pioglitazone, sildenafil, triamcinolone acetonide 0.1%, gavilyte-c, and losartan.  -     He  has a past medical history of Acid reflux, Angina pectoris, Back pain, BPH (benign prostatic hyperplasia), Cholesteatoma, Degenerative joint disease, Diverticulosis, Erectile dysfunction, History of elevated PSA (2014), History of pericarditis, Hypercholesteremia, Hypertension, Iron deficiency anemia, СВЕТЛАНА (obstructive sleep apnea) (2022), Pacemaker, Renal disorder, Squamous cell cancer of skin of  "mastoid region of scalp, Type 2 diabetes mellitus, Urinary incontinence, and when he was 7 Yrs old..   -     He does not have any pertinent problems on file.   -     He  has a past surgical history that includes Shoulder arthroscopy (Right); Nasal sinus surgery; Cardiac pacemaker placement; Tonsillectomy; Transurethral resection of prostate; Knee cartilage surgery (Bilateral); Tympanoplasty; vesectomy; Total knee arthroplasty (Left, 05/15/2017); Joint replacement; Esophagogastroduodenoscopy (N/A, 9/13/2019); Colonoscopy (N/A, 9/13/2019); Esophagogastroduodenoscopy (N/A, 9/3/2021); Colonoscopy (N/A, 9/22/2022); Tympanoplasty with mastoidectomy (Left, 3/15/2023); and Insertion of tympanostomy tube (Right, 3/15/2023).  -     He  reports that he has never smoked. He has never used smokeless tobacco. He reports current alcohol use. He reports that he does not use drugs.  -     His family history includes Arthritis in his mother; Colon cancer in his paternal grandmother; Diabetes in his maternal grandmother and son; Heart disease in his father; Hypertension in his father and mother; Stroke in his father.  -     He is allergic to aspirin, meperidine, and niacin.    Objective:     Physical Exam:  Vitals:  BP (!) 140/88   Pulse 76   Temp 98.6 °F (37 °C) (Tympanic)   Ht 5' 11" (1.803 m)   Wt 89.3 kg (196 lb 13.9 oz)   BMI 27.46 kg/m²   General appearance:  Well developed, well nourished, no apparent distress    Surgical site: LEFT postauricular incision steri-strips removed; incision healing nicely.  Clean, dry and intact.  No erythema.  LEFT EAC is filled with gelfoam.  RIGHT T-tube visualized; angled, unable to see lumen, no active drainage    Assessment & Plan:   Rigoberto was seen today for follow-up.    Diagnoses and all orders for this visit:    Cholesteatoma of ear, left    Hx of tympanomastoidectomy    S/P tympanostomy tube placement      OK to start using ear drops in left ear to help dissolve gelfoam.  Recommend he " keep incision clean and dry.  Instructed him to call with any new or worsening symptoms; otherwise keep scheduled followup with Dr. Gudino next month.

## 2023-03-29 ENCOUNTER — PATIENT MESSAGE (OUTPATIENT)
Dept: OTOLARYNGOLOGY | Facility: CLINIC | Age: 73
End: 2023-03-29
Payer: MEDICARE

## 2023-03-29 DIAGNOSIS — H60.312 DIFFUSE OTITIS EXTERNA OF LEFT EAR, UNSPECIFIED CHRONICITY: Primary | ICD-10-CM

## 2023-03-29 RX ORDER — CIPROFLOXACIN AND DEXAMETHASONE 3; 1 MG/ML; MG/ML
4 SUSPENSION/ DROPS AURICULAR (OTIC) 2 TIMES DAILY
Qty: 7.5 ML | Refills: 0 | OUTPATIENT
Start: 2023-03-29 | End: 2023-03-30 | Stop reason: SDUPTHER

## 2023-03-30 DIAGNOSIS — H60.312 DIFFUSE OTITIS EXTERNA OF LEFT EAR, UNSPECIFIED CHRONICITY: ICD-10-CM

## 2023-03-30 RX ORDER — CIPROFLOXACIN AND DEXAMETHASONE 3; 1 MG/ML; MG/ML
4 SUSPENSION/ DROPS AURICULAR (OTIC) 2 TIMES DAILY
Qty: 7.5 ML | Refills: 0 | OUTPATIENT
Start: 2023-03-30 | End: 2023-03-31 | Stop reason: SDUPTHER

## 2023-03-30 NOTE — TELEPHONE ENCOUNTER
----- Message from Sandra Moreno sent at 3/30/2023 11:37 AM CDT -----  Pt is requesting a refill for ear drops and a call back at [phone]    [facility] [address] [place] - [address]  [address]  [place] [address]  Phone: [phone] Fax: [phone]

## 2023-03-31 DIAGNOSIS — H60.312 DIFFUSE OTITIS EXTERNA OF LEFT EAR, UNSPECIFIED CHRONICITY: ICD-10-CM

## 2023-03-31 RX ORDER — CIPROFLOXACIN AND DEXAMETHASONE 3; 1 MG/ML; MG/ML
4 SUSPENSION/ DROPS AURICULAR (OTIC) 2 TIMES DAILY
Qty: 7.5 ML | Refills: 0 | Status: SHIPPED | OUTPATIENT
Start: 2023-03-31 | End: 2023-04-11 | Stop reason: SDUPTHER

## 2023-03-31 RX ORDER — CIPROFLOXACIN AND DEXAMETHASONE 3; 1 MG/ML; MG/ML
4 SUSPENSION/ DROPS AURICULAR (OTIC) 2 TIMES DAILY
Qty: 7.5 ML | Refills: 0 | Status: SHIPPED | OUTPATIENT
Start: 2023-03-31 | End: 2023-03-31 | Stop reason: SDUPTHER

## 2023-03-31 NOTE — TELEPHONE ENCOUNTER
----- Message from Teresita Billings sent at 3/31/2023  8:33 AM CDT -----  Contact: Rigoberto  Type:  RX Refill Request    Who Called: Rigoberto  Refill or New Rx:Refill  RX Name and Strength:Ciprodex  How is the patient currently taking it? (ex. 1XDay):1  Is this a 30 day or 90 day RX:  Preferred Pharmacy with phone number:  [facility] - [place] - [address]  [address]  [place] [address]  Phone: [phone] Fax: [phone]  Local or Mail Order:Local  Ordering Provider:Dr. Ludwig  Would the patient rather a call back or a response via [facility]? call  Best Call Back Number:[phone]  Additional Information: Patient is going to be out this weekend.

## 2023-04-03 ENCOUNTER — NURSE TRIAGE (OUTPATIENT)
Dept: ADMINISTRATIVE | Facility: CLINIC | Age: 73
End: 2023-04-03
Payer: MEDICARE

## 2023-04-03 NOTE — TELEPHONE ENCOUNTER
OOC RN Triage que, question about meds pre procedure.   Sp ear surgery,  3-15-23  on ATB, Cipro for that.   Now,    going to dentist procedure tomorrow.    For Dentist.   Not with [facility], patient to call him next, am I supposed to be on an ATB for the Dentist?   Advised Patient that I would send a high priority message to PCP and that he should also call the dentist for clarification.   Xiomara SINGH.   Gave OOC Rn number and xiomara SINGH.     Reason for Disposition   Caller has NON-URGENT medicine question about med that PCP or specialist prescribed and triager unable to answer question    Additional Information   Negative: Intentional drug overdose and suicidal thoughts or ideas   Negative: MORE THAN A DOUBLE DOSE of a prescription or over-the-counter (OTC) drug   Negative: DOUBLE DOSE (an extra dose or lesser amount) of prescription drug and any symptoms (e.g., dizziness, nausea, pain, sleepiness)   Negative: DOUBLE DOSE (an extra dose or lesser amount) of over-the-counter (OTC) drug and any symptoms (e.g., dizziness, nausea, pain, sleepiness)   Negative: Took another person's prescription drug   Negative: DOUBLE DOSE (an extra dose or lesser amount) of prescription drug and NO symptoms  (Exception: A double dose of antibiotics.)   Negative: Diabetes drug error or overdose (e.g., took wrong type of insulin or took extra dose)   Negative: DOUBLE DOSE (an extra dose or lesser amount) of over-the-counter (OTC) drug and NO symptoms   Negative: DOUBLE DOSE (an extra dose or lesser amount) of antibiotic drug and NO symptoms   Negative: Caller has medicine question only, adult not sick, and triager answers question   Negative: Caller has medicine question, adult has minor symptoms, caller declines triage, and triager answers question   Negative: Caller requesting information about medicine during pregnancy; adult is not ill AND triager answers question   Negative: Caller requesting information about medicine use with  breastfeeding; neither adult nor infant is ill, triager answers question   Negative: Pharmacy calling with prescription question and triager answers question   Negative: Prescription prescribed recently is not at pharmacy and triager has access to patient's EMR and prescription is recorded in the EMR   Negative: Prescription request for new medicine (not a refill)   Negative: Caller wants to use a complementary or alternative medicine   Negative: Medicine patch causing local rash or itching   Negative: Prescription not at pharmacy and was prescribed by PCP recently  (Exception: triager has access to EMR and prescription is recorded there. Go to Home Care and confirm for pharmacy.)   Negative: Pharmacy calling with prescription question and triager unable to answer question   Negative: Caller has URGENT medicine question about med that PCP or specialist prescribed and triager unable to answer question    Protocols used: Medication Question Call-A-OH

## 2023-04-06 ENCOUNTER — OFFICE VISIT (OUTPATIENT)
Dept: OTOLARYNGOLOGY | Facility: CLINIC | Age: 73
End: 2023-04-06
Payer: MEDICARE

## 2023-04-06 VITALS
HEIGHT: 71 IN | DIASTOLIC BLOOD PRESSURE: 84 MMHG | WEIGHT: 192.69 LBS | BODY MASS INDEX: 26.98 KG/M2 | SYSTOLIC BLOOD PRESSURE: 141 MMHG | TEMPERATURE: 99 F

## 2023-04-06 DIAGNOSIS — Z09 POSTOPERATIVE EXAMINATION: Primary | ICD-10-CM

## 2023-04-06 PROCEDURE — 3044F PR MOST RECENT HEMOGLOBIN A1C LEVEL <7.0%: ICD-10-PCS | Mod: HCNC,CPTII,S$GLB, | Performed by: OTOLARYNGOLOGY

## 2023-04-06 PROCEDURE — 3077F PR MOST RECENT SYSTOLIC BLOOD PRESSURE >= 140 MM HG: ICD-10-PCS | Mod: HCNC,CPTII,S$GLB, | Performed by: OTOLARYNGOLOGY

## 2023-04-06 PROCEDURE — 3079F PR MOST RECENT DIASTOLIC BLOOD PRESSURE 80-89 MM HG: ICD-10-PCS | Mod: HCNC,CPTII,S$GLB, | Performed by: OTOLARYNGOLOGY

## 2023-04-06 PROCEDURE — 1101F PT FALLS ASSESS-DOCD LE1/YR: CPT | Mod: HCNC,CPTII,S$GLB, | Performed by: OTOLARYNGOLOGY

## 2023-04-06 PROCEDURE — 3061F NEG MICROALBUMINURIA REV: CPT | Mod: HCNC,CPTII,S$GLB, | Performed by: OTOLARYNGOLOGY

## 2023-04-06 PROCEDURE — 99024 PR POST-OP FOLLOW-UP VISIT: ICD-10-PCS | Mod: HCNC,S$GLB,, | Performed by: OTOLARYNGOLOGY

## 2023-04-06 PROCEDURE — 1101F PR PT FALLS ASSESS DOC 0-1 FALLS W/OUT INJ PAST YR: ICD-10-PCS | Mod: HCNC,CPTII,S$GLB, | Performed by: OTOLARYNGOLOGY

## 2023-04-06 PROCEDURE — 3008F PR BODY MASS INDEX (BMI) DOCUMENTED: ICD-10-PCS | Mod: HCNC,CPTII,S$GLB, | Performed by: OTOLARYNGOLOGY

## 2023-04-06 PROCEDURE — 3072F PR LOW RISK FOR RETINOPATHY: ICD-10-PCS | Mod: HCNC,CPTII,S$GLB, | Performed by: OTOLARYNGOLOGY

## 2023-04-06 PROCEDURE — 3288F FALL RISK ASSESSMENT DOCD: CPT | Mod: HCNC,CPTII,S$GLB, | Performed by: OTOLARYNGOLOGY

## 2023-04-06 PROCEDURE — 3066F NEPHROPATHY DOC TX: CPT | Mod: HCNC,CPTII,S$GLB, | Performed by: OTOLARYNGOLOGY

## 2023-04-06 PROCEDURE — 3044F HG A1C LEVEL LT 7.0%: CPT | Mod: HCNC,CPTII,S$GLB, | Performed by: OTOLARYNGOLOGY

## 2023-04-06 PROCEDURE — 3072F LOW RISK FOR RETINOPATHY: CPT | Mod: HCNC,CPTII,S$GLB, | Performed by: OTOLARYNGOLOGY

## 2023-04-06 PROCEDURE — 1125F AMNT PAIN NOTED PAIN PRSNT: CPT | Mod: HCNC,CPTII,S$GLB, | Performed by: OTOLARYNGOLOGY

## 2023-04-06 PROCEDURE — 3066F PR DOCUMENTATION OF TREATMENT FOR NEPHROPATHY: ICD-10-PCS | Mod: HCNC,CPTII,S$GLB, | Performed by: OTOLARYNGOLOGY

## 2023-04-06 PROCEDURE — 99024 POSTOP FOLLOW-UP VISIT: CPT | Mod: HCNC,S$GLB,, | Performed by: OTOLARYNGOLOGY

## 2023-04-06 PROCEDURE — 1125F PR PAIN SEVERITY QUANTIFIED, PAIN PRESENT: ICD-10-PCS | Mod: HCNC,CPTII,S$GLB, | Performed by: OTOLARYNGOLOGY

## 2023-04-06 PROCEDURE — 1159F MED LIST DOCD IN RCRD: CPT | Mod: HCNC,CPTII,S$GLB, | Performed by: OTOLARYNGOLOGY

## 2023-04-06 PROCEDURE — 3288F PR FALLS RISK ASSESSMENT DOCUMENTED: ICD-10-PCS | Mod: HCNC,CPTII,S$GLB, | Performed by: OTOLARYNGOLOGY

## 2023-04-06 PROCEDURE — 3008F BODY MASS INDEX DOCD: CPT | Mod: HCNC,CPTII,S$GLB, | Performed by: OTOLARYNGOLOGY

## 2023-04-06 PROCEDURE — 3079F DIAST BP 80-89 MM HG: CPT | Mod: HCNC,CPTII,S$GLB, | Performed by: OTOLARYNGOLOGY

## 2023-04-06 PROCEDURE — 3077F SYST BP >= 140 MM HG: CPT | Mod: HCNC,CPTII,S$GLB, | Performed by: OTOLARYNGOLOGY

## 2023-04-06 PROCEDURE — 3061F PR NEG MICROALBUMINURIA RESULT DOCUMENTED/REVIEW: ICD-10-PCS | Mod: HCNC,CPTII,S$GLB, | Performed by: OTOLARYNGOLOGY

## 2023-04-06 PROCEDURE — 99999 PR PBB SHADOW E&M-EST. PATIENT-LVL IV: CPT | Mod: PBBFAC,HCNC,, | Performed by: OTOLARYNGOLOGY

## 2023-04-06 PROCEDURE — 99999 PR PBB SHADOW E&M-EST. PATIENT-LVL IV: ICD-10-PCS | Mod: PBBFAC,HCNC,, | Performed by: OTOLARYNGOLOGY

## 2023-04-06 PROCEDURE — 1159F PR MEDICATION LIST DOCUMENTED IN MEDICAL RECORD: ICD-10-PCS | Mod: HCNC,CPTII,S$GLB, | Performed by: OTOLARYNGOLOGY

## 2023-04-10 NOTE — PROGRESS NOTES
Post auricular incision healing well.    Packing vacuumed out of ear with microscope.    Graft intact and healing well.    Patient tolerated well.    RTC 3 weeks.    Continue ear drops as directed.

## 2023-04-11 ENCOUNTER — PATIENT MESSAGE (OUTPATIENT)
Dept: OTOLARYNGOLOGY | Facility: CLINIC | Age: 73
End: 2023-04-11
Payer: MEDICARE

## 2023-04-11 DIAGNOSIS — H60.312 DIFFUSE OTITIS EXTERNA OF LEFT EAR, UNSPECIFIED CHRONICITY: ICD-10-CM

## 2023-04-11 RX ORDER — CIPROFLOXACIN AND DEXAMETHASONE 3; 1 MG/ML; MG/ML
4 SUSPENSION/ DROPS AURICULAR (OTIC) 2 TIMES DAILY
Qty: 7.5 ML | Refills: 0 | Status: SHIPPED | OUTPATIENT
Start: 2023-04-11 | End: 2023-10-27

## 2023-04-14 ENCOUNTER — PATIENT MESSAGE (OUTPATIENT)
Dept: ADMINISTRATIVE | Facility: HOSPITAL | Age: 73
End: 2023-04-14
Payer: MEDICARE

## 2023-04-14 ENCOUNTER — TELEPHONE (OUTPATIENT)
Dept: ADMINISTRATIVE | Facility: HOSPITAL | Age: 73
End: 2023-04-14
Payer: MEDICARE

## 2023-04-25 ENCOUNTER — OFFICE VISIT (OUTPATIENT)
Dept: GASTROENTEROLOGY | Facility: CLINIC | Age: 73
End: 2023-04-25
Payer: MEDICARE

## 2023-04-25 VITALS
DIASTOLIC BLOOD PRESSURE: 82 MMHG | SYSTOLIC BLOOD PRESSURE: 140 MMHG | HEIGHT: 71 IN | BODY MASS INDEX: 27.16 KG/M2 | HEART RATE: 73 BPM | OXYGEN SATURATION: 97 % | WEIGHT: 194 LBS

## 2023-04-25 DIAGNOSIS — R11.0 NAUSEA: Primary | ICD-10-CM

## 2023-04-25 DIAGNOSIS — R10.13 DYSPEPSIA: ICD-10-CM

## 2023-04-25 DIAGNOSIS — K21.9 GASTROESOPHAGEAL REFLUX DISEASE, UNSPECIFIED WHETHER ESOPHAGITIS PRESENT: ICD-10-CM

## 2023-04-25 PROCEDURE — 3079F DIAST BP 80-89 MM HG: CPT | Mod: HCNC,CPTII,S$GLB, | Performed by: PHYSICIAN ASSISTANT

## 2023-04-25 PROCEDURE — 1126F PR PAIN SEVERITY QUANTIFIED, NO PAIN PRESENT: ICD-10-PCS | Mod: HCNC,CPTII,S$GLB, | Performed by: PHYSICIAN ASSISTANT

## 2023-04-25 PROCEDURE — 3072F LOW RISK FOR RETINOPATHY: CPT | Mod: HCNC,CPTII,S$GLB, | Performed by: PHYSICIAN ASSISTANT

## 2023-04-25 PROCEDURE — 3061F NEG MICROALBUMINURIA REV: CPT | Mod: HCNC,CPTII,S$GLB, | Performed by: PHYSICIAN ASSISTANT

## 2023-04-25 PROCEDURE — 3288F PR FALLS RISK ASSESSMENT DOCUMENTED: ICD-10-PCS | Mod: HCNC,CPTII,S$GLB, | Performed by: PHYSICIAN ASSISTANT

## 2023-04-25 PROCEDURE — 3288F FALL RISK ASSESSMENT DOCD: CPT | Mod: HCNC,CPTII,S$GLB, | Performed by: PHYSICIAN ASSISTANT

## 2023-04-25 PROCEDURE — 3077F SYST BP >= 140 MM HG: CPT | Mod: HCNC,CPTII,S$GLB, | Performed by: PHYSICIAN ASSISTANT

## 2023-04-25 PROCEDURE — 3008F BODY MASS INDEX DOCD: CPT | Mod: HCNC,CPTII,S$GLB, | Performed by: PHYSICIAN ASSISTANT

## 2023-04-25 PROCEDURE — 99999 PR PBB SHADOW E&M-EST. PATIENT-LVL V: CPT | Mod: PBBFAC,HCNC,, | Performed by: PHYSICIAN ASSISTANT

## 2023-04-25 PROCEDURE — 3008F PR BODY MASS INDEX (BMI) DOCUMENTED: ICD-10-PCS | Mod: HCNC,CPTII,S$GLB, | Performed by: PHYSICIAN ASSISTANT

## 2023-04-25 PROCEDURE — 3066F NEPHROPATHY DOC TX: CPT | Mod: HCNC,CPTII,S$GLB, | Performed by: PHYSICIAN ASSISTANT

## 2023-04-25 PROCEDURE — 3044F HG A1C LEVEL LT 7.0%: CPT | Mod: HCNC,CPTII,S$GLB, | Performed by: PHYSICIAN ASSISTANT

## 2023-04-25 PROCEDURE — 99214 PR OFFICE/OUTPT VISIT, EST, LEVL IV, 30-39 MIN: ICD-10-PCS | Mod: HCNC,S$GLB,, | Performed by: PHYSICIAN ASSISTANT

## 2023-04-25 PROCEDURE — 1101F PR PT FALLS ASSESS DOC 0-1 FALLS W/OUT INJ PAST YR: ICD-10-PCS | Mod: HCNC,CPTII,S$GLB, | Performed by: PHYSICIAN ASSISTANT

## 2023-04-25 PROCEDURE — 3072F PR LOW RISK FOR RETINOPATHY: ICD-10-PCS | Mod: HCNC,CPTII,S$GLB, | Performed by: PHYSICIAN ASSISTANT

## 2023-04-25 PROCEDURE — 3079F PR MOST RECENT DIASTOLIC BLOOD PRESSURE 80-89 MM HG: ICD-10-PCS | Mod: HCNC,CPTII,S$GLB, | Performed by: PHYSICIAN ASSISTANT

## 2023-04-25 PROCEDURE — 3077F PR MOST RECENT SYSTOLIC BLOOD PRESSURE >= 140 MM HG: ICD-10-PCS | Mod: HCNC,CPTII,S$GLB, | Performed by: PHYSICIAN ASSISTANT

## 2023-04-25 PROCEDURE — 3061F PR NEG MICROALBUMINURIA RESULT DOCUMENTED/REVIEW: ICD-10-PCS | Mod: HCNC,CPTII,S$GLB, | Performed by: PHYSICIAN ASSISTANT

## 2023-04-25 PROCEDURE — 3044F PR MOST RECENT HEMOGLOBIN A1C LEVEL <7.0%: ICD-10-PCS | Mod: HCNC,CPTII,S$GLB, | Performed by: PHYSICIAN ASSISTANT

## 2023-04-25 PROCEDURE — 1101F PT FALLS ASSESS-DOCD LE1/YR: CPT | Mod: HCNC,CPTII,S$GLB, | Performed by: PHYSICIAN ASSISTANT

## 2023-04-25 PROCEDURE — 1126F AMNT PAIN NOTED NONE PRSNT: CPT | Mod: HCNC,CPTII,S$GLB, | Performed by: PHYSICIAN ASSISTANT

## 2023-04-25 PROCEDURE — 99999 PR PBB SHADOW E&M-EST. PATIENT-LVL V: ICD-10-PCS | Mod: PBBFAC,HCNC,, | Performed by: PHYSICIAN ASSISTANT

## 2023-04-25 PROCEDURE — 1159F MED LIST DOCD IN RCRD: CPT | Mod: HCNC,CPTII,S$GLB, | Performed by: PHYSICIAN ASSISTANT

## 2023-04-25 PROCEDURE — 3066F PR DOCUMENTATION OF TREATMENT FOR NEPHROPATHY: ICD-10-PCS | Mod: HCNC,CPTII,S$GLB, | Performed by: PHYSICIAN ASSISTANT

## 2023-04-25 PROCEDURE — 99214 OFFICE O/P EST MOD 30 MIN: CPT | Mod: HCNC,S$GLB,, | Performed by: PHYSICIAN ASSISTANT

## 2023-04-25 PROCEDURE — 1159F PR MEDICATION LIST DOCUMENTED IN MEDICAL RECORD: ICD-10-PCS | Mod: HCNC,CPTII,S$GLB, | Performed by: PHYSICIAN ASSISTANT

## 2023-04-25 RX ORDER — POLYETHYLENE GLYCOL 3350 17 G/17G
17 POWDER, FOR SOLUTION ORAL DAILY
COMMUNITY

## 2023-04-25 RX ORDER — FAMOTIDINE 40 MG/1
40 TABLET, FILM COATED ORAL 2 TIMES DAILY
Qty: 60 TABLET | Refills: 11 | Status: SHIPPED | OUTPATIENT
Start: 2023-04-25 | End: 2023-06-15 | Stop reason: DRUGHIGH

## 2023-04-25 NOTE — PROGRESS NOTES
Subjective:      Patient ID: Rigoberto Vasquez is a 72 y.o. male.    Chief Complaint: Nausea    HPI:  Patient reports to clinic today for follow up of nausea and indigestion. Last seen in 2021. Had EGD with findings of candida.   Today he reports that he was doing well for a while but symptoms slowly began to return about 4 months ago. Increased his Protonix to twice a day for 2 months and symptoms resolved. Dropped back down to once a day and symptoms returned. Trying Prilosec OTC in the evenings which seems to help some. Denies vomiting. Bowels moving well - on daily Miralax. Spicy foods make the nausea and indigestion worse. If he doesn't take his medication, anything will cause the symptoms. Denies dysphagia.    Review of Systems   Constitutional:  Negative for activity change, appetite change, chills, diaphoresis, fatigue, fever and unexpected weight change.   HENT:  Negative for trouble swallowing.    Respiratory:  Negative for shortness of breath.    Cardiovascular:  Negative for chest pain.   Gastrointestinal:  Positive for nausea. Negative for abdominal pain, constipation, diarrhea and vomiting.   Psychiatric/Behavioral:  Negative for dysphoric mood. The patient is not nervous/anxious.      Medical History: Reviewed    Social History: Reviewed    Allergies: Reviewed    Objective:     Physical Exam  Constitutional:       General: He is not in acute distress.     Appearance: Normal appearance. He is not ill-appearing, toxic-appearing or diaphoretic.   HENT:      Head: Normocephalic and atraumatic.   Eyes:      General: No scleral icterus.     Extraocular Movements: Extraocular movements intact.   Cardiovascular:      Rate and Rhythm: Normal rate and regular rhythm.   Pulmonary:      Effort: Pulmonary effort is normal. No respiratory distress.      Breath sounds: Normal breath sounds.   Abdominal:      General: Bowel sounds are normal. There is no distension.      Palpations: Abdomen is soft. There is no mass.       Tenderness: There is no abdominal tenderness. There is no guarding.   Musculoskeletal:         General: Normal range of motion.      Cervical back: Normal range of motion.   Skin:     General: Skin is warm and dry.      Coloration: Skin is not jaundiced or pale.   Neurological:      Mental Status: He is alert and oriented to person, place, and time.       Assessment:     1. Nausea    2. Dyspepsia    3. Gastroesophageal reflux disease, unspecified whether esophagitis present        Plan:     -Symptoms of nausea and indigestion have returned. Can be well controlled with Protonix BID. Did this for 2 months, but currently taking once daily. Recommend adding Pepcid BID to once daily Protonix.  -GERD diet encouraged.  -Follow up 3 months. If symptoms continue, will need to discuss PPI BID vs. Repeat EGD.  -Can continue daily Miralax for bowel maintenance.    Rigoberto was seen today for nausea.    Diagnoses and all orders for this visit:    Nausea  -     famotidine (PEPCID) 40 MG tablet; Take 1 tablet (40 mg total) by mouth 2 (two) times daily.    Dyspepsia  -     famotidine (PEPCID) 40 MG tablet; Take 1 tablet (40 mg total) by mouth 2 (two) times daily.    Gastroesophageal reflux disease, unspecified whether esophagitis present  -     famotidine (PEPCID) 40 MG tablet; Take 1 tablet (40 mg total) by mouth 2 (two) times daily.        No follow-ups on file.    Thank you for the opportunity to participate in the care of this patient.   Mary Vázquez PA-C.

## 2023-04-26 ENCOUNTER — OFFICE VISIT (OUTPATIENT)
Dept: OPHTHALMOLOGY | Facility: CLINIC | Age: 73
End: 2023-04-26
Payer: MEDICARE

## 2023-04-26 DIAGNOSIS — H35.372 EPIRETINAL MEMBRANE (ERM) OF LEFT EYE: ICD-10-CM

## 2023-04-26 DIAGNOSIS — H35.341 LAMELLAR MACULAR HOLE OF RIGHT EYE: ICD-10-CM

## 2023-04-26 DIAGNOSIS — H40.013 OPEN ANGLE WITH BORDERLINE FINDINGS AND LOW GLAUCOMA RISK IN BOTH EYES: Primary | ICD-10-CM

## 2023-04-26 DIAGNOSIS — D31.31 CHOROIDAL NEVUS OF RIGHT EYE: ICD-10-CM

## 2023-04-26 DIAGNOSIS — Z79.4 CONTROLLED TYPE 2 DIABETES MELLITUS WITHOUT COMPLICATION, WITH LONG-TERM CURRENT USE OF INSULIN: ICD-10-CM

## 2023-04-26 DIAGNOSIS — E11.9 CONTROLLED TYPE 2 DIABETES MELLITUS WITHOUT COMPLICATION, WITH LONG-TERM CURRENT USE OF INSULIN: ICD-10-CM

## 2023-04-26 PROCEDURE — 3066F NEPHROPATHY DOC TX: CPT | Mod: HCNC,CPTII,S$GLB, | Performed by: OPHTHALMOLOGY

## 2023-04-26 PROCEDURE — 1159F PR MEDICATION LIST DOCUMENTED IN MEDICAL RECORD: ICD-10-PCS | Mod: HCNC,CPTII,S$GLB, | Performed by: OPHTHALMOLOGY

## 2023-04-26 PROCEDURE — 3061F PR NEG MICROALBUMINURIA RESULT DOCUMENTED/REVIEW: ICD-10-PCS | Mod: HCNC,CPTII,S$GLB, | Performed by: OPHTHALMOLOGY

## 2023-04-26 PROCEDURE — 1159F MED LIST DOCD IN RCRD: CPT | Mod: HCNC,CPTII,S$GLB, | Performed by: OPHTHALMOLOGY

## 2023-04-26 PROCEDURE — 99213 PR OFFICE/OUTPT VISIT, EST, LEVL III, 20-29 MIN: ICD-10-PCS | Mod: HCNC,S$GLB,, | Performed by: OPHTHALMOLOGY

## 2023-04-26 PROCEDURE — 3066F PR DOCUMENTATION OF TREATMENT FOR NEPHROPATHY: ICD-10-PCS | Mod: HCNC,CPTII,S$GLB, | Performed by: OPHTHALMOLOGY

## 2023-04-26 PROCEDURE — 3044F HG A1C LEVEL LT 7.0%: CPT | Mod: HCNC,CPTII,S$GLB, | Performed by: OPHTHALMOLOGY

## 2023-04-26 PROCEDURE — 4010F ACE/ARB THERAPY RXD/TAKEN: CPT | Mod: HCNC,CPTII,S$GLB, | Performed by: OPHTHALMOLOGY

## 2023-04-26 PROCEDURE — 4010F PR ACE/ARB THEARPY RXD/TAKEN: ICD-10-PCS | Mod: HCNC,CPTII,S$GLB, | Performed by: OPHTHALMOLOGY

## 2023-04-26 PROCEDURE — 99213 OFFICE O/P EST LOW 20 MIN: CPT | Mod: HCNC,S$GLB,, | Performed by: OPHTHALMOLOGY

## 2023-04-26 PROCEDURE — 3044F PR MOST RECENT HEMOGLOBIN A1C LEVEL <7.0%: ICD-10-PCS | Mod: HCNC,CPTII,S$GLB, | Performed by: OPHTHALMOLOGY

## 2023-04-26 PROCEDURE — 99999 PR PBB SHADOW E&M-EST. PATIENT-LVL III: ICD-10-PCS | Mod: PBBFAC,HCNC,, | Performed by: OPHTHALMOLOGY

## 2023-04-26 PROCEDURE — 3061F NEG MICROALBUMINURIA REV: CPT | Mod: HCNC,CPTII,S$GLB, | Performed by: OPHTHALMOLOGY

## 2023-04-26 PROCEDURE — 99999 PR PBB SHADOW E&M-EST. PATIENT-LVL III: CPT | Mod: PBBFAC,HCNC,, | Performed by: OPHTHALMOLOGY

## 2023-04-26 NOTE — PROGRESS NOTES
HPI     Glaucoma            Comments: IOP check    Patient states OU is doing well, no changes in VA and using drops as   directed.          Comments    1. DM since 1999   NO DBR   2. Mac hole od   3. erm od  4. Choroidal nevus od   5. HIGH MYOPE(-6)   6. SCOAG   iop 25/ 23 ou on po steroid   anamolous myopic disc   ASYMETRIC C/D 0.7 / 0.5    // 641  NO FAMILY HISTORY   HVF 3/22/2022      GCL 31/28 5/3/22      Latanoprost qam OU       Last edited by Helene Ly, Patient Care Assistant on 4/26/2023  2:04   PM.            Assessment /Plan     For exam results, see Encounter Report.      ICD-10-CM ICD-9-CM    1. Open angle with borderline findings and low glaucoma risk in both eyes  H40.013 365.01 Doing well - intraocular pressure is within acceptable range relative to target pressure with no evidence of progression.   Continue current treatment.  Reviewed importance of continued compliance with treatment and follow up.         2. Controlled type 2 diabetes mellitus without complication, with long-term current use of insulin  E11.9 250.00 Pt defers dilation today     Z79.4 V58.67       3. Lamellar macular hole of right eye  H35.341 362.54 Followed by C       4. Choroidal nevus of right eye  D31.31 224.6       5. Epiretinal membrane (ERM) of left eye  H35.372 362.56           RETURN TO CLINIC 3 months HVF, GOCT AND DOA/MR     Latanoprost qam OU

## 2023-05-04 ENCOUNTER — OFFICE VISIT (OUTPATIENT)
Dept: OTOLARYNGOLOGY | Facility: CLINIC | Age: 73
End: 2023-05-04
Payer: MEDICARE

## 2023-05-04 ENCOUNTER — CLINICAL SUPPORT (OUTPATIENT)
Dept: AUDIOLOGY | Facility: CLINIC | Age: 73
End: 2023-05-04
Payer: MEDICARE

## 2023-05-04 VITALS
HEART RATE: 75 BPM | HEIGHT: 71 IN | DIASTOLIC BLOOD PRESSURE: 76 MMHG | SYSTOLIC BLOOD PRESSURE: 116 MMHG | TEMPERATURE: 98 F | BODY MASS INDEX: 27.06 KG/M2 | WEIGHT: 193.31 LBS

## 2023-05-04 DIAGNOSIS — H90.6 MIXED HEARING LOSS, BILATERAL: Primary | ICD-10-CM

## 2023-05-04 DIAGNOSIS — H72.01 CENTRAL PERFORATION OF TYMPANIC MEMBRANE OF RIGHT EAR: ICD-10-CM

## 2023-05-04 DIAGNOSIS — Z09 POSTOPERATIVE EXAMINATION: Primary | ICD-10-CM

## 2023-05-04 PROCEDURE — 3074F SYST BP LT 130 MM HG: CPT | Mod: HCNC,CPTII,S$GLB, | Performed by: OTOLARYNGOLOGY

## 2023-05-04 PROCEDURE — 4010F PR ACE/ARB THEARPY RXD/TAKEN: ICD-10-PCS | Mod: HCNC,CPTII,S$GLB, | Performed by: OTOLARYNGOLOGY

## 2023-05-04 PROCEDURE — 3008F BODY MASS INDEX DOCD: CPT | Mod: HCNC,CPTII,S$GLB, | Performed by: OTOLARYNGOLOGY

## 2023-05-04 PROCEDURE — 1126F PR PAIN SEVERITY QUANTIFIED, NO PAIN PRESENT: ICD-10-PCS | Mod: HCNC,CPTII,S$GLB, | Performed by: OTOLARYNGOLOGY

## 2023-05-04 PROCEDURE — 1101F PR PT FALLS ASSESS DOC 0-1 FALLS W/OUT INJ PAST YR: ICD-10-PCS | Mod: HCNC,CPTII,S$GLB, | Performed by: OTOLARYNGOLOGY

## 2023-05-04 PROCEDURE — 92567 TYMPANOMETRY: CPT | Mod: HCNC,S$GLB,, | Performed by: AUDIOLOGIST-HEARING AID FITTER

## 2023-05-04 PROCEDURE — 69610 TYMPANIC MEMBRANE REPAIR: CPT | Mod: 79,HCNC,RT,S$GLB | Performed by: OTOLARYNGOLOGY

## 2023-05-04 PROCEDURE — 99999 PR PBB SHADOW E&M-EST. PATIENT-LVL IV: CPT | Mod: PBBFAC,HCNC,, | Performed by: OTOLARYNGOLOGY

## 2023-05-04 PROCEDURE — 1101F PT FALLS ASSESS-DOCD LE1/YR: CPT | Mod: HCNC,CPTII,S$GLB, | Performed by: OTOLARYNGOLOGY

## 2023-05-04 PROCEDURE — 92567 PR TYMPA2METRY: ICD-10-PCS | Mod: HCNC,S$GLB,, | Performed by: AUDIOLOGIST-HEARING AID FITTER

## 2023-05-04 PROCEDURE — 3078F PR MOST RECENT DIASTOLIC BLOOD PRESSURE < 80 MM HG: ICD-10-PCS | Mod: HCNC,CPTII,S$GLB, | Performed by: OTOLARYNGOLOGY

## 2023-05-04 PROCEDURE — 99024 PR POST-OP FOLLOW-UP VISIT: ICD-10-PCS | Mod: HCNC,S$GLB,, | Performed by: OTOLARYNGOLOGY

## 2023-05-04 PROCEDURE — 69610 PR REPAIR TYMPANIC MEMBRANE: ICD-10-PCS | Mod: 79,HCNC,RT,S$GLB | Performed by: OTOLARYNGOLOGY

## 2023-05-04 PROCEDURE — 3072F PR LOW RISK FOR RETINOPATHY: ICD-10-PCS | Mod: HCNC,CPTII,S$GLB, | Performed by: OTOLARYNGOLOGY

## 2023-05-04 PROCEDURE — 3044F PR MOST RECENT HEMOGLOBIN A1C LEVEL <7.0%: ICD-10-PCS | Mod: HCNC,CPTII,S$GLB, | Performed by: OTOLARYNGOLOGY

## 2023-05-04 PROCEDURE — 3044F HG A1C LEVEL LT 7.0%: CPT | Mod: HCNC,CPTII,S$GLB, | Performed by: OTOLARYNGOLOGY

## 2023-05-04 PROCEDURE — 1159F MED LIST DOCD IN RCRD: CPT | Mod: HCNC,CPTII,S$GLB, | Performed by: OTOLARYNGOLOGY

## 2023-05-04 PROCEDURE — 3078F DIAST BP <80 MM HG: CPT | Mod: HCNC,CPTII,S$GLB, | Performed by: OTOLARYNGOLOGY

## 2023-05-04 PROCEDURE — 3074F PR MOST RECENT SYSTOLIC BLOOD PRESSURE < 130 MM HG: ICD-10-PCS | Mod: HCNC,CPTII,S$GLB, | Performed by: OTOLARYNGOLOGY

## 2023-05-04 PROCEDURE — 99024 POSTOP FOLLOW-UP VISIT: CPT | Mod: HCNC,S$GLB,, | Performed by: OTOLARYNGOLOGY

## 2023-05-04 PROCEDURE — 3288F PR FALLS RISK ASSESSMENT DOCUMENTED: ICD-10-PCS | Mod: HCNC,CPTII,S$GLB, | Performed by: OTOLARYNGOLOGY

## 2023-05-04 PROCEDURE — 92553 PR AUDIOMETRY, AIR & BONE: ICD-10-PCS | Mod: HCNC,S$GLB,, | Performed by: AUDIOLOGIST-HEARING AID FITTER

## 2023-05-04 PROCEDURE — 3061F PR NEG MICROALBUMINURIA RESULT DOCUMENTED/REVIEW: ICD-10-PCS | Mod: HCNC,CPTII,S$GLB, | Performed by: OTOLARYNGOLOGY

## 2023-05-04 PROCEDURE — 3061F NEG MICROALBUMINURIA REV: CPT | Mod: HCNC,CPTII,S$GLB, | Performed by: OTOLARYNGOLOGY

## 2023-05-04 PROCEDURE — 92553 AUDIOMETRY AIR & BONE: CPT | Mod: HCNC,S$GLB,, | Performed by: AUDIOLOGIST-HEARING AID FITTER

## 2023-05-04 PROCEDURE — 4010F ACE/ARB THERAPY RXD/TAKEN: CPT | Mod: HCNC,CPTII,S$GLB, | Performed by: OTOLARYNGOLOGY

## 2023-05-04 PROCEDURE — 1126F AMNT PAIN NOTED NONE PRSNT: CPT | Mod: HCNC,CPTII,S$GLB, | Performed by: OTOLARYNGOLOGY

## 2023-05-04 PROCEDURE — 3066F PR DOCUMENTATION OF TREATMENT FOR NEPHROPATHY: ICD-10-PCS | Mod: HCNC,CPTII,S$GLB, | Performed by: OTOLARYNGOLOGY

## 2023-05-04 PROCEDURE — 99999 PR PBB SHADOW E&M-EST. PATIENT-LVL IV: ICD-10-PCS | Mod: PBBFAC,HCNC,, | Performed by: OTOLARYNGOLOGY

## 2023-05-04 PROCEDURE — 1159F PR MEDICATION LIST DOCUMENTED IN MEDICAL RECORD: ICD-10-PCS | Mod: HCNC,CPTII,S$GLB, | Performed by: OTOLARYNGOLOGY

## 2023-05-04 PROCEDURE — 3072F LOW RISK FOR RETINOPATHY: CPT | Mod: HCNC,CPTII,S$GLB, | Performed by: OTOLARYNGOLOGY

## 2023-05-04 PROCEDURE — 3008F PR BODY MASS INDEX (BMI) DOCUMENTED: ICD-10-PCS | Mod: HCNC,CPTII,S$GLB, | Performed by: OTOLARYNGOLOGY

## 2023-05-04 PROCEDURE — 3066F NEPHROPATHY DOC TX: CPT | Mod: HCNC,CPTII,S$GLB, | Performed by: OTOLARYNGOLOGY

## 2023-05-04 PROCEDURE — 3288F FALL RISK ASSESSMENT DOCD: CPT | Mod: HCNC,CPTII,S$GLB, | Performed by: OTOLARYNGOLOGY

## 2023-05-04 NOTE — PROGRESS NOTES
Rigoberto Vasquez was seen 05/04/2023 for an audiological evaluation.   Surgery date: 3/15/2023     Operations performed:   Left Tympanoplasty with mastoidectomy and ossicular reconstruction  Right Myringotomy with tube placement    Results reveal a moderate-to-severe mixed hearing loss 250-8000 Hz for the right ear, and moderate conductive to profound mixed hearing loss 250-8000 Hz for the left ear.   Tympanograms were CNT (seal) for the right ear and Type B large volume for the left ear.      Patient was counseled on the above findings.    Recommendations:  1. Otologist appt today

## 2023-05-04 NOTE — PROGRESS NOTES
Subjective:       Patient ID: Rigoberto Vasquez is a 72 y.o. male.    Chief Complaint: Follow-up (Follow up with audio results.)    HPI     Rigoberto Vasquez is a 72 y.o. male hx of left ear CWU tmastoid in 2015 rand more recently revision AS CWU tmastoid for recurrence and PE tube of right ear on 3/15/2023:    5/4/23: reports no otorrhea, hearing slightly worse in both ears.  Otherwise no complaints      Review of Systems   Constitutional:  Negative for chills and fever.   HENT:  Positive for ear discharge and hearing loss. Negative for sore throat and trouble swallowing.    Respiratory:  Negative for apnea and chest tightness.    Cardiovascular:  Negative for chest pain.       Objective:      Physical Exam  Vitals and nursing note reviewed.   Constitutional:       Appearance: Normal appearance.   HENT:      Head: Normocephalic and atraumatic.      Right Ear: There is no impacted cerumen.      Left Ear: There is no impacted cerumen.   Neurological:      Mental Status: He is alert.       Binocular Microscopy  Indications: s/p ear surgery  Details: binocular microscopy used to examine both ears  Findings  AD: T-tube in posterior perforation of TM  AS: TM intact well healed, canal with some scarring, still maturing    Data Reviewed:          Audiogram tracings independently reviewed and discussed with patient shows slight worsening of CHL of left ear and increased ABG on right ear    Procedure: paperpatch myringoplasty left ear  Details: PE tube removed from perforation of right ear and small piece of sterile paper cut to size just larger than the perforation. This was placed over perforation in a overlay fashion, using ointment to secure it to the TM.  The patient report immediate improvement in hearing.       Assessment:       Problem List Items Addressed This Visit    None  Visit Diagnoses       Postoperative examination    -  Primary    Central perforation of tympanic membrane of right ear                    Plan:          Left TM healed   Right side with perforation from PE tube, tube removed and paper patch placed today   F/u in 6 wks to check for healing

## 2023-05-05 NOTE — TELEPHONE ENCOUNTER
No care due was identified.  [facility] Embedded Care Due Messages. Reference number: [id number].   5/05/2023 8:12:11 AM CDT

## 2023-05-07 ENCOUNTER — PATIENT MESSAGE (OUTPATIENT)
Dept: GASTROENTEROLOGY | Facility: CLINIC | Age: 73
End: 2023-05-07
Payer: MEDICARE

## 2023-05-07 DIAGNOSIS — K21.9 GASTROESOPHAGEAL REFLUX DISEASE, UNSPECIFIED WHETHER ESOPHAGITIS PRESENT: ICD-10-CM

## 2023-05-07 DIAGNOSIS — R10.13 DYSPEPSIA: ICD-10-CM

## 2023-05-07 DIAGNOSIS — R11.0 NAUSEA: Primary | ICD-10-CM

## 2023-05-09 RX ORDER — LIRAGLUTIDE 6 MG/ML
INJECTION SUBCUTANEOUS
Qty: 27 ML | Refills: 4 | Status: SHIPPED | OUTPATIENT
Start: 2023-05-09

## 2023-05-16 ENCOUNTER — PATIENT MESSAGE (OUTPATIENT)
Dept: UROLOGY | Facility: CLINIC | Age: 73
End: 2023-05-16
Payer: MEDICARE

## 2023-05-18 ENCOUNTER — HOSPITAL ENCOUNTER (OUTPATIENT)
Dept: PREADMISSION TESTING | Facility: HOSPITAL | Age: 73
Discharge: HOME | End: 2023-05-18
Attending: FAMILY MEDICINE
Payer: MEDICARE

## 2023-05-18 DIAGNOSIS — R10.13 DYSPEPSIA: ICD-10-CM

## 2023-05-18 DIAGNOSIS — R11.0 NAUSEA: ICD-10-CM

## 2023-05-18 DIAGNOSIS — K21.9 GASTROESOPHAGEAL REFLUX DISEASE, UNSPECIFIED WHETHER ESOPHAGITIS PRESENT: ICD-10-CM

## 2023-05-24 ENCOUNTER — HOSPITAL ENCOUNTER (OUTPATIENT)
Facility: HOSPITAL | Age: 73
Discharge: HOME | End: 2023-05-24
Attending: INTERNAL MEDICINE | Admitting: INTERNAL MEDICINE
Payer: MEDICARE

## 2023-05-24 ENCOUNTER — ANESTHESIA (OUTPATIENT)
Dept: ENDOSCOPY | Facility: HOSPITAL | Age: 73
End: 2023-05-24
Payer: MEDICARE

## 2023-05-24 ENCOUNTER — ANESTHESIA EVENT (OUTPATIENT)
Dept: ENDOSCOPY | Facility: HOSPITAL | Age: 73
End: 2023-05-24
Payer: MEDICARE

## 2023-05-24 DIAGNOSIS — R11.0 NAUSEA: Primary | ICD-10-CM

## 2023-05-24 LAB
GLUCOSE SERPL-MCNC: 91 MG/DL (ref 70–110)
POCT GLUCOSE: 91 MG/DL (ref 70–110)

## 2023-05-24 PROCEDURE — 88305 TISSUE EXAM BY PATHOLOGIST: ICD-10-PCS | Mod: 26,,, | Performed by: PATHOLOGY

## 2023-05-24 PROCEDURE — 88341 IMHCHEM/IMCYTCHM EA ADD ANTB: CPT | Performed by: PATHOLOGY

## 2023-05-24 PROCEDURE — 88305 TISSUE EXAM BY PATHOLOGIST: CPT | Performed by: PATHOLOGY

## 2023-05-24 PROCEDURE — 88342 IMHCHEM/IMCYTCHM 1ST ANTB: CPT | Mod: 26,,, | Performed by: PATHOLOGY

## 2023-05-24 PROCEDURE — 25000003 PHARM REV CODE 250: Performed by: NURSE ANESTHETIST, CERTIFIED REGISTERED

## 2023-05-24 PROCEDURE — 88342 CHG IMMUNOCYTOCHEMISTRY: ICD-10-PCS | Mod: 26,,, | Performed by: PATHOLOGY

## 2023-05-24 PROCEDURE — 27201012 HC FORCEPS, HOT/COLD, DISP: Performed by: INTERNAL MEDICINE

## 2023-05-24 PROCEDURE — 43239 EGD BIOPSY SINGLE/MULTIPLE: CPT | Performed by: INTERNAL MEDICINE

## 2023-05-24 PROCEDURE — 88342 IMHCHEM/IMCYTCHM 1ST ANTB: CPT | Performed by: PATHOLOGY

## 2023-05-24 PROCEDURE — 43239 PR EGD, FLEX, W/BIOPSY, SGL/MULTI: ICD-10-PCS | Mod: ,,, | Performed by: INTERNAL MEDICINE

## 2023-05-24 PROCEDURE — 63600175 PHARM REV CODE 636 W HCPCS: Performed by: NURSE ANESTHETIST, CERTIFIED REGISTERED

## 2023-05-24 PROCEDURE — 37000008 HC ANESTHESIA 1ST 15 MINUTES: Performed by: INTERNAL MEDICINE

## 2023-05-24 PROCEDURE — 88341 PR IHC OR ICC EACH ADD'L SINGLE ANTIBODY  STAINPR: ICD-10-PCS | Mod: 26,,, | Performed by: PATHOLOGY

## 2023-05-24 PROCEDURE — 43239 EGD BIOPSY SINGLE/MULTIPLE: CPT | Mod: ,,, | Performed by: INTERNAL MEDICINE

## 2023-05-24 PROCEDURE — 88305 TISSUE EXAM BY PATHOLOGIST: CPT | Mod: 26,,, | Performed by: PATHOLOGY

## 2023-05-24 PROCEDURE — 88341 IMHCHEM/IMCYTCHM EA ADD ANTB: CPT | Mod: 26,,, | Performed by: PATHOLOGY

## 2023-05-24 RX ORDER — PROPOFOL 10 MG/ML
VIAL (ML) INTRAVENOUS
Status: DISCONTINUED | OUTPATIENT
Start: 2023-05-24 | End: 2023-05-24

## 2023-05-24 RX ORDER — LIDOCAINE HYDROCHLORIDE 10 MG/ML
INJECTION, SOLUTION EPIDURAL; INFILTRATION; INTRACAUDAL; PERINEURAL
Status: DISCONTINUED | OUTPATIENT
Start: 2023-05-24 | End: 2023-05-24

## 2023-05-24 RX ADMIN — SODIUM CHLORIDE, POTASSIUM CHLORIDE, SODIUM LACTATE AND CALCIUM CHLORIDE: 600; 310; 30; 20 INJECTION, SOLUTION INTRAVENOUS at 09:05

## 2023-05-24 RX ADMIN — LIDOCAINE HYDROCHLORIDE 100 MG: 10 INJECTION, SOLUTION EPIDURAL; INFILTRATION; INTRACAUDAL; PERINEURAL at 09:05

## 2023-05-24 RX ADMIN — PROPOFOL 100 MG: 10 INJECTION, EMULSION INTRAVENOUS at 09:05

## 2023-05-24 NOTE — ANESTHESIA PREPROCEDURE EVALUATION
05/24/2023  Rigoberto Vasquez is a 72 y.o., male.      Pre-op Assessment    I have reviewed the Patient Summary Reports.     I have reviewed the Nursing Notes. I have reviewed the NPO Status.   I have reviewed the Medications.     Review of Systems  Anesthesia Hx:  No problems with previous Anesthesia    Social:  Non-Smoker    Cardiovascular:   Pacemaker Hypertension Angina hyperlipidemia Atrial-sensed ventricular-paced rhythm   Abnormal ECG   When compared with ECG of 11-MAR-2015 15:08,   Vent. rate has decreased BY  11 BPM   Confirmed by Mynor Orozco MD ([id number]) on 3/6/2023 9:25:30 PM    Pulmonary:   Shortness of breath Sleep Apnea    Renal/:   Chronic Renal Disease BPH    Hepatic/GI:   GERD Diverticulosis    Musculoskeletal:   Arthritis     Endocrine:   Diabetes, type 2        Physical Exam  General: Well nourished, Cooperative, Alert and Oriented    Airway:  Mallampati: III / II  Mouth Opening: Normal  TM Distance: Normal  Tongue: Normal    Dental:  Intact        Anesthesia Plan  Type of Anesthesia, risks & benefits discussed:    Anesthesia Type: Gen Natural Airway, MAC  Intra-op Monitoring Plan: Standard ASA Monitors  Post Op Pain Control Plan: IV/PO Opioids PRN  Induction:  IV  Informed Consent: Informed consent signed with the Patient and all parties understand the risks and agree with anesthesia plan.  All questions answered.   ASA Score: 3  Day of Surgery Review of History & Physical: H&P Update referred to the surgeon/provider.I have interviewed and examined the patient. I have reviewed the patient's H&P dated: There are no significant changes.     Ready For Surgery From Anesthesia Perspective.     .

## 2023-05-24 NOTE — PROVATION PATIENT INSTRUCTIONS
Discharge Summary/Instructions after an Endoscopic Procedure  Patient Name: Rigoberto Vasquez  Patient MRN: [id number]  Patient YOB: 1950  Wednesday, May 24, 2023 Cory Bennett MD  Dear patient,  As a result of recent federal legislation (The Federal Cures Act), you may   receive lab or pathology results from your procedure in your [facility]   account before your physician is able to contact you. Your physician or   their representative will relay the results to you with their   recommendations at their soonest availability.  Thank you,  RESTRICTIONS:  During your procedure today, you received medications for sedation.  These   medications may affect your judgment, balance and coordination.  Therefore,   for 24 hours, you have the following restrictions:   - DO NOT drive a car, operate machinery, make legal/financial decisions,   sign important papers or drink alcohol.    ACTIVITY:  Today: no heavy lifting, straining or running due to procedural   sedation/anesthesia.  The following day: return to full activity including work.  DIET:  Eat and drink normally unless instructed otherwise.     TREATMENT FOR COMMON SIDE EFFECTS:  - Mild abdominal pain, nausea, belching, bloating or excessive gas:  rest,   eat lightly and use a heating pad.  - Sore Throat: treat with throat lozenges and/or gargle with warm salt   water.  - Because air was used during the procedure, expelling large amounts of air   from your rectum or belching is normal.  - If a bowel prep was taken, you may not have a bowel movement for 1-3 days.    This is normal.  SYMPTOMS TO WATCH FOR AND REPORT TO YOUR PHYSICIAN:  1. Abdominal pain or bloating, other than gas cramps.  2. Chest pain.  3. Back pain.  4. Signs of infection such as: chills or fever occurring within 24 hours   after the procedure.  5. Rectal bleeding, which would show as bright red, maroon, or black stools.   (A tablespoon of blood from the rectum is not serious, especially if    hemorrhoids are present.)  6. Vomiting.  7. Weakness or dizziness.  GO DIRECTLY TO THE NEAREST EMERGENCY ROOM IF YOU HAVE ANY OF THE FOLLOWING:      Difficulty breathing              Chills and/or fever over 101 F   Persistent vomiting and/or vomiting blood   Severe abdominal pain   Severe chest pain   Black, tarry stools   Bleeding- more than one tablespoon   Any other symptom or condition that you feel may need urgent attention  Your doctor recommends these additional instructions:  If any biopsies were taken, your doctors clinic will contact you in 1 to 2   weeks with any results.  - Discharge patient to home.   - Resume previous diet.   - Continue present medications.   - Await pathology results.   - Return to referring physician.  For questions, problems or results please call your physician Cory Bennett MD at Work:  [phone]  If you have any questions about the above instructions, call the GI   department at [phone] or call the endoscopy unit at [phone]   from 7am until 3 pm.  [facility] [place], EMERGENCY ROOM PHONE NUMBER:   [phone]  IF A COMPLICATION OR EMERGENCY SITUATION ARISES AND YOU ARE UNABLE TO REACH   YOUR PHYSICIAN - GO DIRECTLY TO THE EMERGENCY ROOM.  I have read or have had read to me these discharge instructions for my   procedure and have received a written copy.  I understand these   instructions and will follow-up with my physician if I have any questions.     __________________________________       _____________________________________  Nurse Signature                                          Patient/Designated   Responsible Party Signature  Cory Bennett MD  5/24/2023 9:10:50 AM  This report has been verified and signed electronically.  Dear patient,  As a result of recent federal legislation (The Federal Cures Act), you may   receive lab or pathology results from your procedure in your [facility]   account before your physician is  able to contact you. Your physician or   their representative will relay the results to you with their   recommendations at their soonest availability.  Thank you,  PROVATION

## 2023-05-24 NOTE — H&P
PRE PROCEDURE H&P    Patient Name: Rigoberto Vasquez  MRN: [id number]  : 1950  Date of Procedure:  2023  Referring Physician: Mary Vázquez PA-C  Primary Physician: Jesse Grimes MD  Procedure Physician: Cory Bennett MD       Planned Procedure: EGD  Diagnosis: GERD, nausea and indigestion   Chief Complaint: Same as above    HPI: Patient is an 72 y.o. male is here for the above.     Anticoagulation: None     Past Medical History:   Past Medical History:   Diagnosis Date    Acid reflux     gi [facility]    Angina pectoris     Back pain     BPH (benign prostatic hyperplasia)     blue    Cholesteatoma     Degenerative joint disease     Diverticulosis     Erectile dysfunction     History of elevated PSA 2014    normalized, dr dave annually    History of pericarditis     Hypercholesteremia     Hypertension     Iron deficiency anemia     СВЕТЛАНА (obstructive sleep apnea) 2022    Pacemaker     complete av block;NO afib    Renal disorder     Squamous cell cancer of skin of mastoid region of scalp     dr jaida salgado    Type 2 diabetes mellitus     dr wyman    Urinary incontinence     when he was 7 Yrs old.         Past Surgical History:  Past Surgical History:   Procedure Laterality Date    CARDIAC PACEMAKER PLACEMENT      COLONOSCOPY N/A 2019    Procedure: COLONOSCOPY;  Surgeon: Kan Ramsey III, MD;  Location: [facility];  Service: Endoscopy;  Laterality: N/A;    COLONOSCOPY N/A 2022    Procedure: COLONOSCOPY;  Surgeon: Chris Garcia MD;  Location: [facility];  Service: Endoscopy;  Laterality: N/A;    ESOPHAGOGASTRODUODENOSCOPY N/A 2019    Procedure: ESOPHAGOGASTRODUODENOSCOPY (EGD);  Surgeon: Kan Ramsey III, MD;  Location: [facility];  Service: Endoscopy;  Laterality: N/A;    ESOPHAGOGASTRODUODENOSCOPY N/A 9/3/2021    Procedure: EGD (ESOPHAGOGASTRODUODENOSCOPY);  Surgeon: Kaylene Pineda MD;  Location: [facility];  Service: Endoscopy;  Laterality: N/A;     "INSERTION OF TYMPANOSTOMY TUBE Right 3/15/2023    Procedure: INSERTION, TYMPANOSTOMY TUBE;  Surgeon: Jose L Gudino MD;  Location: [facility] OR [address] [place] [address];  Service: ENT;  Laterality: Right;    JOINT REPLACEMENT      KNEE CARTILAGE SURGERY Bilateral     NASAL SINUS SURGERY      SHOULDER ARTHROSCOPY Right     TONSILLECTOMY      TOTAL KNEE ARTHROPLASTY Left 05/15/2017    TRANSURETHRAL RESECTION OF PROSTATE      TYMPANOPLASTY      TYMPANOPLASTY WITH MASTOIDECTOMY Left 3/15/2023    Procedure: TYMPANOPLASTY, WITH MASTOIDECTOMY;  Surgeon: Jose L Gudino MD;  Location: [facility] OR [address] [place] [address];  Service: ENT;  Laterality: Left;    vesectomy          Home Medications:  Prior to Admission medications    Medication Sig Start Date End Date Taking? Authorizing Provider   ACETAMINOPHEN (TYLENOL 8 HOUR ORAL) Take by mouth as needed.   Yes Historical Provider   azelastine (ASTELIN) 137 mcg (0.1 %) nasal spray 2 sprays (274 mcg total) by Nasal route 2 (two) times daily. 7/12/22 7/12/23 Yes Cookie Espinosa MD   blood sugar diagnostic (ACCU-CHEK JAXON) Strp Check BG 2-3 times daily. Accuchek jaxon strips 1/5/23  Yes Jesse Grimes MD   ciprofloxacin-dexAMETHasone 0.3-0.1% (CIPRODEX) 0.3-0.1 % DrpS Place 4 drops into the left ear 2 (two) times daily. 4/11/23  Yes Jose L Gudino MD   DROPLET PEN NEEDLE 31 gauge x 3/16" Ndle USE AS DIRECTED  WITH  INSULIN 2/13/23  Yes Jesse Grimes MD   dutasteride (AVODART) 0.5 mg capsule Take 1 capsule (0.5 mg total) by mouth once daily. 9/9/22  Yes Vance Olivera MD   famotidine (PEPCID) 40 MG tablet Take 1 tablet (40 mg total) by mouth 2 (two) times daily. 4/25/23 4/24/24 Yes Mary Vázquez PA-C   fenofibrate micronized (LOFIBRA) 200 MG Cap Take 1 capsule (200 mg total) by mouth every evening. 4/27/23  Yes Jesse Grimes MD   fluticasone propionate (FLONASE) 50 mcg/actuation nasal spray 1 spray by Each Nostril route once daily.   Yes Historical Provider   gabapentin (CMPD PAIN MANAGEMENT COMPOUND OINTMNENT " THREE) Apply bid prn pain 2/1/23  Yes Jesse Grimes MD   HYDROcodone-acetaminophen (NORCO) 5-325 mg per tablet Take 1 tablet by mouth every 6 (six) hours as needed for Pain. 3/15/23  Yes Luís Neves MD   hydrocortisone 2.5 % cream Apply topically 2 (two) times daily. 11/11/19  Yes Jesse Grimes MD   insulin detemir U-100 (LEVEMIR FLEXTOUCH U-100 INSULN) 100 unit/mL (3 mL) InPn pen 16 units in the AM and 8 units in the PM  Patient taking differently: 16 units in the AM and 4 units in the PM 7/28/22  Yes Suma Sheth PA-C   latanoprost 0.005 % ophthalmic solution INSTILL 1 DROP INTO BOTH EYES ONE TIME DAILY 4/4/23 7/3/23 Yes Alexander Reza MD   levocetirizine (XYZAL) 5 MG tablet Take 5 mg by mouth every evening.   Yes Historical Provider   liraglutide 0.6 mg/0.1 mL, 18 mg/3 mL, subq PNIJ (VICTOZA 3-ALESHA) 0.6 mg/0.1 mL (18 mg/3 mL) PnIj pen INJECT 1.8 MG DAILY 5/9/23  Yes Jesse Grimes MD   losartan (COZAAR) 25 MG tablet Take 1 tablet (25 mg total) by mouth once daily. 4/26/23 4/25/24 Yes Suma Sheth PA-C   lovastatin (MEVACOR) 40 MG tablet Take 1 tablet by mouth Every evening. 4/25/23  Yes Jesse Grimes MD   metFORMIN (GLUCOPHAGE) 1000 MG tablet Take 1 tablet (1,000 mg total) by mouth once daily. 4/25/23  Yes Jesse Grimes MD   multivitamin capsule Take 1 capsule by mouth once daily.   Yes Historical Provider   pantoprazole (PROTONIX) 40 MG tablet Take 1 tablet (40 mg total) by mouth once daily. 1/23/23 5/24/23 Yes Mary Vázquez PA-C   pioglitazone (ACTOS) 30 MG tablet Take 1 tablet (30 mg total) by mouth once daily. 12/8/22  Yes Jesse Grimes MD   polyethylene glycol (GLYCOLAX) 17 gram/dose powder Take 17 g by mouth once daily.   Yes Historical Provider   sildenafil (REVATIO) 20 mg Tab Take 1 tablet (20 mg total) by mouth daily as needed. Takes prn 12/8/22 12/8/23 Yes Vance Olivera MD   triamcinolone acetonide 0.1% (KENALOG) 0.1 % cream AAA bid 11/11/20  Yes  "Nelson Leonard MD   ondansetron (ZOFRAN-ODT) 4 MG TbDL Take 1 tablet (4 mg total) by mouth every 6 (six) hours as needed. 3/15/23   Luís Neves MD   ondansetron (ZOFRAN-ODT) 8 MG TbDL Take 1 tablet (8 mg total) by mouth every 6 (six) hours as needed (nausea). 6/15/21   MARYURI Kim        Allergies:  Review of patient's allergies indicates:   Allergen Reactions    Aspirin      Stomach pain even with ppi    Meperidine      Other reaction(s): Stomach upset    Niacin      Other reaction(s): hot skin        Social History:   Social History     Socioeconomic History    Marital status:      Spouse name: SAUL    Number of children: 2   Occupational History    Occupation: retired- [employer] .   Tobacco Use    Smoking status: Never    Smokeless tobacco: Never   Substance and Sexual Activity    Alcohol use: Yes     Comment: " once a month"    Drug use: No    Sexual activity: Yes     Partners: Female   Social History Narrative     . Lives with spouse. Has 2 children. Patient retired from [employer]. His son has type 1 diabetes.       Family History:  Family History   Problem Relation Age of Onset    Arthritis Mother     Hypertension Mother     Heart disease Father     Hypertension Father     Stroke Father     Diabetes Son     Diabetes Maternal Grandmother     Colon cancer Paternal Grandmother        ROS: No acute cardiac events, no acute respiratory complaints.     Physical Exam (all patients):    BP (!) 147/85   Pulse 71   Temp 98.8 °F (37.1 °C) (Temporal)   Resp 18   Ht 5' 11" (1.803 m)   Wt 88.5 kg (195 lb)   SpO2 97%   BMI 27.20 kg/m²   Lungs: Clear to auscultation bilaterally, respirations unlabored  Heart: Regular rate and rhythm, S1 and S2 normal, no obvious murmurs  Abdomen:         Soft, non-tender, bowel sounds normal, no masses, no organomegaly    Lab Results   Component Value Date    WBC 7.37 02/08/2023    MCV 98 02/08/2023    RDW 12.9 02/08/2023     " 02/08/2023    INR 1.1 07/02/2018    GLU 99 02/08/2023    HGBA1C 6.0 (H) 02/08/2023    BUN 23 02/08/2023     02/08/2023    K 4.4 02/08/2023     02/08/2023        SEDATION PLAN: per anesthesia      History reviewed, vital signs satisfactory, cardiopulmonary status satisfactory, sedation options, risks and plans have been discussed with the patient  All their questions were answered and the patient agrees to the sedation procedures as planned and the patient is deemed an appropriate candidate for the sedation as planned.    Procedure explained to patient, informed consent obtained and placed in chart.    Cory Bennett  5/24/2023  9:00 AM

## 2023-05-24 NOTE — TRANSFER OF CARE
"Anesthesia Transfer of Care Note    Patient: Rigoberto Vasquez    Procedure(s) Performed: Procedure(s) (LRB):  EGD (ESOPHAGOGASTRODUODENOSCOPY) (N/A)    Patient location: GI    Anesthesia Type: MAC    Transport from OR: Transported from OR on room air with adequate spontaneous ventilation    Post pain: adequate analgesia    Post assessment: no apparent anesthetic complications    Post vital signs: stable    Level of consciousness: awake, alert and oriented    Nausea/Vomiting: no nausea/vomiting    Complications: none    Transfer of care protocol was followed      Last vitals:   Visit Vitals  BP (!) 147/85   Pulse 71   Temp 37.1 °C (98.8 °F) (Temporal)   Resp 18   Ht 5' 11" (1.803 m)   Wt 88.5 kg (195 lb)   SpO2 97%   BMI 27.20 kg/m²     "

## 2023-05-24 NOTE — ANESTHESIA POSTPROCEDURE EVALUATION
Anesthesia Post Evaluation    Patient: Rigoberto Vasquez    Procedure(s) Performed: Procedure(s) (LRB):  EGD (ESOPHAGOGASTRODUODENOSCOPY) (N/A)    Final Anesthesia Type: MAC      Patient location during evaluation: GI PACU  Patient participation: Yes- Able to Participate  Level of consciousness: awake and alert  Post-procedure vital signs: reviewed and stable  Pain management: adequate  Airway patency: patent    PONV status at discharge: No PONV  Anesthetic complications: no      Cardiovascular status: hemodynamically stable  Respiratory status: unassisted, room air and spontaneous ventilation  Hydration status: euvolemic  Follow-up not needed.          Vitals Value Taken Time   /80 05/24/23 0926   Temp 36.7 °C (98.1 °F) 05/24/23 0912   Pulse 63 05/24/23 0926   Resp 18 05/24/23 0926   SpO2 95 % 05/24/23 0926         Event Time   Out of Recovery 09:40:03         Pain/Berna Score: Berna Score: 10 (5/24/2023  9:26 AM)

## 2023-05-25 VITALS
HEART RATE: 63 BPM | TEMPERATURE: 98 F | OXYGEN SATURATION: 95 % | BODY MASS INDEX: 27.3 KG/M2 | WEIGHT: 195 LBS | HEIGHT: 71 IN | SYSTOLIC BLOOD PRESSURE: 137 MMHG | RESPIRATION RATE: 18 BRPM | DIASTOLIC BLOOD PRESSURE: 80 MMHG

## 2023-05-30 LAB
FINAL PATHOLOGIC DIAGNOSIS: NORMAL
GROSS: NORMAL
Lab: NORMAL

## 2023-06-01 ENCOUNTER — PATIENT MESSAGE (OUTPATIENT)
Dept: GASTROENTEROLOGY | Facility: CLINIC | Age: 73
End: 2023-06-01
Payer: MEDICARE

## 2023-06-08 ENCOUNTER — OFFICE VISIT (OUTPATIENT)
Dept: UROLOGY | Facility: CLINIC | Age: 73
End: 2023-06-08
Payer: MEDICARE

## 2023-06-08 VITALS
HEIGHT: 71 IN | BODY MASS INDEX: 27.3 KG/M2 | DIASTOLIC BLOOD PRESSURE: 72 MMHG | WEIGHT: 195 LBS | SYSTOLIC BLOOD PRESSURE: 109 MMHG | HEART RATE: 76 BPM

## 2023-06-08 DIAGNOSIS — R39.16 BENIGN PROSTATIC HYPERPLASIA (BPH) WITH STRAINING ON URINATION: Primary | ICD-10-CM

## 2023-06-08 DIAGNOSIS — N52.9 ERECTILE DYSFUNCTION, UNSPECIFIED ERECTILE DYSFUNCTION TYPE: ICD-10-CM

## 2023-06-08 DIAGNOSIS — N40.1 BENIGN PROSTATIC HYPERPLASIA (BPH) WITH STRAINING ON URINATION: Primary | ICD-10-CM

## 2023-06-08 PROCEDURE — 99999 PR PBB SHADOW E&M-EST. PATIENT-LVL V: ICD-10-PCS | Mod: PBBFAC,,, | Performed by: UROLOGY

## 2023-06-08 PROCEDURE — 3044F PR MOST RECENT HEMOGLOBIN A1C LEVEL <7.0%: ICD-10-PCS | Mod: CPTII,S$GLB,, | Performed by: UROLOGY

## 2023-06-08 PROCEDURE — 3074F PR MOST RECENT SYSTOLIC BLOOD PRESSURE < 130 MM HG: ICD-10-PCS | Mod: CPTII,S$GLB,, | Performed by: UROLOGY

## 2023-06-08 PROCEDURE — 1126F AMNT PAIN NOTED NONE PRSNT: CPT | Mod: CPTII,S$GLB,, | Performed by: UROLOGY

## 2023-06-08 PROCEDURE — 4010F PR ACE/ARB THEARPY RXD/TAKEN: ICD-10-PCS | Mod: CPTII,S$GLB,, | Performed by: UROLOGY

## 2023-06-08 PROCEDURE — 1126F PR PAIN SEVERITY QUANTIFIED, NO PAIN PRESENT: ICD-10-PCS | Mod: CPTII,S$GLB,, | Performed by: UROLOGY

## 2023-06-08 PROCEDURE — 3066F NEPHROPATHY DOC TX: CPT | Mod: CPTII,S$GLB,, | Performed by: UROLOGY

## 2023-06-08 PROCEDURE — 1159F PR MEDICATION LIST DOCUMENTED IN MEDICAL RECORD: ICD-10-PCS | Mod: CPTII,S$GLB,, | Performed by: UROLOGY

## 2023-06-08 PROCEDURE — 3061F NEG MICROALBUMINURIA REV: CPT | Mod: CPTII,S$GLB,, | Performed by: UROLOGY

## 2023-06-08 PROCEDURE — 1101F PT FALLS ASSESS-DOCD LE1/YR: CPT | Mod: CPTII,S$GLB,, | Performed by: UROLOGY

## 2023-06-08 PROCEDURE — 3288F PR FALLS RISK ASSESSMENT DOCUMENTED: ICD-10-PCS | Mod: CPTII,S$GLB,, | Performed by: UROLOGY

## 2023-06-08 PROCEDURE — 99999 PR PBB SHADOW E&M-EST. PATIENT-LVL V: CPT | Mod: PBBFAC,,, | Performed by: UROLOGY

## 2023-06-08 PROCEDURE — 3074F SYST BP LT 130 MM HG: CPT | Mod: CPTII,S$GLB,, | Performed by: UROLOGY

## 2023-06-08 PROCEDURE — 3078F PR MOST RECENT DIASTOLIC BLOOD PRESSURE < 80 MM HG: ICD-10-PCS | Mod: CPTII,S$GLB,, | Performed by: UROLOGY

## 2023-06-08 PROCEDURE — 3072F LOW RISK FOR RETINOPATHY: CPT | Mod: CPTII,S$GLB,, | Performed by: UROLOGY

## 2023-06-08 PROCEDURE — 3061F PR NEG MICROALBUMINURIA RESULT DOCUMENTED/REVIEW: ICD-10-PCS | Mod: CPTII,S$GLB,, | Performed by: UROLOGY

## 2023-06-08 PROCEDURE — 3066F PR DOCUMENTATION OF TREATMENT FOR NEPHROPATHY: ICD-10-PCS | Mod: CPTII,S$GLB,, | Performed by: UROLOGY

## 2023-06-08 PROCEDURE — 99214 PR OFFICE/OUTPT VISIT, EST, LEVL IV, 30-39 MIN: ICD-10-PCS | Mod: S$GLB,,, | Performed by: UROLOGY

## 2023-06-08 PROCEDURE — 3008F BODY MASS INDEX DOCD: CPT | Mod: CPTII,S$GLB,, | Performed by: UROLOGY

## 2023-06-08 PROCEDURE — 1101F PR PT FALLS ASSESS DOC 0-1 FALLS W/OUT INJ PAST YR: ICD-10-PCS | Mod: CPTII,S$GLB,, | Performed by: UROLOGY

## 2023-06-08 PROCEDURE — 3288F FALL RISK ASSESSMENT DOCD: CPT | Mod: CPTII,S$GLB,, | Performed by: UROLOGY

## 2023-06-08 PROCEDURE — 1159F MED LIST DOCD IN RCRD: CPT | Mod: CPTII,S$GLB,, | Performed by: UROLOGY

## 2023-06-08 PROCEDURE — 99214 OFFICE O/P EST MOD 30 MIN: CPT | Mod: S$GLB,,, | Performed by: UROLOGY

## 2023-06-08 PROCEDURE — 4010F ACE/ARB THERAPY RXD/TAKEN: CPT | Mod: CPTII,S$GLB,, | Performed by: UROLOGY

## 2023-06-08 PROCEDURE — 3078F DIAST BP <80 MM HG: CPT | Mod: CPTII,S$GLB,, | Performed by: UROLOGY

## 2023-06-08 PROCEDURE — 1160F PR REVIEW ALL MEDS BY PRESCRIBER/CLIN PHARMACIST DOCUMENTED: ICD-10-PCS | Mod: CPTII,S$GLB,, | Performed by: UROLOGY

## 2023-06-08 PROCEDURE — 3044F HG A1C LEVEL LT 7.0%: CPT | Mod: CPTII,S$GLB,, | Performed by: UROLOGY

## 2023-06-08 PROCEDURE — 3008F PR BODY MASS INDEX (BMI) DOCUMENTED: ICD-10-PCS | Mod: CPTII,S$GLB,, | Performed by: UROLOGY

## 2023-06-08 PROCEDURE — 3072F PR LOW RISK FOR RETINOPATHY: ICD-10-PCS | Mod: CPTII,S$GLB,, | Performed by: UROLOGY

## 2023-06-08 PROCEDURE — 1160F RVW MEDS BY RX/DR IN RCRD: CPT | Mod: CPTII,S$GLB,, | Performed by: UROLOGY

## 2023-06-08 RX ORDER — TADALAFIL 20 MG/1
20 TABLET ORAL
Qty: 8 TABLET | Refills: 11 | Status: SHIPPED | OUTPATIENT
Start: 2023-06-08 | End: 2023-10-27

## 2023-06-08 NOTE — PROGRESS NOTES
Chief Complaint:   Encounter Diagnoses   Name Primary?    Benign prostatic hyperplasia (BPH) with straining on urination Yes    Erectile dysfunction, unspecified erectile dysfunction type        HPI:   6/8/23- currently voiding well on dutasteride alone, has considered surgical management.  Viagra 100 milligrams is not sufficient, would like to discuss other options.    69-year-old gentleman who reports due to BPH type symptoms.  Patient on tamsulosin after the last 6 months, with no significant improvement.  This happened approximately 14-15 years ago he was having significant issues which required Florence catheterization.  Outside urologist did a TURP, symptoms seem to improve following that.  Patient's also complains of erectile dysfunction which he takes over-the-counter medications for.  One of his biggest complaints is retrograde ejaculation, which is most likely secondary to the tamsulosin.  Patient states he also has some decreased sensation with erections.  He is a significant diabetic Farideh lost from night, with no significant frequency or urgency issues during the daytime.  He states his biggest complaint is difficulty starting and stopping during his stream.  He has had no other urological history, no family history of urological cancers or stones, except for BPH in his father.  No gross hematuria, he has never been a smoker    Allergies:  Aspirin, Meperidine, and Niacin    Medications:  has a current medication list which includes the following prescription(s): acetaminophen, azelastine, blood sugar diagnostic, ciprofloxacin-dexamethasone 0.3-0.1%, droplet pen needle, dutasteride, famotidine, fenofibrate micronized, fluticasone propionate, gabapentin, hydrocodone-acetaminophen, hydrocortisone, levemir flextouch u100 insulin, latanoprost, levocetirizine, victoza 3-claudine, losartan, lovastatin, metformin, multivitamin, ondansetron, ondansetron, pioglitazone, polyethylene glycol, sildenafil, triamcinolone  acetonide 0.1%, and pantoprazole.    Review of Systems:  General: No fever, chills, fatigability, or weight loss.  Skin: No rashes, itching, or changes in color or texture of skin.  Chest: Denies MEEK, cyanosis, wheezing, cough, and sputum production.  Abdomen: Appetite fine. No weight loss. Denies diarrhea, abdominal pain, hematemesis, or blood in stool.  Musculoskeletal: No joint stiffness or swelling. Denies back pain.  : As above.  All other review of systems negative.    PMH:   has a past medical history of Acid reflux, Angina pectoris, Back pain, BPH (benign prostatic hyperplasia), Cholesteatoma, Degenerative joint disease, Diverticulosis, Erectile dysfunction, History of elevated PSA (02/2014), History of pericarditis, Hypercholesteremia, Hypertension, Iron deficiency anemia, СВЕТЛАНА (obstructive sleep apnea) (05/17/2022), Pacemaker, Renal disorder, Squamous cell cancer of skin of mastoid region of scalp, Type 2 diabetes mellitus, Urinary incontinence, and when he was 7 Yrs old..    PSH:   has a past surgical history that includes Shoulder arthroscopy (Right); Nasal sinus surgery; Cardiac pacemaker placement; Tonsillectomy; Transurethral resection of prostate; Knee cartilage surgery (Bilateral); Tympanoplasty; vesectomy; Total knee arthroplasty (Left, 05/15/2017); Joint replacement; Esophagogastroduodenoscopy (N/A, 9/13/2019); Colonoscopy (N/A, 9/13/2019); Esophagogastroduodenoscopy (N/A, 9/3/2021); Colonoscopy (N/A, 9/22/2022); Tympanoplasty with mastoidectomy (Left, 3/15/2023); Insertion of tympanostomy tube (Right, 3/15/2023); and Esophagogastroduodenoscopy (N/A, 5/24/2023).    FamHx: family history includes Arthritis in his mother; Colon cancer in his paternal grandmother; Diabetes in his maternal grandmother and son; Heart disease in his father; Hypertension in his father and mother; Stroke in his father.    SocHx:  reports that he has never smoked. He has never used smokeless tobacco. He reports current  alcohol use. He reports that he does not use drugs.      Physical Exam:  Vitals:    06/08/23 0912   BP: 109/72   Pulse: 76     General: A&Ox3, no apparent distress, no deformities  Neck: No masses, normal ROM  Lungs: normal inspiration, no use of accessory muscles  Heart: normal pulse, no arrhythmias  Abdomen: Soft, NT, ND, no masses, no hernias, no hepatosplenomegaly  Skin: The skin is warm and dry. No jaundice.  Ext: No c/c/e.    Labs/Studies:   pvr 49 ml 8/21  psa 0.67 8/22  DIANN complicated left renal cyst 6/22    Impression/Plan:       1. BPH- dutasteride alone is sufficient, no change since stopping the tamsulosin.  He has considered surgical management and we discussed this in detail, he did have a TURP remotely.  Would need cystoscopy prior to this, please see below in regards to our discussion today though.  Call if he would like to pursue something prior to the next appointment, otherwise see me in 6 months with a uroflow and postvoid residual.  PSA will be due in 2 months and will schedule as a lab visit.    2. Erectile dysfunction- 100 mg of sildenafil is really not effective, would like to try Cialis 20 milligrams.  Call with any complaints, we have discussed TriMix as another option if this fails.  He will contact me if he would like to attempt a TriMix trial prior to the next appointment.    The patient understands the risks, benefits and alternatives.  These include but are not limited to damage to the surrounding structures including the urethra, prostate, ureteral orifices, bladder and rectum.  Risk of hematuria, regrowth, recurrent obstruction requiring further procedures or medical therapy, infection, further bleeding requiring other procedures, persistent burning.  Risk of perforation requiring an open procedure.  Risk of the possible need for stents or longer term catheterization.  Patient understands a biopsy may diagnose him with prostate cancer during the procedure.  Understanding that  retrograde ejaculation will occur following the procedure.  Risk of heart attack, stroke, death, DVT and PE.  Patient understanding of all of the above has elected to pursue the procedure.

## 2023-06-15 ENCOUNTER — CLINICAL SUPPORT (OUTPATIENT)
Dept: AUDIOLOGY | Facility: CLINIC | Age: 73
End: 2023-06-15
Payer: MEDICARE

## 2023-06-15 ENCOUNTER — OFFICE VISIT (OUTPATIENT)
Dept: GASTROENTEROLOGY | Facility: CLINIC | Age: 73
End: 2023-06-15
Payer: MEDICARE

## 2023-06-15 ENCOUNTER — OFFICE VISIT (OUTPATIENT)
Dept: OTOLARYNGOLOGY | Facility: CLINIC | Age: 73
End: 2023-06-15
Payer: MEDICARE

## 2023-06-15 VITALS
HEART RATE: 68 BPM | WEIGHT: 194 LBS | DIASTOLIC BLOOD PRESSURE: 79 MMHG | OXYGEN SATURATION: 98 % | BODY MASS INDEX: 27.16 KG/M2 | SYSTOLIC BLOOD PRESSURE: 130 MMHG | HEIGHT: 71 IN

## 2023-06-15 VITALS — WEIGHT: 193.81 LBS | BODY MASS INDEX: 27.13 KG/M2 | HEIGHT: 71 IN

## 2023-06-15 DIAGNOSIS — K44.9 HIATAL HERNIA: ICD-10-CM

## 2023-06-15 DIAGNOSIS — H90.A32 MIXED CONDUCTIVE AND SENSORINEURAL HEARING LOSS OF LEFT EAR WITH RESTRICTED HEARING OF RIGHT EAR: ICD-10-CM

## 2023-06-15 DIAGNOSIS — K31.A11 INTESTINAL METAPLASIA OF ANTRUM OF STOMACH WITHOUT DYSPLASIA: ICD-10-CM

## 2023-06-15 DIAGNOSIS — Z98.890 HX OF TYMPANOMASTOIDECTOMY: ICD-10-CM

## 2023-06-15 DIAGNOSIS — K30 INDIGESTION: ICD-10-CM

## 2023-06-15 DIAGNOSIS — H72.01 CENTRAL PERFORATION OF TYMPANIC MEMBRANE OF RIGHT EAR: Primary | ICD-10-CM

## 2023-06-15 DIAGNOSIS — H90.6 HEARING LOSS, MIXED, BILATERAL: Primary | ICD-10-CM

## 2023-06-15 DIAGNOSIS — H71.92 CHOLESTEATOMA OF EAR, LEFT: ICD-10-CM

## 2023-06-15 DIAGNOSIS — R11.0 NAUSEA: ICD-10-CM

## 2023-06-15 DIAGNOSIS — K21.9 GASTROESOPHAGEAL REFLUX DISEASE, UNSPECIFIED WHETHER ESOPHAGITIS PRESENT: Primary | ICD-10-CM

## 2023-06-15 DIAGNOSIS — R10.13 DYSPEPSIA: ICD-10-CM

## 2023-06-15 PROCEDURE — 3066F PR DOCUMENTATION OF TREATMENT FOR NEPHROPATHY: ICD-10-PCS | Mod: CPTII,S$GLB,, | Performed by: INTERNAL MEDICINE

## 2023-06-15 PROCEDURE — 3075F SYST BP GE 130 - 139MM HG: CPT | Mod: CPTII,S$GLB,, | Performed by: INTERNAL MEDICINE

## 2023-06-15 PROCEDURE — 3066F NEPHROPATHY DOC TX: CPT | Mod: CPTII,S$GLB,, | Performed by: OTOLARYNGOLOGY

## 2023-06-15 PROCEDURE — 99214 OFFICE O/P EST MOD 30 MIN: CPT | Mod: S$GLB,,, | Performed by: INTERNAL MEDICINE

## 2023-06-15 PROCEDURE — 1159F MED LIST DOCD IN RCRD: CPT | Mod: CPTII,S$GLB,, | Performed by: OTOLARYNGOLOGY

## 2023-06-15 PROCEDURE — 3008F PR BODY MASS INDEX (BMI) DOCUMENTED: ICD-10-PCS | Mod: CPTII,S$GLB,, | Performed by: INTERNAL MEDICINE

## 2023-06-15 PROCEDURE — 1159F PR MEDICATION LIST DOCUMENTED IN MEDICAL RECORD: ICD-10-PCS | Mod: CPTII,S$GLB,, | Performed by: INTERNAL MEDICINE

## 2023-06-15 PROCEDURE — 1101F PR PT FALLS ASSESS DOC 0-1 FALLS W/OUT INJ PAST YR: ICD-10-PCS | Mod: CPTII,S$GLB,, | Performed by: OTOLARYNGOLOGY

## 2023-06-15 PROCEDURE — 1125F PR PAIN SEVERITY QUANTIFIED, PAIN PRESENT: ICD-10-PCS | Mod: CPTII,S$GLB,, | Performed by: INTERNAL MEDICINE

## 2023-06-15 PROCEDURE — 3288F PR FALLS RISK ASSESSMENT DOCUMENTED: ICD-10-PCS | Mod: CPTII,S$GLB,, | Performed by: OTOLARYNGOLOGY

## 2023-06-15 PROCEDURE — 99999 PR PBB SHADOW E&M-EST. PATIENT-LVL II: ICD-10-PCS | Mod: PBBFAC,,, | Performed by: OTOLARYNGOLOGY

## 2023-06-15 PROCEDURE — 99213 PR OFFICE/OUTPT VISIT, EST, LEVL III, 20-29 MIN: ICD-10-PCS | Mod: S$GLB,,, | Performed by: OTOLARYNGOLOGY

## 2023-06-15 PROCEDURE — 99999 PR PBB SHADOW E&M-EST. PATIENT-LVL V: ICD-10-PCS | Mod: PBBFAC,,, | Performed by: INTERNAL MEDICINE

## 2023-06-15 PROCEDURE — 4010F ACE/ARB THERAPY RXD/TAKEN: CPT | Mod: CPTII,S$GLB,, | Performed by: INTERNAL MEDICINE

## 2023-06-15 PROCEDURE — 1159F MED LIST DOCD IN RCRD: CPT | Mod: CPTII,S$GLB,, | Performed by: INTERNAL MEDICINE

## 2023-06-15 PROCEDURE — 3044F PR MOST RECENT HEMOGLOBIN A1C LEVEL <7.0%: ICD-10-PCS | Mod: CPTII,S$GLB,, | Performed by: INTERNAL MEDICINE

## 2023-06-15 PROCEDURE — 3078F PR MOST RECENT DIASTOLIC BLOOD PRESSURE < 80 MM HG: ICD-10-PCS | Mod: CPTII,S$GLB,, | Performed by: INTERNAL MEDICINE

## 2023-06-15 PROCEDURE — 1159F PR MEDICATION LIST DOCUMENTED IN MEDICAL RECORD: ICD-10-PCS | Mod: CPTII,S$GLB,, | Performed by: OTOLARYNGOLOGY

## 2023-06-15 PROCEDURE — 3008F PR BODY MASS INDEX (BMI) DOCUMENTED: ICD-10-PCS | Mod: CPTII,S$GLB,, | Performed by: OTOLARYNGOLOGY

## 2023-06-15 PROCEDURE — 3061F PR NEG MICROALBUMINURIA RESULT DOCUMENTED/REVIEW: ICD-10-PCS | Mod: CPTII,S$GLB,, | Performed by: OTOLARYNGOLOGY

## 2023-06-15 PROCEDURE — 3061F NEG MICROALBUMINURIA REV: CPT | Mod: CPTII,S$GLB,, | Performed by: INTERNAL MEDICINE

## 2023-06-15 PROCEDURE — 92553 PR AUDIOMETRY, AIR & BONE: ICD-10-PCS | Mod: S$GLB,,, | Performed by: AUDIOLOGIST

## 2023-06-15 PROCEDURE — 1125F AMNT PAIN NOTED PAIN PRSNT: CPT | Mod: CPTII,S$GLB,, | Performed by: INTERNAL MEDICINE

## 2023-06-15 PROCEDURE — 4010F PR ACE/ARB THEARPY RXD/TAKEN: ICD-10-PCS | Mod: CPTII,S$GLB,, | Performed by: INTERNAL MEDICINE

## 2023-06-15 PROCEDURE — 3072F LOW RISK FOR RETINOPATHY: CPT | Mod: CPTII,S$GLB,, | Performed by: INTERNAL MEDICINE

## 2023-06-15 PROCEDURE — 3288F FALL RISK ASSESSMENT DOCD: CPT | Mod: CPTII,S$GLB,, | Performed by: OTOLARYNGOLOGY

## 2023-06-15 PROCEDURE — 3044F HG A1C LEVEL LT 7.0%: CPT | Mod: CPTII,S$GLB,, | Performed by: INTERNAL MEDICINE

## 2023-06-15 PROCEDURE — 3008F BODY MASS INDEX DOCD: CPT | Mod: CPTII,S$GLB,, | Performed by: INTERNAL MEDICINE

## 2023-06-15 PROCEDURE — 3066F NEPHROPATHY DOC TX: CPT | Mod: CPTII,S$GLB,, | Performed by: INTERNAL MEDICINE

## 2023-06-15 PROCEDURE — 3061F PR NEG MICROALBUMINURIA RESULT DOCUMENTED/REVIEW: ICD-10-PCS | Mod: CPTII,S$GLB,, | Performed by: INTERNAL MEDICINE

## 2023-06-15 PROCEDURE — 3008F BODY MASS INDEX DOCD: CPT | Mod: CPTII,S$GLB,, | Performed by: OTOLARYNGOLOGY

## 2023-06-15 PROCEDURE — 3061F NEG MICROALBUMINURIA REV: CPT | Mod: CPTII,S$GLB,, | Performed by: OTOLARYNGOLOGY

## 2023-06-15 PROCEDURE — 3072F LOW RISK FOR RETINOPATHY: CPT | Mod: CPTII,S$GLB,, | Performed by: OTOLARYNGOLOGY

## 2023-06-15 PROCEDURE — 3044F PR MOST RECENT HEMOGLOBIN A1C LEVEL <7.0%: ICD-10-PCS | Mod: CPTII,S$GLB,, | Performed by: OTOLARYNGOLOGY

## 2023-06-15 PROCEDURE — 3072F PR LOW RISK FOR RETINOPATHY: ICD-10-PCS | Mod: CPTII,S$GLB,, | Performed by: OTOLARYNGOLOGY

## 2023-06-15 PROCEDURE — 4010F ACE/ARB THERAPY RXD/TAKEN: CPT | Mod: CPTII,S$GLB,, | Performed by: OTOLARYNGOLOGY

## 2023-06-15 PROCEDURE — 3075F PR MOST RECENT SYSTOLIC BLOOD PRESS GE 130-139MM HG: ICD-10-PCS | Mod: CPTII,S$GLB,, | Performed by: INTERNAL MEDICINE

## 2023-06-15 PROCEDURE — 3066F PR DOCUMENTATION OF TREATMENT FOR NEPHROPATHY: ICD-10-PCS | Mod: CPTII,S$GLB,, | Performed by: OTOLARYNGOLOGY

## 2023-06-15 PROCEDURE — 3078F DIAST BP <80 MM HG: CPT | Mod: CPTII,S$GLB,, | Performed by: INTERNAL MEDICINE

## 2023-06-15 PROCEDURE — 92553 AUDIOMETRY AIR & BONE: CPT | Mod: S$GLB,,, | Performed by: AUDIOLOGIST

## 2023-06-15 PROCEDURE — 99214 PR OFFICE/OUTPT VISIT, EST, LEVL IV, 30-39 MIN: ICD-10-PCS | Mod: S$GLB,,, | Performed by: INTERNAL MEDICINE

## 2023-06-15 PROCEDURE — 3044F HG A1C LEVEL LT 7.0%: CPT | Mod: CPTII,S$GLB,, | Performed by: OTOLARYNGOLOGY

## 2023-06-15 PROCEDURE — 99999 PR PBB SHADOW E&M-EST. PATIENT-LVL V: CPT | Mod: PBBFAC,,, | Performed by: INTERNAL MEDICINE

## 2023-06-15 PROCEDURE — 3072F PR LOW RISK FOR RETINOPATHY: ICD-10-PCS | Mod: CPTII,S$GLB,, | Performed by: INTERNAL MEDICINE

## 2023-06-15 PROCEDURE — 99999 PR PBB SHADOW E&M-EST. PATIENT-LVL II: CPT | Mod: PBBFAC,,, | Performed by: OTOLARYNGOLOGY

## 2023-06-15 PROCEDURE — 99213 OFFICE O/P EST LOW 20 MIN: CPT | Mod: S$GLB,,, | Performed by: OTOLARYNGOLOGY

## 2023-06-15 PROCEDURE — 4010F PR ACE/ARB THEARPY RXD/TAKEN: ICD-10-PCS | Mod: CPTII,S$GLB,, | Performed by: OTOLARYNGOLOGY

## 2023-06-15 PROCEDURE — 1101F PT FALLS ASSESS-DOCD LE1/YR: CPT | Mod: CPTII,S$GLB,, | Performed by: OTOLARYNGOLOGY

## 2023-06-15 RX ORDER — FAMOTIDINE 20 MG/1
20 TABLET, FILM COATED ORAL 2 TIMES DAILY
Qty: 60 TABLET | Refills: 3 | Status: SHIPPED | OUTPATIENT
Start: 2023-06-15 | End: 2023-08-15 | Stop reason: SDUPTHER

## 2023-06-15 NOTE — PROGRESS NOTES
Rigoberto Vasquez was seen 06/15/2023 for an audiological evaluation. Patient seen post ear cleaning and check by Dr. Gudino. C/o right perforation and recent left ME surgery for reoccurrence of cholesteatoma.      Results reveal a moderately severe-to-severe mixed hearing loss 250-8000 Hz for the right ear, and  mild-to-profound mixed hearing loss 250-8000 Hz for the left ear.       Patient was counseled on the above findings.    Recommendations:  Follow-up with ENT, as scheduled.   Repeat audiological evaluation per ENT, or sooner if needed.  Continued binaural amplification, copy of audiogram given to bring to his dispensing audiologist  Wear hearing protection devices when around loud noise.

## 2023-06-15 NOTE — PROGRESS NOTES
[facility]  Gastroenterology    PCP: Jesse Grimes MD    6/15/23    HPI       Follow-up     Additional comments: Pt present today procedure f/u with c/o persistent nausea and indigestion           Last edited by Toni Milligan, MA on 6/15/2023  8:40 AM.        Reason for Visit: F/u Nausea and Indigestion    Subjective:   Rigoberto Vasquez is a 72 y.o. male here for f/u nausea and indigestion. This is his first visit with me but previously seen by GI PA. Was on PPI BID with improvement but symptoms returned when PPI was decreased to once/day. Pepcid 40 mg BID had been added. Had recent EGD 05/2023 which showed hiatal hernia and erosive gastritis. Gastric bx positive for intestinal metaplasia- repeat EGD with gastric mapping in 1 year. Denies FH of gastric cancer. Symptoms slowly improving.       Past Medical History:   Diagnosis Date    Acid reflux     gi [facility]    Angina pectoris     Back pain     BPH (benign prostatic hyperplasia)     blue    Cholesteatoma     Degenerative joint disease     Diverticulosis     Erectile dysfunction     History of elevated PSA 02/2014    normalized, dr dave annually    History of pericarditis     Hypercholesteremia     Hypertension     Iron deficiency anemia     СВЕТЛАНА (obstructive sleep apnea) 05/17/2022    Pacemaker     complete av block;NO afib    Renal disorder     Squamous cell cancer of skin of mastoid region of scalp     dr jaida salgado    Type 2 diabetes mellitus     dr wyman    Urinary incontinence     when he was 7 Yrs old.        Past Surgical History:   Procedure Laterality Date    CARDIAC PACEMAKER PLACEMENT      COLONOSCOPY N/A 9/13/2019    Procedure: COLONOSCOPY;  Surgeon: Kan Ramsey III, MD;  Location: [facility];  Service: Endoscopy;  Laterality: N/A;    COLONOSCOPY N/A 9/22/2022    Procedure: COLONOSCOPY;  Surgeon: Chris Garcia MD;  Location: [facility];  Service: Endoscopy;  Laterality: N/A;    ESOPHAGOGASTRODUODENOSCOPY N/A 9/13/2019  "   Procedure: ESOPHAGOGASTRODUODENOSCOPY (EGD);  Surgeon: Kan Ramsey III, MD;  Location: [facility] ENDO;  Service: Endoscopy;  Laterality: N/A;    ESOPHAGOGASTRODUODENOSCOPY N/A 9/3/2021    Procedure: EGD (ESOPHAGOGASTRODUODENOSCOPY);  Surgeon: Kaylene Pineda MD;  Location: [facility] ENDO;  Service: Endoscopy;  Laterality: N/A;    ESOPHAGOGASTRODUODENOSCOPY N/A 5/24/2023    Procedure: EGD (ESOPHAGOGASTRODUODENOSCOPY);  Surgeon: Cory Bennett MD;  Location: [facility] ENDO;  Service: Endoscopy;  Laterality: N/A;    INSERTION OF TYMPANOSTOMY TUBE Right 3/15/2023    Procedure: INSERTION, TYMPANOSTOMY TUBE;  Surgeon: Jose L Gudino MD;  Location: [facility] OR [address] [place] [address];  Service: ENT;  Laterality: Right;    JOINT REPLACEMENT      KNEE CARTILAGE SURGERY Bilateral     NASAL SINUS SURGERY      SHOULDER ARTHROSCOPY Right     TONSILLECTOMY      TOTAL KNEE ARTHROPLASTY Left 05/15/2017    TRANSURETHRAL RESECTION OF PROSTATE      TYMPANOPLASTY      TYMPANOPLASTY WITH MASTOIDECTOMY Left 3/15/2023    Procedure: TYMPANOPLASTY, WITH MASTOIDECTOMY;  Surgeon: Jose L Gudino MD;  Location: [facility] OR [address] [place] [address];  Service: ENT;  Laterality: Left;    vesectomy         Current Outpatient Medications on File Prior to Visit   Medication Sig Dispense Refill    ACETAMINOPHEN (TYLENOL 8 HOUR ORAL) Take by mouth as needed.      azelastine (ASTELIN) 137 mcg (0.1 %) nasal spray 2 sprays (274 mcg total) by Nasal route 2 (two) times daily. 30 mL 12    blood sugar diagnostic (ACCU-CHEK JAXON) Strp Check BG 2-3 times daily. Accuchek jaxon strips 300 strip 3    ciprofloxacin-dexAMETHasone 0.3-0.1% (CIPRODEX) 0.3-0.1 % DrpS Place 4 drops into the left ear 2 (two) times daily. 7.5 mL 0    DROPLET PEN NEEDLE 31 gauge x 3/16" Ndle USE AS DIRECTED  WITH  INSULIN 200 each 3    dutasteride (AVODART) 0.5 mg capsule Take 1 capsule (0.5 mg total) by mouth once daily. 90 capsule 3    fenofibrate micronized (LOFIBRA) 200 MG Cap Take 1 capsule (200 mg total) by mouth every " evening. 90 capsule 4    fluticasone propionate (FLONASE) 50 mcg/actuation nasal spray 1 spray by Each Nostril route once daily.      gabapentin (CMPD PAIN MANAGEMENT COMPOUND OINTMNENT THREE) Apply bid prn pain 100 g 11    hydrocortisone 2.5 % cream Apply topically 2 (two) times daily. 3.5 g 5    insulin detemir U-100 (LEVEMIR FLEXTOUCH U-100 INSULN) 100 unit/mL (3 mL) InPn pen 16 units in the AM and 8 units in the PM (Patient taking differently: 16 units in the AM and 4 units in the PM) 30 mL 3    latanoprost 0.005 % ophthalmic solution INSTILL 1 DROP INTO BOTH EYES ONE TIME DAILY 7.5 mL 3    levocetirizine (XYZAL) 5 MG tablet Take 5 mg by mouth every evening.      liraglutide 0.6 mg/0.1 mL, 18 mg/3 mL, subq PNIJ (VICTOZA 3-ALESHA) 0.6 mg/0.1 mL (18 mg/3 mL) PnIj pen INJECT 1.8 MG DAILY 27 mL 4    losartan (COZAAR) 25 MG tablet Take 1 tablet (25 mg total) by mouth once daily. 90 tablet 3    lovastatin (MEVACOR) 40 MG tablet Take 1 tablet by mouth Every evening. 90 tablet 0    metFORMIN (GLUCOPHAGE) 1000 MG tablet Take 1 tablet (1,000 mg total) by mouth once daily. 90 tablet 0    multivitamin capsule Take 1 capsule by mouth once daily.      ondansetron (ZOFRAN-ODT) 4 MG TbDL Take 1 tablet (4 mg total) by mouth every 6 (six) hours as needed. 20 tablet 0    ondansetron (ZOFRAN-ODT) 8 MG TbDL Take 1 tablet (8 mg total) by mouth every 6 (six) hours as needed (nausea). 20 tablet 0    pantoprazole (PROTONIX) 40 MG tablet Take 1 tablet (40 mg total) by mouth once daily. 90 tablet 1    pioglitazone (ACTOS) 30 MG tablet Take 1 tablet (30 mg total) by mouth once daily. 90 tablet 4    polyethylene glycol (GLYCOLAX) 17 gram/dose powder Take 17 g by mouth once daily.      sildenafil (REVATIO) 20 mg Tab Take 1 tablet (20 mg total) by mouth daily as needed. Takes prn 90 tablet 11    tadalafiL (CIALIS) 20 MG Tab Take 1 tablet (20 mg total) by mouth every 24 hours as needed. 8 tablet 11    triamcinolone acetonide 0.1% (KENALOG) 0.1 %  "cream AAA bid 454 g 0    [DISCONTINUED] famotidine (PEPCID) 40 MG tablet Take 1 tablet (40 mg total) by mouth 2 (two) times daily. 60 tablet 11    HYDROcodone-acetaminophen (NORCO) 5-325 mg per tablet Take 1 tablet by mouth every 6 (six) hours as needed for Pain. (Patient not taking: Reported on 6/15/2023) 20 tablet 0     No current facility-administered medications on file prior to visit.       Review of patient's allergies indicates:   Allergen Reactions    Aspirin      Stomach pain even with ppi    Meperidine      Other reaction(s): Stomach upset    Niacin      Other reaction(s): hot skin       Social History     Socioeconomic History    Marital status:      Spouse name: SAUL    Number of children: 2   Occupational History    Occupation: retired- [employer] .   Tobacco Use    Smoking status: Never    Smokeless tobacco: Never   Substance and Sexual Activity    Alcohol use: Yes     Comment: " once a month"    Drug use: No    Sexual activity: Yes     Partners: Female   Social History Narrative     . Lives with spouse. Has 2 children. Patient retired from [employer]. His son has type 1 diabetes.       Family History   Problem Relation Age of Onset    Arthritis Mother     Hypertension Mother     Heart disease Father     Hypertension Father     Stroke Father     Diabetes Son     Diabetes Maternal Grandmother     Colon cancer Paternal Grandmother        Review of Systems   Constitutional:  Negative for appetite change, fever and unexpected weight change.   HENT:  Negative for sore throat and trouble swallowing.    Eyes:  Negative for visual disturbance.   Respiratory:  Negative for cough, shortness of breath and wheezing.    Cardiovascular:  Negative for chest pain, palpitations and leg swelling.   Gastrointestinal:  Negative for abdominal pain, blood in stool, constipation, diarrhea, nausea and vomiting.   Genitourinary:  Negative for dysuria.   Musculoskeletal:  Negative for " arthralgias and myalgias.   Skin:  Negative for color change, pallor and rash.   Neurological:  Negative for dizziness and weakness.   Hematological:  Negative for adenopathy.   Psychiatric/Behavioral:  Negative for agitation.        Objective:   Vitals:   Vitals:    06/15/23 0842   BP: 130/79   Pulse: 68       Physical Exam  Vitals reviewed.   Constitutional:       General: He is not in acute distress.     Appearance: He is not diaphoretic.   HENT:      Head: Normocephalic and atraumatic.      Mouth/Throat:      Pharynx: No oropharyngeal exudate.   Eyes:      General: No scleral icterus.        Right eye: No discharge.         Left eye: No discharge.      Conjunctiva/sclera: Conjunctivae normal.      Pupils: Pupils are equal, round, and reactive to light.   Cardiovascular:      Rate and Rhythm: Normal rate and regular rhythm.      Heart sounds: Normal heart sounds. No murmur heard.    No friction rub. No gallop.   Pulmonary:      Effort: Pulmonary effort is normal. No respiratory distress.      Breath sounds: Normal breath sounds. No stridor. No wheezing or rales.   Abdominal:      General: Bowel sounds are normal. There is no distension.      Palpations: Abdomen is soft. There is no mass.      Tenderness: There is no abdominal tenderness. There is no guarding.   Musculoskeletal:         General: Normal range of motion.      Cervical back: Normal range of motion.   Skin:     General: Skin is warm and dry.      Coloration: Skin is not pale.      Findings: No erythema or rash.   Neurological:      Mental Status: He is alert and oriented to person, place, and time.     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  Dispense: 60 tablet; Refill: 3        Kaylene Pineda MD  Gastroenterology

## 2023-06-15 NOTE — PROGRESS NOTES
Rigoberto Vasquez was seen 06/15/2023 for an audiological evaluation. Previous audiological evaluation on 05/04/2023 revealed a moderate-to-severe mixed hearing loss 250-8000 Hz for the right ear, and moderate conductive to profound mixed hearing loss 250-8000 Hz for the left ear. Patient reported some improvement in hearing since last appointment.     Otoscopy revealed non-occluding debris with partial visualization of the tympanic membrane in both ears.     Patient was counseled on the above findings.    Recommendations:  Follow-up with ENT, as scheduled.  Repeat audiological evaluation per ENT, or sooner if needed.

## 2023-06-18 NOTE — PROGRESS NOTES
Subjective:       Patient ID: Rigoberto Vasquez is a 72 y.o. male.    Chief Complaint: Ear Fullness    Ear Fullness   Associated symptoms include ear discharge and hearing loss. Pertinent negatives include no sore throat.      Rigoberto Vasquez is a 72 y.o. male hx of left ear CWU tmastoid in 2015 rand more recently revision AS CWU tmastoid for recurrence and PE tube of right ear on 3/15/2023:    6/15/23: 1mo f/u after paper patch placement for new right sided perforation. Reports hearing not improved, denies otorrhea      Review of Systems   Constitutional:  Negative for chills and fever.   HENT:  Positive for ear discharge and hearing loss. Negative for sore throat and trouble swallowing.    Respiratory:  Negative for apnea and chest tightness.    Cardiovascular:  Negative for chest pain.       Objective:      Physical Exam  Vitals and nursing note reviewed.   Constitutional:       Appearance: Normal appearance.   HENT:      Head: Normocephalic and atraumatic.      Right Ear: There is no impacted cerumen.      Left Ear: There is no impacted cerumen.   Neurological:      Mental Status: He is alert.       Binocular Microscopy  Indications: s/p ear surgery  Details: binocular microscopy used to examine both ears  Findings  AD: posterior based dry perforation.   AS: TM intact well healed, canal with some scarring, still maturing    Data Reviewed:                  Audiogram tracings independently reviewed and discussed with patient shows slight worsening of CHL of left ear and increased ABG on right ear          Assessment:       Problem List Items Addressed This Visit    None            Plan:         Left TM healed   Right side with perforation from PE tube, not healed so far     F/u in 3 mo   Readjust hearing aids for time being   Pt uninterested in tympanoplasty at this time for right ear

## 2023-06-27 ENCOUNTER — HOSPITAL ENCOUNTER (OUTPATIENT)
Dept: RADIOLOGY | Facility: HOSPITAL | Age: 73
Discharge: HOME | End: 2023-06-27
Attending: INTERNAL MEDICINE
Payer: MEDICARE

## 2023-06-27 DIAGNOSIS — N28.1 RENAL CYST: ICD-10-CM

## 2023-06-27 PROCEDURE — 76770 US RETROPERITONEAL COMPLETE: ICD-10-PCS | Mod: 26,,, | Performed by: STUDENT IN AN ORGANIZED HEALTH CARE EDUCATION/TRAINING PROGRAM

## 2023-06-27 PROCEDURE — 76770 US EXAM ABDO BACK WALL COMP: CPT | Mod: 26,,, | Performed by: STUDENT IN AN ORGANIZED HEALTH CARE EDUCATION/TRAINING PROGRAM

## 2023-06-27 PROCEDURE — 76770 US EXAM ABDO BACK WALL COMP: CPT | Mod: TC,PO

## 2023-07-03 ENCOUNTER — TELEPHONE (OUTPATIENT)
Dept: OTOLARYNGOLOGY | Facility: CLINIC | Age: 73
End: 2023-07-03
Payer: MEDICARE

## 2023-07-05 DIAGNOSIS — R10.13 DYSPEPSIA: ICD-10-CM

## 2023-07-05 DIAGNOSIS — R11.0 NAUSEA: ICD-10-CM

## 2023-07-06 RX ORDER — PANTOPRAZOLE SODIUM 40 MG/1
40 TABLET, DELAYED RELEASE ORAL DAILY
Qty: 90 TABLET | Refills: 1 | Status: SHIPPED | OUTPATIENT
Start: 2023-07-06 | End: 2023-12-19 | Stop reason: SDUPTHER

## 2023-07-07 ENCOUNTER — PATIENT MESSAGE (OUTPATIENT)
Dept: INFECTIOUS DISEASES | Facility: CLINIC | Age: 73
End: 2023-07-07
Payer: MEDICARE

## 2023-07-08 ENCOUNTER — PATIENT MESSAGE (OUTPATIENT)
Dept: NEPHROLOGY | Facility: CLINIC | Age: 73
End: 2023-07-08
Payer: MEDICARE

## 2023-07-09 NOTE — TELEPHONE ENCOUNTER
Care Due:                  Date            Visit Type   Department     Provider  --------------------------------------------------------------------------------                                EP -                              PRIMARY      [facility] INTERNAL  Last Visit: 02-      CARE ([facility])   CECE Grimes                              EP -                              PRIMARY      [facility] INTERNAL  Next Visit: 08-      CARE ([facility])   CECE Grimes                                                            Last  Test          Frequency    Reason                     Performed    Due Date  --------------------------------------------------------------------------------    HBA1C.......  6 months...  liraglutide, metFORMIN,    02- 08-                             pioglitazone.............    [employer] Embedded Care Due Messages. Reference number: [id number].   7/09/2023 8:58:13 AM LAYNET

## 2023-07-10 DIAGNOSIS — E11.22 TYPE 2 DIABETES MELLITUS WITH STAGE 3A CHRONIC KIDNEY DISEASE, WITH LONG-TERM CURRENT USE OF INSULIN: ICD-10-CM

## 2023-07-10 DIAGNOSIS — I12.9 PARENCHYMAL RENAL HYPERTENSION, STAGE 1 THROUGH STAGE 4 OR UNSPECIFIED CHRONIC KIDNEY DISEASE: Primary | ICD-10-CM

## 2023-07-10 DIAGNOSIS — N18.31 TYPE 2 DIABETES MELLITUS WITH STAGE 3A CHRONIC KIDNEY DISEASE, WITH LONG-TERM CURRENT USE OF INSULIN: ICD-10-CM

## 2023-07-10 DIAGNOSIS — Z79.4 TYPE 2 DIABETES MELLITUS WITH STAGE 3A CHRONIC KIDNEY DISEASE, WITH LONG-TERM CURRENT USE OF INSULIN: ICD-10-CM

## 2023-07-13 RX ORDER — METFORMIN HYDROCHLORIDE 1000 MG/1
1000 TABLET ORAL DAILY
Qty: 90 TABLET | Refills: 0 | Status: SHIPPED | OUTPATIENT
Start: 2023-07-13 | End: 2023-08-11 | Stop reason: SDUPTHER

## 2023-07-13 NOTE — TELEPHONE ENCOUNTER
No care due was identified.  [facility] Embedded Care Due Messages. Reference number: [id number].   7/13/2023 1:11:47 PM CDT

## 2023-07-13 NOTE — TELEPHONE ENCOUNTER
----- Message from Paty Bernstein sent at 7/13/2023 10:22 AM CDT -----  Contact: [phone]  Patient is requesting a call back regarding a check up on the refill for metformin. Patient put in a request for refill on July 9th and has not gotten a response. Please call him back at [phone]. Thanks

## 2023-07-14 RX ORDER — METFORMIN HYDROCHLORIDE 1000 MG/1
1000 TABLET ORAL DAILY
Qty: 90 TABLET | Refills: 0 | Status: SHIPPED | OUTPATIENT
Start: 2023-07-14 | End: 2023-08-14

## 2023-07-18 ENCOUNTER — TELEPHONE (OUTPATIENT)
Dept: NEPHROLOGY | Facility: CLINIC | Age: 73
End: 2023-07-18
Payer: MEDICARE

## 2023-08-02 ENCOUNTER — OFFICE VISIT (OUTPATIENT)
Dept: OPHTHALMOLOGY | Facility: CLINIC | Age: 73
End: 2023-08-02
Payer: MEDICARE

## 2023-08-02 DIAGNOSIS — H35.341 LAMELLAR MACULAR HOLE OF RIGHT EYE: ICD-10-CM

## 2023-08-02 DIAGNOSIS — D31.31 CHOROIDAL NEVUS OF RIGHT EYE: ICD-10-CM

## 2023-08-02 DIAGNOSIS — E11.9 CONTROLLED TYPE 2 DIABETES MELLITUS WITHOUT COMPLICATION, WITH LONG-TERM CURRENT USE OF INSULIN: ICD-10-CM

## 2023-08-02 DIAGNOSIS — Z79.4 CONTROLLED TYPE 2 DIABETES MELLITUS WITHOUT COMPLICATION, WITH LONG-TERM CURRENT USE OF INSULIN: ICD-10-CM

## 2023-08-02 DIAGNOSIS — H40.013 OPEN ANGLE WITH BORDERLINE FINDINGS AND LOW GLAUCOMA RISK IN BOTH EYES: Primary | ICD-10-CM

## 2023-08-02 DIAGNOSIS — H35.371 EPIRETINAL MEMBRANE (ERM) OF RIGHT EYE: ICD-10-CM

## 2023-08-02 PROCEDURE — 1160F PR REVIEW ALL MEDS BY PRESCRIBER/CLIN PHARMACIST DOCUMENTED: ICD-10-PCS | Mod: HCNC,CPTII,S$GLB, | Performed by: OPHTHALMOLOGY

## 2023-08-02 PROCEDURE — 4010F PR ACE/ARB THEARPY RXD/TAKEN: ICD-10-PCS | Mod: HCNC,CPTII,S$GLB, | Performed by: OPHTHALMOLOGY

## 2023-08-02 PROCEDURE — 1159F MED LIST DOCD IN RCRD: CPT | Mod: HCNC,CPTII,S$GLB, | Performed by: OPHTHALMOLOGY

## 2023-08-02 PROCEDURE — 1159F PR MEDICATION LIST DOCUMENTED IN MEDICAL RECORD: ICD-10-PCS | Mod: HCNC,CPTII,S$GLB, | Performed by: OPHTHALMOLOGY

## 2023-08-02 PROCEDURE — 3066F NEPHROPATHY DOC TX: CPT | Mod: HCNC,CPTII,S$GLB, | Performed by: OPHTHALMOLOGY

## 2023-08-02 PROCEDURE — 3061F PR NEG MICROALBUMINURIA RESULT DOCUMENTED/REVIEW: ICD-10-PCS | Mod: HCNC,CPTII,S$GLB, | Performed by: OPHTHALMOLOGY

## 2023-08-02 PROCEDURE — 92083 HUMPHREY VISUAL FIELD - OU - BOTH EYES: ICD-10-PCS | Mod: HCNC,S$GLB,, | Performed by: OPHTHALMOLOGY

## 2023-08-02 PROCEDURE — 3066F PR DOCUMENTATION OF TREATMENT FOR NEPHROPATHY: ICD-10-PCS | Mod: HCNC,CPTII,S$GLB, | Performed by: OPHTHALMOLOGY

## 2023-08-02 PROCEDURE — 92083 EXTENDED VISUAL FIELD XM: CPT | Mod: HCNC,S$GLB,, | Performed by: OPHTHALMOLOGY

## 2023-08-02 PROCEDURE — 3061F NEG MICROALBUMINURIA REV: CPT | Mod: HCNC,CPTII,S$GLB, | Performed by: OPHTHALMOLOGY

## 2023-08-02 PROCEDURE — 4010F ACE/ARB THERAPY RXD/TAKEN: CPT | Mod: HCNC,CPTII,S$GLB, | Performed by: OPHTHALMOLOGY

## 2023-08-02 PROCEDURE — 92133 CPTRZD OPH DX IMG PST SGM ON: CPT | Mod: HCNC,S$GLB,, | Performed by: OPHTHALMOLOGY

## 2023-08-02 PROCEDURE — 99999 PR PBB SHADOW E&M-EST. PATIENT-LVL I: ICD-10-PCS | Mod: PBBFAC,HCNC,, | Performed by: OPHTHALMOLOGY

## 2023-08-02 PROCEDURE — 1160F RVW MEDS BY RX/DR IN RCRD: CPT | Mod: HCNC,CPTII,S$GLB, | Performed by: OPHTHALMOLOGY

## 2023-08-02 PROCEDURE — 99999 PR PBB SHADOW E&M-EST. PATIENT-LVL I: CPT | Mod: PBBFAC,HCNC,, | Performed by: OPHTHALMOLOGY

## 2023-08-02 PROCEDURE — 92133 POSTERIOR SEGMENT OCT OPTIC NERVE(OCULAR COHERENCE TOMOGRAPHY) - OU - BOTH EYES: ICD-10-PCS | Mod: HCNC,S$GLB,, | Performed by: OPHTHALMOLOGY

## 2023-08-02 PROCEDURE — 3044F PR MOST RECENT HEMOGLOBIN A1C LEVEL <7.0%: ICD-10-PCS | Mod: HCNC,CPTII,S$GLB, | Performed by: OPHTHALMOLOGY

## 2023-08-02 PROCEDURE — 99213 OFFICE O/P EST LOW 20 MIN: CPT | Mod: HCNC,S$GLB,, | Performed by: OPHTHALMOLOGY

## 2023-08-02 PROCEDURE — 99213 PR OFFICE/OUTPT VISIT, EST, LEVL III, 20-29 MIN: ICD-10-PCS | Mod: HCNC,S$GLB,, | Performed by: OPHTHALMOLOGY

## 2023-08-02 PROCEDURE — 3044F HG A1C LEVEL LT 7.0%: CPT | Mod: HCNC,CPTII,S$GLB, | Performed by: OPHTHALMOLOGY

## 2023-08-02 RX ORDER — DORZOLAMIDE HYDROCHLORIDE AND TIMOLOL MALEATE 20; 5 MG/ML; MG/ML
1 SOLUTION/ DROPS OPHTHALMIC 2 TIMES DAILY
Qty: 10 ML | Refills: 5 | Status: SHIPPED | OUTPATIENT
Start: 2023-08-02 | End: 2023-11-30 | Stop reason: SDUPTHER

## 2023-08-02 NOTE — PROGRESS NOTES
HPI     Glaucoma            Comments: Pt reports for 3-4m HVF, GOCT, dil,  Denies any pain or   irritation. Va stable. 100% compliant with gtts.           Comments    1. DM since 1999   NO DBR   2. Mac hole od   3. erm od  4. Choroidal nevus od   5. HIGH MYOPE(-6)   6. SCOAG   iop 25/ 23 ou on po steroid   anamolous myopic disc   ASYMETRIC C/D 0.7 / 0.5    // 641  NO FAMILY HISTORY   HVF 3/22/2022      GCL 31/28 5/3/22      Latanoprost qam OU            Last edited by Alexander Ireen on 8/2/2023  9:35 AM.            Assessment /Plan     For exam results, see Encounter Report.      ICD-10-CM ICD-9-CM    1. Open angle with borderline findings and low glaucoma risk in both eyes  H40.013 365.01 Morales Visual Field - OU - Extended - Both Eyes      Posterior Segment OCT Optic Nerve- Both eyes    IOP doing well, though due to macular hole/ERM OD will switch from Latanoprost to Dorz/Timolol BID          2. Controlled type 2 diabetes mellitus without complication, with long-term current use of insulin  E11.9 250.00 Monitor with DFE    Z79.4 V58.67       3. Lamellar macular hole of right eye  H35.341 362.54 Pt requests to monitor at this time, defers referral back to [facility]      4. Choroidal nevus of right eye  D31.31 224.6 Monitor with DFE      5. Epiretinal membrane (ERM) of right eye  H35.371 362.56 Monitor           Return to clinic 4 months for DFE      Dorz/Timolol BID OU

## 2023-08-07 ENCOUNTER — LAB VISIT (OUTPATIENT)
Dept: LAB | Facility: HOSPITAL | Age: 73
End: 2023-08-07
Attending: INTERNAL MEDICINE
Payer: MEDICARE

## 2023-08-07 DIAGNOSIS — N18.31 TYPE 2 DIABETES MELLITUS WITH STAGE 3A CHRONIC KIDNEY DISEASE, WITH LONG-TERM CURRENT USE OF INSULIN: ICD-10-CM

## 2023-08-07 DIAGNOSIS — E11.22 TYPE 2 DIABETES MELLITUS WITH STAGE 3A CHRONIC KIDNEY DISEASE, WITH LONG-TERM CURRENT USE OF INSULIN: ICD-10-CM

## 2023-08-07 DIAGNOSIS — D50.9 IRON DEFICIENCY ANEMIA, UNSPECIFIED IRON DEFICIENCY ANEMIA TYPE: Chronic | ICD-10-CM

## 2023-08-07 DIAGNOSIS — E78.5 HYPERLIPIDEMIA ASSOCIATED WITH TYPE 2 DIABETES MELLITUS: Chronic | ICD-10-CM

## 2023-08-07 DIAGNOSIS — Z79.4 TYPE 2 DIABETES MELLITUS WITH STAGE 3A CHRONIC KIDNEY DISEASE, WITH LONG-TERM CURRENT USE OF INSULIN: ICD-10-CM

## 2023-08-07 DIAGNOSIS — E11.69 HYPERLIPIDEMIA ASSOCIATED WITH TYPE 2 DIABETES MELLITUS: Chronic | ICD-10-CM

## 2023-08-07 LAB
ALBUMIN SERPL BCP-MCNC: 3.9 G/DL (ref 3.5–5.2)
ALP SERPL-CCNC: 31 U/L (ref 55–135)
ALT SERPL W/O P-5'-P-CCNC: 14 U/L (ref 10–44)
ANION GAP SERPL CALC-SCNC: 7 MMOL/L (ref 8–16)
AST SERPL-CCNC: 26 U/L (ref 10–40)
BASOPHILS # BLD AUTO: 0.04 K/UL (ref 0–0.2)
BASOPHILS NFR BLD: 0.7 % (ref 0–1.9)
BILIRUB SERPL-MCNC: 0.5 MG/DL (ref 0.1–1)
BUN SERPL-MCNC: 21 MG/DL (ref 8–23)
CALCIUM SERPL-MCNC: 9.5 MG/DL (ref 8.7–10.5)
CHLORIDE SERPL-SCNC: 108 MMOL/L (ref 95–110)
CO2 SERPL-SCNC: 25 MMOL/L (ref 23–29)
CREAT SERPL-MCNC: 1.2 MG/DL (ref 0.5–1.4)
DIFFERENTIAL METHOD: ABNORMAL
EOSINOPHIL # BLD AUTO: 0.2 K/UL (ref 0–0.5)
EOSINOPHIL NFR BLD: 4.3 % (ref 0–8)
ERYTHROCYTE [DISTWIDTH] IN BLOOD BY AUTOMATED COUNT: 12.8 % (ref 11.5–14.5)
EST. GFR  (NO RACE VARIABLE): >60 ML/MIN/1.73 M^2
GLUCOSE SERPL-MCNC: 106 MG/DL (ref 70–110)
HCT VFR BLD AUTO: 41.2 % (ref 40–54)
HGB BLD-MCNC: 13.7 G/DL (ref 14–18)
IMM GRANULOCYTES # BLD AUTO: 0.03 K/UL (ref 0–0.04)
IMM GRANULOCYTES NFR BLD AUTO: 0.6 % (ref 0–0.5)
LYMPHOCYTES # BLD AUTO: 1.4 K/UL (ref 1–4.8)
LYMPHOCYTES NFR BLD: 26.2 % (ref 18–48)
MCH RBC QN AUTO: 33 PG (ref 27–31)
MCHC RBC AUTO-ENTMCNC: 33.3 G/DL (ref 32–36)
MCV RBC AUTO: 99 FL (ref 82–98)
MONOCYTES # BLD AUTO: 0.5 K/UL (ref 0.3–1)
MONOCYTES NFR BLD: 8.3 % (ref 4–15)
NEUTROPHILS # BLD AUTO: 3.2 K/UL (ref 1.8–7.7)
NEUTROPHILS NFR BLD: 59.9 % (ref 38–73)
NRBC BLD-RTO: 0 /100 WBC
PLATELET # BLD AUTO: 246 K/UL (ref 150–450)
PMV BLD AUTO: 9.3 FL (ref 9.2–12.9)
POTASSIUM SERPL-SCNC: 4.1 MMOL/L (ref 3.5–5.1)
PROT SERPL-MCNC: 6.5 G/DL (ref 6–8.4)
RBC # BLD AUTO: 4.15 M/UL (ref 4.6–6.2)
SODIUM SERPL-SCNC: 140 MMOL/L (ref 136–145)
WBC # BLD AUTO: 5.41 K/UL (ref 3.9–12.7)

## 2023-08-07 PROCEDURE — 80053 COMPREHEN METABOLIC PANEL: CPT | Mod: HCNC,PO | Performed by: NURSE PRACTITIONER

## 2023-08-07 PROCEDURE — 85025 COMPLETE CBC W/AUTO DIFF WBC: CPT | Mod: HCNC,PO | Performed by: NURSE PRACTITIONER

## 2023-08-07 PROCEDURE — 83036 HEMOGLOBIN GLYCOSYLATED A1C: CPT | Mod: HCNC | Performed by: NURSE PRACTITIONER

## 2023-08-07 PROCEDURE — 80061 LIPID PANEL: CPT | Mod: HCNC | Performed by: NURSE PRACTITIONER

## 2023-08-07 PROCEDURE — 36415 COLL VENOUS BLD VENIPUNCTURE: CPT | Mod: HCNC,PO | Performed by: NURSE PRACTITIONER

## 2023-08-08 LAB
CHOLEST SERPL-MCNC: 112 MG/DL (ref 120–199)
CHOLEST/HDLC SERPL: 2.4 {RATIO} (ref 2–5)
ESTIMATED AVG GLUCOSE: 111 MG/DL (ref 68–131)
HBA1C MFR BLD: 5.5 % (ref 4–5.6)
HDLC SERPL-MCNC: 46 MG/DL (ref 40–75)
HDLC SERPL: 41.1 % (ref 20–50)
LDLC SERPL CALC-MCNC: 55.4 MG/DL (ref 63–159)
NONHDLC SERPL-MCNC: 66 MG/DL
TRIGL SERPL-MCNC: 53 MG/DL (ref 30–150)

## 2023-08-10 ENCOUNTER — PATIENT MESSAGE (OUTPATIENT)
Dept: GASTROENTEROLOGY | Facility: CLINIC | Age: 73
End: 2023-08-10
Payer: MEDICARE

## 2023-08-10 RX ORDER — AZELASTINE 1 MG/ML
2 SPRAY, METERED NASAL 2 TIMES DAILY
Qty: 30 ML | Refills: 12 | Status: SHIPPED | OUTPATIENT
Start: 2023-08-10 | End: 2024-08-09

## 2023-08-11 ENCOUNTER — LAB VISIT (OUTPATIENT)
Dept: LAB | Facility: HOSPITAL | Age: 73
End: 2023-08-11
Attending: UROLOGY
Payer: MEDICARE

## 2023-08-11 ENCOUNTER — OFFICE VISIT (OUTPATIENT)
Dept: NEPHROLOGY | Facility: CLINIC | Age: 73
End: 2023-08-11
Payer: MEDICARE

## 2023-08-11 VITALS
HEART RATE: 60 BPM | SYSTOLIC BLOOD PRESSURE: 114 MMHG | HEIGHT: 71 IN | DIASTOLIC BLOOD PRESSURE: 80 MMHG | WEIGHT: 191.81 LBS | BODY MASS INDEX: 26.85 KG/M2

## 2023-08-11 DIAGNOSIS — N40.1 BENIGN PROSTATIC HYPERPLASIA (BPH) WITH STRAINING ON URINATION: ICD-10-CM

## 2023-08-11 DIAGNOSIS — I10 PRIMARY HYPERTENSION: ICD-10-CM

## 2023-08-11 DIAGNOSIS — N28.1 RENAL CYST: ICD-10-CM

## 2023-08-11 DIAGNOSIS — N18.31 STAGE 3A CHRONIC KIDNEY DISEASE: Primary | ICD-10-CM

## 2023-08-11 DIAGNOSIS — R39.16 BENIGN PROSTATIC HYPERPLASIA (BPH) WITH STRAINING ON URINATION: ICD-10-CM

## 2023-08-11 LAB — COMPLEXED PSA SERPL-MCNC: 0.33 NG/ML (ref 0–4)

## 2023-08-11 PROCEDURE — 3066F PR DOCUMENTATION OF TREATMENT FOR NEPHROPATHY: ICD-10-PCS | Mod: HCNC,CPTII,S$GLB, | Performed by: INTERNAL MEDICINE

## 2023-08-11 PROCEDURE — 3074F SYST BP LT 130 MM HG: CPT | Mod: HCNC,CPTII,S$GLB, | Performed by: INTERNAL MEDICINE

## 2023-08-11 PROCEDURE — 3079F PR MOST RECENT DIASTOLIC BLOOD PRESSURE 80-89 MM HG: ICD-10-PCS | Mod: HCNC,CPTII,S$GLB, | Performed by: INTERNAL MEDICINE

## 2023-08-11 PROCEDURE — 3288F PR FALLS RISK ASSESSMENT DOCUMENTED: ICD-10-PCS | Mod: HCNC,CPTII,S$GLB, | Performed by: INTERNAL MEDICINE

## 2023-08-11 PROCEDURE — 3008F BODY MASS INDEX DOCD: CPT | Mod: HCNC,CPTII,S$GLB, | Performed by: INTERNAL MEDICINE

## 2023-08-11 PROCEDURE — 1160F RVW MEDS BY RX/DR IN RCRD: CPT | Mod: HCNC,CPTII,S$GLB, | Performed by: INTERNAL MEDICINE

## 2023-08-11 PROCEDURE — 4010F PR ACE/ARB THEARPY RXD/TAKEN: ICD-10-PCS | Mod: HCNC,CPTII,S$GLB, | Performed by: INTERNAL MEDICINE

## 2023-08-11 PROCEDURE — 84153 ASSAY OF PSA TOTAL: CPT | Mod: HCNC | Performed by: UROLOGY

## 2023-08-11 PROCEDURE — 3008F PR BODY MASS INDEX (BMI) DOCUMENTED: ICD-10-PCS | Mod: HCNC,CPTII,S$GLB, | Performed by: INTERNAL MEDICINE

## 2023-08-11 PROCEDURE — 1159F PR MEDICATION LIST DOCUMENTED IN MEDICAL RECORD: ICD-10-PCS | Mod: HCNC,CPTII,S$GLB, | Performed by: INTERNAL MEDICINE

## 2023-08-11 PROCEDURE — 1101F PR PT FALLS ASSESS DOC 0-1 FALLS W/OUT INJ PAST YR: ICD-10-PCS | Mod: HCNC,CPTII,S$GLB, | Performed by: INTERNAL MEDICINE

## 2023-08-11 PROCEDURE — 3288F FALL RISK ASSESSMENT DOCD: CPT | Mod: HCNC,CPTII,S$GLB, | Performed by: INTERNAL MEDICINE

## 2023-08-11 PROCEDURE — 3074F PR MOST RECENT SYSTOLIC BLOOD PRESSURE < 130 MM HG: ICD-10-PCS | Mod: HCNC,CPTII,S$GLB, | Performed by: INTERNAL MEDICINE

## 2023-08-11 PROCEDURE — 3044F PR MOST RECENT HEMOGLOBIN A1C LEVEL <7.0%: ICD-10-PCS | Mod: HCNC,CPTII,S$GLB, | Performed by: INTERNAL MEDICINE

## 2023-08-11 PROCEDURE — 3072F PR LOW RISK FOR RETINOPATHY: ICD-10-PCS | Mod: HCNC,CPTII,S$GLB, | Performed by: INTERNAL MEDICINE

## 2023-08-11 PROCEDURE — 1126F AMNT PAIN NOTED NONE PRSNT: CPT | Mod: HCNC,CPTII,S$GLB, | Performed by: INTERNAL MEDICINE

## 2023-08-11 PROCEDURE — 99999 PR PBB SHADOW E&M-EST. PATIENT-LVL IV: ICD-10-PCS | Mod: PBBFAC,HCNC,, | Performed by: INTERNAL MEDICINE

## 2023-08-11 PROCEDURE — 99214 OFFICE O/P EST MOD 30 MIN: CPT | Mod: HCNC,S$GLB,, | Performed by: INTERNAL MEDICINE

## 2023-08-11 PROCEDURE — 3079F DIAST BP 80-89 MM HG: CPT | Mod: HCNC,CPTII,S$GLB, | Performed by: INTERNAL MEDICINE

## 2023-08-11 PROCEDURE — 1126F PR PAIN SEVERITY QUANTIFIED, NO PAIN PRESENT: ICD-10-PCS | Mod: HCNC,CPTII,S$GLB, | Performed by: INTERNAL MEDICINE

## 2023-08-11 PROCEDURE — 1159F MED LIST DOCD IN RCRD: CPT | Mod: HCNC,CPTII,S$GLB, | Performed by: INTERNAL MEDICINE

## 2023-08-11 PROCEDURE — 4010F ACE/ARB THERAPY RXD/TAKEN: CPT | Mod: HCNC,CPTII,S$GLB, | Performed by: INTERNAL MEDICINE

## 2023-08-11 PROCEDURE — 3061F PR NEG MICROALBUMINURIA RESULT DOCUMENTED/REVIEW: ICD-10-PCS | Mod: HCNC,CPTII,S$GLB, | Performed by: INTERNAL MEDICINE

## 2023-08-11 PROCEDURE — 3066F NEPHROPATHY DOC TX: CPT | Mod: HCNC,CPTII,S$GLB, | Performed by: INTERNAL MEDICINE

## 2023-08-11 PROCEDURE — 3072F LOW RISK FOR RETINOPATHY: CPT | Mod: HCNC,CPTII,S$GLB, | Performed by: INTERNAL MEDICINE

## 2023-08-11 PROCEDURE — 99214 PR OFFICE/OUTPT VISIT, EST, LEVL IV, 30-39 MIN: ICD-10-PCS | Mod: HCNC,S$GLB,, | Performed by: INTERNAL MEDICINE

## 2023-08-11 PROCEDURE — 36415 COLL VENOUS BLD VENIPUNCTURE: CPT | Mod: HCNC | Performed by: UROLOGY

## 2023-08-11 PROCEDURE — 99999 PR PBB SHADOW E&M-EST. PATIENT-LVL IV: CPT | Mod: PBBFAC,HCNC,, | Performed by: INTERNAL MEDICINE

## 2023-08-11 PROCEDURE — 3061F NEG MICROALBUMINURIA REV: CPT | Mod: HCNC,CPTII,S$GLB, | Performed by: INTERNAL MEDICINE

## 2023-08-11 PROCEDURE — 3044F HG A1C LEVEL LT 7.0%: CPT | Mod: HCNC,CPTII,S$GLB, | Performed by: INTERNAL MEDICINE

## 2023-08-11 PROCEDURE — 1101F PT FALLS ASSESS-DOCD LE1/YR: CPT | Mod: HCNC,CPTII,S$GLB, | Performed by: INTERNAL MEDICINE

## 2023-08-11 PROCEDURE — 1160F PR REVIEW ALL MEDS BY PRESCRIBER/CLIN PHARMACIST DOCUMENTED: ICD-10-PCS | Mod: HCNC,CPTII,S$GLB, | Performed by: INTERNAL MEDICINE

## 2023-08-11 NOTE — PROGRESS NOTES
"Subjective:       Patient ID: Rigoberto Vasquez is a 73 y.o. male.    Chief Complaint: Chronic Kidney Disease    HPI    He presents to clinic today for routine follow-up.  Since his last office visit he has been doing well and has no specific or new complaints.  His laboratory studies and medications were reviewed.  All Nephrology related questions were answered to his satisfaction.      Review of Systems   Constitutional: Negative.    HENT: Negative.     Respiratory: Negative.     Cardiovascular: Negative.    Gastrointestinal: Negative.    Genitourinary: Negative.    Musculoskeletal: Negative.    Skin: Negative.    Neurological: Negative.        /80   Pulse 60   Ht 5' 11" (1.803 m)   Wt 87 kg (191 lb 12.8 oz)   BMI 26.75 kg/m²     Lab Results   Component Value Date    WBC 5.41 08/07/2023    HGB 13.7 (L) 08/07/2023    HCT 41.2 08/07/2023    MCV 99 (H) 08/07/2023     08/07/2023      BMP  Lab Results   Component Value Date     08/07/2023    K 4.1 08/07/2023     08/07/2023    CO2 25 08/07/2023    BUN 21 08/07/2023    CREATININE 1.2 08/07/2023    CALCIUM 9.5 08/07/2023    ANIONGAP 7 (L) 08/07/2023    ESTGFRAFRICA >60.0 06/23/2022    EGFRNONAA 55.3 (A) 06/23/2022     CMP  Sodium   Date Value Ref Range Status   08/07/2023 140 136 - 145 mmol/L Final     Potassium   Date Value Ref Range Status   08/07/2023 4.1 3.5 - 5.1 mmol/L Final     Chloride   Date Value Ref Range Status   08/07/2023 108 95 - 110 mmol/L Final     CO2   Date Value Ref Range Status   08/07/2023 25 23 - 29 mmol/L Final     Glucose   Date Value Ref Range Status   08/07/2023 106 70 - 110 mg/dL Final     BUN   Date Value Ref Range Status   08/07/2023 21 8 - 23 mg/dL Final     Creatinine   Date Value Ref Range Status   08/07/2023 1.2 0.5 - 1.4 mg/dL Final     Calcium   Date Value Ref Range Status   08/07/2023 9.5 8.7 - 10.5 mg/dL Final     Total Protein   Date Value Ref Range Status   08/07/2023 6.5 6.0 - 8.4 g/dL Final     Albumin "   Date Value Ref Range Status   08/07/2023 3.9 3.5 - 5.2 g/dL Final   05/22/2019 4.2 3.6 - 5.1 g/dL Final     Comment:     **Data from J Clin Invest 1974:53:[id number] and J Clin Endocrinol   Metab 1973 36:[id number]. Men with clinically significant   hypogonadal symptoms and testosterone values repeatedly in the   range of the 200-300 ng/dL or less, may benefit from testosterone  treatment after adequate risk and benefits counseling.  For additional information, please refer to   http://education.TweetUp/faq/ZUT666 (This link is   being provided for informational/ educational purposes only.)  This test was developed and its analytical performance   characteristics have been determined by [employer]  [facility]. It has not been cleared or approved by the [place]  Food and Drug Administration. This assay has been validated   pursuant to the CLIA regulations and is used for clinical   purposes.  @ Test Performed By:  [facility]  Tae Everett M.D., Ph.D.,   [address] [place] [id number]  [facility]  [id number]       Total Bilirubin   Date Value Ref Range Status   08/07/2023 0.5 0.1 - 1.0 mg/dL Final     Comment:     For infants and newborns, interpretation of results should be based  on gestational age, weight and in agreement with clinical  observations.    Premature Infant recommended reference ranges:  Up to 24 hours.............<8.0 mg/dL  Up to 48 hours............<12.0 mg/dL  3-5 days..................<15.0 mg/dL  6-29 days.................<15.0 mg/dL       Alkaline Phosphatase   Date Value Ref Range Status   08/07/2023 31 (L) 55 - 135 U/L Final     AST   Date Value Ref Range Status   08/07/2023 26 10 - 40 U/L Final     ALT   Date Value Ref Range Status   08/07/2023 14 10 - 44 U/L Final     Anion Gap   Date Value Ref Range Status   08/07/2023 7 (L) 8 - 16 mmol/L Final     eGFR if    Date Value Ref Range Status  "  06/23/2022 >60.0 >60 mL/min/1.73 m^2 Final     eGFR if non    Date Value Ref Range Status   06/23/2022 55.3 (A) >60 mL/min/1.73 m^2 Final     Comment:     Calculation used to obtain the estimated glomerular filtration  rate (eGFR) is the CKD-EPI equation.        Current Outpatient Medications on File Prior to Visit   Medication Sig Dispense Refill    ACETAMINOPHEN (TYLENOL 8 HOUR ORAL) Take by mouth as needed.      azelastine (ASTELIN) 137 mcg (0.1 %) nasal spray 2 sprays (274 mcg total) by Nasal route 2 (two) times daily. 30 mL 12    blood sugar diagnostic (ACCU-CHEK JAXON) Strp Check BG 2-3 times daily. Accuchek jaxon strips 300 strip 3    ciprofloxacin-dexAMETHasone 0.3-0.1% (CIPRODEX) 0.3-0.1 % DrpS Place 4 drops into the left ear 2 (two) times daily. 7.5 mL 0    dorzolamide-timolol 2-0.5% (COSOPT) 22.3-6.8 mg/mL ophthalmic solution Place 1 drop into both eyes 2 (two) times daily. 10 mL 5    DROPLET PEN NEEDLE 31 gauge x 3/16" Ndle USE AS DIRECTED  WITH  INSULIN 200 each 3    dutasteride (AVODART) 0.5 mg capsule Take 1 capsule (0.5 mg total) by mouth once daily. 90 capsule 3    famotidine (PEPCID) 20 MG tablet Take 1 tablet (20 mg total) by mouth 2 (two) times daily. 60 tablet 3    fenofibrate micronized (LOFIBRA) 200 MG Cap Take 1 capsule (200 mg total) by mouth every evening. 90 capsule 4    fluticasone propionate (FLONASE) 50 mcg/actuation nasal spray 1 spray by Each Nostril route once daily.      gabapentin (CMPD PAIN MANAGEMENT COMPOUND OINTMNENT THREE) Apply bid prn pain 100 g 11    hydrocortisone 2.5 % cream Apply topically 2 (two) times daily. 3.5 g 5    insulin detemir U-100 (LEVEMIR FLEXTOUCH U-100 INSULN) 100 unit/mL (3 mL) InPn pen 16 units in the AM and 8 units in the PM (Patient taking differently: 16 units in the AM and 4 units in the PM) 30 mL 3    latanoprost 0.005 % ophthalmic solution INSTILL 1 DROP INTO BOTH EYES ONE TIME DAILY 7.5 mL 3    levocetirizine (XYZAL) 5 MG tablet " Take 5 mg by mouth every evening.      liraglutide 0.6 mg/0.1 mL, 18 mg/3 mL, subq PNIJ (VICTOZA 3-ALESHA) 0.6 mg/0.1 mL (18 mg/3 mL) PnIj pen INJECT 1.8 MG DAILY 27 mL 4    losartan (COZAAR) 25 MG tablet Take 1 tablet (25 mg total) by mouth once daily. 90 tablet 3    lovastatin (MEVACOR) 40 MG tablet Take 1 tablet by mouth Every evening. 90 tablet 0    metFORMIN (GLUCOPHAGE) 1000 MG tablet Take 1 tablet (1,000 mg total) by mouth once daily. 90 tablet 0    multivitamin capsule Take 1 capsule by mouth once daily.      pantoprazole (PROTONIX) 40 MG tablet Take 1 tablet (40 mg total) by mouth once daily. 90 tablet 1    pioglitazone (ACTOS) 30 MG tablet Take 1 tablet (30 mg total) by mouth once daily. 90 tablet 4    polyethylene glycol (GLYCOLAX) 17 gram/dose powder Take 17 g by mouth once daily.      sildenafil (REVATIO) 20 mg Tab Take 1 tablet (20 mg total) by mouth daily as needed. Takes prn 90 tablet 11    tadalafiL (CIALIS) 20 MG Tab Take 1 tablet (20 mg total) by mouth every 24 hours as needed. 8 tablet 11    triamcinolone acetonide 0.1% (KENALOG) 0.1 % cream AAA bid 454 g 0    [DISCONTINUED] HYDROcodone-acetaminophen (NORCO) 5-325 mg per tablet Take 1 tablet by mouth every 6 (six) hours as needed for Pain. (Patient not taking: Reported on 8/11/2023) 20 tablet 0    [DISCONTINUED] metFORMIN (GLUCOPHAGE) 1000 MG tablet Take 1 tablet (1,000 mg total) by mouth once daily. (Patient not taking: Reported on 8/11/2023) 90 tablet 0    [DISCONTINUED] ondansetron (ZOFRAN-ODT) 4 MG TbDL Take 1 tablet (4 mg total) by mouth every 6 (six) hours as needed. (Patient not taking: Reported on 8/11/2023) 20 tablet 0    [DISCONTINUED] ondansetron (ZOFRAN-ODT) 8 MG TbDL Take 1 tablet (8 mg total) by mouth every 6 (six) hours as needed (nausea). (Patient not taking: Reported on 8/2/2023) 20 tablet 0     No current facility-administered medications on file prior to visit.            Objective:            Physical Exam  Constitutional:        Appearance: Normal appearance.   HENT:      Head: Normocephalic and atraumatic.   Eyes:      General: No scleral icterus.     Extraocular Movements: Extraocular movements intact.      Pupils: Pupils are equal, round, and reactive to light.   Pulmonary:      Effort: Pulmonary effort is normal.      Breath sounds: No stridor.   Musculoskeletal:      Right lower leg: No edema.      Left lower leg: No edema.   Skin:     General: Skin is warm and dry.   Neurological:      General: No focal deficit present.      Mental Status: He is alert and oriented to person, place, and time.   Psychiatric:         Mood and Affect: Mood normal.         Behavior: Behavior normal.       Assessment:       1. Stage 3a chronic kidney disease    2. Primary hypertension    3. Renal cyst        Plan:       1. Creatinine has remained stable ranging between about 1.2 and 1.3 for the past several years.  Most recent creatinine is 1.2.  EGFR is normal for his age.  There is no evidence of proteinuria or microalbuminuria.  He is on a RAAS inhibitor.    2. Blood pressure is well controlled on current regimen.    3. He has mildly complex cyst on his left kidney.  Was last imaged little over year ago.  Will plan to set up repeat ultrasound.        Yoni Haile MD

## 2023-08-14 ENCOUNTER — OFFICE VISIT (OUTPATIENT)
Dept: INTERNAL MEDICINE | Facility: CLINIC | Age: 73
End: 2023-08-14
Payer: MEDICARE

## 2023-08-14 ENCOUNTER — PATIENT MESSAGE (OUTPATIENT)
Dept: NEPHROLOGY | Facility: CLINIC | Age: 73
End: 2023-08-14
Payer: MEDICARE

## 2023-08-14 VITALS
WEIGHT: 192.44 LBS | OXYGEN SATURATION: 97 % | BODY MASS INDEX: 26.94 KG/M2 | TEMPERATURE: 99 F | HEART RATE: 74 BPM | DIASTOLIC BLOOD PRESSURE: 70 MMHG | SYSTOLIC BLOOD PRESSURE: 120 MMHG | HEIGHT: 71 IN

## 2023-08-14 DIAGNOSIS — N18.31 TYPE 2 DIABETES MELLITUS WITH STAGE 3A CHRONIC KIDNEY DISEASE, WITH LONG-TERM CURRENT USE OF INSULIN: Primary | ICD-10-CM

## 2023-08-14 DIAGNOSIS — E11.59 HYPERTENSION ASSOCIATED WITH DIABETES: ICD-10-CM

## 2023-08-14 DIAGNOSIS — I15.2 HYPERTENSION ASSOCIATED WITH DIABETES: ICD-10-CM

## 2023-08-14 DIAGNOSIS — Z79.4 TYPE 2 DIABETES MELLITUS WITH STAGE 3A CHRONIC KIDNEY DISEASE, WITH LONG-TERM CURRENT USE OF INSULIN: Primary | ICD-10-CM

## 2023-08-14 DIAGNOSIS — E11.22 TYPE 2 DIABETES MELLITUS WITH STAGE 3A CHRONIC KIDNEY DISEASE, WITH LONG-TERM CURRENT USE OF INSULIN: Primary | ICD-10-CM

## 2023-08-14 DIAGNOSIS — I70.0 CALCIFICATION OF AORTA: ICD-10-CM

## 2023-08-14 DIAGNOSIS — Z95.0 PACEMAKER: ICD-10-CM

## 2023-08-14 PROCEDURE — 3061F NEG MICROALBUMINURIA REV: CPT | Mod: HCNC,CPTII,S$GLB, | Performed by: FAMILY MEDICINE

## 2023-08-14 PROCEDURE — 3078F DIAST BP <80 MM HG: CPT | Mod: HCNC,CPTII,S$GLB, | Performed by: FAMILY MEDICINE

## 2023-08-14 PROCEDURE — 3066F NEPHROPATHY DOC TX: CPT | Mod: HCNC,CPTII,S$GLB, | Performed by: FAMILY MEDICINE

## 2023-08-14 PROCEDURE — 4010F PR ACE/ARB THEARPY RXD/TAKEN: ICD-10-PCS | Mod: HCNC,CPTII,S$GLB, | Performed by: FAMILY MEDICINE

## 2023-08-14 PROCEDURE — 99999 PR PBB SHADOW E&M-EST. PATIENT-LVL V: CPT | Mod: PBBFAC,HCNC,, | Performed by: FAMILY MEDICINE

## 2023-08-14 PROCEDURE — 1159F PR MEDICATION LIST DOCUMENTED IN MEDICAL RECORD: ICD-10-PCS | Mod: HCNC,CPTII,S$GLB, | Performed by: FAMILY MEDICINE

## 2023-08-14 PROCEDURE — 99214 PR OFFICE/OUTPT VISIT, EST, LEVL IV, 30-39 MIN: ICD-10-PCS | Mod: HCNC,S$GLB,, | Performed by: FAMILY MEDICINE

## 2023-08-14 PROCEDURE — 3072F LOW RISK FOR RETINOPATHY: CPT | Mod: HCNC,CPTII,S$GLB, | Performed by: FAMILY MEDICINE

## 2023-08-14 PROCEDURE — 3074F PR MOST RECENT SYSTOLIC BLOOD PRESSURE < 130 MM HG: ICD-10-PCS | Mod: HCNC,CPTII,S$GLB, | Performed by: FAMILY MEDICINE

## 2023-08-14 PROCEDURE — 3074F SYST BP LT 130 MM HG: CPT | Mod: HCNC,CPTII,S$GLB, | Performed by: FAMILY MEDICINE

## 2023-08-14 PROCEDURE — 3044F PR MOST RECENT HEMOGLOBIN A1C LEVEL <7.0%: ICD-10-PCS | Mod: HCNC,CPTII,S$GLB, | Performed by: FAMILY MEDICINE

## 2023-08-14 PROCEDURE — 3288F PR FALLS RISK ASSESSMENT DOCUMENTED: ICD-10-PCS | Mod: HCNC,CPTII,S$GLB, | Performed by: FAMILY MEDICINE

## 2023-08-14 PROCEDURE — 4010F ACE/ARB THERAPY RXD/TAKEN: CPT | Mod: HCNC,CPTII,S$GLB, | Performed by: FAMILY MEDICINE

## 2023-08-14 PROCEDURE — 3061F PR NEG MICROALBUMINURIA RESULT DOCUMENTED/REVIEW: ICD-10-PCS | Mod: HCNC,CPTII,S$GLB, | Performed by: FAMILY MEDICINE

## 2023-08-14 PROCEDURE — 1126F AMNT PAIN NOTED NONE PRSNT: CPT | Mod: HCNC,CPTII,S$GLB, | Performed by: FAMILY MEDICINE

## 2023-08-14 PROCEDURE — 1126F PR PAIN SEVERITY QUANTIFIED, NO PAIN PRESENT: ICD-10-PCS | Mod: HCNC,CPTII,S$GLB, | Performed by: FAMILY MEDICINE

## 2023-08-14 PROCEDURE — 1101F PT FALLS ASSESS-DOCD LE1/YR: CPT | Mod: HCNC,CPTII,S$GLB, | Performed by: FAMILY MEDICINE

## 2023-08-14 PROCEDURE — 3078F PR MOST RECENT DIASTOLIC BLOOD PRESSURE < 80 MM HG: ICD-10-PCS | Mod: HCNC,CPTII,S$GLB, | Performed by: FAMILY MEDICINE

## 2023-08-14 PROCEDURE — 3008F PR BODY MASS INDEX (BMI) DOCUMENTED: ICD-10-PCS | Mod: HCNC,CPTII,S$GLB, | Performed by: FAMILY MEDICINE

## 2023-08-14 PROCEDURE — 3072F PR LOW RISK FOR RETINOPATHY: ICD-10-PCS | Mod: HCNC,CPTII,S$GLB, | Performed by: FAMILY MEDICINE

## 2023-08-14 PROCEDURE — 3008F BODY MASS INDEX DOCD: CPT | Mod: HCNC,CPTII,S$GLB, | Performed by: FAMILY MEDICINE

## 2023-08-14 PROCEDURE — 3066F PR DOCUMENTATION OF TREATMENT FOR NEPHROPATHY: ICD-10-PCS | Mod: HCNC,CPTII,S$GLB, | Performed by: FAMILY MEDICINE

## 2023-08-14 PROCEDURE — 1101F PR PT FALLS ASSESS DOC 0-1 FALLS W/OUT INJ PAST YR: ICD-10-PCS | Mod: HCNC,CPTII,S$GLB, | Performed by: FAMILY MEDICINE

## 2023-08-14 PROCEDURE — 3044F HG A1C LEVEL LT 7.0%: CPT | Mod: HCNC,CPTII,S$GLB, | Performed by: FAMILY MEDICINE

## 2023-08-14 PROCEDURE — 3288F FALL RISK ASSESSMENT DOCD: CPT | Mod: HCNC,CPTII,S$GLB, | Performed by: FAMILY MEDICINE

## 2023-08-14 PROCEDURE — 1159F MED LIST DOCD IN RCRD: CPT | Mod: HCNC,CPTII,S$GLB, | Performed by: FAMILY MEDICINE

## 2023-08-14 PROCEDURE — 99999 PR PBB SHADOW E&M-EST. PATIENT-LVL V: ICD-10-PCS | Mod: PBBFAC,HCNC,, | Performed by: FAMILY MEDICINE

## 2023-08-14 PROCEDURE — 99214 OFFICE O/P EST MOD 30 MIN: CPT | Mod: HCNC,S$GLB,, | Performed by: FAMILY MEDICINE

## 2023-08-14 NOTE — PROGRESS NOTES
Subjective:       Patient ID: Rigoberto Vasquez is a . male.    Chief Complaint: Multiple issues see below    HPIType 2 diabetes: a1cnl . elida insul. A;declines digital dm; a1c 5.5 improv w same med with healthier diet/acti;vict 1.8;metform some chr gi side eff      Hypertension: blood pressures w dig. htn 120-130s.   Hypercholesterolemia: controlled. Tolerating medicine.  GERD sympt w/o med. last egd 9/21;asympt. Tolerating medication.     Hx elev psa/bph; utd urol y    Chronic borderline anemia/hct: iron def w/u 2015. He says anemic all his life. egd cscope done. Saw gi again . No video capsule done;egd /cscope 9/19    Cri ;now normal; utd nephrol        Pacemaker /card        Past Medical History:   Diagnosis Date    Acid reflux     gi [facility]    Angina pectoris     Back pain     BPH (benign prostatic hyperplasia)     blue    Cholesteatoma     Degenerative joint disease     Diverticulosis     Erectile dysfunction     History of elevated PSA 02/2014    normalized, dr dave annually    History of pericarditis     Hypercholesteremia     Hypertension     Iron deficiency anemia     СВЕТЛАНА (obstructive sleep apnea) 05/17/2022    Pacemaker     complete av block;NO afib    Renal disorder     Squamous cell cancer of skin of mastoid region of scalp     dr jaida salgado    Type 2 diabetes mellitus     dr wyman    Urinary incontinence     when he was 7 Yrs old.      Past Surgical History:   Procedure Laterality Date    CARDIAC PACEMAKER PLACEMENT      COLONOSCOPY N/A 9/13/2019    Procedure: COLONOSCOPY;  Surgeon: Kan Ramsey III, MD;  Location: [facility];  Service: Endoscopy;  Laterality: N/A;    COLONOSCOPY N/A 9/22/2022    Procedure: COLONOSCOPY;  Surgeon: Chris Garcia MD;  Location: [facility];  Service: Endoscopy;  Laterality: N/A;    ESOPHAGOGASTRODUODENOSCOPY N/A 9/13/2019    Procedure: ESOPHAGOGASTRODUODENOSCOPY (EGD);  Surgeon: Kan Ramsey III, MD;  Location: [facility];  Service: Endoscopy;  Laterality:  "N/A;    ESOPHAGOGASTRODUODENOSCOPY N/A 9/3/2021    Procedure: EGD (ESOPHAGOGASTRODUODENOSCOPY);  Surgeon: Kaylene Pineda MD;  Location: [facility];  Service: Endoscopy;  Laterality: N/A;    ESOPHAGOGASTRODUODENOSCOPY N/A 5/24/2023    Procedure: EGD (ESOPHAGOGASTRODUODENOSCOPY);  Surgeon: Cory Bennett MD;  Location: [facility];  Service: Endoscopy;  Laterality: N/A;    INSERTION OF TYMPANOSTOMY TUBE Right 3/15/2023    Procedure: INSERTION, TYMPANOSTOMY TUBE;  Surgeon: Jose L Gudino MD;  [facility];  Service: ENT;  Laterality: Right;    JOINT REPLACEMENT      KNEE CARTILAGE SURGERY Bilateral     NASAL SINUS SURGERY      SHOULDER ARTHROSCOPY Right     TONSILLECTOMY      TOTAL KNEE ARTHROPLASTY Left 05/15/2017    TRANSURETHRAL RESECTION OF PROSTATE      TYMPANOPLASTY      TYMPANOPLASTY WITH MASTOIDECTOMY Left 3/15/2023    Procedure: TYMPANOPLASTY, WITH MASTOIDECTOMY;  Surgeon: Jose L Gudino MD;  [facility];  Service: ENT;  Laterality: Left;    vesectomy       Family History   Problem Relation Age of Onset    Arthritis Mother     Hypertension Mother     Heart disease Father     Hypertension Father     Stroke Father     Diabetes Son     Diabetes Maternal Grandmother     Colon cancer Paternal Grandmother      Social History     Socioeconomic History    Marital status:      Spouse name: SAUL    Number of children: 2   Occupational History    Occupation: retired- [employer] .   Tobacco Use    Smoking status: Never    Smokeless tobacco: Never   Substance and Sexual Activity    Alcohol use: Yes     Comment: " once a month"    Drug use: No    Sexual activity: Yes     Partners: Female   Social History Narrative     . Lives with spouse. Has 2 children. Patient retired from [employer]. His son has type 1 diabetes.     Social Determinants of Health     Financial Resource Strain: Low Risk  (11/11/2021)    Overall Financial Resource Strain (CARDIA)     Difficulty " of Paying Living Expenses: Not hard at all   Food Insecurity: No Food Insecurity (11/11/2021)    Hunger Vital Sign     Worried About Running Out of Food in the Last Year: Never true     Ran Out of Food in the Last Year: Never true   Transportation Needs: No Transportation Needs (11/11/2021)    PRAPARE - Transportation     Lack of Transportation (Medical): No     Lack of Transportation (Non-Medical): No   Physical Activity: Sufficiently Active (11/11/2021)    Exercise Vital Sign     Days of Exercise per Week: 5 days     Minutes of Exercise per Session: 60 min   Stress: No Stress Concern Present (11/11/2021)    [employer] - Occupational Stress Questionnaire     Feeling of Stress : Not at all   Social Connections: Unknown (11/11/2021)    Social Connection and Isolation Panel [NHANES]     Frequency of Communication with Friends and Family: Three times a week   Housing Stability: Low Risk  (11/11/2021)    Housing Stability Vital Sign     Unable to Pay for Housing in the Last Year: No     Number of Places Lived in the Last Year: 1     Unstable Housing in the Last Year: No                 Review of Systems   Respiratory: Negative for shortness of breath.    Cardiovascular: Negative for chest pain and leg swelling.       Objective:      Physical Exam   Constitutional: He is oriented to person, place, and time. He appears well-developed and well-nourished.   HENT:   Head: Normocephalic and atraumatic.   Eyes: Conjunctivae normal and EOM are normal. Pupils are equal, round, and reactive to light.   Neck: Normal range of motion. Neck supple. Carotid bruit is not present.   Cardiovascular: Normal rate and regular rhythm.    Pulmonary/Chest: Effort normal and breath sounds normal.   Abd+bssoftndnt no hsm no m ass  Musculoskeletal: He exhibits no edema.   Neurological: He is alert and oriented to person, place, and time.   Skin: Skin is warm and dry.   Psychiatric: He has a normal mood and affect.  His behavior is normal. Judgment and thought content normal.       Assessment:         type 2 dm  htn  gerd  hyperchol  Hx elev psa/bph  Pacemaker hx    Hx iron def anemia  bph  Cri resolved      Plan:    **  F/u card ()and urol when due  Has opth f/u      Lab and follow up after in 6 months [place]    Try d/c metformin and if sugars grad. Inc then either resume metfrm or inc insul

## 2023-08-14 NOTE — Clinical Note
Modesta He gets an exam via dr gallo. Please let me know if dr gallo needs to add anyting in notes to count for dm eye exam. Thanks CB

## 2023-08-15 RX ORDER — FAMOTIDINE 20 MG/1
20 TABLET, FILM COATED ORAL 2 TIMES DAILY
Qty: 60 TABLET | Refills: 3 | Status: SHIPPED | OUTPATIENT
Start: 2023-08-15 | End: 2023-11-08 | Stop reason: SDUPTHER

## 2023-08-23 ENCOUNTER — HOSPITAL ENCOUNTER (OUTPATIENT)
Dept: RADIOLOGY | Facility: HOSPITAL | Age: 73
Discharge: HOME | End: 2023-08-23
Attending: INTERNAL MEDICINE
Payer: MEDICARE

## 2023-08-23 DIAGNOSIS — I10 PRIMARY HYPERTENSION: ICD-10-CM

## 2023-08-23 DIAGNOSIS — N18.31 STAGE 3A CHRONIC KIDNEY DISEASE: ICD-10-CM

## 2023-08-23 DIAGNOSIS — N28.1 RENAL CYST: ICD-10-CM

## 2023-08-23 PROCEDURE — 76770 US EXAM ABDO BACK WALL COMP: CPT | Mod: 26,HCNC,, | Performed by: RADIOLOGY

## 2023-08-23 PROCEDURE — 76770 US EXAM ABDO BACK WALL COMP: CPT | Mod: TC,HCNC,PO

## 2023-08-23 PROCEDURE — 76770 US RETROPERITONEAL COMPLETE: ICD-10-PCS | Mod: 26,HCNC,, | Performed by: RADIOLOGY

## 2023-08-31 ENCOUNTER — OFFICE VISIT (OUTPATIENT)
Dept: SLEEP MEDICINE | Facility: CLINIC | Age: 73
End: 2023-08-31
Payer: MEDICARE

## 2023-08-31 VITALS
HEART RATE: 70 BPM | RESPIRATION RATE: 16 BRPM | DIASTOLIC BLOOD PRESSURE: 78 MMHG | WEIGHT: 190.94 LBS | SYSTOLIC BLOOD PRESSURE: 122 MMHG | OXYGEN SATURATION: 97 % | HEIGHT: 71 IN | BODY MASS INDEX: 26.73 KG/M2

## 2023-08-31 DIAGNOSIS — J84.10 LUNG GRANULOMA: ICD-10-CM

## 2023-08-31 DIAGNOSIS — N18.31 TYPE 2 DIABETES MELLITUS WITH STAGE 3A CHRONIC KIDNEY DISEASE, WITH LONG-TERM CURRENT USE OF INSULIN: ICD-10-CM

## 2023-08-31 DIAGNOSIS — E11.22 TYPE 2 DIABETES MELLITUS WITH STAGE 3A CHRONIC KIDNEY DISEASE, WITH LONG-TERM CURRENT USE OF INSULIN: ICD-10-CM

## 2023-08-31 DIAGNOSIS — I15.2 HYPERTENSION ASSOCIATED WITH DIABETES: Chronic | ICD-10-CM

## 2023-08-31 DIAGNOSIS — E11.59 HYPERTENSION ASSOCIATED WITH DIABETES: Chronic | ICD-10-CM

## 2023-08-31 DIAGNOSIS — G47.33 OSA ON CPAP: Primary | ICD-10-CM

## 2023-08-31 DIAGNOSIS — E11.69 HYPERLIPIDEMIA ASSOCIATED WITH TYPE 2 DIABETES MELLITUS: Chronic | ICD-10-CM

## 2023-08-31 DIAGNOSIS — E78.5 HYPERLIPIDEMIA ASSOCIATED WITH TYPE 2 DIABETES MELLITUS: Chronic | ICD-10-CM

## 2023-08-31 DIAGNOSIS — Z79.4 TYPE 2 DIABETES MELLITUS WITH STAGE 3A CHRONIC KIDNEY DISEASE, WITH LONG-TERM CURRENT USE OF INSULIN: ICD-10-CM

## 2023-08-31 PROBLEM — R06.00 NOCTURNAL DYSPNEA: Status: RESOLVED | Noted: 2022-07-26 | Resolved: 2023-08-31

## 2023-08-31 PROCEDURE — 4010F PR ACE/ARB THEARPY RXD/TAKEN: ICD-10-PCS | Mod: CPTII,S$GLB,, | Performed by: INTERNAL MEDICINE

## 2023-08-31 PROCEDURE — 3288F PR FALLS RISK ASSESSMENT DOCUMENTED: ICD-10-PCS | Mod: CPTII,S$GLB,, | Performed by: INTERNAL MEDICINE

## 2023-08-31 PROCEDURE — 99999 PR PBB SHADOW E&M-EST. PATIENT-LVL V: ICD-10-PCS | Mod: PBBFAC,HCNC,, | Performed by: INTERNAL MEDICINE

## 2023-08-31 PROCEDURE — 3008F BODY MASS INDEX DOCD: CPT | Mod: CPTII,S$GLB,, | Performed by: INTERNAL MEDICINE

## 2023-08-31 PROCEDURE — 1101F PR PT FALLS ASSESS DOC 0-1 FALLS W/OUT INJ PAST YR: ICD-10-PCS | Mod: CPTII,S$GLB,, | Performed by: INTERNAL MEDICINE

## 2023-08-31 PROCEDURE — 3008F PR BODY MASS INDEX (BMI) DOCUMENTED: ICD-10-PCS | Mod: CPTII,S$GLB,, | Performed by: INTERNAL MEDICINE

## 2023-08-31 PROCEDURE — 3074F PR MOST RECENT SYSTOLIC BLOOD PRESSURE < 130 MM HG: ICD-10-PCS | Mod: CPTII,S$GLB,, | Performed by: INTERNAL MEDICINE

## 2023-08-31 PROCEDURE — 99999 PR PBB SHADOW E&M-EST. PATIENT-LVL V: CPT | Mod: PBBFAC,HCNC,, | Performed by: INTERNAL MEDICINE

## 2023-08-31 PROCEDURE — 3044F PR MOST RECENT HEMOGLOBIN A1C LEVEL <7.0%: ICD-10-PCS | Mod: CPTII,S$GLB,, | Performed by: INTERNAL MEDICINE

## 2023-08-31 PROCEDURE — 3288F FALL RISK ASSESSMENT DOCD: CPT | Mod: CPTII,S$GLB,, | Performed by: INTERNAL MEDICINE

## 2023-08-31 PROCEDURE — 4010F ACE/ARB THERAPY RXD/TAKEN: CPT | Mod: CPTII,S$GLB,, | Performed by: INTERNAL MEDICINE

## 2023-08-31 PROCEDURE — 1160F PR REVIEW ALL MEDS BY PRESCRIBER/CLIN PHARMACIST DOCUMENTED: ICD-10-PCS | Mod: CPTII,S$GLB,, | Performed by: INTERNAL MEDICINE

## 2023-08-31 PROCEDURE — 3061F NEG MICROALBUMINURIA REV: CPT | Mod: CPTII,S$GLB,, | Performed by: INTERNAL MEDICINE

## 2023-08-31 PROCEDURE — 3072F LOW RISK FOR RETINOPATHY: CPT | Mod: CPTII,S$GLB,, | Performed by: INTERNAL MEDICINE

## 2023-08-31 PROCEDURE — 99214 OFFICE O/P EST MOD 30 MIN: CPT | Mod: S$GLB,,, | Performed by: INTERNAL MEDICINE

## 2023-08-31 PROCEDURE — 3078F DIAST BP <80 MM HG: CPT | Mod: CPTII,S$GLB,, | Performed by: INTERNAL MEDICINE

## 2023-08-31 PROCEDURE — 3078F PR MOST RECENT DIASTOLIC BLOOD PRESSURE < 80 MM HG: ICD-10-PCS | Mod: CPTII,S$GLB,, | Performed by: INTERNAL MEDICINE

## 2023-08-31 PROCEDURE — 3066F PR DOCUMENTATION OF TREATMENT FOR NEPHROPATHY: ICD-10-PCS | Mod: CPTII,S$GLB,, | Performed by: INTERNAL MEDICINE

## 2023-08-31 PROCEDURE — 1101F PT FALLS ASSESS-DOCD LE1/YR: CPT | Mod: CPTII,S$GLB,, | Performed by: INTERNAL MEDICINE

## 2023-08-31 PROCEDURE — 3061F PR NEG MICROALBUMINURIA RESULT DOCUMENTED/REVIEW: ICD-10-PCS | Mod: CPTII,S$GLB,, | Performed by: INTERNAL MEDICINE

## 2023-08-31 PROCEDURE — 1159F PR MEDICATION LIST DOCUMENTED IN MEDICAL RECORD: ICD-10-PCS | Mod: CPTII,S$GLB,, | Performed by: INTERNAL MEDICINE

## 2023-08-31 PROCEDURE — 3072F PR LOW RISK FOR RETINOPATHY: ICD-10-PCS | Mod: CPTII,S$GLB,, | Performed by: INTERNAL MEDICINE

## 2023-08-31 PROCEDURE — 1159F MED LIST DOCD IN RCRD: CPT | Mod: CPTII,S$GLB,, | Performed by: INTERNAL MEDICINE

## 2023-08-31 PROCEDURE — 1160F RVW MEDS BY RX/DR IN RCRD: CPT | Mod: CPTII,S$GLB,, | Performed by: INTERNAL MEDICINE

## 2023-08-31 PROCEDURE — 99214 PR OFFICE/OUTPT VISIT, EST, LEVL IV, 30-39 MIN: ICD-10-PCS | Mod: S$GLB,,, | Performed by: INTERNAL MEDICINE

## 2023-08-31 PROCEDURE — 3044F HG A1C LEVEL LT 7.0%: CPT | Mod: CPTII,S$GLB,, | Performed by: INTERNAL MEDICINE

## 2023-08-31 PROCEDURE — 3066F NEPHROPATHY DOC TX: CPT | Mod: CPTII,S$GLB,, | Performed by: INTERNAL MEDICINE

## 2023-08-31 PROCEDURE — 3074F SYST BP LT 130 MM HG: CPT | Mod: CPTII,S$GLB,, | Performed by: INTERNAL MEDICINE

## 2023-08-31 NOTE — ASSESSMENT & PLAN NOTE
8/31/2023     8:59 AM   EPWORTH SLEEPINESS SCALE   Sitting and reading 0   Watching TV 0   Sitting, inactive in a public place (e.g. a theatre or a meeting) 0   As a passenger in a car for an hour without a break 1   Lying down to rest in the afternoon when circumstances permit 2   Sitting and talking to someone 0   Sitting quietly after a lunch without alcohol 2   In a car, while stopped for a few minutes in traffic 0   Total score 5     APAP  5-20  07/25/2022 to 11/08/2022  Usage > 4 hrs was 98%  FFM  AHI 0. 3  Using and benefits from PAP

## 2023-08-31 NOTE — PROGRESS NOTES
Subjective:      Patient ID: Rigoberto Vasquez is a 73 y.o. male.    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    Choroidal nevus of right eye    Epiretinal membrane (ERM) of left eye    Overweight (BMI 25.0-29.9)    Macular hole of right eye    Open angle with borderline findings and low glaucoma risk in both eyes    Chronic LUQ pain    Nausea    Ectopic gastric mucosa    Open angle with borderline findings and high glaucoma risk in both eyes    History of skin cancer    History of heart block;AV block, 3rd degree    Benign prostatic hyperplasia (BPH) with straining on urination    Erectile dysfunction    Lung granuloma    Dyspepsia    Calcification of aorta    Cholesteatoma of ear, left    СВЕТЛАНА on CPAP       he has been referred by No ref. provider found for evaluation and management for   Chief Complaint   Patient presents with    Sleep Apnea         Chief Complaint: Sleep Apnea        HPI:  He presents for СВЕТЛАНА with review 2/10/2022 Home Sleep Study mild obstructive sleep apnea AHI 12.1.   Patient symptomatic for obstructive sleep apnea with feeling tired and fatigued and snoring.   Patient reports somewhat restful sleep.  He denies morning headache.   He reports day time napping; duration 1 Hour  He denies recent weight gain.  Cardiovascular risk factors: diabetes, hypertension, hyperlipidemia and coronary artery disease  Bed time is 0930  Wake time is 0600 - 0700  Sleep onset is within 15 - 30 Minutes.  Sleep maintenance difficulties related to frequent night time awakening  Wake after sleep onset occurs two  times a night.  Nocturia occurs two times a night,   Sleep aids : YES , melatonin 1-2 times a week if working on things around his house, he will think about it, most nights able to go to sleep in 15-20 minutes.  Dry mouth :  NO  Sleep walking:  NO  Sleep talking :  NO  Sleep eating: NO  Vivid Dreams :  NO  Cataplexy :  NO       07/26/2022  Last visit 05/17/2022  Using device and benefits  AHI was 0.4  Episodes of noctural dyspnea  Had inhaaler in past, never used for night issues, had daytime fatiigue which resolved  Seen Dr Senior; PPM since 30 years old  Complete heart block  Never smoker  Las 6MWD reveiwed  Last PFT: hyperinflation    11/08/2022  Last seen 07/26/2022  No resp issues  No cough, No SOB  PFT: Normal  Using CPAP Usage > 4 hrs was 100%      08/31/2023  Followup  On RESVENT device  APAP 5-20  FFM  Using and benefits  No respiratory issues  Bed time 9 pm, Wake time 6am  No NAPS  AHI was 0.3      Previous Report Reviewed: lab reports and office notes     Past Medical History: The following portions of the patient's history were reviewed and updated as appropriate:   He  has a past surgical history that includes Shoulder arthroscopy (Right); Nasal sinus surgery; Cardiac pacemaker placement; Tonsillectomy; Transurethral resection of prostate; Knee cartilage surgery (Bilateral); Tympanoplasty; vesectomy; Total knee arthroplasty (Left, 05/15/2017); Joint replacement; Esophagogastroduodenoscopy (N/A, 9/13/2019); Colonoscopy (N/A, 9/13/2019); Esophagogastroduodenoscopy (N/A, 9/3/2021); Colonoscopy (N/A, 9/22/2022); Tympanoplasty with mastoidectomy (Left, 3/15/2023); Insertion of tympanostomy tube (Right, 3/15/2023); and Esophagogastroduodenoscopy (N/A, 5/24/2023).  His family history includes Arthritis in his mother; Colon cancer in his paternal grandmother; Diabetes in his maternal grandmother and son; Heart disease in his father; Hypertension in his father and mother; Stroke in his father.  He  reports that  "he has never smoked. He has never used smokeless tobacco. He reports current alcohol use. He reports that he does not use drugs.  He has a current medication list which includes the following prescription(s): acetaminophen, azelastine, blood sugar diagnostic, ciprofloxacin-dexamethasone 0.3-0.1%, dorzolamide-timolol 2-0.5%, droplet pen needle, dutasteride, famotidine, fenofibrate micronized, fluticasone propionate, gabapentin, hydrocortisone, levemir flextouch u100 insulin, levocetirizine, victoza 3-claudine, losartan, lovastatin, multivitamin, pantoprazole, pioglitazone, polyethylene glycol, sildenafil, tadalafil, triamcinolone acetonide 0.1%, and latanoprost.  He is allergic to aspirin, meperidine, and niacin..    Review of Systems   Constitutional:  Negative for fever, chills, weight loss, weight gain, activity change, appetite change, fatigue and night sweats.   HENT:  Negative for postnasal drip, rhinorrhea, sinus pressure, voice change and congestion.    Eyes:  Negative for redness and itching.   Respiratory:  Negative for apnea, snoring, cough, sputum production, chest tightness, shortness of breath, wheezing, orthopnea, asthma nighttime symptoms, dyspnea on extertion, use of rescue inhaler and somnolence.    Cardiovascular: Negative.  Negative for chest pain, palpitations and leg swelling.   Genitourinary:  Negative for difficulty urinating and hematuria.   Endocrine:  Negative for cold intolerance and heat intolerance.    Musculoskeletal:  Negative for arthralgias, gait problem, joint swelling and myalgias.   Skin: Negative.    Gastrointestinal:  Negative for nausea, vomiting, abdominal pain and acid reflux.   Neurological:  Negative for dizziness, weakness, light-headedness and headaches.   Hematological:  Negative for adenopathy. No excessive bruising.   All other systems reviewed and are negative.     Objective:   /78   Pulse 70   Resp 16   Ht 5' 11" (1.803 m)   Wt 86.6 kg (190 lb 14.7 oz)   SpO2 " 97%   BMI 26.63 kg/m²   Physical Exam  Vitals and nursing note reviewed.   Constitutional:       Appearance: He is well-developed.   HENT:      Head: Normocephalic and atraumatic.   Eyes:      Conjunctiva/sclera: Conjunctivae normal.   Cardiovascular:      Rate and Rhythm: Normal rate and regular rhythm.      Heart sounds: Normal heart sounds.   Pulmonary:      Effort: Pulmonary effort is normal.      Breath sounds: Normal breath sounds.   Abdominal:      General: Bowel sounds are normal.      Palpations: Abdomen is soft.   Musculoskeletal:         General: Normal range of motion.      Cervical back: Normal range of motion and neck supple.   Skin:     General: Skin is warm and dry.   Neurological:      Mental Status: He is alert and oriented to person, place, and time.      Deep Tendon Reflexes: Reflexes are normal and symmetric.   Psychiatric:         Behavior: Behavior normal.         Thought Content: Thought content normal.         Judgment: Judgment normal.         Personal Diagnostic Review  Review of labs, xray's, cardiology reports.      DOWNLOAD   DOWNLOAD  06/30/2023 to 08/30/2023  Usage > 4 hrs was 98%  APAP 5-20      Assessment:     1. СВЕТЛАНА on CPAP    2. Hyperlipidemia associated with type 2 diabetes mellitus    3. Hypertension associated with diabetes    4. Lung granuloma    5. Type 2 diabetes mellitus with stage 3a chronic kidney disease, with long-term current use of insulin      Orders Placed This Encounter   Procedures    CPAP/BIPAP SUPPLIES     Order Specific Question:   Length of need (1-99 months):     Answer:   99     Order Specific Question:   Choose ONE mask type and its corresponding cushions and/or pillows:     Answer:    Full Face Mask, 1 per 90 days:  Full Face Cushion, (3 per 90 days)     Order Specific Question:   Choose EITHER Heated or Non-Heated Tubjing     Answer:    Non-Heated Tubing, 1 per 90 days     Order Specific Question:   All other supplies as needed as listed  below:     Answer:    Headgear, 1 per 180 days     Order Specific Question:   All other supplies as needed as listed below:     Answer:    Chin Strap, 1 per 180 days     Order Specific Question:   All other supplies as needed as listed below:     Answer:    Non-Disposable Filter, 1 per 180 days     Order Specific Question:   All other supplies as needed as listed below:     Answer:    Disposable Filter, 6 per 90 days     Order Specific Question:   All other supplies as needed as listed below:     Answer:    Exhalation Port, contact payer for quantity/frequency     Order Specific Question:   All other supplies as needed as listed below:     Answer:    Humidifier Chamber, 1 per 180 days       Plan:   Discussed diagnosis, its evaluation, treatment and usual course. All questions answered.  Problem List Items Addressed This Visit       Hypertension associated with diabetes (Chronic)     Stable COZAAR         Hyperlipidemia associated with type 2 diabetes mellitus (Chronic)     Stable on MEVACOR, LOFIBRA         Type 2 diabetes mellitus with stage 3a chronic kidney disease, with long-term current use of insulin     Stable : insulin detemir, Actos, metformin, VICTOZA         Lung granuloma     Radiological surveilance         СВЕТЛАНА on CPAP - Primary            8/31/2023     8:59 AM   EPWORTH SLEEPINESS SCALE   Sitting and reading 0   Watching TV 0   Sitting, inactive in a public place (e.g. a theatre or a meeting) 0   As a passenger in a car for an hour without a break 1   Lying down to rest in the afternoon when circumstances permit 2   Sitting and talking to someone 0   Sitting quietly after a lunch without alcohol 2   In a car, while stopped for a few minutes in traffic 0   Total score 5   APAP  5-20  07/25/2022 to 11/08/2022  Usage > 4 hrs was 98%  FFM  AHI 0. 3  Using and benefits from PAP         Relevant Orders    CPAP/BIPAP SUPPLIES        Follow up in about 1 year (around 8/31/2024),  or download, supplies.    This note was prepared using voice recognition system and is likely to have sound alike errors that may have been overlooked even after proof reading.  Please call me with any questions    Discussed diagnosis, its evaluation, treatment and usual course. All questions answered.    Thank you for the courtesy of participating in the care of this patient    Luciano Sahu MD

## 2023-09-01 ENCOUNTER — PATIENT MESSAGE (OUTPATIENT)
Dept: NEPHROLOGY | Facility: CLINIC | Age: 73
End: 2023-09-01
Payer: MEDICARE

## 2023-09-05 DIAGNOSIS — N40.1 BENIGN PROSTATIC HYPERPLASIA (BPH) WITH STRAINING ON URINATION: ICD-10-CM

## 2023-09-05 DIAGNOSIS — R39.16 BENIGN PROSTATIC HYPERPLASIA (BPH) WITH STRAINING ON URINATION: ICD-10-CM

## 2023-09-06 RX ORDER — DUTASTERIDE 0.5 MG/1
0.5 CAPSULE, LIQUID FILLED ORAL DAILY
Qty: 90 CAPSULE | Refills: 3 | Status: SHIPPED | OUTPATIENT
Start: 2023-09-06

## 2023-09-07 ENCOUNTER — OFFICE VISIT (OUTPATIENT)
Dept: OTOLARYNGOLOGY | Facility: CLINIC | Age: 73
End: 2023-09-07
Payer: MEDICARE

## 2023-09-07 VITALS — BODY MASS INDEX: 26.63 KG/M2 | WEIGHT: 190.94 LBS

## 2023-09-07 DIAGNOSIS — H71.92 CHOLESTEATOMA OF EAR, LEFT: ICD-10-CM

## 2023-09-07 DIAGNOSIS — H90.A32 MIXED CONDUCTIVE AND SENSORINEURAL HEARING LOSS OF LEFT EAR WITH RESTRICTED HEARING OF RIGHT EAR: ICD-10-CM

## 2023-09-07 DIAGNOSIS — Z98.890 HX OF TYMPANOMASTOIDECTOMY: ICD-10-CM

## 2023-09-07 DIAGNOSIS — H72.01 CENTRAL PERFORATION OF TYMPANIC MEMBRANE OF RIGHT EAR: Primary | ICD-10-CM

## 2023-09-07 PROCEDURE — 3044F PR MOST RECENT HEMOGLOBIN A1C LEVEL <7.0%: ICD-10-PCS | Mod: HCNC,CPTII,S$GLB, | Performed by: OTOLARYNGOLOGY

## 2023-09-07 PROCEDURE — 3044F HG A1C LEVEL LT 7.0%: CPT | Mod: HCNC,CPTII,S$GLB, | Performed by: OTOLARYNGOLOGY

## 2023-09-07 PROCEDURE — 4010F ACE/ARB THERAPY RXD/TAKEN: CPT | Mod: HCNC,CPTII,S$GLB, | Performed by: OTOLARYNGOLOGY

## 2023-09-07 PROCEDURE — 99999 PR PBB SHADOW E&M-EST. PATIENT-LVL II: ICD-10-PCS | Mod: PBBFAC,HCNC,, | Performed by: OTOLARYNGOLOGY

## 2023-09-07 PROCEDURE — 3072F LOW RISK FOR RETINOPATHY: CPT | Mod: HCNC,CPTII,S$GLB, | Performed by: OTOLARYNGOLOGY

## 2023-09-07 PROCEDURE — 4010F PR ACE/ARB THEARPY RXD/TAKEN: ICD-10-PCS | Mod: HCNC,CPTII,S$GLB, | Performed by: OTOLARYNGOLOGY

## 2023-09-07 PROCEDURE — 1101F PT FALLS ASSESS-DOCD LE1/YR: CPT | Mod: HCNC,CPTII,S$GLB, | Performed by: OTOLARYNGOLOGY

## 2023-09-07 PROCEDURE — 3288F PR FALLS RISK ASSESSMENT DOCUMENTED: ICD-10-PCS | Mod: HCNC,CPTII,S$GLB, | Performed by: OTOLARYNGOLOGY

## 2023-09-07 PROCEDURE — 3061F PR NEG MICROALBUMINURIA RESULT DOCUMENTED/REVIEW: ICD-10-PCS | Mod: HCNC,CPTII,S$GLB, | Performed by: OTOLARYNGOLOGY

## 2023-09-07 PROCEDURE — 3066F PR DOCUMENTATION OF TREATMENT FOR NEPHROPATHY: ICD-10-PCS | Mod: HCNC,CPTII,S$GLB, | Performed by: OTOLARYNGOLOGY

## 2023-09-07 PROCEDURE — 99213 OFFICE O/P EST LOW 20 MIN: CPT | Mod: HCNC,S$GLB,, | Performed by: OTOLARYNGOLOGY

## 2023-09-07 PROCEDURE — 99213 PR OFFICE/OUTPT VISIT, EST, LEVL III, 20-29 MIN: ICD-10-PCS | Mod: HCNC,S$GLB,, | Performed by: OTOLARYNGOLOGY

## 2023-09-07 PROCEDURE — 1126F AMNT PAIN NOTED NONE PRSNT: CPT | Mod: HCNC,CPTII,S$GLB, | Performed by: OTOLARYNGOLOGY

## 2023-09-07 PROCEDURE — 3072F PR LOW RISK FOR RETINOPATHY: ICD-10-PCS | Mod: HCNC,CPTII,S$GLB, | Performed by: OTOLARYNGOLOGY

## 2023-09-07 PROCEDURE — 99999 PR PBB SHADOW E&M-EST. PATIENT-LVL II: CPT | Mod: PBBFAC,HCNC,, | Performed by: OTOLARYNGOLOGY

## 2023-09-07 PROCEDURE — 3066F NEPHROPATHY DOC TX: CPT | Mod: HCNC,CPTII,S$GLB, | Performed by: OTOLARYNGOLOGY

## 2023-09-07 PROCEDURE — 3061F NEG MICROALBUMINURIA REV: CPT | Mod: HCNC,CPTII,S$GLB, | Performed by: OTOLARYNGOLOGY

## 2023-09-07 PROCEDURE — 3008F PR BODY MASS INDEX (BMI) DOCUMENTED: ICD-10-PCS | Mod: HCNC,CPTII,S$GLB, | Performed by: OTOLARYNGOLOGY

## 2023-09-07 PROCEDURE — 1126F PR PAIN SEVERITY QUANTIFIED, NO PAIN PRESENT: ICD-10-PCS | Mod: HCNC,CPTII,S$GLB, | Performed by: OTOLARYNGOLOGY

## 2023-09-07 PROCEDURE — 3008F BODY MASS INDEX DOCD: CPT | Mod: HCNC,CPTII,S$GLB, | Performed by: OTOLARYNGOLOGY

## 2023-09-07 PROCEDURE — 3288F FALL RISK ASSESSMENT DOCD: CPT | Mod: HCNC,CPTII,S$GLB, | Performed by: OTOLARYNGOLOGY

## 2023-09-07 PROCEDURE — 1101F PR PT FALLS ASSESS DOC 0-1 FALLS W/OUT INJ PAST YR: ICD-10-PCS | Mod: HCNC,CPTII,S$GLB, | Performed by: OTOLARYNGOLOGY

## 2023-09-08 NOTE — PROGRESS NOTES
Subjective:       Patient ID: Rigoberto Vasquez is a 73 y.o. male.    Chief Complaint: Follow-up (6m follow up on ear , pt states he is still having trouble hearing , no drainage out of left ear but states it feels wet at times )    Ear Fullness   Associated symptoms include ear discharge and hearing loss. Pertinent negatives include no sore throat.   Follow-up  Pertinent negatives include no chest pain, chills, fever or sore throat.        Rigoberto Vasquez is a 73 y.o. male hx of left ear CWU tmastoid in 2015 rand more recently revision AS CWU tmastoid for recurrence and PE tube of right ear on 3/15/2023. Unfortunately developed perf in right ear after tube.  Here for follow up.  No acute changes.       Review of Systems   Constitutional:  Negative for chills and fever.   HENT:  Positive for ear discharge and hearing loss. Negative for sore throat and trouble swallowing.    Respiratory:  Negative for apnea and chest tightness.    Cardiovascular:  Negative for chest pain.         Objective:      Physical Exam  Vitals and nursing note reviewed.   Constitutional:       Appearance: Normal appearance.   HENT:      Head: Normocephalic and atraumatic.      Right Ear: There is no impacted cerumen.      Left Ear: There is no impacted cerumen.   Neurological:      Mental Status: He is alert.         Binocular Microscopy  Indications: s/p ear surgery  Details: binocular microscopy used to examine both ears  Findings  AD: posterior based dry perforation.   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

## 2023-09-26 ENCOUNTER — OFFICE VISIT (OUTPATIENT)
Dept: OPHTHALMOLOGY | Facility: CLINIC | Age: 73
End: 2023-09-26
Payer: MEDICARE

## 2023-09-26 DIAGNOSIS — H04.129 DRY EYE: Primary | ICD-10-CM

## 2023-09-26 DIAGNOSIS — H40.013 OPEN ANGLE WITH BORDERLINE FINDINGS AND LOW GLAUCOMA RISK IN BOTH EYES: ICD-10-CM

## 2023-09-26 PROCEDURE — 99203 PR OFFICE/OUTPT VISIT, NEW, LEVL III, 30-44 MIN: ICD-10-PCS | Mod: HCNC,S$GLB,, | Performed by: OPTOMETRIST

## 2023-09-26 PROCEDURE — 4010F ACE/ARB THERAPY RXD/TAKEN: CPT | Mod: HCNC,CPTII,S$GLB, | Performed by: OPTOMETRIST

## 2023-09-26 PROCEDURE — 99999 PR PBB SHADOW E&M-EST. PATIENT-LVL III: ICD-10-PCS | Mod: PBBFAC,HCNC,, | Performed by: OPTOMETRIST

## 2023-09-26 PROCEDURE — 1159F MED LIST DOCD IN RCRD: CPT | Mod: HCNC,CPTII,S$GLB, | Performed by: OPTOMETRIST

## 2023-09-26 PROCEDURE — 99999 PR PBB SHADOW E&M-EST. PATIENT-LVL III: CPT | Mod: PBBFAC,HCNC,, | Performed by: OPTOMETRIST

## 2023-09-26 PROCEDURE — 3061F PR NEG MICROALBUMINURIA RESULT DOCUMENTED/REVIEW: ICD-10-PCS | Mod: HCNC,CPTII,S$GLB, | Performed by: OPTOMETRIST

## 2023-09-26 PROCEDURE — 4010F PR ACE/ARB THEARPY RXD/TAKEN: ICD-10-PCS | Mod: HCNC,CPTII,S$GLB, | Performed by: OPTOMETRIST

## 2023-09-26 PROCEDURE — 1160F RVW MEDS BY RX/DR IN RCRD: CPT | Mod: HCNC,CPTII,S$GLB, | Performed by: OPTOMETRIST

## 2023-09-26 PROCEDURE — 3044F PR MOST RECENT HEMOGLOBIN A1C LEVEL <7.0%: ICD-10-PCS | Mod: HCNC,CPTII,S$GLB, | Performed by: OPTOMETRIST

## 2023-09-26 PROCEDURE — 99203 OFFICE O/P NEW LOW 30 MIN: CPT | Mod: HCNC,S$GLB,, | Performed by: OPTOMETRIST

## 2023-09-26 PROCEDURE — 1159F PR MEDICATION LIST DOCUMENTED IN MEDICAL RECORD: ICD-10-PCS | Mod: HCNC,CPTII,S$GLB, | Performed by: OPTOMETRIST

## 2023-09-26 PROCEDURE — 3061F NEG MICROALBUMINURIA REV: CPT | Mod: HCNC,CPTII,S$GLB, | Performed by: OPTOMETRIST

## 2023-09-26 PROCEDURE — 3066F NEPHROPATHY DOC TX: CPT | Mod: HCNC,CPTII,S$GLB, | Performed by: OPTOMETRIST

## 2023-09-26 PROCEDURE — 3044F HG A1C LEVEL LT 7.0%: CPT | Mod: HCNC,CPTII,S$GLB, | Performed by: OPTOMETRIST

## 2023-09-26 PROCEDURE — 3066F PR DOCUMENTATION OF TREATMENT FOR NEPHROPATHY: ICD-10-PCS | Mod: HCNC,CPTII,S$GLB, | Performed by: OPTOMETRIST

## 2023-09-26 PROCEDURE — 1160F PR REVIEW ALL MEDS BY PRESCRIBER/CLIN PHARMACIST DOCUMENTED: ICD-10-PCS | Mod: HCNC,CPTII,S$GLB, | Performed by: OPTOMETRIST

## 2023-09-26 NOTE — PROGRESS NOTES
HPI     Eye Problem            Comments: Pain Scale:  0  Onset:   about 4 weeks ago  OD, OS, OU:  OU  Discharge:   no  A.M. Matting:  no  Itch:   just burning when drops are first administered  Redness:   no  Photophobia:   no  Foreign body sensation:   no  Deep pain:   no  Previous occurrence:   no  Drops:   dorzolamide is the new drop   Pt stated after a  few weeks of using new drops the [facility] was not as clear   and felt pressure and tightness OU         Last edited by Anitha Stephens on 9/26/2023  1:04 PM.        OU feels blurry and pressure    Assessment /Plan     For exam results, see Encounter Report.    Dry eye  No sign of infection or allergic response today OD, OS  Dry eye characterized by decreased TBUT OU  Recommended iVizia bid-tid OU      Open angle with borderline findings and low glaucoma risk in both eyes  Stable IOP today OU  Continue Cosopt bid OU      RTC PRN if symptoms worsen or not resolved x 1 week  Otherwise, RTC as scheduled with MGM   Discussed above and all questions were answered.

## 2023-09-28 RX ORDER — LOVASTATIN 40 MG/1
TABLET ORAL
Qty: 90 TABLET | Refills: 3 | Status: SHIPPED | OUTPATIENT
Start: 2023-09-28

## 2023-09-28 NOTE — TELEPHONE ENCOUNTER
No care due was identified.  [facility] Embedded Care Due Messages. Reference number: [id number].   9/28/2023 8:31:33 AM CDT

## 2023-09-28 NOTE — TELEPHONE ENCOUNTER
Refill Decision Note   Rigoberto Pedro  is requesting a refill authorization.  Brief Assessment and Rationale for Refill:  Approve     Medication Therapy Plan:         Comments:     Note composed:3:43 PM 09/28/2023

## 2023-10-24 ENCOUNTER — OFFICE VISIT (OUTPATIENT)
Dept: OPHTHALMOLOGY | Facility: CLINIC | Age: 73
End: 2023-10-24
Payer: MEDICARE

## 2023-10-24 DIAGNOSIS — H02.055 TRICHIASIS OF LEFT LOWER EYELID: Primary | ICD-10-CM

## 2023-10-24 PROCEDURE — 1160F PR REVIEW ALL MEDS BY PRESCRIBER/CLIN PHARMACIST DOCUMENTED: ICD-10-PCS | Mod: HCNC,CPTII,S$GLB, | Performed by: OPTOMETRIST

## 2023-10-24 PROCEDURE — 1159F MED LIST DOCD IN RCRD: CPT | Mod: HCNC,CPTII,S$GLB, | Performed by: OPTOMETRIST

## 2023-10-24 PROCEDURE — 92012 INTRM OPH EXAM EST PATIENT: CPT | Mod: 24,25,HCNC,S$GLB | Performed by: OPTOMETRIST

## 2023-10-24 PROCEDURE — 92012 PR EYE EXAM, EST PATIENT,INTERMED: ICD-10-PCS | Mod: 24,25,HCNC,S$GLB | Performed by: OPTOMETRIST

## 2023-10-24 PROCEDURE — 4010F PR ACE/ARB THEARPY RXD/TAKEN: ICD-10-PCS | Mod: HCNC,CPTII,S$GLB, | Performed by: OPTOMETRIST

## 2023-10-24 PROCEDURE — 3061F PR NEG MICROALBUMINURIA RESULT DOCUMENTED/REVIEW: ICD-10-PCS | Mod: HCNC,CPTII,S$GLB, | Performed by: OPTOMETRIST

## 2023-10-24 PROCEDURE — 3044F HG A1C LEVEL LT 7.0%: CPT | Mod: HCNC,CPTII,S$GLB, | Performed by: OPTOMETRIST

## 2023-10-24 PROCEDURE — 99999 PR PBB SHADOW E&M-EST. PATIENT-LVL III: ICD-10-PCS | Mod: PBBFAC,HCNC,, | Performed by: OPTOMETRIST

## 2023-10-24 PROCEDURE — 1159F PR MEDICATION LIST DOCUMENTED IN MEDICAL RECORD: ICD-10-PCS | Mod: HCNC,CPTII,S$GLB, | Performed by: OPTOMETRIST

## 2023-10-24 PROCEDURE — 99999 PR PBB SHADOW E&M-EST. PATIENT-LVL III: CPT | Mod: PBBFAC,HCNC,, | Performed by: OPTOMETRIST

## 2023-10-24 PROCEDURE — 3066F NEPHROPATHY DOC TX: CPT | Mod: HCNC,CPTII,S$GLB, | Performed by: OPTOMETRIST

## 2023-10-24 PROCEDURE — 67820 REVISE EYELASHES: CPT | Mod: HCNC,LT,S$GLB, | Performed by: OPTOMETRIST

## 2023-10-24 PROCEDURE — 67820 PR REVISE EYELASHES,FORCEPS: ICD-10-PCS | Mod: HCNC,LT,S$GLB, | Performed by: OPTOMETRIST

## 2023-10-24 PROCEDURE — 1160F RVW MEDS BY RX/DR IN RCRD: CPT | Mod: HCNC,CPTII,S$GLB, | Performed by: OPTOMETRIST

## 2023-10-24 PROCEDURE — 3044F PR MOST RECENT HEMOGLOBIN A1C LEVEL <7.0%: ICD-10-PCS | Mod: HCNC,CPTII,S$GLB, | Performed by: OPTOMETRIST

## 2023-10-24 PROCEDURE — 3061F NEG MICROALBUMINURIA REV: CPT | Mod: HCNC,CPTII,S$GLB, | Performed by: OPTOMETRIST

## 2023-10-24 PROCEDURE — 4010F ACE/ARB THERAPY RXD/TAKEN: CPT | Mod: HCNC,CPTII,S$GLB, | Performed by: OPTOMETRIST

## 2023-10-24 PROCEDURE — 3066F PR DOCUMENTATION OF TREATMENT FOR NEPHROPATHY: ICD-10-PCS | Mod: HCNC,CPTII,S$GLB, | Performed by: OPTOMETRIST

## 2023-10-24 NOTE — PROGRESS NOTES
HPI     Eye Problem            Comments: Pt states irritation from eyelash that is ingrown for 1 week.   Pt states he broke the tip of it off bekah it is growing and poking him. Pt   states compliance with Latanoprost qam OU          Last edited by Pratibha Wilkins MA on 10/24/2023  9:32 AM.            Assessment /Plan     For exam results, see Encounter Report.    Trichiasis of left lower eyelid      Procedure: lashes from eyelids were removed with a forceps without any complication.   Patient tolerated procedure well.  FORCEPS EPILATION PROCEDURE:   Pt has symptomatic trichiasis of the OS eyelid(s).   Risk, benefits and alternatives to cilia removal by forceps was reviewed and pt requested that this be performed at this time.   Cilia forceps, under slit lamp imagnification, were used for epilation, removing the following number of cilia:   RUL: 0   RLL: 0   JULIAN: 0   LLL: 1    RTC as scheduled with Dr Reza or RYAN

## 2023-10-27 ENCOUNTER — OFFICE VISIT (OUTPATIENT)
Dept: INTERNAL MEDICINE | Facility: CLINIC | Age: 73
End: 2023-10-27
Payer: MEDICARE

## 2023-10-27 VITALS
SYSTOLIC BLOOD PRESSURE: 124 MMHG | DIASTOLIC BLOOD PRESSURE: 76 MMHG | HEIGHT: 71 IN | OXYGEN SATURATION: 95 % | HEART RATE: 79 BPM | TEMPERATURE: 98 F | WEIGHT: 197.06 LBS | BODY MASS INDEX: 27.59 KG/M2

## 2023-10-27 DIAGNOSIS — R68.84 JAW PAIN: ICD-10-CM

## 2023-10-27 DIAGNOSIS — Z79.4 TYPE 2 DIABETES MELLITUS WITH STAGE 3A CHRONIC KIDNEY DISEASE, WITH LONG-TERM CURRENT USE OF INSULIN: ICD-10-CM

## 2023-10-27 DIAGNOSIS — N18.31 TYPE 2 DIABETES MELLITUS WITH STAGE 3A CHRONIC KIDNEY DISEASE, WITH LONG-TERM CURRENT USE OF INSULIN: ICD-10-CM

## 2023-10-27 DIAGNOSIS — I15.2 HYPERTENSION ASSOCIATED WITH DIABETES: Chronic | ICD-10-CM

## 2023-10-27 DIAGNOSIS — E11.22 TYPE 2 DIABETES MELLITUS WITH STAGE 3A CHRONIC KIDNEY DISEASE, WITH LONG-TERM CURRENT USE OF INSULIN: ICD-10-CM

## 2023-10-27 DIAGNOSIS — E11.59 HYPERTENSION ASSOCIATED WITH DIABETES: Chronic | ICD-10-CM

## 2023-10-27 DIAGNOSIS — R51.9 FACIAL PAIN: Primary | ICD-10-CM

## 2023-10-27 PROCEDURE — 3008F PR BODY MASS INDEX (BMI) DOCUMENTED: ICD-10-PCS | Mod: HCNC,CPTII,S$GLB, | Performed by: NURSE PRACTITIONER

## 2023-10-27 PROCEDURE — 1101F PT FALLS ASSESS-DOCD LE1/YR: CPT | Mod: HCNC,CPTII,S$GLB, | Performed by: NURSE PRACTITIONER

## 2023-10-27 PROCEDURE — 3061F PR NEG MICROALBUMINURIA RESULT DOCUMENTED/REVIEW: ICD-10-PCS | Mod: HCNC,CPTII,S$GLB, | Performed by: NURSE PRACTITIONER

## 2023-10-27 PROCEDURE — 3061F NEG MICROALBUMINURIA REV: CPT | Mod: HCNC,CPTII,S$GLB, | Performed by: NURSE PRACTITIONER

## 2023-10-27 PROCEDURE — 99214 OFFICE O/P EST MOD 30 MIN: CPT | Mod: HCNC,S$GLB,, | Performed by: NURSE PRACTITIONER

## 2023-10-27 PROCEDURE — 3074F SYST BP LT 130 MM HG: CPT | Mod: HCNC,CPTII,S$GLB, | Performed by: NURSE PRACTITIONER

## 2023-10-27 PROCEDURE — 3044F PR MOST RECENT HEMOGLOBIN A1C LEVEL <7.0%: ICD-10-PCS | Mod: HCNC,CPTII,S$GLB, | Performed by: NURSE PRACTITIONER

## 2023-10-27 PROCEDURE — 3078F DIAST BP <80 MM HG: CPT | Mod: HCNC,CPTII,S$GLB, | Performed by: NURSE PRACTITIONER

## 2023-10-27 PROCEDURE — 1160F PR REVIEW ALL MEDS BY PRESCRIBER/CLIN PHARMACIST DOCUMENTED: ICD-10-PCS | Mod: HCNC,CPTII,S$GLB, | Performed by: NURSE PRACTITIONER

## 2023-10-27 PROCEDURE — 4010F PR ACE/ARB THEARPY RXD/TAKEN: ICD-10-PCS | Mod: HCNC,CPTII,S$GLB, | Performed by: NURSE PRACTITIONER

## 2023-10-27 PROCEDURE — 1160F RVW MEDS BY RX/DR IN RCRD: CPT | Mod: HCNC,CPTII,S$GLB, | Performed by: NURSE PRACTITIONER

## 2023-10-27 PROCEDURE — 1101F PR PT FALLS ASSESS DOC 0-1 FALLS W/OUT INJ PAST YR: ICD-10-PCS | Mod: HCNC,CPTII,S$GLB, | Performed by: NURSE PRACTITIONER

## 2023-10-27 PROCEDURE — 99999 PR PBB SHADOW E&M-EST. PATIENT-LVL V: CPT | Mod: PBBFAC,HCNC,, | Performed by: NURSE PRACTITIONER

## 2023-10-27 PROCEDURE — 3074F PR MOST RECENT SYSTOLIC BLOOD PRESSURE < 130 MM HG: ICD-10-PCS | Mod: HCNC,CPTII,S$GLB, | Performed by: NURSE PRACTITIONER

## 2023-10-27 PROCEDURE — 3288F PR FALLS RISK ASSESSMENT DOCUMENTED: ICD-10-PCS | Mod: HCNC,CPTII,S$GLB, | Performed by: NURSE PRACTITIONER

## 2023-10-27 PROCEDURE — 99999 PR PBB SHADOW E&M-EST. PATIENT-LVL V: ICD-10-PCS | Mod: PBBFAC,HCNC,, | Performed by: NURSE PRACTITIONER

## 2023-10-27 PROCEDURE — 4010F ACE/ARB THERAPY RXD/TAKEN: CPT | Mod: HCNC,CPTII,S$GLB, | Performed by: NURSE PRACTITIONER

## 2023-10-27 PROCEDURE — 3066F PR DOCUMENTATION OF TREATMENT FOR NEPHROPATHY: ICD-10-PCS | Mod: HCNC,CPTII,S$GLB, | Performed by: NURSE PRACTITIONER

## 2023-10-27 PROCEDURE — 99214 PR OFFICE/OUTPT VISIT, EST, LEVL IV, 30-39 MIN: ICD-10-PCS | Mod: HCNC,S$GLB,, | Performed by: NURSE PRACTITIONER

## 2023-10-27 PROCEDURE — 1125F AMNT PAIN NOTED PAIN PRSNT: CPT | Mod: HCNC,CPTII,S$GLB, | Performed by: NURSE PRACTITIONER

## 2023-10-27 PROCEDURE — 1159F MED LIST DOCD IN RCRD: CPT | Mod: HCNC,CPTII,S$GLB, | Performed by: NURSE PRACTITIONER

## 2023-10-27 PROCEDURE — 3078F PR MOST RECENT DIASTOLIC BLOOD PRESSURE < 80 MM HG: ICD-10-PCS | Mod: HCNC,CPTII,S$GLB, | Performed by: NURSE PRACTITIONER

## 2023-10-27 PROCEDURE — 3008F BODY MASS INDEX DOCD: CPT | Mod: HCNC,CPTII,S$GLB, | Performed by: NURSE PRACTITIONER

## 2023-10-27 PROCEDURE — 3066F NEPHROPATHY DOC TX: CPT | Mod: HCNC,CPTII,S$GLB, | Performed by: NURSE PRACTITIONER

## 2023-10-27 PROCEDURE — 3288F FALL RISK ASSESSMENT DOCD: CPT | Mod: HCNC,CPTII,S$GLB, | Performed by: NURSE PRACTITIONER

## 2023-10-27 PROCEDURE — 1125F PR PAIN SEVERITY QUANTIFIED, PAIN PRESENT: ICD-10-PCS | Mod: HCNC,CPTII,S$GLB, | Performed by: NURSE PRACTITIONER

## 2023-10-27 PROCEDURE — 1159F PR MEDICATION LIST DOCUMENTED IN MEDICAL RECORD: ICD-10-PCS | Mod: HCNC,CPTII,S$GLB, | Performed by: NURSE PRACTITIONER

## 2023-10-27 PROCEDURE — 3044F HG A1C LEVEL LT 7.0%: CPT | Mod: HCNC,CPTII,S$GLB, | Performed by: NURSE PRACTITIONER

## 2023-10-27 RX ORDER — HYDROCODONE BITARTRATE AND ACETAMINOPHEN 5; 325 MG/1; MG/1
1 TABLET ORAL EVERY 8 HOURS PRN
Qty: 12 TABLET | Refills: 0 | Status: SHIPPED | OUTPATIENT
Start: 2023-10-27 | End: 2023-10-31

## 2023-10-27 RX ORDER — AMOXICILLIN AND CLAVULANATE POTASSIUM 875; 125 MG/1; MG/1
1 TABLET, FILM COATED ORAL 2 TIMES DAILY
Qty: 14 TABLET | Refills: 0 | Status: SHIPPED | OUTPATIENT
Start: 2023-10-27 | End: 2023-11-03

## 2023-10-27 NOTE — PROGRESS NOTES
Subjective:       Patient ID: Rigoberto Vasquez is a 73 y.o. male.    Chief Complaint: Jaw Pain (X3 days)    Mr. Vasquez presents to clinic for left jaw pain x 3 days. Dull, achy pain rating 3/10, radiating to left cheek/sinus and down left jaw line. Pain escalated last night, 10/10, sharp and constant. Unable to wear CPAP mask last night. Reports he has been having problems with his left upper molar. Dentist wants to remove it. Similar symptoms happened 7 months prior after dentist put filling to molar. He was started on an antibiotic and symptoms improved.     Also has a hx of reconstructive surgery to left ear 8 months prior with ENT.    Denies any fever, chills, or body aches. Took leftover Norco last night which provided temporary relief. Otherwise taking Tylenol for pain.    Facial Pain  This is a new problem. The current episode started in the past 7 days. The problem occurs constantly. Pertinent negatives include no chills, congestion, fever, joint swelling, myalgias or nausea. The symptoms are aggravated by eating. He has tried acetaminophen and oral narcotics for the symptoms. The treatment provided mild relief.       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    Choroidal nevus of right eye    Epiretinal membrane (ERM) of left eye    Overweight (BMI 25.0-29.9)    Macular hole of right eye    Open angle with borderline findings and low glaucoma risk in both eyes    Chronic LUQ pain    Nausea    Ectopic gastric mucosa    Open angle with borderline findings and high glaucoma risk in both eyes    History of skin  cancer    History of heart block;AV block, 3rd degree    Benign prostatic hyperplasia (BPH) with straining on urination    Erectile dysfunction    Lung granuloma    Dyspepsia    Calcification of aorta    Cholesteatoma of ear, left    СВЕТЛАНА on CPAP       Family History   Problem Relation Age of Onset    Arthritis Mother     Hypertension Mother     Heart disease Father     Hypertension Father     Stroke Father     Diabetes Son     Diabetes Maternal Grandmother     Colon cancer Paternal Grandmother      Past Surgical History:   Procedure Laterality Date    CARDIAC PACEMAKER PLACEMENT      COLONOSCOPY N/A 9/13/2019    Procedure: COLONOSCOPY;  Surgeon: Kan Ramsey III, MD;  Location: [facility];  Service: Endoscopy;  Laterality: N/A;    COLONOSCOPY N/A 9/22/2022    Procedure: COLONOSCOPY;  Surgeon: hCris Garcia MD;  Location: [facility];  Service: Endoscopy;  Laterality: N/A;    ESOPHAGOGASTRODUODENOSCOPY N/A 9/13/2019    Procedure: ESOPHAGOGASTRODUODENOSCOPY (EGD);  Surgeon: Kan Ramsey III, MD;  Location: [facility];  Service: Endoscopy;  Laterality: N/A;    ESOPHAGOGASTRODUODENOSCOPY N/A 9/3/2021    Procedure: EGD (ESOPHAGOGASTRODUODENOSCOPY);  Surgeon: Kaylene Pineda MD;  Location: [facility];  Service: Endoscopy;  Laterality: N/A;    ESOPHAGOGASTRODUODENOSCOPY N/A 5/24/2023    Procedure: EGD (ESOPHAGOGASTRODUODENOSCOPY);  Surgeon: Cory Bennett MD;  Location: [facility];  Service: Endoscopy;  Laterality: N/A;    INSERTION OF TYMPANOSTOMY TUBE Right 3/15/2023    Procedure: INSERTION, TYMPANOSTOMY TUBE;  Surgeon: Jose L Gudino MD;  Location: [address];  Service: ENT;  Laterality: Right;    JOINT REPLACEMENT      KNEE CARTILAGE SURGERY Bilateral     NASAL SINUS SURGERY      SHOULDER ARTHROSCOPY Right     TONSILLECTOMY      TOTAL KNEE ARTHROPLASTY Left 05/15/2017    TRANSURETHRAL RESECTION OF PROSTATE      TYMPANOPLASTY      TYMPANOPLASTY WITH MASTOIDECTOMY Left 3/15/2023    Procedure:  "TYMPANOPLASTY, WITH MASTOIDECTOMY;  Surgeon: Jose L Gudino MD;  Location: [facility] OR [address] [place] [address];  Service: ENT;  Laterality: Left;    vesectomy           Current Outpatient Medications:     ACETAMINOPHEN (TYLENOL 8 HOUR ORAL), Take by mouth as needed., Disp: , Rfl:     azelastine (ASTELIN) 137 mcg (0.1 %) nasal spray, 2 sprays (274 mcg total) by Nasal route 2 (two) times daily., Disp: 30 mL, Rfl: 12    blood sugar diagnostic (ACCU-CHEK JAXON) Strp, Check BG 2-3 times daily. Accuchek jaxon strips, Disp: 300 strip, Rfl: 3    dorzolamide-timolol 2-0.5% (COSOPT) 22.3-6.8 mg/mL ophthalmic solution, Place 1 drop into both eyes 2 (two) times daily., Disp: 10 mL, Rfl: 5    DROPLET PEN NEEDLE 31 gauge x 3/16" Ndle, USE AS DIRECTED  WITH  INSULIN, Disp: 200 each, Rfl: 3    dutasteride (AVODART) 0.5 mg capsule, Take 1 capsule (0.5 mg total) by mouth once daily., Disp: 90 capsule, Rfl: 3    fenofibrate micronized (LOFIBRA) 200 MG Cap, Take 1 capsule (200 mg total) by mouth every evening., Disp: 90 capsule, Rfl: 4    fluticasone propionate (FLONASE) 50 mcg/actuation nasal spray, 1 spray by Each Nostril route once daily., Disp: , Rfl:     gabapentin (CMPD PAIN MANAGEMENT COMPOUND OINTMNENT THREE), Apply bid prn pain, Disp: 100 g, Rfl: 11    hydrocortisone 2.5 % cream, Apply topically 2 (two) times daily., Disp: 3.5 g, Rfl: 5    insulin detemir U-100 (LEVEMIR FLEXTOUCH U-100 INSULN) 100 unit/mL (3 mL) InPn pen, 16 units in the AM and 8 units in the PM (Patient taking differently: 16 units in the AM and 4 units in the PM), Disp: 30 mL, Rfl: 3    levocetirizine (XYZAL) 5 MG tablet, Take 5 mg by mouth every evening., Disp: , Rfl:     liraglutide 0.6 mg/0.1 mL, 18 mg/3 mL, subq PNIJ (VICTOZA 3-ALESHA) 0.6 mg/0.1 mL (18 mg/3 mL) PnIj pen, INJECT 1.8 MG DAILY, Disp: 27 mL, Rfl: 4    losartan (COZAAR) 25 MG tablet, Take 1 tablet (25 mg total) by mouth once daily., Disp: 90 tablet, Rfl: 3    lovastatin (MEVACOR) 40 MG tablet, Take 1 tablet by mouth " "Every evening., Disp: 90 tablet, Rfl: 3    multivitamin capsule, Take 1 capsule by mouth once daily., Disp: , Rfl:     pantoprazole (PROTONIX) 40 MG tablet, Take 1 tablet (40 mg total) by mouth once daily., Disp: 90 tablet, Rfl: 1    pioglitazone (ACTOS) 30 MG tablet, Take 1 tablet (30 mg total) by mouth once daily., Disp: 90 tablet, Rfl: 4    polyethylene glycol (GLYCOLAX) 17 gram/dose powder, Take 17 g by mouth once daily., Disp: , Rfl:     sildenafil (REVATIO) 20 mg Tab, Take 1 tablet (20 mg total) by mouth daily as needed. Takes prn, Disp: 90 tablet, Rfl: 11    triamcinolone acetonide 0.1% (KENALOG) 0.1 % cream, AAA bid, Disp: 454 g, Rfl: 0    famotidine (PEPCID) 20 MG tablet, Take 1 tablet (20 mg total) by mouth 2 (two) times daily., Disp: 60 tablet, Rfl: 3    latanoprost 0.005 % ophthalmic solution, INSTILL 1 DROP INTO BOTH EYES ONE TIME DAILY, Disp: 7.5 mL, Rfl: 3    Review of Systems   Constitutional:  Negative for chills and fever.   HENT:  Positive for dental problem, hearing loss, postnasal drip (chronic) and sneezing (chronic). Negative for congestion, drooling, ear discharge, ear pain, facial swelling, mouth sores, sinus pressure, sinus pain and trouble swallowing.    Respiratory:  Negative for shortness of breath.    Gastrointestinal:  Negative for nausea.   Musculoskeletal:  Negative for joint swelling and myalgias.       Objective:   /76 (BP Location: Left arm, Patient Position: Sitting, BP Method: Medium (Manual))   Pulse 79   Temp 98.2 °F (36.8 °C) (Tympanic)   Ht 5' 11" (1.803 m)   Wt 89.4 kg (197 lb 1.5 oz)   SpO2 95%   BMI 27.49 kg/m²      Physical Exam  HENT:      Head: Normocephalic.      Jaw: Tenderness present. No swelling.        Comments: +TTP     Left Ear: Ear canal and external ear normal. No drainage, swelling or tenderness. There is no impacted cerumen. No foreign body. Tympanic membrane is not erythematous.      Ears:      Comments: S/p ear surgery     Nose: Nose normal. " No congestion or rhinorrhea.      Right Sinus: No maxillary sinus tenderness.      Left Sinus: No maxillary sinus tenderness.      Mouth/Throat:      Mouth: Mucous membranes are moist.      Dentition: Dental tenderness present.      Tongue: No lesions. Tongue does not deviate from midline.      Palate: No mass and lesions.      Pharynx: Oropharynx is clear. Uvula midline. No pharyngeal swelling, oropharyngeal exudate, posterior oropharyngeal erythema or uvula swelling.     Eyes:      Conjunctiva/sclera: Conjunctivae normal.   Cardiovascular:      Rate and Rhythm: Normal rate.   Pulmonary:      Effort: Pulmonary effort is normal. No respiratory distress.   Musculoskeletal:      Cervical back: Normal range of motion. Tenderness (mild TTP to left neck) present. No rigidity.   Lymphadenopathy:      Cervical: No cervical adenopathy.   Neurological:      Mental Status: He is oriented to person, place, and time.   Psychiatric:         Behavior: Behavior normal.         Assessment & Plan     1. Facial pain  Comments:  Concern for tooth infection. Start Augmentin. Schedule appt with dentist for further evaluation. Short course Norco PRN severe pain & Tylenol PRN mild-mod pain  Orders:  -     amoxicillin-clavulanate 875-125mg (AUGMENTIN) 875-125 mg per tablet; Take 1 tablet by mouth 2 (two) times daily. for 7 days  Dispense: 14 tablet; Refill: 0  -     HYDROcodone-acetaminophen (NORCO) 5-325 mg per tablet; Take 1 tablet by mouth every 8 (eight) hours as needed for Pain.  Dispense: 12 tablet; Refill: 0    2. Jaw pain  -     HYDROcodone-acetaminophen (NORCO) 5-325 mg per tablet; Take 1 tablet by mouth every 8 (eight) hours as needed for Pain.  Dispense: 12 tablet; Refill: 0    3. Hypertension associated with diabetes  Assessment & Plan:  Stable. BP well controlled. Continue current treatment plan      4. Type 2 diabetes mellitus with stage 3a chronic kidney disease, with long-term current use of insulin  Assessment & Plan:  Lab  "Results   Component Value Date    HGBA1C 5.5 08/07/2023     Stable/well controlled. Continue current treatment plan. Followed by PCP.            DONTAE Jones      Portions of this note may have been created with voice recognition software. Occasional "wrong-word" or "sound-a-like" substitutions may have occurred due to the inherent limitations of voice recognition software. Please, read the note carefully and recognize, using context, where substitutions have occurred.     "

## 2023-10-27 NOTE — ASSESSMENT & PLAN NOTE
Lab Results   Component Value Date    HGBA1C 5.5 08/07/2023     Stable/well controlled. Continue current treatment plan. Followed by PCP.

## 2023-11-07 ENCOUNTER — TELEPHONE (OUTPATIENT)
Dept: OPHTHALMOLOGY | Facility: CLINIC | Age: 73
End: 2023-11-07
Payer: MEDICARE

## 2023-11-07 ENCOUNTER — OFFICE VISIT (OUTPATIENT)
Dept: OPHTHALMOLOGY | Facility: CLINIC | Age: 73
End: 2023-11-07
Payer: MEDICARE

## 2023-11-07 DIAGNOSIS — H40.013 OPEN ANGLE WITH BORDERLINE FINDINGS AND LOW GLAUCOMA RISK IN BOTH EYES: ICD-10-CM

## 2023-11-07 DIAGNOSIS — H11.32 SUBCONJUNCTIVAL HEMORRHAGE OF LEFT EYE: Primary | ICD-10-CM

## 2023-11-07 PROCEDURE — 1159F MED LIST DOCD IN RCRD: CPT | Mod: HCNC,CPTII,S$GLB, | Performed by: OPTOMETRIST

## 2023-11-07 PROCEDURE — 3066F NEPHROPATHY DOC TX: CPT | Mod: HCNC,CPTII,S$GLB, | Performed by: OPTOMETRIST

## 2023-11-07 PROCEDURE — 1160F RVW MEDS BY RX/DR IN RCRD: CPT | Mod: HCNC,CPTII,S$GLB, | Performed by: OPTOMETRIST

## 2023-11-07 PROCEDURE — 1160F PR REVIEW ALL MEDS BY PRESCRIBER/CLIN PHARMACIST DOCUMENTED: ICD-10-PCS | Mod: HCNC,CPTII,S$GLB, | Performed by: OPTOMETRIST

## 2023-11-07 PROCEDURE — 99999 PR PBB SHADOW E&M-EST. PATIENT-LVL II: CPT | Mod: PBBFAC,HCNC,, | Performed by: OPTOMETRIST

## 2023-11-07 PROCEDURE — 3066F PR DOCUMENTATION OF TREATMENT FOR NEPHROPATHY: ICD-10-PCS | Mod: HCNC,CPTII,S$GLB, | Performed by: OPTOMETRIST

## 2023-11-07 PROCEDURE — 4010F PR ACE/ARB THEARPY RXD/TAKEN: ICD-10-PCS | Mod: HCNC,CPTII,S$GLB, | Performed by: OPTOMETRIST

## 2023-11-07 PROCEDURE — 3044F HG A1C LEVEL LT 7.0%: CPT | Mod: HCNC,CPTII,S$GLB, | Performed by: OPTOMETRIST

## 2023-11-07 PROCEDURE — 99999 PR PBB SHADOW E&M-EST. PATIENT-LVL II: ICD-10-PCS | Mod: PBBFAC,HCNC,, | Performed by: OPTOMETRIST

## 2023-11-07 PROCEDURE — 3061F PR NEG MICROALBUMINURIA RESULT DOCUMENTED/REVIEW: ICD-10-PCS | Mod: HCNC,CPTII,S$GLB, | Performed by: OPTOMETRIST

## 2023-11-07 PROCEDURE — 99213 PR OFFICE/OUTPT VISIT, EST, LEVL III, 20-29 MIN: ICD-10-PCS | Mod: HCNC,S$GLB,, | Performed by: OPTOMETRIST

## 2023-11-07 PROCEDURE — 3044F PR MOST RECENT HEMOGLOBIN A1C LEVEL <7.0%: ICD-10-PCS | Mod: HCNC,CPTII,S$GLB, | Performed by: OPTOMETRIST

## 2023-11-07 PROCEDURE — 99213 OFFICE O/P EST LOW 20 MIN: CPT | Mod: HCNC,S$GLB,, | Performed by: OPTOMETRIST

## 2023-11-07 PROCEDURE — 3061F NEG MICROALBUMINURIA REV: CPT | Mod: HCNC,CPTII,S$GLB, | Performed by: OPTOMETRIST

## 2023-11-07 PROCEDURE — 4010F ACE/ARB THERAPY RXD/TAKEN: CPT | Mod: HCNC,CPTII,S$GLB, | Performed by: OPTOMETRIST

## 2023-11-07 PROCEDURE — 1159F PR MEDICATION LIST DOCUMENTED IN MEDICAL RECORD: ICD-10-PCS | Mod: HCNC,CPTII,S$GLB, | Performed by: OPTOMETRIST

## 2023-11-07 NOTE — PROGRESS NOTES
HPI     Eye Problem            Comments: Pt stated VA looked cloudy yesterday evening. When he looked in   the mirror, he noticed blood in his eye with some pain and pressure.   Applied moisturizing drops at that time and the blood cleared up.  Pain Scale:  today 3  Onset:   yesterday  OD, OS, OU:   OS  Discharge:   no  A.M. Matting:  no  Itch:   no  Redness:   yes  Photophobia:   yes, more when pain comes  Foreign body sensation:   yes in the inside corner of OS  Deep pain:   6 yesterday  Previous occurrence:   no  Drops:   moisturizing drops OTC         Last edited by Anitha Stephens on 11/7/2023  1:57 PM.            Assessment /Plan     For exam results, see Encounter Report.    Subconjunctival hemorrhage of left eye  Benign nature of subconjunctival hemorrhage was discussed with patient and/or patient's family. Recommended starting artificial tears for comfort.   Symptoms should improve or resolve in the next 7-10 days. RTC PRN if symptoms worsen or persist x 1-2 weeks.  Discussed above and answered questions.     Open angle with borderline findings and low glaucoma risk in both eyes  IOP stable today and within acceptable range   Continue Cosopt bid OU      RTC PRN   Otherwise, RTC as scheduled with [facility]  Discussed above and all questions were answered.

## 2023-11-07 NOTE — TELEPHONE ENCOUNTER
----- Message from Teresita Billings sent at 11/7/2023  8:09 AM CST -----  Contact: Rigoberto Franklin is needing a call back in regards to scheduling an appt for having blood in his L eye. Please give him a call back at [phone]

## 2023-11-07 NOTE — TELEPHONE ENCOUNTER
SPOKE TO PT, HE HAD RED EYE ALONG WITH SOME PRESSURE. DISCOMFORT AND SLIGHT PAIN. MADE APPT FOR HIM TO SEE DR SMITH TODAY AT [facility]

## 2023-11-08 RX ORDER — FAMOTIDINE 20 MG/1
20 TABLET, FILM COATED ORAL 2 TIMES DAILY
Qty: 60 TABLET | Refills: 3 | Status: SHIPPED | OUTPATIENT
Start: 2023-11-08 | End: 2024-02-12 | Stop reason: SDUPTHER

## 2023-11-08 NOTE — TELEPHONE ENCOUNTER
Refills requested on Pepcid 20 mg BID; last refill given on 08/15/23 60 tablets with 3 refills; last office visit 06/15/23

## 2023-11-10 NOTE — PROGRESS NOTES
Last 5 Patient Entered Readings                                      Current 30 Day Average: 137/80     Recent Readings 5/29/2019 5/26/2019 5/24/2019 5/23/2019 5/21/2019    SBP (mmHg) 155 142 123 119 131    DBP (mmHg) 89 89 63 69 85    Pulse 81 71 73 81 72          5/30: LVM.  Will call in 1 week.   KAMI (acute kidney injury)

## 2023-11-13 ENCOUNTER — PATIENT MESSAGE (OUTPATIENT)
Dept: INTERNAL MEDICINE | Facility: CLINIC | Age: 73
End: 2023-11-13
Payer: MEDICARE

## 2023-11-16 ENCOUNTER — PATIENT MESSAGE (OUTPATIENT)
Dept: INTERNAL MEDICINE | Facility: CLINIC | Age: 73
End: 2023-11-16
Payer: MEDICARE

## 2023-11-16 DIAGNOSIS — Z79.4 TYPE 2 DIABETES MELLITUS WITHOUT COMPLICATION, WITH LONG-TERM CURRENT USE OF INSULIN: Chronic | ICD-10-CM

## 2023-11-16 DIAGNOSIS — E11.9 TYPE 2 DIABETES MELLITUS WITHOUT COMPLICATION, WITH LONG-TERM CURRENT USE OF INSULIN: Chronic | ICD-10-CM

## 2023-11-16 RX ORDER — INSULIN DETEMIR 100 [IU]/ML
INJECTION, SOLUTION SUBCUTANEOUS
Qty: 30 ML | Refills: 3 | Status: SHIPPED | OUTPATIENT
Start: 2023-11-16 | End: 2024-02-15

## 2023-11-29 ENCOUNTER — OFFICE VISIT (OUTPATIENT)
Dept: DERMATOLOGY | Facility: CLINIC | Age: 73
End: 2023-11-29
Payer: MEDICARE

## 2023-11-29 DIAGNOSIS — Z85.828 HISTORY OF SKIN CANCER: ICD-10-CM

## 2023-11-29 DIAGNOSIS — L82.1 SEBORRHEIC KERATOSES: Primary | ICD-10-CM

## 2023-11-29 DIAGNOSIS — D18.01 CHERRY ANGIOMA: ICD-10-CM

## 2023-11-29 DIAGNOSIS — L81.4 LENTIGINES: ICD-10-CM

## 2023-11-29 PROCEDURE — 3066F NEPHROPATHY DOC TX: CPT | Mod: HCNC,CPTII,S$GLB, | Performed by: STUDENT IN AN ORGANIZED HEALTH CARE EDUCATION/TRAINING PROGRAM

## 2023-11-29 PROCEDURE — 99213 PR OFFICE/OUTPT VISIT, EST, LEVL III, 20-29 MIN: ICD-10-PCS | Mod: HCNC,S$GLB,, | Performed by: STUDENT IN AN ORGANIZED HEALTH CARE EDUCATION/TRAINING PROGRAM

## 2023-11-29 PROCEDURE — 3061F NEG MICROALBUMINURIA REV: CPT | Mod: HCNC,CPTII,S$GLB, | Performed by: STUDENT IN AN ORGANIZED HEALTH CARE EDUCATION/TRAINING PROGRAM

## 2023-11-29 PROCEDURE — 1126F AMNT PAIN NOTED NONE PRSNT: CPT | Mod: HCNC,CPTII,S$GLB, | Performed by: STUDENT IN AN ORGANIZED HEALTH CARE EDUCATION/TRAINING PROGRAM

## 2023-11-29 PROCEDURE — 99999 PR PBB SHADOW E&M-EST. PATIENT-LVL II: CPT | Mod: PBBFAC,HCNC,, | Performed by: STUDENT IN AN ORGANIZED HEALTH CARE EDUCATION/TRAINING PROGRAM

## 2023-11-29 PROCEDURE — 1160F PR REVIEW ALL MEDS BY PRESCRIBER/CLIN PHARMACIST DOCUMENTED: ICD-10-PCS | Mod: HCNC,CPTII,S$GLB, | Performed by: STUDENT IN AN ORGANIZED HEALTH CARE EDUCATION/TRAINING PROGRAM

## 2023-11-29 PROCEDURE — 1101F PR PT FALLS ASSESS DOC 0-1 FALLS W/OUT INJ PAST YR: ICD-10-PCS | Mod: HCNC,CPTII,S$GLB, | Performed by: STUDENT IN AN ORGANIZED HEALTH CARE EDUCATION/TRAINING PROGRAM

## 2023-11-29 PROCEDURE — 1126F PR PAIN SEVERITY QUANTIFIED, NO PAIN PRESENT: ICD-10-PCS | Mod: HCNC,CPTII,S$GLB, | Performed by: STUDENT IN AN ORGANIZED HEALTH CARE EDUCATION/TRAINING PROGRAM

## 2023-11-29 PROCEDURE — 3044F HG A1C LEVEL LT 7.0%: CPT | Mod: HCNC,CPTII,S$GLB, | Performed by: STUDENT IN AN ORGANIZED HEALTH CARE EDUCATION/TRAINING PROGRAM

## 2023-11-29 PROCEDURE — 99213 OFFICE O/P EST LOW 20 MIN: CPT | Mod: HCNC,S$GLB,, | Performed by: STUDENT IN AN ORGANIZED HEALTH CARE EDUCATION/TRAINING PROGRAM

## 2023-11-29 PROCEDURE — 3288F PR FALLS RISK ASSESSMENT DOCUMENTED: ICD-10-PCS | Mod: HCNC,CPTII,S$GLB, | Performed by: STUDENT IN AN ORGANIZED HEALTH CARE EDUCATION/TRAINING PROGRAM

## 2023-11-29 PROCEDURE — 99999 PR PBB SHADOW E&M-EST. PATIENT-LVL II: ICD-10-PCS | Mod: PBBFAC,HCNC,, | Performed by: STUDENT IN AN ORGANIZED HEALTH CARE EDUCATION/TRAINING PROGRAM

## 2023-11-29 PROCEDURE — 3066F PR DOCUMENTATION OF TREATMENT FOR NEPHROPATHY: ICD-10-PCS | Mod: HCNC,CPTII,S$GLB, | Performed by: STUDENT IN AN ORGANIZED HEALTH CARE EDUCATION/TRAINING PROGRAM

## 2023-11-29 PROCEDURE — 4010F PR ACE/ARB THEARPY RXD/TAKEN: ICD-10-PCS | Mod: HCNC,CPTII,S$GLB, | Performed by: STUDENT IN AN ORGANIZED HEALTH CARE EDUCATION/TRAINING PROGRAM

## 2023-11-29 PROCEDURE — 3061F PR NEG MICROALBUMINURIA RESULT DOCUMENTED/REVIEW: ICD-10-PCS | Mod: HCNC,CPTII,S$GLB, | Performed by: STUDENT IN AN ORGANIZED HEALTH CARE EDUCATION/TRAINING PROGRAM

## 2023-11-29 PROCEDURE — 4010F ACE/ARB THERAPY RXD/TAKEN: CPT | Mod: HCNC,CPTII,S$GLB, | Performed by: STUDENT IN AN ORGANIZED HEALTH CARE EDUCATION/TRAINING PROGRAM

## 2023-11-29 PROCEDURE — 1101F PT FALLS ASSESS-DOCD LE1/YR: CPT | Mod: HCNC,CPTII,S$GLB, | Performed by: STUDENT IN AN ORGANIZED HEALTH CARE EDUCATION/TRAINING PROGRAM

## 2023-11-29 PROCEDURE — 1159F PR MEDICATION LIST DOCUMENTED IN MEDICAL RECORD: ICD-10-PCS | Mod: HCNC,CPTII,S$GLB, | Performed by: STUDENT IN AN ORGANIZED HEALTH CARE EDUCATION/TRAINING PROGRAM

## 2023-11-29 PROCEDURE — 3288F FALL RISK ASSESSMENT DOCD: CPT | Mod: HCNC,CPTII,S$GLB, | Performed by: STUDENT IN AN ORGANIZED HEALTH CARE EDUCATION/TRAINING PROGRAM

## 2023-11-29 PROCEDURE — 1160F RVW MEDS BY RX/DR IN RCRD: CPT | Mod: HCNC,CPTII,S$GLB, | Performed by: STUDENT IN AN ORGANIZED HEALTH CARE EDUCATION/TRAINING PROGRAM

## 2023-11-29 PROCEDURE — 3044F PR MOST RECENT HEMOGLOBIN A1C LEVEL <7.0%: ICD-10-PCS | Mod: HCNC,CPTII,S$GLB, | Performed by: STUDENT IN AN ORGANIZED HEALTH CARE EDUCATION/TRAINING PROGRAM

## 2023-11-29 PROCEDURE — 1159F MED LIST DOCD IN RCRD: CPT | Mod: HCNC,CPTII,S$GLB, | Performed by: STUDENT IN AN ORGANIZED HEALTH CARE EDUCATION/TRAINING PROGRAM

## 2023-11-29 NOTE — PROGRESS NOTES
Subjective:       Patient ID:  Rigoberto Vasquez is a 73 y.o. male who presents for   Chief Complaint   Patient presents with    Follow-up     Check up     History of Present Illness: The patient presents for follow up of skin check.    The patient was last seen on: 10/5/22. Doing well. Has a few brownish, scaly growths. Denies any other rapidly growing, bleeding or non healing skin lesions. Has a fungus or bruise possibly on the right 3rd nail digit.     Follow-up      Review of Systems   Constitutional:  Negative for fever and chills.   Skin:  Negative for itching, rash and dry skin.        Objective:    Physical Exam   Constitutional: He appears well-developed and well-nourished. No distress.   Neurological: He is alert and oriented to person, place, and time. He is not disoriented.   Psychiatric: He has a normal mood and affect.   Skin:   Areas Examined (abnormalities noted in diagram):   Scalp / Hair Palpated and Inspected  Head / Face Inspection Performed  Neck Inspection Performed  Chest / Axilla Inspection Performed  Abdomen Inspection Performed  Genitals / Buttocks / Groin Inspection Performed  Back Inspection Performed  RUE Inspected  LUE Inspection Performed  RLE Inspected  LLE Inspection Performed  Nails and Digits Inspection Performed                       Diagram Legend     Erythematous scaling macule/papule c/w actinic keratosis       Vascular papule c/w angioma      Pigmented verrucoid papule/plaque c/w seborrheic keratosis      Yellow umbilicated papule c/w sebaceous hyperplasia      Irregularly shaped tan macule c/w lentigo     1-2 mm smooth white papules consistent with Milia      Movable subcutaneous cyst with punctum c/w epidermal inclusion cyst      Subcutaneous movable cyst c/w pilar cyst      Firm pink to brown papule c/w dermatofibroma      Pedunculated fleshy papule(s) c/w skin tag(s)      Evenly pigmented macule c/w junctional nevus     Mildly variegated pigmented, slightly  irregular-bordered macule c/w mildly atypical nevus      Flesh colored to evenly pigmented papule c/w intradermal nevus       Pink pearly papule/plaque c/w basal cell carcinoma      Erythematous hyperkeratotic cursted plaque c/w SCC      Surgical scar with no sign of skin cancer recurrence      Open and closed comedones      Inflammatory papules and pustules      Verrucoid papule consistent consistent with wart     Erythematous eczematous patches and plaques     Dystrophic onycholytic nail with subungual debris c/w onychomycosis     Umbilicated papule    Erythematous-base heme-crusted tan verrucoid plaque consistent with inflamed seborrheic keratosis     Erythematous Silvery Scaling Plaque c/w Psoriasis     See annotation      Assessment / Plan:        Seborrheic keratoses  These are benign inherited growths without a malignant potential. Reassurance given to patient. No treatment is necessary.     Cherry angioma  This is a benign vascular lesion. Reassurance given. No treatment required.     Lentigines  This is a benign hyperpigmented sun induced lesion. Recommend daily sun protection/avoidance and use of at least SPF 30, broad spectrum sunscreen (OTC drug) will reduce the number of new lesions. Treatment of these benign lesions are considered cosmetic.    History of skin cancer  upper body skin examination performed today including at least 6 points as noted in physical examination. No lesions suspicious for malignancy noted.  Reassurance provided.    Instructed patient to observe lesion(s) for changes and follow up in clinic if changes are noted. Patient to monitor skin at home for new or changing lesions and follow up in clinic if noted.           Follow up in about 1 year (around 11/29/2024).

## 2023-11-30 DIAGNOSIS — H40.013 OPEN ANGLE WITH BORDERLINE FINDINGS AND LOW GLAUCOMA RISK IN BOTH EYES: ICD-10-CM

## 2023-11-30 RX ORDER — DORZOLAMIDE HYDROCHLORIDE AND TIMOLOL MALEATE 20; 5 MG/ML; MG/ML
1 SOLUTION/ DROPS OPHTHALMIC 2 TIMES DAILY
Qty: 10 ML | Refills: 5 | Status: SHIPPED | OUTPATIENT
Start: 2023-11-30 | End: 2024-11-29

## 2023-12-05 ENCOUNTER — OFFICE VISIT (OUTPATIENT)
Dept: OPHTHALMOLOGY | Facility: CLINIC | Age: 73
End: 2023-12-05
Payer: MEDICARE

## 2023-12-05 DIAGNOSIS — H40.013 OPEN ANGLE WITH BORDERLINE FINDINGS AND LOW GLAUCOMA RISK IN BOTH EYES: Primary | ICD-10-CM

## 2023-12-05 DIAGNOSIS — H35.341 LAMELLAR MACULAR HOLE OF RIGHT EYE: ICD-10-CM

## 2023-12-05 DIAGNOSIS — Z79.4 CONTROLLED TYPE 2 DIABETES MELLITUS WITHOUT COMPLICATION, WITH LONG-TERM CURRENT USE OF INSULIN: ICD-10-CM

## 2023-12-05 DIAGNOSIS — E11.9 CONTROLLED TYPE 2 DIABETES MELLITUS WITHOUT COMPLICATION, WITH LONG-TERM CURRENT USE OF INSULIN: ICD-10-CM

## 2023-12-05 DIAGNOSIS — H35.372 EPIRETINAL MEMBRANE (ERM) OF LEFT EYE: ICD-10-CM

## 2023-12-05 DIAGNOSIS — H04.129 DRY EYE: ICD-10-CM

## 2023-12-05 DIAGNOSIS — E11.36 DIABETIC CATARACT OF BOTH EYES: ICD-10-CM

## 2023-12-05 PROCEDURE — 99214 PR OFFICE/OUTPT VISIT, EST, LEVL IV, 30-39 MIN: ICD-10-PCS | Mod: HCNC,S$GLB,, | Performed by: OPHTHALMOLOGY

## 2023-12-05 PROCEDURE — 3066F NEPHROPATHY DOC TX: CPT | Mod: HCNC,CPTII,S$GLB, | Performed by: OPHTHALMOLOGY

## 2023-12-05 PROCEDURE — 3066F PR DOCUMENTATION OF TREATMENT FOR NEPHROPATHY: ICD-10-PCS | Mod: HCNC,CPTII,S$GLB, | Performed by: OPHTHALMOLOGY

## 2023-12-05 PROCEDURE — 3044F PR MOST RECENT HEMOGLOBIN A1C LEVEL <7.0%: ICD-10-PCS | Mod: HCNC,CPTII,S$GLB, | Performed by: OPHTHALMOLOGY

## 2023-12-05 PROCEDURE — 4010F ACE/ARB THERAPY RXD/TAKEN: CPT | Mod: HCNC,CPTII,S$GLB, | Performed by: OPHTHALMOLOGY

## 2023-12-05 PROCEDURE — 3061F PR NEG MICROALBUMINURIA RESULT DOCUMENTED/REVIEW: ICD-10-PCS | Mod: HCNC,CPTII,S$GLB, | Performed by: OPHTHALMOLOGY

## 2023-12-05 PROCEDURE — 4010F PR ACE/ARB THEARPY RXD/TAKEN: ICD-10-PCS | Mod: HCNC,CPTII,S$GLB, | Performed by: OPHTHALMOLOGY

## 2023-12-05 PROCEDURE — 1159F PR MEDICATION LIST DOCUMENTED IN MEDICAL RECORD: ICD-10-PCS | Mod: HCNC,CPTII,S$GLB, | Performed by: OPHTHALMOLOGY

## 2023-12-05 PROCEDURE — 99999 PR PBB SHADOW E&M-EST. PATIENT-LVL III: ICD-10-PCS | Mod: PBBFAC,HCNC,, | Performed by: OPHTHALMOLOGY

## 2023-12-05 PROCEDURE — 2023F PR DILATED RETINAL EXAM W/O EVID OF RETINOPATHY: ICD-10-PCS | Mod: HCNC,CPTII,S$GLB, | Performed by: OPHTHALMOLOGY

## 2023-12-05 PROCEDURE — 1159F MED LIST DOCD IN RCRD: CPT | Mod: HCNC,CPTII,S$GLB, | Performed by: OPHTHALMOLOGY

## 2023-12-05 PROCEDURE — 3044F HG A1C LEVEL LT 7.0%: CPT | Mod: HCNC,CPTII,S$GLB, | Performed by: OPHTHALMOLOGY

## 2023-12-05 PROCEDURE — 3061F NEG MICROALBUMINURIA REV: CPT | Mod: HCNC,CPTII,S$GLB, | Performed by: OPHTHALMOLOGY

## 2023-12-05 PROCEDURE — 99214 OFFICE O/P EST MOD 30 MIN: CPT | Mod: HCNC,S$GLB,, | Performed by: OPHTHALMOLOGY

## 2023-12-05 PROCEDURE — 2023F DILAT RTA XM W/O RTNOPTHY: CPT | Mod: HCNC,CPTII,S$GLB, | Performed by: OPHTHALMOLOGY

## 2023-12-05 PROCEDURE — 99999 PR PBB SHADOW E&M-EST. PATIENT-LVL III: CPT | Mod: PBBFAC,HCNC,, | Performed by: OPHTHALMOLOGY

## 2023-12-05 NOTE — PROGRESS NOTES
HPI     Glaucoma            Comments: Pt using Ivizia TID OU for dryness  Pt using Rx'd gtts as directed  Pt denies pain ou  Pt denies any ocular disturbances x 4 mo constant           Comments    1. DM since 1999   NO DBR   2. Mac hole od   3. erm od  4. Choroidal nevus od   5. HIGH MYOPE(-6)   6. SCOAG   iop 25/ 23 ou on po steroid   anamolous myopic disc   ASYMETRIC C/D 0.7 / 0.5    // 641  NO FAMILY HISTORY   HVF 3/22/2022        GCL 31/28 5/3/22      Cosopt BID OU            Last edited by Arden Pickens on 12/5/2023  8:27 AM.            Assessment /Plan     For exam results, see Encounter Report.      ICD-10-CM ICD-9-CM    1. Open angle with borderline findings and low glaucoma risk in both eyes  H40.013 365.01 Doing well - intraocular pressure is within acceptable range relative to target pressure with no evidence of progression.   Continue current treatment.  Reviewed importance of continued compliance with treatment and follow up.         2. Controlled type 2 diabetes mellitus without complication, with long-term current use of insulin  E11.9 250.00 Diabetes with no diabetic retinopathy on dilated exam.   Reviewed diabetic eye precautions including excellent blood sugar control, and importance of regular follow up.         Z79.4 V58.67       3. Diabetic cataract of both eyes  E11.36 250.50 Very early, follow      366.41       4. Lamellar macular hole of right eye  H35.341 362.54 Stable - follow       5. Dry eye  H04.129 375.15 Continue tears       6. Epiretinal membrane (ERM) of left eye  H35.372 362.56 Stable           RETURN TO CLINIC 4 month IOP   Continue Cosopt BID OU

## 2023-12-06 DIAGNOSIS — L30.9 DERMATITIS: ICD-10-CM

## 2023-12-06 RX ORDER — TRIAMCINOLONE ACETONIDE 1 MG/G
CREAM TOPICAL
Qty: 454 G | Refills: 0 | Status: SHIPPED | OUTPATIENT
Start: 2023-12-06

## 2023-12-07 NOTE — PROGRESS NOTES
Health Maintenance Due   Topic Date Due    TETANUS VACCINE  03/22/2021    COVID-19 Vaccine (4 - Booster for Moderna series) 01/26/2022     Updates were requested from care everywhere.  Chart was reviewed for overdue Proactive [facility] Encounters ([facility]ENETTA) topics (CRS, Breast Cancer Screening, Eye exam)  Health Maintenance has been updated.  LINKS immunization registry triggered.  Immunizations were reconciled.     Initial (On Arrival)

## 2023-12-11 ENCOUNTER — PATIENT MESSAGE (OUTPATIENT)
Dept: INTERNAL MEDICINE | Facility: CLINIC | Age: 73
End: 2023-12-11
Payer: MEDICARE

## 2023-12-19 DIAGNOSIS — R10.13 DYSPEPSIA: ICD-10-CM

## 2023-12-19 DIAGNOSIS — R11.0 NAUSEA: ICD-10-CM

## 2023-12-19 RX ORDER — PANTOPRAZOLE SODIUM 40 MG/1
40 TABLET, DELAYED RELEASE ORAL DAILY
Qty: 90 TABLET | Refills: 1 | Status: SHIPPED | OUTPATIENT
Start: 2023-12-19 | End: 2024-06-16

## 2023-12-21 ENCOUNTER — OFFICE VISIT (OUTPATIENT)
Dept: UROLOGY | Facility: CLINIC | Age: 73
End: 2023-12-21
Payer: MEDICARE

## 2023-12-21 VITALS — WEIGHT: 197 LBS | BODY MASS INDEX: 27.58 KG/M2 | HEIGHT: 71 IN

## 2023-12-21 DIAGNOSIS — N52.9 ERECTILE DYSFUNCTION, UNSPECIFIED ERECTILE DYSFUNCTION TYPE: ICD-10-CM

## 2023-12-21 DIAGNOSIS — R39.16 BENIGN PROSTATIC HYPERPLASIA (BPH) WITH STRAINING ON URINATION: Primary | ICD-10-CM

## 2023-12-21 DIAGNOSIS — N40.1 BENIGN PROSTATIC HYPERPLASIA (BPH) WITH STRAINING ON URINATION: Primary | ICD-10-CM

## 2023-12-21 PROCEDURE — 3008F PR BODY MASS INDEX (BMI) DOCUMENTED: ICD-10-PCS | Mod: HCNC,CPTII,S$GLB, | Performed by: UROLOGY

## 2023-12-21 PROCEDURE — 3008F BODY MASS INDEX DOCD: CPT | Mod: HCNC,CPTII,S$GLB, | Performed by: UROLOGY

## 2023-12-21 PROCEDURE — 99214 OFFICE O/P EST MOD 30 MIN: CPT | Mod: HCNC,S$GLB,, | Performed by: UROLOGY

## 2023-12-21 PROCEDURE — 3066F PR DOCUMENTATION OF TREATMENT FOR NEPHROPATHY: ICD-10-PCS | Mod: HCNC,CPTII,S$GLB, | Performed by: UROLOGY

## 2023-12-21 PROCEDURE — 3288F PR FALLS RISK ASSESSMENT DOCUMENTED: ICD-10-PCS | Mod: HCNC,CPTII,S$GLB, | Performed by: UROLOGY

## 2023-12-21 PROCEDURE — 3288F FALL RISK ASSESSMENT DOCD: CPT | Mod: HCNC,CPTII,S$GLB, | Performed by: UROLOGY

## 2023-12-21 PROCEDURE — 3061F NEG MICROALBUMINURIA REV: CPT | Mod: HCNC,CPTII,S$GLB, | Performed by: UROLOGY

## 2023-12-21 PROCEDURE — 99999 PR PBB SHADOW E&M-EST. PATIENT-LVL III: ICD-10-PCS | Mod: PBBFAC,HCNC,, | Performed by: UROLOGY

## 2023-12-21 PROCEDURE — 1159F PR MEDICATION LIST DOCUMENTED IN MEDICAL RECORD: ICD-10-PCS | Mod: HCNC,CPTII,S$GLB, | Performed by: UROLOGY

## 2023-12-21 PROCEDURE — 99999 PR PBB SHADOW E&M-EST. PATIENT-LVL III: CPT | Mod: PBBFAC,HCNC,, | Performed by: UROLOGY

## 2023-12-21 PROCEDURE — 99214 PR OFFICE/OUTPT VISIT, EST, LEVL IV, 30-39 MIN: ICD-10-PCS | Mod: HCNC,S$GLB,, | Performed by: UROLOGY

## 2023-12-21 PROCEDURE — 1160F RVW MEDS BY RX/DR IN RCRD: CPT | Mod: HCNC,CPTII,S$GLB, | Performed by: UROLOGY

## 2023-12-21 PROCEDURE — 4010F ACE/ARB THERAPY RXD/TAKEN: CPT | Mod: HCNC,CPTII,S$GLB, | Performed by: UROLOGY

## 2023-12-21 PROCEDURE — 1101F PR PT FALLS ASSESS DOC 0-1 FALLS W/OUT INJ PAST YR: ICD-10-PCS | Mod: HCNC,CPTII,S$GLB, | Performed by: UROLOGY

## 2023-12-21 PROCEDURE — 3044F PR MOST RECENT HEMOGLOBIN A1C LEVEL <7.0%: ICD-10-PCS | Mod: HCNC,CPTII,S$GLB, | Performed by: UROLOGY

## 2023-12-21 PROCEDURE — 1101F PT FALLS ASSESS-DOCD LE1/YR: CPT | Mod: HCNC,CPTII,S$GLB, | Performed by: UROLOGY

## 2023-12-21 PROCEDURE — 4010F PR ACE/ARB THEARPY RXD/TAKEN: ICD-10-PCS | Mod: HCNC,CPTII,S$GLB, | Performed by: UROLOGY

## 2023-12-21 PROCEDURE — 3061F PR NEG MICROALBUMINURIA RESULT DOCUMENTED/REVIEW: ICD-10-PCS | Mod: HCNC,CPTII,S$GLB, | Performed by: UROLOGY

## 2023-12-21 PROCEDURE — 3066F NEPHROPATHY DOC TX: CPT | Mod: HCNC,CPTII,S$GLB, | Performed by: UROLOGY

## 2023-12-21 PROCEDURE — 1160F PR REVIEW ALL MEDS BY PRESCRIBER/CLIN PHARMACIST DOCUMENTED: ICD-10-PCS | Mod: HCNC,CPTII,S$GLB, | Performed by: UROLOGY

## 2023-12-21 PROCEDURE — 3044F HG A1C LEVEL LT 7.0%: CPT | Mod: HCNC,CPTII,S$GLB, | Performed by: UROLOGY

## 2023-12-21 PROCEDURE — 1159F MED LIST DOCD IN RCRD: CPT | Mod: HCNC,CPTII,S$GLB, | Performed by: UROLOGY

## 2023-12-21 NOTE — PROGRESS NOTES
Chief Complaint:   Encounter Diagnoses   Name Primary?    Benign prostatic hyperplasia (BPH) with straining on urination Yes    Erectile dysfunction, unspecified erectile dysfunction type        HPI:   12/21/23- patient is currently only using dutasteride, could have a little bit better void but does not want pursue repeat TURP as of yet.  Erections are relatively stable on medical management.    69-year-old gentleman who reports due to BPH type symptoms.  Patient on tamsulosin after the last 6 months, with no significant improvement.  This happened approximately 14-15 years ago he was having significant issues which required Florence catheterization.  Outside urologist did a TURP, symptoms seem to improve following that.  Patient's also complains of erectile dysfunction which he takes over-the-counter medications for.  One of his biggest complaints is retrograde ejaculation, which is most likely secondary to the tamsulosin.  Patient states he also has some decreased sensation with erections.  He is a significant diabetic Farideh lost from night, with no significant frequency or urgency issues during the daytime.  He states his biggest complaint is difficulty starting and stopping during his stream.  He has had no other urological history, no family history of urological cancers or stones, except for BPH in his father.  No gross hematuria, he has never been a smoker    Allergies:  Aspirin, Meperidine, and Niacin    Medications:  has a current medication list which includes the following prescription(s): acetaminophen, azelastine, blood sugar diagnostic, dorzolamide-timolol 2-0.5%, droplet pen needle, dutasteride, famotidine, fenofibrate micronized, fluticasone propionate, gabapentin, hydrocortisone, levemir flextouch u100 insulin, levocetirizine, victoza 3-claudine, losartan, lovastatin, multivitamin, pantoprazole, pioglitazone, polyethylene glycol, triamcinolone acetonide 0.1%, latanoprost, and sildenafil.    Review of  Systems:  General: No fever, chills, fatigability, or weight loss.  Skin: No rashes, itching, or changes in color or texture of skin.  Chest: Denies MEEK, cyanosis, wheezing, cough, and sputum production.  Abdomen: Appetite fine. No weight loss. Denies diarrhea, abdominal pain, hematemesis, or blood in stool.  Musculoskeletal: No joint stiffness or swelling. Denies back pain.  : As above.  All other review of systems negative.    PMH:   has a past medical history of Acid reflux, Angina pectoris, Back pain, BPH (benign prostatic hyperplasia), Cholesteatoma, Degenerative joint disease, Diverticulosis, Erectile dysfunction, History of elevated PSA (02/2014), History of pericarditis, Hypercholesteremia, Hypertension, Iron deficiency anemia, СВЕТЛАНА (obstructive sleep apnea) (05/17/2022), Pacemaker, Renal disorder, Squamous cell cancer of skin of mastoid region of scalp, Type 2 diabetes mellitus, Urinary incontinence, and when he was 7 Yrs old..    PSH:   has a past surgical history that includes Shoulder arthroscopy (Right); Nasal sinus surgery; Cardiac pacemaker placement; Tonsillectomy; Transurethral resection of prostate; Knee cartilage surgery (Bilateral); Tympanoplasty; vesectomy; Total knee arthroplasty (Left, 05/15/2017); Joint replacement; Esophagogastroduodenoscopy (N/A, 9/13/2019); Colonoscopy (N/A, 9/13/2019); Esophagogastroduodenoscopy (N/A, 9/3/2021); Colonoscopy (N/A, 9/22/2022); Tympanoplasty with mastoidectomy (Left, 3/15/2023); Insertion of tympanostomy tube (Right, 3/15/2023); and Esophagogastroduodenoscopy (N/A, 5/24/2023).    FamHx: family history includes Arthritis in his mother; Colon cancer in his paternal grandmother; Diabetes in his maternal grandmother and son; Heart disease in his father; Hypertension in his father and mother; Stroke in his father.    SocHx:  reports that he has never smoked. He has never used smokeless tobacco. He reports current alcohol use. He reports that he does not use  drugs.      Physical Exam:  There were no vitals filed for this visit.    General: A&Ox3, no apparent distress, no deformities  Neck: No masses, normal ROM  Lungs: normal inspiration, no use of accessory muscles  Heart: normal pulse, no arrhythmias  Abdomen: Soft, NT, ND, no masses, no hernias, no hepatosplenomegaly  Skin: The skin is warm and dry. No jaundice.  Ext: No c/c/e.    Labs/Studies:   pvr 49 ml 8/21  psa 0.33 8/23  DIANN complicated left renal cyst 6/22    Impression/Plan:      1. BPH- dutasteride alone is sufficient, no change since stopping the tamsulosin.  He could use a little bit more improvement in voiding, he does have a history of TURP remotely.  We have again discussed the procedure in detail, please see below.  If patient would like to pursue prior to the next appointment, he will contact us, otherwise see me in 6 months with a PSA and if stable yearly from there.  Of note he would need cystoscopy preoperatively.    2. Erectile dysfunction- 100 mg of sildenafil is relatively sufficient, better than the Cialis.  He has considered shots but does not want to proceed with this now.  He will call if he would like to pursue prior to the next appointment.

## 2024-01-04 ENCOUNTER — PATIENT MESSAGE (OUTPATIENT)
Dept: OTOLARYNGOLOGY | Facility: CLINIC | Age: 74
End: 2024-01-04
Payer: MEDICARE

## 2024-02-08 ENCOUNTER — LAB VISIT (OUTPATIENT)
Dept: LAB | Facility: HOSPITAL | Age: 74
End: 2024-02-08
Attending: FAMILY MEDICINE
Payer: MEDICARE

## 2024-02-08 DIAGNOSIS — E11.22 TYPE 2 DIABETES MELLITUS WITH STAGE 3A CHRONIC KIDNEY DISEASE, WITH LONG-TERM CURRENT USE OF INSULIN: ICD-10-CM

## 2024-02-08 DIAGNOSIS — N18.31 TYPE 2 DIABETES MELLITUS WITH STAGE 3A CHRONIC KIDNEY DISEASE, WITH LONG-TERM CURRENT USE OF INSULIN: ICD-10-CM

## 2024-02-08 DIAGNOSIS — Z79.4 TYPE 2 DIABETES MELLITUS WITH STAGE 3A CHRONIC KIDNEY DISEASE, WITH LONG-TERM CURRENT USE OF INSULIN: ICD-10-CM

## 2024-02-08 LAB
BASOPHILS # BLD AUTO: 0.03 K/UL (ref 0–0.2)
BASOPHILS NFR BLD: 0.6 % (ref 0–1.9)
DIFFERENTIAL METHOD BLD: ABNORMAL
EOSINOPHIL # BLD AUTO: 0.1 K/UL (ref 0–0.5)
EOSINOPHIL NFR BLD: 2.3 % (ref 0–8)
ERYTHROCYTE [DISTWIDTH] IN BLOOD BY AUTOMATED COUNT: 13.2 % (ref 11.5–14.5)
ESTIMATED AVG GLUCOSE: 128 MG/DL (ref 68–131)
HBA1C MFR BLD: 6.1 % (ref 4–5.6)
HCT VFR BLD AUTO: 41.8 % (ref 40–54)
HGB BLD-MCNC: 14.1 G/DL (ref 14–18)
IMM GRANULOCYTES # BLD AUTO: 0.02 K/UL (ref 0–0.04)
IMM GRANULOCYTES NFR BLD AUTO: 0.4 % (ref 0–0.5)
LYMPHOCYTES # BLD AUTO: 1.2 K/UL (ref 1–4.8)
LYMPHOCYTES NFR BLD: 23.1 % (ref 18–48)
MCH RBC QN AUTO: 32.9 PG (ref 27–31)
MCHC RBC AUTO-ENTMCNC: 33.7 G/DL (ref 32–36)
MCV RBC AUTO: 98 FL (ref 82–98)
MONOCYTES # BLD AUTO: 0.4 K/UL (ref 0.3–1)
MONOCYTES NFR BLD: 8.1 % (ref 4–15)
NEUTROPHILS # BLD AUTO: 3.4 K/UL (ref 1.8–7.7)
NEUTROPHILS NFR BLD: 65.5 % (ref 38–73)
NRBC BLD-RTO: 0 /100 WBC
PLATELET # BLD AUTO: 239 K/UL (ref 150–450)
PMV BLD AUTO: 9 FL (ref 9.2–12.9)
RBC # BLD AUTO: 4.28 M/UL (ref 4.6–6.2)
VIT B12 SERPL-MCNC: 538 PG/ML (ref 210–950)
WBC # BLD AUTO: 5.19 K/UL (ref 3.9–12.7)

## 2024-02-08 PROCEDURE — 36415 COLL VENOUS BLD VENIPUNCTURE: CPT | Mod: HCNC,PO | Performed by: FAMILY MEDICINE

## 2024-02-08 PROCEDURE — 85025 COMPLETE CBC W/AUTO DIFF WBC: CPT | Mod: HCNC,PO | Performed by: FAMILY MEDICINE

## 2024-02-08 PROCEDURE — 82607 VITAMIN B-12: CPT | Mod: GA,HCNC | Performed by: FAMILY MEDICINE

## 2024-02-08 PROCEDURE — 83036 HEMOGLOBIN GLYCOSYLATED A1C: CPT | Mod: HCNC | Performed by: FAMILY MEDICINE

## 2024-02-13 RX ORDER — FAMOTIDINE 20 MG/1
20 TABLET, FILM COATED ORAL 2 TIMES DAILY
Qty: 60 TABLET | Refills: 3 | Status: SHIPPED | OUTPATIENT
Start: 2024-02-13 | End: 2024-05-10 | Stop reason: SDUPTHER

## 2024-02-15 ENCOUNTER — LAB VISIT (OUTPATIENT)
Dept: LAB | Facility: HOSPITAL | Age: 74
End: 2024-02-15
Payer: MEDICARE

## 2024-02-15 ENCOUNTER — OFFICE VISIT (OUTPATIENT)
Dept: INTERNAL MEDICINE | Facility: CLINIC | Age: 74
End: 2024-02-15
Payer: MEDICARE

## 2024-02-15 VITALS
HEIGHT: 71 IN | TEMPERATURE: 96 F | SYSTOLIC BLOOD PRESSURE: 138 MMHG | HEART RATE: 68 BPM | WEIGHT: 202.81 LBS | OXYGEN SATURATION: 98 % | DIASTOLIC BLOOD PRESSURE: 78 MMHG | BODY MASS INDEX: 28.39 KG/M2

## 2024-02-15 DIAGNOSIS — E11.22 TYPE 2 DIABETES MELLITUS WITH STAGE 3A CHRONIC KIDNEY DISEASE, WITH LONG-TERM CURRENT USE OF INSULIN: ICD-10-CM

## 2024-02-15 DIAGNOSIS — E11.69 HYPERLIPIDEMIA ASSOCIATED WITH TYPE 2 DIABETES MELLITUS: Chronic | ICD-10-CM

## 2024-02-15 DIAGNOSIS — N18.31 TYPE 2 DIABETES MELLITUS WITH STAGE 3A CHRONIC KIDNEY DISEASE, WITH LONG-TERM CURRENT USE OF INSULIN: ICD-10-CM

## 2024-02-15 DIAGNOSIS — H40.019 OPEN ANGLE WITH BORDERLINE FINDINGS, LOW RISK: ICD-10-CM

## 2024-02-15 DIAGNOSIS — E78.5 HYPERLIPIDEMIA ASSOCIATED WITH TYPE 2 DIABETES MELLITUS: Chronic | ICD-10-CM

## 2024-02-15 DIAGNOSIS — E11.59 HYPERTENSION ASSOCIATED WITH DIABETES: Primary | Chronic | ICD-10-CM

## 2024-02-15 DIAGNOSIS — I15.2 HYPERTENSION ASSOCIATED WITH DIABETES: Primary | Chronic | ICD-10-CM

## 2024-02-15 DIAGNOSIS — I70.0 CALCIFICATION OF AORTA: ICD-10-CM

## 2024-02-15 DIAGNOSIS — J84.10 LUNG GRANULOMA: ICD-10-CM

## 2024-02-15 DIAGNOSIS — Z95.0 PACEMAKER: Chronic | ICD-10-CM

## 2024-02-15 DIAGNOSIS — Z79.4 TYPE 2 DIABETES MELLITUS WITH STAGE 3A CHRONIC KIDNEY DISEASE, WITH LONG-TERM CURRENT USE OF INSULIN: ICD-10-CM

## 2024-02-15 DIAGNOSIS — D50.9 IRON DEFICIENCY ANEMIA, UNSPECIFIED IRON DEFICIENCY ANEMIA TYPE: Chronic | ICD-10-CM

## 2024-02-15 DIAGNOSIS — G47.33 OSA ON CPAP: ICD-10-CM

## 2024-02-15 PROBLEM — G89.29 CHRONIC LUQ PAIN: Status: RESOLVED | Noted: 2017-07-02 | Resolved: 2024-02-15

## 2024-02-15 PROBLEM — R11.0 NAUSEA: Status: RESOLVED | Noted: 2017-07-02 | Resolved: 2024-02-15

## 2024-02-15 PROBLEM — R10.12 CHRONIC LUQ PAIN: Status: RESOLVED | Noted: 2017-07-02 | Resolved: 2024-02-15

## 2024-02-15 LAB
ALBUMIN/CREAT UR: 10.9 UG/MG (ref 0–30)
CREAT UR-MCNC: 55 MG/DL (ref 23–375)
MICROALBUMIN UR DL<=1MG/L-MCNC: 6 UG/ML

## 2024-02-15 PROCEDURE — 1126F AMNT PAIN NOTED NONE PRSNT: CPT | Mod: HCNC,CPTII,S$GLB, | Performed by: PHYSICIAN ASSISTANT

## 2024-02-15 PROCEDURE — 3078F DIAST BP <80 MM HG: CPT | Mod: HCNC,CPTII,S$GLB, | Performed by: PHYSICIAN ASSISTANT

## 2024-02-15 PROCEDURE — 82043 UR ALBUMIN QUANTITATIVE: CPT | Mod: HCNC | Performed by: PHYSICIAN ASSISTANT

## 2024-02-15 PROCEDURE — 1101F PT FALLS ASSESS-DOCD LE1/YR: CPT | Mod: HCNC,CPTII,S$GLB, | Performed by: PHYSICIAN ASSISTANT

## 2024-02-15 PROCEDURE — 1159F MED LIST DOCD IN RCRD: CPT | Mod: HCNC,CPTII,S$GLB, | Performed by: PHYSICIAN ASSISTANT

## 2024-02-15 PROCEDURE — 3072F LOW RISK FOR RETINOPATHY: CPT | Mod: HCNC,CPTII,S$GLB, | Performed by: PHYSICIAN ASSISTANT

## 2024-02-15 PROCEDURE — 3008F BODY MASS INDEX DOCD: CPT | Mod: HCNC,CPTII,S$GLB, | Performed by: PHYSICIAN ASSISTANT

## 2024-02-15 PROCEDURE — 3288F FALL RISK ASSESSMENT DOCD: CPT | Mod: HCNC,CPTII,S$GLB, | Performed by: PHYSICIAN ASSISTANT

## 2024-02-15 PROCEDURE — 3044F HG A1C LEVEL LT 7.0%: CPT | Mod: HCNC,CPTII,S$GLB, | Performed by: PHYSICIAN ASSISTANT

## 2024-02-15 PROCEDURE — 99214 OFFICE O/P EST MOD 30 MIN: CPT | Mod: HCNC,S$GLB,, | Performed by: PHYSICIAN ASSISTANT

## 2024-02-15 PROCEDURE — 3075F SYST BP GE 130 - 139MM HG: CPT | Mod: HCNC,CPTII,S$GLB, | Performed by: PHYSICIAN ASSISTANT

## 2024-02-15 PROCEDURE — 99999 PR PBB SHADOW E&M-EST. PATIENT-LVL V: CPT | Mod: PBBFAC,HCNC,, | Performed by: PHYSICIAN ASSISTANT

## 2024-02-15 RX ORDER — INSULIN GLARGINE 100 [IU]/ML
INJECTION, SOLUTION SUBCUTANEOUS
Qty: 3 ML | Refills: 11 | Status: SHIPPED | OUTPATIENT
Start: 2024-02-15

## 2024-02-15 NOTE — PROGRESS NOTES
Subjective:      Patient ID: Rigoberto Vasquez is a 73 y.o. male.    Chief Complaint: Follow-up    HPI  Here today for a routine follow up.   Needs to change insulin from levemir to lantus. No longer covered by insurance.   16 units in Am and 4 units in PM.   INsulin + actos. Off metformin.   Denies any hypoglycemia.   BP stable and well controlled. No hypotension.   Finger tips: nail beds turning purple and tingling in warm water on occasion.     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    Choroidal nevus of right eye    Epiretinal membrane (ERM) of left eye    Overweight (BMI 25.0-29.9)    Macular hole of right eye    Open angle with borderline findings and low glaucoma risk in both eyes    Ectopic gastric mucosa    Open angle with borderline findings and high glaucoma risk in both eyes    History of skin cancer    History of heart block;AV block, 3rd degree    Benign prostatic hyperplasia (BPH) with straining on urination    Erectile dysfunction    Lung granuloma    Dyspepsia    Calcification of aorta    Cholesteatoma of ear, left    СВЕТЛАНА on CPAP         Current Outpatient Medications:     ACETAMINOPHEN (TYLENOL 8 HOUR ORAL), Take by mouth as needed., Disp: , Rfl:     azelastine (ASTELIN) 137 mcg (0.1 %) nasal spray, 2 sprays (274 mcg total) by Nasal route 2 (two) times daily., Disp: 30 mL, Rfl: 12    blood sugar diagnostic (ACCU-CHEK JAXON) Strp, Check BG 2-3 times daily. Accuchek jaxon strips, Disp: 300 strip, Rfl: 3    dorzolamide-timolol 2-0.5% (COSOPT) 22.3-6.8 mg/mL ophthalmic solution, Place 1 drop into  "both eyes 2 (two) times daily., Disp: 10 mL, Rfl: 5    DROPLET PEN NEEDLE 31 gauge x 3/16" Ndle, USE AS DIRECTED  WITH  INSULIN, Disp: 200 each, Rfl: 3    dutasteride (AVODART) 0.5 mg capsule, Take 1 capsule (0.5 mg total) by mouth once daily., Disp: 90 capsule, Rfl: 3    famotidine (PEPCID) 20 MG tablet, Take 1 tablet (20 mg total) by mouth 2 (two) times daily., Disp: 60 tablet, Rfl: 3    fenofibrate micronized (LOFIBRA) 200 MG Cap, Take 1 capsule (200 mg total) by mouth every evening., Disp: 90 capsule, Rfl: 4    fluticasone propionate (FLONASE) 50 mcg/actuation nasal spray, 1 spray by Each Nostril route once daily., Disp: , Rfl:     gabapentin (CMPD PAIN MANAGEMENT COMPOUND OINTMNENT THREE), Apply bid prn pain, Disp: 100 g, Rfl: 11    hydrocortisone 2.5 % cream, Apply topically 2 (two) times daily., Disp: 3.5 g, Rfl: 5    levocetirizine (XYZAL) 5 MG tablet, Take 5 mg by mouth every evening., Disp: , Rfl:     liraglutide 0.6 mg/0.1 mL, 18 mg/3 mL, subq PNIJ (VICTOZA 3-ALESHA) 0.6 mg/0.1 mL (18 mg/3 mL) PnIj pen, INJECT 1.8 MG DAILY, Disp: 27 mL, Rfl: 4    losartan (COZAAR) 25 MG tablet, Take 1 tablet (25 mg total) by mouth once daily., Disp: 90 tablet, Rfl: 3    lovastatin (MEVACOR) 40 MG tablet, Take 1 tablet by mouth Every evening., Disp: 90 tablet, Rfl: 3    multivitamin capsule, Take 1 capsule by mouth once daily., Disp: , Rfl:     pantoprazole (PROTONIX) 40 MG tablet, Take 1 tablet (40 mg total) by mouth once daily., Disp: 90 tablet, Rfl: 1    pioglitazone (ACTOS) 30 MG tablet, Take 1 tablet (30 mg total) by mouth once daily., Disp: 90 tablet, Rfl: 4    polyethylene glycol (GLYCOLAX) 17 gram/dose powder, Take 17 g by mouth once daily., Disp: , Rfl:     triamcinolone acetonide 0.1% (KENALOG) 0.1 % cream, AAA bid, Disp: 454 g, Rfl: 0    insulin (LANTUS SOLOSTAR U-100 INSULIN) glargine 100 units/mL SubQ pen, 16 units in the AM and 4 units in the PM, Disp: 3 mL, Rfl: 11    latanoprost 0.005 % ophthalmic solution, " "INSTILL 1 DROP INTO BOTH EYES ONE TIME DAILY, Disp: 7.5 mL, Rfl: 3    sildenafil (REVATIO) 20 mg Tab, Take 1 tablet (20 mg total) by mouth daily as needed. Takes prn, Disp: 90 tablet, Rfl: 11    Review of Systems   Constitutional:  Negative for activity change, appetite change, chills, diaphoresis, fatigue, fever and unexpected weight change.   HENT: Negative.  Negative for congestion, hearing loss, postnasal drip, rhinorrhea, sore throat, trouble swallowing and voice change.    Eyes: Negative.  Negative for visual disturbance.   Respiratory: Negative.  Negative for cough, choking, chest tightness and shortness of breath.    Cardiovascular:  Negative for chest pain, palpitations and leg swelling.   Gastrointestinal:  Negative for abdominal distention, abdominal pain, blood in stool, constipation, diarrhea, nausea and vomiting.   Endocrine: Negative for cold intolerance, heat intolerance, polydipsia and polyuria.   Genitourinary: Negative.  Negative for difficulty urinating and frequency.   Musculoskeletal:  Negative for arthralgias, back pain, gait problem, joint swelling and myalgias.   Skin:  Negative for color change, pallor, rash and wound.   Neurological:  Negative for dizziness, tremors, weakness, light-headedness, numbness and headaches.   Hematological:  Negative for adenopathy.   Psychiatric/Behavioral:  Negative for behavioral problems, confusion, self-injury, sleep disturbance and suicidal ideas. The patient is not nervous/anxious.      Objective:   /78 (BP Location: Left arm, Patient Position: Sitting, BP Method: Large (Manual))   Pulse 68   Temp 96.4 °F (35.8 °C) (Tympanic)   Ht 5' 11" (1.803 m)   Wt 92 kg (202 lb 13.2 oz)   SpO2 98%   BMI 28.29 kg/m²     Physical Exam  Vitals reviewed.   Constitutional:       General: He is not in acute distress.     Appearance: Normal appearance. He is well-developed. He is not ill-appearing, toxic-appearing or diaphoretic.   HENT:      Head: Normocephalic " and atraumatic.      Right Ear: Tympanic membrane, ear canal and external ear normal.      Left Ear: Tympanic membrane, ear canal and external ear normal.      Nose: Nose normal.   Eyes:      Conjunctiva/sclera: Conjunctivae normal.      Pupils: Pupils are equal, round, and reactive to light.   Cardiovascular:      Rate and Rhythm: Normal rate and regular rhythm.      Heart sounds: Normal heart sounds. No murmur heard.     No friction rub. No gallop.   Pulmonary:      Effort: Pulmonary effort is normal. No respiratory distress.      Breath sounds: Normal breath sounds. No wheezing or rales.   Chest:      Chest wall: No tenderness.   Abdominal:      General: There is no distension.      Palpations: Abdomen is soft.      Tenderness: There is no abdominal tenderness.   Musculoskeletal:         General: Normal range of motion.      Cervical back: Normal range of motion and neck supple.   Lymphadenopathy:      Cervical: No cervical adenopathy.   Skin:     General: Skin is warm and dry.      Capillary Refill: Capillary refill takes less than 2 seconds.      Findings: No rash.   Neurological:      Mental Status: He is alert and oriented to person, place, and time.      Motor: No weakness.      Coordination: Coordination normal.      Gait: Gait normal.   Psychiatric:         Mood and Affect: Mood normal.         Behavior: Behavior normal.         Thought Content: Thought content normal.         Judgment: Judgment normal.       Lab Visit on 02/08/2024   Component Date Value Ref Range Status    Hemoglobin A1C 02/08/2024 6.1 (H)  4.0 - 5.6 % Final    Comment: ADA Screening Guidelines:  5.7-6.4%  Consistent with prediabetes  >or=6.5%  Consistent with diabetes    High levels of fetal hemoglobin interfere with the HbA1C  assay. Heterozygous hemoglobin variants (HbS, HgC, etc)do  not significantly interfere with this assay.   However, presence of multiple variants may affect accuracy.      Estimated Avg Glucose 02/08/2024 128  68  - 131 mg/dL Final    Vitamin B-12 02/08/2024 538  210 - 950 pg/mL Final    WBC 02/08/2024 5.19  3.90 - 12.70 K/uL Final    RBC 02/08/2024 4.28 (L)  4.60 - 6.20 M/uL Final    Hemoglobin 02/08/2024 14.1  14.0 - 18.0 g/dL Final    Hematocrit 02/08/2024 41.8  40.0 - 54.0 % Final    MCV 02/08/2024 98  82 - 98 fL Final    MCH 02/08/2024 32.9 (H)  27.0 - 31.0 pg Final    MCHC 02/08/2024 33.7  32.0 - 36.0 g/dL Final    RDW 02/08/2024 13.2  11.5 - 14.5 % Final    Platelets 02/08/2024 239  150 - 450 K/uL Final    MPV 02/08/2024 9.0 (L)  9.2 - 12.9 fL Final    Immature Granulocytes 02/08/2024 0.4  0.0 - 0.5 % Final    Gran # (ANC) 02/08/2024 3.4  1.8 - 7.7 K/uL Final    Immature Grans (Abs) 02/08/2024 0.02  0.00 - 0.04 K/uL Final    Comment: Mild elevation in immature granulocytes is non specific and   can be seen in a variety of conditions including stress response,   acute inflammation, trauma and pregnancy. Correlation with other   laboratory and clinical findings is essential.      Lymph # 02/08/2024 1.2  1.0 - 4.8 K/uL Final    Mono # 02/08/2024 0.4  0.3 - 1.0 K/uL Final    Eos # 02/08/2024 0.1  0.0 - 0.5 K/uL Final    Baso # 02/08/2024 0.03  0.00 - 0.20 K/uL Final    nRBC 02/08/2024 0  0 /100 WBC Final    Gran % 02/08/2024 65.5  38.0 - 73.0 % Final    Lymph % 02/08/2024 23.1  18.0 - 48.0 % Final    Mono % 02/08/2024 8.1  4.0 - 15.0 % Final    Eosinophil % 02/08/2024 2.3  0.0 - 8.0 % Final    Basophil % 02/08/2024 0.6  0.0 - 1.9 % Final    Differential Method 02/08/2024 Automated   Final       Assessment:     1. Hypertension associated with diabetes    2. Hyperlipidemia associated with type 2 diabetes mellitus    3. Iron deficiency anemia, unspecified iron deficiency anemia type    4. Type 2 diabetes mellitus with stage 3a chronic kidney disease, with long-term current use of insulin    5. СВЕТЛАНА on CPAP    6. Lung granuloma    7. Pacemaker    8. Open angle with borderline findings, low risk    9. Calcification of aorta       Plan:   Hypertension associated with diabetes  -stable and controlled on current meds    Hyperlipidemia associated with type 2 diabetes mellitus  -     Lipid Panel; Future; Expected date: 08/15/2024    Iron deficiency anemia, unspecified iron deficiency anemia type  -improved on recent labs    Type 2 diabetes mellitus with stage 3a chronic kidney disease, with long-term current use of insulin  -     Microalbumin/Creatinine Ratio, Urine; Future; Expected date: 02/15/2024  -     insulin (LANTUS SOLOSTAR U-100 INSULIN) glargine 100 units/mL SubQ pen; 16 units in the AM and 4 units in the PM  Dispense: 3 mL; Refill: 11  -     Hemoglobin A1C; Future; Expected date: 08/15/2024    СВЕТЛАНА on CPAP  -stable on cpap    Lung granuloma  -stable    -labs in 6 months    Pacemaker  -up TO DATE WITH CARDIOLOGY    Open angle with borderline findings, low risk  -up to date with ophthalmology    Calcification of aorta  -stable on statin. Up to date with cardiology      Follow up in about 6 months (around 8/15/2024), or if symptoms worsen or fail to improve.

## 2024-03-07 ENCOUNTER — OFFICE VISIT (OUTPATIENT)
Dept: OTOLARYNGOLOGY | Facility: CLINIC | Age: 74
End: 2024-03-07
Payer: MEDICARE

## 2024-03-07 VITALS — HEIGHT: 71 IN | WEIGHT: 202.81 LBS | BODY MASS INDEX: 28.39 KG/M2

## 2024-03-07 DIAGNOSIS — H71.92 CHOLESTEATOMA OF EAR, LEFT: Primary | ICD-10-CM

## 2024-03-07 DIAGNOSIS — H61.22 IMPACTED CERUMEN, LEFT EAR: ICD-10-CM

## 2024-03-07 DIAGNOSIS — Z98.890 HX OF TYMPANOMASTOIDECTOMY: ICD-10-CM

## 2024-03-07 DIAGNOSIS — H72.01 CENTRAL PERFORATION OF TYMPANIC MEMBRANE OF RIGHT EAR: ICD-10-CM

## 2024-03-07 PROCEDURE — 3008F BODY MASS INDEX DOCD: CPT | Mod: HCNC,CPTII,S$GLB, | Performed by: OTOLARYNGOLOGY

## 2024-03-07 PROCEDURE — 99213 OFFICE O/P EST LOW 20 MIN: CPT | Mod: 25,HCNC,S$GLB, | Performed by: OTOLARYNGOLOGY

## 2024-03-07 PROCEDURE — 3061F NEG MICROALBUMINURIA REV: CPT | Mod: HCNC,CPTII,S$GLB, | Performed by: OTOLARYNGOLOGY

## 2024-03-07 PROCEDURE — 99999 PR PBB SHADOW E&M-EST. PATIENT-LVL III: CPT | Mod: PBBFAC,HCNC,, | Performed by: OTOLARYNGOLOGY

## 2024-03-07 PROCEDURE — 1126F AMNT PAIN NOTED NONE PRSNT: CPT | Mod: HCNC,CPTII,S$GLB, | Performed by: OTOLARYNGOLOGY

## 2024-03-07 PROCEDURE — 3288F FALL RISK ASSESSMENT DOCD: CPT | Mod: HCNC,CPTII,S$GLB, | Performed by: OTOLARYNGOLOGY

## 2024-03-07 PROCEDURE — 3072F LOW RISK FOR RETINOPATHY: CPT | Mod: HCNC,CPTII,S$GLB, | Performed by: OTOLARYNGOLOGY

## 2024-03-07 PROCEDURE — 3066F NEPHROPATHY DOC TX: CPT | Mod: HCNC,CPTII,S$GLB, | Performed by: OTOLARYNGOLOGY

## 2024-03-07 PROCEDURE — 69210 REMOVE IMPACTED EAR WAX UNI: CPT | Mod: HCNC,S$GLB,, | Performed by: OTOLARYNGOLOGY

## 2024-03-07 PROCEDURE — 1159F MED LIST DOCD IN RCRD: CPT | Mod: HCNC,CPTII,S$GLB, | Performed by: OTOLARYNGOLOGY

## 2024-03-07 PROCEDURE — 3044F HG A1C LEVEL LT 7.0%: CPT | Mod: HCNC,CPTII,S$GLB, | Performed by: OTOLARYNGOLOGY

## 2024-03-07 PROCEDURE — 1101F PT FALLS ASSESS-DOCD LE1/YR: CPT | Mod: HCNC,CPTII,S$GLB, | Performed by: OTOLARYNGOLOGY

## 2024-03-08 NOTE — PROGRESS NOTES
Subjective:       Patient ID: Rigoberto Vasquez is a 73 y.o. male.    Chief Complaint: Other    Ear Fullness   Associated symptoms include ear discharge and hearing loss. Pertinent negatives include no sore throat.   Follow-up  Pertinent negatives include no chest pain, chills, fever or sore throat.        Rigoberto Vasquez is a 73 y.o. male hx of left ear CWU tmastoid in 2015 rand more recently revision AS CWU tmastoid for recurrence and PE tube of right ear on 3/15/2023. Unfortunately developed perf in right ear after tube.      Here for follow up reports no acute complaints other than hearing difficulties bilaterally. Uses hearing aids.     Review of Systems   Constitutional:  Negative for chills and fever.   HENT:  Positive for ear discharge and hearing loss. Negative for sore throat and trouble swallowing.    Respiratory:  Negative for apnea and chest tightness.    Cardiovascular:  Negative for chest pain.         Objective:      Physical Exam  Vitals and nursing note reviewed.   Constitutional:       Appearance: Normal appearance.   HENT:      Head: Normocephalic and atraumatic.      Right Ear: There is no impacted cerumen.      Left Ear: There is no impacted cerumen.   Neurological:      Mental Status: He is alert.         Binocular Microscopy  Indications: s/p ear surgery  Details: binocular microscopy used to examine both ears  Findings  AD: posterior based dry perforation.  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

## 2024-03-18 RX ORDER — PIOGLITAZONEHYDROCHLORIDE 30 MG/1
30 TABLET ORAL DAILY
Qty: 90 TABLET | Refills: 4 | Status: SHIPPED | OUTPATIENT
Start: 2024-03-18

## 2024-03-18 NOTE — TELEPHONE ENCOUNTER
No care due was identified.  [facility] Embedded Care Due Messages. Reference number: [id number].   3/18/2024 8:33:18 AM CDT

## 2024-03-26 ENCOUNTER — PATIENT MESSAGE (OUTPATIENT)
Dept: UROLOGY | Facility: CLINIC | Age: 74
End: 2024-03-26
Payer: MEDICARE

## 2024-03-26 DIAGNOSIS — N52.9 ERECTILE DYSFUNCTION, UNSPECIFIED ERECTILE DYSFUNCTION TYPE: ICD-10-CM

## 2024-03-26 RX ORDER — SILDENAFIL CITRATE 20 MG/1
20 TABLET ORAL DAILY PRN
Qty: 30 TABLET | Refills: 11 | Status: SHIPPED | OUTPATIENT
Start: 2024-03-26 | End: 2025-03-26

## 2024-04-03 ENCOUNTER — PATIENT MESSAGE (OUTPATIENT)
Dept: PULMONOLOGY | Facility: CLINIC | Age: 74
End: 2024-04-03
Payer: MEDICARE

## 2024-04-09 ENCOUNTER — OFFICE VISIT (OUTPATIENT)
Dept: OPHTHALMOLOGY | Facility: CLINIC | Age: 74
End: 2024-04-09
Payer: MEDICARE

## 2024-04-09 DIAGNOSIS — E11.9 CONTROLLED TYPE 2 DIABETES MELLITUS WITHOUT COMPLICATION, WITH LONG-TERM CURRENT USE OF INSULIN: ICD-10-CM

## 2024-04-09 DIAGNOSIS — Z79.4 CONTROLLED TYPE 2 DIABETES MELLITUS WITHOUT COMPLICATION, WITH LONG-TERM CURRENT USE OF INSULIN: ICD-10-CM

## 2024-04-09 DIAGNOSIS — H40.013 OPEN ANGLE WITH BORDERLINE FINDINGS AND LOW GLAUCOMA RISK IN BOTH EYES: Primary | ICD-10-CM

## 2024-04-09 DIAGNOSIS — H52.7 REFRACTIVE ERROR: ICD-10-CM

## 2024-04-09 DIAGNOSIS — H35.341 LAMELLAR MACULAR HOLE OF RIGHT EYE: ICD-10-CM

## 2024-04-09 DIAGNOSIS — E11.36 DIABETIC CATARACT OF BOTH EYES: ICD-10-CM

## 2024-04-09 PROCEDURE — 92015 DETERMINE REFRACTIVE STATE: CPT | Mod: HCNC,S$GLB,, | Performed by: OPHTHALMOLOGY

## 2024-04-09 PROCEDURE — 3044F HG A1C LEVEL LT 7.0%: CPT | Mod: HCNC,CPTII,S$GLB, | Performed by: OPHTHALMOLOGY

## 2024-04-09 PROCEDURE — 99999 PR PBB SHADOW E&M-EST. PATIENT-LVL III: CPT | Mod: PBBFAC,HCNC,, | Performed by: OPHTHALMOLOGY

## 2024-04-09 PROCEDURE — 99214 OFFICE O/P EST MOD 30 MIN: CPT | Mod: HCNC,S$GLB,, | Performed by: OPHTHALMOLOGY

## 2024-04-09 PROCEDURE — 3061F NEG MICROALBUMINURIA REV: CPT | Mod: HCNC,CPTII,S$GLB, | Performed by: OPHTHALMOLOGY

## 2024-04-09 PROCEDURE — 1126F AMNT PAIN NOTED NONE PRSNT: CPT | Mod: HCNC,CPTII,S$GLB, | Performed by: OPHTHALMOLOGY

## 2024-04-09 PROCEDURE — 1159F MED LIST DOCD IN RCRD: CPT | Mod: HCNC,CPTII,S$GLB, | Performed by: OPHTHALMOLOGY

## 2024-04-09 PROCEDURE — 1160F RVW MEDS BY RX/DR IN RCRD: CPT | Mod: HCNC,CPTII,S$GLB, | Performed by: OPHTHALMOLOGY

## 2024-04-09 PROCEDURE — 3066F NEPHROPATHY DOC TX: CPT | Mod: HCNC,CPTII,S$GLB, | Performed by: OPHTHALMOLOGY

## 2024-04-09 NOTE — PROGRESS NOTES
HPI     Glaucoma            Comments: 4 MO IOP Check: Pt states VA is unchanged since last visit.   Using drop as directed. No pain or irritation today.          Comments    1. DM since 1999   NO DBR   2. Mac hole od   3. erm od  4. Choroidal nevus od   5. HIGH MYOPE(-6)   6. SCOAG   iop 25/ 23 ou on po steroid   anamolous myopic disc   ASYMETRIC C/D 0.7 / 0.5    // 641  NO FAMILY HISTORY   [facility] 3/22/2022      **d/c Latanoprost due to ERM/mac hole**      GCL OD: 29 w/ Artifact, OS: 28 --8/2023       Dorz/Timolol BID OU             Last edited by Lizzy Reno on 4/9/2024  8:53 AM.            Assessment /Plan     For exam results, see Encounter Report.      ICD-10-CM ICD-9-CM    1. Open angle with borderline findings and low glaucoma risk in both eyes  H40.013 365.01 Doing well - intraocular pressure is within acceptable range relative to target pressure with no evidence of progression.   Continue current treatment.  Reviewed importance of continued compliance with treatment and follow up.         2. Controlled type 2 diabetes mellitus without complication, with long-term current use of insulin  E11.9 250.00 Monitor with DFE    Z79.4 V58.67       3. Diabetic cataract of both eyes  E11.36 250.50 You were found to have an early cataract in your eye(s) today. However the cataract is not affecting your activities of daily living, such as reading and driving. You do not need surgery at this time. We will recheck your cataract at your next visit. You are welcome to call for an earlier appointment if your vision gets worse.      366.41       4. Lamellar macular hole of right eye  H35.341 362.54 Monitor with DFE      5. Refractive error  H52.7 367.9 Disp MR          Return to clinic 4 months for IOP        Dorz/Timolol BID OU

## 2024-04-19 NOTE — TELEPHONE ENCOUNTER
No care due was identified.  [facility] Embedded Care Due Messages. Reference number: [id number].   4/19/2024 2:37:05 PM CDT

## 2024-05-03 DIAGNOSIS — I10 ESSENTIAL HYPERTENSION: ICD-10-CM

## 2024-05-07 NOTE — TELEPHONE ENCOUNTER
Refill Routing Note   Medication(s) are not appropriate for processing by [facility] for the following reason(s):        Non-participating provider    [facility] action(s):  Route               Appointments  past 12m or future 3m with PCP    Date Provider   Last Visit   2/15/2024 Suma Sheth PA-C   Next Visit   Visit date not found Suma Sheth PA-C   ED visits in past 90 days: 0        Note composed:2:54 PM 05/07/2024

## 2024-05-08 ENCOUNTER — PATIENT MESSAGE (OUTPATIENT)
Dept: INTERNAL MEDICINE | Facility: CLINIC | Age: 74
End: 2024-05-08
Payer: MEDICARE

## 2024-05-08 RX ORDER — LOSARTAN POTASSIUM 25 MG/1
25 TABLET ORAL DAILY
Qty: 90 TABLET | Refills: 3 | Status: SHIPPED | OUTPATIENT
Start: 2024-05-08 | End: 2025-05-08

## 2024-05-10 NOTE — TELEPHONE ENCOUNTER
Refill request on protonix 40 mg BID; last refill given 12/19/23 90 tabs with 1 refills; alst office visit on 06/15/23

## 2024-05-14 RX ORDER — FAMOTIDINE 20 MG/1
20 TABLET, FILM COATED ORAL 2 TIMES DAILY
Qty: 60 TABLET | Refills: 3 | Status: SHIPPED | OUTPATIENT
Start: 2024-05-14 | End: 2024-09-11

## 2024-06-17 ENCOUNTER — OFFICE VISIT (OUTPATIENT)
Dept: UROLOGY | Facility: CLINIC | Age: 74
End: 2024-06-17
Payer: MEDICARE

## 2024-06-17 VITALS
HEART RATE: 72 BPM | HEIGHT: 71 IN | DIASTOLIC BLOOD PRESSURE: 78 MMHG | WEIGHT: 197.31 LBS | BODY MASS INDEX: 27.62 KG/M2 | SYSTOLIC BLOOD PRESSURE: 127 MMHG

## 2024-06-17 DIAGNOSIS — N40.1 BPH WITH OBSTRUCTION/LOWER URINARY TRACT SYMPTOMS: ICD-10-CM

## 2024-06-17 DIAGNOSIS — R31.0 GROSS HEMATURIA: Primary | ICD-10-CM

## 2024-06-17 DIAGNOSIS — R31.29 OTHER MICROSCOPIC HEMATURIA: ICD-10-CM

## 2024-06-17 DIAGNOSIS — N13.8 BPH WITH OBSTRUCTION/LOWER URINARY TRACT SYMPTOMS: ICD-10-CM

## 2024-06-17 DIAGNOSIS — R39.13 INTERMITTENT URINARY STREAM: ICD-10-CM

## 2024-06-17 LAB
BILIRUB UR QL STRIP: NEGATIVE
GLUCOSE UR QL STRIP: NEGATIVE
KETONES UR QL STRIP: NEGATIVE
LEUKOCYTE ESTERASE UR QL STRIP: NEGATIVE
PH, POC UA: 7
POC BLOOD, URINE: POSITIVE
POC NITRATES, URINE: NEGATIVE
POC RESIDUAL URINE VOLUME: 69 ML (ref 0–100)
PROT UR QL STRIP: NEGATIVE
SP GR UR STRIP: 1.01 (ref 1–1.03)
UROBILINOGEN UR STRIP-ACNC: 0.2 (ref 0.3–2.2)

## 2024-06-17 PROCEDURE — 99214 OFFICE O/P EST MOD 30 MIN: CPT | Mod: S$GLB,,, | Performed by: UROLOGY

## 2024-06-17 PROCEDURE — 3061F NEG MICROALBUMINURIA REV: CPT | Mod: CPTII,S$GLB,, | Performed by: UROLOGY

## 2024-06-17 PROCEDURE — 3072F LOW RISK FOR RETINOPATHY: CPT | Mod: CPTII,S$GLB,, | Performed by: UROLOGY

## 2024-06-17 PROCEDURE — 3288F FALL RISK ASSESSMENT DOCD: CPT | Mod: CPTII,S$GLB,, | Performed by: UROLOGY

## 2024-06-17 PROCEDURE — 3008F BODY MASS INDEX DOCD: CPT | Mod: CPTII,S$GLB,, | Performed by: UROLOGY

## 2024-06-17 PROCEDURE — 1101F PT FALLS ASSESS-DOCD LE1/YR: CPT | Mod: CPTII,S$GLB,, | Performed by: UROLOGY

## 2024-06-17 PROCEDURE — 81003 URINALYSIS AUTO W/O SCOPE: CPT | Mod: QW,S$GLB,, | Performed by: UROLOGY

## 2024-06-17 PROCEDURE — 99999 PR PBB SHADOW E&M-EST. PATIENT-LVL V: CPT | Mod: PBBFAC,HCNC,, | Performed by: UROLOGY

## 2024-06-17 PROCEDURE — 1126F AMNT PAIN NOTED NONE PRSNT: CPT | Mod: CPTII,S$GLB,, | Performed by: UROLOGY

## 2024-06-17 PROCEDURE — 3074F SYST BP LT 130 MM HG: CPT | Mod: CPTII,S$GLB,, | Performed by: UROLOGY

## 2024-06-17 PROCEDURE — 3044F HG A1C LEVEL LT 7.0%: CPT | Mod: CPTII,S$GLB,, | Performed by: UROLOGY

## 2024-06-17 PROCEDURE — 4010F ACE/ARB THERAPY RXD/TAKEN: CPT | Mod: CPTII,S$GLB,, | Performed by: UROLOGY

## 2024-06-17 PROCEDURE — 3066F NEPHROPATHY DOC TX: CPT | Mod: CPTII,S$GLB,, | Performed by: UROLOGY

## 2024-06-17 PROCEDURE — 51798 US URINE CAPACITY MEASURE: CPT | Mod: S$GLB,,, | Performed by: UROLOGY

## 2024-06-17 PROCEDURE — 1159F MED LIST DOCD IN RCRD: CPT | Mod: CPTII,S$GLB,, | Performed by: UROLOGY

## 2024-06-17 PROCEDURE — 3078F DIAST BP <80 MM HG: CPT | Mod: CPTII,S$GLB,, | Performed by: UROLOGY

## 2024-06-17 NOTE — PROGRESS NOTES
Subjective:       Patient ID: Rigoberto Vasquez is a 73 y.o. male.    Chief Complaint: Follow-up and Urinary Frequency      History of Present Illness:     Mr Vasquez had gross hematuria 3 weeks ago that resolved after 1 day.  Gross hematuria returned on 6-14-24 and resolved this morning.  He is having some urinary frequency has has improved today.  Nocturia X 1.  Intermittent urinary flow.  Denies taking a blood thinner.  He has BPH and he has a history of a TURP by Andrew Dave approximately 10 year ago per the patient.  He is taking dutasteride 0.5 mg daily.  Denies a history of kidney stones.  Denies flank pain.    Follow-up  Pertinent negatives include no chest pain, chills, fever, nausea, rash or vomiting.   Urinary Frequency   Associated symptoms include frequency and hematuria. Pertinent negatives include no chills, flank pain, nausea, vomiting or rash.        Past Medical History:   Diagnosis Date    Acid reflux     [facility]    Angina pectoris     Back pain     BPH (benign prostatic hyperplasia)     blue    Cholesteatoma     Degenerative joint disease     Diverticulosis     Erectile dysfunction     History of elevated PSA 02/2014    normalized, dr dave annually    History of pericarditis     Hypercholesteremia     Hypertension     Iron deficiency anemia     СВЕТЛАНА (obstructive sleep apnea) 05/17/2022    Pacemaker     complete av block;NO afib    Renal disorder     Squamous cell cancer of skin of mastoid region of scalp     dr jaida salgado    Type 2 diabetes mellitus     dr wyman    Urinary incontinence     when he was 7 Yrs old.      Family History   Problem Relation Name Age of Onset    Arthritis Mother      Hypertension Mother      Heart disease Father      Hypertension Father      Stroke Father      Diabetes Son      Diabetes Maternal Grandmother      Colon cancer Paternal Grandmother       Social History     Socioeconomic History    Marital status:      Spouse name: SAUL    Number of children: 2  "  Occupational History    Occupation: retired- [employer] .   Tobacco Use    Smoking status: Never    Smokeless tobacco: Never   Substance and Sexual Activity    Alcohol use: Yes     Comment: " once a month"    Drug use: No    Sexual activity: Yes     Partners: Female   Social History Narrative     . Lives with spouse. Has 2 children. Patient retired from [employer]. His son has type 1 diabetes.     Social Determinants of Health     Financial Resource Strain: Low Risk  (11/11/2021)    Overall Financial Resource Strain (CARDIA)     Difficulty of Paying Living Expenses: Not hard at all   Food Insecurity: No Food Insecurity (11/11/2021)    Hunger Vital Sign     Worried About Running Out of Food in the Last Year: Never true     Ran Out of Food in the Last Year: Never true   Transportation Needs: No Transportation Needs (11/11/2021)    PRAPARE - Transportation     Lack of Transportation (Medical): No     Lack of Transportation (Non-Medical): No   Physical Activity: Sufficiently Active (11/11/2021)    Exercise Vital Sign     Days of Exercise per Week: 5 days     Minutes of Exercise per Session: 60 min   Stress: No Stress Concern Present (11/11/2021)    [employer] [place] [employer] - Occupational Stress Questionnaire     Feeling of Stress : Not at all   Housing Stability: Low Risk  (11/11/2021)    Housing Stability Vital Sign     Unable to Pay for Housing in the Last Year: No     Number of Places Lived in the Last Year: 1     Unstable Housing in the Last Year: No     Outpatient Encounter Medications as of 6/17/2024   Medication Sig Dispense Refill    ACETAMINOPHEN (TYLENOL 8 HOUR ORAL) Take by mouth as needed.      azelastine (ASTELIN) 137 mcg (0.1 %) nasal spray 2 sprays (274 mcg total) by Nasal route 2 (two) times daily. 30 mL 12    blood sugar diagnostic (ACCU-CHEK JAXON) Strp Check BG 2-3 times daily. Accuchek jaxon strips 300 strip 3    dorzolamide-timolol 2-0.5% (COSOPT) 22.3-6.8 " "mg/mL ophthalmic solution Place 1 drop into both eyes 2 (two) times daily. 10 mL 5    DROPLET PEN NEEDLE 31 gauge x 3/16" Ndle USE AS DIRECTED  WITH  INSULIN 200 each 3    dutasteride (AVODART) 0.5 mg capsule Take 1 capsule (0.5 mg total) by mouth once daily. 90 capsule 3    famotidine (PEPCID) 20 MG tablet Take 1 tablet (20 mg total) by mouth 2 (two) times daily. 60 tablet 3    fenofibrate micronized (LOFIBRA) 200 MG Cap Take 1 capsule (200 mg total) by mouth every evening. 90 capsule 4    fluticasone propionate (FLONASE) 50 mcg/actuation nasal spray 1 spray by Each Nostril route once daily.      gabapentin (CMPD PAIN MANAGEMENT COMPOUND OINTMNENT THREE) Apply bid prn pain 100 g 11    hydrocortisone 2.5 % cream Apply topically 2 (two) times daily. 3.5 g 5    insulin (LANTUS SOLOSTAR U-100 INSULIN) glargine 100 units/mL SubQ pen 16 units in the AM and 4 units in the PM 3 mL 11    latanoprost 0.005 % ophthalmic solution INSTILL 1 DROP INTO BOTH EYES ONE TIME DAILY 7.5 mL 3    levocetirizine (XYZAL) 5 MG tablet Take 5 mg by mouth every evening.      liraglutide 0.6 mg/0.1 mL, 18 mg/3 mL, subq PNIJ (VICTOZA 3-ALESHA) 0.6 mg/0.1 mL (18 mg/3 mL) PnIj pen INJECT 1.8 MG DAILY 27 mL 4    losartan (COZAAR) 25 MG tablet Take 1 tablet (25 mg total) by mouth once daily. 90 tablet 3    lovastatin (MEVACOR) 40 MG tablet Take 1 tablet by mouth Every evening. 90 tablet 3    multivitamin capsule Take 1 capsule by mouth once daily.      pantoprazole (PROTONIX) 40 MG tablet Take 1 tablet (40 mg total) by mouth once daily. 90 tablet 1    pioglitazone (ACTOS) 30 MG tablet Take 1 tablet (30 mg total) by mouth once daily. 90 tablet 4    polyethylene glycol (GLYCOLAX) 17 gram/dose powder Take 17 g by mouth once daily.      sildenafil (REVATIO) 20 mg Tab Take 1 tablet (20 mg total) by mouth daily as needed (PRN). Takes prn 30 tablet 11    triamcinolone acetonide 0.1% (KENALOG) 0.1 % cream AAA bid 454 g 0     No facility-administered encounter " "medications on file as of 6/17/2024.        Review of Systems   Constitutional:  Negative for chills and fever.   Respiratory:  Negative for shortness of breath.    Cardiovascular:  Negative for chest pain.   Gastrointestinal:  Negative for nausea and vomiting.   Genitourinary:  Positive for frequency and hematuria. Negative for difficulty urinating, dysuria and flank pain.   Musculoskeletal:  Negative for back pain.   Skin:  Negative for rash.   Neurological:  Negative for dizziness.   Psychiatric/Behavioral:  Negative for agitation.        Objective:     /78 (BP Location: Right arm, Patient Position: Sitting)   Pulse 72   Ht 5' 11" (1.803 m)   Wt 89.5 kg (197 lb 5 oz)   BMI 27.52 kg/m²     Physical Exam  Constitutional:       Appearance: Normal appearance.   Pulmonary:      Effort: Pulmonary effort is normal.   Abdominal:      Palpations: Abdomen is soft.   Neurological:      Mental Status: He is alert and oriented to person, place, and time.   Psychiatric:         Mood and Affect: Mood normal.         Office Visit on 06/17/2024   Component Date Value Ref Range Status    POC Blood, Urine 06/17/2024 Positive (A)  Negative Final    POC Bilirubin, Urine 06/17/2024 Negative  Negative Final    POC Urobilinogen, Urine 06/17/2024 0.2 (A)  0.3 - 2.2 Final    POC Ketones, Urine 06/17/2024 Negative  Negative Final    POC Protein, Urine 06/17/2024 Negative  Negative Final    POC Nitrates, Urine 06/17/2024 Negative  Negative Final    POC Glucose, Urine 06/17/2024 Negative  Negative Final    pH, UA 06/17/2024 7.0   Final    POC Specific Gravity, Urine 06/17/2024 1.015  1.003 - 1.029 Final    POC Leukocytes, Urine 06/17/2024 Negative  Negative Final    POC Residual Urine Volume 06/17/2024 69  0 - 100 mL Final        Results for orders placed or performed in visit on 06/17/24 (from the past 8760 hour(s))   POCT Bladder Scan   Result Value    POC Residual Urine Volume 69        Assessment:       1. Gross hematuria    2. " Hematuria, unspecified type    3. BPH with obstruction/lower urinary tract symptoms    4. Intermittent urinary stream    5. Other microscopic hematuria      Plan:     Orders Placed This Encounter    CT Urogram Abd Pelvis W WO    PROSTATE SPECIFIC ANTIGEN, DIAGNOSTIC    CREATININE, SERUM    POCT Urinalysis, Dipstick, Automated, W/O Scope    POCT Bladder Scan        Assessment:  - Gross hematuria 3 weeks ago that resolved after 1 day.  Gross hematuria returned on 6-14-24 and resolved this morning.  Moderate amount of MSH today on 6-17-24.  He is not a smoker and he has no smoking history.  Denies taking a blood thinner.  - BPH with mild LUTS.  Intermittent urinary flow.  He took tamsulosin in the past. History of a TURP by Andrew Ramirez approximately 10 year ago per the patient.  - ED.  Sildenafil 100 mg usually works for his erections.    Plan:  - CT urogram followed by a cystoscopy by his established Urologist, Dr Lares.  - PSA and creatinine tomorrow.  - Continue dutasteride 0.5 mg 1 PO qdaily.  He says he does not need it refilled.  - I discussed dietary modifications with him today and I recommended he drink mostly water during the day.

## 2024-06-21 ENCOUNTER — LAB VISIT (OUTPATIENT)
Dept: LAB | Facility: HOSPITAL | Age: 74
End: 2024-06-21
Attending: UROLOGY
Payer: MEDICARE

## 2024-06-21 DIAGNOSIS — N13.8 BPH WITH OBSTRUCTION/LOWER URINARY TRACT SYMPTOMS: ICD-10-CM

## 2024-06-21 DIAGNOSIS — R31.0 GROSS HEMATURIA: ICD-10-CM

## 2024-06-21 DIAGNOSIS — N40.1 BPH WITH OBSTRUCTION/LOWER URINARY TRACT SYMPTOMS: ICD-10-CM

## 2024-06-21 LAB
COMPLEXED PSA SERPL-MCNC: 0.19 NG/ML (ref 0–4)
CREAT SERPL-MCNC: 1.5 MG/DL (ref 0.5–1.4)
EST. GFR  (NO RACE VARIABLE): 49.2 ML/MIN/1.73 M^2

## 2024-06-21 PROCEDURE — 84153 ASSAY OF PSA TOTAL: CPT | Performed by: UROLOGY

## 2024-06-21 PROCEDURE — 82565 ASSAY OF CREATININE: CPT | Mod: PO | Performed by: UROLOGY

## 2024-06-21 PROCEDURE — 36415 COLL VENOUS BLD VENIPUNCTURE: CPT | Mod: PO | Performed by: UROLOGY

## 2024-06-24 ENCOUNTER — HOSPITAL ENCOUNTER (OUTPATIENT)
Dept: RADIOLOGY | Facility: HOSPITAL | Age: 74
Discharge: HOME | End: 2024-06-24
Attending: UROLOGY
Payer: MEDICARE

## 2024-06-24 DIAGNOSIS — R31.0 GROSS HEMATURIA: ICD-10-CM

## 2024-06-24 PROCEDURE — 74178 CT ABD&PLV WO CNTR FLWD CNTR: CPT | Mod: TC,PO

## 2024-06-24 PROCEDURE — 74178 CT ABD&PLV WO CNTR FLWD CNTR: CPT | Mod: 26,,, | Performed by: RADIOLOGY

## 2024-06-24 PROCEDURE — 25500020 PHARM REV CODE 255: Mod: PO | Performed by: UROLOGY

## 2024-06-24 RX ADMIN — IOHEXOL 100 ML: 350 INJECTION, SOLUTION INTRAVENOUS at 10:06

## 2024-06-25 ENCOUNTER — PATIENT MESSAGE (OUTPATIENT)
Dept: UROLOGY | Facility: CLINIC | Age: 74
End: 2024-06-25
Payer: MEDICARE

## 2024-06-26 DIAGNOSIS — R11.0 NAUSEA: ICD-10-CM

## 2024-06-26 DIAGNOSIS — R10.13 DYSPEPSIA: ICD-10-CM

## 2024-06-26 RX ORDER — PANTOPRAZOLE SODIUM 40 MG/1
40 TABLET, DELAYED RELEASE ORAL DAILY
Qty: 90 TABLET | Refills: 1 | Status: SHIPPED | OUTPATIENT
Start: 2024-06-26 | End: 2024-12-23

## 2024-07-01 ENCOUNTER — PATIENT MESSAGE (OUTPATIENT)
Dept: UROLOGY | Facility: CLINIC | Age: 74
End: 2024-07-01
Payer: MEDICARE

## 2024-07-09 ENCOUNTER — PATIENT MESSAGE (OUTPATIENT)
Dept: UROLOGY | Facility: CLINIC | Age: 74
End: 2024-07-09
Payer: MEDICARE

## 2024-07-11 ENCOUNTER — HOSPITAL ENCOUNTER (OUTPATIENT)
Dept: RADIOLOGY | Facility: HOSPITAL | Age: 74
Discharge: HOME | End: 2024-07-11
Attending: UROLOGY
Payer: MEDICARE

## 2024-07-11 ENCOUNTER — TELEPHONE (OUTPATIENT)
Dept: PREADMISSION TESTING | Facility: HOSPITAL | Age: 74
End: 2024-07-11
Payer: MEDICARE

## 2024-07-11 ENCOUNTER — HOSPITAL ENCOUNTER (OUTPATIENT)
Dept: CARDIOLOGY | Facility: HOSPITAL | Age: 74
Discharge: HOME | End: 2024-07-11
Attending: UROLOGY
Payer: MEDICARE

## 2024-07-11 ENCOUNTER — PROCEDURE VISIT (OUTPATIENT)
Dept: UROLOGY | Facility: CLINIC | Age: 74
End: 2024-07-11
Payer: MEDICARE

## 2024-07-11 VITALS — WEIGHT: 201.5 LBS | BODY MASS INDEX: 28.21 KG/M2 | HEIGHT: 71 IN

## 2024-07-11 DIAGNOSIS — R31.0 GROSS HEMATURIA: Primary | ICD-10-CM

## 2024-07-11 DIAGNOSIS — Z01.818 PRE-OP TESTING: ICD-10-CM

## 2024-07-11 DIAGNOSIS — N52.9 ERECTILE DYSFUNCTION, UNSPECIFIED ERECTILE DYSFUNCTION TYPE: ICD-10-CM

## 2024-07-11 DIAGNOSIS — D49.4 BLADDER TUMOR: ICD-10-CM

## 2024-07-11 DIAGNOSIS — N13.8 BPH WITH OBSTRUCTION/LOWER URINARY TRACT SYMPTOMS: ICD-10-CM

## 2024-07-11 DIAGNOSIS — N40.1 BPH WITH OBSTRUCTION/LOWER URINARY TRACT SYMPTOMS: ICD-10-CM

## 2024-07-11 LAB
OHS QRS DURATION: 154 MS
OHS QTC CALCULATION: 540 MS

## 2024-07-11 PROCEDURE — 93005 ELECTROCARDIOGRAM TRACING: CPT | Mod: HCNC

## 2024-07-11 PROCEDURE — 71046 X-RAY EXAM CHEST 2 VIEWS: CPT | Mod: 26,HCNC,, | Performed by: RADIOLOGY

## 2024-07-11 PROCEDURE — 88112 CYTOPATH CELL ENHANCE TECH: CPT | Performed by: STUDENT IN AN ORGANIZED HEALTH CARE EDUCATION/TRAINING PROGRAM

## 2024-07-11 PROCEDURE — 71046 X-RAY EXAM CHEST 2 VIEWS: CPT | Mod: TC,HCNC

## 2024-07-11 PROCEDURE — 52000 CYSTOURETHROSCOPY: CPT | Mod: S$GLB,,, | Performed by: UROLOGY

## 2024-07-11 RX ORDER — LIDOCAINE HYDROCHLORIDE 20 MG/ML
JELLY TOPICAL
Status: ACTIVE | OUTPATIENT
Start: 2024-07-11

## 2024-07-11 RX ORDER — CIPROFLOXACIN 500 MG/1
500 TABLET ORAL
Status: COMPLETED | OUTPATIENT
Start: 2024-07-11 | End: 2024-07-11

## 2024-07-11 RX ORDER — CEFAZOLIN SODIUM 2 G/50ML
2 SOLUTION INTRAVENOUS
OUTPATIENT
Start: 2024-07-11

## 2024-07-11 RX ADMIN — CIPROFLOXACIN 500 MG: 500 TABLET ORAL at 11:07

## 2024-07-11 NOTE — PROGRESS NOTES
..Per Dr. Olivera oral ciprofloxacin 500 MG was given to pt. Pt instructed to remain in clinic for 15 minutes to monitor for signs & symptoms of adverse reaction. Pt verbalized understanding.      Loraine Milanes RN

## 2024-07-11 NOTE — PROCEDURES
Procedures  Chief Complaint:   Encounter Diagnoses   Name Primary?    Gross hematuria Yes    BPH with obstruction/lower urinary tract symptoms     Erectile dysfunction, unspecified erectile dysfunction type        HPI:   7/11/24- patient is here today for cystoscopy, has had more hematuria today.  Prior to this voiding was relatively stable.    69-year-old gentleman who reports due to BPH type symptoms.  Patient on tamsulosin after the last 6 months, with no significant improvement.  This happened approximately 14-15 years ago he was having significant issues which required Florence catheterization.  Outside urologist did a TURP, symptoms seem to improve following that.  Patient's also complains of erectile dysfunction which he takes over-the-counter medications for.  One of his biggest complaints is retrograde ejaculation, which is most likely secondary to the tamsulosin.  Patient states he also has some decreased sensation with erections.  He is a significant diabetic Farideh lost from night, with no significant frequency or urgency issues during the daytime.  He states his biggest complaint is difficulty starting and stopping during his stream.  He has had no other urological history, no family history of urological cancers or stones, except for BPH in his father.  No gross hematuria, he has never been a smoker    Allergies:  Aspirin, Meperidine, and Niacin    Medications:  has a current medication list which includes the following prescription(s): acetaminophen, azelastine, blood sugar diagnostic, dorzolamide-timolol 2-0.5%, droplet pen needle, dutasteride, famotidine, fenofibrate micronized, fluticasone propionate, gabapentin, hydrocortisone, lantus solostar u-100 insulin, levocetirizine, victoza 3-claudine, losartan, lovastatin, multivitamin, pantoprazole, pioglitazone, polyethylene glycol, sildenafil, triamcinolone acetonide 0.1%, and latanoprost.    Review of Systems:  General: No fever, chills, fatigability, or  weight loss.  Skin: No rashes, itching, or changes in color or texture of skin.  Chest: Denies MEEK, cyanosis, wheezing, cough, and sputum production.  Abdomen: Appetite fine. No weight loss. Denies diarrhea, abdominal pain, hematemesis, or blood in stool.  Musculoskeletal: No joint stiffness or swelling. Denies back pain.  : As above.  All other review of systems negative.    PMH:   has a past medical history of Acid reflux, Angina pectoris, Back pain, BPH (benign prostatic hyperplasia), Cholesteatoma, Degenerative joint disease, Diverticulosis, Erectile dysfunction, History of elevated PSA (02/2014), History of pericarditis, Hypercholesteremia, Hypertension, Iron deficiency anemia, СВЕТЛАНА (obstructive sleep apnea) (05/17/2022), Pacemaker, Renal disorder, Squamous cell cancer of skin of mastoid region of scalp, Type 2 diabetes mellitus, Urinary incontinence, and when he was 7 Yrs old..    PSH:   has a past surgical history that includes Shoulder arthroscopy (Right); Nasal sinus surgery; Cardiac pacemaker placement; Tonsillectomy; Transurethral resection of prostate; Knee cartilage surgery (Bilateral); Tympanoplasty; vesectomy; Total knee arthroplasty (Left, 05/15/2017); Joint replacement; Esophagogastroduodenoscopy (N/A, 9/13/2019); Colonoscopy (N/A, 9/13/2019); Esophagogastroduodenoscopy (N/A, 9/3/2021); Colonoscopy (N/A, 9/22/2022); Tympanoplasty with mastoidectomy (Left, 3/15/2023); Insertion of tympanostomy tube (Right, 3/15/2023); and Esophagogastroduodenoscopy (N/A, 5/24/2023).    FamHx: family history includes Arthritis in his mother; Colon cancer in his paternal grandmother; Diabetes in his maternal grandmother and son; Heart disease in his father; Hypertension in his father and mother; Stroke in his father.    SocHx:  reports that he has never smoked. He has never used smokeless tobacco. He reports current alcohol use. He reports that he does not use drugs.      Physical Exam:  There were no vitals filed for  this visit.    General: A&Ox3, no apparent distress, no deformities  Neck: No masses, normal ROM  Lungs: normal inspiration, no use of accessory muscles  Heart: normal pulse, no arrhythmias  Abdomen: Soft, NT, ND, no masses, no hernias, no hepatosplenomegaly  Skin: The skin is warm and dry. No jaundice.  Ext: No c/c/e.  : 7/24- Test desc sandi, no abnormalities of epididymus. Normal penile and scrotal skin. Meatus normal.    Labs/Studies:   pvr 49 ml 8/21  Cystoscopy tumor within a right lateral wall diverticulum, regrowth adenoma, bladder neck open 7/24  psa 0.19 6/24  CT urogram bladder diverticulum 6/24  DIANN complicated left renal cyst 6/22    Procedure: Diagnostic Cystoscopy    Procedure in Detail: After proper consents were obtained, the patient was prepped and draped in normal sterile fashion for diagnostic cystoscopy. 5 ml of lidocaine jelly was instilled in the urethra. The flexible cystoscope was then introduced into the urethra, and advanced into the bladder under direct vision. The urethral mucosa appeared normal, and no strictures were noted. The sphincter appeared to be normal, and the veru montanum was unremarkable. The prostatic mucosa demonstrates regrowth adenoma, bladder neck is open. Inspection of the interior of the bladder was then carried out. The trigone was unremarkable, with no mucosal lesions. The ureteral orifices were normal in position and configuration. Systematic inspection of the mucosa of the bladder it was then carried out, rotating the cystoscope so that all areas of the left and right lateral walls, the dome of the bladder, and the posterior wall were all visualized. The cystoscope was then advanced further into the bladder, and maximum deflection of the scope was performed so that the bladder neck could be inspected.  Diverticulum noted on the right lateral wall with a papillary tumor consistent with possible urothelial cell carcinoma within the lumen.  The cystoscope was then  removed, and the procedure terminated.     Findings:  Tumor within the bladder diverticulum, regrowth adenoma, bladder neck open      Impression/Plan:      1. BPH- dutasteride alone is sufficient, he does have a history of TURP remotely.  Previous discussions for repeat TURP, since he is voiding relatively well, in addition to the new findings on cystoscopy, we will hold with a repeat TURP for now.    2. Gross hematuria/bladder tumor- bleeding is coming from the tumor, clot as seen on top of the, no evidence of bleeding within the prostatic urethra.  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  Will pursue cystoscopy, TURBT, bilateral retrogrades.  Please see below in regards to our discussion today, call with any complaints prior to the next appointment.    3. Erectile dysfunction- 100 mg of sildenafil is relatively sufficient, better than the Cialis.  He has considered shots but does not want to proceed with this now.    Patient understands the risks, benefits and alternatives of the above-stated procedure.  These include but not limited to damage to the surrounding structures including the urethra, prostate, ureters and bladder.  Risk of the need for stent placement, perforation requiring open procedures, Florence catheterization at the conclusion of the procedure.  Risk of recurrence or need for further surgeries.  Risk of pain, hematuria, infections.  Risk of heart attack, stroke, death, DVT and PE.  Patient understands he may require hospitalization post procedure, understanding of all the above he has elected to pursue.

## 2024-07-11 NOTE — TELEPHONE ENCOUNTER
Pre op instructions reviewed with pt over telephone, verbalized understanding.    To confirm, Surgery is scheduled on 7/16/24. We will call you after 2pm the day before Surgery with your arrival time.    *Please report to the [facility] Lobby (1st Floor) located off of [facility] (2nd Entrance/Building on the left, in front of the flag pole).  Address: [address] [place] [address] [place]. [address]    !!!Cardiac Clearance Faxed to Dr Senior office!!!      INSTRUCTIONS IMPORTANT!!!  DO NOT Eat, Drink, or Smoke after 12 midnight unless instructed otherwise by your Surgeon. OK to brush teeth, no gum, candy or mints!    >>>MEDICATION INSTRUCTIONS<<<: Morning of Surgery, take small sip of water with ONLY these medications:  Nasal sprays  Pepcid  Protonix       *Diabetic/ Prediabetic Patients: !!!If you take diabetic or weight loss medication, Do NOT take morning of surgery unless instructed by Doctor!!!  Metformin to be stopped 24 hrs prior to surgery.   Long Acting Insulin Instructions: HOLD the night before surgery unless instructed differently by Provider!  Ozempic/ Mounjaro/ Wegovy/ Trulicity/ Semaglutide injections or weight loss medication to be stopped 7 days prior to surgery.    !!!STOP ALL Aspirins, NSAIDS, WEIGHT LOSS INJECTIONS/PILLS, Herbal supplements, & Vitamins 7 DAYS BEFORE SURGERY!!!    ____  Avoid Alcoholic beverages 3 days prior to surgery, as it can thin the blood.  ____  NO Acrylic/fake nails or nail polish worn day of surgery (specifically hand/arm & foot surgeries).  ____  NO powder, lotions, deodorants, oils or cream on body.  ____  Remove all jewelry & piercings & foreign objects before arrival & leave at home.  ____  Remove Dentures, Hearing Aids & Contact Lens prior to surgery.  ____  Bring photo ID and insurance information to hospital (Leave Valuables at Home).  ____  If going home the same day, arrange for a ride home. You will not be able to drive for 24 hrs if Anesthesia was used.    ____  Females (ages 11-60): may need to give a urine sample the morning of surgery; please see Pre op Nurse prior to using the restroom.  ____  Males: Stop ED medications (Viagra, Cialis) 24 hrs prior to surgery.  ____  Wear clean, loose fitting clothing to allow for dressings/ bandages.      Bathing Instructions:    -Shower with anti-bacterial Soap (Hibiclens or Dial) the night before surgery and the morning of.   -Do not use Hibiclens on your face or genitals.   -Apply clean clothes after shower.  -Do not shave your face or body 2 days prior to surgery unless instructed otherwise by your Surgeon.  -Do not shave pubic hair 7 days prior to surgery (gyn pt's).    [facility] Visitor/Ride Policy:  Only 2 adults allowed in pre op/recovery area during your procedure. You MUST HAVE A RIDE HOME from a responsible adult that you know and trust. Medical Transport, Uber or Lyft can ONLY be used if patient has a responsible adult to accompany them during ride home.       *Signs and symptoms of Infection Before or After Surgery:               !!!If you experience any fever, chills, nausea/ vomiting, foul odor/ excessive drainage from surgical site, flu-like symptoms, new wounds or cuts, PLEASE CALL THE SURGEON OFFICE at [phone] or SEND MESSAGE THROUGH [employer] PORTAL!!!     *If you are running late the morning of surgery, please call the [facility] Surgery Dept @ [phone].     *Billing questions:  [phone]     Thank you,  -[facility] Pre Admit Dept.  [phone] or [phone]  M-F 7:30 am-4:00 pm (Closed Major Holidays)

## 2024-07-12 LAB
FINAL PATHOLOGIC DIAGNOSIS: NORMAL
Lab: NORMAL

## 2024-07-15 ENCOUNTER — TELEPHONE (OUTPATIENT)
Dept: PREADMISSION TESTING | Facility: HOSPITAL | Age: 74
End: 2024-07-15
Payer: MEDICARE

## 2024-07-15 ENCOUNTER — ANESTHESIA EVENT (OUTPATIENT)
Dept: SURGERY | Facility: HOSPITAL | Age: 74
End: 2024-07-15
Payer: MEDICARE

## 2024-07-15 NOTE — TELEPHONE ENCOUNTER
Called and spoke with patient about the following:     Please arrive to [facility] ([facility] at 7:00AM on 7/16/2024 for your scheduled procedure.  Address: [address] [place] [address] [place] [address] (2nd Building on left, 1st Floor Lobby)  >>>NO eating or drinking after midnight unless instructed otherwise by your Surgeon<<<    Thank you,  -[facility] Pre Admit Testing Dept.  Mon-Fri 7:30 am - 4 pm [phone]

## 2024-07-15 NOTE — PRE ADMISSION SCREENING
Called Dr Senior office to follow up with cardiac clearance that was faxed on 7/11/24. Spoke with Estephania in office, states they need me to re-fax clearance to [phone]. Faxed clearance request 2nd time per request.    1130:Cardiology office called back to state that they need to see pt before clearing him, but have no openings today.   Spoke with Dr Dumas in Anesthesia dept; ok to proceed without clearance d/t urgency of surgery

## 2024-07-16 ENCOUNTER — ANESTHESIA (OUTPATIENT)
Dept: SURGERY | Facility: HOSPITAL | Age: 74
End: 2024-07-16
Payer: MEDICARE

## 2024-07-16 ENCOUNTER — HOSPITAL ENCOUNTER (OUTPATIENT)
Dept: RADIOLOGY | Facility: HOSPITAL | Age: 74
Discharge: HOME | End: 2024-07-16
Attending: UROLOGY
Payer: MEDICARE

## 2024-07-16 ENCOUNTER — TELEPHONE (OUTPATIENT)
Dept: UROLOGY | Facility: CLINIC | Age: 74
End: 2024-07-16
Payer: MEDICARE

## 2024-07-16 ENCOUNTER — HOSPITAL ENCOUNTER (OUTPATIENT)
Facility: HOSPITAL | Age: 74
Discharge: HOME | End: 2024-07-16
Attending: UROLOGY | Admitting: UROLOGY
Payer: MEDICARE

## 2024-07-16 DIAGNOSIS — D49.4 BLADDER TUMOR: ICD-10-CM

## 2024-07-16 DIAGNOSIS — R31.0 GROSS HEMATURIA: ICD-10-CM

## 2024-07-16 LAB — POCT GLUCOSE: 90 MG/DL (ref 70–110)

## 2024-07-16 PROCEDURE — 25500020 PHARM REV CODE 255: Performed by: UROLOGY

## 2024-07-16 PROCEDURE — 63600175 PHARM REV CODE 636 W HCPCS: Performed by: UROLOGY

## 2024-07-16 PROCEDURE — 71000033 HC RECOVERY, INTIAL HOUR: Performed by: UROLOGY

## 2024-07-16 PROCEDURE — 71000015 HC POSTOP RECOV 1ST HR: Performed by: UROLOGY

## 2024-07-16 PROCEDURE — 52234 CYSTOSCOPY AND TREATMENT: CPT | Mod: ,,, | Performed by: UROLOGY

## 2024-07-16 PROCEDURE — 25000003 PHARM REV CODE 250: Performed by: UROLOGY

## 2024-07-16 PROCEDURE — C1758 CATHETER, URETERAL: HCPCS | Performed by: UROLOGY

## 2024-07-16 PROCEDURE — 88341 IMHCHEM/IMCYTCHM EA ADD ANTB: CPT | Performed by: PATHOLOGY

## 2024-07-16 PROCEDURE — 88307 TISSUE EXAM BY PATHOLOGIST: CPT | Performed by: PATHOLOGY

## 2024-07-16 PROCEDURE — 63600175 PHARM REV CODE 636 W HCPCS: Performed by: STUDENT IN AN ORGANIZED HEALTH CARE EDUCATION/TRAINING PROGRAM

## 2024-07-16 PROCEDURE — C1769 GUIDE WIRE: HCPCS | Performed by: UROLOGY

## 2024-07-16 PROCEDURE — 88342 IMHCHEM/IMCYTCHM 1ST ANTB: CPT | Performed by: PATHOLOGY

## 2024-07-16 PROCEDURE — 36000707: Performed by: UROLOGY

## 2024-07-16 PROCEDURE — 36000706: Performed by: UROLOGY

## 2024-07-16 PROCEDURE — 25000003 PHARM REV CODE 250: Performed by: STUDENT IN AN ORGANIZED HEALTH CARE EDUCATION/TRAINING PROGRAM

## 2024-07-16 PROCEDURE — 74420 UROGRAPHY RTRGR +-KUB: CPT | Mod: TC

## 2024-07-16 PROCEDURE — 74420 UROGRAPHY RTRGR +-KUB: CPT | Mod: 26,,, | Performed by: UROLOGY

## 2024-07-16 PROCEDURE — 37000009 HC ANESTHESIA EA ADD 15 MINS: Performed by: UROLOGY

## 2024-07-16 PROCEDURE — 37000008 HC ANESTHESIA 1ST 15 MINUTES: Performed by: UROLOGY

## 2024-07-16 PROCEDURE — 27201423 OPTIME MED/SURG SUP & DEVICES STERILE SUPPLY: Performed by: UROLOGY

## 2024-07-16 RX ORDER — MIDAZOLAM HYDROCHLORIDE 1 MG/ML
INJECTION INTRAMUSCULAR; INTRAVENOUS
Status: DISCONTINUED | OUTPATIENT
Start: 2024-07-16 | End: 2024-07-16

## 2024-07-16 RX ORDER — HYDROMORPHONE HYDROCHLORIDE 2 MG/ML
0.2 INJECTION, SOLUTION INTRAMUSCULAR; INTRAVENOUS; SUBCUTANEOUS EVERY 5 MIN PRN
Status: DISCONTINUED | OUTPATIENT
Start: 2024-07-16 | End: 2024-07-16 | Stop reason: HOSPADM

## 2024-07-16 RX ORDER — OXYCODONE AND ACETAMINOPHEN 5; 325 MG/1; MG/1
1 TABLET ORAL EVERY 4 HOURS PRN
Qty: 10 TABLET | Refills: 0 | Status: SHIPPED | OUTPATIENT
Start: 2024-07-16

## 2024-07-16 RX ORDER — FENTANYL CITRATE 50 UG/ML
INJECTION, SOLUTION INTRAMUSCULAR; INTRAVENOUS
Status: DISCONTINUED | OUTPATIENT
Start: 2024-07-16 | End: 2024-07-16

## 2024-07-16 RX ORDER — ONDANSETRON HYDROCHLORIDE 2 MG/ML
INJECTION, SOLUTION INTRAVENOUS
Status: DISCONTINUED | OUTPATIENT
Start: 2024-07-16 | End: 2024-07-16

## 2024-07-16 RX ORDER — ONDANSETRON HYDROCHLORIDE 2 MG/ML
4 INJECTION, SOLUTION INTRAVENOUS DAILY PRN
Status: DISCONTINUED | OUTPATIENT
Start: 2024-07-16 | End: 2024-07-16 | Stop reason: HOSPADM

## 2024-07-16 RX ORDER — CEFDINIR 300 MG/1
300 CAPSULE ORAL 2 TIMES DAILY
Qty: 10 CAPSULE | Refills: 0 | Status: SHIPPED | OUTPATIENT
Start: 2024-07-16 | End: 2024-07-21

## 2024-07-16 RX ORDER — OXYCODONE AND ACETAMINOPHEN 5; 325 MG/1; MG/1
1 TABLET ORAL
Status: DISCONTINUED | OUTPATIENT
Start: 2024-07-16 | End: 2024-07-16 | Stop reason: HOSPADM

## 2024-07-16 RX ORDER — PHENAZOPYRIDINE HYDROCHLORIDE 200 MG/1
200 TABLET, FILM COATED ORAL 3 TIMES DAILY PRN
Qty: 15 TABLET | Refills: 0 | Status: SHIPPED | OUTPATIENT
Start: 2024-07-16

## 2024-07-16 RX ORDER — DEXAMETHASONE SODIUM PHOSPHATE 4 MG/ML
INJECTION, SOLUTION INTRA-ARTICULAR; INTRALESIONAL; INTRAMUSCULAR; INTRAVENOUS; SOFT TISSUE
Status: DISCONTINUED | OUTPATIENT
Start: 2024-07-16 | End: 2024-07-16

## 2024-07-16 RX ORDER — GLUCAGON 1 MG
1 KIT INJECTION
Status: DISCONTINUED | OUTPATIENT
Start: 2024-07-16 | End: 2024-07-16 | Stop reason: HOSPADM

## 2024-07-16 RX ORDER — SODIUM CHLORIDE, SODIUM LACTATE, POTASSIUM CHLORIDE, CALCIUM CHLORIDE 600; 310; 30; 20 MG/100ML; MG/100ML; MG/100ML; MG/100ML
INJECTION, SOLUTION INTRAVENOUS CONTINUOUS PRN
Status: DISCONTINUED | OUTPATIENT
Start: 2024-07-16 | End: 2024-07-16

## 2024-07-16 RX ORDER — LIDOCAINE HYDROCHLORIDE 20 MG/ML
INJECTION INTRAVENOUS
Status: DISCONTINUED | OUTPATIENT
Start: 2024-07-16 | End: 2024-07-16

## 2024-07-16 RX ORDER — PROPOFOL 10 MG/ML
VIAL (ML) INTRAVENOUS
Status: DISCONTINUED | OUTPATIENT
Start: 2024-07-16 | End: 2024-07-16

## 2024-07-16 RX ORDER — KETOROLAC TROMETHAMINE 30 MG/ML
15 INJECTION, SOLUTION INTRAMUSCULAR; INTRAVENOUS EVERY 8 HOURS PRN
Status: DISCONTINUED | OUTPATIENT
Start: 2024-07-16 | End: 2024-07-16 | Stop reason: HOSPADM

## 2024-07-16 RX ADMIN — LIDOCAINE HYDROCHLORIDE 100 MG: 20 INJECTION INTRAVENOUS at 09:07

## 2024-07-16 RX ADMIN — PROPOFOL 200 MG: 10 INJECTION, EMULSION INTRAVENOUS at 09:07

## 2024-07-16 RX ADMIN — FENTANYL CITRATE 100 MCG: 50 INJECTION, SOLUTION INTRAMUSCULAR; INTRAVENOUS at 09:07

## 2024-07-16 RX ADMIN — MIDAZOLAM HYDROCHLORIDE 2 MG: 1 INJECTION, SOLUTION INTRAMUSCULAR; INTRAVENOUS at 09:07

## 2024-07-16 RX ADMIN — SODIUM CHLORIDE, SODIUM LACTATE, POTASSIUM CHLORIDE, AND CALCIUM CHLORIDE: 600; 310; 30; 20 INJECTION, SOLUTION INTRAVENOUS at 09:07

## 2024-07-16 RX ADMIN — DEXAMETHASONE SODIUM PHOSPHATE 4 MG: 4 INJECTION, SOLUTION INTRA-ARTICULAR; INTRALESIONAL; INTRAMUSCULAR; INTRAVENOUS; SOFT TISSUE at 10:07

## 2024-07-16 RX ADMIN — ONDANSETRON 4 MG: 2 INJECTION INTRAMUSCULAR; INTRAVENOUS at 10:07

## 2024-07-16 RX ADMIN — CEFAZOLIN 2 G: 2 INJECTION, POWDER, FOR SOLUTION INTRAMUSCULAR; INTRAVENOUS at 10:07

## 2024-07-16 NOTE — ANESTHESIA PROCEDURE NOTES
Intubation    Date/Time: 7/16/2024 9:55 AM    Performed by: Naveen Keenan CRNA  Authorized by: Alexander Dumas II, MD    Intubation:     Induction:  Intravenous    Intubated:  Postinduction    Mask Ventilation:  Easy mask    Attempts:  1    Attempted By:  CRNA    Method of Intubation:  Blind intubation    Difficult Airway Encountered?: No      Airway Device:  Supraglottic airway/LMA    Airway Device Size:  4.0    Secured at:  The lips    Placement Verified By:  Capnometry    Complicating Factors:  None    Findings Post-Intubation:  BS equal bilateral and atraumatic/condition of teeth unchanged

## 2024-07-16 NOTE — H&P
Chief Complaint:        Encounter Diagnoses   Name Primary?    Gross hematuria Yes    BPH with obstruction/lower urinary tract symptoms      Erectile dysfunction, unspecified erectile dysfunction type           HPI:   7/11/24- patient is here today for cystoscopy, has had more hematuria today.  Prior to this voiding was relatively stable.     69-year-old gentleman who reports due to BPH type symptoms.  Patient on tamsulosin after the last 6 months, with no significant improvement.  This happened approximately 14-15 years ago he was having significant issues which required Florence catheterization.  Outside urologist did a TURP, symptoms seem to improve following that.  Patient's also complains of erectile dysfunction which he takes over-the-counter medications for.  One of his biggest complaints is retrograde ejaculation, which is most likely secondary to the tamsulosin.  Patient states he also has some decreased sensation with erections.  He is a significant diabetic Farideh lost from night, with no significant frequency or urgency issues during the daytime.  He states his biggest complaint is difficulty starting and stopping during his stream.  He has had no other urological history, no family history of urological cancers or stones, except for BPH in his father.  No gross hematuria, he has never been a smoker     Allergies:  Aspirin, Meperidine, and Niacin     Medications:  has a current medication list which includes the following prescription(s): acetaminophen, azelastine, blood sugar diagnostic, dorzolamide-timolol 2-0.5%, droplet pen needle, dutasteride, famotidine, fenofibrate micronized, fluticasone propionate, gabapentin, hydrocortisone, lantus solostar u-100 insulin, levocetirizine, victoza 3-claudine, losartan, lovastatin, multivitamin, pantoprazole, pioglitazone, polyethylene glycol, sildenafil, triamcinolone acetonide 0.1%, and latanoprost.     Review of Systems:  General: No fever, chills, fatigability, or  weight loss.  Skin: No rashes, itching, or changes in color or texture of skin.  Chest: Denies MEEK, cyanosis, wheezing, cough, and sputum production.  Abdomen: Appetite fine. No weight loss. Denies diarrhea, abdominal pain, hematemesis, or blood in stool.  Musculoskeletal: No joint stiffness or swelling. Denies back pain.  : As above.  All other review of systems negative.     PMH:   has a past medical history of Acid reflux, Angina pectoris, Back pain, BPH (benign prostatic hyperplasia), Cholesteatoma, Degenerative joint disease, Diverticulosis, Erectile dysfunction, History of elevated PSA (02/2014), History of pericarditis, Hypercholesteremia, Hypertension, Iron deficiency anemia, СВЕТЛАНА (obstructive sleep apnea) (05/17/2022), Pacemaker, Renal disorder, Squamous cell cancer of skin of mastoid region of scalp, Type 2 diabetes mellitus, Urinary incontinence, and when he was 7 Yrs old..     PSH:   has a past surgical history that includes Shoulder arthroscopy (Right); Nasal sinus surgery; Cardiac pacemaker placement; Tonsillectomy; Transurethral resection of prostate; Knee cartilage surgery (Bilateral); Tympanoplasty; vesectomy; Total knee arthroplasty (Left, 05/15/2017); Joint replacement; Esophagogastroduodenoscopy (N/A, 9/13/2019); Colonoscopy (N/A, 9/13/2019); Esophagogastroduodenoscopy (N/A, 9/3/2021); Colonoscopy (N/A, 9/22/2022); Tympanoplasty with mastoidectomy (Left, 3/15/2023); Insertion of tympanostomy tube (Right, 3/15/2023); and Esophagogastroduodenoscopy (N/A, 5/24/2023).     FamHx: family history includes Arthritis in his mother; Colon cancer in his paternal grandmother; Diabetes in his maternal grandmother and son; Heart disease in his father; Hypertension in his father and mother; Stroke in his father.     SocHx:  reports that he has never smoked. He has never used smokeless tobacco. He reports current alcohol use. He reports that he does not use drugs.       Physical Exam:  There were no vitals  filed for this visit.     General: A&Ox3, no apparent distress, no deformities  Neck: No masses, normal ROM  Lungs: normal inspiration, no use of accessory muscles  Heart: normal pulse, no arrhythmias  Abdomen: Soft, NT, ND, no masses, no hernias, no hepatosplenomegaly  Skin: The skin is warm and dry. No jaundice.  Ext: No c/c/e.  : 7/24- Test desc sandi, no abnormalities of epididymus. Normal penile and scrotal skin. Meatus normal.     Labs/Studies:   pvr 49 ml 8/21  Cystoscopy tumor within a right lateral wall diverticulum, regrowth adenoma, bladder neck open 7/24  psa 0.19 6/24  CT urogram bladder diverticulum 6/24  DIANN complicated left renal cyst 6/22     Procedure: Diagnostic Cystoscopy     Procedure in Detail: After proper consents were obtained, the patient was prepped and draped in normal sterile fashion for diagnostic cystoscopy. 5 ml of lidocaine jelly was instilled in the urethra. The flexible cystoscope was then introduced into the urethra, and advanced into the bladder under direct vision. The urethral mucosa appeared normal, and no strictures were noted. The sphincter appeared to be normal, and the veru montanum was unremarkable. The prostatic mucosa demonstrates regrowth adenoma, bladder neck is open. Inspection of the interior of the bladder was then carried out. The trigone was unremarkable, with no mucosal lesions. The ureteral orifices were normal in position and configuration. Systematic inspection of the mucosa of the bladder it was then carried out, rotating the cystoscope so that all areas of the left and right lateral walls, the dome of the bladder, and the posterior wall were all visualized. The cystoscope was then advanced further into the bladder, and maximum deflection of the scope was performed so that the bladder neck could be inspected.  Diverticulum noted on the right lateral wall with a papillary tumor consistent with possible urothelial cell carcinoma within the lumen.  The  cystoscope was then removed, and the procedure terminated.      Findings:  Tumor within the bladder diverticulum, regrowth adenoma, bladder neck open        Impression/Plan:       1. BPH- dutasteride alone is sufficient, he does have a history of TURP remotely.  Previous discussions for repeat TURP, since he is voiding relatively well, in addition to the new findings on cystoscopy, we will hold with a repeat TURP for now.     2. Gross hematuria/bladder tumor- bleeding is coming from the tumor, clot as seen on top of the, no evidence of bleeding within the prostatic urethra.  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  Will pursue cystoscopy, TURBT, bilateral retrogrades.  Please see below in regards to our discussion today, call with any complaints prior to the next appointment.     3. Erectile dysfunction- 100 mg of sildenafil is relatively sufficient, better than the Cialis.  He has considered shots but does not want to proceed with this now.     Patient understands the risks, benefits and alternatives of the above-stated procedure.  These include but not limited to damage to the surrounding structures including the urethra, prostate, ureters and bladder.  Risk of the need for stent placement, perforation requiring open procedures, Florence catheterization at the conclusion of the procedure.  Risk of recurrence or need for further surgeries.  Risk of pain, hematuria, infections.  Risk of heart attack, stroke, death, DVT and PE.  Patient understands he may require hospitalization post procedure, understanding of all the above he has elected to pursue.

## 2024-07-16 NOTE — DISCHARGE SUMMARY
Dr. Hathaway/TARSHA Garcia/TARSHA Michael LALY [place] LALY White LALY ervin LALY ervin, Dr Esparza MD Hathaway, TARSHA Garcia [facility] NP Alejandrina, TARSHA Garcia [facility]  Discharge Note  Short Stay    Procedure(s) (LRB):  TURBT (TRANSURETHRAL RESECTION OF BLADDER TUMOR) (N/A)  CYSTOSCOPY, WITH RETROGRADE PYELOGRAM (Bilateral)      OUTCOME: Patient tolerated treatment/procedure well without complication and is now ready for discharge.    DISPOSITION: Home or Self Care    FINAL DIAGNOSIS: bladder cancer    FOLLOWUP: In clinic    DISCHARGE INSTRUCTIONS:    Discharge Procedure Orders   Diet Adult Regular     Notify your health care provider if you experience any of the following:  severe uncontrolled pain     Notify your health care provider if you experience any of the following:  persistent nausea and vomiting or diarrhea     Notify your health care provider if you experience any of the following:  temperature >100.4     Activity as tolerated        TIME SPENT ON DISCHARGE: 5 minutes   Radha Tinoco NP/ Zechariah RUFFIN TARSHA quintana TARSHA quintana MD [place]

## 2024-07-16 NOTE — OP NOTE
Date of Procedure: 07/16/2024    PREOP DIAGNOSIS:  Bladder cancer.    POSTOP DIAGNOSIS:  Bladder cancer.    PROCEDURES:      1. TURBT -- small 2 cm.    2. Bilateral Retrogrades    3. Tumor fulguration    SURGEON:  Vance Olivera M.D.    Assistants: None    Specimen: Bladder tumor    ANESTHESIA:  General endotracheal.    BLOOD LOSS:  None.    FINDINGS:  2 cm bladder tumors excised out of a right wall diverticulum.  Bilateral retrogrades were unremarkable.      PROCEDURE IN DETAIL:  Patient was brought to the operative suite and placed under general anesthesia and positioned into the dorsal lithotomy position.  After being sterilely prepped and draped a 21 Malian sheath cystoscope was inserted into a normal urethra.  Prostatic urethra demonstrated regrowth adenoma but open bladder neck.  Bladder was examined, tumor was identified we then a right bladder wall diverticulum.  Bilateral ureteral orifices are normal in size, shape, caliber and location.  Right ureteral orifice was cannulated with a [place] catheter, contrast was injected.  There was no evidence of filling defects or other abnormalities, otherwise unremarkable right retrograde nephrogram.  [place] was then inserted into the left ureteral orifice.  Contrast was injected demonstrating no evidence of filling defects or other abnormalities, otherwise unremarkable left retrograde nephrogram.  Cystoscope was removed, 24 Malian resectoscope was then inserted into the urethra and bladder.  Dissection was continued until the tumor was completely dissected from the bladder wall.  I then fulgurated the edges and the base of the tumor.  At the conclusion the tumor was excised completely. Hemostasis was meticulously maintained and there was no evidence of residual tumor burden within the bladder.  Patient was transferred to the PACU in stable condition.  Patient will return in 3 weeks to discuss pathology.    COMPLICATIONS: None

## 2024-07-16 NOTE — ANESTHESIA PREPROCEDURE EVALUATION
07/16/2024  Rigoberto Vasquez is a 73 y.o., male.    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    Choroidal nevus of right eye    Epiretinal membrane (ERM) of left eye    Overweight (BMI 25.0-29.9)    Macular hole of right eye    Open angle with borderline findings and low glaucoma risk in both eyes    Ectopic gastric mucosa    Open angle with borderline findings and high glaucoma risk in both eyes    History of skin cancer    History of heart block;AV block, 3rd degree    BPH with obstruction/lower urinary tract symptoms    Erectile dysfunction    Lung granuloma    Dyspepsia    Calcification of aorta    Cholesteatoma of ear, left    СВЕТЛАНА on CPAP    Gross hematuria     Past Surgical History:   Procedure Laterality Date    CARDIAC PACEMAKER PLACEMENT      COLONOSCOPY N/A 9/13/2019    Procedure: COLONOSCOPY;  Surgeon: Kan Ramsey III, MD;  Location: [facility];  Service: Endoscopy;  Laterality: N/A;    COLONOSCOPY N/A 9/22/2022    Procedure: COLONOSCOPY;  Surgeon: Chris Garcia MD;  Location: [facility];  Service: Endoscopy;  Laterality: N/A;    ESOPHAGOGASTRODUODENOSCOPY N/A 9/13/2019    Procedure: ESOPHAGOGASTRODUODENOSCOPY (EGD);  Surgeon: Kan Ramsey III, MD;  Location: [facility];  Service: Endoscopy;  Laterality: N/A;    ESOPHAGOGASTRODUODENOSCOPY N/A 9/3/2021    Procedure: EGD (ESOPHAGOGASTRODUODENOSCOPY);  Surgeon: Kaylene Pineda MD;  Location: [facility];  Service: Endoscopy;   Laterality: N/A;    ESOPHAGOGASTRODUODENOSCOPY N/A 5/24/2023    Procedure: EGD (ESOPHAGOGASTRODUODENOSCOPY);  Surgeon: Cory Bennett MD;  Location: [facility];  Service: Endoscopy;  Laterality: N/A;    INSERTION OF TYMPANOSTOMY TUBE Right 3/15/2023    Procedure: INSERTION, TYMPANOSTOMY TUBE;  Surgeon: Jose L Gudino MD;  Location: [address];  Service: ENT;  Laterality: Right;    JOINT REPLACEMENT      KNEE CARTILAGE SURGERY Bilateral     NASAL SINUS SURGERY      SHOULDER ARTHROSCOPY Right     TONSILLECTOMY      TOTAL KNEE ARTHROPLASTY Left 05/15/2017    TRANSURETHRAL RESECTION OF PROSTATE      TYMPANOPLASTY      TYMPANOPLASTY WITH MASTOIDECTOMY Left 3/15/2023    Procedure: TYMPANOPLASTY, WITH MASTOIDECTOMY;  Surgeon: Jose L Gudino MD;  Location: [address];  Service: ENT;  Laterality: Left;    vesectomy         Pre-op Assessment    I have reviewed the Patient Summary Reports.    I have reviewed the NPO Status.   I have reviewed the Medications.     Review of Systems  Anesthesia Hx:  No problems with previous Anesthesia                Social:  Non-Smoker       Hematology/Oncology:  Hematology Normal                                     Cardiovascular:    Pacemaker Hypertension           hyperlipidemia   ECG has been reviewed.                          Pulmonary:        Sleep Apnea, CPAP                Renal/:  Chronic Renal Disease, CKD  BPH              Hepatic/GI:     GERD, well controlled             Musculoskeletal:  Arthritis               Neurological:  Neurology Normal                                      Endocrine:  Diabetes               Physical Exam  General: Well nourished    Airway:  Mallampati: II   Mouth Opening: Normal  TM Distance: Normal  Neck ROM: Normal ROM    Dental:  Intact        Anesthesia Plan  Type of Anesthesia, risks & benefits discussed:    Anesthesia Type: Gen ETT, Gen Supraglottic Airway, MAC  Intra-op Monitoring Plan: Standard ASA Monitors  Post Op Pain Control Plan:  multimodal analgesia  Induction:  IV  Airway Plan: , Post-Induction  Informed Consent: Informed consent signed with the Patient and all parties understand the risks and agree with anesthesia plan.  All questions answered.   ASA Score: 3    Ready For Surgery From Anesthesia Perspective.     .      Chemistry        Component Value Date/Time     07/11/2024 1103    K 3.9 07/11/2024 1103     07/11/2024 1103    CO2 25 07/11/2024 1103    BUN 22 07/11/2024 1103    CREATININE 1.3 07/11/2024 1103    GLU 72 07/11/2024 1103        Component Value Date/Time    CALCIUM 9.4 07/11/2024 1103    ALKPHOS 31 (L) 07/11/2024 1103    AST 30 07/11/2024 1103    ALT 12 07/11/2024 1103    BILITOT 0.7 07/11/2024 1103    ESTGFRAFRICA >60.0 06/23/2022 0823    EGFRNONAA 55.3 (A) 06/23/2022 0823        Lab Results   Component Value Date    WBC 5.14 07/11/2024    HGB 13.2 (L) 07/11/2024    HCT 42.4 07/11/2024     (H) 07/11/2024     07/11/2024     Echo 1/19/23:  CONCLUSIONS:   1. Normal left ventricular cavity size. Low normal left ventricular systolic function.   LVEF 50 - 55%. Mild (Grade I) diastolic dysfunction (impaired relaxation).   2. Borderline right ventricular cavity size. Borderline right ventricular systolic   function.

## 2024-07-16 NOTE — TRANSFER OF CARE
"Anesthesia Transfer of Care Note    Patient: Rigoberto Vasquez    Procedure(s) Performed: Procedure(s) (LRB):  TURBT (TRANSURETHRAL RESECTION OF BLADDER TUMOR) (N/A)  CYSTOSCOPY, WITH RETROGRADE PYELOGRAM (Bilateral)    Patient location: PACU    Anesthesia Type: general    Transport from OR: Transported from OR on room air with adequate spontaneous ventilation    Post pain: adequate analgesia    Post assessment: no apparent anesthetic complications and tolerated procedure well    Post vital signs: stable    Level of consciousness: responds to stimulation and sedated    Nausea/Vomiting: no nausea/vomiting    Complications: none    Transfer of care protocol was followedComments: Report given to PACU RN at bedside. Hand off tool used. RN given opportunity to ask questions or clarify concerns. No Concerns verbalized. RN was asked if ready to assume care of patient. RN verbally confirmed. Pt. left in stable condition. SV. Vital Signs Return to Near Baseline. No s/s of distress noted.       Last vitals: Visit Vitals  BP (!) 155/86 (BP Location: Right arm, Patient Position: Sitting)   Pulse 67   Temp 36.6 °C (97.9 °F) (Temporal)   Resp 18   Ht 5' 11" (1.803 m)   Wt 90.8 kg (200 lb 2.8 oz)   SpO2 99%   BMI 27.92 kg/m²     "

## 2024-07-17 VITALS
WEIGHT: 200.19 LBS | OXYGEN SATURATION: 98 % | HEART RATE: 63 BPM | RESPIRATION RATE: 8 BRPM | BODY MASS INDEX: 28.02 KG/M2 | HEIGHT: 71 IN | TEMPERATURE: 97 F | DIASTOLIC BLOOD PRESSURE: 77 MMHG | SYSTOLIC BLOOD PRESSURE: 144 MMHG

## 2024-07-17 RX ORDER — AZELASTINE 1 MG/ML
2 SPRAY, METERED NASAL 2 TIMES DAILY
Qty: 30 ML | Refills: 12 | Status: SHIPPED | OUTPATIENT
Start: 2024-07-17 | End: 2025-07-17

## 2024-07-17 NOTE — ANESTHESIA POSTPROCEDURE EVALUATION
Anesthesia Post Evaluation    Patient: Rigoberto Vasquez    Procedure(s) Performed: Procedure(s) (LRB):  TURBT (TRANSURETHRAL RESECTION OF BLADDER TUMOR) (N/A)  CYSTOSCOPY, WITH RETROGRADE PYELOGRAM (Bilateral)    Final Anesthesia Type: general      Patient location during evaluation: PACU  Patient participation: Yes- Able to Participate  Level of consciousness: awake and alert  Post-procedure vital signs: reviewed and stable  Pain management: adequate  Airway patency: patent  СВЕТЛАНА mitigation strategies: Extubation while patient is awake  PONV status at discharge: No PONV  Anesthetic complications: no      Cardiovascular status: hemodynamically stable  Respiratory status: spontaneous ventilation  Hydration status: euvolemic  Follow-up not needed.              Vitals Value Taken Time   /80 07/16/24 1125   Temp 36.2 °C (97.1 °F) 07/16/24 1117   Pulse 66 07/16/24 1126   Resp 10 07/16/24 1126   SpO2 98 % 07/16/24 1126   Vitals shown include unfiled device data.      Event Time   Out of Recovery 11:29:53         Pain/Berna Score: Berna Score: 10 (7/16/2024 11:30 AM)

## 2024-07-19 ENCOUNTER — TELEPHONE (OUTPATIENT)
Dept: UROLOGY | Facility: CLINIC | Age: 74
End: 2024-07-19
Payer: MEDICARE

## 2024-07-19 ENCOUNTER — PATIENT MESSAGE (OUTPATIENT)
Dept: UROLOGY | Facility: CLINIC | Age: 74
End: 2024-07-19
Payer: MEDICARE

## 2024-07-19 DIAGNOSIS — C67.2 MALIGNANT NEOPLASM OF LATERAL WALL OF URINARY BLADDER: Primary | ICD-10-CM

## 2024-07-19 NOTE — TELEPHONE ENCOUNTER
MRI scheduled, patient notified through the portal.     ----- Message from Vance Olivera MD sent at 7/19/2024 10:49 AM CDT -----  Please obtain an MRI, order is in the chart

## 2024-07-19 NOTE — PROGRESS NOTES
Will obtain an MRI for bladder cancer to assess for extravesical extension, will discuss with our urology oncologists in [place].

## 2024-07-22 LAB
FINAL PATHOLOGIC DIAGNOSIS: NORMAL
GROSS: NORMAL
Lab: NORMAL
SUPPLEMENTAL DIAGNOSIS: NORMAL

## 2024-07-22 NOTE — TELEPHONE ENCOUNTER
Care Due:                  Date            Visit Type   Department     Provider  --------------------------------------------------------------------------------                                EP -                              PRIMARY      [facility] INTERNAL  Last Visit: 08-      CARE ([facility])   CECE Grimes                              EP -                              PRIMARY      [facility] INTERNAL  Next Visit: 08-      CARE ([facility])   CECE Grimes                                                            Last  Test          Frequency    Reason                     Performed    Due Date  --------------------------------------------------------------------------------    HBA1C.......  6 months...  liraglutide, pioglitazone  02- 08-    Lipid Panel.  12 months..  fenofibrate, lovastatin..  08- 08-    [facility] Embedded Care Due Messages. Reference number: [id number].   7/22/2024 8:42:54 AM CDT

## 2024-07-23 ENCOUNTER — TELEPHONE (OUTPATIENT)
Dept: UROLOGY | Facility: CLINIC | Age: 74
End: 2024-07-23
Payer: MEDICARE

## 2024-07-23 NOTE — TELEPHONE ENCOUNTER
Tried to call patient to let him know what Dr Olivera said about him seeing Dr Compa Diaz in [place], pt did not answer

## 2024-07-25 ENCOUNTER — PATIENT MESSAGE (OUTPATIENT)
Dept: UROLOGY | Facility: CLINIC | Age: 74
End: 2024-07-25
Payer: MEDICARE

## 2024-07-25 ENCOUNTER — PATIENT MESSAGE (OUTPATIENT)
Dept: INTERNAL MEDICINE | Facility: CLINIC | Age: 74
End: 2024-07-25
Payer: MEDICARE

## 2024-07-25 NOTE — TELEPHONE ENCOUNTER
No care due was identified.  [facility] Embedded Care Due Messages. Reference number: [id number].   7/25/2024 8:36:25 AM CDT

## 2024-07-26 ENCOUNTER — PATIENT MESSAGE (OUTPATIENT)
Dept: CARDIOLOGY | Facility: HOSPITAL | Age: 74
End: 2024-07-26
Payer: MEDICARE

## 2024-07-27 ENCOUNTER — PATIENT MESSAGE (OUTPATIENT)
Dept: INTERNAL MEDICINE | Facility: CLINIC | Age: 74
End: 2024-07-27
Payer: MEDICARE

## 2024-08-04 RX ORDER — SEMAGLUTIDE 1.34 MG/ML
1 INJECTION, SOLUTION SUBCUTANEOUS
Qty: 9 ML | Refills: 4 | Status: SHIPPED | OUTPATIENT
Start: 2024-08-04 | End: 2025-08-04

## 2024-08-05 ENCOUNTER — OFFICE VISIT (OUTPATIENT)
Dept: UROLOGY | Facility: CLINIC | Age: 74
End: 2024-08-05
Payer: MEDICARE

## 2024-08-05 VITALS
HEART RATE: 60 BPM | HEIGHT: 71 IN | DIASTOLIC BLOOD PRESSURE: 72 MMHG | SYSTOLIC BLOOD PRESSURE: 131 MMHG | BODY MASS INDEX: 28.67 KG/M2 | WEIGHT: 204.81 LBS

## 2024-08-05 DIAGNOSIS — N52.9 ERECTILE DYSFUNCTION, UNSPECIFIED ERECTILE DYSFUNCTION TYPE: ICD-10-CM

## 2024-08-05 DIAGNOSIS — N13.8 BPH WITH OBSTRUCTION/LOWER URINARY TRACT SYMPTOMS: ICD-10-CM

## 2024-08-05 DIAGNOSIS — R31.0 GROSS HEMATURIA: ICD-10-CM

## 2024-08-05 DIAGNOSIS — N40.1 BPH WITH OBSTRUCTION/LOWER URINARY TRACT SYMPTOMS: ICD-10-CM

## 2024-08-05 DIAGNOSIS — C67.2 MALIGNANT NEOPLASM OF LATERAL WALL OF URINARY BLADDER: Primary | ICD-10-CM

## 2024-08-05 PROCEDURE — 99999 PR PBB SHADOW E&M-EST. PATIENT-LVL IV: CPT | Mod: PBBFAC,HCNC,, | Performed by: UROLOGY

## 2024-08-05 PROCEDURE — 3072F LOW RISK FOR RETINOPATHY: CPT | Mod: HCNC,CPTII,S$GLB, | Performed by: UROLOGY

## 2024-08-05 PROCEDURE — 3044F HG A1C LEVEL LT 7.0%: CPT | Mod: HCNC,CPTII,S$GLB, | Performed by: UROLOGY

## 2024-08-05 PROCEDURE — 3061F NEG MICROALBUMINURIA REV: CPT | Mod: HCNC,CPTII,S$GLB, | Performed by: UROLOGY

## 2024-08-05 PROCEDURE — 4010F ACE/ARB THERAPY RXD/TAKEN: CPT | Mod: HCNC,CPTII,S$GLB, | Performed by: UROLOGY

## 2024-08-05 PROCEDURE — 3288F FALL RISK ASSESSMENT DOCD: CPT | Mod: HCNC,CPTII,S$GLB, | Performed by: UROLOGY

## 2024-08-05 PROCEDURE — 3078F DIAST BP <80 MM HG: CPT | Mod: HCNC,CPTII,S$GLB, | Performed by: UROLOGY

## 2024-08-05 PROCEDURE — 1159F MED LIST DOCD IN RCRD: CPT | Mod: HCNC,CPTII,S$GLB, | Performed by: UROLOGY

## 2024-08-05 PROCEDURE — 3075F SYST BP GE 130 - 139MM HG: CPT | Mod: HCNC,CPTII,S$GLB, | Performed by: UROLOGY

## 2024-08-05 PROCEDURE — 3066F NEPHROPATHY DOC TX: CPT | Mod: HCNC,CPTII,S$GLB, | Performed by: UROLOGY

## 2024-08-05 PROCEDURE — 1126F AMNT PAIN NOTED NONE PRSNT: CPT | Mod: HCNC,CPTII,S$GLB, | Performed by: UROLOGY

## 2024-08-05 PROCEDURE — 99024 POSTOP FOLLOW-UP VISIT: CPT | Mod: HCNC,S$GLB,, | Performed by: UROLOGY

## 2024-08-05 PROCEDURE — 1101F PT FALLS ASSESS-DOCD LE1/YR: CPT | Mod: HCNC,CPTII,S$GLB, | Performed by: UROLOGY

## 2024-08-05 RX ORDER — LORATADINE 10 MG/1
10 TABLET ORAL DAILY
COMMUNITY

## 2024-08-06 ENCOUNTER — OFFICE VISIT (OUTPATIENT)
Dept: OPHTHALMOLOGY | Facility: CLINIC | Age: 74
End: 2024-08-06
Payer: MEDICARE

## 2024-08-06 DIAGNOSIS — H40.013 OPEN ANGLE WITH BORDERLINE FINDINGS AND LOW GLAUCOMA RISK IN BOTH EYES: Primary | ICD-10-CM

## 2024-08-06 DIAGNOSIS — E11.9 CONTROLLED TYPE 2 DIABETES MELLITUS WITHOUT COMPLICATION, WITH LONG-TERM CURRENT USE OF INSULIN: ICD-10-CM

## 2024-08-06 DIAGNOSIS — Z79.4 CONTROLLED TYPE 2 DIABETES MELLITUS WITHOUT COMPLICATION, WITH LONG-TERM CURRENT USE OF INSULIN: ICD-10-CM

## 2024-08-06 DIAGNOSIS — E11.36 DIABETIC CATARACT OF BOTH EYES: ICD-10-CM

## 2024-08-06 PROCEDURE — 3066F NEPHROPATHY DOC TX: CPT | Mod: HCNC,CPTII,S$GLB, | Performed by: OPHTHALMOLOGY

## 2024-08-06 PROCEDURE — 4010F ACE/ARB THERAPY RXD/TAKEN: CPT | Mod: HCNC,CPTII,S$GLB, | Performed by: OPHTHALMOLOGY

## 2024-08-06 PROCEDURE — 1160F RVW MEDS BY RX/DR IN RCRD: CPT | Mod: HCNC,CPTII,S$GLB, | Performed by: OPHTHALMOLOGY

## 2024-08-06 PROCEDURE — 3044F HG A1C LEVEL LT 7.0%: CPT | Mod: HCNC,CPTII,S$GLB, | Performed by: OPHTHALMOLOGY

## 2024-08-06 PROCEDURE — 3061F NEG MICROALBUMINURIA REV: CPT | Mod: HCNC,CPTII,S$GLB, | Performed by: OPHTHALMOLOGY

## 2024-08-06 PROCEDURE — 1159F MED LIST DOCD IN RCRD: CPT | Mod: HCNC,CPTII,S$GLB, | Performed by: OPHTHALMOLOGY

## 2024-08-06 PROCEDURE — 99999 PR PBB SHADOW E&M-EST. PATIENT-LVL III: CPT | Mod: PBBFAC,HCNC,, | Performed by: OPHTHALMOLOGY

## 2024-08-06 PROCEDURE — 99214 OFFICE O/P EST MOD 30 MIN: CPT | Mod: HCNC,S$GLB,, | Performed by: OPHTHALMOLOGY

## 2024-08-07 ENCOUNTER — OFFICE VISIT (OUTPATIENT)
Dept: OTOLARYNGOLOGY | Facility: CLINIC | Age: 74
End: 2024-08-07
Payer: MEDICARE

## 2024-08-07 ENCOUNTER — OFFICE VISIT (OUTPATIENT)
Dept: UROLOGY | Facility: CLINIC | Age: 74
End: 2024-08-07
Payer: MEDICARE

## 2024-08-07 ENCOUNTER — PATIENT MESSAGE (OUTPATIENT)
Dept: OTOLARYNGOLOGY | Facility: CLINIC | Age: 74
End: 2024-08-07

## 2024-08-07 VITALS — HEIGHT: 61 IN | WEIGHT: 202.63 LBS | BODY MASS INDEX: 38.26 KG/M2

## 2024-08-07 DIAGNOSIS — R31.0 GROSS HEMATURIA: ICD-10-CM

## 2024-08-07 DIAGNOSIS — H90.A32 MIXED CONDUCTIVE AND SENSORINEURAL HEARING LOSS OF LEFT EAR WITH RESTRICTED HEARING OF RIGHT EAR: ICD-10-CM

## 2024-08-07 DIAGNOSIS — Z98.890 HX OF TYMPANOMASTOIDECTOMY: ICD-10-CM

## 2024-08-07 DIAGNOSIS — H61.23 BILATERAL IMPACTED CERUMEN: Primary | ICD-10-CM

## 2024-08-07 DIAGNOSIS — C67.2 MALIGNANT NEOPLASM OF LATERAL WALL OF URINARY BLADDER: Primary | ICD-10-CM

## 2024-08-07 PROCEDURE — 99212 OFFICE O/P EST SF 10 MIN: CPT | Mod: 25,HCNC,S$GLB, | Performed by: PHYSICIAN ASSISTANT

## 2024-08-07 PROCEDURE — 3044F HG A1C LEVEL LT 7.0%: CPT | Mod: HCNC,CPTII,S$GLB, | Performed by: PHYSICIAN ASSISTANT

## 2024-08-07 PROCEDURE — 3061F NEG MICROALBUMINURIA REV: CPT | Mod: HCNC,CPTII,S$GLB, | Performed by: PHYSICIAN ASSISTANT

## 2024-08-07 PROCEDURE — 1101F PT FALLS ASSESS-DOCD LE1/YR: CPT | Mod: HCNC,CPTII,S$GLB, | Performed by: PHYSICIAN ASSISTANT

## 2024-08-07 PROCEDURE — 3072F LOW RISK FOR RETINOPATHY: CPT | Mod: HCNC,CPTII,S$GLB, | Performed by: PHYSICIAN ASSISTANT

## 2024-08-07 PROCEDURE — 3066F NEPHROPATHY DOC TX: CPT | Mod: HCNC,CPTII,S$GLB, | Performed by: PHYSICIAN ASSISTANT

## 2024-08-07 PROCEDURE — 1159F MED LIST DOCD IN RCRD: CPT | Mod: HCNC,CPTII,S$GLB, | Performed by: PHYSICIAN ASSISTANT

## 2024-08-07 PROCEDURE — 3288F FALL RISK ASSESSMENT DOCD: CPT | Mod: HCNC,CPTII,S$GLB, | Performed by: PHYSICIAN ASSISTANT

## 2024-08-07 PROCEDURE — 69210 REMOVE IMPACTED EAR WAX UNI: CPT | Mod: HCNC,S$GLB,, | Performed by: PHYSICIAN ASSISTANT

## 2024-08-07 PROCEDURE — 3008F BODY MASS INDEX DOCD: CPT | Mod: HCNC,CPTII,S$GLB, | Performed by: PHYSICIAN ASSISTANT

## 2024-08-07 PROCEDURE — 99999 PR PBB SHADOW E&M-EST. PATIENT-LVL IV: CPT | Mod: PBBFAC,HCNC,, | Performed by: PHYSICIAN ASSISTANT

## 2024-08-07 PROCEDURE — 4010F ACE/ARB THERAPY RXD/TAKEN: CPT | Mod: HCNC,CPTII,S$GLB, | Performed by: PHYSICIAN ASSISTANT

## 2024-08-07 PROCEDURE — 1126F AMNT PAIN NOTED NONE PRSNT: CPT | Mod: HCNC,CPTII,S$GLB, | Performed by: PHYSICIAN ASSISTANT

## 2024-08-08 NOTE — H&P (VIEW-ONLY)
[facility]  Urologic Oncology      Date of Service: 08/07/2024    The patient location is:  [facility]  The chief complaint leading to consultation is:  Bladder cancer    Visit type: audiovisual    Face to Face time with patient: 15 minutes  30 minutes of total time spent on the encounter, which includes face to face time and non-face to face time preparing to see the patient (eg, review of tests), Obtaining and/or reviewing separately obtained history, Documenting clinical information in the electronic or other health record, Independently interpreting results (not separately reported) and communicating results to the patient/family/caregiver, or Care coordination (not separately reported).     Each patient to whom he or she provides medical services by telemedicine is:  (1) informed of the relationship between the physician and patient and the respective role of any other health care provider with respect to management of the patient; and (2) notified that he or she may decline to receive medical services by telemedicine and may withdraw from such care at any time.    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  He does have a history of a TURP approximately 10 years ago and is currently on finasteride and tamsulosin although he experiences retrograde ejaculation.  He also has a history of  erectile dysfunction which is stable.    He underwent Transurethral resection of bladder tumor on 07/16/2024.  This revealed pathologic T1 high-grade bladder cancer in the diverticulum, no muscle was present which is concordant with a bladder diverticulum.  No perforation was noted, and bilateral retrograde pyelograms were within normal limits.    Imaging: I have reviewed the imaging study CT urogram on 06/24/2024 personally, have independently interpreted this study, and agree with the findings    He was scheduled to undergo a MRI of the abdomen and pelvis, and we will make sure this gets scheduled.    Of note, he does have a history of diabetes in his taken a daily GLP 1, he was switching to a weekly Ozempic but has not started this    Current Problem List:  Patient Active Problem List    Diagnosis Date Noted    Malignant neoplasm of lateral wall of urinary bladder 08/05/2024    Gross hematuria 07/11/2024    СВЕТЛАНА on CPAP 05/17/2022    Cholesteatoma of ear, left     Calcification of aorta 11/11/2021    Dyspepsia 09/03/2021    Lung granuloma 08/04/2021    BPH with obstruction/lower urinary tract symptoms 07/08/2020    Erectile dysfunction 07/08/2020    History of skin cancer 11/25/2019    History of heart block;AV block, 3rd degree 02/04/2019    Open angle with borderline findings and high glaucoma risk in both eyes 01/21/2019    Ectopic gastric mucosa 07/03/2017    Macular hole of right eye 12/05/2016    Open angle with borderline findings and low glaucoma risk in both eyes 12/05/2016    Overweight (BMI 25.0-29.9) 11/11/2015    Choroidal nevus of right eye 04/06/2015    Epiretinal membrane (ERM) of left eye 04/06/2015    Conductive hearing loss in left ear     Pacemaker     CHANDRAKANT (iron deficiency anemia) 06/18/2014    High myopia 03/28/2014    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    Open angle with borderline findings, low risk 02/28/2014    Type 2 diabetes mellitus with stage 3a chronic kidney disease, with long-term  current use of insulin     Hyperlipidemia associated with type 2 diabetes mellitus     Hypertension associated with diabetes     Osteoarthritis of knees, bilateral 03/04/2010        Allergies:  Review of patient's allergies indicates:   Allergen Reactions    Aspirin      Stomach pain even with ppi    Meperidine      Other reaction(s): Stomach upset    Niacin      Other reaction(s): hot skin        Medications per EMR:  (Not in a hospital admission)      Past Medical History:  Past Medical History:   Diagnosis Date    Acid reflux     [facility]    Angina pectoris     Back pain     BPH (benign prostatic hyperplasia)     blue    Cholesteatoma     Degenerative joint disease     Diverticulosis     Erectile dysfunction     History of elevated PSA 02/2014    normalized, dr dave annually    History of pericarditis     Hypercholesteremia     Hypertension     Iron deficiency anemia     Malignant neoplasm of lateral wall of urinary bladder 8/5/2024    СВЕТЛАНА (obstructive sleep apnea) 05/17/2022    Pacemaker     complete av block;NO afib    Renal disorder     Squamous cell cancer of skin of mastoid region of scalp     dr jaida salgado    Type 2 diabetes mellitus     dr wyman    Urinary incontinence     when he was 7 Yrs old.         Past Surgical History:  Past Surgical History:   Procedure Laterality Date    CARDIAC PACEMAKER PLACEMENT      COLONOSCOPY N/A 9/13/2019    Procedure: COLONOSCOPY;  Surgeon: Kan Ramsey III, MD;  Location: [facility] ENDO;  Service: Endoscopy;  Laterality: N/A;    COLONOSCOPY N/A 9/22/2022    Procedure: COLONOSCOPY;  Surgeon: Chris Garcia MD;  Location: [facility] ENDO;  Service: Endoscopy;  Laterality: N/A;    CYSTOSCOPY W/ RETROGRADES Bilateral 7/16/2024    Procedure: CYSTOSCOPY, WITH RETROGRADE PYELOGRAM;  Surgeon: Vance Olivera MD;  Location: [facility] OR;  Service: Urology;  Laterality: Bilateral;    ESOPHAGOGASTRODUODENOSCOPY N/A 9/13/2019    Procedure: ESOPHAGOGASTRODUODENOSCOPY (EGD);   "Surgeon: Kan Ramsey III, MD;  Location: [facility];  Service: Endoscopy;  Laterality: N/A;    ESOPHAGOGASTRODUODENOSCOPY N/A 9/3/2021    Procedure: EGD (ESOPHAGOGASTRODUODENOSCOPY);  Surgeon: Kaylene Pineda MD;  Location: [facility];  Service: Endoscopy;  Laterality: N/A;    ESOPHAGOGASTRODUODENOSCOPY N/A 5/24/2023    Procedure: EGD (ESOPHAGOGASTRODUODENOSCOPY);  Surgeon: Cory Bennett MD;  Location: [facility];  Service: Endoscopy;  Laterality: N/A;    INSERTION OF TYMPANOSTOMY TUBE Right 3/15/2023    Procedure: INSERTION, TYMPANOSTOMY TUBE;  Surgeon: Jose L Gudino MD;  Location: [address];  Service: ENT;  Laterality: Right;    JOINT REPLACEMENT      KNEE CARTILAGE SURGERY Bilateral     NASAL SINUS SURGERY      SHOULDER ARTHROSCOPY Right     TONSILLECTOMY      TOTAL KNEE ARTHROPLASTY Left 05/15/2017    TRANSURETHRAL RESECTION OF PROSTATE      TURBT (TRANSURETHRAL RESECTION OF BLADDER TUMOR) N/A 7/16/2024    Procedure: TURBT (TRANSURETHRAL RESECTION OF BLADDER TUMOR);  Surgeon: Vance Olivera MD;  Location: [facility];  Service: Urology;  Laterality: N/A;    TYMPANOPLASTY      TYMPANOPLASTY WITH MASTOIDECTOMY Left 3/15/2023    Procedure: TYMPANOPLASTY, WITH MASTOIDECTOMY;  Surgeon: Jose L Gudino MD;  Location: [address];  Service: ENT;  Laterality: Left;    vesectomy          Family History:  Family History   Problem Relation Name Age of Onset    Arthritis Mother      Hypertension Mother      Heart disease Father      Hypertension Father      Stroke Father      Diabetes Son      Diabetes Maternal Grandmother      Colon cancer Paternal Grandmother          Social History:  Social History     Tobacco Use    Smoking status: Never    Smokeless tobacco: Never   Substance Use Topics    Alcohol use: Yes     Comment: " once a month"          OBJECTIVE:     There were no vitals filed for this visit.     Physical Exam    General: No acute distress. Nontoxic appearing.  HENT: Normocephalic. " Atraumatic.  Respiratory: Normal respiratory effort. No conversational dyspnea. No audible wheezing.  Abdomen: No obvious distension.  Skin: No visible abnormalities.  Extremities: No edema upper extremities. No edema lower extremities.  Neurological: Alert and oriented x3. Normal speech.  Psychiatric: Normal mood. Normal affect. No evidence of SI.       LAB:    CBC:  Lab Results   Component Value Date    WBC 5.14 07/11/2024    HGB 13.2 (L) 07/11/2024    HCT 42.4 07/11/2024     (H) 07/11/2024     07/11/2024         BMP:  Lab Results   Component Value Date     07/11/2024    K 3.9 07/11/2024     07/11/2024    CO2 25 07/11/2024    BUN 22 07/11/2024    CREATININE 1.3 07/11/2024    CALCIUM 9.4 07/11/2024    ANIONGAP 6 (L) 07/11/2024    EGFRNORACEVR 58.4 (A) 07/11/2024         ASSESSMENT/PLAN:   Assessment & Plan            Assessment: 74-year-old male with a history of BPH status post TURP, erectile dysfunction, and lower urinary tract symptoms, now found to have high-risk nonmuscle invasive bladder cancer, pT1 in a bladder diverticulum    Plan:   I had a thorough discussion with the patient and his wife virtually.  We discussed that with this type of bladder cancer, it was possible to excise just the bladder diverticulum and leave the bladder in place as opposed to radical cystectomy.  However, before opening the bladder and sewing it closed, it was critical to rule out concomitant CIS and bladder cancer elsewhere in the bladder so that he does not have tumor spillage or urine spillage during the surgery.    With this in mind, I discussed with him that we should obtain the MRI as planned, then perform a blue light cystoscopy with for cause random bladder biopsies, this would also be an opportunity for me to visualize the inside of the bladder before surgical excision.    Ultimately, I do think he is a good candidate for a bladder diverticulectomy, but we will need to rule out more invasive  disease around the diverticulum as well as carcinoma in-situ elsewhere in the bladder    We then discussed risks, benefits, alternatives associated with this surgery, including complications, detailed below:  -General anesthesia:  Associated risks include pneumonia, cerebrovascular accident, cardiac events including myocardial infarction, pulmonary embolism, deep vein thrombosis  -TURBT: We discussed complications such as urinary tract infection, dysuria, sepsis, hematuria, clot retention requiring take back to the operating room, and bladder perforation requiring prolonged catheterization versus operative exploration in the case of a intraperitoneal perforation.      He does not take any anticoagulation, but does take a GLP 1.  He was in the process of switching to Ozempic now, and I discussed that if he switches to Ozempic, and he was to be off this medication for a full 7 days, and if we perform the surgery on Friday he will need to hold it for likely a week and a half beforehand.  He was in understanding, I will let the preoperative clinic know about this as well so that we are in clear communication.    This encounter was dictated and transcribed using DeepScribe and [employer], please excuse any typographical or grammatical errors.

## 2024-08-12 ENCOUNTER — TELEPHONE (OUTPATIENT)
Dept: UROLOGY | Facility: CLINIC | Age: 74
End: 2024-08-12
Payer: MEDICARE

## 2024-08-12 ENCOUNTER — PATIENT MESSAGE (OUTPATIENT)
Dept: UROLOGY | Facility: CLINIC | Age: 74
End: 2024-08-12
Payer: MEDICARE

## 2024-08-12 ENCOUNTER — LAB VISIT (OUTPATIENT)
Dept: LAB | Facility: HOSPITAL | Age: 74
End: 2024-08-12
Attending: PHYSICIAN ASSISTANT
Payer: MEDICARE

## 2024-08-12 DIAGNOSIS — Z79.4 TYPE 2 DIABETES MELLITUS WITH STAGE 3A CHRONIC KIDNEY DISEASE, WITH LONG-TERM CURRENT USE OF INSULIN: ICD-10-CM

## 2024-08-12 DIAGNOSIS — E11.22 TYPE 2 DIABETES MELLITUS WITH STAGE 3A CHRONIC KIDNEY DISEASE, WITH LONG-TERM CURRENT USE OF INSULIN: ICD-10-CM

## 2024-08-12 DIAGNOSIS — E78.5 HYPERLIPIDEMIA ASSOCIATED WITH TYPE 2 DIABETES MELLITUS: Chronic | ICD-10-CM

## 2024-08-12 DIAGNOSIS — E11.69 HYPERLIPIDEMIA ASSOCIATED WITH TYPE 2 DIABETES MELLITUS: Chronic | ICD-10-CM

## 2024-08-12 DIAGNOSIS — N18.31 TYPE 2 DIABETES MELLITUS WITH STAGE 3A CHRONIC KIDNEY DISEASE, WITH LONG-TERM CURRENT USE OF INSULIN: ICD-10-CM

## 2024-08-12 LAB
CHOLEST SERPL-MCNC: 112 MG/DL (ref 120–199)
CHOLEST/HDLC SERPL: 2.7 {RATIO} (ref 2–5)
ESTIMATED AVG GLUCOSE: 120 MG/DL (ref 68–131)
HBA1C MFR BLD: 5.8 % (ref 4–5.6)
HDLC SERPL-MCNC: 42 MG/DL (ref 40–75)
HDLC SERPL: 37.5 % (ref 20–50)
LDLC SERPL CALC-MCNC: 58.4 MG/DL (ref 63–159)
NONHDLC SERPL-MCNC: 70 MG/DL
TRIGL SERPL-MCNC: 58 MG/DL (ref 30–150)

## 2024-08-12 PROCEDURE — 83036 HEMOGLOBIN GLYCOSYLATED A1C: CPT | Mod: HCNC | Performed by: PHYSICIAN ASSISTANT

## 2024-08-12 PROCEDURE — 80061 LIPID PANEL: CPT | Mod: HCNC | Performed by: PHYSICIAN ASSISTANT

## 2024-08-12 PROCEDURE — 36415 COLL VENOUS BLD VENIPUNCTURE: CPT | Mod: HCNC,PO | Performed by: PHYSICIAN ASSISTANT

## 2024-08-15 RX ORDER — FAMOTIDINE 20 MG/1
20 TABLET, FILM COATED ORAL 2 TIMES DAILY
Qty: 60 TABLET | Refills: 3 | Status: SHIPPED | OUTPATIENT
Start: 2024-08-15 | End: 2024-12-13

## 2024-08-16 ENCOUNTER — LAB VISIT (OUTPATIENT)
Dept: LAB | Facility: HOSPITAL | Age: 74
End: 2024-08-16
Attending: UROLOGY
Payer: MEDICARE

## 2024-08-16 DIAGNOSIS — R31.0 GROSS HEMATURIA: ICD-10-CM

## 2024-08-16 LAB
BILIRUB UR QL STRIP: NEGATIVE
CLARITY UR REFRACT.AUTO: CLEAR
COLOR UR AUTO: YELLOW
GLUCOSE UR QL STRIP: NEGATIVE
HGB UR QL STRIP: NEGATIVE
KETONES UR QL STRIP: NEGATIVE
LEUKOCYTE ESTERASE UR QL STRIP: ABNORMAL
MICROSCOPIC COMMENT: NORMAL
NITRITE UR QL STRIP: NEGATIVE
PH UR STRIP: 7 [PH] (ref 5–8)
PROT UR QL STRIP: NEGATIVE
SP GR UR STRIP: 1.01 (ref 1–1.03)
URN SPEC COLLECT METH UR: ABNORMAL
UROBILINOGEN UR STRIP-ACNC: NEGATIVE EU/DL
WBC #/AREA URNS AUTO: 4 /HPF (ref 0–5)

## 2024-08-16 PROCEDURE — 81000 URINALYSIS NONAUTO W/SCOPE: CPT | Mod: HCNC,PO | Performed by: UROLOGY

## 2024-08-16 PROCEDURE — 87086 URINE CULTURE/COLONY COUNT: CPT | Mod: HCNC | Performed by: UROLOGY

## 2024-08-18 LAB — BACTERIA UR CULT: NO GROWTH

## 2024-08-20 ENCOUNTER — OFFICE VISIT (OUTPATIENT)
Dept: INTERNAL MEDICINE | Facility: CLINIC | Age: 74
End: 2024-08-20
Payer: MEDICARE

## 2024-08-20 VITALS
DIASTOLIC BLOOD PRESSURE: 78 MMHG | OXYGEN SATURATION: 96 % | HEART RATE: 73 BPM | SYSTOLIC BLOOD PRESSURE: 122 MMHG | HEIGHT: 71 IN | TEMPERATURE: 99 F | WEIGHT: 204.38 LBS | BODY MASS INDEX: 28.61 KG/M2 | RESPIRATION RATE: 18 BRPM

## 2024-08-20 DIAGNOSIS — I15.2 HYPERTENSION ASSOCIATED WITH DIABETES: ICD-10-CM

## 2024-08-20 DIAGNOSIS — Z79.4 TYPE 2 DIABETES MELLITUS WITH STAGE 3A CHRONIC KIDNEY DISEASE, WITH LONG-TERM CURRENT USE OF INSULIN: Primary | ICD-10-CM

## 2024-08-20 DIAGNOSIS — N18.31 TYPE 2 DIABETES MELLITUS WITH STAGE 3A CHRONIC KIDNEY DISEASE, WITH LONG-TERM CURRENT USE OF INSULIN: Primary | ICD-10-CM

## 2024-08-20 DIAGNOSIS — R39.16 BENIGN PROSTATIC HYPERPLASIA (BPH) WITH STRAINING ON URINATION: ICD-10-CM

## 2024-08-20 DIAGNOSIS — E11.22 TYPE 2 DIABETES MELLITUS WITH STAGE 3A CHRONIC KIDNEY DISEASE, WITH LONG-TERM CURRENT USE OF INSULIN: Primary | ICD-10-CM

## 2024-08-20 DIAGNOSIS — N40.1 BENIGN PROSTATIC HYPERPLASIA (BPH) WITH STRAINING ON URINATION: ICD-10-CM

## 2024-08-20 DIAGNOSIS — E11.59 HYPERTENSION ASSOCIATED WITH DIABETES: ICD-10-CM

## 2024-08-20 DIAGNOSIS — C67.2 MALIGNANT NEOPLASM OF LATERAL WALL OF URINARY BLADDER: ICD-10-CM

## 2024-08-20 PROCEDURE — 3072F LOW RISK FOR RETINOPATHY: CPT | Mod: HCNC,CPTII,S$GLB, | Performed by: FAMILY MEDICINE

## 2024-08-20 PROCEDURE — 4010F ACE/ARB THERAPY RXD/TAKEN: CPT | Mod: HCNC,CPTII,S$GLB, | Performed by: FAMILY MEDICINE

## 2024-08-20 PROCEDURE — 3061F NEG MICROALBUMINURIA REV: CPT | Mod: HCNC,CPTII,S$GLB, | Performed by: FAMILY MEDICINE

## 2024-08-20 PROCEDURE — 3288F FALL RISK ASSESSMENT DOCD: CPT | Mod: HCNC,CPTII,S$GLB, | Performed by: FAMILY MEDICINE

## 2024-08-20 PROCEDURE — G2211 COMPLEX E/M VISIT ADD ON: HCPCS | Mod: HCNC,S$GLB,, | Performed by: FAMILY MEDICINE

## 2024-08-20 PROCEDURE — 99999 PR PBB SHADOW E&M-EST. PATIENT-LVL V: CPT | Mod: PBBFAC,HCNC,, | Performed by: FAMILY MEDICINE

## 2024-08-20 PROCEDURE — 1126F AMNT PAIN NOTED NONE PRSNT: CPT | Mod: HCNC,CPTII,S$GLB, | Performed by: FAMILY MEDICINE

## 2024-08-20 PROCEDURE — 1159F MED LIST DOCD IN RCRD: CPT | Mod: HCNC,CPTII,S$GLB, | Performed by: FAMILY MEDICINE

## 2024-08-20 PROCEDURE — 99214 OFFICE O/P EST MOD 30 MIN: CPT | Mod: HCNC,S$GLB,, | Performed by: FAMILY MEDICINE

## 2024-08-20 PROCEDURE — 3078F DIAST BP <80 MM HG: CPT | Mod: HCNC,CPTII,S$GLB, | Performed by: FAMILY MEDICINE

## 2024-08-20 PROCEDURE — 1101F PT FALLS ASSESS-DOCD LE1/YR: CPT | Mod: HCNC,CPTII,S$GLB, | Performed by: FAMILY MEDICINE

## 2024-08-20 PROCEDURE — 3066F NEPHROPATHY DOC TX: CPT | Mod: HCNC,CPTII,S$GLB, | Performed by: FAMILY MEDICINE

## 2024-08-20 PROCEDURE — 3074F SYST BP LT 130 MM HG: CPT | Mod: HCNC,CPTII,S$GLB, | Performed by: FAMILY MEDICINE

## 2024-08-20 PROCEDURE — 3008F BODY MASS INDEX DOCD: CPT | Mod: HCNC,CPTII,S$GLB, | Performed by: FAMILY MEDICINE

## 2024-08-20 PROCEDURE — 3044F HG A1C LEVEL LT 7.0%: CPT | Mod: HCNC,CPTII,S$GLB, | Performed by: FAMILY MEDICINE

## 2024-08-20 NOTE — TELEPHONE ENCOUNTER
Refill Routing Note   Medication(s) are not appropriate for processing by [facility] for the following reason(s):        Outside of protocol    [facility] action(s):  Route   Requires appointment : Yes             Appointments  past 12m or future 3m with PCP    Date Provider   Last Visit   8/14/2023 Jesse Grimes MD   Next Visit   8/20/2024 Jesse Grimes MD   ED visits in past 90 days: 0        Note composed:3:32 PM 08/20/2024

## 2024-08-20 NOTE — PATIENT INSTRUCTIONS
""Bladder cancer -- Although there are no data linking rosiglitazone to bladder cancer, there is concern about increased risk with pioglitazone. The association is controversial, and different reports have yielded conflicting results. Pioglitazone should not be used in patients with active bladder cancer [80]. In patients with a history of bladder cancer, the benefits of glycemic management versus the unknown risk for cancer recurrence with pioglitazone should be considered. (See 'Contraindications' above.)  In preclinical studies, pioglitazone was associated with bladder tumors in male rats. In the PROactive trial described above, there were more cases of bladder cancer (14 versus 5) in the pioglitazone than placebo group [74]. In a 10-year observational study of pioglitazone use in patients with diabetes, there was not a significant association between pioglitazone exposure and increases risk of bladder cancer (HR 1.06, 95% CI 0.89-1.26) even when accounting for dose and duration of exposure"  "

## 2024-08-20 NOTE — TELEPHONE ENCOUNTER
Care Due:                  Date            Visit Type   Department     Provider  --------------------------------------------------------------------------------                                EP -                              PRIMARY      [facility] INTERNAL  Last Visit: 08-      [facility]   MEDICINE       Jesse Grimes  Next Visit: None Scheduled  None         None Found                                                            Last  Test          Frequency    Reason                     Performed    Due Date  --------------------------------------------------------------------------------    Office Visit  15 months..  fenofibrate, lovastatin,   08- 11-                             pioglitazone, semaglutide    [facility] Embedded Care Due Messages. Reference number: [id number].   8/20/2024 8:18:15 AM CDT

## 2024-08-20 NOTE — PROGRESS NOTES
Chief Complaint: Follow-up    History of Present Illness    CHIEF COMPLAINT:  Mr. Vasquez presents today for follow up.    BLADDER CANCER:  He reports initial symptoms of bladder cancer began with hematuria in May, which resolved after one day. In mid-June, bleeding recurred and persisted for about a week, prompting consultation with a urologist. CT revealed a pouch-like structure in the bladder, followed by a scope exam where a tumor was removed. Pathology results showed no muscle mass behind the excised piece, raising concerns. He was referred to a specialized urologist for further evaluation. An upcoming blue light exam is scheduled for the 30th, during which biopsies will be taken to assess for any remaining cancer in the bladder. Potential treatment options include intravesical therapy and surgical repair of the bladder pouch from the outside.    DIABETES MANAGEMENT:  He is transitioning from Victoza 1.8 mg to Ozempic 1 mg for diabetes management. He prefers to start with the 1 mg dose of Ozempic and assess its effectiveness before considering any increases, with a potential to increase up to 2 mg if needed. He plans to delay starting Ozempic until after an upcoming test on the 30th, as he needs to be off the medication for at least a week prior. His recent A1C level is 5.9, indicating good glycemic control. The possibility of discontinuing Actos (pioglitazone) in the future has been discussed, contingent on his response to Ozempic and A1C levels.    TMJ ISSUES:  He reports experiencing TMJ issues, describing a clicking noise in his ear, particularly annoying at night. He denies pain but notes some tenderness on palpation. The clicking is more noticeable when sleeping on his side, especially while wearing his sleep apnea mask. He has been using a topical cream for inflammation on the joint, which he reports helps sometimes but not consistently.    CANCER SCREENING:  He undergoes regular colon cancer screening,  prostate cancer screening including PSA tests, and annual skin checks with a dermatologist for melanoma detection.    IMMUNIZATIONS:  He is up to date on most immunizations. His last COVID-19 vaccine dose was in October. He is aware of the recommendation for individuals 65 and older to receive a booster 4 months after their last dose and expresses interest in receiving future COVID-19 vaccines as they become available.    FAMILY HEALTH:  He reports concerns about his grandchild's recurrent infections. The grandchild, who will be 5 years old in December, has been experiencing frequent illnesses, including COVID-19, strep throat, and RSV, likely due to exposure in a  or  setting.    MEDICAL HISTORY:  He has a pacemaker with an old lead, preventing him from undergoing an MRI due to concerns that the lead may move with the magnetic field. A CT was performed instead, and the physician believes they have sufficient information to proceed with further evaluation or treatment.      Objective:   Physical Exam    Vitals: Reviewed. Nursing note reviewed.  Constitutional: Alert.  HENT: Normocephalic. Atraumatic. External ears normal. Nose normal. PERRL. Conjunctivae normal.  Neck: ROM normal. Supple.  Cardiovascular: Normal rate and regular rhythm. Normal heart sounds.  Pulmonary: Normal breath sounds. Effort normal.  Abdominal: Bowel sounds are normal. Soft.  Musculoskeletal: ROM normal. LEFT TMJ JOINT FEELS DIFFERENT COMPARED TO RIGHT SIDE.  Skin: Warm. Dry.  Neurological: Oriented x3.  Psychiatric: Behavior normal. Thought content normal. Judgment normal.       Assessment:       1. Type 2 diabetes mellitus with stage 3a chronic kidney disease, with long-term current use of insulin    2. Hypertension associated with diabetes    3. Malignant neoplasm of lateral wall of urinary bladder      tmj  Plan:   Assessment & Plan    E11.9 Type 2 diabetes mellitus without complications  Z95.0 Presence of cardiac  pacemaker  Z85.51 Personal history of malignant neoplasm of bladder  C67.9 Malignant neoplasm of bladder, unspecified    G47.39 Other sleep apnea    I10 Essential (primary) hypertension  M26.622 Arthralgia of left temporomandibular joint  Z79.899 Other long term (current) drug therapy  N32.3 Diverticulum of bladder    Evaluating bladder cancer: Awaiting results of blue light exam and biopsies scheduled for the 30th to determine extent of cancer and guide treatment plan  Considered discontinuation of pioglitazone (Actos) and increasing Ozempic dosage in the future, based on current A1C of 5.9  Assessed temporomandibular joint-like symptoms; recommended dental evaluation for potential interventions  Reviewed cancer screening status: patient up-to-date on standard screenings (colon, prostate, skin)  Evaluated COVID-19 vaccination status; patient due for booster at 4 months post-October dose  TEMPOROMANDIBULAR JOINT DISORDER:  - Avoid repetitive chewing (gum, ice, nuts) to manage temporomandibular joint symptoms.  CANCER SCREENING:  - Mr. Vasquez to contact the office if interested in ordering the Grail test, a non-insurance covered blood test for detecting multiple cancer types.  DIABETES:  - Continued plan start Ozempic 1 mg.;this will be alower equiv dose than victoza  - Start Ozempic 1 mg after bladder procedure on the 30th.  - Follow up in 6 months to reassess diabetes management and potential medication changes.  COVID-19 VACCINATION:  - Informed about COVID-19 booster recommendation for individuals 65 and older at 4 months post-previous dose.        Lab and follow up after in 6 months [facility] 3 months;offactos    B/c bladder ca will stop actos. Monitor gluc/has lantus

## 2024-08-21 ENCOUNTER — PATIENT MESSAGE (OUTPATIENT)
Dept: DIABETES | Facility: CLINIC | Age: 74
End: 2024-08-21
Payer: MEDICARE

## 2024-08-21 ENCOUNTER — PATIENT MESSAGE (OUTPATIENT)
Dept: UROLOGY | Facility: CLINIC | Age: 74
End: 2024-08-21
Payer: MEDICARE

## 2024-08-21 NOTE — ANESTHESIA PAT ROS NOTE
"                                                                                                             08/21/2024  Rigoberto Vasquez is a 74 y.o., male.      Pre-op Assessment    I have reviewed the NPO Status.   I have reviewed the Medications.     Review of Systems  Anesthesia Hx:  No problems with previous Anesthesia  "Only time I had trouble was with demerol. It made me very sick."           Denies Family Hx of Anesthesia complications.    Denies Personal Hx of Anesthesia complications.                    Social:  Non-Smoker, No Alcohol Use       Hematology/Oncology:       -- Anemia: Iron Deficiency Anemia               Bladder            Current/Recent Cancer.            Oncology Comments: Bladder cancer (high risk NMIBC) s/p TURBT 07/16/24, h/o SCC on scalp     EENT/Dental:   Eyes:             Eye Disease:    Glaucoma:          Ears General/Symptom(s)    Ear Disease:  CholesteatomaPt h/o tympanomastoidectomy       Jaw Problems:  Clicking (TMJ) Endorses clicking but denies difficulty opening mouth  Cardiovascular:  Exercise tolerance: good  Pacemaker Hypertension, well controlled   Denies MI.        Denies Angina. (pt denies any current or recent chest pain)     hyperlipidemia                          Disorder of Cardiac Conduction, A-V Block, 3rd Degree A-V Block (s/p PPM since 1981)    Pulmonary:     Denies Asthma.   Denies Shortness of breath.  Sleep Apnea, CPAP                Renal/:  Chronic Renal Disease, CKD  BPH    Neoplasm/Tumor, Bladder Tumor.           Hepatic/GI:     GERD, well controlled Denies Liver Disease.     Bowel Conditions: (diverticulosis)         Musculoskeletal:  Arthritis      Joint Disease:  Arthritis, Osteoarthritis (both knees)          Neurological:  Neurology Normal Denies TIA.  Denies CVA.    Denies Seizures.        Osteoarthritis (both knees)                           Endocrine:  Diabetes, well controlled, type 2                    Anesthesia Assessment: Preoperative " EQUATION    Planned Procedure: Procedure(s) (LRB):  TURBT,WITH BLUE LIGHT CYSTOSCOPY AND CYSVIEW (N/A)  Requested Anesthesia Type:General  Surgeon: Compa Hensley MD  Service: Urology  Known or anticipated Date of Surgery:8/30/2024    Surgeon notes: reviewed    Electronic QUestionnaire Assessment completed via nurse interview with patient.        Triage considerations:     The patient has no apparent active cardiac condition (No unstable coronary Syndrome such as severe unstable angina or recent [<1 month] myocardial infarction, decompensated CHF, severe valvular   disease or significant arrhythmia)    Previous anesthesia records:GETA and No problems  07/16/24 TURBT (TRANSURETHRAL RESECTION OF BLADDER TUMOR) (Bladder), CYSTOSCOPY, WITH RETROGRADE PYELOGRAM (Bilateral: Kidney), general anesthesia, ASA 3  Intubation     Date/Time: 7/16/2024 9:55 AM     Performed by: Naveen Keenan CRNA  Authorized by: Alexander Dumas II, MD    Intubation:     Induction:  Intravenous    Intubated:  Postinduction    Mask Ventilation:  Easy mask    Attempts:  1    Attempted By:  CRNA    Method of Intubation:  Blind intubation    Difficult Airway Encountered?: No      Airway Device:  Supraglottic airway/LMA    Airway Device Size:  4.0    Secured at:  The lips    Placement Verified By:  Capnometry    Complicating Factors:  None    Findings Post-Intubation:  BS equal bilateral and atraumatic/condition of teeth unchanged    Last PCP note: within 1 month , within [facility]   Subspecialty notes: Urology    Other important co-morbidities: DM2, GERD, HLD, HTN, СВЕТЛАНА, and bladder cancer (high risk NMIBC) s/p prior TURBT 07/16/24, BPH with LUTS s/p TURP, ED, TMJ, calcification of aorta, h/o SCC scalp, h/o CHB s/p pacemaker (original pacer placed in 1981 per pt), glaucoma, h/o CHANDRAKANT, CKD 3a, OA both knees, diverticulosis, DJD, h/o pericarditis       Tests already available:  Available tests,  within 1 month , within 3 months , within [facility] .    08/16/24 UA  08/12/24 A1C (5.8), LIPID PANEL  08/01/24 TTE (EF 45-50%)  07/11/24 CBC, CMP, EKG              Instructions given. (See in Nurse's note)    Optimization:  Anesthesia Preop Clinic Assessment Indicated:      --phone screen done        Plan:    Testing:  None Needed    Navigation:  Straight Line to surgery.               No tests, anesthesia preop clinic visit, or consult required.

## 2024-08-22 RX ORDER — DUTASTERIDE 0.5 MG/1
0.5 CAPSULE, LIQUID FILLED ORAL DAILY
Qty: 90 CAPSULE | Refills: 3 | Status: SHIPPED | OUTPATIENT
Start: 2024-08-22

## 2024-08-29 ENCOUNTER — OFFICE VISIT (OUTPATIENT)
Dept: DIABETES | Facility: CLINIC | Age: 74
End: 2024-08-29
Payer: MEDICARE

## 2024-08-29 ENCOUNTER — TELEPHONE (OUTPATIENT)
Dept: UROLOGY | Facility: CLINIC | Age: 74
End: 2024-08-29
Payer: MEDICARE

## 2024-08-29 ENCOUNTER — TELEPHONE (OUTPATIENT)
Dept: DIABETES | Facility: CLINIC | Age: 74
End: 2024-08-29

## 2024-08-29 VITALS
WEIGHT: 203.69 LBS | SYSTOLIC BLOOD PRESSURE: 114 MMHG | BODY MASS INDEX: 28.41 KG/M2 | DIASTOLIC BLOOD PRESSURE: 74 MMHG | HEART RATE: 60 BPM

## 2024-08-29 DIAGNOSIS — E11.59 HYPERTENSION ASSOCIATED WITH DIABETES: Chronic | ICD-10-CM

## 2024-08-29 DIAGNOSIS — E78.5 HYPERLIPIDEMIA ASSOCIATED WITH TYPE 2 DIABETES MELLITUS: Chronic | ICD-10-CM

## 2024-08-29 DIAGNOSIS — N18.31 TYPE 2 DIABETES MELLITUS WITH STAGE 3A CHRONIC KIDNEY DISEASE, WITH LONG-TERM CURRENT USE OF INSULIN: Primary | ICD-10-CM

## 2024-08-29 DIAGNOSIS — C67.2 MALIGNANT NEOPLASM OF LATERAL WALL OF URINARY BLADDER: ICD-10-CM

## 2024-08-29 DIAGNOSIS — E11.22 TYPE 2 DIABETES MELLITUS WITH STAGE 3A CHRONIC KIDNEY DISEASE, WITH LONG-TERM CURRENT USE OF INSULIN: Primary | ICD-10-CM

## 2024-08-29 DIAGNOSIS — Z79.4 TYPE 2 DIABETES MELLITUS WITH STAGE 3A CHRONIC KIDNEY DISEASE, WITH LONG-TERM CURRENT USE OF INSULIN: Primary | ICD-10-CM

## 2024-08-29 DIAGNOSIS — I15.2 HYPERTENSION ASSOCIATED WITH DIABETES: Chronic | ICD-10-CM

## 2024-08-29 DIAGNOSIS — E11.69 HYPERLIPIDEMIA ASSOCIATED WITH TYPE 2 DIABETES MELLITUS: Chronic | ICD-10-CM

## 2024-08-29 LAB — GLUCOSE SERPL-MCNC: 152 MG/DL (ref 70–110)

## 2024-08-29 PROCEDURE — 99999 PR PBB SHADOW E&M-EST. PATIENT-LVL IV: CPT | Mod: PBBFAC,HCNC,, | Performed by: NURSE PRACTITIONER

## 2024-08-29 NOTE — PROGRESS NOTES
Patient ID: Rigoberto Vasquez is a 74 y.o. male.  Patient's current PCP is Jesse Grimes MD.   Collaborating Physician: JULIO Galvin MD    Chief Complaint: Diabetes Mellitus    HPI  Rigoberto Vasquez is a 74 y.o. White male presenting for a new consult for diabetes.       Patient has been diagnosed with type 2 diabetes for > 10 years.  Complications related to diabetes: nephropathy  Recent diabetes related hospitalizations: none  Previous diabetes education:yes, here    Current diet: Eating 3 meals per day. Generally healthy choices - lower red meat and fried foods. Increasing vegetables. Using [email or web address]  Activity level: 3 x week after breakfast, stretching/resistance/stationary bike - 1 hour +  On hold prior to first bladder surgery d/t bleeding    Current issues:Bladder cancer - Requiring blue light procedure tomorrow to help determine treatment course - per Dr Hensley.  Inquiring about cgm. Son has T1DM since age 10    Personal history of pancreatitis: denies  Personal history of abdominal surgery: denies  Personal history of thyroid surgery: denies  Family history of pancreatic cancer in first-degree relative: denies  Family history of MTC/MEN/endocrine tumors: denies     Past Medical History:   Diagnosis Date    Acid reflux     [facility]    Angina pectoris     Back pain     BPH (benign prostatic hyperplasia)     blue    Cholesteatoma     Degenerative joint disease     Diverticulosis     Erectile dysfunction     History of elevated PSA 02/2014    normalized, dr dave annually    History of pericarditis     Hypercholesteremia     Hypertension     Iron deficiency anemia     Malignant neoplasm of lateral wall of urinary bladder 8/5/2024    СВЕТЛАНА (obstructive sleep apnea) 05/17/2022    Pacemaker     complete av block;NO afib    Renal disorder     Squamous cell cancer of skin of mastoid region of scalp     dr jaida salgado    Type 2 diabetes mellitus     dr wyman    Urinary incontinence     when he was 7 Yrs old.   "    Social History     Socioeconomic History    Marital status:      Spouse name: SAUL    Number of children: 2   Occupational History    Occupation: retired- [employer] .   Tobacco Use    Smoking status: Never    Smokeless tobacco: Never   Substance and Sexual Activity    Alcohol use: Yes     Comment: " once a month"    Drug use: No    Sexual activity: Yes     Partners: Female   Social History Narrative     . Lives with spouse. Has 2 children. Patient retired from [employer]. His son has type 1 diabetes.     Social Determinants of Health     Financial Resource Strain: Low Risk  (8/7/2024)    Overall Financial Resource Strain (CARDIA)     Difficulty of Paying Living Expenses: Not hard at all   Food Insecurity: No Food Insecurity (8/7/2024)    Hunger Vital Sign     Worried About Running Out of Food in the Last Year: Never true     Ran Out of Food in the Last Year: Never true   Transportation Needs: No Transportation Needs (11/11/2021)    PRAPARE - Transportation     Lack of Transportation (Medical): No     Lack of Transportation (Non-Medical): No   Physical Activity: Sufficiently Active (8/7/2024)    Exercise Vital Sign     Days of Exercise per Week: 3 days     Minutes of Exercise per Session: 90 min   Stress: No Stress Concern Present (8/7/2024)    [place] [employer] - Occupational Stress Questionnaire     Feeling of Stress : Only a little   Housing Stability: Low Risk  (11/11/2021)    Housing Stability Vital Sign     Unable to Pay for Housing in the Last Year: No     Number of Places Lived in the Last Year: 1     Unstable Housing in the Last Year: No       Review of patient's allergies indicates:   Allergen Reactions    Aspirin      Stomach pain even with ppi    Meperidine      Other reaction(s): Stomach upset    Niacin      Other reaction(s): hot skin       CURRENT DM MEDICATIONS:   Diabetes Medications               insulin (LANTUS SOLOSTAR U-100 INSULIN) " glargine 100 units/mL SubQ pen 15 units in the AM and 0-2 units in the PM    semaglutide (OZEMPIC) 1 mg/dose (4 mg/3 mL) Inject 1 mg into the skin every 7 days.  Has not started yet - transitioning from Victoza 1.8     Off Victoza x 1 week (last Friday)       Past failed treatment(s) include:   Pioglitazone - bladder CA/good control. Recently stopped  Victoza - formulary change/constipation    Meter/cgm: meter  Blood glucose testing is performed regularly.   Patient is testing 1 times per day.  Any episodes of hypoglycemia? Occasional 60s - not symptomatic  Glucose trends:   Euglycemic  am and  rest of day when checked    His blood sugar in the clinic today was:   Lab Results   Component Value Date    POCGLU 152 (A) 08/29/2024       Statin: Taking  ACE/ARB: Taking    Labs reviewed and are noted below.    Screening or Prevention Patient's value Goal Complete/Controlled?   HgA1C Testing and Control   Lab Results   Component Value Date    HGBA1C 5.8 (H) 08/12/2024      Annually/Less than 8% Yes   Lipid profile : 08/12/2024 Annually Yes   LDL control Lab Results   Component Value Date    LDLCALC 58.4 (L) 08/12/2024    Annually/Less than 100 mg/dl  Yes   Nephropathy screening Lab Results   Component Value Date    MICALBCREAT 10.9 02/15/2024     Lab Results   Component Value Date    PROTEINUA Negative 08/16/2024    Annually Yes   Blood pressure BP Readings from Last 1 Encounters:   08/29/24 114/74    Less than 140/90 Yes   Dilated retinal exam : 12/05/2023 Annually Yes    Foot exam   : 02/15/2024 Annually Yes     Glucose   Date Value Ref Range Status   07/11/2024 72 70 - 110 mg/dL Final     Anion Gap   Date Value Ref Range Status   07/11/2024 6 (L) 8 - 16 mmol/L Final     eGFR if    Date Value Ref Range Status   06/23/2022 >60.0 >60 mL/min/1.73 m^2 Final     eGFR if non    Date Value Ref Range Status   06/23/2022 55.3 (A) >60 mL/min/1.73 m^2 Final     Comment:     Calculation  "used to obtain the estimated glomerular filtration  rate (eGFR) is the CKD-EPI equation.        Lab Results   Component Value Date    TSH 1.837 12/27/2021     No results found for: "CPEPTIDE"  No results found for: "GLUTAMICACID"    Wt Readings from Last 3 Encounters:   08/29/24 0953 92.4 kg (203 lb 11.3 oz)   08/20/24 0916 92.7 kg (204 lb 5.9 oz)   08/07/24 1025 91.9 kg (202 lb 9.6 oz)       Review of Systems   Constitutional:  Negative for malaise/fatigue and weight loss.   Eyes:  Negative for blurred vision and double vision.   Respiratory:  Negative for shortness of breath.    Cardiovascular: Negative.    Gastrointestinal:  Positive for constipation.   Genitourinary:  Negative for frequency.   Musculoskeletal:  Negative for myalgias.   Neurological: Negative.    Psychiatric/Behavioral: Negative.         Physical Exam  Vitals reviewed.   Constitutional:       Appearance: Normal appearance. He is obese.   Eyes:      Conjunctiva/sclera: Conjunctivae normal.      Pupils: Pupils are equal, round, and reactive to light.   Cardiovascular:      Rate and Rhythm: Normal rate and regular rhythm.      Pulses: Normal pulses.      Heart sounds: Normal heart sounds.   Pulmonary:      Effort: Pulmonary effort is normal.      Breath sounds: Normal breath sounds.   Abdominal:      General: Bowel sounds are normal.      Palpations: Abdomen is soft.   Musculoskeletal:      Cervical back: Normal range of motion and neck supple.      Right lower leg: No edema.      Left lower leg: No edema.   Skin:     General: Skin is warm and dry.      Comments: Sites without hypertrophy and/or infection   Neurological:      General: No focal deficit present.      Mental Status: He is alert and oriented to person, place, and time.   Psychiatric:         Mood and Affect: Mood normal.         Behavior: Behavior normal.           Assessment & Plan    Rigoberto was seen today for diabetes mellitus.    Diagnoses and all orders for this visit:    Type 2 " diabetes mellitus with stage 3a chronic kidney disease, with long-term current use of insulin  -     POCT Glucose, Hand-Held Device  -     Recommend starting Dexcom G7 cgm to assist with treatment management and prevent hypoglycemia  -     Continue current treatment plans for now  -     Call /Mychart if < 70 or > 180 consistently    Hyperlipidemia associated with type 2 diabetes mellitus - on statin and fenofibrate    Hypertension associated with diabetes - on low dose ARB    Malignant neoplasm of lateral wall of urinary bladder - managed by Urology  - Call/Mychart once know treatment plans if changes in DM therapy needed            - Follow up: 6 weeks    Visit today included increased complexity associated with the care of the episodic problem fluctuating blood sugars addressed and managing the longitudinal care of the patient due to the serious and/or complex managed problem(s) T2DM.

## 2024-08-30 ENCOUNTER — ANESTHESIA EVENT (OUTPATIENT)
Dept: SURGERY | Facility: HOSPITAL | Age: 74
End: 2024-08-30
Payer: MEDICARE

## 2024-08-30 ENCOUNTER — HOSPITAL ENCOUNTER (OUTPATIENT)
Facility: HOSPITAL | Age: 74
Discharge: HOME | End: 2024-08-30
Attending: UROLOGY | Admitting: UROLOGY
Payer: MEDICARE

## 2024-08-30 ENCOUNTER — DOCUMENTATION ONLY (OUTPATIENT)
Dept: CARDIOLOGY | Facility: HOSPITAL | Age: 74
End: 2024-08-30
Payer: MEDICARE

## 2024-08-30 ENCOUNTER — ANESTHESIA (OUTPATIENT)
Dept: SURGERY | Facility: HOSPITAL | Age: 74
End: 2024-08-30
Payer: MEDICARE

## 2024-08-30 VITALS
TEMPERATURE: 98 F | DIASTOLIC BLOOD PRESSURE: 81 MMHG | OXYGEN SATURATION: 99 % | WEIGHT: 200 LBS | HEART RATE: 60 BPM | SYSTOLIC BLOOD PRESSURE: 137 MMHG | BODY MASS INDEX: 27.89 KG/M2 | RESPIRATION RATE: 16 BRPM

## 2024-08-30 DIAGNOSIS — D49.4 BLADDER TUMOR: Primary | ICD-10-CM

## 2024-08-30 LAB
POCT GLUCOSE: 105 MG/DL (ref 70–110)
POCT GLUCOSE: 122 MG/DL (ref 70–110)

## 2024-08-30 PROCEDURE — 82962 GLUCOSE BLOOD TEST: CPT | Mod: HCNC | Performed by: UROLOGY

## 2024-08-30 PROCEDURE — 63600175 PHARM REV CODE 636 W HCPCS: Mod: HCNC

## 2024-08-30 PROCEDURE — 88342 IMHCHEM/IMCYTCHM 1ST ANTB: CPT | Mod: 26,HCNC,, | Performed by: PATHOLOGY

## 2024-08-30 PROCEDURE — 37000008 HC ANESTHESIA 1ST 15 MINUTES: Mod: HCNC | Performed by: UROLOGY

## 2024-08-30 PROCEDURE — 25000003 PHARM REV CODE 250: Mod: HCNC

## 2024-08-30 PROCEDURE — 36000706: Mod: HCNC | Performed by: UROLOGY

## 2024-08-30 PROCEDURE — 37000009 HC ANESTHESIA EA ADD 15 MINS: Mod: HCNC | Performed by: UROLOGY

## 2024-08-30 PROCEDURE — 88305 TISSUE EXAM BY PATHOLOGIST: CPT | Mod: 59,HCNC | Performed by: PATHOLOGY

## 2024-08-30 PROCEDURE — 88342 IMHCHEM/IMCYTCHM 1ST ANTB: CPT | Mod: HCNC | Performed by: PATHOLOGY

## 2024-08-30 PROCEDURE — 36000707: Mod: HCNC | Performed by: UROLOGY

## 2024-08-30 PROCEDURE — 88341 IMHCHEM/IMCYTCHM EA ADD ANTB: CPT | Mod: 26,HCNC,, | Performed by: PATHOLOGY

## 2024-08-30 PROCEDURE — 71000015 HC POSTOP RECOV 1ST HR: Mod: HCNC | Performed by: UROLOGY

## 2024-08-30 PROCEDURE — 88341 IMHCHEM/IMCYTCHM EA ADD ANTB: CPT | Mod: HCNC | Performed by: PATHOLOGY

## 2024-08-30 PROCEDURE — A9589 INSTI HEXAMINOLEVULINATE HCL: HCPCS | Mod: HCNC

## 2024-08-30 PROCEDURE — 88305 TISSUE EXAM BY PATHOLOGIST: CPT | Mod: 26,HCNC,, | Performed by: PATHOLOGY

## 2024-08-30 PROCEDURE — 71000044 HC DOSC ROUTINE RECOVERY FIRST HOUR: Mod: HCNC | Performed by: UROLOGY

## 2024-08-30 PROCEDURE — 52204 CYSTOSCOPY W/BIOPSY(S): CPT | Mod: ,,, | Performed by: UROLOGY

## 2024-08-30 RX ORDER — FENTANYL CITRATE 50 UG/ML
INJECTION, SOLUTION INTRAMUSCULAR; INTRAVENOUS
Status: DISCONTINUED | OUTPATIENT
Start: 2024-08-30 | End: 2024-08-30

## 2024-08-30 RX ORDER — MIDAZOLAM HYDROCHLORIDE 1 MG/ML
INJECTION INTRAMUSCULAR; INTRAVENOUS
Status: DISCONTINUED | OUTPATIENT
Start: 2024-08-30 | End: 2024-08-30

## 2024-08-30 RX ORDER — CEFAZOLIN SODIUM 1 G/3ML
INJECTION, POWDER, FOR SOLUTION INTRAMUSCULAR; INTRAVENOUS
Status: DISCONTINUED | OUTPATIENT
Start: 2024-08-30 | End: 2024-08-30

## 2024-08-30 RX ORDER — ONDANSETRON HYDROCHLORIDE 2 MG/ML
INJECTION, SOLUTION INTRAVENOUS
Status: DISCONTINUED | OUTPATIENT
Start: 2024-08-30 | End: 2024-08-30

## 2024-08-30 RX ORDER — DEXAMETHASONE SODIUM PHOSPHATE 4 MG/ML
INJECTION, SOLUTION INTRA-ARTICULAR; INTRALESIONAL; INTRAMUSCULAR; INTRAVENOUS; SOFT TISSUE
Status: DISCONTINUED | OUTPATIENT
Start: 2024-08-30 | End: 2024-08-30

## 2024-08-30 RX ORDER — SODIUM CHLORIDE 9 MG/ML
INJECTION, SOLUTION INTRAVENOUS CONTINUOUS
Status: DISCONTINUED | OUTPATIENT
Start: 2024-08-30 | End: 2024-08-30 | Stop reason: HOSPADM

## 2024-08-30 RX ORDER — PROPOFOL 10 MG/ML
VIAL (ML) INTRAVENOUS
Status: DISCONTINUED | OUTPATIENT
Start: 2024-08-30 | End: 2024-08-30

## 2024-08-30 RX ORDER — PHENAZOPYRIDINE HYDROCHLORIDE 100 MG/1
100 TABLET, FILM COATED ORAL 3 TIMES DAILY PRN
Qty: 21 TABLET | Refills: 0 | Status: SHIPPED | OUTPATIENT
Start: 2024-08-30 | End: 2024-09-06

## 2024-08-30 RX ORDER — ROCURONIUM BROMIDE 10 MG/ML
INJECTION, SOLUTION INTRAVENOUS
Status: DISCONTINUED | OUTPATIENT
Start: 2024-08-30 | End: 2024-08-30

## 2024-08-30 RX ORDER — OXYBUTYNIN CHLORIDE 5 MG/1
5 TABLET ORAL 3 TIMES DAILY
Qty: 21 TABLET | Refills: 0 | Status: SHIPPED | OUTPATIENT
Start: 2024-08-30 | End: 2024-09-06

## 2024-08-30 RX ORDER — LIDOCAINE HYDROCHLORIDE 20 MG/ML
INJECTION, SOLUTION EPIDURAL; INFILTRATION; INTRACAUDAL; PERINEURAL
Status: DISCONTINUED | OUTPATIENT
Start: 2024-08-30 | End: 2024-08-30

## 2024-08-30 RX ADMIN — DEXAMETHASONE SODIUM PHOSPHATE 4 MG: 4 INJECTION, SOLUTION INTRAMUSCULAR; INTRAVENOUS at 11:08

## 2024-08-30 RX ADMIN — CEFAZOLIN 2 G: 330 INJECTION, POWDER, FOR SOLUTION INTRAMUSCULAR; INTRAVENOUS at 11:08

## 2024-08-30 RX ADMIN — FENTANYL CITRATE 50 MCG: 50 INJECTION, SOLUTION INTRAMUSCULAR; INTRAVENOUS at 11:08

## 2024-08-30 RX ADMIN — ONDANSETRON 4 MG: 2 INJECTION INTRAMUSCULAR; INTRAVENOUS at 12:08

## 2024-08-30 RX ADMIN — SODIUM CHLORIDE: 0.9 INJECTION, SOLUTION INTRAVENOUS at 11:08

## 2024-08-30 RX ADMIN — ROCURONIUM BROMIDE 40 MG: 10 INJECTION, SOLUTION INTRAVENOUS at 11:08

## 2024-08-30 RX ADMIN — LIDOCAINE HYDROCHLORIDE 100 MG: 20 INJECTION, SOLUTION EPIDURAL; INFILTRATION; INTRACAUDAL; PERINEURAL at 11:08

## 2024-08-30 RX ADMIN — HEXAMINOLEVULINATE HYDROCHLORIDE 100 MG: KIT at 10:08

## 2024-08-30 RX ADMIN — SUGAMMADEX 200 MG: 100 INJECTION, SOLUTION INTRAVENOUS at 12:08

## 2024-08-30 RX ADMIN — MIDAZOLAM HYDROCHLORIDE 2 MG: 2 INJECTION, SOLUTION INTRAMUSCULAR; INTRAVENOUS at 11:08

## 2024-08-30 RX ADMIN — PROPOFOL 140 MG: 10 INJECTION, EMULSION INTRAVENOUS at 11:08

## 2024-08-30 RX ADMIN — ROCURONIUM BROMIDE 10 MG: 10 INJECTION, SOLUTION INTRAVENOUS at 11:08

## 2024-08-30 NOTE — DISCHARGE INSTRUCTIONS
Post Cystoscopy and Transurethral resection of Bladder Tumor Instructions  Do not strain to have a bowel movement  No strenuous exercise x 7 days  No driving while you are on narcotic pain medications or if your turner  catheter is in place    You can expect:  To see blood in your urine.    Go to the ER if:  Your catheter stops draining  You are having severe abdominal pain  Inability to void if you do not have a catheter    Call the doctor if:  Temperature is greater than 101F  Persistent vomiting and inability to keep food down

## 2024-08-30 NOTE — OP NOTE
[facility] ([facility])  Urology Department  Operative Note    Date of Procedure: 8/30/2024     Pre-Operative Diagnosis:   Malignant neoplasm of lateral wall of urinary bladder [C67.2]    Patient Active Problem List    Diagnosis Date Noted    Malignant neoplasm of lateral wall of urinary bladder 08/05/2024    Gross hematuria 07/11/2024    СВЕТЛАНА on CPAP 05/17/2022    Cholesteatoma of ear, left     Calcification of aorta 11/11/2021    Dyspepsia 09/03/2021    Lung granuloma 08/04/2021    BPH with obstruction/lower urinary tract symptoms 07/08/2020    Erectile dysfunction 07/08/2020    History of skin cancer 11/25/2019    History of heart block;AV block, 3rd degree 02/04/2019    Open angle with borderline findings and high glaucoma risk in both eyes 01/21/2019    Ectopic gastric mucosa 07/03/2017    Macular hole of right eye 12/05/2016    Open angle with borderline findings and low glaucoma risk in both eyes 12/05/2016    Overweight (BMI 25.0-29.9) 11/11/2015    Choroidal nevus of right eye 04/06/2015    Epiretinal membrane (ERM) of left eye 04/06/2015    Conductive hearing loss in left ear     Pacemaker     CHANDRAKANT (iron deficiency anemia) 06/18/2014    High myopia 03/28/2014    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  Cystoscopy with bladder biopsy and fulguration (>5cm)    Primary: Compa Hensley MD  Resident - Assisting: Maico Torres MD        Anesthesia: General    Estimated Blood Loss (EBL): min     Specimen(s):  Peridiverticular bladder wall, posterior bladder wall, dome of bladder, left lateral wall, right lateral wall    Indications: Rigoberto CORTEZ  Pedro is a 74 y.o. male with recently diagnosed high-grade T1 bladder cancer within a right bladder wall diverticulum. He presents today for blue light cystoscopy with bladder biopsy and fulguration.    Findings:   - no obvious bladder wall tumors identified along the bladder wall with white light  - several 1-2 mm areas of blue light avid bladder mucosa seen along the right lateral and left lateral bladder wall as well as at the dome of the bladder.  Random bladder biopsies performed along the peridiverticular bladder wall, posterior bladder wall, right lateral bladder wall, left lateral bladder wall, and dome.  - fulguration performed (over 5 cm area) for hemostasis  - bladder diverticulum with prior resection site with high-grade T1 bladder cancer located along the right posterior bladder wall, roughly 2 cm by 2 cm in length and width with only 2 cm depth.    Procedure in Detail:  Informed consent was obtained. The patient was transferred to the cystoscopy suite and placed in the supine position. Appropriate DVT prophylaxis was utilized. Anesthesia was administered. Once sedated, the patient was placed in the dorsal lithotomy position and prepped and draped in the usual sterile fashion. Time out was performed, and palomo-procedural antibiotics were confirmed.    A rigid cystoscope in a 22 Fr sheath was introduced into the bladder per urethra. The entire urethra was visualized which showed no masses or strictures. Cystoscopy was performed. The right and left ureteral orifices were identified in the normal anatomic position.  We formally evaluated the bladder with blue light and white light with the above findings.      Attention was turned to the blue light avid lesions.  These areas were biopsied with cold cup biopsy forceps and passed off as a specimen. The bugbee electrocautery was introduced through the scope, and the bleeding areas were fulgurated. Hemostasis was achieved. The erythematous areas surrounding  the lesions were then fulgurated. The bladder was drained and refilled. Hemostasis was confirmed.    The bladder was drained, and the patient was removed from lithotomy.     The patient tolerated the procedure well and was transferred to recovery in stable condition.    Disposition:  The patient will follow up to discuss pathology results.     Maico Torres MD

## 2024-08-30 NOTE — ANESTHESIA PREPROCEDURE EVALUATION
[facility]  Anesthesia Pre-Operative Evaluation     Patient Name: Rigoberto Vasquez  YOB: 1950  MRN: [id number]  [facility]: [id number]       Admit Date: 8/30/2024   Admit Team: Networked reference to record PCT   Hospital Day: 1  Date of Procedure: 8/30/2024  Anesthesia: General Procedure: Procedure(s) (LRB):  TURBT,WITH BLUE LIGHT CYSTOSCOPY AND CYSVIEW (N/A)  PYELOGRAM, RETROGRADE (Bilateral)  INSERTION, STENT, URETER (Bilateral)  Pre-Operative Diagnosis: Malignant neoplasm of lateral wall of urinary bladder [C67.2]  Proceduralist:Surgeons and Role:     * Compa Hensley MD - Primary     * Maico Torres MD - Resident - Assisting  Code Status: Prior   Advanced Directive: <no information>  Isolation Precautions: No active isolations  Capacity: Full capacity     SUBJECTIVE:   Rigoberto Vasquez is a 74 y.o. 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  No notes on file   0.9% NaCl   Intravenous Continuous         Hospital LOS: 0 days  ICU LOS: Patient does not have an ICU stay during this admission.    he has a current medication list which includes the following long-term medication(s): azelastine, dorzolamide-timolol 2-0.5%, famotidine, fenofibrate micronized, hydrocortisone, levocetirizine, loratadine, losartan, lovastatin, pantoprazole, sildenafil, triamcinolone acetonide 0.1%, dutasteride, lantus solostar u-100 insulin, and latanoprost.   Current Outpatient  "Medications   Medication Instructions    ACETAMINOPHEN (TYLENOL 8 HOUR ORAL) Oral, As needed (PRN)    azelastine (ASTELIN) 274 mcg, Nasal, 2 times daily    blood sugar diagnostic (ACCU-CHEK JAXON) Strp Check BG 2-3 times daily. Accuchek jaxon strips    dorzolamide-timolol 2-0.5% (COSOPT) 22.3-6.8 mg/mL ophthalmic solution 1 drop, Both Eyes, 2 times daily    DROPLET PEN NEEDLE 31 gauge x 3/16" Ndle USE AS DIRECTED  WITH  INSULIN    dutasteride (AVODART) 0.5 mg, Oral, Daily    famotidine (PEPCID) 20 mg, Oral, 2 times daily    fenofibrate micronized (LOFIBRA) 200 mg, Oral, Nightly    fluticasone propionate (FLONASE) 50 mcg/actuation nasal spray 1 spray, Each Nostril, Daily    gabapentin (CMPD PAIN MANAGEMENT COMPOUND OINTMNENT THREE) Apply bid prn pain    hydrocortisone 2.5 % cream Topical (Top), 2 times daily    insulin (LANTUS SOLOSTAR U-100 INSULIN) glargine 100 units/mL SubQ pen 16 units in the AM and 4 units in the PM    latanoprost 0.005 % ophthalmic solution INSTILL 1 DROP INTO BOTH EYES ONE TIME DAILY    levocetirizine (XYZAL) 5 mg, Oral, Nightly    loratadine (CLARITIN) 10 mg, Oral, Daily    losartan (COZAAR) 25 mg, Oral, Daily    lovastatin (MEVACOR) 40 MG tablet Take 1 tablet by mouth Every evening.    multivitamin capsule 1 capsule, Oral, Daily    OZEMPIC 1 mg, Subcutaneous, Every 7 days    pantoprazole (PROTONIX) 40 mg, Oral, Daily    polyethylene glycol (GLYCOLAX) 17 g, Oral, Daily    sildenafil (REVATIO) 20 mg, Oral, Daily PRN, Takes prn    triamcinolone acetonide 0.1% (KENALOG) 0.1 % cream AAA bid     ALLERGIES:     Review of patient's allergies indicates:   Allergen Reactions    Aspirin      Stomach pain even with ppi    Meperidine      Other reaction(s): Stomach upset    Niacin      Other reaction(s): hot skin     LDA:   AIRWAY:         * No LDAs found *      Lines/Drains/Airways       Peripheral Intravenous Line  Duration                  Peripheral IV - Single Lumen 08/30/24 1001 20 G Left;Posterior " Hand <1 day                   Anesthesia Evaluation      Airway   Mallampati: III  TM distance: Normal  Neck ROM: Normal ROM  Dental    (+) Intact    Pulmonary    (+) pneumonia, sleep apnea  Cardiovascular   Exercise tolerance: good  (+) hypertension, angina    Neuro/Psych      GI/Hepatic/Renal    (+) GERD, chronic renal disease    Endo/Other    (+) diabetes mellitus, arthritis  Abdominal                    MEDICATIONS:     Current Outpatient Medications on File Prior to Encounter   Medication Sig Dispense Refill Last Dose    ACETAMINOPHEN (TYLENOL 8 HOUR ORAL) Take by mouth as needed.   8/21/2024    azelastine (ASTELIN) 137 mcg (0.1 %) nasal spray 2 sprays (274 mcg total) by Nasal route 2 (two) times daily. 30 mL 12 8/21/2024    dorzolamide-timolol 2-0.5% (COSOPT) 22.3-6.8 mg/mL ophthalmic solution Place 1 drop into both eyes 2 (two) times daily. 10 mL 5 8/21/2024    fenofibrate micronized (LOFIBRA) 200 MG Cap Take 1 capsule (200 mg total) by mouth every evening. 90 capsule 0 8/20/2024    fluticasone propionate (FLONASE) 50 mcg/actuation nasal spray 1 spray by Each Nostril route once daily.   8/21/2024    hydrocortisone 2.5 % cream Apply topically 2 (two) times daily. 3.5 g 5 Past Week    levocetirizine (XYZAL) 5 MG tablet Take 5 mg by mouth every evening.   8/20/2024    loratadine (CLARITIN) 10 mg tablet Take 10 mg by mouth once daily.   8/21/2024    losartan (COZAAR) 25 MG tablet Take 1 tablet (25 mg total) by mouth once daily. 90 tablet 3 8/20/2024    lovastatin (MEVACOR) 40 MG tablet Take 1 tablet by mouth Every evening. 90 tablet 3 8/20/2024    multivitamin capsule Take 1 capsule by mouth once daily.   8/21/2024    pantoprazole (PROTONIX) 40 MG tablet Take 1 tablet (40 mg total) by mouth once daily. 90 tablet 1 8/20/2024    polyethylene glycol (GLYCOLAX) 17 gram/dose powder Take 17 g by mouth once daily.   8/20/2024    sildenafil (REVATIO) 20 mg Tab Take 1 tablet (20 mg total) by mouth daily as needed (PRN).  "Takes prn 30 tablet 11 8/21/2024    triamcinolone acetonide 0.1% (KENALOG) 0.1 % cream AAA bid 454 g 0 8/21/2024    blood sugar diagnostic (ACCU-CHEK JAXON) Strp Check BG 2-3 times daily. Accuchek jaxon strips 300 strip 3     DROPLET PEN NEEDLE 31 gauge x 3/16" Ndle USE AS DIRECTED  WITH  INSULIN 200 each 3     insulin (LANTUS SOLOSTAR U-100 INSULIN) glargine 100 units/mL SubQ pen 16 units in the AM and 4 units in the PM (Patient taking differently: 15 units in the AM and 2 units in the PM) 3 mL 11     latanoprost 0.005 % ophthalmic solution INSTILL 1 DROP INTO BOTH EYES ONE TIME DAILY 7.5 mL 3     semaglutide (OZEMPIC) 1 mg/dose (4 mg/3 mL) Inject 1 mg into the skin every 7 days. (Patient not taking: Reported on 8/29/2024) 9 mL 4       Inpatient Medications:  Antibiotics (From admission, onward)      Start     Stop Route Frequency Ordered    08/30/24 0929  ceFAZolin 2 g in D5W 50 mL IVPB (MB+)         -- IV On Call Procedure 08/30/24 0929          VTE Risk Mitigation (From admission, onward)           Ordered     IP VTE HIGH RISK PATIENT  Once         08/30/24 0929     Place sequential compression device  Until discontinued         08/30/24 0929                   hexaminolevulinate  100 mg Intravesical Once Pre-Op       Current Facility-Administered Medications   Medication Dose Route Frequency Provider Last Rate Last Admin    0.9%  NaCl infusion   Intravenous Continuous Maico Torres MD        ceFAZolin 2 g in D5W 50 mL IVPB (MB+)  2 g Intravenous On Call Procedure Maico Torres MD        hexaminolevulinate solution 100 mg  100 mg Intravesical Once Pre-Op Maico Torres MD              History:   There are no hospital problems to display for this patient.    Surgical History:    has a past surgical history that includes Shoulder arthroscopy (Right); Nasal sinus surgery; Cardiac pacemaker placement; Tonsillectomy; Transurethral resection of prostate; Knee cartilage surgery (Bilateral); Tympanoplasty; " vesectomy; Total knee arthroplasty (Left, 05/15/2017); Joint replacement; Esophagogastroduodenoscopy (N/A, 9/13/2019); Colonoscopy (N/A, 9/13/2019); Esophagogastroduodenoscopy (N/A, 9/3/2021); Colonoscopy (N/A, 9/22/2022); Tympanoplasty with mastoidectomy (Left, 3/15/2023); Insertion of tympanostomy tube (Right, 3/15/2023); Esophagogastroduodenoscopy (N/A, 5/24/2023); turbt (transurethral resection of bladder tumor) (N/A, 7/16/2024); and Cystoscopy w/ retrogrades (Bilateral, 7/16/2024).   Social History:    reports being sexually active and has had partner(s) who are female.  reports that he has never smoked. He has never used smokeless tobacco. He reports current alcohol use. He reports that he does not use drugs.    Vitals:    08/30/24 1000   BP: 137/81   BP Location: Left arm   Patient Position: Lying   Pulse: 60   Resp: 16   Temp: 36.4 °C (97.5 °F)   TempSrc: Temporal   SpO2: 99%   Weight: 90.7 kg (200 lb)     Vital Signs Range (Last 24H):  Temp:  [36.4 °C (97.5 °F)]   Pulse:  [60]   Resp:  [16]   BP: (137)/(81)   SpO2:  [99 %]     Body mass index is 27.89 kg/m².  Wt Readings from Last 4 Encounters:   08/30/24 90.7 kg (200 lb)   08/29/24 92.4 kg (203 lb 11.3 oz)   08/20/24 92.7 kg (204 lb 5.9 oz)   08/07/24 91.9 kg (202 lb 9.6 oz)        Intake/Output - Last 3 Shifts       None          Lab Results   Component Value Date    WBC 5.14 07/11/2024    HGB 13.2 (L) 07/11/2024    HCT 42.4 07/11/2024     07/11/2024     07/11/2024    K 3.9 07/11/2024     07/11/2024    CREATININE 1.3 07/11/2024    BUN 22 07/11/2024    CO2 25 07/11/2024    GLU 72 07/11/2024    CALCIUM 9.4 07/11/2024    PHOS 3.3 06/23/2022    ALKPHOS 31 (L) 07/11/2024    ALT 12 07/11/2024    AST 30 07/11/2024    ALBUMIN 3.8 07/11/2024    INR 1.1 07/02/2018    APTT 26.1 07/02/2018    HGBA1C 5.8 (H) 08/12/2024    CPK 47 06/04/2010     Recent Results (from the past 12 hour(s))   POCT glucose    Collection Time: 08/30/24  9:53 AM   Result  "Value Ref Range    POCT Glucose 122 (H) 70 - 110 mg/dL     No results for input(s): "WBC", "HGB", "HCT", "PLT", "NA", "K", "CREATININE", "GLU", "INR", "LACTATE", "5HIAAPLASMA", "5HIAAURINT", "5HIAA", "5GVJX12IX" in the last 168 hours.  No LMP for male patient.    EKG:   Results for orders placed or performed during the hospital encounter of 07/11/24   EKG 12-lead    Collection Time: 07/11/24 10:51 AM   Result Value Ref Range    QRS Duration 154 ms    [facility] QTC Calculation 540 ms    Narrative    Test Reason : Z01.818,    Vent. Rate : 089 BPM     Atrial Rate : 066 BPM     P-R Int : 182 ms          QRS Dur : 154 ms      QT Int : 444 ms       P-R-T Axes : 068 -62 092 degrees     QTc Int : 540 ms    Atrial-sensed ventricular-paced rhythm  Abnormal ECG  When compared with ECG of 06-MAR-2023 13:31,  Vent. rate has increased BY  18 BPM  Confirmed by Simon Gonzalez MD ([id number]) on 7/11/2024 8:55:46 PM    Referred By: ROHINI HASTINGS           Confirmed By:Simon Gonzalez MD     TTE:  No results found for this or any previous visit.    ALLYSON:  No results found for this or any previous visit.    Stress Test:  No results found for this or any previous visit.    No results found for this or any previous visit.    LHC:  No results found for this or any previous visit.    Cardiac Device Check   No results found for this or any previous visit.    No results found for this or any previous visit.        Pre-op Assessment    I have reviewed the Patient Summary Reports.     I have reviewed the Nursing Notes.    I have reviewed the Medications.     Review of Systems  Anesthesia Hx:  No problems with previous Anesthesia                Hematology/Oncology:  Hematology Normal   Oncology Normal                                   EENT/Dental:  EENT/Dental Normal           Cardiovascular:  Exercise tolerance: good   Hypertension      Angina                                  Pulmonary:  Pneumonia      Sleep Apnea              "   Renal/:  Chronic Renal Disease                Hepatic/GI:     GERD             Musculoskeletal:  Arthritis               Neurological:  Neurology Normal                                      Endocrine:  Diabetes           Dermatological:  Skin Normal    Psych:  Psychiatric Normal                    Physical Exam  General: Well nourished    Airway:  Mallampati: III / II  Mouth Opening: Normal  TM Distance: Normal  Tongue: Normal  Neck ROM: Normal ROM    Dental:  Intact        Anesthesia Plan  Type of Anesthesia, risks & benefits discussed:    Anesthesia Type: Gen ETT, Gen Natural Airway, MAC  Intra-op Monitoring Plan: Standard ASA Monitors  Post Op Pain Control Plan: multimodal analgesia and IV/PO Opioids PRN  Induction:  IV  Airway Plan: Direct and Video, Post-Induction  Informed Consent: Informed consent signed with the Patient and all parties understand the risks and agree with anesthesia plan.  All questions answered.   ASA Score: 3  Day of Surgery Review of History & Physical: H&P completed by Anesthesiologist.  Anesthesia Plan Notes: Chart reviewed, patient interviewed and examined.  The anesthetic plan was explained.  Risks, benefits, and alternatives were discussed. Questions were answered and the consent was signed.        SHANITA eMlchor M.D.         Ready For Surgery From Anesthesia Perspective.     .

## 2024-08-30 NOTE — DISCHARGE SUMMARY
[facility] - [facility] (1st Fl)  Discharge Note  Short Stay    Procedure(s) (LRB):  BIOPSY, BLADDER (N/A)  CYSTOSCOPY (N/A)  FULGURATION, BLADDER (N/A)      OUTCOME: Patient tolerated treatment/procedure well without complication and is now ready for discharge.    DISPOSITION: Home or Self Care    FINAL DIAGNOSIS:  Malignant neoplasm of lateral wall of urinary bladder    FOLLOWUP: In clinic    DISCHARGE INSTRUCTIONS:    Discharge Procedure Orders   No dressing needed     Notify your health care provider if you experience any of the following:  temperature >100.4     Notify your health care provider if you experience any of the following:  persistent nausea and vomiting or diarrhea     Notify your health care provider if you experience any of the following:  severe uncontrolled pain     Notify your health care provider if you experience any of the following:  difficulty breathing or increased cough     Notify your health care provider if you experience any of the following:  severe persistent headache     Notify your health care provider if you experience any of the following:  worsening rash     Notify your health care provider if you experience any of the following:  persistent dizziness, light-headedness, or visual disturbances     Activity as tolerated        TIME SPENT ON DISCHARGE: 15 minutes

## 2024-08-30 NOTE — TRANSFER OF CARE
Anesthesia Transfer of Care Note    Patient: Rigoberto Vasquez    Procedure(s) Performed: Procedure(s) (LRB):  BIOPSY, BLADDER (N/A)  CYSTOSCOPY (N/A)  FULGURATION, BLADDER (N/A)    Patient location: PACU    Anesthesia Type: general    Transport from OR: Transported from OR on 6-10 L/min O2 by face mask with adequate spontaneous ventilation    Post pain: adequate analgesia    Post assessment: no apparent anesthetic complications and tolerated procedure well    Post vital signs: stable    Level of consciousness: sedated    Nausea/Vomiting: no nausea/vomiting    Complications: none    Transfer of care protocol was followed      Last vitals: Visit Vitals  /81 (BP Location: Left arm, Patient Position: Lying)   Pulse 60   Temp 36.4 °C (97.5 °F) (Temporal)   Resp 16   Wt 90.7 kg (200 lb)   SpO2 99%   BMI 27.89 kg/m²

## 2024-08-30 NOTE — PROGRESS NOTES
Patient has been identified as having an implanted cardiac rhythm device (CRD); the implanted device is a [employer]tronic pacemaker.      Planned procedure:  TURBT,WITH BLUE LIGHT CYSTOSCOPY AND CYSVIEW     PACEMAKERS    The reported surgical procedure is BELOW the umbilicus; per protocol, magnet application is not needed and device reprogramming is not required.    For additional questions, please contact the Arrhythmia Department at Ext [phone].

## 2024-09-03 NOTE — ANESTHESIA POSTPROCEDURE EVALUATION
Anesthesia Post Evaluation    Patient: Rigoberto Vasquez    Procedure(s) Performed: Procedure(s) (LRB):  BIOPSY, BLADDER (N/A)  CYSTOSCOPY (N/A)  FULGURATION, BLADDER (N/A)    Final Anesthesia Type: general      Patient location during evaluation: PACU  Patient participation: Yes- Able to Participate  Level of consciousness: awake and alert  Post-procedure vital signs: reviewed and stable  Pain management: adequate  Airway patency: patent    PONV status at discharge: No PONV  Anesthetic complications: no      Cardiovascular status: blood pressure returned to baseline  Respiratory status: unassisted  Hydration status: euvolemic  Follow-up not needed.              Vitals Value Taken Time   /80 08/30/24 1302   Temp 36.7 °C (98.1 °F) 08/30/24 1313   Pulse 63 08/30/24 1312   Resp 16 08/30/24 1313   SpO2 100 % 08/30/24 1312   Vitals shown include unfiled device data.      No case tracking events are documented in the log.      Pain/Berna Score: No data recorded

## 2024-09-04 ENCOUNTER — PATIENT MESSAGE (OUTPATIENT)
Dept: DIABETES | Facility: CLINIC | Age: 74
End: 2024-09-04
Payer: MEDICARE

## 2024-09-04 ENCOUNTER — PATIENT MESSAGE (OUTPATIENT)
Dept: INTERNAL MEDICINE | Facility: CLINIC | Age: 74
End: 2024-09-04
Payer: MEDICARE

## 2024-09-05 ENCOUNTER — OFFICE VISIT (OUTPATIENT)
Dept: SLEEP MEDICINE | Facility: CLINIC | Age: 74
End: 2024-09-05
Payer: MEDICARE

## 2024-09-05 ENCOUNTER — TELEPHONE (OUTPATIENT)
Dept: INTERNAL MEDICINE | Facility: CLINIC | Age: 74
End: 2024-09-05
Payer: MEDICARE

## 2024-09-05 VITALS
DIASTOLIC BLOOD PRESSURE: 60 MMHG | SYSTOLIC BLOOD PRESSURE: 124 MMHG | WEIGHT: 201.5 LBS | BODY MASS INDEX: 28.21 KG/M2 | HEART RATE: 59 BPM | RESPIRATION RATE: 19 BRPM | OXYGEN SATURATION: 97 % | HEIGHT: 71 IN

## 2024-09-05 DIAGNOSIS — N18.31 TYPE 2 DIABETES MELLITUS WITH STAGE 3A CHRONIC KIDNEY DISEASE, WITH LONG-TERM CURRENT USE OF INSULIN: ICD-10-CM

## 2024-09-05 DIAGNOSIS — C67.2 MALIGNANT NEOPLASM OF LATERAL WALL OF URINARY BLADDER: ICD-10-CM

## 2024-09-05 DIAGNOSIS — E11.69 HYPERLIPIDEMIA ASSOCIATED WITH TYPE 2 DIABETES MELLITUS: Chronic | ICD-10-CM

## 2024-09-05 DIAGNOSIS — E78.5 HYPERLIPIDEMIA ASSOCIATED WITH TYPE 2 DIABETES MELLITUS: Chronic | ICD-10-CM

## 2024-09-05 DIAGNOSIS — E11.59 HYPERTENSION ASSOCIATED WITH DIABETES: Chronic | ICD-10-CM

## 2024-09-05 DIAGNOSIS — G47.33 OSA ON CPAP: Primary | ICD-10-CM

## 2024-09-05 DIAGNOSIS — E11.22 TYPE 2 DIABETES MELLITUS WITH STAGE 3A CHRONIC KIDNEY DISEASE, WITH LONG-TERM CURRENT USE OF INSULIN: ICD-10-CM

## 2024-09-05 DIAGNOSIS — Z79.4 TYPE 2 DIABETES MELLITUS WITH STAGE 3A CHRONIC KIDNEY DISEASE, WITH LONG-TERM CURRENT USE OF INSULIN: ICD-10-CM

## 2024-09-05 DIAGNOSIS — I15.2 HYPERTENSION ASSOCIATED WITH DIABETES: Chronic | ICD-10-CM

## 2024-09-05 PROCEDURE — 3044F HG A1C LEVEL LT 7.0%: CPT | Mod: HCNC,CPTII,S$GLB, | Performed by: INTERNAL MEDICINE

## 2024-09-05 PROCEDURE — 99214 OFFICE O/P EST MOD 30 MIN: CPT | Mod: HCNC,S$GLB,, | Performed by: INTERNAL MEDICINE

## 2024-09-05 PROCEDURE — 4010F ACE/ARB THERAPY RXD/TAKEN: CPT | Mod: HCNC,CPTII,S$GLB, | Performed by: INTERNAL MEDICINE

## 2024-09-05 PROCEDURE — 3066F NEPHROPATHY DOC TX: CPT | Mod: HCNC,CPTII,S$GLB, | Performed by: INTERNAL MEDICINE

## 2024-09-05 PROCEDURE — 3072F LOW RISK FOR RETINOPATHY: CPT | Mod: HCNC,CPTII,S$GLB, | Performed by: INTERNAL MEDICINE

## 2024-09-05 PROCEDURE — 1126F AMNT PAIN NOTED NONE PRSNT: CPT | Mod: HCNC,CPTII,S$GLB, | Performed by: INTERNAL MEDICINE

## 2024-09-05 PROCEDURE — 3288F FALL RISK ASSESSMENT DOCD: CPT | Mod: HCNC,CPTII,S$GLB, | Performed by: INTERNAL MEDICINE

## 2024-09-05 PROCEDURE — 1159F MED LIST DOCD IN RCRD: CPT | Mod: HCNC,CPTII,S$GLB, | Performed by: INTERNAL MEDICINE

## 2024-09-05 PROCEDURE — 3078F DIAST BP <80 MM HG: CPT | Mod: HCNC,CPTII,S$GLB, | Performed by: INTERNAL MEDICINE

## 2024-09-05 PROCEDURE — 3061F NEG MICROALBUMINURIA REV: CPT | Mod: HCNC,CPTII,S$GLB, | Performed by: INTERNAL MEDICINE

## 2024-09-05 PROCEDURE — 1101F PT FALLS ASSESS-DOCD LE1/YR: CPT | Mod: HCNC,CPTII,S$GLB, | Performed by: INTERNAL MEDICINE

## 2024-09-05 PROCEDURE — 3008F BODY MASS INDEX DOCD: CPT | Mod: HCNC,CPTII,S$GLB, | Performed by: INTERNAL MEDICINE

## 2024-09-05 PROCEDURE — 3074F SYST BP LT 130 MM HG: CPT | Mod: HCNC,CPTII,S$GLB, | Performed by: INTERNAL MEDICINE

## 2024-09-05 PROCEDURE — 99999 PR PBB SHADOW E&M-EST. PATIENT-LVL V: CPT | Mod: PBBFAC,HCNC,, | Performed by: INTERNAL MEDICINE

## 2024-09-05 NOTE — PROGRESS NOTES
Subjective:      Patient ID: Rigoberto Vasquez is a 74 y.o. male.    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    Choroidal nevus of right eye    Epiretinal membrane (ERM) of left eye    Overweight (BMI 25.0-29.9)    Macular hole of right eye    Open angle with borderline findings and low glaucoma risk in both eyes    Ectopic gastric mucosa    Open angle with borderline findings and high glaucoma risk in both eyes    History of skin cancer    History of heart block;AV block, 3rd degree    BPH with obstruction/lower urinary tract symptoms    Erectile dysfunction    Lung granuloma    Dyspepsia    Calcification of aorta    Cholesteatoma of ear, left    СВЕТЛАНА on CPAP    Gross hematuria    Malignant neoplasm of lateral wall of urinary bladder       he has been referred by No ref. provider found for evaluation and management for   Chief Complaint   Patient presents with    Sleep Apnea         Chief Complaint: Sleep Apnea        HPI:  He presents for СВЕТЛАНА with review 2/10/2022 Home Sleep Study mild obstructive sleep apnea AHI 12.1.   Patient symptomatic for obstructive sleep apnea with feeling tired and fatigued and snoring.   Patient reports somewhat restful sleep.  He denies morning headache.   He reports day time napping; duration 1 Hour  He denies recent weight gain.  Cardiovascular risk factors: diabetes, hypertension, hyperlipidemia and coronary artery disease  Bed time is 0930  Wake time is 0600 - 0700  Sleep onset is within 15 - 30 Minutes.  Sleep maintenance difficulties related to frequent night time  awakening  Wake after sleep onset occurs two times a night.  Nocturia occurs two times a night,   Sleep aids : YES , melatonin 1-2 times a week if working on things around his house, he will think about it, most nights able to go to sleep in 15-20 minutes.  Dry mouth :  NO  Sleep walking:  NO  Sleep talking :  NO  Sleep eating: NO  Vivid Dreams :  NO  Cataplexy :  NO       07/26/2022  Last visit 05/17/2022  Using device and benefits  AHI was 0.4  Episodes of noctural dyspnea  Had inhaaler in past, never used for night issues, had daytime fatiigue which resolved  Seen Dr Senior; PPM since 30 years old  Complete heart block  Never smoker  Las 6MWD reveiwed  Last PFT: hyperinflation    11/08/2022  Last seen 07/26/2022  No resp issues  No cough, No SOB  PFT: Normal  Using CPAP Usage > 4 hrs was 100%      08/31/2023  Followup  On RESVENT device  APAP 5-20  FFM  Using and benefits  No respiratory issues  Bed time 9 pm, Wake time 6am  No NAPS  AHI was 0.3    09/05/2024  Followup  Recent bladder cancer  Cystoscopy Dr Hensley  Using CPAP and benefits  No snoring  FFM  New supplies      Previous Report Reviewed: lab reports and office notes     Past Medical History: The following portions of the patient's history were reviewed and updated as appropriate:   He  has a past surgical history that includes Shoulder arthroscopy (Right); Nasal sinus surgery; Cardiac pacemaker placement; Tonsillectomy; Transurethral resection of prostate; Knee cartilage surgery (Bilateral); Tympanoplasty; vesectomy; Total knee arthroplasty (Left, 05/15/2017); Joint replacement; Esophagogastroduodenoscopy (N/A, 9/13/2019); Colonoscopy (N/A, 9/13/2019); Esophagogastroduodenoscopy (N/A, 9/3/2021); Colonoscopy (N/A, 9/22/2022); Tympanoplasty with mastoidectomy (Left, 3/15/2023); Insertion of tympanostomy tube (Right, 3/15/2023); Esophagogastroduodenoscopy (N/A, 5/24/2023); turbt (transurethral resection of bladder tumor) (N/A, 7/16/2024); Cystoscopy w/  retrogrades (Bilateral, 7/16/2024); Biopsy of bladder (N/A, 8/30/2024); Cystoscopy (N/A, 8/30/2024); and Bladder fulguration (N/A, 8/30/2024).  His family history includes Arthritis in his mother; Colon cancer in his paternal grandmother; Diabetes in his maternal grandmother and son; Heart disease in his father; Hypertension in his father and mother; Stroke in his father.  He  reports that he has never smoked. He has never used smokeless tobacco. He reports current alcohol use. He reports that he does not use drugs.  He has a current medication list which includes the following prescription(s): acetaminophen, azelastine, blood sugar diagnostic, dorzolamide-timolol 2-0.5%, droplet pen needle, dutasteride, famotidine, fenofibrate micronized, fluticasone propionate, gabapentin, hydrocortisone, lantus solostar u-100 insulin, levocetirizine, loratadine, losartan, lovastatin, multivitamin, oxybutynin, pantoprazole, phenazopyridine, polyethylene glycol, ozempic, sildenafil, triamcinolone acetonide 0.1%, and latanoprost, and the following Facility-Administered Medications: lidocaine hcl 2%.  He is allergic to aspirin, meperidine, and niacin..    Review of Systems   Constitutional:  Negative for fever, chills, weight loss, weight gain, activity change, appetite change, fatigue and night sweats.   HENT:  Negative for postnasal drip, rhinorrhea, sinus pressure, voice change and congestion.    Eyes:  Negative for redness and itching.   Respiratory:  Negative for apnea, snoring, cough, sputum production, chest tightness, shortness of breath, wheezing, orthopnea, asthma nighttime symptoms, dyspnea on extertion, use of rescue inhaler and somnolence.    Cardiovascular: Negative.  Negative for chest pain, palpitations and leg swelling.   Genitourinary:  Negative for difficulty urinating and hematuria.   Endocrine:  Negative for cold intolerance and heat intolerance.    Musculoskeletal:  Negative for arthralgias, gait problem, joint  "swelling and myalgias.   Skin: Negative.    Gastrointestinal:  Negative for nausea, vomiting, abdominal pain and acid reflux.   Neurological:  Negative for dizziness, weakness, light-headedness and headaches.   Hematological:  Negative for adenopathy. No excessive bruising.   All other systems reviewed and are negative.     Objective:   /60   Pulse (!) 59   Resp 19   Ht 5' 11" (1.803 m)   Wt 91.4 kg (201 lb 8 oz)   SpO2 97%   BMI 28.10 kg/m²   Physical Exam  Vitals and nursing note reviewed.   Constitutional:       Appearance: He is well-developed.   HENT:      Head: Normocephalic and atraumatic.   Eyes:      Conjunctiva/sclera: Conjunctivae normal.   Cardiovascular:      Rate and Rhythm: Normal rate and regular rhythm.      Heart sounds: Normal heart sounds.   Pulmonary:      Effort: Pulmonary effort is normal.      Breath sounds: Normal breath sounds.   Abdominal:      General: Bowel sounds are normal.      Palpations: Abdomen is soft.   Musculoskeletal:         General: Normal range of motion.      Cervical back: Normal range of motion and neck supple.   Skin:     General: Skin is warm and dry.   Neurological:      Mental Status: He is alert and oriented to person, place, and time.      Deep Tendon Reflexes: Reflexes are normal and symmetric.   Psychiatric:         Behavior: Behavior normal.         Thought Content: Thought content normal.         Judgment: Judgment normal.         Personal Diagnostic Review    BLADDER TUMOR, TRANSURETHRAL RESECTION:   High-grade papillary urothelial carcinoma, invasive.    The carcinoma invades the lamina propria.   Muscularis propria is not present for evaluation.   Deeper levels examined.   MMR testing will be attempted, results will be reported in a supplemental.   Review of labs, xray's, cardiology reports.      DOWNLOAD   DOWNLOAD  06/30/2023 to 08/30/2023  Usage > 4 hrs was 98%  APAP 5-20              Rigoberto [id number]- ( 71age )    Usage    Usage " days  28/30(93%)    >= 4 hours  27 (90%)    < 4 hours  1 (3%)    usage hours  223.4    Average usage hours (total days)  7.4    Average usage hours (days used)  8.0    Median usage hours (days used)  -    Total usage (since setup date)  [id number]  Device Information Andres 20A    SN  [id number]    Last set date  03-    Mode (last set in the report range)  APAP    Min./Max. pressure  5/20    iPR  OFF    Therapy    Pressure(cmH2O)Median5.2  95th percentile7.0Max7.9    Leak(L/min)Median0.0  95th percentile6.3    PB (min/night) 0.0    Events per hour (Avg.)AI:0.0  HI:0.2AHI:0.2    Apnoea Index (Avg.)Central:0.0  Obstructive:0.0  Assessment:     1. СВЕТЛАНА on CPAP    2. Hyperlipidemia associated with type 2 diabetes mellitus    3. Hypertension associated with diabetes    4. Malignant neoplasm of lateral wall of urinary bladder    5. Type 2 diabetes mellitus with stage 3a chronic kidney disease, with long-term current use of insulin      Orders Placed This Encounter   Procedures    CPAP/BIPAP SUPPLIES     Order Specific Question:   Length of need (1-99 months):     Answer:   99     Order Specific Question:   Choose ONE mask type and its corresponding cushions and/or pillows:     Answer:    Full Face Mask, 1 per 90 days:  Full Face Cushion, (3 per 90 days)     Order Specific Question:   Choose EITHER Heated or Non-Heated Tubjing     Answer:    Non-Heated Tubing, 1 per 90 days     Order Specific Question:   All other supplies as needed as listed below:     Answer:    Headgear, 1 per 180 days     Order Specific Question:   All other supplies as needed as listed below:     Answer:    Non-Disposable Filter, 1 per 180 days     Order Specific Question:   All other supplies as needed as listed below:     Answer:    Disposable Filter, 6 per 90 days     Order Specific Question:   All other supplies as needed as listed below:     Answer:    Exhalation Port, contact payer for  quantity/frequency     Order Specific Question:   All other supplies as needed as listed below:     Answer:    Humidifier Chamber, 1 per 180 days     Order Specific Question:   DME Agency:     Answer:   [facility]       Plan:   Discussed diagnosis, its evaluation, treatment and usual course. All questions answered.  Problem List Items Addressed This Visit       Hyperlipidemia associated with type 2 diabetes mellitus (Chronic)    Hypertension associated with diabetes (Chronic)    Type 2 diabetes mellitus with stage 3a chronic kidney disease, with long-term current use of insulin    Malignant neoplasm of lateral wall of urinary bladder    СВЕТЛАНА on CPAP - Primary         9/5/2024     9:12 AM   EPWORTH SLEEPINESS SCALE   Sitting and reading 0   Watching TV 2   Sitting, inactive in a public place (e.g. a theatre or a meeting) 0   As a passenger in a car for an hour without a break 1   Lying down to rest in the afternoon when circumstances permit 2   Sitting and talking to someone 0   Sitting quietly after a lunch without alcohol 2   In a car, while stopped for a few minutes in traffic 0   Total score 7        Data 08/05/2024 to 09/03/2024  Usage > 4 hrs was 90%    Recent URI missed some nights    Bad night 06/30, 07/27, 08/21  Using and benefits    AHI 0.2    FFM         Relevant Orders    CPAP/BIPAP SUPPLIES        Follow up in about 1 year (around 9/5/2025), or cpap download, new supplies.    This note was prepared using voice recognition system and is likely to have sound alike errors that may have been overlooked even after proof reading.  Please call me with any questions    Discussed diagnosis, its evaluation, treatment and usual course. All questions answered.    Thank you for the courtesy of participating in the care of this patient    Luciano Sahu MD

## 2024-09-05 NOTE — TELEPHONE ENCOUNTER
Dr. Grimes    Please reprint the patient compound prescription. The patient is request we fax the script to [facility] fax # [phone]

## 2024-09-05 NOTE — ASSESSMENT & PLAN NOTE
9/5/2024     9:12 AM   EPWORTH SLEEPINESS SCALE   Sitting and reading 0   Watching TV 2   Sitting, inactive in a public place (e.g. a theatre or a meeting) 0   As a passenger in a car for an hour without a break 1   Lying down to rest in the afternoon when circumstances permit 2   Sitting and talking to someone 0   Sitting quietly after a lunch without alcohol 2   In a car, while stopped for a few minutes in traffic 0   Total score 7        Data 08/05/2024 to 09/03/2024  Usage > 4 hrs was 90%    Recent URI missed some nights    Bad night 06/30, 07/27, 08/21  Using and benefits    AHI 0.2    FFM

## 2024-09-05 NOTE — TELEPHONE ENCOUNTER
----- Message from Chevy Francois sent at 9/5/2024  8:55 AM CDT -----  .Type:  Pharmacy Calling to Clarify an RX    Name of Caller:JUAN F   Pharmacy Name:[facility]   Prescription Name:gabapentin ([facility]INT[facility])  What do they need to clarify?:REFILL AUTH   Best Call Back Number:   Additional Information:

## 2024-09-06 ENCOUNTER — TELEPHONE (OUTPATIENT)
Dept: INTERNAL MEDICINE | Facility: CLINIC | Age: 74
End: 2024-09-06
Payer: MEDICARE

## 2024-09-06 LAB
COMMENT: NORMAL
FINAL PATHOLOGIC DIAGNOSIS: NORMAL
GROSS: NORMAL
Lab: NORMAL
MICROSCOPIC EXAM: NORMAL

## 2024-09-06 NOTE — TELEPHONE ENCOUNTER
Spoke with the pharmacist at [facility] at   [facility] Phone # [phone] Fax # [phone]     The patient prescription was re faxed to the following fax # [phone].     Tried calling the patient to informing him the prescription was refax to [phone] with no answer.

## 2024-09-06 NOTE — TELEPHONE ENCOUNTER
----- Message from Natalia Vazquez sent at 9/6/2024  9:45 AM CDT -----  Type:  RX Refill Request  Who Called: Rigoberto  Refill or New Rx:New  RX Name and Strength: Disp Refills Start End RANDOLPH  gabapentin ([facility] PAIN MANAGEMENT COMPOUND [facility]INT[facility] THREE)     How is the patient currently taking it? (ex. 1XDay):  Is this a 30 day or 90 day RX:  Preferred Pharmacy with phone number:[facility] Phone # [phone] Fax # [phone]  Local or Mail Order:Local  Ordering Provider:Dr Grimes   Would the patient rather a call back or a response via [facility]? Call  Best Call Back Number: [phone]  Additional Information: Rx needs to be sen to the above pharmacy no pharmacy on file

## 2024-09-12 ENCOUNTER — PATIENT MESSAGE (OUTPATIENT)
Dept: DIABETES | Facility: CLINIC | Age: 74
End: 2024-09-12
Payer: MEDICARE

## 2024-09-18 ENCOUNTER — OFFICE VISIT (OUTPATIENT)
Dept: UROLOGY | Facility: CLINIC | Age: 74
End: 2024-09-18
Payer: MEDICARE

## 2024-09-18 DIAGNOSIS — C67.2 MALIGNANT NEOPLASM OF LATERAL WALL OF URINARY BLADDER: Primary | ICD-10-CM

## 2024-09-18 PROCEDURE — 3044F HG A1C LEVEL LT 7.0%: CPT | Mod: HCNC,CPTII,95, | Performed by: UROLOGY

## 2024-09-18 PROCEDURE — 4010F ACE/ARB THERAPY RXD/TAKEN: CPT | Mod: HCNC,CPTII,95, | Performed by: UROLOGY

## 2024-09-18 PROCEDURE — 3072F LOW RISK FOR RETINOPATHY: CPT | Mod: HCNC,CPTII,95, | Performed by: UROLOGY

## 2024-09-18 PROCEDURE — 99215 OFFICE O/P EST HI 40 MIN: CPT | Mod: HCNC,95,, | Performed by: UROLOGY

## 2024-09-18 PROCEDURE — 3061F NEG MICROALBUMINURIA REV: CPT | Mod: HCNC,CPTII,95, | Performed by: UROLOGY

## 2024-09-18 PROCEDURE — 3066F NEPHROPATHY DOC TX: CPT | Mod: HCNC,CPTII,95, | Performed by: UROLOGY

## 2024-09-18 NOTE — H&P (VIEW-ONLY)
[facility]  Urologic Oncology    The patient location is:  [facility]  The chief complaint leading to consultation is:  Non-muscle invasive bladder cancer    Visit type: audiovisual    Face to Face time with patient: 12 minutes  30 minutes of total time spent on the encounter, which includes face to face time and non-face to face time preparing to see the patient (eg, review of tests), Obtaining and/or reviewing separately obtained history, Documenting clinical information in the electronic or other health record, Independently interpreting results (not separately reported) and communicating results to the patient/family/caregiver, or Care coordination (not separately reported).     Each patient to whom he or she provides medical services by telemedicine is:  (1) informed of the relationship between the physician and patient and the respective role of any other health care provider with respect to management of the patient; and (2) notified that he or she may decline to receive medical services by telemedicine and may withdraw from such care at any time.    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  He does have a history of a TURP approximately 10 years ago and is currently on finasteride and tamsulosin although he experiences retrograde ejaculation.  He also has a history of erectile dysfunction which is stable.     He underwent Transurethral resection of bladder tumor on  07/16/2024.  This revealed pathologic T1 high-grade bladder cancer in the diverticulum, no muscle was present which is concordant with a bladder diverticulum.  No perforation was noted, and bilateral retrograde pyelograms were within normal limits.    He is unable to get an MRI scheduled due to his pacemaker leads    He underwent cystoscopy with bladder biopsy and blue light cystoscopy on 08/30/2024, the pathology of the peridiverticular biopsies as well as random bladder biopsies were negative for bladder cancer.    Imaging: I have reviewed the imaging study CT urogram on 06/24/2024 personally, and agree with the findings    Allergies:  Review of patient's allergies indicates:   Allergen Reactions    Aspirin      Stomach pain even with ppi    Meperidine      Other reaction(s): Stomach upset    Niacin      Other reaction(s): hot skin        Medications per EMR:  (Not in a hospital admission)      Past Medical History:  Past Medical History:   Diagnosis Date    Acid reflux     [facility]    Angina pectoris     Back pain     BPH (benign prostatic hyperplasia)     blue    Cholesteatoma     Degenerative joint disease     Diverticulosis     Erectile dysfunction     History of elevated PSA 02/2014    normalized, dr dave annually    History of pericarditis     Hypercholesteremia     Hypertension     Iron deficiency anemia     Malignant neoplasm of lateral wall of urinary bladder 8/5/2024    СВЕТЛАНА (obstructive sleep apnea) 05/17/2022    Pacemaker     complete av block;NO afib    Renal disorder     Squamous cell cancer of skin of mastoid region of scalp     dr jaida salgado    Type 2 diabetes mellitus     dr wyman    Urinary incontinence     when he was 7 Yrs old.         Past Surgical History:  Past Surgical History:   Procedure Laterality Date    BIOPSY OF BLADDER N/A 8/30/2024    Procedure: BIOPSY, BLADDER;  Surgeon: Compa Hensley MD;  Location: [facility] OR [address] [place] [address];  Service: Urology;  Laterality: N/A;    BLADDER  FULGURATION N/A 8/30/2024    Procedure: FULGURATION, BLADDER;  Surgeon: Compa Hensley MD;  Location: [facility] OR [address] [place] [address];  Service: Urology;  Laterality: N/A;    CARDIAC PACEMAKER PLACEMENT      COLONOSCOPY N/A 9/13/2019    Procedure: COLONOSCOPY;  Surgeon: Kan Ramsey III, MD;  Location: [facility];  Service: Endoscopy;  Laterality: N/A;    COLONOSCOPY N/A 9/22/2022    Procedure: COLONOSCOPY;  Surgeon: Chris Garcia MD;  Location: [facility];  Service: Endoscopy;  Laterality: N/A;    CYSTOSCOPY N/A 8/30/2024    Procedure: CYSTOSCOPY;  Surgeon: Compa Hensley MD;  [facility];  Service: Urology;  Laterality: N/A;  with blue light    CYSTOSCOPY W/ RETROGRADES Bilateral 7/16/2024    Procedure: CYSTOSCOPY, WITH RETROGRADE PYELOGRAM;  Surgeon: Vance Olivera MD;  Location: [facility];  Service: Urology;  Laterality: Bilateral;    ESOPHAGOGASTRODUODENOSCOPY N/A 9/13/2019    Procedure: ESOPHAGOGASTRODUODENOSCOPY (EGD);  Surgeon: Kan Ramsey III, MD;  Location: [facility];  Service: Endoscopy;  Laterality: N/A;    ESOPHAGOGASTRODUODENOSCOPY N/A 9/3/2021    Procedure: EGD (ESOPHAGOGASTRODUODENOSCOPY);  Surgeon: Kaylene Pineda MD;  Location: [facility];  Service: Endoscopy;  Laterality: N/A;    ESOPHAGOGASTRODUODENOSCOPY N/A 5/24/2023    Procedure: EGD (ESOPHAGOGASTRODUODENOSCOPY);  Surgeon: Cory Bennett MD;  Location: [facility];  Service: Endoscopy;  Laterality: N/A;    INSERTION OF TYMPANOSTOMY TUBE Right 3/15/2023    Procedure: INSERTION, TYMPANOSTOMY TUBE;  Surgeon: Jose L Gudino MD;  Location: [facility] 2ND FLR;  Service: ENT;  Laterality: Right;    JOINT REPLACEMENT      KNEE CARTILAGE SURGERY Bilateral     NASAL SINUS SURGERY      SHOULDER ARTHROSCOPY Right     TONSILLECTOMY      TOTAL KNEE ARTHROPLASTY Left 05/15/2017    TRANSURETHRAL RESECTION OF PROSTATE      TURBT (TRANSURETHRAL RESECTION OF BLADDER TUMOR) N/A 7/16/2024    Procedure: TURBT (TRANSURETHRAL RESECTION OF BLADDER  "TUMOR);  Surgeon: Vance Olivera MD;  Location: [facility] OR;  Service: Urology;  Laterality: N/A;    TYMPANOPLASTY      TYMPANOPLASTY WITH MASTOIDECTOMY Left 3/15/2023    Procedure: TYMPANOPLASTY, WITH MASTOIDECTOMY;  Surgeon: Jose L Gudino MD;  Location: [facility] OR [address] [place] [address];  Service: ENT;  Laterality: Left;    vesectomy          Family History:  Family History   Problem Relation Name Age of Onset    Arthritis Mother      Hypertension Mother      Heart disease Father      Hypertension Father      Stroke Father      Diabetes Son      Diabetes Maternal Grandmother      Colon cancer Paternal Grandmother          Social History:  Social History     Tobacco Use    Smoking status: Never    Smokeless tobacco: Never   Substance Use Topics    Alcohol use: Yes     Comment: " once a month"          OBJECTIVE:     There were no vitals filed for this visit.     Physical Exam            Physical Exam    General: No acute distress. Nontoxic appearing.  HENT: Normocephalic. Atraumatic.  Respiratory: Normal respiratory effort. No conversational dyspnea. No audible wheezing.  Abdomen: No obvious distension.  Skin: No visible abnormalities.  Extremities: No edema upper extremities. No edema lower extremities.  Neurological: Alert and oriented x3. Normal speech.  Psychiatric: Normal mood. Normal affect. No evidence of SI.         LABS:    CBC:  Lab Results   Component Value Date    WBC 5.14 07/11/2024    HGB 13.2 (L) 07/11/2024    HCT 42.4 07/11/2024     (H) 07/11/2024     07/11/2024         BMP:  Lab Results   Component Value Date     07/11/2024    K 3.9 07/11/2024     07/11/2024    CO2 25 07/11/2024    BUN 22 07/11/2024    CREATININE 1.3 07/11/2024    CALCIUM 9.4 07/11/2024    ANIONGAP 6 (L) 07/11/2024    EGFRNORACEVR 58.4 (A) 07/11/2024         ASSESSMENT/PLAN:     Assessment & Plan              Assessment: 74-year-old male with high-risk nonmuscle invasive bladder cancer in a bladder tumor diverticulum, " high-grade T1, negative bladder mapping biopsies, and no evidence for extravesical or lymphadenopathy    Plan:   I had a thorough discussion with the patient and his wife regarding the plan of care moving forward.  We discussed bladder diverticulectomy with curative intent.  We discussed his prior mapping biopsy and a CT urogram.  I would like to obtain a CT of the chest abdomen and pelvis given that his CT urogram was back in June of 2024.    We then discussed open diverticulectomy and right pelvic lymphadenectomy with intraoperative chemotherapy.  Discussed this would require him to stay 2-3 nights in the hospital, have a catheter for 1 week, with a temporary drain.    We then discussed risks, benefits, alternatives associated with this surgery, including complications, detailed below:  -General anesthesia:  Associated risks include pneumonia, cerebrovascular accident, cardiac events including myocardial infarction, pulmonary embolism, deep vein thrombosis  -bladder diverticulectomy, pelvic lymphadenectomy:  Associated risks include infection of the superficial and deep space, bleeding requiring transfusion, injury to the surrounding structures such as the small bowel, colon, sigmoid colon, rectum, arteries and veins of the abdomen and pelvis, prostate ureter and bladder.  For partial cystectomy, we discussed urine leak and prolonged catheterization as well as severe urine leak requiring percutaneous nephrostomy tubes.  We discussed injury to the ureters.  For the lymphadenectomy, we discussed lymphocele formation, lymphatic leak, and nerve injury.    I spent a total of 40 minutes on the day of the visit.  This includes face to face time and non-face to face time preparing to see the patient (eg, review of tests), obtaining and/or reviewing separately obtained history, documenting clinical information in the electronic or other health record, independently interpreting results and communicating results to the  patient/family/caregiver, or care coordinator.      This encounter was dictated and transcribed using DeepScribe and FluencyDirect, please excuse any typographical or grammatical errors.

## 2024-09-18 NOTE — PROGRESS NOTES
[facility]  Urologic Oncology    The patient location is:  [facility]  The chief complaint leading to consultation is:  Non-muscle invasive bladder cancer    Visit type: audiovisual    Face to Face time with patient: 12 minutes  30 minutes of total time spent on the encounter, which includes face to face time and non-face to face time preparing to see the patient (eg, review of tests), Obtaining and/or reviewing separately obtained history, Documenting clinical information in the electronic or other health record, Independently interpreting results (not separately reported) and communicating results to the patient/family/caregiver, or Care coordination (not separately reported).     Each patient to whom he or she provides medical services by telemedicine is:  (1) informed of the relationship between the physician and patient and the respective role of any other health care provider with respect to management of the patient; and (2) notified that he or she may decline to receive medical services by telemedicine and may withdraw from such care at any time.    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  He does have a history of a TURP approximately 10 years ago and is currently on finasteride and tamsulosin although he experiences retrograde ejaculation.  He also has a history of erectile dysfunction which is stable.     He underwent Transurethral resection of bladder tumor on  07/16/2024.  This revealed pathologic T1 high-grade bladder cancer in the diverticulum, no muscle was present which is concordant with a bladder diverticulum.  No perforation was noted, and bilateral retrograde pyelograms were within normal limits.    He is unable to get an MRI scheduled due to his pacemaker leads    He underwent cystoscopy with bladder biopsy and blue light cystoscopy on 08/30/2024, the pathology of the peridiverticular biopsies as well as random bladder biopsies were negative for bladder cancer.    Imaging: I have reviewed the imaging study CT urogram on 06/24/2024 personally, and agree with the findings    Allergies:  Review of patient's allergies indicates:   Allergen Reactions    Aspirin      Stomach pain even with ppi    Meperidine      Other reaction(s): Stomach upset    Niacin      Other reaction(s): hot skin        Medications per EMR:  (Not in a hospital admission)      Past Medical History:  Past Medical History:   Diagnosis Date    Acid reflux     [facility]    Angina pectoris     Back pain     BPH (benign prostatic hyperplasia)     blue    Cholesteatoma     Degenerative joint disease     Diverticulosis     Erectile dysfunction     History of elevated PSA 02/2014    normalized, dr dave annually    History of pericarditis     Hypercholesteremia     Hypertension     Iron deficiency anemia     Malignant neoplasm of lateral wall of urinary bladder 8/5/2024    СВЕТЛАНА (obstructive sleep apnea) 05/17/2022    Pacemaker     complete av block;NO afib    Renal disorder     Squamous cell cancer of skin of mastoid region of scalp     dr jaida salgado    Type 2 diabetes mellitus     dr wyman    Urinary incontinence     when he was 7 Yrs old.         Past Surgical History:  Past Surgical History:   Procedure Laterality Date    BIOPSY OF BLADDER N/A 8/30/2024    Procedure: BIOPSY, BLADDER;  Surgeon: Compa Hensley MD;  Location: [facility] OR [address] [place] [address];  Service: Urology;  Laterality: N/A;    BLADDER  FULGURATION N/A 8/30/2024    Procedure: FULGURATION, BLADDER;  Surgeon: Compa Hensley MD;  Location: [facility] OR [address] [place] [address];  Service: Urology;  Laterality: N/A;    CARDIAC PACEMAKER PLACEMENT      COLONOSCOPY N/A 9/13/2019    Procedure: COLONOSCOPY;  Surgeon: Kan Ramsey III, MD;  Location: [facility];  Service: Endoscopy;  Laterality: N/A;    COLONOSCOPY N/A 9/22/2022    Procedure: COLONOSCOPY;  Surgeon: Chris Garcia MD;  Location: [facility];  Service: Endoscopy;  Laterality: N/A;    CYSTOSCOPY N/A 8/30/2024    Procedure: CYSTOSCOPY;  Surgeon: Compa Hensley MD;  [facility];  Service: Urology;  Laterality: N/A;  with blue light    CYSTOSCOPY W/ RETROGRADES Bilateral 7/16/2024    Procedure: CYSTOSCOPY, WITH RETROGRADE PYELOGRAM;  Surgeon: Vance Olivera MD;  Location: [facility];  Service: Urology;  Laterality: Bilateral;    ESOPHAGOGASTRODUODENOSCOPY N/A 9/13/2019    Procedure: ESOPHAGOGASTRODUODENOSCOPY (EGD);  Surgeon: Kan Ramsey III, MD;  Location: [facility];  Service: Endoscopy;  Laterality: N/A;    ESOPHAGOGASTRODUODENOSCOPY N/A 9/3/2021    Procedure: EGD (ESOPHAGOGASTRODUODENOSCOPY);  Surgeon: Kaylene Pineda MD;  Location: [facility];  Service: Endoscopy;  Laterality: N/A;    ESOPHAGOGASTRODUODENOSCOPY N/A 5/24/2023    Procedure: EGD (ESOPHAGOGASTRODUODENOSCOPY);  Surgeon: Cory Bennett MD;  Location: [facility];  Service: Endoscopy;  Laterality: N/A;    INSERTION OF TYMPANOSTOMY TUBE Right 3/15/2023    Procedure: INSERTION, TYMPANOSTOMY TUBE;  Surgeon: Jose L Gudino MD;  Location: [facility] 2ND FLR;  Service: ENT;  Laterality: Right;    JOINT REPLACEMENT      KNEE CARTILAGE SURGERY Bilateral     NASAL SINUS SURGERY      SHOULDER ARTHROSCOPY Right     TONSILLECTOMY      TOTAL KNEE ARTHROPLASTY Left 05/15/2017    TRANSURETHRAL RESECTION OF PROSTATE      TURBT (TRANSURETHRAL RESECTION OF BLADDER TUMOR) N/A 7/16/2024    Procedure: TURBT (TRANSURETHRAL RESECTION OF BLADDER  "TUMOR);  Surgeon: Vance Olivera MD;  Location: [facility];  Service: Urology;  Laterality: N/A;    TYMPANOPLASTY      TYMPANOPLASTY WITH MASTOIDECTOMY Left 3/15/2023    Procedure: TYMPANOPLASTY, WITH MASTOIDECTOMY;  Surgeon: Jose L Gudino MD;  Location: [facility] OR [address] [place] [address];  Service: ENT;  Laterality: Left;    vesectomy          Family History:  Family History   Problem Relation Name Age of Onset    Arthritis Mother      Hypertension Mother      Heart disease Father      Hypertension Father      Stroke Father      Diabetes Son      Diabetes Maternal Grandmother      Colon cancer Paternal Grandmother          Social History:  Social History     Tobacco Use    Smoking status: Never    Smokeless tobacco: Never   Substance Use Topics    Alcohol use: Yes     Comment: " once a month"          OBJECTIVE:     There were no vitals filed for this visit.     Physical Exam            Physical Exam    General: No acute distress. Nontoxic appearing.  HENT: Normocephalic. Atraumatic.  Respiratory: Normal respiratory effort. No conversational dyspnea. No audible wheezing.  Abdomen: No obvious distension.  Skin: No visible abnormalities.  Extremities: No edema upper extremities. No edema lower extremities.  Neurological: Alert and oriented x3. Normal speech.  Psychiatric: Normal mood. Normal affect. No evidence of SI.         LABS:    CBC:  Lab Results   Component Value Date    WBC 5.14 07/11/2024    HGB 13.2 (L) 07/11/2024    HCT 42.4 07/11/2024     (H) 07/11/2024     07/11/2024         BMP:  Lab Results   Component Value Date     07/11/2024    K 3.9 07/11/2024     07/11/2024    CO2 25 07/11/2024    BUN 22 07/11/2024    CREATININE 1.3 07/11/2024    CALCIUM 9.4 07/11/2024    ANIONGAP 6 (L) 07/11/2024    EGFRNORACEVR 58.4 (A) 07/11/2024         ASSESSMENT/PLAN:     Assessment & Plan              Assessment: 74-year-old male with high-risk nonmuscle invasive bladder cancer in a bladder tumor diverticulum, " high-grade T1, negative bladder mapping biopsies, and no evidence for extravesical or lymphadenopathy    Plan:   I had a thorough discussion with the patient and his wife regarding the plan of care moving forward.  We discussed bladder diverticulectomy with curative intent.  We discussed his prior mapping biopsy and a CT urogram.  I would like to obtain a CT of the chest abdomen and pelvis given that his CT urogram was back in June of 2024.    We then discussed open diverticulectomy and right pelvic lymphadenectomy with intraoperative chemotherapy.  Discussed this would require him to stay 2-3 nights in the hospital, have a catheter for 1 week, with a temporary drain.    We then discussed risks, benefits, alternatives associated with this surgery, including complications, detailed below:  -General anesthesia:  Associated risks include pneumonia, cerebrovascular accident, cardiac events including myocardial infarction, pulmonary embolism, deep vein thrombosis  -bladder diverticulectomy, pelvic lymphadenectomy:  Associated risks include infection of the superficial and deep space, bleeding requiring transfusion, injury to the surrounding structures such as the small bowel, colon, sigmoid colon, rectum, arteries and veins of the abdomen and pelvis, prostate ureter and bladder.  For partial cystectomy, we discussed urine leak and prolonged catheterization as well as severe urine leak requiring percutaneous nephrostomy tubes.  We discussed injury to the ureters.  For the lymphadenectomy, we discussed lymphocele formation, lymphatic leak, and nerve injury.    I spent a total of 40 minutes on the day of the visit.  This includes face to face time and non-face to face time preparing to see the patient (eg, review of tests), obtaining and/or reviewing separately obtained history, documenting clinical information in the electronic or other health record, independently interpreting results and communicating results to the  patient/family/caregiver, or care coordinator.      This encounter was dictated and transcribed using DeepScribe and FluencyDirect, please excuse any typographical or grammatical errors.

## 2024-09-19 DIAGNOSIS — E11.22 TYPE 2 DIABETES MELLITUS WITH STAGE 3A CHRONIC KIDNEY DISEASE, WITH LONG-TERM CURRENT USE OF INSULIN: Primary | ICD-10-CM

## 2024-09-19 DIAGNOSIS — N18.31 TYPE 2 DIABETES MELLITUS WITH STAGE 3A CHRONIC KIDNEY DISEASE, WITH LONG-TERM CURRENT USE OF INSULIN: Primary | ICD-10-CM

## 2024-09-19 DIAGNOSIS — Z79.4 TYPE 2 DIABETES MELLITUS WITH STAGE 3A CHRONIC KIDNEY DISEASE, WITH LONG-TERM CURRENT USE OF INSULIN: Primary | ICD-10-CM

## 2024-09-25 ENCOUNTER — TELEPHONE (OUTPATIENT)
Dept: UROLOGY | Facility: CLINIC | Age: 74
End: 2024-09-25
Payer: MEDICARE

## 2024-09-25 ENCOUNTER — PATIENT MESSAGE (OUTPATIENT)
Dept: UROLOGY | Facility: CLINIC | Age: 74
End: 2024-09-25
Payer: MEDICARE

## 2024-09-25 DIAGNOSIS — D49.4 BLADDER TUMOR: Primary | ICD-10-CM

## 2024-09-25 DIAGNOSIS — R39.9 UNSPECIFIED SYMPTOMS AND SIGNS INVOLVING THE GENITOURINARY SYSTEM: ICD-10-CM

## 2024-09-25 NOTE — TELEPHONE ENCOUNTER
Spoke with patient.  Confirmed surgery date with Dr. Hensley of 10/8/2024.  Instructed patient to present to any [facility] clinic next week to provide a urine sample.  Confirmed orders placed.

## 2024-09-25 NOTE — PROGRESS NOTES
His request order placed for 10/08/2024, preop urines ordered, instructed patient's wife to hold Ozempic starting now, patient takes no anticoagulation.  We will reach out to a preoperative triage nurses for preop evaluation.

## 2024-09-25 NOTE — ANESTHESIA PAT ROS NOTE
09/25/2024  Rigoberto Vasquez is a 74 y.o., male.      Pre-op Assessment          Review of Systems           Anesthesia Assessment: Preoperative EQUATION    Planned Procedure: Procedure(s) (LRB):  EXCISION, DIVERTICULUM, BLADDER (Right)  CYSTECTOMY, PARTIAL (N/A)  LYMPHADENECTOMY, PELVIS (Right)  Requested Anesthesia Type:General  Surgeon: Compa Hensley MD  Service: Urology  Known or anticipated Date of Surgery:10/8/2024    Surgeon notes: reviewed    Electronic QUestionnaire Assessment completed via nurse interview with patient.        Triage considerations:         Previous anesthesia records:GETA and No problems  08/30/24 BIOPSY, BLADDER (Bladder), CYSTOSCOPY (Bladder), FULGURATION, BLADDER (Bladder), gen anes, ASA 3  Intubation     Date/Time: 8/30/2024 11:21 AM     Performed by: Carmen Rodriguez CRNA  Authorized by: Pallavi Melchor MD    Intubation:     Induction:  Intravenous    Intubated:  Postinduction    Mask Ventilation:  Easy mask    Attempts:  1    Attempted By:  CRNA    Method of Intubation:  Video laryngoscopy    Blade:  Ott 3    Laryngeal View Grade: Grade I - full view of cords      Difficult Airway Encountered?: No      Complications:  None    Airway Device:  Oral endotracheal tube    Airway Device Size:  7.5    Style/Cuff Inflation:  Cuffed (inflated to minimal occlusive pressure)    Tube secured:  21    Secured at:  The lips    Placement Verified By:  Capnometry    Complicating Factors:  None    Findings Post-Intubation:  BS equal bilateral and atraumatic/condition of teeth unchanged    Last PCP note: within 3 months , within [facility]   Subspecialty notes: Urology    Other important co-morbidities: DM2, HLD, HTN, СВЕТЛАНА, and high risk bladder cancer, BPH with h/o TURP approximately 10 years ago, ED, GERD, angina, cholesteatoma, DJD, diverticulosis, h/o elevated PSA, h/o pericarditis, CHANDRAKANT,  CHB s/p PPM, h/o SCC scalp, h/o left total knee replacement, TMJ, h/o tympanomastoidectomy       Tests already available:  Available tests,  within 3 months , outside [facility] , within [facility] .   08/12/24 A1C (5.8)  08/01/24 (OUTSIDE) TTE (EF 45-50%)  07/11/24 CBC, CMP, EKG            Instructions     Optimization:  Anesthesia Preop Clinic Assessment Indicated    Medical Opinion Indicated       Sub-specialist consult indicated: TBD       Plan:    Testing:  T&S   Pre-anesthesia  visit       Visit focus: concerns in complex and/or prolonged anesthesia, clearance, Jens 4/ASA 3     Consultation:IM Perioperative Hospitalist    Navigation: Tests Scheduled.              Consults scheduled.             Results will be tracked by Preop Clinic.

## 2024-09-26 ENCOUNTER — TELEPHONE (OUTPATIENT)
Dept: INTERNAL MEDICINE | Facility: CLINIC | Age: 74
End: 2024-09-26
Payer: MEDICARE

## 2024-09-26 ENCOUNTER — CLINICAL SUPPORT (OUTPATIENT)
Dept: DIABETES | Facility: CLINIC | Age: 74
End: 2024-09-26
Payer: MEDICARE

## 2024-09-26 DIAGNOSIS — E11.22 TYPE 2 DIABETES MELLITUS WITH STAGE 3A CHRONIC KIDNEY DISEASE, WITH LONG-TERM CURRENT USE OF INSULIN: Primary | ICD-10-CM

## 2024-09-26 DIAGNOSIS — N18.31 TYPE 2 DIABETES MELLITUS WITH STAGE 3A CHRONIC KIDNEY DISEASE, WITH LONG-TERM CURRENT USE OF INSULIN: Primary | ICD-10-CM

## 2024-09-26 DIAGNOSIS — Z79.4 TYPE 2 DIABETES MELLITUS WITH STAGE 3A CHRONIC KIDNEY DISEASE, WITH LONG-TERM CURRENT USE OF INSULIN: Primary | ICD-10-CM

## 2024-09-26 PROCEDURE — G0108 DIAB MANAGE TRN  PER INDIV: HCPCS | Mod: HCNC,S$GLB,, | Performed by: DIETITIAN, REGISTERED

## 2024-09-26 PROCEDURE — 99999 PR PBB SHADOW E&M-EST. PATIENT-LVL III: CPT | Mod: PBBFAC,HCNC,, | Performed by: DIETITIAN, REGISTERED

## 2024-09-26 NOTE — TELEPHONE ENCOUNTER
Tried calling the patient with no answer. A message was left for a return call to Dr Grimes's office.

## 2024-09-26 NOTE — TELEPHONE ENCOUNTER
----- Message from Heather Wiggins sent at 9/26/2024  3:56 PM CDT -----  Contact: Certain  [phone]  Patient is returning a phone call.    Who left a message for the patient: Eden Yuen MA    Does patient know what this is regarding:  A message from Eden Yuen MA    Would you like a call back, or a response through your [facility] portal?:   Call

## 2024-09-26 NOTE — PROGRESS NOTES
Diabetes Care Specialist Progress Note  Author: Tasha Thapa RD, CDE  Date: 9/26/2024    Intake    Program Intake  Reason for Diabetes Program Visit:: Intervention  Type of Intervention:: Individual  Individual: Device Training  Device Training: Personal CGM (Dexcom G7)  Current diabetes risk level:: high  In the last 12 months, have you:: none  Permission to speak with others about care:: no    Current Diabetes Treatment: DM Injectables, Insulin  DM Injectables Type/Dose: Ozempic 1mg q7d  Method of insulin delivery?: Injections  Injection Type: Pens  Pen Type/Dose: Lantus 16u in AM, 6u HS    Continuous Glucose Monitoring  Patient has CGM: No    Lab Results   Component Value Date    HGBA1C 5.8 (H) 08/12/2024         Lifestyle Coping Support & Clinical    Lifestyle/Coping/Support  Learning Barriers:: None  Culture or [employer] beliefs that may impact ability to access healthcare: No  Psychosocial/Coping Skills Assessment Completed: : Yes  Area of need?: No    Problem Review  Active Comorbidities: Cancer, Cardiovascular Disease, Hypertension    Diabetes Self-Management Skills Assessment    Medication Skills Assessment  Patient is able to identify current diabetes medications, dosages, and appropriate timing of medications.: yes  Patient reports problems or concerns with current medication regimen.: no  Patient is  aware that some diabetes medications can cause low blood sugar?: Yes  Medication Skills Assessment Completed:: Yes  Assessment indicates:: Adequate understanding  Area of need?: No    Diabetes Disease Process/Treatment Options  Diabetes Type?: Type II  When were you diagnosed?: about 10 years  If previous diabetes education, when/where:: yes, at [facility] in 2020  What are your goals for this education session?: dexcom training  Is patient aware of what causes diabetes?: Yes  Does patient understand the pathophysiology of diabetes?: Yes  Diabetes Disease Process/Treatment Options: Skills Assessment  Completed: Yes  Assessment indicates:: Adequate understanding  Area of need?: No    Home Blood Glucose Monitoring  Patient states that blood sugar is checked at home daily.: yes  Monitoring Method:: home glucometer  Fasting BG range history:: , has been as low as 70  Home Blood Glucose Monitoring Skills Assessment Completed: : Yes  Assessment indicates:: Instruction Needed, Knowledge deficit  Area of need?: Yes    Acute Complications  Have you ever had hypoglycemia (low BG 70 or less)?: yes  How often and what are your symptoms?: not often  How do you treat hypoglycemia?: hard time concentrating  Have you ever had hyperglycemia (high  or more)?: no  Have you ever had DKA?: no  Do you ever test for ketones?: no  Acute Complications Skills Assessment Completed: : Yes  Assessment indicates:: Instruction Needed  Area of need?: No         Assessment Summary and Plan    Based on today's diabetes care assessment, the following areas of need were identified:          9/26/2024    12:01 AM   Areas of Need   Medications/Current Diabetes Treatment Patient will log doses of insulin Dexcom heidi. Would like to be able to decrease Lantus dose if he doesn't need it.    Lifestyle Coping Support No   Diabetes Disease Process/Treatment Options No   Home Blood Glucose Monitoring Yes- see care plan.    Acute Complications No       Today's interventions were provided through individual discussion, instruction, and written materials were provided.      Patient verbalized understanding of instruction and written materials.  Pt was able to return back demonstration of instructions today. Patient understood key points, needs reinforcement and further instruction.     Diabetes Self-Management Care Plan:    Today's Diabetes Self-Management Care Plan was developed with Rigoberto's input. Rigoberto has agreed to work toward the following goal(s) to improve his/her overall diabetes control.      Care Plan: Diabetes Management   Updates made  "since 8/27/2024 12:00 AM        Problem: Blood Glucose Self-Monitoring         Goal: Patient will use Dexcom to monitor BG and make treatment decisions.    Start Date: 9/26/2024   Expected End Date: 12/26/2024   Priority: High   Barriers: No Barriers Identified   Note:    Patient having MRI on 10/1/24. Will remove sensor and call Dexcom for replacement. Having surgery on his bladder on 10/7/24 and will not wear Dexcom during hospital stay. Will resume once he gets home.        Task: Trained patient to use Dexcom G7. Completed 9/26/2024   Note:    DEXCOM G7 CGM TRAINING  Dexcom G7 mobile apps downloaded to phone. Education was provided using "Quick Start Guide" and demo device per Dexcom protocol. [facility] clinic data share set-up.       Overview:  5 minute glucose reading updates, trending arrows, BG graph screens, reports screen,  connectivity and functions.   Menus: Trend graph, start sensor, enter BG, events, alerts, settings, replace sensor, stop sensor, and shutdown  Dexcom G7 mobile heidi/ Settings:                          * Urgent Low: 55 mg/dL                          * Urgent Low Soon: On                          * Low Alert: 75 mg/dL; Snooze OFF                          * High Alert: 250 mg/dL; Snooze OFF                           * Signal Loss: ON                          * Brief Sensor Issue: ON     Reviewed additional setting options.  Patient paired sensor using 4-digit code listed on sensor cap..    Reviewed where to find sensor insertion time and expiration date.   Reviewed appropriate calibration techniques.  Reviewed sensor site selection. Patient selected and prepped site using aseptic technique, inserted sensor, applied overlay tape and started session.   Reviewed sensor removal from site. Discussed times to remove sensor per  guidelines include MRI or diatherapy.   Patient able to demonstrate without difficulty. Encouraged to review manual and/or training videos prior " to starting another sensor.   Reviewed problem solving aspects of sensor transmission/variables that can disrupt RF transmission.  range 20 feet, but the first 3 hrs keep within 3 feet of transmitter.  Patient instructed on lag time of interstitial fluid from CBG and was advised to treat hypoglycemia and dose insulin based on SMBG values.  Dexcom technical support contact number given and examples of when to contact them discussed.              Follow Up Plan     Follow up in about 1 month (around 10/26/2024), or if symptoms worsen or fail to improve, for E11.22. Will run Dexcom Clarity report next week. Patient will contact [facility] with questions.     Today's care plan and follow up schedule was discussed with patient.  Rigoberto verbalized understanding of the care plan, goals, and agrees to follow up plan.        The patient was encouraged to communicate with his/her health care provider/physician and care team regarding his/her condition(s) and treatment.  I provided the patient with my contact information today and encouraged to contact me via phone or [facility] Patient Portal as needed.     Length of Visit   Total Time: 60 Minutes

## 2024-09-26 NOTE — TELEPHONE ENCOUNTER
"----- Message from Dandy Villa RN sent at 9/26/2024  9:43 AM CDT -----  Regarding: pcp clearance  Dr. Grimes, I tried to get Mr. Vasquez in to our clinic for a preop clearance visit but he refused because of distance to [facility]. Reaching out to you for pcp clearance, details below.       Your patient indicated above requires Pre-Op Clearance/ Risk Stratification for an "EXCISION, DIVERTICULUM, BLADDER. CYSTECTOMY, PARTIAL. LYMPHADENECTOMY, PELVIS." with Dr. HALLIE SOLIMAN on 10/08/24 (General anesthesia, 270 minutes).  Anesthesia review is in progress.    If a chart review is appropriate for clearance, please indicate in a note in the EMR.     If not, please advise timely, so that your clinic may schedule an appointment.       The following labs/tests have been ordered: T&S, 2U PREPARED  If further diagnostics are required please feel free to initiate.       Thank you,  Dandy Villa RN  [facility] [place]  " Need for prophylactic measure Need for prophylactic measure

## 2024-09-27 ENCOUNTER — LAB VISIT (OUTPATIENT)
Dept: LAB | Facility: HOSPITAL | Age: 74
End: 2024-09-27
Attending: UROLOGY
Payer: MEDICARE

## 2024-09-27 DIAGNOSIS — R39.9 UNSPECIFIED SYMPTOMS AND SIGNS INVOLVING THE GENITOURINARY SYSTEM: ICD-10-CM

## 2024-09-27 DIAGNOSIS — D49.4 BLADDER TUMOR: ICD-10-CM

## 2024-09-27 LAB
BILIRUB UR QL STRIP: NEGATIVE
CLARITY UR REFRACT.AUTO: CLEAR
COLOR UR AUTO: YELLOW
GLUCOSE UR QL STRIP: NEGATIVE
HGB UR QL STRIP: NEGATIVE
KETONES UR QL STRIP: NEGATIVE
LEUKOCYTE ESTERASE UR QL STRIP: NEGATIVE
NITRITE UR QL STRIP: NEGATIVE
PH UR STRIP: 7 [PH] (ref 5–8)
PROT UR QL STRIP: NEGATIVE
SP GR UR STRIP: 1.01 (ref 1–1.03)
URN SPEC COLLECT METH UR: NORMAL
UROBILINOGEN UR STRIP-ACNC: NEGATIVE EU/DL

## 2024-09-27 PROCEDURE — 81003 URINALYSIS AUTO W/O SCOPE: CPT | Mod: HCNC,PO | Performed by: UROLOGY

## 2024-09-27 PROCEDURE — 87086 URINE CULTURE/COLONY COUNT: CPT | Mod: HCNC | Performed by: UROLOGY

## 2024-09-29 LAB — BACTERIA UR CULT: NO GROWTH

## 2024-10-01 ENCOUNTER — HOSPITAL ENCOUNTER (OUTPATIENT)
Dept: RADIOLOGY | Facility: HOSPITAL | Age: 74
Discharge: HOME | End: 2024-10-01
Attending: UROLOGY
Payer: MEDICARE

## 2024-10-01 DIAGNOSIS — C67.2 MALIGNANT NEOPLASM OF LATERAL WALL OF URINARY BLADDER: ICD-10-CM

## 2024-10-01 DIAGNOSIS — H40.013 OPEN ANGLE WITH BORDERLINE FINDINGS AND LOW GLAUCOMA RISK IN BOTH EYES: ICD-10-CM

## 2024-10-01 PROCEDURE — 71260 CT THORAX DX C+: CPT | Mod: TC,HCNC

## 2024-10-01 PROCEDURE — 71260 CT THORAX DX C+: CPT | Mod: 26,HCNC,, | Performed by: RADIOLOGY

## 2024-10-01 PROCEDURE — 25500020 PHARM REV CODE 255: Mod: HCNC | Performed by: UROLOGY

## 2024-10-01 PROCEDURE — 74177 CT ABD & PELVIS W/CONTRAST: CPT | Mod: 26,HCNC,, | Performed by: RADIOLOGY

## 2024-10-01 PROCEDURE — 74177 CT ABD & PELVIS W/CONTRAST: CPT | Mod: TC,HCNC

## 2024-10-01 RX ORDER — DORZOLAMIDE HYDROCHLORIDE AND TIMOLOL MALEATE 20; 5 MG/ML; MG/ML
1 SOLUTION/ DROPS OPHTHALMIC 2 TIMES DAILY
Qty: 10 ML | Refills: 5 | Status: SHIPPED | OUTPATIENT
Start: 2024-10-01 | End: 2025-10-01

## 2024-10-01 RX ADMIN — IOHEXOL 100 ML: 350 INJECTION, SOLUTION INTRAVENOUS at 04:10

## 2024-10-02 ENCOUNTER — OFFICE VISIT (OUTPATIENT)
Dept: INTERNAL MEDICINE | Facility: CLINIC | Age: 74
End: 2024-10-02
Payer: MEDICARE

## 2024-10-02 VITALS
SYSTOLIC BLOOD PRESSURE: 132 MMHG | DIASTOLIC BLOOD PRESSURE: 78 MMHG | BODY MASS INDEX: 28.27 KG/M2 | WEIGHT: 201.94 LBS | OXYGEN SATURATION: 98 % | TEMPERATURE: 98 F | HEIGHT: 71 IN | HEART RATE: 65 BPM

## 2024-10-02 DIAGNOSIS — Z01.818 PREOP EXAM FOR INTERNAL MEDICINE: ICD-10-CM

## 2024-10-02 DIAGNOSIS — E11.22 TYPE 2 DIABETES MELLITUS WITH STAGE 3A CHRONIC KIDNEY DISEASE, WITH LONG-TERM CURRENT USE OF INSULIN: ICD-10-CM

## 2024-10-02 DIAGNOSIS — E11.59 HYPERTENSION ASSOCIATED WITH DIABETES: Chronic | ICD-10-CM

## 2024-10-02 DIAGNOSIS — I15.2 HYPERTENSION ASSOCIATED WITH DIABETES: Chronic | ICD-10-CM

## 2024-10-02 DIAGNOSIS — E78.5 HYPERLIPIDEMIA ASSOCIATED WITH TYPE 2 DIABETES MELLITUS: Chronic | ICD-10-CM

## 2024-10-02 DIAGNOSIS — G47.33 OSA ON CPAP: ICD-10-CM

## 2024-10-02 DIAGNOSIS — Z79.4 TYPE 2 DIABETES MELLITUS WITH STAGE 3A CHRONIC KIDNEY DISEASE, WITH LONG-TERM CURRENT USE OF INSULIN: ICD-10-CM

## 2024-10-02 DIAGNOSIS — C67.2 MALIGNANT NEOPLASM OF LATERAL WALL OF URINARY BLADDER: Primary | ICD-10-CM

## 2024-10-02 DIAGNOSIS — J84.10 LUNG GRANULOMA: ICD-10-CM

## 2024-10-02 DIAGNOSIS — E11.69 HYPERLIPIDEMIA ASSOCIATED WITH TYPE 2 DIABETES MELLITUS: Chronic | ICD-10-CM

## 2024-10-02 DIAGNOSIS — Z95.0 PACEMAKER: Chronic | ICD-10-CM

## 2024-10-02 DIAGNOSIS — D50.9 IRON DEFICIENCY ANEMIA, UNSPECIFIED IRON DEFICIENCY ANEMIA TYPE: Chronic | ICD-10-CM

## 2024-10-02 DIAGNOSIS — H40.013 OPEN ANGLE WITH BORDERLINE FINDINGS AND LOW GLAUCOMA RISK IN BOTH EYES: ICD-10-CM

## 2024-10-02 DIAGNOSIS — R31.0 GROSS HEMATURIA: ICD-10-CM

## 2024-10-02 DIAGNOSIS — N18.31 TYPE 2 DIABETES MELLITUS WITH STAGE 3A CHRONIC KIDNEY DISEASE, WITH LONG-TERM CURRENT USE OF INSULIN: ICD-10-CM

## 2024-10-02 DIAGNOSIS — Z86.79 HISTORY OF HEART BLOCK: ICD-10-CM

## 2024-10-02 DIAGNOSIS — I70.0 CALCIFICATION OF AORTA: ICD-10-CM

## 2024-10-02 PROCEDURE — 1159F MED LIST DOCD IN RCRD: CPT | Mod: HCNC,CPTII,S$GLB, | Performed by: PHYSICIAN ASSISTANT

## 2024-10-02 PROCEDURE — 1160F RVW MEDS BY RX/DR IN RCRD: CPT | Mod: HCNC,CPTII,S$GLB, | Performed by: PHYSICIAN ASSISTANT

## 2024-10-02 PROCEDURE — 3072F LOW RISK FOR RETINOPATHY: CPT | Mod: HCNC,CPTII,S$GLB, | Performed by: PHYSICIAN ASSISTANT

## 2024-10-02 PROCEDURE — 1101F PT FALLS ASSESS-DOCD LE1/YR: CPT | Mod: HCNC,CPTII,S$GLB, | Performed by: PHYSICIAN ASSISTANT

## 2024-10-02 PROCEDURE — 99999 PR PBB SHADOW E&M-EST. PATIENT-LVL V: CPT | Mod: PBBFAC,HCNC,, | Performed by: PHYSICIAN ASSISTANT

## 2024-10-02 PROCEDURE — 3066F NEPHROPATHY DOC TX: CPT | Mod: HCNC,CPTII,S$GLB, | Performed by: PHYSICIAN ASSISTANT

## 2024-10-02 PROCEDURE — 3078F DIAST BP <80 MM HG: CPT | Mod: HCNC,CPTII,S$GLB, | Performed by: PHYSICIAN ASSISTANT

## 2024-10-02 PROCEDURE — 3075F SYST BP GE 130 - 139MM HG: CPT | Mod: HCNC,CPTII,S$GLB, | Performed by: PHYSICIAN ASSISTANT

## 2024-10-02 PROCEDURE — 3044F HG A1C LEVEL LT 7.0%: CPT | Mod: HCNC,CPTII,S$GLB, | Performed by: PHYSICIAN ASSISTANT

## 2024-10-02 PROCEDURE — 1125F AMNT PAIN NOTED PAIN PRSNT: CPT | Mod: HCNC,CPTII,S$GLB, | Performed by: PHYSICIAN ASSISTANT

## 2024-10-02 PROCEDURE — 4010F ACE/ARB THERAPY RXD/TAKEN: CPT | Mod: HCNC,CPTII,S$GLB, | Performed by: PHYSICIAN ASSISTANT

## 2024-10-02 PROCEDURE — 99214 OFFICE O/P EST MOD 30 MIN: CPT | Mod: HCNC,S$GLB,, | Performed by: PHYSICIAN ASSISTANT

## 2024-10-02 PROCEDURE — 3008F BODY MASS INDEX DOCD: CPT | Mod: HCNC,CPTII,S$GLB, | Performed by: PHYSICIAN ASSISTANT

## 2024-10-02 PROCEDURE — 3061F NEG MICROALBUMINURIA REV: CPT | Mod: HCNC,CPTII,S$GLB, | Performed by: PHYSICIAN ASSISTANT

## 2024-10-02 PROCEDURE — 3288F FALL RISK ASSESSMENT DOCD: CPT | Mod: HCNC,CPTII,S$GLB, | Performed by: PHYSICIAN ASSISTANT

## 2024-10-02 NOTE — PROGRESS NOTES
Subjective:      Patient ID: Rigoberto Vasquez is a 74 y.o. male.    Chief Complaint: Pre-op Exam    HPI  The patient is here for a preop clearance to have excision of bladder diverticulum surgery.  The physician that is performing the surgery is Dr. Compa Hensley.     The surgery is being planned for 10/8/2024.    I will send a copy of the referral to the surgeon   The patient states that there has not been previous problems with sedation.    Kidney disease, stroke, MI, arrhythmia, seizures, angina, asthma, diabetes, chest pain, thyroid disease, blood/bleeding disorders?  Kidney disease stable.       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    Choroidal nevus of right eye    Epiretinal membrane (ERM) of left eye    Overweight (BMI 25.0-29.9)    Macular hole of right eye    Open angle with borderline findings and low glaucoma risk in both eyes    Ectopic gastric mucosa    Open angle with borderline findings and high glaucoma risk in both eyes    History of skin cancer    History of heart block;AV block, 3rd degree    BPH with obstruction/lower urinary tract symptoms    Erectile dysfunction    Lung granuloma    Dyspepsia    Calcification of aorta    Cholesteatoma of ear, left    СВЕТЛАНА on CPAP    Gross hematuria    Malignant neoplasm of lateral wall of urinary bladder     Past Surgical History:   Procedure Laterality Date    BIOPSY OF BLADDER N/A 8/30/2024    Procedure: BIOPSY, BLADDER;  Surgeon: Compa Hensley MD;  Location: [facility] OR [address] [place] [address];  Service: Urology;  Laterality: N/A;    BLADDER FULGURATION N/A 8/30/2024     Procedure: FULGURATION, BLADDER;  Surgeon: Compa Hensley MD;  Location: [facility] OR 1ST FLR;  Service: Urology;  Laterality: N/A;    CARDIAC PACEMAKER PLACEMENT      COLONOSCOPY N/A 9/13/2019    Procedure: COLONOSCOPY;  Surgeon: Kan Ramsey III, MD;  Location: [facility];  Service: Endoscopy;  Laterality: N/A;    COLONOSCOPY N/A 9/22/2022    Procedure: COLONOSCOPY;  Surgeon: Chris Garcia MD;  Location: [facility] ENDO;  Service: Endoscopy;  Laterality: N/A;    CYSTOSCOPY N/A 8/30/2024    Procedure: CYSTOSCOPY;  Surgeon: Compa Hensley MD;  Location: [facility] OR 1ST FLR;  Service: Urology;  Laterality: N/A;  with blue light    CYSTOSCOPY W/ RETROGRADES Bilateral 7/16/2024    Procedure: CYSTOSCOPY, WITH RETROGRADE PYELOGRAM;  Surgeon: Vance Olivera MD;  Location: [facility];  Service: Urology;  Laterality: Bilateral;    ESOPHAGOGASTRODUODENOSCOPY N/A 9/13/2019    Procedure: ESOPHAGOGASTRODUODENOSCOPY (EGD);  Surgeon: Kan Ramsey III, MD;  Location: [facility];  Service: Endoscopy;  Laterality: N/A;    ESOPHAGOGASTRODUODENOSCOPY N/A 9/3/2021    Procedure: EGD (ESOPHAGOGASTRODUODENOSCOPY);  Surgeon: Kaylene Pineda MD;  Location: [facility];  Service: Endoscopy;  Laterality: N/A;    ESOPHAGOGASTRODUODENOSCOPY N/A 5/24/2023    Procedure: EGD (ESOPHAGOGASTRODUODENOSCOPY);  Surgeon: Cory Bennett MD;  Location: [facility];  Service: Endoscopy;  Laterality: N/A;    INSERTION OF TYMPANOSTOMY TUBE Right 3/15/2023    Procedure: INSERTION, TYMPANOSTOMY TUBE;  Surgeon: Jose L Gudino MD;  Location: [facility] OR 2ND FLR;  Service: ENT;  Laterality: Right;    JOINT REPLACEMENT      KNEE CARTILAGE SURGERY Bilateral     NASAL SINUS SURGERY      SHOULDER ARTHROSCOPY Right     TONSILLECTOMY      TOTAL KNEE ARTHROPLASTY Left 05/15/2017    TRANSURETHRAL RESECTION OF PROSTATE      TURBT (TRANSURETHRAL RESECTION OF BLADDER TUMOR) N/A 7/16/2024    Procedure: TURBT (TRANSURETHRAL RESECTION OF BLADDER TUMOR);  Surgeon: Joselin  "Vance FLETCHER MD;  Location: [facility];  Service: Urology;  Laterality: N/A;    TYMPANOPLASTY      TYMPANOPLASTY WITH MASTOIDECTOMY Left 3/15/2023    Procedure: TYMPANOPLASTY, WITH MASTOIDECTOMY;  Surgeon: Jose L Gudino MD;  Location: [address];  Service: ENT;  Laterality: Left;    vesectomy              Current Outpatient Medications:     ACETAMINOPHEN (TYLENOL 8 HOUR ORAL), Take by mouth as needed., Disp: , Rfl:     azelastine (ASTELIN) 137 mcg (0.1 %) nasal spray, 2 sprays (274 mcg total) by Nasal route 2 (two) times daily., Disp: 30 mL, Rfl: 12    blood sugar diagnostic (ACCU-CHEK LUIS FERNANDO) Strp, Check BG 2-3 times daily. Accuchek luis fernando strips, Disp: 300 strip, Rfl: 3    dorzolamide-timolol 2-0.5% (COSOPT) 22.3-6.8 mg/mL ophthalmic solution, Place 1 drop into both eyes 2 (two) times daily., Disp: 10 mL, Rfl: 5    DROPLET PEN NEEDLE 31 gauge x 3/16" Ndle, USE AS DIRECTED  WITH  INSULIN, Disp: 200 each, Rfl: 3    dutasteride (AVODART) 0.5 mg capsule, Take 1 capsule (0.5 mg total) by mouth once daily., Disp: 90 capsule, Rfl: 3    famotidine (PEPCID) 20 MG tablet, Take 1 tablet (20 mg total) by mouth 2 (two) times daily., Disp: 60 tablet, Rfl: 3    fenofibrate micronized (LOFIBRA) 200 MG Cap, Take 1 capsule (200 mg total) by mouth every evening., Disp: 90 capsule, Rfl: 0    fluticasone propionate (FLONASE) 50 mcg/actuation nasal spray, 1 spray by Each Nostril route once daily., Disp: , Rfl:     gabapentin (CMPD PAIN MANAGEMENT COMPOUND OINTMNENT THREE), Apply bid prn pain, Disp: 100 g, Rfl: 11    hydrocortisone 2.5 % cream, Apply topically 2 (two) times daily., Disp: 3.5 g, Rfl: 5    insulin (LANTUS SOLOSTAR U-100 INSULIN) glargine 100 units/mL SubQ pen, 16 units in the AM and 4 units in the PM (Patient taking differently: 15 units in the AM and 2 units in the PM), Disp: 3 mL, Rfl: 11    levocetirizine (XYZAL) 5 MG tablet, Take 5 mg by mouth every evening., Disp: , Rfl:     loratadine (CLARITIN) 10 mg tablet, Take 10 " "mg by mouth once daily., Disp: , Rfl:     losartan (COZAAR) 25 MG tablet, Take 1 tablet (25 mg total) by mouth once daily., Disp: 90 tablet, Rfl: 3    lovastatin (MEVACOR) 40 MG tablet, Take 1 tablet by mouth Every evening., Disp: 90 tablet, Rfl: 3    multivitamin capsule, Take 1 capsule by mouth once daily., Disp: , Rfl:     pantoprazole (PROTONIX) 40 MG tablet, Take 1 tablet (40 mg total) by mouth once daily., Disp: 90 tablet, Rfl: 1    polyethylene glycol (GLYCOLAX) 17 gram/dose powder, Take 17 g by mouth once daily., Disp: , Rfl:     semaglutide (OZEMPIC) 1 mg/dose (4 mg/3 mL), Inject 1 mg into the skin every 7 days., Disp: 9 mL, Rfl: 4    sildenafil (REVATIO) 20 mg Tab, Take 1 tablet (20 mg total) by mouth daily as needed (PRN). Takes prn, Disp: 30 tablet, Rfl: 11    triamcinolone acetonide 0.1% (KENALOG) 0.1 % cream, AAA bid, Disp: 454 g, Rfl: 0    latanoprost 0.005 % ophthalmic solution, INSTILL 1 DROP INTO BOTH EYES ONE TIME DAILY, Disp: 7.5 mL, Rfl: 3    Current Facility-Administered Medications:     LIDOcaine HCl 2% urojet, , Mucous Membrane, 1 time in Clinic/HOD,     Review of Systems  Objective:   /78   Pulse 65   Temp 98.4 °F (36.9 °C)   Ht 5' 11" (1.803 m)   Wt 91.6 kg (201 lb 15.1 oz)   SpO2 98%   BMI 28.17 kg/m²     Physical Exam  Lab Results   Component Value Date    HGBA1C 5.8 (H) 2024     Lab Results   Component Value Date    CREATININE 1.4 2024    BUN 22 2024     2024    K 3.9 2024     2024    CO2 25 2024     Lab Results   Component Value Date    WBC 5.14 2024    HGB 13.2 (L) 2024    HCT 42.4 2024     (H) 2024     2024       TTE: ransthoracic Echocardiographic Report     Patient Name: KAT RODRIGUEZ C   Patient ID: [id number]  Account #:   : 1950 (74y )  Gender: M   Study Date: 2024 10:32:25 AM   Ht(Cm): 180  Wt(Kg): 86.64  BSA: 2.08   Accession #: [id number]   Sonographer: temitope" Location: [facility] Provider: CATHERINE MCNAIR     Heart Rate: 61   Quality: The study images were of technically adequate quality.   Ref.Provider: CATHERINE MCNAIR     -----------------------     CONCLUSIONS:   1. Normal left ventricular cavity size. Mildly depressed left ventricular systolic   function. LVEF 45 - 50%.   2. Normal right ventricular size.   3. Mild mitral valve regurgitation.   4. Mild aortic valve regurgitation.   5. Mild tricuspid valve regurgitation.     Narrative & Impression  EXAMINATION:  CT CHEST ABDOMEN PELVIS WITH IV CONTRAST (XPD)     CLINICAL HISTORY:  Bladder cancer, invasive, assess treatment response;Malignant neoplasm of lateral wall of bladder     TECHNIQUE:  The patient was surveyed from the thoracic inlet through the pelvis after the administration of 100 cc Omni 350 IV contrast as well as oral contrast and data was reconstructed for coronal, sagittal, and axial images.     COMPARISON:  07/16/2024, 06/24/2024     FINDINGS:  Structures at the base the neck: No adenopathy. Normal thyroid gland.     Lungs: No nodules or infiltrates.  Calcified granuloma right lower lobe.     Pleura: No thickening or fluid.     Mediastinum/Mandy:Calcified hilar nodes as well as 14 mm calcified subcarinal mediastinal node consistent with prior granulomatous disease.     Axilla: No adenopathy.     Heart/Aorta: Normal size.  Moderate coronary disease.  Cardiac leads are noted.  No effusion.No pericardial effusion. Aorta normal caliber.     Liver: Normal size and attenuation.  Scattered hypodensities within the liver are similar to prior and thought to reflect cysts, largest within segment 5 measuring up to 19 x 11 mm..     Gallbladder: No calcified gallstones.     Bile Ducts: No dilatation.     Pancreas: No mass. No peripancreatic fat stranding.     Spleen: Normal.     Adrenals: Normal.     Kidneys/Ureters: Normal enhancement.  No mass or hydroureteronephrosis.     Bladder: Mild nonspecific bladder wall  thickening.  Small diverticulum off the right posterolateral bladder wall.     Reproductive organs: Prostate measures 5.6 x 4.4 cm which is mildly enlarged but stable.     GI Tract/Mesentery: No evidence of bowel obstruction or inflammation.  Mild constipation.  Scattered diverticulosis predominately within the sigmoid colon without evidence of acute diverticulitis.  No evidence of appendicitis.     Peritoneal Space: No ascites or free air.     Retroperitoneum: Shotty adenopathy.     Abdominal wall: Normal.     Vasculature: No aneurysm.  Mild atherosclerotic disease throughout the aorta.     Chest wall collateral seen on the left upper chest wall could reflect some component of left subclavian stenosis related to cardiac leads.     Incidental double IVC.     Bones: No acute fracture.  Mild osteopenia.  Moderate degenerative changes throughout the lumbar spine.  Disc space narrowing and endplate degenerative changes L4/5.  No suspicious lytic or sclerotic lesions.     Impression:     No evidence of metastatic disease.     All CT scans at this facility use dose modulation, iterative reconstruction and/or weight based dosing when appropriate to reduce radiation dose to as low as reasonably achievable.        Electronically signed by:Luis Carlos Medina MD  Date:                                            10/02/2024  Time:                                           09:52        Exam Ended: 10/01/24 16:41 CDT Last Resulted: 10/02/24 09:52 CDT       Order Details        View Encounter        Lab and Collection Details        Routing        Result History     View All Conversations on this Encounter     Result Care Coordination      Patient Communication     Add Comments   Seen Back to Top       CT Chest Abdomen Pelvis With IV Contrast (XPD) Routine Oral Contrast: Patient Communication     Add Comments   Seen     External Result Report    External Result Report     Narrative & Impression    EXAMINATION:  CT CHEST ABDOMEN PELVIS  WITH IV CONTRAST (XPD)     CLINICAL HISTORY:  Bladder cancer, invasive, assess treatment response;Malignant neoplasm of lateral wall of bladder     TECHNIQUE:  The patient was surveyed from the thoracic inlet through the pelvis after the administration of 100 cc Omni 350 IV contrast as well as oral contrast and data was reconstructed for coronal, sagittal, and axial images.     COMPARISON:  07/16/2024, 06/24/2024     FINDINGS:  Structures at the base the neck: No adenopathy. Normal thyroid gland.     Lungs: No nodules or infiltrates.  Calcified granuloma right lower lobe.     Pleura: No thickening or fluid.     Mediastinum/Mandy:Calcified hilar nodes as well as 14 mm calcified subcarinal mediastinal node consistent with prior granulomatous disease.     Axilla: No adenopathy.     Heart/Aorta: Normal size.  Moderate coronary disease.  Cardiac leads are noted.  No effusion.No pericardial effusion. Aorta normal caliber.     Liver: Normal size and attenuation.  Scattered hypodensities within the liver are similar to prior and thought to reflect cysts, largest within segment 5 measuring up to 19 x 11 mm..     Gallbladder: No calcified gallstones.     Bile Ducts: No dilatation.     Pancreas: No mass. No peripancreatic fat stranding.     Spleen: Normal.     Adrenals: Normal.     Kidneys/Ureters: Normal enhancement.  No mass or hydroureteronephrosis.     Bladder: Mild nonspecific bladder wall thickening.  Small diverticulum off the right posterolateral bladder wall.     Reproductive organs: Prostate measures 5.6 x 4.4 cm which is mildly enlarged but stable.     GI Tract/Mesentery: No evidence of bowel obstruction or inflammation.  Mild constipation.  Scattered diverticulosis predominately within the sigmoid colon without evidence of acute diverticulitis.  No evidence of appendicitis.     Peritoneal Space: No ascites or free air.     Retroperitoneum: Shotty adenopathy.     Abdominal wall: Normal.     Vasculature: No aneurysm.   Mild atherosclerotic disease throughout the aorta.     Chest wall collateral seen on the left upper chest wall could reflect some component of left subclavian stenosis related to cardiac leads.     Incidental double IVC.     Bones: No acute fracture.  Mild osteopenia.  Moderate degenerative changes throughout the lumbar spine.  Disc space narrowing and endplate degenerative changes L4/5.  No suspicious lytic or sclerotic lesions.     Impression:     No evidence of metastatic disease.     All CT scans at this facility use dose modulation, iterative reconstruction and/or weight based dosing when appropriate to reduce radiation dose to as low as reasonably achievable.        Electronically signed by:Luis Carlos Medina MD  Date:                                            10/02/2024  Time:                                           09:52        Assessment:     1. Malignant neoplasm of lateral wall of urinary bladder    2. Gross hematuria    3. Preop exam for internal medicine    4. СВЕТЛАНА on CPAP    5. Calcification of aorta    6. Hypertension associated with diabetes    7. Hyperlipidemia associated with type 2 diabetes mellitus    8. Iron deficiency anemia, unspecified iron deficiency anemia type    9. History of heart block;AV block, 3rd degree    10. Pacemaker    11. Type 2 diabetes mellitus with stage 3a chronic kidney disease, with long-term current use of insulin    12. Open angle with borderline findings and low glaucoma risk in both eyes    13. Lung granuloma      Plan:   Malignant neoplasm of lateral wall of urinary bladder  Gross hematuria  Preop exam for internal medicine  -stopped ozempic already  -based on my assessment today, I find Mr. Vasquez low risk for his upcoming bladder surgery.     СВЕТЛАНА on CPAP  -stable on cpap    Calcification of aorta  -stable on current medications    Hypertension associated with diabetes  -stable and controlled on current meds    Hyperlipidemia associated with type 2 diabetes  mellitus  -stable on lovastatin    Iron deficiency anemia, unspecified iron deficiency anemia type    History of heart block;AV block, 3rd degree  Pacemaker  -up todate with cardiology. Recent ALLYSON stable.     Type 2 diabetes mellitus with stage 3a chronic kidney disease, with long-term current use of insulin  -stable and controlled    Open angle with borderline findings and low glaucoma risk in both eyes  -up to date with ophthalmology    Lung granuloma  -stable on recent scans  -cont to monitor      Follow up if symptoms worsen or fail to improve.

## 2024-10-03 ENCOUNTER — TELEPHONE (OUTPATIENT)
Dept: UROLOGY | Facility: CLINIC | Age: 74
End: 2024-10-03
Payer: MEDICARE

## 2024-10-03 NOTE — TELEPHONE ENCOUNTER
Returned patient's call.  Patient asking about a call from pre op regarding medications.  Advised that he received a call from one of the pre op nurses on 9/25/2024.      I spoke with the pre op department directly who states they will call patient again on tomorrow to answer any additional questions.

## 2024-10-07 ENCOUNTER — ANESTHESIA EVENT (OUTPATIENT)
Dept: SURGERY | Facility: HOSPITAL | Age: 74
End: 2024-10-07
Payer: MEDICARE

## 2024-10-07 ENCOUNTER — TELEPHONE (OUTPATIENT)
Dept: UROLOGY | Facility: CLINIC | Age: 74
End: 2024-10-07
Payer: MEDICARE

## 2024-10-07 RX ORDER — SODIUM CHLORIDE 0.9 % (FLUSH) 0.9 %
10 SYRINGE (ML) INJECTION
OUTPATIENT
Start: 2024-10-07

## 2024-10-07 RX ORDER — FENTANYL CITRATE 50 UG/ML
25 INJECTION, SOLUTION INTRAMUSCULAR; INTRAVENOUS EVERY 5 MIN PRN
OUTPATIENT
Start: 2024-10-07

## 2024-10-07 RX ORDER — HALOPERIDOL 5 MG/ML
0.5 INJECTION INTRAMUSCULAR EVERY 10 MIN PRN
OUTPATIENT
Start: 2024-10-07

## 2024-10-07 RX ORDER — GLUCAGON 1 MG
1 KIT INJECTION
OUTPATIENT
Start: 2024-10-07

## 2024-10-07 RX ORDER — OXYCODONE HYDROCHLORIDE 5 MG/1
5 TABLET ORAL
OUTPATIENT
Start: 2024-10-07

## 2024-10-07 NOTE — TELEPHONE ENCOUNTER
Instructions for Day of Surgery      Report To:    [facility], 2nd floor of [facility]    Arrival time: 5am    Please note:     If you are allergic to any medication please let your care team know the day of surgery.  If you have ever had adverse reaction to anesthesia please let your care team know the day of surgery   Please arrange transportation from the hospital, you will not be allowed to drive yourself home from surgery since you have been under sedation or anesthesia   Notify your doctor or care team of any diabetic medications that you take as these may need to be held or adjusted prior to surgery     Before Surgery     You should stop taking any blood thinners for up to 7 days depending on the blood thinner, please let care team know what you are taking so you can be directed when to stop certain medications.    You should hold any aspirin containing products for 7 days    Stop taking any herbal supplements 14 days prior to surgery     Night Before Surgery     Nothing to eat or drink after midnight the night before your surgery  Take medications as instructed the morning of surgery  No alcoholic beverages 24 hours prior to surgery

## 2024-10-07 NOTE — ANESTHESIA PREPROCEDURE EVALUATION
[facility]  Anesthesia Pre-Operative Evaluation         Patient Name: Rigoberto Vasquez  YOB: 1950  MRN: [id number]    SUBJECTIVE:     Pre-operative evaluation for Procedure(s) (LRB):  EXCISION, DIVERTICULUM, BLADDER (Right)  CYSTECTOMY, PARTIAL (N/A)  LYMPHADENECTOMY, PELVIS (Right)     10/07/2024    Rigoberto Vasquez is a 74 y.o. 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    Patient now presents for the above procedure(s).      LDA:      Prev airway:   Date/Time: 8/30/2024 11:21 AM     Performed by: Carmen Rodriguez CRNA  Authorized by: Pallavi Melchor MD    Intubation:     Induction:  Intravenous    Intubated:  Postinduction    Mask Ventilation:  Easy mask    Attempts:  1    Attempted By:  CRNA    Method of Intubation:  Video laryngoscopy    Blade:  Ott 3    Laryngeal View Grade: Grade I - full view of cords      Difficult Airway Encountered?: No      Complications:  None    Airway Device:  Oral endotracheal tube    Airway Device Size:  7.5    Style/Cuff Inflation:  Cuffed (inflated to minimal occlusive pressure)    Tube secured:  21    Secured at:  The lips    Placement Verified By:  Capnometry    Complicating Factors:  None    Findings Post-Intubation:  BS equal bilateral and atraumatic/condition of teeth unchanged    Drips: None documented.    Echo (08/01/24):  1. Normal left ventricular cavity size. Mildly depressed left ventricular systolic   function. LVEF 45 - 50%.   2. Normal right ventricular size.   3. Mild mitral valve regurgitation.   4. Mild aortic valve regurgitation.   5. Mild tricuspid valve regurgitation.       Patient Active Problem List   Diagnosis    Osteoarthritis of knees, bilateral    Hypertension associated with diabetes    Type 2  "diabetes mellitus with stage 3a chronic kidney disease, with long-term current use of insulin    Hyperlipidemia associated with type 2 diabetes mellitus    Open angle with borderline findings, low risk    High myopia    Optic disc anomaly    CHANDRAKANT (iron deficiency anemia)    Pacemaker    Conductive hearing loss in left ear    Choroidal nevus of right eye    Epiretinal membrane (ERM) of left eye    Overweight (BMI 25.0-29.9)    Macular hole of right eye    Open angle with borderline findings and low glaucoma risk in both eyes    Ectopic gastric mucosa    Open angle with borderline findings and high glaucoma risk in both eyes    History of skin cancer    History of heart block;AV block, 3rd degree    BPH with obstruction/lower urinary tract symptoms    Erectile dysfunction    Lung granuloma    Dyspepsia    Calcification of aorta    Cholesteatoma of ear, left    СВЕТЛАНА on CPAP    Gross hematuria    Malignant neoplasm of lateral wall of urinary bladder       Review of patient's allergies indicates:   Allergen Reactions    Aspirin      Stomach pain even with ppi    Meperidine      Other reaction(s): Stomach upset    Niacin      Other reaction(s): hot skin       Current Outpatient Medications:    Current Facility-Administered Medications:     LIDOcaine HCl 2% urojet, , Mucous Membrane, 1 time in Clinic/HOD,     Current Outpatient Medications:     ACETAMINOPHEN (TYLENOL 8 HOUR ORAL), Take by mouth as needed., Disp: , Rfl:     azelastine (ASTELIN) 137 mcg (0.1 %) nasal spray, 2 sprays (274 mcg total) by Nasal route 2 (two) times daily., Disp: 30 mL, Rfl: 12    blood sugar diagnostic (ACCU-CHEK LUIS FERNANDO) Strp, Check BG 2-3 times daily. Accuchek luis fernando strips, Disp: 300 strip, Rfl: 3    dorzolamide-timolol 2-0.5% (COSOPT) 22.3-6.8 mg/mL ophthalmic solution, Place 1 drop into both eyes 2 (two) times daily., Disp: 10 mL, Rfl: 5    DROPLET PEN NEEDLE 31 gauge x 3/16" Ndle, USE AS DIRECTED  WITH  INSULIN, " Disp: 200 each, Rfl: 3    dutasteride (AVODART) 0.5 mg capsule, Take 1 capsule (0.5 mg total) by mouth once daily., Disp: 90 capsule, Rfl: 3    famotidine (PEPCID) 20 MG tablet, Take 1 tablet (20 mg total) by mouth 2 (two) times daily., Disp: 60 tablet, Rfl: 3    fenofibrate micronized (LOFIBRA) 200 MG Cap, Take 1 capsule (200 mg total) by mouth every evening., Disp: 90 capsule, Rfl: 0    fluticasone propionate (FLONASE) 50 mcg/actuation nasal spray, 1 spray by Each Nostril route once daily., Disp: , Rfl:     gabapentin (CMPD PAIN MANAGEMENT COMPOUND OINTMNENT THREE), Apply bid prn pain, Disp: 100 g, Rfl: 11    hydrocortisone 2.5 % cream, Apply topically 2 (two) times daily., Disp: 3.5 g, Rfl: 5    insulin (LANTUS SOLOSTAR U-100 INSULIN) glargine 100 units/mL SubQ pen, 16 units in the AM and 4 units in the PM (Patient taking differently: 15 units in the AM and 2 units in the PM), Disp: 3 mL, Rfl: 11    latanoprost 0.005 % ophthalmic solution, INSTILL 1 DROP INTO BOTH EYES ONE TIME DAILY, Disp: 7.5 mL, Rfl: 3    levocetirizine (XYZAL) 5 MG tablet, Take 5 mg by mouth every evening., Disp: , Rfl:     loratadine (CLARITIN) 10 mg tablet, Take 10 mg by mouth once daily., Disp: , Rfl:     losartan (COZAAR) 25 MG tablet, Take 1 tablet (25 mg total) by mouth once daily., Disp: 90 tablet, Rfl: 3    lovastatin (MEVACOR) 40 MG tablet, Take 1 tablet by mouth Every evening., Disp: 90 tablet, Rfl: 3    multivitamin capsule, Take 1 capsule by mouth once daily., Disp: , Rfl:     pantoprazole (PROTONIX) 40 MG tablet, Take 1 tablet (40 mg total) by mouth once daily., Disp: 90 tablet, Rfl: 1    polyethylene glycol (GLYCOLAX) 17 gram/dose powder, Take 17 g by mouth once daily., Disp: , Rfl:     semaglutide (OZEMPIC) 1 mg/dose (4 mg/3 mL), Inject 1 mg into the skin every 7 days., Disp: 9 mL, Rfl: 4    sildenafil (REVATIO) 20 mg Tab, Take 1 tablet (20 mg total) by mouth daily as needed (PRN). Takes prn, Disp: 30 tablet,  Rfl: 11    triamcinolone acetonide 0.1% (KENALOG) 0.1 % cream, AAA bid, Disp: 454 g, Rfl: 0    Past Surgical History:   Procedure Laterality Date    BIOPSY OF BLADDER N/A 8/30/2024    Procedure: BIOPSY, BLADDER;  Surgeon: Compa Hensley MD;  [facility];  Service: Urology;  Laterality: N/A;    BLADDER FULGURATION N/A 8/30/2024    Procedure: FULGURATION, BLADDER;  Surgeon: Compa Hensley MD;  [facility];  Service: Urology;  Laterality: N/A;    CARDIAC PACEMAKER PLACEMENT      COLONOSCOPY N/A 9/13/2019    Procedure: COLONOSCOPY;  Surgeon: Kan Ramsey III, MD;  Location: [facility];  Service: Endoscopy;  Laterality: N/A;    COLONOSCOPY N/A 9/22/2022    Procedure: COLONOSCOPY;  Surgeon: Chris Garcia MD;  Location: [facility];  Service: Endoscopy;  Laterality: N/A;    CYSTOSCOPY N/A 8/30/2024    Procedure: CYSTOSCOPY;  Surgeon: Compa Hensley MD;  [facility];  Service: Urology;  Laterality: N/A;  with blue light    CYSTOSCOPY W/ RETROGRADES Bilateral 7/16/2024    Procedure: CYSTOSCOPY, WITH RETROGRADE PYELOGRAM;  Surgeon: Vance Olivera MD;  Location: [facility];  Service: Urology;  Laterality: Bilateral;    ESOPHAGOGASTRODUODENOSCOPY N/A 9/13/2019    Procedure: ESOPHAGOGASTRODUODENOSCOPY (EGD);  Surgeon: Kan Ramsey III, MD;  Location: [facility];  Service: Endoscopy;  Laterality: N/A;    ESOPHAGOGASTRODUODENOSCOPY N/A 9/3/2021    Procedure: EGD (ESOPHAGOGASTRODUODENOSCOPY);  Surgeon: Kaylene Pineda MD;  Location: [facility];  Service: Endoscopy;  Laterality: N/A;    ESOPHAGOGASTRODUODENOSCOPY N/A 5/24/2023    Procedure: EGD (ESOPHAGOGASTRODUODENOSCOPY);  Surgeon: Cory Bennett MD;  Location: [facility];  Service: Endoscopy;  Laterality: N/A;    INSERTION OF TYMPANOSTOMY TUBE Right 3/15/2023    Procedure: INSERTION, TYMPANOSTOMY TUBE;  Surgeon: Jose L Gudino MD;  Location: [facility] OR [address] [place] [address];  Service: ENT;  Laterality: Right;    JOINT  "REPLACEMENT      KNEE CARTILAGE SURGERY Bilateral     NASAL SINUS SURGERY      SHOULDER ARTHROSCOPY Right     TONSILLECTOMY      TOTAL KNEE ARTHROPLASTY Left 05/15/2017    TRANSURETHRAL RESECTION OF PROSTATE      TURBT (TRANSURETHRAL RESECTION OF BLADDER TUMOR) N/A 7/16/2024    Procedure: TURBT (TRANSURETHRAL RESECTION OF BLADDER TUMOR);  Surgeon: Vance Olivera MD;  Location: [facility];  Service: Urology;  Laterality: N/A;    TYMPANOPLASTY      TYMPANOPLASTY WITH MASTOIDECTOMY Left 3/15/2023    Procedure: TYMPANOPLASTY, WITH MASTOIDECTOMY;  Surgeon: Jose L Gudino MD;  Location: [facility] OR [address] [place] [address];  Service: ENT;  Laterality: Left;    vesectomy         Social History     Socioeconomic History    Marital status:      Spouse name: SAUL    Number of children: 2   Occupational History    Occupation: retired- [employer] .   Tobacco Use    Smoking status: Never    Smokeless tobacco: Never   Substance and Sexual Activity    Alcohol use: Yes     Comment: " once a month"    Drug use: No    Sexual activity: Yes     Partners: Female   Social History Narrative     . Lives with spouse. Has 2 children. Patient retired from [employer]. His son has type 1 diabetes.     Social Drivers of Health     Financial Resource Strain: Low Risk  (8/7/2024)    Overall Financial Resource Strain (CARDIA)     Difficulty of Paying Living Expenses: Not hard at all   Food Insecurity: No Food Insecurity (8/7/2024)    Hunger Vital Sign     Worried About Running Out of Food in the Last Year: Never true     Ran Out of Food in the Last Year: Never true   Transportation Needs: No Transportation Needs (11/11/2021)    PRAPARE - Transportation     Lack of Transportation (Medical): No     Lack of Transportation (Non-Medical): No   Physical Activity: Sufficiently Active (8/7/2024)    Exercise Vital Sign     Days of Exercise per Week: 3 days     Minutes of Exercise per Session: 90 min   Stress: No " Stress Concern Present (8/7/2024)    [employer] [place] [employer] - Occupational Stress Questionnaire     Feeling of Stress : Only a little   Housing Stability: Low Risk  (11/11/2021)    Housing Stability Vital Sign     Unable to Pay for Housing in the Last Year: No     Number of Places Lived in the Last Year: 1     Unstable Housing in the Last Year: No       OBJECTIVE:     Vital Signs Range (Last 24H):         Significant Labs:  Lab Results   Component Value Date    WBC 5.14 07/11/2024    HGB 13.2 (L) 07/11/2024    HCT 42.4 07/11/2024     07/11/2024    CHOL 112 (L) 08/12/2024    TRIG 58 08/12/2024    HDL 42 08/12/2024    ALT 12 07/11/2024    AST 30 07/11/2024     07/11/2024    K 3.9 07/11/2024     07/11/2024    CREATININE 1.4 09/27/2024    BUN 22 07/11/2024    CO2 25 07/11/2024    TSH 1.837 12/27/2021    PSA 1.4 06/25/2020    INR 1.1 07/02/2018    HGBA1C 5.8 (H) 08/12/2024       Diagnostic Studies: No relevant studies.    EKG:   Results for orders placed or performed during the hospital encounter of 07/11/24   EKG 12-lead    Collection Time: 07/11/24 10:51 AM   Result Value Ref Range    QRS Duration 154 ms    OHS QTC Calculation 540 ms    Narrative    Test Reason : Z01.818,    Vent. Rate : 089 BPM     Atrial Rate : 066 BPM     P-R Int : 182 ms          QRS Dur : 154 ms      QT Int : 444 ms       P-R-T Axes : 068 -62 092 degrees     QTc Int : 540 ms    Atrial-sensed ventricular-paced rhythm  Abnormal ECG  When compared with ECG of 06-MAR-2023 13:31,  Vent. rate has increased BY  18 BPM  Confirmed by Simon Gonzalez MD ([id number]) on 7/11/2024 8:55:46 PM    Referred By: ROHINI HASTINGS           Confirmed By:Simon Gonzalez MD       2D ECHO:  TTE:  No results found. However, due to the size of the patient record, not all encounters were searched. Please check Results Review for a complete set of results.    LALYSON:  No results found. However, due to the size of the patient record,  not all encounters were searched. Please check Results Review for a complete set of results.    ASSESSMENT/PLAN:           Pre-op Assessment    I have reviewed the Patient Summary Reports.     I have reviewed the Nursing Notes. I have reviewed the NPO Status.   I have reviewed the Medications.     Review of Systems  Anesthesia Hx:   History of prior surgery of interest to airway management or planning:          Denies Family Hx of Anesthesia complications.    Denies Personal Hx of Anesthesia complications.                    Hematology/Oncology:       -- Anemia:                  Current/Recent Cancer.  --  Cancer in past history:                     Cardiovascular:    Pacemaker Hypertension    Dysrhythmias (3rd Degree AV Block)  Angina     hyperlipidemia                             Pulmonary:        Sleep Apnea, CPAP                Renal/:  Chronic Renal Disease, CKD                Hepatic/GI:     GERD             Endocrine:  Diabetes, type 2                  Anesthesia Plan  Type of Anesthesia, risks & benefits discussed:    Anesthesia Type: Gen ETT  Intra-op Monitoring Plan: Standard ASA Monitors  Post Op Pain Control Plan: multimodal analgesia and IV/PO Opioids PRN  Induction:  IV  Airway Plan: Direct and Video, Post-Induction  Informed Consent: Informed consent signed with the Patient and all parties understand the risks and agree with anesthesia plan.  All questions answered.   ASA Score: 3  Day of Surgery Review of History & Physical: H&P Update referred to the surgeon/provider.    Ready For Surgery From Anesthesia Perspective.     .

## 2024-10-08 ENCOUNTER — DOCUMENTATION ONLY (OUTPATIENT)
Dept: ELECTROPHYSIOLOGY | Facility: CLINIC | Age: 74
End: 2024-10-08
Payer: MEDICARE

## 2024-10-08 ENCOUNTER — ANESTHESIA (OUTPATIENT)
Dept: SURGERY | Facility: HOSPITAL | Age: 74
End: 2024-10-08
Payer: MEDICARE

## 2024-10-08 ENCOUNTER — HOSPITAL ENCOUNTER (INPATIENT)
Facility: HOSPITAL | Age: 74
LOS: 2 days | Discharge: HOME HEALTH | DRG: 655 | End: 2024-10-10
Attending: UROLOGY | Admitting: UROLOGY
Payer: MEDICARE

## 2024-10-08 DIAGNOSIS — Z01.818 PREOP TESTING: ICD-10-CM

## 2024-10-08 DIAGNOSIS — C67.2 MALIGNANT NEOPLASM OF LATERAL WALL OF URINARY BLADDER: ICD-10-CM

## 2024-10-08 DIAGNOSIS — Z85.51 HISTORY OF BLADDER CANCER: Primary | ICD-10-CM

## 2024-10-08 LAB
ABO + RH BLD: NORMAL
BLD GP AB SCN CELLS X3 SERPL QL: NORMAL
GLUCOSE SERPL-MCNC: 149 MG/DL (ref 70–110)
POCT GLUCOSE: 149 MG/DL (ref 70–110)
POCT GLUCOSE: 157 MG/DL (ref 70–110)
POCT GLUCOSE: 169 MG/DL (ref 70–110)
POCT GLUCOSE: 194 MG/DL (ref 70–110)
SPECIMEN OUTDATE: NORMAL

## 2024-10-08 PROCEDURE — 94761 N-INVAS EAR/PLS OXIMETRY MLT: CPT | Mod: HCNC

## 2024-10-08 PROCEDURE — 82962 GLUCOSE BLOOD TEST: CPT | Mod: HCNC | Performed by: UROLOGY

## 2024-10-08 PROCEDURE — 25000003 PHARM REV CODE 250: Mod: HCNC | Performed by: STUDENT IN AN ORGANIZED HEALTH CARE EDUCATION/TRAINING PROGRAM

## 2024-10-08 PROCEDURE — 3E0K705 INTRODUCTION OF OTHER ANTINEOPLASTIC INTO GENITOURINARY TRACT, VIA NATURAL OR ARTIFICIAL OPENING: ICD-10-PCS | Performed by: UROLOGY

## 2024-10-08 PROCEDURE — 88305 TISSUE EXAM BY PATHOLOGIST: CPT | Mod: 59 | Performed by: PATHOLOGY

## 2024-10-08 PROCEDURE — 63600175 PHARM REV CODE 636 W HCPCS: Mod: HCNC | Performed by: STUDENT IN AN ORGANIZED HEALTH CARE EDUCATION/TRAINING PROGRAM

## 2024-10-08 PROCEDURE — 36000708 HC OR TIME LEV III 1ST 15 MIN: Mod: HCNC | Performed by: UROLOGY

## 2024-10-08 PROCEDURE — 07BC0ZZ EXCISION OF PELVIS LYMPHATIC, OPEN APPROACH: ICD-10-PCS | Performed by: UROLOGY

## 2024-10-08 PROCEDURE — 86900 BLOOD TYPING SEROLOGIC ABO: CPT | Mod: HCNC | Performed by: STUDENT IN AN ORGANIZED HEALTH CARE EDUCATION/TRAINING PROGRAM

## 2024-10-08 PROCEDURE — 63600175 PHARM REV CODE 636 W HCPCS: Mod: HCNC | Performed by: ANESTHESIOLOGY

## 2024-10-08 PROCEDURE — 71000016 HC POSTOP RECOV ADDL HR: Mod: HCNC | Performed by: UROLOGY

## 2024-10-08 PROCEDURE — 71000015 HC POSTOP RECOV 1ST HR: Mod: HCNC | Performed by: UROLOGY

## 2024-10-08 PROCEDURE — 64450 NJX AA&/STRD OTHER PN/BRANCH: CPT | Mod: HCNC

## 2024-10-08 PROCEDURE — 99900035 HC TECH TIME PER 15 MIN (STAT): Mod: HCNC

## 2024-10-08 PROCEDURE — 36000709 HC OR TIME LEV III EA ADD 15 MIN: Mod: HCNC | Performed by: UROLOGY

## 2024-10-08 PROCEDURE — 37000009 HC ANESTHESIA EA ADD 15 MINS: Mod: HCNC | Performed by: UROLOGY

## 2024-10-08 PROCEDURE — C1758 CATHETER, URETERAL: HCPCS | Mod: HCNC | Performed by: UROLOGY

## 2024-10-08 PROCEDURE — 27201423 OPTIME MED/SURG SUP & DEVICES STERILE SUPPLY: Mod: HCNC | Performed by: UROLOGY

## 2024-10-08 PROCEDURE — 37000008 HC ANESTHESIA 1ST 15 MINUTES: Mod: HCNC | Performed by: UROLOGY

## 2024-10-08 PROCEDURE — 86901 BLOOD TYPING SEROLOGIC RH(D): CPT | Mod: HCNC | Performed by: STUDENT IN AN ORGANIZED HEALTH CARE EDUCATION/TRAINING PROGRAM

## 2024-10-08 PROCEDURE — 63600175 PHARM REV CODE 636 W HCPCS: Mod: JZ,JG,HCNC | Performed by: STUDENT IN AN ORGANIZED HEALTH CARE EDUCATION/TRAINING PROGRAM

## 2024-10-08 PROCEDURE — C1729 CATH, DRAINAGE: HCPCS | Mod: HCNC | Performed by: UROLOGY

## 2024-10-08 PROCEDURE — 88309 TISSUE EXAM BY PATHOLOGIST: CPT | Performed by: PATHOLOGY

## 2024-10-08 PROCEDURE — 27000221 HC OXYGEN, UP TO 24 HOURS: Mod: HCNC

## 2024-10-08 PROCEDURE — 63600175 PHARM REV CODE 636 W HCPCS: Mod: HCNC

## 2024-10-08 PROCEDURE — 71000033 HC RECOVERY, INTIAL HOUR: Mod: HCNC | Performed by: UROLOGY

## 2024-10-08 PROCEDURE — 11000001 HC ACUTE MED/SURG PRIVATE ROOM: Mod: HCNC

## 2024-10-08 PROCEDURE — 86850 RBC ANTIBODY SCREEN: CPT | Mod: HCNC | Performed by: STUDENT IN AN ORGANIZED HEALTH CARE EDUCATION/TRAINING PROGRAM

## 2024-10-08 PROCEDURE — 0TBB0ZZ EXCISION OF BLADDER, OPEN APPROACH: ICD-10-PCS | Performed by: UROLOGY

## 2024-10-08 RX ORDER — FENTANYL CITRATE 50 UG/ML
25-200 INJECTION, SOLUTION INTRAMUSCULAR; INTRAVENOUS
Status: DISCONTINUED | OUTPATIENT
Start: 2024-10-08 | End: 2024-10-08 | Stop reason: HOSPADM

## 2024-10-08 RX ORDER — CEFAZOLIN SODIUM 1 G/3ML
INJECTION, POWDER, FOR SOLUTION INTRAMUSCULAR; INTRAVENOUS
Status: DISCONTINUED | OUTPATIENT
Start: 2024-10-08 | End: 2024-10-08

## 2024-10-08 RX ORDER — IBUPROFEN 200 MG
24 TABLET ORAL
Status: DISCONTINUED | OUTPATIENT
Start: 2024-10-08 | End: 2024-10-10 | Stop reason: HOSPADM

## 2024-10-08 RX ORDER — DIPHENHYDRAMINE HCL 25 MG
25 CAPSULE ORAL EVERY 12 HOURS PRN
Status: DISCONTINUED | OUTPATIENT
Start: 2024-10-08 | End: 2024-10-10 | Stop reason: HOSPADM

## 2024-10-08 RX ORDER — INSULIN ASPART 100 [IU]/ML
0-10 INJECTION, SOLUTION INTRAVENOUS; SUBCUTANEOUS
Status: DISCONTINUED | OUTPATIENT
Start: 2024-10-08 | End: 2024-10-10 | Stop reason: HOSPADM

## 2024-10-08 RX ORDER — BUPIVACAINE HYDROCHLORIDE 7.5 MG/ML
INJECTION, SOLUTION EPIDURAL; RETROBULBAR
Status: COMPLETED | OUTPATIENT
Start: 2024-10-08 | End: 2024-10-08

## 2024-10-08 RX ORDER — MIDAZOLAM HYDROCHLORIDE 1 MG/ML
.5-4 INJECTION, SOLUTION INTRAMUSCULAR; INTRAVENOUS
Status: DISCONTINUED | OUTPATIENT
Start: 2024-10-08 | End: 2024-10-08 | Stop reason: HOSPADM

## 2024-10-08 RX ORDER — HYDROMORPHONE HYDROCHLORIDE 1 MG/ML
INJECTION, SOLUTION INTRAMUSCULAR; INTRAVENOUS; SUBCUTANEOUS
Status: COMPLETED
Start: 2024-10-08 | End: 2024-10-08

## 2024-10-08 RX ORDER — LIDOCAINE HYDROCHLORIDE 20 MG/ML
INJECTION, SOLUTION EPIDURAL; INFILTRATION; INTRACAUDAL; PERINEURAL
Status: DISCONTINUED | OUTPATIENT
Start: 2024-10-08 | End: 2024-10-08

## 2024-10-08 RX ORDER — OXYCODONE HYDROCHLORIDE 5 MG/1
5 TABLET ORAL EVERY 4 HOURS PRN
Status: DISCONTINUED | OUTPATIENT
Start: 2024-10-08 | End: 2024-10-10 | Stop reason: HOSPADM

## 2024-10-08 RX ORDER — AZELASTINE 1 MG/ML
2 SPRAY, METERED NASAL 2 TIMES DAILY
Status: DISCONTINUED | OUTPATIENT
Start: 2024-10-08 | End: 2024-10-10 | Stop reason: HOSPADM

## 2024-10-08 RX ORDER — LOVASTATIN 40 MG/1
TABLET ORAL
Qty: 90 TABLET | Refills: 3 | Status: SHIPPED | OUTPATIENT
Start: 2024-10-08

## 2024-10-08 RX ORDER — LOSARTAN POTASSIUM 25 MG/1
25 TABLET ORAL DAILY
Status: DISCONTINUED | OUTPATIENT
Start: 2024-10-08 | End: 2024-10-10 | Stop reason: HOSPADM

## 2024-10-08 RX ORDER — GLUCAGON 1 MG
1 KIT INJECTION
Status: DISCONTINUED | OUTPATIENT
Start: 2024-10-08 | End: 2024-10-10 | Stop reason: HOSPADM

## 2024-10-08 RX ORDER — CETIRIZINE HYDROCHLORIDE 5 MG/1
5 TABLET ORAL DAILY
Status: DISCONTINUED | OUTPATIENT
Start: 2024-10-09 | End: 2024-10-10 | Stop reason: HOSPADM

## 2024-10-08 RX ORDER — PHENYLEPHRINE HYDROCHLORIDE 10 MG/ML
INJECTION INTRAVENOUS
Status: DISCONTINUED | OUTPATIENT
Start: 2024-10-08 | End: 2024-10-08

## 2024-10-08 RX ORDER — SODIUM CHLORIDE 9 MG/ML
INJECTION, SOLUTION INTRAVENOUS CONTINUOUS
Status: DISCONTINUED | OUTPATIENT
Start: 2024-10-08 | End: 2024-10-09

## 2024-10-08 RX ORDER — KETOROLAC TROMETHAMINE 30 MG/ML
15 INJECTION, SOLUTION INTRAMUSCULAR; INTRAVENOUS EVERY 6 HOURS
Status: DISCONTINUED | OUTPATIENT
Start: 2024-10-08 | End: 2024-10-09

## 2024-10-08 RX ORDER — PREGABALIN 75 MG/1
150 CAPSULE ORAL NIGHTLY
Status: DISCONTINUED | OUTPATIENT
Start: 2024-10-08 | End: 2024-10-10 | Stop reason: HOSPADM

## 2024-10-08 RX ORDER — HEPARIN SODIUM 5000 [USP'U]/ML
5000 INJECTION, SOLUTION INTRAVENOUS; SUBCUTANEOUS
Status: COMPLETED | OUTPATIENT
Start: 2024-10-08 | End: 2024-10-08

## 2024-10-08 RX ORDER — METHOCARBAMOL 500 MG/1
1000 TABLET, FILM COATED ORAL EVERY 6 HOURS PRN
Status: DISCONTINUED | OUTPATIENT
Start: 2024-10-08 | End: 2024-10-10 | Stop reason: HOSPADM

## 2024-10-08 RX ORDER — KETAMINE HCL IN 0.9 % NACL 50 MG/5 ML
SYRINGE (ML) INTRAVENOUS
Status: DISCONTINUED | OUTPATIENT
Start: 2024-10-08 | End: 2024-10-08

## 2024-10-08 RX ORDER — ACETAMINOPHEN 500 MG
1000 TABLET ORAL EVERY 6 HOURS
Status: DISCONTINUED | OUTPATIENT
Start: 2024-10-08 | End: 2024-10-10 | Stop reason: HOSPADM

## 2024-10-08 RX ORDER — OXYCODONE HYDROCHLORIDE 10 MG/1
10 TABLET ORAL EVERY 4 HOURS PRN
Status: DISCONTINUED | OUTPATIENT
Start: 2024-10-08 | End: 2024-10-10 | Stop reason: HOSPADM

## 2024-10-08 RX ORDER — DUTASTERIDE 0.5 MG/1
0.5 CAPSULE, LIQUID FILLED ORAL DAILY
Status: DISCONTINUED | OUTPATIENT
Start: 2024-10-09 | End: 2024-10-10 | Stop reason: HOSPADM

## 2024-10-08 RX ORDER — HEPARIN SODIUM 5000 [USP'U]/ML
5000 INJECTION, SOLUTION INTRAVENOUS; SUBCUTANEOUS EVERY 8 HOURS
Status: DISCONTINUED | OUTPATIENT
Start: 2024-10-08 | End: 2024-10-10 | Stop reason: HOSPADM

## 2024-10-08 RX ORDER — ONDANSETRON HYDROCHLORIDE 2 MG/ML
INJECTION, SOLUTION INTRAVENOUS
Status: DISCONTINUED | OUTPATIENT
Start: 2024-10-08 | End: 2024-10-08

## 2024-10-08 RX ORDER — DORZOLAMIDE HYDROCHLORIDE AND TIMOLOL MALEATE 20; 5 MG/ML; MG/ML
1 SOLUTION/ DROPS OPHTHALMIC 2 TIMES DAILY
Status: DISCONTINUED | OUTPATIENT
Start: 2024-10-08 | End: 2024-10-10 | Stop reason: HOSPADM

## 2024-10-08 RX ORDER — IBUPROFEN 200 MG
16 TABLET ORAL
Status: DISCONTINUED | OUTPATIENT
Start: 2024-10-08 | End: 2024-10-10 | Stop reason: HOSPADM

## 2024-10-08 RX ORDER — ACETAMINOPHEN 500 MG
1000 TABLET ORAL
Status: DISCONTINUED | OUTPATIENT
Start: 2024-10-08 | End: 2024-10-08 | Stop reason: HOSPADM

## 2024-10-08 RX ORDER — HYDROMORPHONE HYDROCHLORIDE 1 MG/ML
0.2 INJECTION, SOLUTION INTRAMUSCULAR; INTRAVENOUS; SUBCUTANEOUS EVERY 5 MIN PRN
Status: DISCONTINUED | OUTPATIENT
Start: 2024-10-08 | End: 2024-10-08 | Stop reason: HOSPADM

## 2024-10-08 RX ORDER — PREGABALIN 75 MG/1
75 CAPSULE ORAL
Status: COMPLETED | OUTPATIENT
Start: 2024-10-08 | End: 2024-10-08

## 2024-10-08 RX ORDER — FAMOTIDINE 20 MG/1
20 TABLET, FILM COATED ORAL 2 TIMES DAILY
Status: DISCONTINUED | OUTPATIENT
Start: 2024-10-08 | End: 2024-10-09

## 2024-10-08 RX ORDER — PROPOFOL 10 MG/ML
VIAL (ML) INTRAVENOUS
Status: DISCONTINUED | OUTPATIENT
Start: 2024-10-08 | End: 2024-10-08

## 2024-10-08 RX ORDER — ROCURONIUM BROMIDE 10 MG/ML
INJECTION, SOLUTION INTRAVENOUS
Status: DISCONTINUED | OUTPATIENT
Start: 2024-10-08 | End: 2024-10-08

## 2024-10-08 RX ORDER — TALC
6 POWDER (GRAM) TOPICAL NIGHTLY PRN
Status: DISCONTINUED | OUTPATIENT
Start: 2024-10-08 | End: 2024-10-10 | Stop reason: HOSPADM

## 2024-10-08 RX ORDER — FENOFIBRATE 200 MG/1
200 CAPSULE ORAL NIGHTLY
Status: DISCONTINUED | OUTPATIENT
Start: 2024-10-08 | End: 2024-10-10 | Stop reason: HOSPADM

## 2024-10-08 RX ORDER — ONDANSETRON HYDROCHLORIDE 2 MG/ML
4 INJECTION, SOLUTION INTRAVENOUS EVERY 6 HOURS PRN
Status: DISCONTINUED | OUTPATIENT
Start: 2024-10-08 | End: 2024-10-10 | Stop reason: HOSPADM

## 2024-10-08 RX ORDER — ATORVASTATIN CALCIUM 10 MG/1
10 TABLET, FILM COATED ORAL DAILY
Status: DISCONTINUED | OUTPATIENT
Start: 2024-10-08 | End: 2024-10-10 | Stop reason: HOSPADM

## 2024-10-08 RX ORDER — DEXAMETHASONE SODIUM PHOSPHATE 4 MG/ML
INJECTION, SOLUTION INTRA-ARTICULAR; INTRALESIONAL; INTRAMUSCULAR; INTRAVENOUS; SOFT TISSUE
Status: DISCONTINUED | OUTPATIENT
Start: 2024-10-08 | End: 2024-10-08

## 2024-10-08 RX ORDER — HYDROMORPHONE HYDROCHLORIDE 1 MG/ML
INJECTION, SOLUTION INTRAMUSCULAR; INTRAVENOUS; SUBCUTANEOUS
Status: DISCONTINUED | OUTPATIENT
Start: 2024-10-08 | End: 2024-10-08

## 2024-10-08 RX ORDER — POLYETHYLENE GLYCOL 3350 17 G/17G
17 POWDER, FOR SOLUTION ORAL DAILY
Status: DISCONTINUED | OUTPATIENT
Start: 2024-10-08 | End: 2024-10-10 | Stop reason: HOSPADM

## 2024-10-08 RX ADMIN — HEPARIN SODIUM 5000 UNITS: 5000 INJECTION INTRAVENOUS; SUBCUTANEOUS at 10:10

## 2024-10-08 RX ADMIN — HYDROMORPHONE HYDROCHLORIDE 0.2 MG: 1 INJECTION, SOLUTION INTRAMUSCULAR; INTRAVENOUS; SUBCUTANEOUS at 04:10

## 2024-10-08 RX ADMIN — DEXAMETHASONE SODIUM PHOSPHATE 4 MG: 4 INJECTION INTRA-ARTICULAR; INTRALESIONAL; INTRAMUSCULAR; INTRAVENOUS; SOFT TISSUE at 08:10

## 2024-10-08 RX ADMIN — ROCURONIUM BROMIDE 20 MG: 10 INJECTION INTRAVENOUS at 11:10

## 2024-10-08 RX ADMIN — PHENYLEPHRINE HYDROCHLORIDE 100 MCG: 10 INJECTION INTRAVENOUS at 08:10

## 2024-10-08 RX ADMIN — ATORVASTATIN CALCIUM 10 MG: 10 TABLET, FILM COATED ORAL at 03:10

## 2024-10-08 RX ADMIN — ONDANSETRON 4 MG: 2 INJECTION INTRAMUSCULAR; INTRAVENOUS at 06:10

## 2024-10-08 RX ADMIN — SODIUM CHLORIDE: 9 INJECTION, SOLUTION INTRAVENOUS at 06:10

## 2024-10-08 RX ADMIN — HYDROMORPHONE HYDROCHLORIDE 0.2 MG: 1 INJECTION, SOLUTION INTRAMUSCULAR; INTRAVENOUS; SUBCUTANEOUS at 02:10

## 2024-10-08 RX ADMIN — LIDOCAINE HYDROCHLORIDE 80 MG: 20 INJECTION, SOLUTION EPIDURAL; INFILTRATION; INTRACAUDAL at 08:10

## 2024-10-08 RX ADMIN — SUGAMMADEX 200 MG: 100 INJECTION, SOLUTION INTRAVENOUS at 12:10

## 2024-10-08 RX ADMIN — KETOROLAC TROMETHAMINE 15 MG: 30 INJECTION, SOLUTION INTRAMUSCULAR at 11:10

## 2024-10-08 RX ADMIN — ACETAMINOPHEN 1000 MG: 500 TABLET ORAL at 11:10

## 2024-10-08 RX ADMIN — BUPIVACAINE HYDROCHLORIDE 30 ML: 7.5 INJECTION, SOLUTION EPIDURAL; RETROBULBAR at 07:10

## 2024-10-08 RX ADMIN — SODIUM CHLORIDE: 0.9 INJECTION, SOLUTION INTRAVENOUS at 07:10

## 2024-10-08 RX ADMIN — INSULIN ASPART 1 UNITS: 100 INJECTION, SOLUTION INTRAVENOUS; SUBCUTANEOUS at 10:10

## 2024-10-08 RX ADMIN — FAMOTIDINE 20 MG: 20 TABLET ORAL at 10:10

## 2024-10-08 RX ADMIN — DORZOLAMIDE HYDROCHLORIDE AND TIMOLOL MALEATE 1 DROP: 20; 5 SOLUTION/ DROPS OPHTHALMIC at 10:10

## 2024-10-08 RX ADMIN — Medication 20 MG: at 08:10

## 2024-10-08 RX ADMIN — HYDROMORPHONE HYDROCHLORIDE 0.2 MG: 1 INJECTION, SOLUTION INTRAMUSCULAR; INTRAVENOUS; SUBCUTANEOUS at 11:10

## 2024-10-08 RX ADMIN — PROPOFOL 20 MG: 10 INJECTION, EMULSION INTRAVENOUS at 12:10

## 2024-10-08 RX ADMIN — ACETAMINOPHEN 1000 MG: 500 TABLET ORAL at 03:10

## 2024-10-08 RX ADMIN — ROCURONIUM BROMIDE 50 MG: 10 INJECTION INTRAVENOUS at 08:10

## 2024-10-08 RX ADMIN — SODIUM CHLORIDE, SODIUM GLUCONATE, SODIUM ACETATE, POTASSIUM CHLORIDE, MAGNESIUM CHLORIDE, SODIUM PHOSPHATE, DIBASIC, AND POTASSIUM PHOSPHATE: .53; .5; .37; .037; .03; .012; .00082 INJECTION, SOLUTION INTRAVENOUS at 08:10

## 2024-10-08 RX ADMIN — ONDANSETRON 4 MG: 2 INJECTION INTRAMUSCULAR; INTRAVENOUS at 12:10

## 2024-10-08 RX ADMIN — PREGABALIN 150 MG: 75 CAPSULE ORAL at 10:10

## 2024-10-08 RX ADMIN — HYDROMORPHONE HYDROCHLORIDE 0.2 MG: 1 INJECTION, SOLUTION INTRAMUSCULAR; INTRAVENOUS; SUBCUTANEOUS at 03:10

## 2024-10-08 RX ADMIN — PROPOFOL 40 MG: 10 INJECTION, EMULSION INTRAVENOUS at 12:10

## 2024-10-08 RX ADMIN — KETOROLAC TROMETHAMINE 15 MG: 30 INJECTION, SOLUTION INTRAMUSCULAR at 03:10

## 2024-10-08 RX ADMIN — MIDAZOLAM 1 MG: 1 INJECTION INTRAMUSCULAR; INTRAVENOUS at 07:10

## 2024-10-08 RX ADMIN — PROPOFOL 200 MG: 10 INJECTION, EMULSION INTRAVENOUS at 08:10

## 2024-10-08 RX ADMIN — PROPOFOL 30 MG: 10 INJECTION, EMULSION INTRAVENOUS at 12:10

## 2024-10-08 RX ADMIN — Medication 10 MG: at 11:10

## 2024-10-08 RX ADMIN — LOSARTAN POTASSIUM 25 MG: 25 TABLET, FILM COATED ORAL at 03:10

## 2024-10-08 RX ADMIN — CEFAZOLIN 2 G: 330 INJECTION, POWDER, FOR SOLUTION INTRAMUSCULAR; INTRAVENOUS at 08:10

## 2024-10-08 RX ADMIN — FENOFIBRATE 200 MG: 200 CAPSULE ORAL at 10:10

## 2024-10-08 RX ADMIN — HEPARIN SODIUM 5000 UNITS: 5000 INJECTION INTRAVENOUS; SUBCUTANEOUS at 03:10

## 2024-10-08 RX ADMIN — HYDROMORPHONE HYDROCHLORIDE 0.4 MG: 1 INJECTION, SOLUTION INTRAMUSCULAR; INTRAVENOUS; SUBCUTANEOUS at 10:10

## 2024-10-08 RX ADMIN — ROCURONIUM BROMIDE 20 MG: 10 INJECTION INTRAVENOUS at 10:10

## 2024-10-08 RX ADMIN — ROCURONIUM BROMIDE 20 MG: 10 INJECTION INTRAVENOUS at 09:10

## 2024-10-08 RX ADMIN — Medication 10 MG: at 10:10

## 2024-10-08 RX ADMIN — SODIUM CHLORIDE: 9 INJECTION, SOLUTION INTRAVENOUS at 01:10

## 2024-10-08 RX ADMIN — OXYCODONE HYDROCHLORIDE 10 MG: 10 TABLET ORAL at 03:10

## 2024-10-08 RX ADMIN — HEPARIN SODIUM 5000 UNITS: 5000 INJECTION INTRAVENOUS; SUBCUTANEOUS at 06:10

## 2024-10-08 RX ADMIN — PREGABALIN 75 MG: 75 CAPSULE ORAL at 06:10

## 2024-10-08 RX ADMIN — AZELASTINE 274 MCG: 1 SPRAY, METERED NASAL at 10:10

## 2024-10-08 RX ADMIN — FENTANYL CITRATE 50 MCG: 50 INJECTION INTRAMUSCULAR; INTRAVENOUS at 07:10

## 2024-10-08 NOTE — PLAN OF CARE
Arrhythmia clinic informed of pt's pacemaker, states nothing to be done with procedures below the waist.

## 2024-10-08 NOTE — ANESTHESIA PROCEDURE NOTES
Intubation    Date/Time: 10/8/2024 8:14 AM    Performed by: Melissa Dunlap MD  Authorized by: Brandon Patel MD    Intubation:     Induction:  Intravenous    Intubated:  Postinduction    Mask Ventilation:  Easy mask    Attempts:  1    Attempted By:  Resident anesthesiologist    Method of Intubation:  Video laryngoscopy    Blade:  Ott 4    Laryngeal View Grade: Grade I - full view of cords      Difficult Airway Encountered?: No      Complications:  None    Airway Device:  Oral endotracheal tube    Airway Device Size:  7.5    Style/Cuff Inflation:  Cuffed (inflated to minimal occlusive pressure)    Tube secured:  22    Secured at:  The lips    Placement Verified By:  Capnometry    Complicating Factors:  None    Findings Post-Intubation:  Atraumatic/condition of teeth unchanged and BS equal bilateral

## 2024-10-08 NOTE — BRIEF OP NOTE
[facility] - Surgery ([facility])  Brief Operative Note    SUMMARY     Surgery Date: 10/8/2024     Surgeons and Role:     * Compa Hensley MD - Primary     * Sarahy Vasquez MD - Resident - Assisting     * Alen Serrano DO - Resident - Assisting        Pre-op Diagnosis:  Bladder tumor [D49.4]    Post-op Diagnosis:  Post-Op Diagnosis Codes:     * Bladder tumor [D49.4]    Procedure(s) (LRB):  EXCISION, DIVERTICULUM, BLADDER (Right)  CYSTECTOMY, PARTIAL (N/A)  LYMPHADENECTOMY, PELVIS (Right)  CYSTOSCOPY, FLEXIBLE (N/A)  INSTILLATION, BLADDER (N/A)  CYSTOSCOPY,WITH URETERAL CATHETER INSERTION (Bilateral)    Anesthesia: General    Implants:  * No implants in log *    Operative Findings:   - Surgery performed without immediate intra-operative complication.    Estimated Blood Loss: * No values recorded between 10/8/2024  8:35 AM and 10/8/2024 12:18 PM *    Estimated Blood Loss has not been documented. EBL = 20 mL.         Specimens:   Specimen (24h ago, onward)       Start     Ordered    10/08/24 1155  Specimen to Pathology, Surgery Urology  Once        Comments: Pre-op Diagnosis: Bladder tumor [D49.4]Procedure(s):EXCISION, DIVERTICULUM, BLADDERCYSTECTOMY, PARTIALLYMPHADENECTOMY, PELVISCYSTOSCOPY, FLEXIBLEINSTILLATION, BLADDER Number of specimens: 5Name of specimens: 1-Perivesical fat-perm2-Right Obturator Lymph Nodes-perm3-Right External Iliac Lymph Node-perm4-Bladder Diverticulum-perm5-Bladder mucosa-stitch marks mucosa-perm     References:    Click here for ordering Quick Tip   Question Answer Comment   Procedure Type: Urology    Specimen Class: Known or suspected malignancy    Release to patient Immediate        10/08/24 1746                    [id number]

## 2024-10-08 NOTE — OP NOTE
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  Right pelvic lymph node dissection  2.  Open Partial cystectomy with bladder diverticulum excision  3.  Cystoscopy with right ureteral catheterization  4.  Intravesical instillation of chemotherapeutic agent    Specimen(s):   1.  Perivesical fat  2.  Right obturator lymph nodes  3.  Right external iliac lymph nodes  4.  Bladder diverticulum  5.  Bladder mucosa margin (stitch marks mucosa)    Staff Surgeon: Compa Hensley MD     Assistant Surgeon: Sarahy Vasquez MD; Leobardo Serrano DO    Anesthesia: General endotracheal anesthesia     Indications: Rigoberto Vasquez is a 74 y.o. male with HG [id number] urothelial cell carcinoma of the bladder.      Findings:   - Surgery performed without immediate intra-operative complication.   - Remained extraperitoneal.  - No evidence of tumor spillage.  - Bladder closed in 2 layers.  - Leak tested to 150 cc. No evidence of leak.      Estimated Blood Loss: 20 mL    Drains:   1.  15 mm Desmond drain to RLQ  2.  16 Fr Florence catheter     Procedure in Detail:  After risks and benefits of the procedure were discussed with the patient, informed consent was obtained.  The patient received heparin and as well as completed his antibiotic bowel prep and Hibiclens shower.  He was brought to the operating suite and placed in the supine position.  General anesthesia was administered. The patient was then prepped and draped int he usual sterile fashion.      A flexible cystoscope was inserted into the urethra and formal cystourethroscopy was performed. The urethra was normal.  The right and left ureteral orifices were in the normal anatomic position. The diverticulum was identified on the right posterolateral wall, away from the right UO. A 5 Fr ureteral catheter was then inserted into the right  ureteral orifice and advanced up to the level of the renal pelvis. The cystoscope was then removed leaving the ureteral catheter in place.     A 16 Fr turner catheter was inserted and the balloon was filled with 10mL of sterile water. The ureteral catheter was secured in the standard fashion.     2 g of Gemcitabine was instilled into the bladder and had a dwell time of 1 hour prior to drainage.    A midline incision was marked from umbilicus to pubis.  This was incised sharply using a 10 blade.  Bovie electrocautery was used to dissect down through the subcutaneous tissues until the anterior rectus sheath was cleared.  The fascia was opened int he lower midline using the Bovie.  The rectus muscle was split in the midline and the retropubic space was entered.  The fascial incision was extended along the length of our skin incision.      The bladder was mobilized from the anterior abdominal wall bluntly.  Bilateral pelvic and retroperitoneal tunnels were created bluntly to further mobilize the bladder.  On each side the vas deferens was isolated, the right vas was taken with the Ligasure. The bookwalter was assembled and retractors were placed.     The right ureter was then isolated with a vessel loop.  It was mobilized proximally and distally down to the level of the bladder. The obliterated umbilical artery was identified and divided.  The lateral bladder space was mobilized bluntly using the Ligasure for large vessels as they were encountered. The perivesical fat was cleaned off the bladder laterally.    The flexible cystoscope was reintroduced into the bladder, per urethra. The diverticulum was visualized. A Satinksy clamp was used to clamp the bladder closed around the diverticulum, making sure the entire diverticulum was included in the clamp, and the ureter was excluded. The cystoscope was removed and a new Turner catheter was replaced.    The diverticulum was then excised using a 15 blade along the clamp. This  was passed off the field as specimen.    A 2-0 PDS was then used to oversew the bladder behind the clamp in a running horizontal mattress fashion. The clamp was released. The mucosal margin was then excised and passed off the field as specimen. A second imbricating layer of closure was performed with a 2-0 PDS. There was no evidence of tumor spillage.    150 cc of saline was instilled into the bladder via the catheter as a leak test, there was no evidence of a leak.     The right pelvic lymph node dissection was then performed by skeletonizing the internal, and external iliac arteries as well as the external iliac vein using the genitofemoral nerve as the extent of our lateral dissection.  Lymphatics were controlled with metal clips.      A 15 mm yeni drain was then placed in the right lower quadrant into the perivesical space. Hemostasis was visualized.     The fascia was then closed using 0 looped PDS. The subcutaneous tissues were closed in two layers using 3-0 vicryl and the overlying skin was closed using 4-0 Monocryl in a running subcuticular fashion. Dermabond was applied.     The drain was secured with a 2-0 nylon suture.      Disposition:  The patient will be admitted to the Urology service for close observation and post operative recovery.    Sarahy Vasquez MD

## 2024-10-08 NOTE — NURSING TRANSFER
Nursing Transfer Note      10/8/2024   5:45 PM    Nurse giving handoff:Danny   Nurse receiving handoff:Farideh     Reason patient is being transferred: post op    Transfer To: 522    Transfer via bed    Transfer with none     Transported by PCT     Transfer Vital Signs: See Flowsheet     Order for Tele Monitor? No    Additional Lines: Florence Catheter    Medicines sent: none    Any special needs or follow-up needed: none    Patient belongings transferred with patient: Yes    Chart send with patient: Yes    Notified: spouse    Patient reassessed at: 10/8/2024 1700  1  Upon arrival to floor: patient oriented to room, call bell in reach, and bed in lowest position

## 2024-10-08 NOTE — TELEPHONE ENCOUNTER
No care due was identified.  [facility] Embedded Care Due Messages. Reference number: [id number].   10/08/2024 2:58:29 AM CDT

## 2024-10-08 NOTE — PROGRESS NOTES
Patient has been identified as having an implanted cardiac rhythm device (CRD); the implanted device is a MDT pacemaker.  CXR verifies PPM      Excision of Diverticulum of Bladder.      PACEMAKERS/BELOW WAIST    The reported surgical procedure is BELOW the umbilicus; per protocol, magnet application is not needed and device reprogramming is not required.        For additional questions, please contact the Arrhythmia Department at Ext [phone].

## 2024-10-08 NOTE — PLAN OF CARE
Pt reports eating at midnight due to glucose monitor indicated glucose was 48. Reports eating peanutbutter with crackers. Anesthesia aware.

## 2024-10-08 NOTE — NURSING
Pt arrived to unit via bed, alert and calm. Wife at bedside.VSS. Florence in place draining blood tinged urine. IV fluids infusing. SADIQ drain in place, draining sanguinous fluid. Dressing c/d/I. Midline incision open to air, clean, approximated, dry. No c/o of pain. Pt c/o of nausea, IV Zofran administered. Pt and wife oriented to room/unit.

## 2024-10-08 NOTE — ANESTHESIA PROCEDURE NOTES
B/l QL SS    Patient location during procedure: pre-op   Block not for primary anesthetic.  Reason for block: at surgeon's request and post-op pain management   Post-op Pain Location: B/l willy   Start time: 10/8/2024 7:46 AM  Timeout: 10/8/2024 7:45 AM   End time: 10/8/2024 7:54 AM    Staffing  Authorizing Provider: Audrey Luke MD  Performing Provider: Hyun Simmons DO    Staffing  Performed by: Hyun Simmons DO  Authorized by: Audrey Luke MD    Preanesthetic Checklist  Completed: patient identified, IV checked, site marked, risks and benefits discussed, surgical consent, monitors and equipment checked, pre-op evaluation and timeout performed  Peripheral Block  Patient position: sitting  Prep: ChloraPrep  Patient monitoring: heart rate, cardiac monitor, continuous pulse ox, continuous capnometry and frequent blood pressure checks  Block type: Quadratus Lumborum  Laterality: bilateral  Injection technique: single shot  Needle  Needle type: Echogenic   Needle gauge: 20 G  Needle length: 4 in  Needle localization: anatomical landmarks and ultrasound guidance   -ultrasound image captured on disc.  Assessment  Injection assessment: negative aspiration, negative parasthesia and local visualized surrounding nerve  Paresthesia pain: none  Heart rate change: no  Slow fractionated injection: yes  Pain Tolerance: comfortable throughout block and no complaints  Medications:    Medications: bupivacaine (pf) (MARCAINE) injection 0.75% - Perineural   30 mL - 10/8/2024 7:53:00 AM    Additional Notes  Patient tolerated well.  [facility].

## 2024-10-08 NOTE — TELEPHONE ENCOUNTER
Refill Decision Note   Rigoberto Pedro  is requesting a refill authorization.  Brief Assessment and Rationale for Refill:  Approve     Medication Therapy Plan:         Comments:     Note composed:2:26 PM 10/08/2024

## 2024-10-08 NOTE — TRANSFER OF CARE
"Anesthesia Transfer of Care Note    Patient: Rigoberto Vasquez    Procedure(s) Performed: Procedure(s) (LRB):  EXCISION, DIVERTICULUM, BLADDER (Right)  CYSTECTOMY, PARTIAL (N/A)  LYMPHADENECTOMY, PELVIS (Right)  CYSTOSCOPY, FLEXIBLE (N/A)  INSTILLATION, BLADDER (N/A)  CYSTOSCOPY,WITH URETERAL CATHETER INSERTION (Bilateral)    Patient location: PACU    Anesthesia Type: general    Transport from OR: Transported from OR on 6-10 L/min O2 by face mask with adequate spontaneous ventilation    Post pain: adequate analgesia    Post assessment: no apparent anesthetic complications    Post vital signs: stable    Level of consciousness: awake    Nausea/Vomiting: no nausea/vomiting    Complications: none    Transfer of care protocol was followed      Last vitals: Visit Vitals  BP (!) 148/91 (BP Location: Right arm, Patient Position: Lying)   Pulse 78   Temp 36.6 °C (97.9 °F) (Temporal)   Resp 18   Ht 5' 11" (1.803 m)   Wt 91 kg (200 lb 9.9 oz)   SpO2 100%   BMI 27.98 kg/m²     "

## 2024-10-08 NOTE — INTERVAL H&P NOTE
The patient has been examined and the H&P has been reviewed:    I concur with the findings and no changes have occurred since H&P was written.    Surgery risks, benefits and alternative options discussed and understood by patient/family.    Consents signed. I have explained the risk, benefits, and alternatives of the procedure in detail. The patient voices understanding and all questions have been answered. The patient agrees to proceed as planned.     Sarahy Vasquez MD      There are no hospital problems to display for this patient.

## 2024-10-09 LAB
ANION GAP SERPL CALC-SCNC: 8 MMOL/L (ref 8–16)
ANION GAP SERPL CALC-SCNC: 9 MMOL/L (ref 8–16)
BASOPHILS # BLD AUTO: 0.02 K/UL (ref 0–0.2)
BASOPHILS NFR BLD: 0.2 % (ref 0–1.9)
BODY FLUID SOURCE, CREATININE: NORMAL
BUN SERPL-MCNC: 31 MG/DL (ref 8–23)
BUN SERPL-MCNC: 35 MG/DL (ref 8–23)
CALCIUM SERPL-MCNC: 7.8 MG/DL (ref 8.7–10.5)
CALCIUM SERPL-MCNC: 8 MG/DL (ref 8.7–10.5)
CHLORIDE SERPL-SCNC: 106 MMOL/L (ref 95–110)
CHLORIDE SERPL-SCNC: 109 MMOL/L (ref 95–110)
CO2 SERPL-SCNC: 21 MMOL/L (ref 23–29)
CO2 SERPL-SCNC: 21 MMOL/L (ref 23–29)
CREAT FLD-MCNC: 1.6 MG/DL
CREAT SERPL-MCNC: 1.8 MG/DL (ref 0.5–1.4)
CREAT SERPL-MCNC: 1.9 MG/DL (ref 0.5–1.4)
DIFFERENTIAL METHOD BLD: ABNORMAL
EOSINOPHIL # BLD AUTO: 0 K/UL (ref 0–0.5)
EOSINOPHIL NFR BLD: 0.1 % (ref 0–8)
ERYTHROCYTE [DISTWIDTH] IN BLOOD BY AUTOMATED COUNT: 13.1 % (ref 11.5–14.5)
EST. GFR  (NO RACE VARIABLE): 36.8 ML/MIN/1.73 M^2
EST. GFR  (NO RACE VARIABLE): 39.3 ML/MIN/1.73 M^2
GLUCOSE SERPL-MCNC: 154 MG/DL (ref 70–110)
GLUCOSE SERPL-MCNC: 186 MG/DL (ref 70–110)
HCT VFR BLD AUTO: 34.8 % (ref 40–54)
HGB BLD-MCNC: 11.4 G/DL (ref 14–18)
IMM GRANULOCYTES # BLD AUTO: 0.04 K/UL (ref 0–0.04)
IMM GRANULOCYTES NFR BLD AUTO: 0.5 % (ref 0–0.5)
LYMPHOCYTES # BLD AUTO: 1.1 K/UL (ref 1–4.8)
LYMPHOCYTES NFR BLD: 13.2 % (ref 18–48)
MAGNESIUM SERPL-MCNC: 1.8 MG/DL (ref 1.6–2.6)
MCH RBC QN AUTO: 33 PG (ref 27–31)
MCHC RBC AUTO-ENTMCNC: 32.8 G/DL (ref 32–36)
MCV RBC AUTO: 101 FL (ref 82–98)
MONOCYTES # BLD AUTO: 0.6 K/UL (ref 0.3–1)
MONOCYTES NFR BLD: 7.5 % (ref 4–15)
NEUTROPHILS # BLD AUTO: 6.3 K/UL (ref 1.8–7.7)
NEUTROPHILS NFR BLD: 78.5 % (ref 38–73)
NRBC BLD-RTO: 0 /100 WBC
PHOSPHATE SERPL-MCNC: 5 MG/DL (ref 2.7–4.5)
PLATELET # BLD AUTO: 200 K/UL (ref 150–450)
PMV BLD AUTO: 9.9 FL (ref 9.2–12.9)
POCT GLUCOSE: 147 MG/DL (ref 70–110)
POCT GLUCOSE: 156 MG/DL (ref 70–110)
POCT GLUCOSE: 180 MG/DL (ref 70–110)
POCT GLUCOSE: 203 MG/DL (ref 70–110)
POTASSIUM SERPL-SCNC: 3.6 MMOL/L (ref 3.5–5.1)
POTASSIUM SERPL-SCNC: 4.1 MMOL/L (ref 3.5–5.1)
RBC # BLD AUTO: 3.45 M/UL (ref 4.6–6.2)
SODIUM SERPL-SCNC: 136 MMOL/L (ref 136–145)
SODIUM SERPL-SCNC: 138 MMOL/L (ref 136–145)
WBC # BLD AUTO: 8.05 K/UL (ref 3.9–12.7)

## 2024-10-09 PROCEDURE — 82570 ASSAY OF URINE CREATININE: CPT | Mod: HCNC | Performed by: STUDENT IN AN ORGANIZED HEALTH CARE EDUCATION/TRAINING PROGRAM

## 2024-10-09 PROCEDURE — 83735 ASSAY OF MAGNESIUM: CPT | Mod: HCNC | Performed by: STUDENT IN AN ORGANIZED HEALTH CARE EDUCATION/TRAINING PROGRAM

## 2024-10-09 PROCEDURE — 85025 COMPLETE CBC W/AUTO DIFF WBC: CPT | Mod: HCNC | Performed by: STUDENT IN AN ORGANIZED HEALTH CARE EDUCATION/TRAINING PROGRAM

## 2024-10-09 PROCEDURE — 25000003 PHARM REV CODE 250: Mod: HCNC | Performed by: STUDENT IN AN ORGANIZED HEALTH CARE EDUCATION/TRAINING PROGRAM

## 2024-10-09 PROCEDURE — 11000001 HC ACUTE MED/SURG PRIVATE ROOM: Mod: HCNC

## 2024-10-09 PROCEDURE — 63600175 PHARM REV CODE 636 W HCPCS: Mod: HCNC | Performed by: STUDENT IN AN ORGANIZED HEALTH CARE EDUCATION/TRAINING PROGRAM

## 2024-10-09 PROCEDURE — 80048 BASIC METABOLIC PNL TOTAL CA: CPT | Mod: HCNC | Performed by: STUDENT IN AN ORGANIZED HEALTH CARE EDUCATION/TRAINING PROGRAM

## 2024-10-09 PROCEDURE — 36415 COLL VENOUS BLD VENIPUNCTURE: CPT | Mod: HCNC | Performed by: STUDENT IN AN ORGANIZED HEALTH CARE EDUCATION/TRAINING PROGRAM

## 2024-10-09 PROCEDURE — 84100 ASSAY OF PHOSPHORUS: CPT | Mod: HCNC | Performed by: STUDENT IN AN ORGANIZED HEALTH CARE EDUCATION/TRAINING PROGRAM

## 2024-10-09 RX ORDER — DEXTROMETHORPHAN HYDROBROMIDE, GUAIFENESIN 5; 100 MG/5ML; MG/5ML
650 LIQUID ORAL EVERY 8 HOURS
COMMUNITY
Start: 2024-10-09 | End: 2024-10-19

## 2024-10-09 RX ORDER — POLYETHYLENE GLYCOL 3350 17 G/17G
17 POWDER, FOR SOLUTION ORAL DAILY
Qty: 510 G | Refills: 0 | Status: SHIPPED | OUTPATIENT
Start: 2024-10-09

## 2024-10-09 RX ORDER — NITROFURANTOIN MACROCRYSTALS 50 MG/1
50 CAPSULE ORAL NIGHTLY
Qty: 10 CAPSULE | Refills: 0 | Status: SHIPPED | OUTPATIENT
Start: 2024-10-09 | End: 2024-10-20

## 2024-10-09 RX ORDER — OXYCODONE HYDROCHLORIDE 5 MG/1
5 TABLET ORAL EVERY 4 HOURS PRN
Qty: 10 TABLET | Refills: 0 | Status: SHIPPED | OUTPATIENT
Start: 2024-10-09

## 2024-10-09 RX ORDER — FAMOTIDINE 20 MG/1
20 TABLET, FILM COATED ORAL DAILY
Status: DISCONTINUED | OUTPATIENT
Start: 2024-10-10 | End: 2024-10-10 | Stop reason: HOSPADM

## 2024-10-09 RX ORDER — IBUPROFEN 800 MG/1
800 TABLET ORAL 3 TIMES DAILY
COMMUNITY
Start: 2024-10-09 | End: 2024-10-10 | Stop reason: HOSPADM

## 2024-10-09 RX ADMIN — AZELASTINE 274 MCG: 1 SPRAY, METERED NASAL at 08:10

## 2024-10-09 RX ADMIN — PREGABALIN 150 MG: 75 CAPSULE ORAL at 08:10

## 2024-10-09 RX ADMIN — FAMOTIDINE 20 MG: 20 TABLET ORAL at 08:10

## 2024-10-09 RX ADMIN — DORZOLAMIDE HYDROCHLORIDE AND TIMOLOL MALEATE 1 DROP: 20; 5 SOLUTION/ DROPS OPHTHALMIC at 08:10

## 2024-10-09 RX ADMIN — OXYCODONE 5 MG: 5 TABLET ORAL at 12:10

## 2024-10-09 RX ADMIN — POLYETHYLENE GLYCOL 3350 17 G: 17 POWDER, FOR SOLUTION ORAL at 08:10

## 2024-10-09 RX ADMIN — ACETAMINOPHEN 1000 MG: 500 TABLET ORAL at 06:10

## 2024-10-09 RX ADMIN — KETOROLAC TROMETHAMINE 15 MG: 30 INJECTION, SOLUTION INTRAMUSCULAR at 12:10

## 2024-10-09 RX ADMIN — FENOFIBRATE 200 MG: 200 CAPSULE ORAL at 08:10

## 2024-10-09 RX ADMIN — DUTASTERIDE 0.5 MG: 0.5 CAPSULE, LIQUID FILLED ORAL at 09:10

## 2024-10-09 RX ADMIN — HEPARIN SODIUM 5000 UNITS: 5000 INJECTION INTRAVENOUS; SUBCUTANEOUS at 05:10

## 2024-10-09 RX ADMIN — HEPARIN SODIUM 5000 UNITS: 5000 INJECTION INTRAVENOUS; SUBCUTANEOUS at 02:10

## 2024-10-09 RX ADMIN — KETOROLAC TROMETHAMINE 15 MG: 30 INJECTION, SOLUTION INTRAMUSCULAR at 05:10

## 2024-10-09 RX ADMIN — SODIUM CHLORIDE, POTASSIUM CHLORIDE, SODIUM LACTATE AND CALCIUM CHLORIDE 1000 ML: 600; 310; 30; 20 INJECTION, SOLUTION INTRAVENOUS at 09:10

## 2024-10-09 RX ADMIN — ATORVASTATIN CALCIUM 10 MG: 10 TABLET, FILM COATED ORAL at 08:10

## 2024-10-09 RX ADMIN — HEPARIN SODIUM 5000 UNITS: 5000 INJECTION INTRAVENOUS; SUBCUTANEOUS at 10:10

## 2024-10-09 RX ADMIN — CETIRIZINE HYDROCHLORIDE 5 MG: 5 TABLET, FILM COATED ORAL at 08:10

## 2024-10-09 RX ADMIN — INSULIN ASPART 2 UNITS: 100 INJECTION, SOLUTION INTRAVENOUS; SUBCUTANEOUS at 08:10

## 2024-10-09 RX ADMIN — ACETAMINOPHEN 1000 MG: 500 TABLET ORAL at 11:10

## 2024-10-09 RX ADMIN — LOSARTAN POTASSIUM 25 MG: 25 TABLET, FILM COATED ORAL at 08:10

## 2024-10-09 RX ADMIN — ACETAMINOPHEN 1000 MG: 500 TABLET ORAL at 05:10

## 2024-10-09 RX ADMIN — INSULIN ASPART 2 UNITS: 100 INJECTION, SOLUTION INTRAVENOUS; SUBCUTANEOUS at 12:10

## 2024-10-09 RX ADMIN — ACETAMINOPHEN 1000 MG: 500 TABLET ORAL at 12:10

## 2024-10-09 NOTE — PROGRESS NOTES
Pharmacist Renal Dose Adjustment Note    Rigoberto Vasquez is a 74 y.o. male being treated with the medication famotidine    Patient Data:    Vital Signs (Most Recent):  Temp: 97.6 °F (36.4 °C) (10/09/24 1229)  Pulse: 76 (10/09/24 1229)  Resp: 18 (10/09/24 1229)  BP: (!) 109/58 (10/09/24 1229)  SpO2: 99 % (10/09/24 1229) Vital Signs (72h Range):  Temp:  [97.1 °F (36.2 °C)-99.1 °F (37.3 °C)]   Pulse:  [60-87]   Resp:  [10-20]   BP: (105-184)/(55-92)   SpO2:  [93 %-100 %]      Recent Labs   Lab 10/09/24  0556 10/09/24  1241   CREATININE 1.9* 1.8*     Serum creatinine: 1.8 mg/dL (H) 10/09/24 1241  Estimated creatinine clearance: 42.2 mL/min (A)    Medication:famotidine dose: 20 mg frequency every 12 hours will be changed to medication:famotidine dose:10 mg  frequency:once daily    Pharmacist's Name: Charlene Page, PharmD  Pharmacist's Extension: [phone]

## 2024-10-09 NOTE — PLAN OF CARE
10/09/24 1400   Post-Acute Status   Post-Acute Authorization Home Health   Home Health Status Referrals Sent   Discharge Plan   Discharge Plan A Home with family;Home Health     Referral sent to [facility] of [place] for Post-acute care.    Wendy LEACH  Case Management  [facility]  [phone]

## 2024-10-09 NOTE — PROGRESS NOTES
[facility]  Urology  Progress Note    Patient Name: Rigoberto Vasquez  MRN: [id number]  Admission Date: 10/8/2024  Hospital Length of Stay: 1 days  Code Status: Full Code   Attending Provider: Compa Hensley MD   Primary Care Physician: Jesse Grimes MD    Subjective:     HPI:  75 y/o M with a PMHx of HG T1 bladder cancer now s/p partial cystectomy and lymph node dissection on 10/8/24.     Interval History: AFVSS. NAEO. Pain controlled. Patient ambulated room. Florence draining light pink urine with moderate sediment. SADIQ output SS. Abdominal incisions c/d/I.       Objective:     Temp:  [97.9 °F (36.6 °C)-99.1 °F (37.3 °C)] 98.4 °F (36.9 °C)  Pulse:  [68-87] 71  Resp:  [10-19] 18  SpO2:  [93 %-100 %] 96 %  BP: (105-184)/(55-92) 105/55     Body mass index is 27.98 kg/m².           Drains       Drain  Duration                  Closed/Suction Drain 10/08/24 1206 Tube - 1 Right Abdomen Bulb <1 day         Ureteral Drain/Stent 10/08/24 0840 Left ureter 5 Fr. <1 day         Ureteral Drain/Stent 10/08/24 0842 Right ureter 5 Fr. <1 day         Urethral Catheter 10/08/24 0845 Latex 16 Fr. <1 day                     Physical Exam  Constitutional:       General: He is not in acute distress.     Appearance: Normal appearance.   HENT:      Head: Normocephalic and atraumatic.   Eyes:      Extraocular Movements: Extraocular movements intact.      Pupils: Pupils are equal, round, and reactive to light.   Cardiovascular:      Rate and Rhythm: Normal rate.   Pulmonary:      Effort: Pulmonary effort is normal. No respiratory distress.   Abdominal:      General: Abdomen is flat. There is no distension.      Tenderness: There is no abdominal tenderness. There is no right CVA tenderness or left CVA tenderness.      Comments: SADIQ output SS.    Incision c/d/i   Genitourinary:     Comments: Florence draining light pink urine with moderate sediment  Skin:     Coloration: Skin is not jaundiced.   Neurological:      General: No focal deficit  "present.      Mental Status: He is alert and oriented to person, place, and time.   Psychiatric:         Mood and Affect: Mood normal.         Behavior: Behavior normal.           Significant Labs:    BMP:  No results for input(s): "NA", "K", "CL", "CO2", "BUN", "CREATININE", "LABGLOM", "GLUCOSE", "CALCIUM" in the last 168 hours.    CBC:   No results for input(s): "WBC", "HGB", "HCT", "PLT" in the last 168 hours.    All pertinent labs results from the past 24 hours have been reviewed.    Significant Imaging:  All pertinent imaging results/findings from the past 24 hours have been reviewed.                  Assessment/Plan:     * Malignant neoplasm of lateral wall of urinary bladder  75 y/o M with a PMHx of HG T1 bladder cancer now s/p partial cystectomy and lymph node dissection on 10/8/24.     -- Ambulate as tolerated  -- Diet as tolerated  -- MM PO Pain control  -- Maintain turner catheter  -- F/u AM labs  -- SADIQ Cr sent, SADIQ dispo pending results      Dispo: Continue floor care. Will PM rounds and evaluate for possible discharge.         VTE Risk Mitigation (From admission, onward)           Ordered     heparin (porcine) injection 5,000 Units  Every 8 hours         10/08/24 1229     IP VTE HIGH RISK PATIENT  Once         10/08/24 1229     Place sequential compression device  Until discontinued         10/08/24 1229     Place sequential compression device  Until discontinued         10/08/24 0755     Place sequential compression device  Until discontinued         10/08/24 0523                    Alen Serrano,   Urology  [facility] - Surgery  "

## 2024-10-09 NOTE — PLAN OF CARE
[facility]    HOME HEALTH ORDERS  FACE TO FACE ENCOUNTER    Patient Name: Rigoberto Vasquez  YOB: 1950    PCP: Jesse Grimes MD   PCP Address: [id number] [place] [address] [facility] [address]  PCP Phone Number: [phone]  PCP Fax: [phone]    Encounter Date: 10/09/2024    Admit to Home Health    Diagnoses:  Active Hospital Problems    Diagnosis  POA    *Malignant neoplasm of lateral wall of urinary bladder [C67.2]  Yes      Resolved Hospital Problems   No resolved problems to display.       Future Appointments   Date Time Provider Department Center   10/30/2024 10:30 AM Ashly Kilgore NP [facility] DIABETE [facility]   11/13/2024  9:40 AM Dorinda Ludwig PA-C [facility] ENT [facility]   11/13/2024 10:30 AM Angie Flor CCC-A [facility] AUDIO [facility]   11/20/2024  9:00 AM [facility] LABORATORY [facility] LAB [facility]   2/7/2025  9:30 AM Alexander Reza MD [facility] OPHTHAL [facility]   2/13/2025 12:00 AM [facility] SPECIMEN LABORATORY [facility] SPECLAB [facility]   2/13/2025  8:00 AM [facility] LABORATORY [facility] LAB [facility]   2/20/2025  9:20 AM Suma Sheth PA-C [facility] IM [facility]   9/11/2025 10:00 AM Luciano Sahu MD [facility] SLEEP [facility]      Follow-up Information       Lori Celeste NP Follow up in 10 day(s).    Specialties: Urology, Family Medicine  Contact information:  [address]  [facility] [address]  [phone]                               I have seen and examined this patient face to face today. My clinical findings that support the need for the home health skilled services and home bound status are the following:  Weakness/numbness causing balance and gait disturbance due to Surgery making it taxing to leave home.    Allergies:  Review of patient's allergies indicates:   Allergen Reactions    Aspirin      Stomach pain even with ppi    Meperidine      Other reaction(s): Stomach upset    Niacin      Other reaction(s): hot skin       Diet: regular diet    Activities: activity as  tolerated    Nursing:   SN to complete comprehensive assessment including routine vital signs. Instruct on disease process and s/s of complications to report to MD. Review/verify medication list sent home with the patient at time of discharge  and instruct patient/caregiver as needed. Frequency may be adjusted depending on start of care date. If patient has enteral feeding tube (NG, PEG, J-tube, G-tube), flush tube before and after feeding and/or medication administration with 20-30 mL of water.    Notify MD if SBP > 160 or < 90; DBP > 90 or < 50; HR > 120 or < 50; Temp > 101; Other:          CONSULTS:    Physical Therapy to evaluate and treat. Evaluate for home safety and equipment needs; Establish/upgrade home exercise program. Perform / instruct on therapeutic exercises, gait training, transfer training, and Range of Motion.  Occupational Therapy to evaluate and treat. Evaluate home environment for safety and equipment needs. Perform/Instruct on transfers, ADL training, ROM, and therapeutic exercises.    MISCELLANEOUS CARE:  N/A    WOUND CARE ORDERS  no      Medications: Review discharge medications with patient and family and provide education.      Current Discharge Medication List        START taking these medications    Details   !! acetaminophen (TYLENOL) 650 MG TbSR Take 1 tablet (650 mg total) by mouth every 8 (eight) hours. for 10 days      ibuprofen (ADVIL,MOTRIN) 800 MG tablet Take 1 tablet (800 mg total) by mouth 3 (three) times daily. for 10 days      nitrofurantoin (MACRODANTIN) 50 MG capsule Take 1 capsule (50 mg total) by mouth every evening. for 10 days  Qty: 10 capsule, Refills: 0      oxyCODONE (ROXICODONE) 5 MG immediate release tablet Take 1 tablet (5 mg total) by mouth every 4 (four) hours as needed for Pain.  Qty: 10 tablet, Refills: 0      !! polyethylene glycol (GLYCOLAX) 17 gram/dose powder Use to cap to measure 17g, mix with liquid, and take by mouth once daily.  Qty: 510 g, Refills: 0        !! - Potential duplicate medications found. Please discuss with provider.        CONTINUE these medications which have NOT CHANGED    Details   !! ACETAMINOPHEN (TYLENOL 8 HOUR ORAL) Take by mouth as needed.      azelastine (ASTELIN) 137 mcg (0.1 %) nasal spray 2 sprays (274 mcg total) by Nasal route 2 (two) times daily.  Qty: 30 mL, Refills: 12      dorzolamide-timolol 2-0.5% (COSOPT) 22.3-6.8 mg/mL ophthalmic solution Place 1 drop into both eyes 2 (two) times daily.  Qty: 10 mL, Refills: 5    Associated Diagnoses: Open angle with borderline findings and low glaucoma risk in both eyes      dutasteride (AVODART) 0.5 mg capsule Take 1 capsule (0.5 mg total) by mouth once daily.  Qty: 90 capsule, Refills: 3    Associated Diagnoses: Benign prostatic hyperplasia (BPH) with straining on urination      famotidine (PEPCID) 20 MG tablet Take 1 tablet (20 mg total) by mouth 2 (two) times daily.  Qty: 60 tablet, Refills: 3      fenofibrate micronized (LOFIBRA) 200 MG Cap Take 1 capsule (200 mg total) by mouth every evening.  Qty: 90 capsule, Refills: 0      gabapentin (CMPD PAIN MANAGEMENT COMPOUND OINTMNENT THREE) Apply bid prn pain  Qty: 100 g, Refills: 11    Comments: With compounded meloxicam (as previously prescribed) .      hydrocortisone 2.5 % cream Apply topically 2 (two) times daily.  Qty: 3.5 g, Refills: 5      insulin (LANTUS SOLOSTAR U-100 INSULIN) glargine 100 units/mL SubQ pen 16 units in the AM and 4 units in the PM  Qty: 3 mL, Refills: 11    Associated Diagnoses: Type 2 diabetes mellitus with stage 3a chronic kidney disease, with long-term current use of insulin      levocetirizine (XYZAL) 5 MG tablet Take 5 mg by mouth every evening.      loratadine (CLARITIN) 10 mg tablet Take 10 mg by mouth once daily.      losartan (COZAAR) 25 MG tablet Take 1 tablet (25 mg total) by mouth once daily.  Qty: 90 tablet, Refills: 3    Comments: .  Associated Diagnoses: Essential hypertension      pantoprazole (PROTONIX)  "40 MG tablet Take 1 tablet (40 mg total) by mouth once daily.  Qty: 90 tablet, Refills: 1    Associated Diagnoses: Nausea; Dyspepsia      !! polyethylene glycol (GLYCOLAX) 17 gram/dose powder Take 17 g by mouth once daily.      triamcinolone acetonide 0.1% (KENALOG) 0.1 % cream AAA bid  Qty: 454 g, Refills: 0    Associated Diagnoses: Dermatitis      blood sugar diagnostic (ACCU-CHEK JAXON) Strp Check BG 2-3 times daily. Accuchek jaxon strips  Qty: 300 strip, Refills: 3      DROPLET PEN NEEDLE 31 gauge x 3/16" Ndle USE AS DIRECTED  WITH  INSULIN  Qty: 200 each, Refills: 3    Associated Diagnoses: Type 2 diabetes mellitus with stage 3a chronic kidney disease, with long-term current use of insulin      fluticasone propionate (FLONASE) 50 mcg/actuation nasal spray 1 spray by Each Nostril route once daily.      latanoprost 0.005 % ophthalmic solution INSTILL 1 DROP INTO BOTH EYES ONE TIME DAILY  Qty: 7.5 mL, Refills: 3    Associated Diagnoses: Open angle with borderline findings and low glaucoma risk in both eyes      lovastatin (MEVACOR) 40 MG tablet Take 1 tablet by mouth Every evening.  Qty: 90 tablet, Refills: 3      multivitamin capsule Take 1 capsule by mouth once daily.      semaglutide (OZEMPIC) 1 mg/dose (4 mg/3 mL) Inject 1 mg into the skin every 7 days.  Qty: 9 mL, Refills: 4      sildenafil (REVATIO) 20 mg Tab Take 1 tablet (20 mg total) by mouth daily as needed (PRN). Takes prn  Qty: 30 tablet, Refills: 11    Associated Diagnoses: Erectile dysfunction, unspecified erectile dysfunction type       !! - Potential duplicate medications found. Please discuss with provider.          I certify that this patient is confined to his home and needs physical therapy and occupational therapy.        "

## 2024-10-09 NOTE — DISCHARGE INSTRUCTIONS
What to expect with your Partial Nephrectomy.  [facility]      After surgery  You may or may not have a drain that is shaped like a grenade and put to suction  This drain usually may or may not come out on Post op day 1. If you go home with a drain, the nurses will teach you how to record the output and you will come back 3-5 days after you leave to have the drain removed in clinic.  You will be on bedrest overnight. The next morning we will come by and see you and take you off bedrest sometime mid morning, that is when your catheter will come out.   You will have a catheter after your surgery. We will arrange follow up in 10 days for voiding trial     The night of surgery we expect and hope that you will:  Eat - you do not have to eat a whole meal, but we want to make sure you can tolerate liquid and/or solid food without nausea and vomiting  Use your incentive spirometer - this is the breathing apparatus that helps you expand your lungs. If and when you have pain you will not want to take deep breaths. But if you dont take deep breaths, you are at risk for pneumonia. The incentive spirometer will help prevent this from occurring by expanding your lungs.  Symptoms you may experience immediately post-op:  Bloating and/or shoulder pain if you had a laparascopic procedure- when we do this operation, we fill up your abdominal cavity with gas to better help us visualize the organs and allow our instruments to fit. After the surgery, not all the air can be removed and your body will eventually absorb this small amount of air. However this can make you feel bloated. In addition, when you sit up, the air can sit right under a muscle (the diaphragm) which has connecting nerves to the shoulders, which could explain why you have shoulder pain.  Do not expect necessarily to have gas or to have a bowel movement - this goes along with the bloating, you may feel like you want to pass gas or have a bowel movement but you  cant. This is normal and you will feel like this for a couple days. There are no pills to help with this. Small walks throughout the day should help with this.  Pain - your pain should be able to be controlled with medicines by mouth that we prescribe. It is important for you to tell us if you are on any pain medications at home before the surgery as you may need stronger pain meds while in the hospital.  You can go home when:  Pain is controlled with medicines by mouth  You are able to walk without difficulty or pain  You are tolerating a regulating diet  Your are voiding. If you cannot void we may have to replace a catheter and you will follow up 3-5 days after you leave to have a voiding trial. The nurses will teach you how to care for your catheter.  When you go home:  Blood pressure  Your blood pressure must be well controlled (<140 systolic blood pressure), if youre blood pressure is not controlled, there is an increased risk of bleeding.  Activity  Continue to walk - small walks throughout the day are better than one long walk.   Do not lift anything greater than 8 pounds for 6 weeks - we want your abdominal wall muscles to heal.  Bowel Movements - Do not strain to have a bowel movement - the pain medicines will make you constipated. That is why we also ask you to take colace 2-3 x per day to help keep your bowels regular. If you are still having trouble, then you can also add Miralax once a day. Do not take any stool softeners if you are having diarrhea.  Drain - If you have a drain (not your catheter, but a separate drain) then record the output and bring it with you to your next appointment  Smoking - If you smoke, we encourage you to STOP. Smoking interferes with the healing process and your prolong your healing with continued smoking.  Driving - Do not drive while you are on pain meds or with your catheter in place.  Bathing - If you do not have a drain, you can shower 48 hours after your surgery. If  you do have a drain, sponge bathe only until the drain is out.  Dressing - you can remove the dressings if there is no drainage or change them as needed if there is. The little sterile band-aid strips will fall off on their own in 10-14 days. If they have not fallen off then you can remove them yourself.  Restarting medicines -especially blood thinners (Aspirin, Plavix, Coumadin), Fish Oil. Discuss this with your physician prior to discharge.  When to return to the ER  Fever - If you have a fever >101.5. This could be due to a number of reasons such as infection of the urine or incision. If your catheter has been removed, you could possibly have a leak. It would be best to come to the ER so they can better evaluate you.  Severe pain - pain is expected, but severe or new onset of pain is not normal.   Inability to tolerate food or liquid with nausea and vomiting - it would be best to go to the ER for them to better evaluate you.

## 2024-10-09 NOTE — SUBJECTIVE & OBJECTIVE
"Interval History: AFVSS. NAEO. Pain controlled. Patient ambulated room. Florence draining light pink urine with moderate sediment. SADIQ output SS. Abdominal incisions c/d/I.       Objective:     Temp:  [97.9 °F (36.6 °C)-99.1 °F (37.3 °C)] 98.4 °F (36.9 °C)  Pulse:  [68-87] 71  Resp:  [10-19] 18  SpO2:  [93 %-100 %] 96 %  BP: (105-184)/(55-92) 105/55     Body mass index is 27.98 kg/m².           Drains       Drain  Duration                  Closed/Suction Drain 10/08/24 1206 Tube - 1 Right Abdomen Bulb <1 day         Ureteral Drain/Stent 10/08/24 0840 Left ureter 5 Fr. <1 day         Ureteral Drain/Stent 10/08/24 0842 Right ureter 5 Fr. <1 day         Urethral Catheter 10/08/24 0845 Latex 16 Fr. <1 day                     Physical Exam  Constitutional:       General: He is not in acute distress.     Appearance: Normal appearance.   HENT:      Head: Normocephalic and atraumatic.   Eyes:      Extraocular Movements: Extraocular movements intact.      Pupils: Pupils are equal, round, and reactive to light.   Cardiovascular:      Rate and Rhythm: Normal rate.   Pulmonary:      Effort: Pulmonary effort is normal. No respiratory distress.   Abdominal:      General: Abdomen is flat. There is no distension.      Tenderness: There is no abdominal tenderness. There is no right CVA tenderness or left CVA tenderness.      Comments: SADIQ output SS.    Incision c/d/i   Genitourinary:     Comments: Florence draining light pink urine with moderate sediment  Skin:     Coloration: Skin is not jaundiced.   Neurological:      General: No focal deficit present.      Mental Status: He is alert and oriented to person, place, and time.   Psychiatric:         Mood and Affect: Mood normal.         Behavior: Behavior normal.           Significant Labs:    BMP:  No results for input(s): "NA", "K", "CL", "CO2", "BUN", "CREATININE", "LABGLOM", "GLUCOSE", "CALCIUM" in the last 168 hours.    CBC:   No results for input(s): "WBC", "HGB", "HCT", "PLT" in the " last 168 hours.    All pertinent labs results from the past 24 hours have been reviewed.    Significant Imaging:  All pertinent imaging results/findings from the past 24 hours have been reviewed.

## 2024-10-09 NOTE — ANESTHESIA POSTPROCEDURE EVALUATION
Anesthesia Post Evaluation    Patient: Rigoberto Vasquez    Procedure(s) Performed: Procedure(s) (LRB):  EXCISION, DIVERTICULUM, BLADDER (Right)  CYSTECTOMY, PARTIAL (N/A)  LYMPHADENECTOMY, PELVIS (Right)  CYSTOSCOPY, FLEXIBLE (N/A)  INSTILLATION, BLADDER (N/A)  CYSTOSCOPY,WITH URETERAL CATHETER INSERTION (Bilateral)    Final Anesthesia Type: general      Patient location during evaluation: PACU  Patient participation: Yes- Able to Participate  Level of consciousness: awake and alert  Post-procedure vital signs: reviewed and stable  Pain management: adequate  Airway patency: patent    PONV status at discharge: No PONV  Anesthetic complications: no      Cardiovascular status: blood pressure returned to baseline  Respiratory status: unassisted  Hydration status: euvolemic  Follow-up not needed.              Vitals Value Taken Time   /58 10/09/24 1229   Temp 36.4 °C (97.6 °F) 10/09/24 1229   Pulse 76 10/09/24 1229   Resp 18 10/09/24 1229   SpO2 99 % 10/09/24 1229         Event Time   Out of Recovery 13:15:00         Pain/Berna Score: Pain Rating Prior to Med Admin: 8 (10/9/2024 12:36 PM)  Pain Rating Post Med Admin: 2 (10/9/2024  6:29 AM)  Berna Score: 10 (10/8/2024  5:00 PM)

## 2024-10-09 NOTE — PLAN OF CARE
[facility] - [facility]  Initial Discharge Assessment       Primary Care Provider: Jesse Grimes MD    Admission Diagnosis: Bladder tumor [D49.4]  History of bladder cancer [Z85.51]    Admission Date: 10/8/2024  Expected Discharge Date: 10/12/2024         Payor: [employer] MANAGED MEDICARE / Plan: [employer] MEDICARE HMO / Product Type: Capitation /     Extended Emergency Contact Information  Primary Emergency Contact: Sydney Vasquez  Address: [address] [facility] [address] [address]           [place] [address] [facility]  Home Phone: [phone]  Mobile Phone: [phone]  Relation: Spouse    Discharge Plan A: Home with [facility] - [place], [place] - [address]  [address] [address]  Phone: [phone] Fax: [phone]    [facility] - [place] - [address]  [address]  [place] [address]  Phone: [phone] Fax: [phone]      Initial Assessment (most recent)       Adult Discharge Assessment - 10/09/24 1326          Discharge Assessment    Assessment Type Discharge Planning Assessment     Confirmed/corrected address, phone number and insurance Yes     Confirmed Demographics Correct on Facesheet     Source of Information patient     Does patient/caregiver understand observation status Yes     Communicated NICKIE with patient/caregiver Yes     People in Home spouse     Facility Arrived From: Home     Do you expect to return to your current living situation? Yes     Do you have help at home or someone to help you manage your care at home? Yes     Who are your caregiver(s) and their phone number(s)? Sydney Vasquez (Spouse) [phone]     Prior to hospitilization cognitive status: Alert/Oriented     Current cognitive status: Alert/Oriented     Walking or Climbing Stairs Difficulty no     Dressing/Bathing Difficulty no     Equipment Currently Used at Home walker, rolling     Readmission within 30 days? No     Patient currently being followed by  outpatient case management? No     Do you currently have service(s) that help you manage your care at home? No     Do you take prescription medications? Yes     Do you have prescription coverage? Yes     Do you have any problems affording any of your prescribed medications? No     Is the patient taking medications as prescribed? yes     Who is going to help you get home at discharge? Spouse-Sydney     How do you get to doctors appointments? family or friend will provide     Are you on dialysis? No     Do you take coumadin? No     Discharge Plan A Home with family        Physical Activity    On average, how many days per week do you engage in moderate to strenuous exercise (like a brisk walk)? 3 days     On average, how many minutes do you engage in exercise at this level? 60 min        Financial Resource Strain    How hard is it for you to pay for the very basics like food, housing, medical care, and heating? Not hard at all        Housing Stability    In the last 12 months, was there a time when you were not able to pay the mortgage or rent on time? No     At any time in the past 12 months, were you homeless or living in a shelter (including now)? No        Transportation Needs    Has the lack of transportation kept you from medical appointments, meetings, work or from getting things needed for daily living? No        Food Insecurity    Within the past 12 months, you worried that your food would run out before you got the money to buy more. Never true     Within the past 12 months, the food you bought just didn't last and you didn't have money to get more. Never true        Stress    Do you feel stress - tense, restless, nervous, or anxious, or unable to sleep at night because your mind is troubled all the time - these days? Not at all        Social Isolation    How often do you feel lonely or isolated from those around you?  Never        Alcohol Use    Q1: How often do you have a drink containing alcohol? Never      Q2: How many drinks containing alcohol do you have on a typical day when you are drinking? Patient does not drink     Q3: How often do you have six or more drinks on one occasion? Never        Utilities    In the past 12 months has the electric, gas, oil, or water company threatened to shut off services in your home? No        Health Literacy    How often do you need to have someone help you when you read instructions, pamphlets, or other written material from your doctor or pharmacy? Never                   Spoke with patient and spouse at bedside to complete d/c planning assessment. Patient lives with his wife in a single story home with 5 steps to enter. Independent with ADL's. Home DME includes a rolling walker.Physical address for patient is @ [address] [place] [address]. Verified PCP, Pharmacy and health insurance. Patient will have help at home from spouse. Will continue to follow for needs. Discharge Plan A and Plan B have been determined by review of patient's clinical status, future medical and therapeutic needs, and coverage/benefits for post-acute care in coordination with multidisciplinary team members.        Wendy Wagoner RNYARELIS  Case Management  [facility]  [phone]

## 2024-10-09 NOTE — ASSESSMENT & PLAN NOTE
75 y/o M with a PMHx of HG T1 bladder cancer now s/p partial cystectomy and lymph node dissection on 10/8/24.     -- Ambulate as tolerated  -- Diet as tolerated  -- MM PO Pain control  -- Maintain turner catheter  -- F/u AM labs  -- SADIQ Cr sent, SADIQ dispo pending results      Dispo: Continue floor care. Will PM rounds and evaluate for possible discharge.

## 2024-10-09 NOTE — HPI
75 y/o M with a PMHx of HG T1 bladder cancer now s/p partial cystectomy and lymph node dissection on 10/8/24.

## 2024-10-09 NOTE — PLAN OF CARE
Problem: Adult Inpatient Plan of Care  Goal: Plan of Care Review  Outcome: Progressing     Problem: Adult Inpatient Plan of Care  Goal: Optimal Comfort and Wellbeing  Outcome: Progressing     Problem: Diabetes Comorbidity  Goal: Blood Glucose Level Within Targeted Range  Outcome: Progressing     Problem: Wound  Goal: Optimal Pain Control and Function  Outcome: Progressing     Problem: Wound  Goal: Skin Health and Integrity  Outcome: Progressing   Patient lying supine in bed watching television. NAD noted. Safety measures in place. Family at the bedside.

## 2024-10-10 VITALS
HEIGHT: 71 IN | OXYGEN SATURATION: 95 % | DIASTOLIC BLOOD PRESSURE: 70 MMHG | HEART RATE: 68 BPM | BODY MASS INDEX: 28.09 KG/M2 | SYSTOLIC BLOOD PRESSURE: 131 MMHG | RESPIRATION RATE: 18 BRPM | WEIGHT: 200.63 LBS | TEMPERATURE: 98 F

## 2024-10-10 LAB
ANION GAP SERPL CALC-SCNC: 7 MMOL/L (ref 8–16)
BASOPHILS # BLD AUTO: 0.03 K/UL (ref 0–0.2)
BASOPHILS NFR BLD: 0.5 % (ref 0–1.9)
BUN SERPL-MCNC: 36 MG/DL (ref 8–23)
CALCIUM SERPL-MCNC: 8.1 MG/DL (ref 8.7–10.5)
CHLORIDE SERPL-SCNC: 107 MMOL/L (ref 95–110)
CO2 SERPL-SCNC: 21 MMOL/L (ref 23–29)
CREAT SERPL-MCNC: 1.5 MG/DL (ref 0.5–1.4)
DIFFERENTIAL METHOD BLD: ABNORMAL
EOSINOPHIL # BLD AUTO: 0.1 K/UL (ref 0–0.5)
EOSINOPHIL NFR BLD: 2.1 % (ref 0–8)
ERYTHROCYTE [DISTWIDTH] IN BLOOD BY AUTOMATED COUNT: 12.5 % (ref 11.5–14.5)
EST. GFR  (NO RACE VARIABLE): 48.9 ML/MIN/1.73 M^2
GLUCOSE SERPL-MCNC: 114 MG/DL (ref 70–110)
HCT VFR BLD AUTO: 32.3 % (ref 40–54)
HGB BLD-MCNC: 11 G/DL (ref 14–18)
IMM GRANULOCYTES # BLD AUTO: 0.06 K/UL (ref 0–0.04)
IMM GRANULOCYTES NFR BLD AUTO: 1.1 % (ref 0–0.5)
LYMPHOCYTES # BLD AUTO: 1.1 K/UL (ref 1–4.8)
LYMPHOCYTES NFR BLD: 18.8 % (ref 18–48)
MAGNESIUM SERPL-MCNC: 1.8 MG/DL (ref 1.6–2.6)
MCH RBC QN AUTO: 32.8 PG (ref 27–31)
MCHC RBC AUTO-ENTMCNC: 34.1 G/DL (ref 32–36)
MCV RBC AUTO: 96 FL (ref 82–98)
MONOCYTES # BLD AUTO: 0.2 K/UL (ref 0.3–1)
MONOCYTES NFR BLD: 2.8 % (ref 4–15)
NEUTROPHILS # BLD AUTO: 4.3 K/UL (ref 1.8–7.7)
NEUTROPHILS NFR BLD: 74.7 % (ref 38–73)
NRBC BLD-RTO: 0 /100 WBC
PHOSPHATE SERPL-MCNC: 3 MG/DL (ref 2.7–4.5)
PLATELET # BLD AUTO: 165 K/UL (ref 150–450)
PMV BLD AUTO: 10.1 FL (ref 9.2–12.9)
POCT GLUCOSE: 128 MG/DL (ref 70–110)
POCT GLUCOSE: 239 MG/DL (ref 70–110)
POTASSIUM SERPL-SCNC: 3.3 MMOL/L (ref 3.5–5.1)
RBC # BLD AUTO: 3.35 M/UL (ref 4.6–6.2)
SODIUM SERPL-SCNC: 135 MMOL/L (ref 136–145)
WBC # BLD AUTO: 5.69 K/UL (ref 3.9–12.7)

## 2024-10-10 PROCEDURE — 25000003 PHARM REV CODE 250: Mod: HCNC | Performed by: STUDENT IN AN ORGANIZED HEALTH CARE EDUCATION/TRAINING PROGRAM

## 2024-10-10 PROCEDURE — 80048 BASIC METABOLIC PNL TOTAL CA: CPT | Mod: HCNC | Performed by: STUDENT IN AN ORGANIZED HEALTH CARE EDUCATION/TRAINING PROGRAM

## 2024-10-10 PROCEDURE — 36415 COLL VENOUS BLD VENIPUNCTURE: CPT | Mod: HCNC | Performed by: STUDENT IN AN ORGANIZED HEALTH CARE EDUCATION/TRAINING PROGRAM

## 2024-10-10 PROCEDURE — 83735 ASSAY OF MAGNESIUM: CPT | Mod: HCNC | Performed by: STUDENT IN AN ORGANIZED HEALTH CARE EDUCATION/TRAINING PROGRAM

## 2024-10-10 PROCEDURE — 84100 ASSAY OF PHOSPHORUS: CPT | Mod: HCNC | Performed by: STUDENT IN AN ORGANIZED HEALTH CARE EDUCATION/TRAINING PROGRAM

## 2024-10-10 PROCEDURE — 97161 PT EVAL LOW COMPLEX 20 MIN: CPT | Mod: HCNC

## 2024-10-10 PROCEDURE — 97165 OT EVAL LOW COMPLEX 30 MIN: CPT | Mod: HCNC

## 2024-10-10 PROCEDURE — 97116 GAIT TRAINING THERAPY: CPT | Mod: HCNC

## 2024-10-10 PROCEDURE — 97537 COMMUNITY/WORK REINTEGRATION: CPT | Mod: HCNC

## 2024-10-10 PROCEDURE — 85025 COMPLETE CBC W/AUTO DIFF WBC: CPT | Mod: HCNC | Performed by: STUDENT IN AN ORGANIZED HEALTH CARE EDUCATION/TRAINING PROGRAM

## 2024-10-10 RX ADMIN — FAMOTIDINE 20 MG: 20 TABLET ORAL at 09:10

## 2024-10-10 RX ADMIN — ACETAMINOPHEN 1000 MG: 500 TABLET ORAL at 06:10

## 2024-10-10 RX ADMIN — INSULIN ASPART 4 UNITS: 100 INJECTION, SOLUTION INTRAVENOUS; SUBCUTANEOUS at 01:10

## 2024-10-10 RX ADMIN — CETIRIZINE HYDROCHLORIDE 5 MG: 5 TABLET, FILM COATED ORAL at 09:10

## 2024-10-10 RX ADMIN — ACETAMINOPHEN 1000 MG: 500 TABLET ORAL at 01:10

## 2024-10-10 RX ADMIN — LOSARTAN POTASSIUM 25 MG: 25 TABLET, FILM COATED ORAL at 09:10

## 2024-10-10 RX ADMIN — ATORVASTATIN CALCIUM 10 MG: 10 TABLET, FILM COATED ORAL at 09:10

## 2024-10-10 RX ADMIN — DUTASTERIDE 0.5 MG: 0.5 CAPSULE, LIQUID FILLED ORAL at 10:10

## 2024-10-10 RX ADMIN — DORZOLAMIDE HYDROCHLORIDE AND TIMOLOL MALEATE 1 DROP: 20; 5 SOLUTION/ DROPS OPHTHALMIC at 09:10

## 2024-10-10 RX ADMIN — AZELASTINE 274 MCG: 1 SPRAY, METERED NASAL at 09:10

## 2024-10-10 NOTE — PLAN OF CARE
Patient does not require acute PT services at this time due to being at (I) PLOF and demonstrated safety with functional mobility, including transfers and ambulation. Not goals set.    Problem: Physical Therapy  Goal: Physical Therapy Goal  Outcome: Met     10/10/2024

## 2024-10-10 NOTE — PT/OT/SLP EVAL
Physical Therapy Evaluation and Discharge Note    Patient Name:  Rigoberto Vasquez   MRN:  [id number]    Recommendations:     Discharge Recommendations: No Therapy Indicated  Discharge Equipment Recommendations: none   Barriers to discharge: None    Assessment:     Rigoberto Vasquez is a 74 y.o. male admitted with a medical diagnosis of Malignant neoplasm of lateral wall of urinary bladder. .  At this time, patient is functioning at their prior level of function and does not require further acute PT services.     Recent Surgery: Procedure(s) (LRB):  EXCISION, DIVERTICULUM, BLADDER (Right)  CYSTECTOMY, PARTIAL (N/A)  LYMPHADENECTOMY, PELVIS (Right)  CYSTOSCOPY, FLEXIBLE (N/A)  INSTILLATION, BLADDER (N/A)  CYSTOSCOPY,WITH URETERAL CATHETER INSERTION (Bilateral) 2 Days Post-Op    Plan:     During this hospitalization, patient does not require further acute PT services.  Please re-consult if situation changes.      Subjective     Chief Complaint: none reported  Patient/Family Comments/goals: To go home  Pain/Comfort:  Pain Rating 1: 7/10  Location - Orientation 1: lower  Location 1: abdomen  Pain Addressed 1: Reposition, Distraction    Patients cultural, spiritual, Taoism conflicts given the current situation: no    Patient History:     Living Environment: Pt lives with spouse in [facility] with 5 TAYLA and R HR. Bathroom: tub/shower combo   Prior Level of Function: IND  DME owned: [place], [place], [place]  Caregiver Assistance: spouse    Objective:     Communicated with RN prior to session.  Patient found HOB elevated with turner catheter upon PT entry to room.    General Precautions: Standard, fall    Orthopedic Precautions:N/A   Braces: N/A  Respiratory Status: Room air    Exams:  Gross Motor Coordination:  WFL  Sensation:    -       Intact  RLE ROM: WFL  RLE Strength: WFL  LLE ROM: WFL  LLE Strength: WFL    Functional Mobility:    Bed Mobility:   Rolling: to R with supervision  Supine > Sit: supervision  Sit > Supine: supervision    Transfers:    Sit <> Stand Transfer: stand by assistance from EOB without AD    Balance:   Sitting balance: FAIR+: Maintains balance through MINIMAL excursions of active trunk motion  Standing balance:   FAIR+: Takes MINIMAL challenges from all directions  GOOD: Needs SUPERVISION only during gait and able to self right with moderate LOB                 Gait:  Distance: ~180 ft   Assistive Device: no AD  Assistance Level: supervision  Gait Assessment: Pt amb with fair cameron without LOB    Stairs:  Pt ascended/descended 9 stair(s) with No Assistive Device with right handrail with Stand-by Assistance.   Pt instructed to use step to gait pattern for ascending and descending stairs    AM-PAC 6 CLICK MOBILITY  Total Score:24       Treatment and Education:  Pt educated on tip to reduce fall risk and safety with mobility and using call button for assistance from nursing staff with OOB mobility.  Pt educated on sitting up in chair throughout most of day  Pt educated on amb 2-3x per day with assistance from staff and increased movement during hospital stay.  All questions answered within the scope of PT.  White board updated accordingly.    AM-PAC 6 CLICK MOBILITY  Total Score:24     Patient left HOB elevated with all lines intact, call button in reach, and spouse present.    GOALS:   Multidisciplinary Problems       Physical Therapy Goals       Not on file              Multidisciplinary Problems (Resolved)          Problem: Physical Therapy    Goal Priority Disciplines Outcome Interventions   Physical Therapy Goal   (Resolved)     PT, PT/OT Met                        History:     Past Medical History:   Diagnosis Date    Acid reflux     [facility]    Angina pectoris     Back pain     BPH (benign prostatic hyperplasia)     blue    Cholesteatoma     Degenerative joint disease     Diverticulosis     Erectile dysfunction     History of elevated PSA 02/2014    normalized, dr dave annually    History of pericarditis     Hypercholesteremia      Hypertension     Iron deficiency anemia     Malignant neoplasm of lateral wall of urinary bladder 8/5/2024    СВЕТЛАНА (obstructive sleep apnea) 05/17/2022    Pacemaker     complete av block;NO afib    Renal disorder     Squamous cell cancer of skin of mastoid region of scalp     dr jaida salgado    Type 2 diabetes mellitus     dr wyman    Urinary incontinence     when he was 7 Yrs old.        Past Surgical History:   Procedure Laterality Date    BIOPSY OF BLADDER N/A 8/30/2024    Procedure: BIOPSY, BLADDER;  Surgeon: Compa Hensley MD;  [facility];  Service: Urology;  Laterality: N/A;    BLADDER DIVERTICULECTOMY Right 10/8/2024    Procedure: EXCISION, DIVERTICULUM, BLADDER;  Surgeon: Compa Hensley MD;  Location: [facility] OR 2ND FLR;  Service: Urology;  Laterality: Right;    BLADDER FULGURATION N/A 8/30/2024    Procedure: FULGURATION, BLADDER;  Surgeon: Compa Hensley MD;  Location: [facility] OR [address] [place] [address];  Service: Urology;  Laterality: N/A;    CARDIAC PACEMAKER PLACEMENT      COLONOSCOPY N/A 9/13/2019    Procedure: COLONOSCOPY;  Surgeon: Kan Ramsey III, MD;  Location: [facility];  Service: Endoscopy;  Laterality: N/A;    COLONOSCOPY N/A 9/22/2022    Procedure: COLONOSCOPY;  Surgeon: Chris Garcia MD;  Location: [facility] ENDO;  Service: Endoscopy;  Laterality: N/A;    CYSTOSCOPY N/A 8/30/2024    Procedure: CYSTOSCOPY;  Surgeon: Compa Hensley MD;  Location: [facility] OR [address] [place] [address];  Service: Urology;  Laterality: N/A;  with blue light    CYSTOSCOPY W/ RETROGRADES Bilateral 7/16/2024    Procedure: CYSTOSCOPY, WITH RETROGRADE PYELOGRAM;  Surgeon: Vance Olivera MD;  Location: [facility] OR;  Service: Urology;  Laterality: Bilateral;    CYSTOSCOPY,WITH URETERAL CATHETER INSERTION Bilateral 10/8/2024    Procedure: CYSTOSCOPY,WITH URETERAL CATHETER INSERTION;  Surgeon: Compa Hensley MD;  Location: [facility] OR [address] [place] [address];  Service: Urology;  Laterality: Bilateral;    ESOPHAGOGASTRODUODENOSCOPY N/A 9/13/2019     Procedure: ESOPHAGOGASTRODUODENOSCOPY (EGD);  Surgeon: Kan Ramsey III, MD;  Location: [facility] ENDO;  Service: Endoscopy;  Laterality: N/A;    ESOPHAGOGASTRODUODENOSCOPY N/A 9/3/2021    Procedure: EGD (ESOPHAGOGASTRODUODENOSCOPY);  Surgeon: Kaylene Pineda MD;  Location: [facility] ENDO;  Service: Endoscopy;  Laterality: N/A;    ESOPHAGOGASTRODUODENOSCOPY N/A 5/24/2023    Procedure: EGD (ESOPHAGOGASTRODUODENOSCOPY);  Surgeon: Cory Bennett MD;  Location: [facility] ENDO;  Service: Endoscopy;  Laterality: N/A;    FLEXIBLE CYSTOSCOPY N/A 10/8/2024    Procedure: CYSTOSCOPY, FLEXIBLE;  Surgeon: Compa Hensley MD;  [facility];  Service: Urology;  Laterality: N/A;    INSERTION OF TYMPANOSTOMY TUBE Right 3/15/2023    Procedure: INSERTION, TYMPANOSTOMY TUBE;  Surgeon: Jose L Gudino MD;  [facility];  Service: ENT;  Laterality: Right;    INSTILLATION OF URINARY BLADDER N/A 10/8/2024    Procedure: INSTILLATION, BLADDER;  Surgeon: Compa Hensley MD;  [facility];  Service: Urology;  Laterality: N/A;  Chemotherapy instillation bladder    JOINT REPLACEMENT      KNEE CARTILAGE SURGERY Bilateral     LYMPHADENECTOMY, PELVIS Right 10/8/2024    Procedure: LYMPHADENECTOMY, PELVIS;  Surgeon: Compa Hensley MD;  [facility];  Service: Urology;  Laterality: Right;    NASAL SINUS SURGERY      PARTIAL SURGICAL REMOVAL OF BLADDER N/A 10/8/2024    Procedure: CYSTECTOMY, PARTIAL;  Surgeon: Compa Hensley MD;  [facility];  Service: Urology;  Laterality: N/A;    SHOULDER ARTHROSCOPY Right     TONSILLECTOMY      TOTAL KNEE ARTHROPLASTY Left 05/15/2017    TRANSURETHRAL RESECTION OF PROSTATE      TURBT (TRANSURETHRAL RESECTION OF BLADDER TUMOR) N/A 7/16/2024    Procedure: TURBT (TRANSURETHRAL RESECTION OF BLADDER TUMOR);  Surgeon: Vance Olivera MD;  Location: [facility] OR;  Service: Urology;  Laterality: N/A;    TYMPANOPLASTY      TYMPANOPLASTY WITH MASTOIDECTOMY Left  3/15/2023    Procedure: TYMPANOPLASTY, WITH MASTOIDECTOMY;  Surgeon: Jose L Gudino MD;  Location: [facility] OR [address] [place] [address];  Service: ENT;  Laterality: Left;    vesectomy         Time Tracking:     PT Received On: 10/10/24  PT Start Time: 1112     PT Stop Time: 1128  PT Total Time (min): 16 min     Billable Minutes: Evaluation 8 and Gait Training 8      10/10/2024

## 2024-10-10 NOTE — PT/OT/SLP EVAL
Occupational Therapy   Evaluation and Discharge Note    Name: Rigoberto Vasquez  MRN: [id number]  Admitting Diagnosis: Malignant neoplasm of lateral wall of urinary bladder  Recent Surgery: Procedure(s) (LRB):  EXCISION, DIVERTICULUM, BLADDER (Right)  CYSTECTOMY, PARTIAL (N/A)  LYMPHADENECTOMY, PELVIS (Right)  CYSTOSCOPY, FLEXIBLE (N/A)  INSTILLATION, BLADDER (N/A)  CYSTOSCOPY,WITH URETERAL CATHETER INSERTION (Bilateral) 2 Days Post-Op    Recommendations:     Discharge Recommendations: No Therapy Indicated  Discharge Equipment Recommendations: none  Barriers to discharge:  None    Assessment:   CO-TX with PT for pt safety and max participation with both disciplines.    Rigoberto Vasquez is a 74 y.o. male with a medical diagnosis of Malignant neoplasm of lateral wall of urinary bladder. At this time, patient is functioning at their prior level of function and does not require further acute OT services.     Plan:     During this hospitalization, patient does not require further acute OT services.  Please re-consult if situation changes.    Plan of Care Reviewed with: patient, spouse    Subjective     Chief Complaint: Abdominal pain  Patient/Family Comments/goals: return home    Occupational Profile:  Living Environment: Lives with spouse, in h, 5 TAYLA and R hand rail, tub shower in bathroom  Previous level of function: Indp  Roles and Routines: , father  Equipment Used at home: walker, rolling, cane, quad, shower chair  Assistance upon Discharge: family    Pain/Comfort:  Pain Rating 1: 7/10  Location - Orientation 1: lower  Location 1: abdomen  Pain Addressed 1: Reposition, Distraction    Patients cultural, spiritual, Uatsdin conflicts given the current situation: no    Objective:     Communicated with: Nsg prior to session.  Patient found HOB elevated with turner catheter upon OT entry to room.    General Precautions: Standard, fall  Orthopedic Precautions: N/A  Braces: N/A  Respiratory Status: Room air      Occupational Performance:    Bed Mobility:    Patient completed Rolling/Turning to Right with supervision  Patient completed Supine to Sit with supervision  Patient completed Sit to Supine with supervision    Functional Mobility/Transfers:  Patient completed Sit <> Stand Transfer with stand by assistance  with  no assistive device   Chair t/f c/ Sup and no AD  Functional Mobility: Pt ambulated c/ Sup and no AD to simulate safe home and community mobility, and visual scanning needed for ADL and IADL participation     Activities of Daily Living:  Upper Body Dressing: stand by assistance don gown for back  Lower Body Dressing: supervision adjust socks    Cognitive/Visual Perceptual:  A&O x4    Physical Exam:  BUE WNL  Balance: intact    AMPAC 6 Click ADL:  AMPAC Total Score: 22    Treatment & Education:  Pt educated on role and purpose of therapy  Pt educated on goal setting  Pt educated on benefits of OOB activity  Pt educated on self advocacy   Pt educated on log roll technique for bed mobility    Patient left HOB elevated with all lines intact, call button in reach, nsg notified, and wife present    GOALS:   Multidisciplinary Problems       Occupational Therapy Goals       Not on file              Multidisciplinary Problems (Resolved)          Problem: Occupational Therapy    Goal Priority Disciplines Outcome Interventions   Occupational Therapy Goal   (Resolved)     OT, PT/OT Met                        History:     Past Medical History:   Diagnosis Date    Acid reflux     [facility]    Angina pectoris     Back pain     BPH (benign prostatic hyperplasia)     blue    Cholesteatoma     Degenerative joint disease     Diverticulosis     Erectile dysfunction     History of elevated PSA 02/2014    normalized, dr dave annually    History of pericarditis     Hypercholesteremia     Hypertension     Iron deficiency anemia     Malignant neoplasm of lateral wall of urinary bladder 8/5/2024    СВЕТЛАНА (obstructive sleep apnea)  05/17/2022    Pacemaker     complete av block;NO afib    Renal disorder     Squamous cell cancer of skin of mastoid region of scalp     dr jaida salgado    Type 2 diabetes mellitus     dr wyman    Urinary incontinence     when he was 7 Yrs old.          Past Surgical History:   Procedure Laterality Date    BIOPSY OF BLADDER N/A 8/30/2024    Procedure: BIOPSY, BLADDER;  Surgeon: Compa Hensley MD;  [facility];  Service: Urology;  Laterality: N/A;    BLADDER DIVERTICULECTOMY Right 10/8/2024    Procedure: EXCISION, DIVERTICULUM, BLADDER;  Surgeon: Compa Hensley MD;  Location: [facility] OR 2ND FLR;  Service: Urology;  Laterality: Right;    BLADDER FULGURATION N/A 8/30/2024    Procedure: FULGURATION, BLADDER;  Surgeon: Compa Hensley MD;  [facility];  Service: Urology;  Laterality: N/A;    CARDIAC PACEMAKER PLACEMENT      COLONOSCOPY N/A 9/13/2019    Procedure: COLONOSCOPY;  Surgeon: Kan Ramsey III, MD;  Location: [facility];  Service: Endoscopy;  Laterality: N/A;    COLONOSCOPY N/A 9/22/2022    Procedure: COLONOSCOPY;  Surgeon: Chris Garcia MD;  Location: [facility];  Service: Endoscopy;  Laterality: N/A;    CYSTOSCOPY N/A 8/30/2024    Procedure: CYSTOSCOPY;  Surgeon: Compa Hensley MD;  Location: [facility] OR [address] [place] [address];  Service: Urology;  Laterality: N/A;  with blue light    CYSTOSCOPY W/ RETROGRADES Bilateral 7/16/2024    Procedure: CYSTOSCOPY, WITH RETROGRADE PYELOGRAM;  Surgeon: Vance Olivera MD;  Location: [facility];  Service: Urology;  Laterality: Bilateral;    CYSTOSCOPY,WITH URETERAL CATHETER INSERTION Bilateral 10/8/2024    Procedure: CYSTOSCOPY,WITH URETERAL CATHETER INSERTION;  Surgeon: Compa Hensley MD;  Location: [facility] OR [address] [place] [address];  Service: Urology;  Laterality: Bilateral;    ESOPHAGOGASTRODUODENOSCOPY N/A 9/13/2019    Procedure: ESOPHAGOGASTRODUODENOSCOPY (EGD);  Surgeon: Kan Ramsey III, MD;  Location: [facility];  Service: Endoscopy;  Laterality: N/A;     ESOPHAGOGASTRODUODENOSCOPY N/A 9/3/2021    Procedure: EGD (ESOPHAGOGASTRODUODENOSCOPY);  Surgeon: Kaylene Pineda MD;  Location: [facility];  Service: Endoscopy;  Laterality: N/A;    ESOPHAGOGASTRODUODENOSCOPY N/A 5/24/2023    Procedure: EGD (ESOPHAGOGASTRODUODENOSCOPY);  Surgeon: Cory Bennett MD;  Location: [facility];  Service: Endoscopy;  Laterality: N/A;    FLEXIBLE CYSTOSCOPY N/A 10/8/2024    Procedure: CYSTOSCOPY, FLEXIBLE;  Surgeon: Compa Hensley MD;  [facility];  Service: Urology;  Laterality: N/A;    INSERTION OF TYMPANOSTOMY TUBE Right 3/15/2023    Procedure: INSERTION, TYMPANOSTOMY TUBE;  Surgeon: Jose L Gudino MD;  [facility];  Service: ENT;  Laterality: Right;    INSTILLATION OF URINARY BLADDER N/A 10/8/2024    Procedure: INSTILLATION, BLADDER;  Surgeon: Compa Hensley MD;  Location: [address];  Service: Urology;  Laterality: N/A;  Chemotherapy instillation bladder    JOINT REPLACEMENT      KNEE CARTILAGE SURGERY Bilateral     LYMPHADENECTOMY, PELVIS Right 10/8/2024    Procedure: LYMPHADENECTOMY, PELVIS;  Surgeon: Compa Hensley MD;  Location: [facility] OR [address] [place] [address];  Service: Urology;  Laterality: Right;    NASAL SINUS SURGERY      PARTIAL SURGICAL REMOVAL OF BLADDER N/A 10/8/2024    Procedure: CYSTECTOMY, PARTIAL;  Surgeon: Compa Hensley MD;  Location: [facility] OR [address] [place] [address];  Service: Urology;  Laterality: N/A;    SHOULDER ARTHROSCOPY Right     TONSILLECTOMY      TOTAL KNEE ARTHROPLASTY Left 05/15/2017    TRANSURETHRAL RESECTION OF PROSTATE      TURBT (TRANSURETHRAL RESECTION OF BLADDER TUMOR) N/A 7/16/2024    Procedure: TURBT (TRANSURETHRAL RESECTION OF BLADDER TUMOR);  Surgeon: Vance Olivera MD;  Location: [facility] OR;  Service: Urology;  Laterality: N/A;    TYMPANOPLASTY      TYMPANOPLASTY WITH MASTOIDECTOMY Left 3/15/2023    Procedure: TYMPANOPLASTY, WITH MASTOIDECTOMY;  Surgeon: Jose L Gudino MD;  Location: [facility] OR [address] [place] [address];  Service: ENT;  Laterality: Left;     vesectomy         Time Tracking:     OT Date of Treatment: 10/10/24  OT Start Time: 1112  OT Stop Time: 1128  OT Total Time (min): 16 min    Billable Minutes:Evaluation 8  Self Care/Home Management 8    10/10/2024

## 2024-10-10 NOTE — ASSESSMENT & PLAN NOTE
73 y/o M with a PMHx of HG T1 bladder cancer now s/p partial cystectomy and lymph node dissection on 10/8/24.     -- Ambulate as tolerated  -- Diet as tolerated  -- MM PO Pain control  -- Maintain turner catheter  -- F/u AM labs  -- SADIQ Cr negative will remove drain today    Dispo: Home today after drain removal will f/u in 10 days for VT      Dispo: Continue floor care. Will PM rounds and evaluate for possible discharge.

## 2024-10-10 NOTE — SUBJECTIVE & OBJECTIVE
Interval History: AFVSS. NAEO. Pain controlled. Ambulating. Tolerating PO diet. Florence draining light pink urine. SADIQ output SS.       Objective:     Temp:  [97.1 °F (36.2 °C)-98.7 °F (37.1 °C)] 98.7 °F (37.1 °C)  Pulse:  [63-76] 66  Resp:  [16-20] 18  SpO2:  [94 %-99 %] 95 %  BP: (103-128)/(57-69) 128/69     Body mass index is 27.98 kg/m².           Drains       Drain  Duration                  Closed/Suction Drain 10/08/24 1206 Tube - 1 Right Abdomen Bulb 1 day         Ureteral Drain/Stent 10/08/24 0840 Left ureter 5 Fr. 1 day         Ureteral Drain/Stent 10/08/24 0842 Right ureter 5 Fr. 1 day         Urethral Catheter 10/08/24 0845 Latex 16 Fr. 1 day                     Physical Exam  Constitutional:       General: He is not in acute distress.     Appearance: Normal appearance.   HENT:      Head: Normocephalic and atraumatic.   Eyes:      Extraocular Movements: Extraocular movements intact.      Pupils: Pupils are equal, round, and reactive to light.   Cardiovascular:      Rate and Rhythm: Normal rate.   Pulmonary:      Effort: Pulmonary effort is normal. No respiratory distress.   Abdominal:      General: Abdomen is flat. There is no distension.      Tenderness: There is no abdominal tenderness. There is no right CVA tenderness or left CVA tenderness.      Comments: SADIQ output SS.    Incision c/d/i   Genitourinary:     Comments: Florence draining light pink urine with moderate sediment  Skin:     Coloration: Skin is not jaundiced.   Neurological:      General: No focal deficit present.      Mental Status: He is alert and oriented to person, place, and time.   Psychiatric:         Mood and Affect: Mood normal.         Behavior: Behavior normal.           Significant Labs:    BMP:  Recent Labs   Lab 10/09/24  0556 10/09/24  1241 10/10/24  0212    136 135*   K 4.1 3.6 3.3*    106 107   CO2 21* 21* 21*   BUN 31* 35* 36*   CREATININE 1.9* 1.8* 1.5*   CALCIUM 7.8* 8.0* 8.1*       CBC:   Recent Labs   Lab  10/09/24  0556 10/10/24  0212   WBC 8.05 5.69   HGB 11.4* 11.0*   HCT 34.8* 32.3*    165       All pertinent labs results from the past 24 hours have been reviewed.    Significant Imaging:  All pertinent imaging results/findings from the past 24 hours have been reviewed.

## 2024-10-10 NOTE — PROGRESS NOTES
[facility]  Urology  Progress Note    Patient Name: Rigoberto Vasquez  MRN: [id number]  Admission Date: 10/8/2024  Hospital Length of Stay: 2 days  Code Status: Full Code   Attending Provider: Compa Hensley MD   Primary Care Physician: Jesse Grimes MD    Subjective:     HPI:  73 y/o M with a PMHx of HG T1 bladder cancer now s/p partial cystectomy and lymph node dissection on 10/8/24.     Interval History: AFVSS. NAEO. Pain controlled. Ambulating. Tolerating PO diet. Florence draining light pink urine. SADIQ output SS. Cr improved this AM.       Objective:     Temp:  [97.1 °F (36.2 °C)-98.7 °F (37.1 °C)] 98.7 °F (37.1 °C)  Pulse:  [63-76] 66  Resp:  [16-20] 18  SpO2:  [94 %-99 %] 95 %  BP: (103-128)/(57-69) 128/69     Body mass index is 27.98 kg/m².           Drains       Drain  Duration                  Closed/Suction Drain 10/08/24 1206 Tube - 1 Right Abdomen Bulb 1 day         Ureteral Drain/Stent 10/08/24 0840 Left ureter 5 Fr. 1 day         Ureteral Drain/Stent 10/08/24 0842 Right ureter 5 Fr. 1 day         Urethral Catheter 10/08/24 0845 Latex 16 Fr. 1 day                     Physical Exam  Constitutional:       General: He is not in acute distress.     Appearance: Normal appearance.   HENT:      Head: Normocephalic and atraumatic.   Eyes:      Extraocular Movements: Extraocular movements intact.      Pupils: Pupils are equal, round, and reactive to light.   Cardiovascular:      Rate and Rhythm: Normal rate.   Pulmonary:      Effort: Pulmonary effort is normal. No respiratory distress.   Abdominal:      General: Abdomen is flat. There is no distension.      Tenderness: There is no abdominal tenderness. There is no right CVA tenderness or left CVA tenderness.      Comments: SADIQ output SS.    Incision c/d/i   Genitourinary:     Comments: Florence draining light pink urine with moderate sediment  Skin:     Coloration: Skin is not jaundiced.   Neurological:      General: No focal deficit present.      Mental Status: He  is alert and oriented to person, place, and time.   Psychiatric:         Mood and Affect: Mood normal.         Behavior: Behavior normal.           Significant Labs:    BMP:  Recent Labs   Lab 10/09/24  0556 10/09/24  1241 10/10/24  0212    136 135*   K 4.1 3.6 3.3*    106 107   CO2 21* 21* 21*   BUN 31* 35* 36*   CREATININE 1.9* 1.8* 1.5*   CALCIUM 7.8* 8.0* 8.1*       CBC:   Recent Labs   Lab 10/09/24  0556 10/10/24  0212   WBC 8.05 5.69   HGB 11.4* 11.0*   HCT 34.8* 32.3*    165       All pertinent labs results from the past 24 hours have been reviewed.    Significant Imaging:  All pertinent imaging results/findings from the past 24 hours have been reviewed.                  Assessment/Plan:     * Malignant neoplasm of lateral wall of urinary bladder  73 y/o M with a PMHx of HG T1 bladder cancer now s/p partial cystectomy and lymph node dissection on 10/8/24.     -- Ambulate as tolerated  -- Diet as tolerated  -- MM PO Pain control  -- Maintain turner catheter  -- F/u AM labs  -- SADIQ Cr negative will remove drain today    Dispo: Home today after drain removal will f/u in 10 days for VT      Dispo: Continue floor care. Will PM rounds and evaluate for possible discharge.         VTE Risk Mitigation (From admission, onward)           Ordered     heparin (porcine) injection 5,000 Units  Every 8 hours         10/08/24 1229     IP VTE HIGH RISK PATIENT  Once         10/08/24 1229     Place sequential compression device  Until discontinued         10/08/24 1229     Place sequential compression device  Until discontinued         10/08/24 0755     Place sequential compression device  Until discontinued         10/08/24 0524                    Alen Serrano DO  Urology  [facility] - Surgery

## 2024-10-10 NOTE — DISCHARGE SUMMARY
[facility]  Urology  Discharge Summary      Patient Name: Rigoberto Vasquez  MRN: [id number]  Admission Date: 10/8/2024  Hospital Length of Stay: 2 days  Discharge Date and Time:  10/10/2024 7:59 AM  Attending Physician: Compa Hensley MD   Discharging Provider: Alen Serrano DO  Primary Care Physician: Jesse Grimes MD    HPI:   75 y/o M with a PMHx of HG T1 bladder cancer now s/p partial cystectomy and lymph node dissection on 10/8/24.     Procedure(s) (LRB):  EXCISION, DIVERTICULUM, BLADDER (Right)  CYSTECTOMY, PARTIAL (N/A)  LYMPHADENECTOMY, PELVIS (Right)  CYSTOSCOPY, FLEXIBLE (N/A)  INSTILLATION, BLADDER (N/A)  CYSTOSCOPY,WITH URETERAL CATHETER INSERTION (Bilateral)     Indwelling Lines/Drains at time of discharge:   Lines/Drains/Airways       Drain  Duration                  Closed/Suction Drain 10/08/24 1206 Tube - 1 Right Abdomen Bulb 1 day         Ureteral Drain/Stent 10/08/24 0840 Left ureter 5 Fr. 1 day         Ureteral Drain/Stent 10/08/24 0842 Right ureter 5 Fr. 1 day         Urethral Catheter 10/08/24 0845 Latex 16 Fr. 1 day                    Hospital Course (synopsis of major diagnoses, care, treatment, and services provided during the course of the hospital stay): The patient was admitted to [facility] [place] the above procedure. Patient tolerated the procedure well in its entirety without issue. For more details, please refer to the complete operative note. he was transferred to recovery post-op and then to the floor. Once on the floor His diet was advanced and he ambulated the night of and day after surgery. On POD 1 the patient was tolerating a regular diet, ambulating without difficulty.His pain was well controlled.    Physical exam was appropriate for post operative state. The incisions were clean, dry and intact. The turner was draining clear yellow urine. SADIQ drain output was SS and SADIQ creatinine was negative. This was removed prior to discharge. The patient was deemed stable for discharge on  10/10/2024.  .     Goals of Care Treatment Preferences:  Code Status: Full Code      Consults:     Significant Diagnostic Studies: None    Pending Diagnostic Studies:       Procedure Component Value Units Date/Time    Specimen to Pathology, Surgery Urology [[id number]] Collected: 10/08/24 1156    Order Status: Sent Lab Status: In process Updated: 10/08/24 1935    Specimen: Tissue             Final Active Diagnoses:    Diagnosis Date Noted POA    PRINCIPAL PROBLEM:  Malignant neoplasm of lateral wall of urinary bladder [C67.2] 08/05/2024 Yes      Problems Resolved During this Admission:         Discharged Condition: good    Disposition: Home or Self Care    Follow Up:   Follow-up Information       Lori Celeste, NP Follow up in 10 day(s).    Specialties: Urology, Family Medicine  Contact information:  [facility] [address]E  [facility] [id number] [phone]                           Patient Instructions:      FL Cystogram Minimum 3 Views (xpd) - Rad Performed   Standing Status: Future Standing Exp. Date: 10/09/25     Order Specific Question Answer Comments   May the Radiologist modify the order per protocol to meet the clinical needs of the patient? Yes    Release to patient Immediate      Notify your health care provider if you experience any of the following:  temperature >100.4     Notify your health care provider if you experience any of the following:  persistent nausea and vomiting or diarrhea     Notify your health care provider if you experience any of the following:  severe uncontrolled pain     Notify your health care provider if you experience any of the following:  redness, tenderness, or signs of infection (pain, swelling, redness, odor or green/yellow discharge around incision site)     Notify your health care provider if you experience any of the following:  difficulty breathing or increased cough     Type & Screen   Standing Status: Future Standing Exp. Date: 11/24/25     Medications:  Reconciled  Home Medications:      Medication List        START taking these medications      nitrofurantoin 50 MG capsule  Commonly known as: MACRODANTIN  Take 1 capsule (50 mg total) by mouth every evening. for 10 days     oxyCODONE 5 MG immediate release tablet  Commonly known as: ROXICODONE  Take 1 tablet (5 mg total) by mouth every 4 (four) hours as needed for Pain.            CHANGE how you take these medications      LANTUS SOLOSTAR U-100 INSULIN 100 unit/mL (3 mL) Inpn pen  Generic drug: insulin glargine U-100 (Lantus)  16 units in the AM and 4 units in the PM  What changed: additional instructions     * polyethylene glycol 17 gram/dose powder  Commonly known as: GLYCOLAX  Use to cap to measure 17g, mix with liquid, and take by mouth once daily.  What changed: You were already taking a medication with the same name, and this prescription was added. Make sure you understand how and when to take each.     * polyethylene glycol 17 gram/dose powder  Commonly known as: GLYCOLAX  Take 17 g by mouth once daily.  What changed: Another medication with the same name was added. Make sure you understand how and when to take each.     * TYLENOL 8 HOUR ORAL  Take by mouth as needed.  What changed: Another medication with the same name was added. Make sure you understand how and when to take each.     * acetaminophen 650 MG Tbsr  Commonly known as: TYLENOL  Take 1 tablet (650 mg total) by mouth every 8 (eight) hours. for 10 days  What changed: You were already taking a medication with the same name, and this prescription was added. Make sure you understand how and when to take each.           * This list has 4 medication(s) that are the same as other medications prescribed for you. Read the directions carefully, and ask your doctor or other care provider to review them with you.                CONTINUE taking these medications      azelastine 137 mcg (0.1 %) nasal spray  Commonly known as: ASTELIN  2 sprays (274 mcg total) by Nasal  "route 2 (two) times daily.     blood sugar diagnostic Strp  Commonly known as: ACCU-CHEK JAXON  Check BG 2-3 times daily. Accuchek jaxon strips     CLARITIN 10 mg tablet  Generic drug: loratadine  Take 10 mg by mouth once daily.     CMPD PAIN MANAGEMENT COMPOUND OINTMNENT THREE  Apply bid prn pain     dorzolamide-timolol 2-0.5% 22.3-6.8 mg/mL ophthalmic solution  Commonly known as: COSOPT  Place 1 drop into both eyes 2 (two) times daily.     DROPLET PEN NEEDLE 31 gauge x 3/16" Ndle  Generic drug: pen needle, diabetic  USE AS DIRECTED  WITH  INSULIN     dutasteride 0.5 mg capsule  Commonly known as: AVODART  Take 1 capsule (0.5 mg total) by mouth once daily.     famotidine 20 MG tablet  Commonly known as: PEPCID  Take 1 tablet (20 mg total) by mouth 2 (two) times daily.     fenofibrate micronized 200 MG Cap  Commonly known as: LOFIBRA  Take 1 capsule (200 mg total) by mouth every evening.     fluticasone propionate 50 mcg/actuation nasal spray  Commonly known as: FLONASE  1 spray by Each Nostril route once daily.     hydrocortisone 2.5 % cream  Apply topically 2 (two) times daily.     latanoprost 0.005 % ophthalmic solution  INSTILL 1 DROP INTO BOTH EYES ONE TIME DAILY     levocetirizine 5 MG tablet  Commonly known as: XYZAL  Take 5 mg by mouth every evening.     losartan 25 MG tablet  Commonly known as: COZAAR  Take 1 tablet (25 mg total) by mouth once daily.     lovastatin 40 MG tablet  Commonly known as: MEVACOR  Take 1 tablet by mouth Every evening.     multivitamin capsule  Take 1 capsule by mouth once daily.     OZEMPIC 1 mg/dose (4 mg/3 mL)  Generic drug: semaglutide  Inject 1 mg into the skin every 7 days.     pantoprazole 40 MG tablet  Commonly known as: PROTONIX  Take 1 tablet (40 mg total) by mouth once daily.     sildenafil 20 mg Tab  Commonly known as: REVATIO  Take 1 tablet (20 mg total) by mouth daily as needed (PRN). Takes prn     triamcinolone acetonide 0.1% 0.1 % cream  Commonly known as: " KENALOG  AAA bid              Time spent on the discharge of patient: 15 minutes    Alen Serrano DO  Urology  [facility]

## 2024-10-10 NOTE — PLAN OF CARE
[facility] - [facility]  Discharge Final Note    Primary Care Provider: Jesse Grimes MD    Expected Discharge Date: 10/10/2024    Final Discharge Note (most recent)       Final Note - 10/10/24 1358          Final Note    Assessment Type Final Discharge Note     Anticipated Discharge Disposition Home-Health Care [facility]     What phone number can be called within the next 1-3 days to see how you are doing after discharge? --   [phone]       Post-Acute Status    Post-Acute Authorization Home Health     Home Health Status Set-up Complete/Auth obtained                     Important Message from Medicare             Contact Info       Lori Celeste NP   Specialty: Urology, Family Medicine    [address]  [facility] [address]   Phone: [phone]       Next Steps: Follow up in 10 day(s)            Future Appointments   Date Time Provider Department Center   10/30/2024 10:30 AM Ashly Kilgore NP [facility] DIABETE [facility]   11/13/2024  9:40 AM Dorinda Ludwig PA-C [facility] ENT [facility]   11/13/2024 10:30 AM Angie Flor CCC-A [facility] AUDIO [facility]   11/20/2024  9:00 AM [facility] LABORATORY [facility] [place]   2/7/2025  9:30 AM Alexander Reza MD [facility] OPHTHAL [facility]   2/13/2025 12:00 AM [facility] SPECIMEN LABORATORY IB SPECLAB [place]   2/13/2025  8:00 AM [facility] LABORATORY [facility] [place]   2/20/2025  9:20 AM Suma Sheth PA-C [facility] IM [facility]   9/11/2025 10:00 AM Luciano Sahu MD [facility] SLEEP [facility]     Patient discharged home to [facility] of [facility] on 10/10/24.    Wendy Wagoner RNCM  Case Management  [facility]  [phone]

## 2024-10-13 ENCOUNTER — NURSE TRIAGE (OUTPATIENT)
Dept: ADMINISTRATIVE | Facility: CLINIC | Age: 74
End: 2024-10-13
Payer: MEDICARE

## 2024-10-13 ENCOUNTER — TELEPHONE (OUTPATIENT)
Dept: ADMINISTRATIVE | Facility: CLINIC | Age: 74
End: 2024-10-13
Payer: MEDICARE

## 2024-10-13 NOTE — TELEPHONE ENCOUNTER
Patient missed TCC call re medications. Due to many questions re medication and no S/S, will have TCC take call. Cell number messaged to Susy Pabon LPN. She will call him back in a few minutes. Patient informed.    Reason for Disposition   Caller has medicine question only, adult not sick, AND triager answers question    Protocols used: Medication Question Call-A-

## 2024-10-13 NOTE — TELEPHONE ENCOUNTER
Spoke to pt - he wanted to clarify his dosage of his Tylenol.  We reviewed his discharge summary.  He verbalized understanding.  Advised to call back with any additional concerns.

## 2024-10-13 NOTE — TELEPHONE ENCOUNTER
"Day 3 Post-Discharge SMS Tracker     Phoned patient in response to reply of "3" to post-discharge texting tracker. Left message, "Hi. This is Susy, a nurse with [facility]'s post-discharge texting tracker. We received your reply of "3" to our text indicating that there might be medication questions or other concerns needing to be addressed. If your reply was due to symptoms, we do have a nurse on call 24 hours a day, 7 days a week. Please call [phone]. Otherwise, I will make a second attempt to call you back this afternoon. Thank you and have a great day."          "

## 2024-10-13 NOTE — TELEPHONE ENCOUNTER
Pt reports he is trying to get in touch with Susy Pabon LPN s/t medication questions after he missed a call from her. Call transferred to KAVITA Pabon.   Reason for Disposition   [1] Follow-up call to recent contact AND [2] information only call, no triage required    Protocols used: Information Only Call - No Triage-A-

## 2024-10-14 ENCOUNTER — PATIENT OUTREACH (OUTPATIENT)
Dept: ADMINISTRATIVE | Facility: CLINIC | Age: 74
End: 2024-10-14
Payer: MEDICARE

## 2024-10-14 NOTE — TELEPHONE ENCOUNTER
Spoke with the patient concerning home health the patient stated that the nurse that called him stated his PCP should complete the order for home health. The patient was informed to follow up with his urologist concerning home health orders. The patient stated that he feels he do not need home health at this time. The patient stated having an appointment on Friday with his urology doctor and will discuss home health.

## 2024-10-14 NOTE — PROGRESS NOTES
C3 nurse spoke with Rigoberto Vasquez  for a TCC post hospital discharge follow up call. The patient does not have a scheduled HOSFU appointment with Jesse Grimes MD  within 5-7 days post hospital discharge date 10/10/2024. C3 nurse was unable to schedule HOSFU appointment in [facility].  Please contact patient and schedule follow up appointment using HOSFU visit type on or before 10/17/2024.    Declined hosp f/u scheduling

## 2024-10-15 LAB
FINAL PATHOLOGIC DIAGNOSIS: NORMAL
GROSS: NORMAL
Lab: NORMAL

## 2024-10-18 ENCOUNTER — CLINICAL SUPPORT (OUTPATIENT)
Dept: UROLOGY | Facility: CLINIC | Age: 74
End: 2024-10-18
Payer: MEDICARE

## 2024-10-18 ENCOUNTER — HOSPITAL ENCOUNTER (OUTPATIENT)
Dept: RADIOLOGY | Facility: HOSPITAL | Age: 74
Discharge: HOME | End: 2024-10-18
Payer: MEDICARE

## 2024-10-18 DIAGNOSIS — N13.8 BPH WITH OBSTRUCTION/LOWER URINARY TRACT SYMPTOMS: Primary | ICD-10-CM

## 2024-10-18 DIAGNOSIS — N40.1 BPH WITH OBSTRUCTION/LOWER URINARY TRACT SYMPTOMS: Primary | ICD-10-CM

## 2024-10-18 DIAGNOSIS — Z85.51 HISTORY OF BLADDER CANCER: ICD-10-CM

## 2024-10-18 PROCEDURE — 74430 CONTRAST X-RAY BLADDER: CPT | Mod: 26,,, | Performed by: INTERNAL MEDICINE

## 2024-10-18 PROCEDURE — 25500020 PHARM REV CODE 255

## 2024-10-18 PROCEDURE — 51600 INJECTION FOR BLADDER X-RAY: CPT | Mod: ,,, | Performed by: INTERNAL MEDICINE

## 2024-10-18 PROCEDURE — 51600 INJECTION FOR BLADDER X-RAY: CPT

## 2024-10-18 PROCEDURE — 74430 CONTRAST X-RAY BLADDER: CPT | Mod: TC

## 2024-10-18 RX ADMIN — DIATRIZOATE MEGLUMINE 225 ML: 180 INJECTION, SOLUTION INTRAVESICAL at 10:10

## 2024-10-19 NOTE — PROGRESS NOTES
Patient arrived for void trial with catheter pull. Patient turner bag holding 500ml of urine at time of void trial initiation. Instilled 130ml of sterile water and waited for patient to void. Patient voided 250ml after catheter pulled. Patient had post op question about his drain site bandaging. Removed bandaging. Drain site clean and intact and bandaging none soiled, instructed when to call if any issues present. Discharged.

## 2024-10-20 NOTE — TELEPHONE ENCOUNTER
----- Message from Waylon Vega sent at 1/20/2023  8:07 AM CST -----  Contact: Pt  Type:  RX Refill Request    Who Called:  pt   Refill or New Rx: refill   RX Name and Strength: pantoprazole (PROTONIX) 40 MG tablet  How is the patient currently taking it? (ex. 1XDay): once daily  Is this a 30 day or 90 day RX: 90 days   [facility]   Local or Mail Order: local   Ordering Provider: [place]   Would the patient rather a call back or a response via [facility]? phone  Best Call Back Number: [phone]  Additional Information: needs to have it called in today / 2 more pills      [facility] [place] [address]  [facility]  [place] [address]  Phone: [phone] Fax: [phone]        
Home

## 2024-10-30 ENCOUNTER — OFFICE VISIT (OUTPATIENT)
Dept: DIABETES | Facility: CLINIC | Age: 74
End: 2024-10-30
Payer: MEDICARE

## 2024-10-30 VITALS
BODY MASS INDEX: 27.37 KG/M2 | WEIGHT: 196.19 LBS | DIASTOLIC BLOOD PRESSURE: 73 MMHG | HEART RATE: 68 BPM | SYSTOLIC BLOOD PRESSURE: 106 MMHG

## 2024-10-30 DIAGNOSIS — C67.2 MALIGNANT NEOPLASM OF LATERAL WALL OF URINARY BLADDER: ICD-10-CM

## 2024-10-30 DIAGNOSIS — N18.31 TYPE 2 DIABETES MELLITUS WITH STAGE 3A CHRONIC KIDNEY DISEASE, WITH LONG-TERM CURRENT USE OF INSULIN: Primary | ICD-10-CM

## 2024-10-30 DIAGNOSIS — I15.2 HYPERTENSION ASSOCIATED WITH DIABETES: Chronic | ICD-10-CM

## 2024-10-30 DIAGNOSIS — E11.69 HYPERLIPIDEMIA ASSOCIATED WITH TYPE 2 DIABETES MELLITUS: Chronic | ICD-10-CM

## 2024-10-30 DIAGNOSIS — E11.22 TYPE 2 DIABETES MELLITUS WITH STAGE 3A CHRONIC KIDNEY DISEASE, WITH LONG-TERM CURRENT USE OF INSULIN: Primary | ICD-10-CM

## 2024-10-30 DIAGNOSIS — E11.59 HYPERTENSION ASSOCIATED WITH DIABETES: Chronic | ICD-10-CM

## 2024-10-30 DIAGNOSIS — Z79.4 TYPE 2 DIABETES MELLITUS WITH STAGE 3A CHRONIC KIDNEY DISEASE, WITH LONG-TERM CURRENT USE OF INSULIN: Primary | ICD-10-CM

## 2024-10-30 DIAGNOSIS — E66.3 OVERWEIGHT (BMI 25.0-29.9): ICD-10-CM

## 2024-10-30 DIAGNOSIS — E78.5 HYPERLIPIDEMIA ASSOCIATED WITH TYPE 2 DIABETES MELLITUS: Chronic | ICD-10-CM

## 2024-10-30 LAB — GLUCOSE SERPL-MCNC: 135 MG/DL (ref 70–110)

## 2024-10-30 PROCEDURE — 99999 PR PBB SHADOW E&M-EST. PATIENT-LVL V: CPT | Mod: PBBFAC,HCNC,, | Performed by: NURSE PRACTITIONER

## 2024-10-31 RX ORDER — FENOFIBRATE 200 MG/1
200 CAPSULE ORAL NIGHTLY
Qty: 90 CAPSULE | Refills: 3 | Status: SHIPPED | OUTPATIENT
Start: 2024-10-31

## 2024-11-06 ENCOUNTER — OFFICE VISIT (OUTPATIENT)
Dept: UROLOGY | Facility: CLINIC | Age: 74
End: 2024-11-06
Payer: MEDICARE

## 2024-11-06 DIAGNOSIS — D49.4 BLADDER TUMOR: Primary | ICD-10-CM

## 2024-11-06 PROCEDURE — 4010F ACE/ARB THERAPY RXD/TAKEN: CPT | Mod: HCNC,CPTII,95, | Performed by: UROLOGY

## 2024-11-06 PROCEDURE — 3066F NEPHROPATHY DOC TX: CPT | Mod: HCNC,CPTII,95, | Performed by: UROLOGY

## 2024-11-06 PROCEDURE — 99024 POSTOP FOLLOW-UP VISIT: CPT | Mod: HCNC,95,, | Performed by: UROLOGY

## 2024-11-06 PROCEDURE — 3044F HG A1C LEVEL LT 7.0%: CPT | Mod: HCNC,CPTII,95, | Performed by: UROLOGY

## 2024-11-06 PROCEDURE — 3061F NEG MICROALBUMINURIA REV: CPT | Mod: HCNC,CPTII,95, | Performed by: UROLOGY

## 2024-11-06 PROCEDURE — 3072F LOW RISK FOR RETINOPATHY: CPT | Mod: HCNC,CPTII,95, | Performed by: UROLOGY

## 2024-11-06 NOTE — PROGRESS NOTES
[facility]  Urologic Oncology    The patient location is:  [facility]  The chief complaint leading to consultation is:  Bladder cancer    Visit type: audiovisual    Face to Face time with patient:  10  20 minutes of total time spent on the encounter, which includes face to face time and non-face to face time preparing to see the patient (eg, review of tests), Obtaining and/or reviewing separately obtained history, Documenting clinical information in the electronic or other health record, Independently interpreting results (not separately reported) and communicating results to the patient/family/caregiver, or Care coordination (not separately reported).     Each patient to whom he or she provides medical services by telemedicine is:  (1) informed of the relationship between the physician and patient and the respective role of any other health care provider with respect to management of the patient; and (2) notified that he or she may decline to receive medical services by telemedicine and may withdraw from such care at any time.    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  His excision was performed using Satinsky clamps with the bladder was not opened.  The final pathology revealed fat necrosis without evidence of residual tumor.  His lymph nodes were negative.    His catheter has  been removed, he was voiding well without issues    Allergies:  Review of patient's allergies indicates:   Allergen Reactions    Aspirin      Stomach pain even with ppi    Meperidine      Other reaction(s): Stomach upset    Niacin      Other reaction(s): hot skin        Medications per EMR:  (Not in a hospital admission)      Past Medical History:  Past Medical History:   Diagnosis Date    Acid reflux     [facility]    Angina pectoris     Back pain     BPH (benign prostatic hyperplasia)     blue    Cholesteatoma     Degenerative joint disease     Diverticulosis     Erectile dysfunction     History of elevated PSA 02/2014    normalized, dr dave annually    History of pericarditis     Hypercholesteremia     Hypertension     Iron deficiency anemia     Malignant neoplasm of lateral wall of urinary bladder 8/5/2024    СВЕТЛАНА (obstructive sleep apnea) 05/17/2022    Pacemaker     complete av block;NO afib    Renal disorder     Squamous cell cancer of skin of mastoid region of scalp     dr jaida salgado    Type 2 diabetes mellitus     dr wyman    Urinary incontinence     when he was 7 Yrs old.         Past Surgical History:  Past Surgical History:   Procedure Laterality Date    BIOPSY OF BLADDER N/A 8/30/2024    Procedure: BIOPSY, BLADDER;  Surgeon: Compa Hensley MD;  Location: [facility] OR [address] [place] [address];  Service: Urology;  Laterality: N/A;    BLADDER DIVERTICULECTOMY Right 10/8/2024    Procedure: EXCISION, DIVERTICULUM, BLADDER;  Surgeon: Compa Hensley MD;  [facility];  Service: Urology;  Laterality: Right;    BLADDER FULGURATION N/A 8/30/2024    Procedure: FULGURATION, BLADDER;  Surgeon: Compa Hensley MD;  Location: [facility] OR [address] [place] [address];  Service: Urology;  Laterality: N/A;    CARDIAC PACEMAKER PLACEMENT      COLONOSCOPY N/A 9/13/2019    Procedure: COLONOSCOPY;  Surgeon: Kan Ramsey III, MD;  Location: [facility] ENDO;  Service: Endoscopy;  Laterality: N/A;    COLONOSCOPY N/A 9/22/2022    Procedure: COLONOSCOPY;   Surgeon: Chris Garcia MD;  Location: [facility] ENDO;  Service: Endoscopy;  Laterality: N/A;    CYSTOSCOPY N/A 8/30/2024    Procedure: CYSTOSCOPY;  Surgeon: Compa Hensley MD;  Location: [facility] OR 1ST FLR;  Service: Urology;  Laterality: N/A;  with blue light    CYSTOSCOPY W/ RETROGRADES Bilateral 7/16/2024    Procedure: CYSTOSCOPY, WITH RETROGRADE PYELOGRAM;  Surgeon: Vance Olivera MD;  Location: [facility] OR;  Service: Urology;  Laterality: Bilateral;    CYSTOSCOPY,WITH URETERAL CATHETER INSERTION Bilateral 10/8/2024    Procedure: CYSTOSCOPY,WITH URETERAL CATHETER INSERTION;  Surgeon: Compa Hensley MD;  Location: [facility] OR 2ND FLR;  Service: Urology;  Laterality: Bilateral;    ESOPHAGOGASTRODUODENOSCOPY N/A 9/13/2019    Procedure: ESOPHAGOGASTRODUODENOSCOPY (EGD);  Surgeon: Kan Ramsey III, MD;  Location: [facility];  Service: Endoscopy;  Laterality: N/A;    ESOPHAGOGASTRODUODENOSCOPY N/A 9/3/2021    Procedure: EGD (ESOPHAGOGASTRODUODENOSCOPY);  Surgeon: Kaylene Pineda MD;  Location: [facility];  Service: Endoscopy;  Laterality: N/A;    ESOPHAGOGASTRODUODENOSCOPY N/A 5/24/2023    Procedure: EGD (ESOPHAGOGASTRODUODENOSCOPY);  Surgeon: Cory Bennett MD;  Location: [facility];  Service: Endoscopy;  Laterality: N/A;    FLEXIBLE CYSTOSCOPY N/A 10/8/2024    Procedure: CYSTOSCOPY, FLEXIBLE;  Surgeon: Compa Hensley MD;  Location: [facility] OR 2ND FLR;  Service: Urology;  Laterality: N/A;    INSERTION OF TYMPANOSTOMY TUBE Right 3/15/2023    Procedure: INSERTION, TYMPANOSTOMY TUBE;  Surgeon: Jose L Gudino MD;  Location: [facility] OR 2ND FLR;  Service: ENT;  Laterality: Right;    INSTILLATION OF URINARY BLADDER N/A 10/8/2024    Procedure: INSTILLATION, BLADDER;  Surgeon: Compa Hensley MD;  Location: [facility] OR 2ND FLR;  Service: Urology;  Laterality: N/A;  Chemotherapy instillation bladder    JOINT REPLACEMENT      KNEE CARTILAGE SURGERY Bilateral     LYMPHADENECTOMY, PELVIS Right 10/8/2024    Procedure: LYMPHADENECTOMY,  "PELVIS;  Surgeon: Compa Hensley MD;  Location: [address];  Service: Urology;  Laterality: Right;    NASAL SINUS SURGERY      PARTIAL SURGICAL REMOVAL OF BLADDER N/A 10/8/2024    Procedure: CYSTECTOMY, PARTIAL;  Surgeon: Compa Hensley MD;  Location: [address];  Service: Urology;  Laterality: N/A;    SHOULDER ARTHROSCOPY Right     TONSILLECTOMY      TOTAL KNEE ARTHROPLASTY Left 05/15/2017    TRANSURETHRAL RESECTION OF PROSTATE      TURBT (TRANSURETHRAL RESECTION OF BLADDER TUMOR) N/A 7/16/2024    Procedure: TURBT (TRANSURETHRAL RESECTION OF BLADDER TUMOR);  Surgeon: Vance Olivera MD;  Location: [facility];  Service: Urology;  Laterality: N/A;    TYMPANOPLASTY      TYMPANOPLASTY WITH MASTOIDECTOMY Left 3/15/2023    Procedure: TYMPANOPLASTY, WITH MASTOIDECTOMY;  Surgeon: Jose L Gudino MD;  Location: [address];  Service: ENT;  Laterality: Left;    vesectomy          Family History:  Family History   Problem Relation Name Age of Onset    Arthritis Mother      Hypertension Mother      Heart disease Father      Hypertension Father      Stroke Father      Diabetes Son      Diabetes Maternal Grandmother      Colon cancer Paternal Grandmother          Social History:  Social History     Tobacco Use    Smoking status: Never    Smokeless tobacco: Never   Substance Use Topics    Alcohol use: Yes     Comment: " once a month"          OBJECTIVE:     There were no vitals filed for this visit.     Physical Exam            Physical Exam    General: No acute distress. Nontoxic appearing.  HENT: Normocephalic. Atraumatic.  Respiratory: Normal respiratory effort. No conversational dyspnea. No audible wheezing.  Abdomen: No obvious distension.  Skin: No visible abnormalities.  Extremities: No edema upper extremities. No edema lower extremities.  Neurological: Alert and oriented x3. Normal speech.  Psychiatric: Normal mood. Normal affect. No evidence of SI.         LABS:    CBC:  Lab Results   Component Value Date "    WBC 5.69 10/10/2024    HGB 11.0 (L) 10/10/2024    HCT 32.3 (L) 10/10/2024    MCV 96 10/10/2024     10/10/2024         BMP:  Lab Results   Component Value Date     (L) 10/10/2024    K 3.3 (L) 10/10/2024     10/10/2024    CO2 21 (L) 10/10/2024    BUN 36 (H) 10/10/2024    CREATININE 1.5 (H) 10/10/2024    CALCIUM 8.1 (L) 10/10/2024    ANIONGAP 7 (L) 10/10/2024    EGFRNORACEVR 48.9 (A) 10/10/2024         ASSESSMENT/PLAN:     Assessment & Plan            Assessment: 74-year-old male status post open bladder diverticulectomy and lymphadenectomy    Plan:   -follow up 3 months postop with CT urogram, cystoscopy and cytology  -had a thorough discussion with the patient regarding BCG therapy and this is my 1st recommendation, I reached out to Dr. Olivera in [place], we discussed the decreased recurrence rate with BCG therapy and I recommended this formally.  He will let us know in the next several days how he would like to proceed, we have placed orders for CT urogram cystoscopy and cytology as a place gomez    communicating results to the patient/family/caregiver, or care coordinator  This encounter was dictated and transcribed using DeepScribe and [employer], please excuse any typographical or grammatical errors.

## 2024-11-07 ENCOUNTER — PATIENT MESSAGE (OUTPATIENT)
Dept: UROLOGY | Facility: CLINIC | Age: 74
End: 2024-11-07
Payer: MEDICARE

## 2024-11-11 ENCOUNTER — PATIENT MESSAGE (OUTPATIENT)
Dept: UROLOGY | Facility: CLINIC | Age: 74
End: 2024-11-11
Payer: MEDICARE

## 2024-11-13 ENCOUNTER — OFFICE VISIT (OUTPATIENT)
Dept: OTOLARYNGOLOGY | Facility: CLINIC | Age: 74
End: 2024-11-13
Payer: MEDICARE

## 2024-11-13 ENCOUNTER — CLINICAL SUPPORT (OUTPATIENT)
Dept: AUDIOLOGY | Facility: CLINIC | Age: 74
End: 2024-11-13
Payer: MEDICARE

## 2024-11-13 VITALS — WEIGHT: 196 LBS | HEIGHT: 61 IN | BODY MASS INDEX: 37 KG/M2

## 2024-11-13 DIAGNOSIS — Z98.890 HX OF TYMPANOMASTOIDECTOMY: ICD-10-CM

## 2024-11-13 DIAGNOSIS — H90.A32 MIXED CONDUCTIVE AND SENSORINEURAL HEARING LOSS OF LEFT EAR WITH RESTRICTED HEARING OF RIGHT EAR: ICD-10-CM

## 2024-11-13 DIAGNOSIS — H61.23 BILATERAL IMPACTED CERUMEN: Primary | ICD-10-CM

## 2024-11-13 DIAGNOSIS — H72.91 PERFORATION OF RIGHT TYMPANIC MEMBRANE: ICD-10-CM

## 2024-11-13 DIAGNOSIS — H90.6 MIXED CONDUCTIVE AND SENSORINEURAL HEARING LOSS OF BOTH EARS: ICD-10-CM

## 2024-11-13 PROCEDURE — 4010F ACE/ARB THERAPY RXD/TAKEN: CPT | Mod: HCNC,CPTII,S$GLB, | Performed by: PHYSICIAN ASSISTANT

## 2024-11-13 PROCEDURE — 3044F HG A1C LEVEL LT 7.0%: CPT | Mod: HCNC,CPTII,S$GLB, | Performed by: PHYSICIAN ASSISTANT

## 2024-11-13 PROCEDURE — 99999 PR PBB SHADOW E&M-EST. PATIENT-LVL III: CPT | Mod: PBBFAC,HCNC,, | Performed by: PHYSICIAN ASSISTANT

## 2024-11-13 PROCEDURE — 1159F MED LIST DOCD IN RCRD: CPT | Mod: HCNC,CPTII,S$GLB, | Performed by: PHYSICIAN ASSISTANT

## 2024-11-13 PROCEDURE — 99213 OFFICE O/P EST LOW 20 MIN: CPT | Mod: 25,HCNC,S$GLB, | Performed by: PHYSICIAN ASSISTANT

## 2024-11-13 PROCEDURE — 1101F PT FALLS ASSESS-DOCD LE1/YR: CPT | Mod: HCNC,CPTII,S$GLB, | Performed by: PHYSICIAN ASSISTANT

## 2024-11-13 PROCEDURE — 3061F NEG MICROALBUMINURIA REV: CPT | Mod: HCNC,CPTII,S$GLB, | Performed by: PHYSICIAN ASSISTANT

## 2024-11-13 PROCEDURE — 3072F LOW RISK FOR RETINOPATHY: CPT | Mod: HCNC,CPTII,S$GLB, | Performed by: PHYSICIAN ASSISTANT

## 2024-11-13 PROCEDURE — G0268 REMOVAL OF IMPACTED WAX MD: HCPCS | Mod: HCNC,S$GLB,, | Performed by: PHYSICIAN ASSISTANT

## 2024-11-13 PROCEDURE — 3288F FALL RISK ASSESSMENT DOCD: CPT | Mod: HCNC,CPTII,S$GLB, | Performed by: PHYSICIAN ASSISTANT

## 2024-11-13 PROCEDURE — 3008F BODY MASS INDEX DOCD: CPT | Mod: HCNC,CPTII,S$GLB, | Performed by: PHYSICIAN ASSISTANT

## 2024-11-13 PROCEDURE — 3066F NEPHROPATHY DOC TX: CPT | Mod: HCNC,CPTII,S$GLB, | Performed by: PHYSICIAN ASSISTANT

## 2024-11-13 PROCEDURE — 99999 PR PBB SHADOW E&M-EST. PATIENT-LVL I: CPT | Mod: PBBFAC,HCNC,, | Performed by: AUDIOLOGIST

## 2024-11-13 PROCEDURE — 1126F AMNT PAIN NOTED NONE PRSNT: CPT | Mod: HCNC,CPTII,S$GLB, | Performed by: PHYSICIAN ASSISTANT

## 2024-11-13 PROCEDURE — 92557 COMPREHENSIVE HEARING TEST: CPT | Mod: HCNC,S$GLB,, | Performed by: AUDIOLOGIST

## 2024-11-13 NOTE — PROGRESS NOTES
Rigoberto Vasquez was seen 11/13/2024 for an audiological evaluation. Patient seen post ear cleaning and check. C/o right perforation and left ME surgery for reoccurrence of cholesteatoma. He wears binaural hearing aids fitted at [facility]. His last audiogram was 6/2023 and he feels his hearing is stable.    Results reveal a mild-to-profound mixed hearing loss 250-8000 Hz for the right ear, and moderately severe-to-profound mixed hearing loss 250-8000 Hz for the left ear.   Speech Reception Thresholds were  55 dBHL for the right ear and 45 dBHL for the left ear.   Word recognition scores were good for the right ear and good for the left ear.      Patient was counseled on the above findings.    Recommendations:  Follow-up with ENT, as scheduled.   Repeat audiological evaluation per ENT, or sooner if needed.  Continue binaural hearing aid use. Patient provided with a copy of audiogram.   Wear hearing protection devices when around loud noise.

## 2024-11-13 NOTE — PROGRESS NOTES
Subjective:   Cerumen impactions     Patient ID: Rigoberto Vasquez is a 74 y.o. male.    Chief Complaint:  Excessive ear wax     Rigoberto Vasquez is a 74 y.o. male here to see me today for evaluation of a possible wax impaction in bilateral ears.  He has complaints of hearing loss in the affected ears, but denies pain or drainage.  This has been an issue in the past.  The patient has not been using any sort of ear drop to soften the wax.  He has hx of left ear CWU tmastoid in 2015 and more recently revision AS CWU tmastoid for recurrence and PE tube of right ear on 3/15/2023. Unfortunately developed perf in right ear after tube extruded.  He wears hearing aids bilaterally.    HPI  Review of Systems   HENT: Positive for hearing loss. Negative for ear discharge, ear pain and tinnitus.        Objective:     Physical Exam   HENT:   Right Ear: External ear and ear canal normal. Decreased hearing is noted.   Left Ear: External ear and ear canal normal. Decreased hearing is noted.   Bilateral moderate cerumen impactions, removal described below       Procedure Note    CHIEF COMPLAINT:  Cerumen Impaction    Description:  The patient was seated in an exam chair.  An ear speculum was placed in the right EAC and was examined under the microscope.  Suction and/or loop curettes were used to remove a large cerumen impaction.  The tympanic membrane was visualized and was perforated in appearance.  The procedure was repeated on the left side in a similar fashion.  The TM was intact and normal on this side.  The patient tolerated the procedure well.           AUDIOGRAM:    Results reveal a mild-to-profound mixed hearing loss 250-8000 Hz for the right ear, and moderately severe-to-profound mixed hearing loss 250-8000 Hz for the left ear.   Speech Reception Thresholds were  55 dBHL for the right ear and 45 dBHL for the left ear.   Word recognition scores were good for the right ear and good for the left ear.            Assessment:     1.  Bilateral impacted cerumen    2. Hx of tympanomastoidectomy    3. Mixed conductive and sensorineural hearing loss of both ears    4. Perforation of right tympanic membrane        Plan:     Cerumen impaction:  Removed today without difficulty. The patient should avoid Qtips.   Recommend repeat ear cleaning in 3 months for optimal hearing aid performance.  Reviewed results of today's audiogram - slight progression compared to 6/2023.  Dry ear precautions on AD.       Repair Type: None

## 2024-11-18 DIAGNOSIS — R10.13 DYSPEPSIA: ICD-10-CM

## 2024-11-18 DIAGNOSIS — R11.0 NAUSEA: ICD-10-CM

## 2024-11-20 ENCOUNTER — LAB VISIT (OUTPATIENT)
Dept: LAB | Facility: HOSPITAL | Age: 74
End: 2024-11-20
Attending: SURGERY
Payer: MEDICARE

## 2024-11-20 DIAGNOSIS — E11.22 TYPE 2 DIABETES MELLITUS WITH STAGE 3A CHRONIC KIDNEY DISEASE, WITH LONG-TERM CURRENT USE OF INSULIN: ICD-10-CM

## 2024-11-20 DIAGNOSIS — Z79.4 TYPE 2 DIABETES MELLITUS WITH STAGE 3A CHRONIC KIDNEY DISEASE, WITH LONG-TERM CURRENT USE OF INSULIN: ICD-10-CM

## 2024-11-20 DIAGNOSIS — N18.31 TYPE 2 DIABETES MELLITUS WITH STAGE 3A CHRONIC KIDNEY DISEASE, WITH LONG-TERM CURRENT USE OF INSULIN: ICD-10-CM

## 2024-11-20 LAB
ESTIMATED AVG GLUCOSE: 137 MG/DL (ref 68–131)
HBA1C MFR BLD: 6.4 % (ref 4–5.6)

## 2024-11-20 PROCEDURE — 83036 HEMOGLOBIN GLYCOSYLATED A1C: CPT | Mod: HCNC | Performed by: FAMILY MEDICINE

## 2024-11-20 PROCEDURE — 36415 COLL VENOUS BLD VENIPUNCTURE: CPT | Mod: HCNC,PO | Performed by: FAMILY MEDICINE

## 2024-11-21 RX ORDER — PANTOPRAZOLE SODIUM 40 MG/1
40 TABLET, DELAYED RELEASE ORAL DAILY
Qty: 90 TABLET | Refills: 1 | Status: SHIPPED | OUTPATIENT
Start: 2024-11-21 | End: 2025-05-20

## 2024-11-30 ENCOUNTER — PATIENT MESSAGE (OUTPATIENT)
Dept: GASTROENTEROLOGY | Facility: CLINIC | Age: 74
End: 2024-11-30
Payer: MEDICARE

## 2024-12-02 RX ORDER — FAMOTIDINE 20 MG/1
20 TABLET, FILM COATED ORAL 2 TIMES DAILY
Qty: 180 TABLET | Refills: 3 | Status: SHIPPED | OUTPATIENT
Start: 2024-12-02 | End: 2025-11-27

## 2024-12-09 ENCOUNTER — TELEPHONE (OUTPATIENT)
Dept: UROLOGY | Facility: CLINIC | Age: 74
End: 2024-12-09
Payer: MEDICARE

## 2024-12-09 NOTE — TELEPHONE ENCOUNTER
Patient calling regarding BCG and if any meds needed to be stopped before therapy starts next week. I told him no.    ----- Message from Quiana sent at 12/9/2024  9:29 AM CST -----  Regarding: PT ADVICE  Contact: [phone]  Pt is requesting a  call from someone in the office and is asking for a return call back soon. Thanks.     Reason for call:discuss plan of crae for surgery      Patient's DX:     Patient requesting call back or [facility] msg: [phone]

## 2024-12-16 ENCOUNTER — OFFICE VISIT (OUTPATIENT)
Dept: UROLOGY | Facility: CLINIC | Age: 74
End: 2024-12-16
Payer: MEDICARE

## 2024-12-16 VITALS
WEIGHT: 199.69 LBS | HEIGHT: 71 IN | DIASTOLIC BLOOD PRESSURE: 87 MMHG | SYSTOLIC BLOOD PRESSURE: 152 MMHG | HEART RATE: 68 BPM | BODY MASS INDEX: 27.96 KG/M2

## 2024-12-16 DIAGNOSIS — C67.2 MALIGNANT NEOPLASM OF LATERAL WALL OF URINARY BLADDER: Primary | ICD-10-CM

## 2024-12-16 DIAGNOSIS — N13.8 BPH WITH OBSTRUCTION/LOWER URINARY TRACT SYMPTOMS: ICD-10-CM

## 2024-12-16 DIAGNOSIS — N52.9 ERECTILE DYSFUNCTION, UNSPECIFIED ERECTILE DYSFUNCTION TYPE: ICD-10-CM

## 2024-12-16 DIAGNOSIS — R31.0 GROSS HEMATURIA: ICD-10-CM

## 2024-12-16 DIAGNOSIS — N40.1 BPH WITH OBSTRUCTION/LOWER URINARY TRACT SYMPTOMS: ICD-10-CM

## 2024-12-16 LAB
BILIRUB SERPL-MCNC: NORMAL MG/DL
BLOOD URINE, POC: NORMAL
CLARITY, POC UA: CLEAR
COLOR, POC UA: YELLOW
GLUCOSE UR QL STRIP: NORMAL
KETONES UR QL STRIP: NORMAL
LEUKOCYTE ESTERASE URINE, POC: NORMAL
NITRITE, POC UA: NORMAL
PH, POC UA: 7
PROTEIN, POC: NORMAL
SPECIFIC GRAVITY, POC UA: 1.03
UROBILINOGEN, POC UA: 0.2

## 2024-12-16 PROCEDURE — 99999 PR PBB SHADOW E&M-EST. PATIENT-LVL IV: CPT | Mod: PBBFAC,HCNC,,

## 2024-12-16 NOTE — PROGRESS NOTES
CHIEF COMPLAINT:  BCG dose 1       HISTORY OF PRESENTING ILLINESS:  Rigoberto Vasquez is a 74 y.o. male is an established pt with the Urology dept. He is here for dose 1 of BCG. He is a patient of Dr Hensley.     7/16/24- TURBT with Dr Olivera in [place]  8/30/24- Bx of the bladder  10/8/24- open diverticulectomy and partial cystectomy for a non-muscle invasive bladder cancer. The final path revealed fat necrosis w/o evidence of residual tumor. Lymph nodes were negative.    12/16/24- Induction BCG; dose 1 of 1      REVIEW OF SYSTEMS:  Review of Systems   Constitutional:  Negative for chills and fever.   Gastrointestinal:  Negative for nausea and vomiting.   Genitourinary:  Negative for frequency, hematuria and urgency.         PATIENT HISTORY:    Past Medical History:   Diagnosis Date    Acid reflux     gi [facility]    Angina pectoris     Back pain     BPH (benign prostatic hyperplasia)     blue    Cholesteatoma     Degenerative joint disease     Diverticulosis     Erectile dysfunction     History of elevated PSA 02/2014    normalized, dr dave annually    History of pericarditis     Hypercholesteremia     Hypertension     Iron deficiency anemia     Malignant neoplasm of lateral wall of urinary bladder 8/5/2024    СВЕТЛАНА (obstructive sleep apnea) 05/17/2022    Pacemaker     complete av block;NO afib    Renal disorder     Squamous cell cancer of skin of mastoid region of scalp     dr jaida salgado    Type 2 diabetes mellitus     dr wyman    Urinary incontinence     when he was 7 Yrs old.        Past Surgical History:   Procedure Laterality Date    BIOPSY OF BLADDER N/A 8/30/2024    Procedure: BIOPSY, BLADDER;  Surgeon: Compa Hensley MD;  Location: [facility] OR [address] [place] [address];  Service: Urology;  Laterality: N/A;    BLADDER DIVERTICULECTOMY Right 10/8/2024    Procedure: EXCISION, DIVERTICULUM, BLADDER;  Surgeon: Compa Hensley MD;  Location: [facility] OR 2ND FLR;  Service: Urology;  Laterality: Right;    BLADDER FULGURATION N/A 8/30/2024     Procedure: FULGURATION, BLADDER;  Surgeon: Compa Hensley MD;  Location: [facility] OR 1ST FLR;  Service: Urology;  Laterality: N/A;    CARDIAC PACEMAKER PLACEMENT      COLONOSCOPY N/A 9/13/2019    Procedure: COLONOSCOPY;  Surgeon: Kan Ramsey III, MD;  Location: [facility];  Service: Endoscopy;  Laterality: N/A;    COLONOSCOPY N/A 9/22/2022    Procedure: COLONOSCOPY;  Surgeon: Chris Garcia MD;  Location: [facility] ENDO;  Service: Endoscopy;  Laterality: N/A;    CYSTOSCOPY N/A 8/30/2024    Procedure: CYSTOSCOPY;  Surgeon: Compa Hensley MD;  Location: [facility] OR 1ST FLR;  Service: Urology;  Laterality: N/A;  with blue light    CYSTOSCOPY W/ RETROGRADES Bilateral 7/16/2024    Procedure: CYSTOSCOPY, WITH RETROGRADE PYELOGRAM;  Surgeon: Vance Olivera MD;  Location: [facility];  Service: Urology;  Laterality: Bilateral;    CYSTOSCOPY,WITH URETERAL CATHETER INSERTION Bilateral 10/8/2024    Procedure: CYSTOSCOPY,WITH URETERAL CATHETER INSERTION;  Surgeon: Compa Hensley MD;  Location: [facility] OR 2ND FLR;  Service: Urology;  Laterality: Bilateral;    ESOPHAGOGASTRODUODENOSCOPY N/A 9/13/2019    Procedure: ESOPHAGOGASTRODUODENOSCOPY (EGD);  Surgeon: Kan Ramsey III, MD;  Location: [facility];  Service: Endoscopy;  Laterality: N/A;    ESOPHAGOGASTRODUODENOSCOPY N/A 9/3/2021    Procedure: EGD (ESOPHAGOGASTRODUODENOSCOPY);  Surgeon: Kaylene Pineda MD;  Location: [facility];  Service: Endoscopy;  Laterality: N/A;    ESOPHAGOGASTRODUODENOSCOPY N/A 5/24/2023    Procedure: EGD (ESOPHAGOGASTRODUODENOSCOPY);  Surgeon: Cory Bennett MD;  Location: [facility];  Service: Endoscopy;  Laterality: N/A;    FLEXIBLE CYSTOSCOPY N/A 10/8/2024    Procedure: CYSTOSCOPY, FLEXIBLE;  Surgeon: Compa Hensley MD;  Location: [facility] OR 2ND FLR;  Service: Urology;  Laterality: N/A;    INSERTION OF TYMPANOSTOMY TUBE Right 3/15/2023    Procedure: INSERTION, TYMPANOSTOMY TUBE;  Surgeon: Jose L Gudino MD;  Location: [facility] OR [address] [place] [address];  Service:  "ENT;  Laterality: Right;    INSTILLATION OF URINARY BLADDER N/A 10/8/2024    Procedure: INSTILLATION, BLADDER;  Surgeon: Compa Hesnley MD;  Location: [facility] OR [address] [place] [address];  Service: Urology;  Laterality: N/A;  Chemotherapy instillation bladder    JOINT REPLACEMENT      KNEE CARTILAGE SURGERY Bilateral     LYMPHADENECTOMY, PELVIS Right 10/8/2024    Procedure: LYMPHADENECTOMY, PELVIS;  Surgeon: Compa Hensley MD;  Location: [facility] OR [address] [place] [address];  Service: Urology;  Laterality: Right;    NASAL SINUS SURGERY      PARTIAL SURGICAL REMOVAL OF BLADDER N/A 10/8/2024    Procedure: CYSTECTOMY, PARTIAL;  Surgeon: Compa Hensley MD;  Location: [facility] OR [address] [place] [address];  Service: Urology;  Laterality: N/A;    SHOULDER ARTHROSCOPY Right     TONSILLECTOMY      TOTAL KNEE ARTHROPLASTY Left 05/15/2017    TRANSURETHRAL RESECTION OF PROSTATE      TURBT (TRANSURETHRAL RESECTION OF BLADDER TUMOR) N/A 7/16/2024    Procedure: TURBT (TRANSURETHRAL RESECTION OF BLADDER TUMOR);  Surgeon: Vance Olivera MD;  Location: [facility];  Service: Urology;  Laterality: N/A;    TYMPANOPLASTY      TYMPANOPLASTY WITH MASTOIDECTOMY Left 3/15/2023    Procedure: TYMPANOPLASTY, WITH MASTOIDECTOMY;  Surgeon: Jose L Gudino MD;  Location: [address];  Service: ENT;  Laterality: Left;    vesectomy         Family History   Problem Relation Name Age of Onset    Arthritis Mother      Hypertension Mother      Heart disease Father      Hypertension Father      Stroke Father      Diabetes Son      Diabetes Maternal Grandmother      Colon cancer Paternal Grandmother         Social History     Socioeconomic History    Marital status:      Spouse name: SAUL    Number of children: 2   Occupational History    Occupation: retired- [employer] .   Tobacco Use    Smoking status: Never    Smokeless tobacco: Never   Substance and Sexual Activity    Alcohol use: Yes     Comment: " once a month"    Drug use: No    Sexual activity: Yes     Partners: Female   Social " "History Narrative     . Lives with spouse. Has 2 children. Patient retired from [employer]. His son has type 1 diabetes.     Social Drivers of Health     Financial Resource Strain: Low Risk  (10/9/2024)    Overall Financial Resource Strain (CARDIA)     Difficulty of Paying Living Expenses: Not hard at all   Food Insecurity: No Food Insecurity (10/9/2024)    Hunger Vital Sign     Worried About Running Out of Food in the Last Year: Never true     Ran Out of Food in the Last Year: Never true   Transportation Needs: No Transportation Needs (10/9/2024)    TRANSPORTATION NEEDS     Transportation : No   Physical Activity: Sufficiently Active (10/9/2024)    Exercise Vital Sign     Days of Exercise per Week: 3 days     Minutes of Exercise per Session: 60 min   Stress: No Stress Concern Present (10/9/2024)    [employer] - Occupational Stress Questionnaire     Feeling of Stress : Not at all   Housing Stability: Low Risk  (10/9/2024)    Housing Stability Vital Sign     Unable to Pay for Housing in the Last Year: No     Homeless in the Last Year: No       Allergies:  Aspirin, Meperidine, and Niacin    Medications:    Current Outpatient Medications:     ACETAMINOPHEN (TYLENOL 8 HOUR ORAL), Take by mouth as needed., Disp: , Rfl:     azelastine (ASTELIN) 137 mcg (0.1 %) nasal spray, 2 sprays (274 mcg total) by Nasal route 2 (two) times daily., Disp: 30 mL, Rfl: 12    blood sugar diagnostic (ACCU-CHEK JAXON) Strp, Check BG 2-3 times daily. Accuchek jaxon strips, Disp: 300 strip, Rfl: 3    dorzolamide-timolol 2-0.5% (COSOPT) 22.3-6.8 mg/mL ophthalmic solution, Place 1 drop into both eyes 2 (two) times daily., Disp: 10 mL, Rfl: 5    DROPLET PEN NEEDLE 31 gauge x 3/16" Ndle, USE AS DIRECTED  WITH  INSULIN, Disp: 200 each, Rfl: 3    dutasteride (AVODART) 0.5 mg capsule, Take 1 capsule (0.5 mg total) by mouth once daily., Disp: 90 capsule, Rfl: 3    famotidine (PEPCID) 20 MG tablet, Take 1 tablet " (20 mg total) by mouth 2 (two) times daily., Disp: 180 tablet, Rfl: 3    fenofibrate micronized (LOFIBRA) 200 MG Cap, Take 1 capsule (200 mg total) by mouth every evening., Disp: 90 capsule, Rfl: 3    fluticasone propionate (FLONASE) 50 mcg/actuation nasal spray, 1 spray by Each Nostril route once daily., Disp: , Rfl:     gabapentin (CMPD PAIN MANAGEMENT COMPOUND OINTMNENT THREE), Apply bid prn pain, Disp: 100 g, Rfl: 11    hydrocortisone 2.5 % cream, Apply topically 2 (two) times daily., Disp: 3.5 g, Rfl: 5    insulin (LANTUS SOLOSTAR U-100 INSULIN) glargine 100 units/mL SubQ pen, 16 units in the AM and 4 units in the PM, Disp: 3 mL, Rfl: 11    latanoprost 0.005 % ophthalmic solution, INSTILL 1 DROP INTO BOTH EYES ONE TIME DAILY, Disp: 7.5 mL, Rfl: 3    levocetirizine (XYZAL) 5 MG tablet, Take 5 mg by mouth every evening., Disp: , Rfl:     loratadine (CLARITIN) 10 mg tablet, Take 10 mg by mouth once daily., Disp: , Rfl:     losartan (COZAAR) 25 MG tablet, Take 1 tablet (25 mg total) by mouth once daily., Disp: 90 tablet, Rfl: 3    lovastatin (MEVACOR) 40 MG tablet, Take 1 tablet by mouth Every evening., Disp: 90 tablet, Rfl: 3    multivitamin capsule, Take 1 capsule by mouth once daily., Disp: , Rfl:     oxyCODONE (ROXICODONE) 5 MG immediate release tablet, Take 1 tablet (5 mg total) by mouth every 4 (four) hours as needed for Pain., Disp: 10 tablet, Rfl: 0    pantoprazole (PROTONIX) 40 MG tablet, Take 1 tablet (40 mg total) by mouth once daily., Disp: 90 tablet, Rfl: 1    polyethylene glycol (GLYCOLAX) 17 gram/dose powder, Take 17 g by mouth once daily., Disp: , Rfl:     polyethylene glycol (GLYCOLAX) 17 gram/dose powder, Use to cap to measure 17g, mix with liquid, and take by mouth once daily., Disp: 510 g, Rfl: 0    semaglutide (OZEMPIC) 1 mg/dose (4 mg/3 mL), Inject 1 mg into the skin every 7 days., Disp: 9 mL, Rfl: 4    sildenafil (REVATIO) 20 mg Tab, Take 1 tablet (20 mg total) by mouth daily as needed (PRN).  Takes prn, Disp: 30 tablet, Rfl: 11    triamcinolone acetonide 0.1% (KENALOG) 0.1 % cream, AAA bid, Disp: 454 g, Rfl: 0    Current Facility-Administered Medications:     LIDOcaine HCl 2% urojet, , Mucous Membrane, 1 time in Clinic/HOD,     PHYSICAL EXAMINATION:  Physical Exam  Constitutional:       Appearance: Normal appearance.   HENT:      Head: Normocephalic and atraumatic.   Eyes:      Pupils: Pupils are equal, round, and reactive to light.   Pulmonary:      Effort: Pulmonary effort is normal. No respiratory distress.   Genitourinary:     Penis: Normal.    Skin:     General: Skin is warm and dry.      Capillary Refill: Capillary refill takes less than 2 seconds.   Neurological:      General: No focal deficit present.      Mental Status: He is alert.   Psychiatric:         Mood and Affect: Mood normal.         Behavior: Behavior normal.           LABS:      In office UA today was clear.     Induction BCG; dose 1 of 1      Lab Results   Component Value Date    PSA 1.4 06/25/2020    PSA 2.3 12/30/2013    PSA 1.15 02/11/2013    PSADIAG 0.19 06/21/2024    PSADIAG 0.33 08/11/2023    PSADIAG 0.67 08/23/2022       Lab Results   Component Value Date    CREATININE 1.5 (H) 10/10/2024    EGFRNORACEVR 48.9 (A) 10/10/2024               IMPRESSION:    Encounter Diagnoses   Name Primary?    Malignant neoplasm of lateral wall of urinary bladder Yes    Erectile dysfunction, unspecified erectile dysfunction type     BPH with obstruction/lower urinary tract symptoms     Gross hematuria          Assessment:       1. Malignant neoplasm of lateral wall of urinary bladder    2. Erectile dysfunction, unspecified erectile dysfunction type    3. BPH with obstruction/lower urinary tract symptoms    4. Gross hematuria        Plan:       I spent a total of 30 minutes on the day of the visit. This includes face to face time and non-face to face time preparing to see the patient (eg, review of tests), obtaining and/or reviewing separately  obtained history, documenting clinical information in the electronic or other health record, independently interpreting results and communicating results to the patient/family/caregiver, or care coordinator  10 minutes pre counseling including last installation  10 minutes prep, travel to get medication & installation of the BCG  10 minutes post installation instructions and room clean up  We addressed his UA  Education and recommendations of today's plan of care with the BCG Bladder Installations; including home remedies and needed follow up with PCP.   BCG dose 1 was instilled using sterile and BCG precautions.  Tolerated well   We discussed his Bladder Cancer; discussed after TURBT; cancer was removed.  Discussed BCG and the expectations, benefits, risks, as well as importance of post clean up for 6 hours after the 2 hour urination.  They was given detailed written instructions as well.   Diet modifications; no caffeine the morning of installation; increase water intake at the 2 hour void to flush out.  No sex for 48 hours after installation; use of condom during the 3/6 week installations.  RTC one week for dose 2.

## 2024-12-23 ENCOUNTER — OFFICE VISIT (OUTPATIENT)
Dept: UROLOGY | Facility: CLINIC | Age: 74
End: 2024-12-23
Payer: MEDICARE

## 2024-12-23 VITALS
HEIGHT: 71 IN | DIASTOLIC BLOOD PRESSURE: 76 MMHG | WEIGHT: 194 LBS | HEART RATE: 69 BPM | SYSTOLIC BLOOD PRESSURE: 134 MMHG | BODY MASS INDEX: 27.16 KG/M2

## 2024-12-23 DIAGNOSIS — D49.4 BLADDER TUMOR: ICD-10-CM

## 2024-12-23 DIAGNOSIS — C67.2 MALIGNANT NEOPLASM OF LATERAL WALL OF URINARY BLADDER: Primary | ICD-10-CM

## 2024-12-23 LAB
BILIRUB SERPL-MCNC: NORMAL MG/DL
BLOOD URINE, POC: NORMAL
CLARITY, POC UA: CLEAR
COLOR, POC UA: YELLOW
GLUCOSE UR QL STRIP: NORMAL
KETONES UR QL STRIP: NORMAL
LEUKOCYTE ESTERASE URINE, POC: NORMAL
NITRITE, POC UA: NORMAL
PH, POC UA: 6.5
PROTEIN, POC: NORMAL
SPECIFIC GRAVITY, POC UA: 1.02
UROBILINOGEN, POC UA: 1

## 2024-12-23 PROCEDURE — 99999 PR PBB SHADOW E&M-EST. PATIENT-LVL IV: CPT | Mod: PBBFAC,HCNC,,

## 2024-12-23 NOTE — PROGRESS NOTES
CHIEF COMPLAINT:  BCG dose 2      HISTORY OF PRESENTING ILLINESS:  Rigoberto Vasquez is a 74 y.o. male is an established pt with the Urology dept. He is here for dose 1 of BCG. He is a patient of Dr Hensley.     7/16/24- TURBT with Dr Olivera in [place]  8/30/24- Bx of the bladder  10/8/24- open diverticulectomy and partial cystectomy for a non-muscle invasive bladder cancer. The final path revealed fat necrosis w/o evidence of residual tumor. Lymph nodes were negative.    12/16/24- Induction BCG; dose 1 of 5  12/23/24- Induction BCG; dose 2 of 5        REVIEW OF SYSTEMS:  Review of Systems   Constitutional:  Negative for chills and fever.   Gastrointestinal:  Negative for nausea and vomiting.   Genitourinary:  Negative for frequency, hematuria and urgency.         PATIENT HISTORY:    Past Medical History:   Diagnosis Date    Acid reflux     [facility]    Angina pectoris     Back pain     BPH (benign prostatic hyperplasia)     blue    Cholesteatoma     Degenerative joint disease     Diverticulosis     Erectile dysfunction     History of elevated PSA 02/2014    normalized, dr dave annually    History of pericarditis     Hypercholesteremia     Hypertension     Iron deficiency anemia     Malignant neoplasm of lateral wall of urinary bladder 8/5/2024    СВЕТЛАНА (obstructive sleep apnea) 05/17/2022    Pacemaker     complete av block;NO afib    Renal disorder     Squamous cell cancer of skin of mastoid region of scalp     dr jaida salgado    Type 2 diabetes mellitus     dr wyman    Urinary incontinence     when he was 7 Yrs old.        Past Surgical History:   Procedure Laterality Date    BIOPSY OF BLADDER N/A 8/30/2024    Procedure: BIOPSY, BLADDER;  Surgeon: Compa Hensley MD;  [facility];  Service: Urology;  Laterality: N/A;    BLADDER DIVERTICULECTOMY Right 10/8/2024    Procedure: EXCISION, DIVERTICULUM, BLADDER;  Surgeon: Compa Hensley MD;  Location: [facility] OR 2ND FLR;  Service: Urology;  Laterality:  Right;    BLADDER FULGURATION N/A 8/30/2024    Procedure: FULGURATION, BLADDER;  Surgeon: Compa Hensley MD;  Location: [facility] OR 1ST FLR;  Service: Urology;  Laterality: N/A;    CARDIAC PACEMAKER PLACEMENT      COLONOSCOPY N/A 9/13/2019    Procedure: COLONOSCOPY;  Surgeon: Kan Ramsey III, MD;  Location: [facility];  Service: Endoscopy;  Laterality: N/A;    COLONOSCOPY N/A 9/22/2022    Procedure: COLONOSCOPY;  Surgeon: Chris Garcia MD;  Location: [facility];  Service: Endoscopy;  Laterality: N/A;    CYSTOSCOPY N/A 8/30/2024    Procedure: CYSTOSCOPY;  Surgeon: Compa Hensley MD;  Location: [facility] OR 1ST FLR;  Service: Urology;  Laterality: N/A;  with blue light    CYSTOSCOPY W/ RETROGRADES Bilateral 7/16/2024    Procedure: CYSTOSCOPY, WITH RETROGRADE PYELOGRAM;  Surgeon: Vance Olivera MD;  Location: [facility];  Service: Urology;  Laterality: Bilateral;    CYSTOSCOPY,WITH URETERAL CATHETER INSERTION Bilateral 10/8/2024    Procedure: CYSTOSCOPY,WITH URETERAL CATHETER INSERTION;  Surgeon: Compa Hensley MD;  Location: [facility] OR 2ND FLR;  Service: Urology;  Laterality: Bilateral;    ESOPHAGOGASTRODUODENOSCOPY N/A 9/13/2019    Procedure: ESOPHAGOGASTRODUODENOSCOPY (EGD);  Surgeon: Kan Ramsey III, MD;  Location: [facility];  Service: Endoscopy;  Laterality: N/A;    ESOPHAGOGASTRODUODENOSCOPY N/A 9/3/2021    Procedure: EGD (ESOPHAGOGASTRODUODENOSCOPY);  Surgeon: Kaylene Pineda MD;  Location: [facility];  Service: Endoscopy;  Laterality: N/A;    ESOPHAGOGASTRODUODENOSCOPY N/A 5/24/2023    Procedure: EGD (ESOPHAGOGASTRODUODENOSCOPY);  Surgeon: Cory Bennett MD;  Location: [facility];  Service: Endoscopy;  Laterality: N/A;    FLEXIBLE CYSTOSCOPY N/A 10/8/2024    Procedure: CYSTOSCOPY, FLEXIBLE;  Surgeon: Compa Hensley MD;  Location: [facility] OR 2ND FLR;  Service: Urology;  Laterality: N/A;    INSERTION OF TYMPANOSTOMY TUBE Right 3/15/2023    Procedure: INSERTION, TYMPANOSTOMY TUBE;  Surgeon: Jose L  "MD Shahab;  Location: [address];  Service: ENT;  Laterality: Right;    INSTILLATION OF URINARY BLADDER N/A 10/8/2024    Procedure: INSTILLATION, BLADDER;  Surgeon: Compa Hensley MD;  Location: [address];  Service: Urology;  Laterality: N/A;  Chemotherapy instillation bladder    JOINT REPLACEMENT      KNEE CARTILAGE SURGERY Bilateral     LYMPHADENECTOMY, PELVIS Right 10/8/2024    Procedure: LYMPHADENECTOMY, PELVIS;  Surgeon: Compa Hensley MD;  Location: [address];  Service: Urology;  Laterality: Right;    NASAL SINUS SURGERY      PARTIAL SURGICAL REMOVAL OF BLADDER N/A 10/8/2024    Procedure: CYSTECTOMY, PARTIAL;  Surgeon: Compa Hensley MD;  Location: [address];  Service: Urology;  Laterality: N/A;    SHOULDER ARTHROSCOPY Right     TONSILLECTOMY      TOTAL KNEE ARTHROPLASTY Left 05/15/2017    TRANSURETHRAL RESECTION OF PROSTATE      TURBT (TRANSURETHRAL RESECTION OF BLADDER TUMOR) N/A 7/16/2024    Procedure: TURBT (TRANSURETHRAL RESECTION OF BLADDER TUMOR);  Surgeon: Vance Olivera MD;  Location: [facility];  Service: Urology;  Laterality: N/A;    TYMPANOPLASTY      TYMPANOPLASTY WITH MASTOIDECTOMY Left 3/15/2023    Procedure: TYMPANOPLASTY, WITH MASTOIDECTOMY;  Surgeon: Jose L Gudino MD;  Location: [address];  Service: ENT;  Laterality: Left;    vesectomy         Family History   Problem Relation Name Age of Onset    Arthritis Mother      Hypertension Mother      Heart disease Father      Hypertension Father      Stroke Father      Diabetes Son      Diabetes Maternal Grandmother      Colon cancer Paternal Grandmother         Social History     Socioeconomic History    Marital status:      Spouse name: SAUL    Number of children: 2   Occupational History    Occupation: retired- [employer] .   Tobacco Use    Smoking status: Never    Smokeless tobacco: Never   Substance and Sexual Activity    Alcohol use: Yes     Comment: " once a month"    Drug use: No    " "Sexual activity: Yes     Partners: Female   Social History Narrative     . Lives with spouse. Has 2 children. Patient retired from [employer]. His son has type 1 diabetes.     Social Drivers of Health     Financial Resource Strain: Low Risk  (10/9/2024)    Overall Financial Resource Strain (CARDIA)     Difficulty of Paying Living Expenses: Not hard at all   Food Insecurity: No Food Insecurity (10/9/2024)    Hunger Vital Sign     Worried About Running Out of Food in the Last Year: Never true     Ran Out of Food in the Last Year: Never true   Transportation Needs: No Transportation Needs (10/9/2024)    TRANSPORTATION NEEDS     Transportation : No   Physical Activity: Sufficiently Active (10/9/2024)    Exercise Vital Sign     Days of Exercise per Week: 3 days     Minutes of Exercise per Session: 60 min   Stress: No Stress Concern Present (10/9/2024)    [employer] - Occupational Stress Questionnaire     Feeling of Stress : Not at all   Housing Stability: Low Risk  (10/9/2024)    Housing Stability Vital Sign     Unable to Pay for Housing in the Last Year: No     Homeless in the Last Year: No       Allergies:  Aspirin, Meperidine, and Niacin    Medications:    Current Outpatient Medications:     ACETAMINOPHEN (TYLENOL 8 HOUR ORAL), Take by mouth as needed., Disp: , Rfl:     azelastine (ASTELIN) 137 mcg (0.1 %) nasal spray, 2 sprays (274 mcg total) by Nasal route 2 (two) times daily., Disp: 30 mL, Rfl: 12    blood sugar diagnostic (ACCU-CHEK JAXON) Strp, Check BG 2-3 times daily. Accuchek jaxon strips, Disp: 300 strip, Rfl: 3    dorzolamide-timolol 2-0.5% (COSOPT) 22.3-6.8 mg/mL ophthalmic solution, Place 1 drop into both eyes 2 (two) times daily., Disp: 10 mL, Rfl: 5    DROPLET PEN NEEDLE 31 gauge x 3/16" Ndle, USE AS DIRECTED  WITH  INSULIN, Disp: 200 each, Rfl: 3    dutasteride (AVODART) 0.5 mg capsule, Take 1 capsule (0.5 mg total) by mouth once daily., Disp: 90 capsule, Rfl: 3    " famotidine (PEPCID) 20 MG tablet, Take 1 tablet (20 mg total) by mouth 2 (two) times daily., Disp: 180 tablet, Rfl: 3    fenofibrate micronized (LOFIBRA) 200 MG Cap, Take 1 capsule (200 mg total) by mouth every evening., Disp: 90 capsule, Rfl: 3    fluticasone propionate (FLONASE) 50 mcg/actuation nasal spray, 1 spray by Each Nostril route once daily., Disp: , Rfl:     gabapentin (CMPD PAIN MANAGEMENT COMPOUND OINTMNENT THREE), Apply bid prn pain, Disp: 100 g, Rfl: 11    hydrocortisone 2.5 % cream, Apply topically 2 (two) times daily., Disp: 3.5 g, Rfl: 5    insulin (LANTUS SOLOSTAR U-100 INSULIN) glargine 100 units/mL SubQ pen, 16 units in the AM and 4 units in the PM, Disp: 3 mL, Rfl: 11    levocetirizine (XYZAL) 5 MG tablet, Take 5 mg by mouth every evening., Disp: , Rfl:     loratadine (CLARITIN) 10 mg tablet, Take 10 mg by mouth once daily., Disp: , Rfl:     losartan (COZAAR) 25 MG tablet, Take 1 tablet (25 mg total) by mouth once daily., Disp: 90 tablet, Rfl: 3    lovastatin (MEVACOR) 40 MG tablet, Take 1 tablet by mouth Every evening., Disp: 90 tablet, Rfl: 3    multivitamin capsule, Take 1 capsule by mouth once daily., Disp: , Rfl:     oxyCODONE (ROXICODONE) 5 MG immediate release tablet, Take 1 tablet (5 mg total) by mouth every 4 (four) hours as needed for Pain., Disp: 10 tablet, Rfl: 0    pantoprazole (PROTONIX) 40 MG tablet, Take 1 tablet (40 mg total) by mouth once daily., Disp: 90 tablet, Rfl: 1    polyethylene glycol (GLYCOLAX) 17 gram/dose powder, Take 17 g by mouth once daily., Disp: , Rfl:     polyethylene glycol (GLYCOLAX) 17 gram/dose powder, Use to cap to measure 17g, mix with liquid, and take by mouth once daily., Disp: 510 g, Rfl: 0    semaglutide (OZEMPIC) 1 mg/dose (4 mg/3 mL), Inject 1 mg into the skin every 7 days., Disp: 9 mL, Rfl: 4    sildenafil (REVATIO) 20 mg Tab, Take 1 tablet (20 mg total) by mouth daily as needed (PRN). Takes prn, Disp: 30 tablet, Rfl: 11    triamcinolone acetonide  0.1% (KENALOG) 0.1 % cream, AAA bid, Disp: 454 g, Rfl: 0    latanoprost 0.005 % ophthalmic solution, INSTILL 1 DROP INTO BOTH EYES ONE TIME DAILY, Disp: 7.5 mL, Rfl: 3    Current Facility-Administered Medications:     BCG live (JESUS) 50 mg in 0.9% NaCl 50 mL bladder instillation, 50 mg, Intravesical, Once, Kimberley Melo R, NP    LIDOcaine HCl 2% urojet, , Mucous Membrane, 1 time in Clinic/HOD,     PHYSICAL EXAMINATION:  Physical Exam  Constitutional:       Appearance: Normal appearance.   HENT:      Head: Normocephalic and atraumatic.   Eyes:      Pupils: Pupils are equal, round, and reactive to light.   Pulmonary:      Effort: Pulmonary effort is normal. No respiratory distress.   Genitourinary:     Penis: Normal.    Skin:     General: Skin is warm and dry.      Capillary Refill: Capillary refill takes less than 2 seconds.   Neurological:      General: No focal deficit present.      Mental Status: He is alert.   Psychiatric:         Mood and Affect: Mood normal.         Behavior: Behavior normal.           LABS:      In office UA today was clear of active infection no visible blood.    Induction BCG; dose 2 of 5       Lab Results   Component Value Date    PSA 1.4 06/25/2020    PSA 2.3 12/30/2013    PSA 1.15 02/11/2013    PSADIAG 0.19 06/21/2024    PSADIAG 0.33 08/11/2023    PSADIAG 0.67 08/23/2022       Lab Results   Component Value Date    CREATININE 1.5 (H) 10/10/2024    EGFRNORACEVR 48.9 (A) 10/10/2024               IMPRESSION:    Encounter Diagnoses   Name Primary?    Malignant neoplasm of lateral wall of urinary bladder Yes    Bladder tumor          Assessment:       1. Malignant neoplasm of lateral wall of urinary bladder    2. Bladder tumor        Plan:       I spent a total of 30 minutes on the day of the visit. This includes face to face time and non-face to face time preparing to see the patient (eg, review of tests), obtaining and/or reviewing separately obtained history, documenting clinical information  in the electronic or other health record, independently interpreting results and communicating results to the patient/family/caregiver, or care coordinator  10 minutes pre counseling including last installation  10 minutes prep, travel to get medication & installation of the BCG  10 minutes post installation instructions and room clean up  We addressed his UA  Education and recommendations of today's plan of care with the BCG Bladder Installations; including home remedies and needed follow up with PCP.   BCG dose 2 was instilled using sterile and BCG precautions.  Tolerated well   We discussed his Bladder Cancer; discussed after TURBT; cancer was removed.  Discussed BCG and the expectations, benefits, risks, as well as importance of post clean up for 6 hours after the 2 hour urination.  They was given detailed written instructions as well.   Diet modifications; no caffeine the morning of installation; increase water intake at the 2 hour void to flush out.  No sex for 48 hours after installation; use of condom during the 3/6 week installations.  RTC one week for dose 3.

## 2024-12-30 ENCOUNTER — OFFICE VISIT (OUTPATIENT)
Dept: UROLOGY | Facility: CLINIC | Age: 74
End: 2024-12-30
Payer: MEDICARE

## 2024-12-30 VITALS
BODY MASS INDEX: 28.07 KG/M2 | HEART RATE: 74 BPM | WEIGHT: 200.5 LBS | SYSTOLIC BLOOD PRESSURE: 134 MMHG | HEIGHT: 71 IN | DIASTOLIC BLOOD PRESSURE: 78 MMHG

## 2024-12-30 DIAGNOSIS — C67.2 MALIGNANT NEOPLASM OF LATERAL WALL OF URINARY BLADDER: Primary | ICD-10-CM

## 2024-12-30 LAB
BILIRUB SERPL-MCNC: NORMAL MG/DL
BLOOD URINE, POC: NORMAL
CLARITY, POC UA: NORMAL
COLOR, POC UA: NORMAL
GLUCOSE UR QL STRIP: NORMAL
KETONES UR QL STRIP: NORMAL
LEUKOCYTE ESTERASE URINE, POC: NORMAL
NITRITE, POC UA: NORMAL
PH, POC UA: 5.5
PROTEIN, POC: NORMAL
SPECIFIC GRAVITY, POC UA: 1.02
UROBILINOGEN, POC UA: 0.2

## 2024-12-30 PROCEDURE — 99999 PR PBB SHADOW E&M-EST. PATIENT-LVL IV: CPT | Mod: PBBFAC,HCNC,,

## 2024-12-30 NOTE — PROGRESS NOTES
CHIEF COMPLAINT:  BCG dose 3      HISTORY OF PRESENTING ILLINESS:  Rigoberto Vasquez is a 74 y.o. male is an established pt with the Urology dept. He is here for dose 3 of BCG. He is a patient of Dr Hensley.     7/16/24- TURBT with Dr Olivera in [place]  8/30/24- Bx of the bladder  10/8/24- open diverticulectomy and partial cystectomy for a non-muscle invasive bladder cancer. The final path revealed fat necrosis w/o evidence of residual tumor. Lymph nodes were negative.    12/16/24- Induction BCG; dose 1 of 5  12/23/24- Induction BCG; dose 2 of 5  12/30/24- Induction BCG; dose 3 of 5         REVIEW OF SYSTEMS:  Review of Systems   Constitutional:  Negative for chills and fever.   Gastrointestinal:  Negative for nausea and vomiting.   Genitourinary:  Negative for frequency, hematuria and urgency.         PATIENT HISTORY:    Past Medical History:   Diagnosis Date    Acid reflux     gi [facility]    Angina pectoris     Back pain     BPH (benign prostatic hyperplasia)     blue    Cholesteatoma     Degenerative joint disease     Diverticulosis     Erectile dysfunction     History of elevated PSA 02/2014    normalized, dr dave annually    History of pericarditis     Hypercholesteremia     Hypertension     Iron deficiency anemia     Malignant neoplasm of lateral wall of urinary bladder 8/5/2024    СВЕТЛАНА (obstructive sleep apnea) 05/17/2022    Pacemaker     complete av block;NO afib    Renal disorder     Squamous cell cancer of skin of mastoid region of scalp     dr jaida salgado    Type 2 diabetes mellitus     dr wyman    Urinary incontinence     when he was 7 Yrs old.        Past Surgical History:   Procedure Laterality Date    BIOPSY OF BLADDER N/A 8/30/2024    Procedure: BIOPSY, BLADDER;  Surgeon: Compa Hensley MD;  Location: [facility] OR [address] [place] [address];  Service: Urology;  Laterality: N/A;    BLADDER DIVERTICULECTOMY Right 10/8/2024    Procedure: EXCISION, DIVERTICULUM, BLADDER;  Surgeon: Compa Hensley MD;  Location: [facility] OR [facility]  FLR;  Service: Urology;  Laterality: Right;    BLADDER FULGURATION N/A 8/30/2024    Procedure: FULGURATION, BLADDER;  Surgeon: Compa Hensley MD;  Location: [facility] OR 1ST FLR;  Service: Urology;  Laterality: N/A;    CARDIAC PACEMAKER PLACEMENT      COLONOSCOPY N/A 9/13/2019    Procedure: COLONOSCOPY;  Surgeon: Kan Ramsey III, MD;  Location: [facility] ENDO;  Service: Endoscopy;  Laterality: N/A;    COLONOSCOPY N/A 9/22/2022    Procedure: COLONOSCOPY;  Surgeon: Chris Garcia MD;  Location: [facility];  Service: Endoscopy;  Laterality: N/A;    CYSTOSCOPY N/A 8/30/2024    Procedure: CYSTOSCOPY;  Surgeon: Compa Hensley MD;  Location: [facility] OR 1ST FLR;  Service: Urology;  Laterality: N/A;  with blue light    CYSTOSCOPY W/ RETROGRADES Bilateral 7/16/2024    Procedure: CYSTOSCOPY, WITH RETROGRADE PYELOGRAM;  Surgeon: Vance Olivera MD;  Location: [facility];  Service: Urology;  Laterality: Bilateral;    CYSTOSCOPY,WITH URETERAL CATHETER INSERTION Bilateral 10/8/2024    Procedure: CYSTOSCOPY,WITH URETERAL CATHETER INSERTION;  Surgeon: Compa Hensley MD;  Location: [facility] OR 2ND FLR;  Service: Urology;  Laterality: Bilateral;    ESOPHAGOGASTRODUODENOSCOPY N/A 9/13/2019    Procedure: ESOPHAGOGASTRODUODENOSCOPY (EGD);  Surgeon: Kan Ramsey III, MD;  Location: [facility];  Service: Endoscopy;  Laterality: N/A;    ESOPHAGOGASTRODUODENOSCOPY N/A 9/3/2021    Procedure: EGD (ESOPHAGOGASTRODUODENOSCOPY);  Surgeon: Kaylene Pineda MD;  Location: [facility] ENDO;  Service: Endoscopy;  Laterality: N/A;    ESOPHAGOGASTRODUODENOSCOPY N/A 5/24/2023    Procedure: EGD (ESOPHAGOGASTRODUODENOSCOPY);  Surgeon: Cory Bennett MD;  Location: [facility];  Service: Endoscopy;  Laterality: N/A;    FLEXIBLE CYSTOSCOPY N/A 10/8/2024    Procedure: CYSTOSCOPY, FLEXIBLE;  Surgeon: Compa Hensley MD;  Location: [facility] OR [address] [place] [address];  Service: Urology;  Laterality: N/A;    INSERTION OF TYMPANOSTOMY TUBE Right 3/15/2023    Procedure: INSERTION,  "TYMPANOSTOMY TUBE;  Surgeon: Jose L Gudino MD;  Location: [address];  Service: ENT;  Laterality: Right;    INSTILLATION OF URINARY BLADDER N/A 10/8/2024    Procedure: INSTILLATION, BLADDER;  Surgeon: Compa Hensley MD;  Location: [address];  Service: Urology;  Laterality: N/A;  Chemotherapy instillation bladder    JOINT REPLACEMENT      KNEE CARTILAGE SURGERY Bilateral     LYMPHADENECTOMY, PELVIS Right 10/8/2024    Procedure: LYMPHADENECTOMY, PELVIS;  Surgeon: Compa Hensley MD;  Location: [address];  Service: Urology;  Laterality: Right;    NASAL SINUS SURGERY      PARTIAL SURGICAL REMOVAL OF BLADDER N/A 10/8/2024    Procedure: CYSTECTOMY, PARTIAL;  Surgeon: Compa Hensley MD;  Location: [address];  Service: Urology;  Laterality: N/A;    SHOULDER ARTHROSCOPY Right     TONSILLECTOMY      TOTAL KNEE ARTHROPLASTY Left 05/15/2017    TRANSURETHRAL RESECTION OF PROSTATE      TURBT (TRANSURETHRAL RESECTION OF BLADDER TUMOR) N/A 7/16/2024    Procedure: TURBT (TRANSURETHRAL RESECTION OF BLADDER TUMOR);  Surgeon: Vance Olivera MD;  Location: [facility];  Service: Urology;  Laterality: N/A;    TYMPANOPLASTY      TYMPANOPLASTY WITH MASTOIDECTOMY Left 3/15/2023    Procedure: TYMPANOPLASTY, WITH MASTOIDECTOMY;  Surgeon: Jose L Gudino MD;  Location: [address];  Service: ENT;  Laterality: Left;    vesectomy         Family History   Problem Relation Name Age of Onset    Arthritis Mother      Hypertension Mother      Heart disease Father      Hypertension Father      Stroke Father      Diabetes Son      Diabetes Maternal Grandmother      Colon cancer Paternal Grandmother         Social History     Socioeconomic History    Marital status:      Spouse name: SAUL    Number of children: 2   Occupational History    Occupation: retired- [employer] .   Tobacco Use    Smoking status: Never    Smokeless tobacco: Never   Substance and Sexual Activity    Alcohol use: Yes     Comment: " " "once a month"    Drug use: No    Sexual activity: Yes     Partners: Female   Social History Narrative     . Lives with spouse. Has 2 children. Patient retired from [employer]. His son has type 1 diabetes.     Social Drivers of Health     Financial Resource Strain: Low Risk  (10/9/2024)    Overall Financial Resource Strain (CARDIA)     Difficulty of Paying Living Expenses: Not hard at all   Food Insecurity: No Food Insecurity (10/9/2024)    Hunger Vital Sign     Worried About Running Out of Food in the Last Year: Never true     Ran Out of Food in the Last Year: Never true   Transportation Needs: No Transportation Needs (10/9/2024)    TRANSPORTATION NEEDS     Transportation : No   Physical Activity: Sufficiently Active (10/9/2024)    Exercise Vital Sign     Days of Exercise per Week: 3 days     Minutes of Exercise per Session: 60 min   Stress: No Stress Concern Present (10/9/2024)    [place] [employer] - Occupational Stress Questionnaire     Feeling of Stress : Not at all   Housing Stability: Low Risk  (10/9/2024)    Housing Stability Vital Sign     Unable to Pay for Housing in the Last Year: No     Homeless in the Last Year: No       Allergies:  Aspirin, Meperidine, and Niacin    Medications:    Current Outpatient Medications:     ACETAMINOPHEN (TYLENOL 8 HOUR ORAL), Take by mouth as needed., Disp: , Rfl:     azelastine (ASTELIN) 137 mcg (0.1 %) nasal spray, 2 sprays (274 mcg total) by Nasal route 2 (two) times daily., Disp: 30 mL, Rfl: 12    blood sugar diagnostic (ACCU-CHEK JAXON) Strp, Check BG 2-3 times daily. Accuchek jaxon strips, Disp: 300 strip, Rfl: 3    dorzolamide-timolol 2-0.5% (COSOPT) 22.3-6.8 mg/mL ophthalmic solution, Place 1 drop into both eyes 2 (two) times daily., Disp: 10 mL, Rfl: 5    DROPLET PEN NEEDLE 31 gauge x 3/16" Ndle, USE AS DIRECTED  WITH  INSULIN, Disp: 200 each, Rfl: 3    dutasteride (AVODART) 0.5 mg capsule, Take 1 capsule (0.5 mg total) by mouth once " daily., Disp: 90 capsule, Rfl: 3    famotidine (PEPCID) 20 MG tablet, Take 1 tablet (20 mg total) by mouth 2 (two) times daily., Disp: 180 tablet, Rfl: 3    fenofibrate micronized (LOFIBRA) 200 MG Cap, Take 1 capsule (200 mg total) by mouth every evening., Disp: 90 capsule, Rfl: 3    fluticasone propionate (FLONASE) 50 mcg/actuation nasal spray, 1 spray by Each Nostril route once daily., Disp: , Rfl:     gabapentin (CMPD PAIN MANAGEMENT COMPOUND OINTMNENT THREE), Apply bid prn pain, Disp: 100 g, Rfl: 11    hydrocortisone 2.5 % cream, Apply topically 2 (two) times daily., Disp: 3.5 g, Rfl: 5    insulin (LANTUS SOLOSTAR U-100 INSULIN) glargine 100 units/mL SubQ pen, 16 units in the AM and 4 units in the PM, Disp: 3 mL, Rfl: 11    levocetirizine (XYZAL) 5 MG tablet, Take 5 mg by mouth every evening., Disp: , Rfl:     loratadine (CLARITIN) 10 mg tablet, Take 10 mg by mouth once daily., Disp: , Rfl:     losartan (COZAAR) 25 MG tablet, Take 1 tablet (25 mg total) by mouth once daily., Disp: 90 tablet, Rfl: 3    lovastatin (MEVACOR) 40 MG tablet, Take 1 tablet by mouth Every evening., Disp: 90 tablet, Rfl: 3    multivitamin capsule, Take 1 capsule by mouth once daily., Disp: , Rfl:     oxyCODONE (ROXICODONE) 5 MG immediate release tablet, Take 1 tablet (5 mg total) by mouth every 4 (four) hours as needed for Pain., Disp: 10 tablet, Rfl: 0    pantoprazole (PROTONIX) 40 MG tablet, Take 1 tablet (40 mg total) by mouth once daily., Disp: 90 tablet, Rfl: 1    polyethylene glycol (GLYCOLAX) 17 gram/dose powder, Take 17 g by mouth once daily., Disp: , Rfl:     polyethylene glycol (GLYCOLAX) 17 gram/dose powder, Use to cap to measure 17g, mix with liquid, and take by mouth once daily., Disp: 510 g, Rfl: 0    semaglutide (OZEMPIC) 1 mg/dose (4 mg/3 mL), Inject 1 mg into the skin every 7 days., Disp: 9 mL, Rfl: 4    sildenafil (REVATIO) 20 mg Tab, Take 1 tablet (20 mg total) by mouth daily as needed (PRN). Takes prn, Disp: 30  tablet, Rfl: 11    triamcinolone acetonide 0.1% (KENALOG) 0.1 % cream, AAA bid, Disp: 454 g, Rfl: 0    latanoprost 0.005 % ophthalmic solution, INSTILL 1 DROP INTO BOTH EYES ONE TIME DAILY, Disp: 7.5 mL, Rfl: 3    Current Facility-Administered Medications:     BCG live (JESUS) 50 mg in 0.9% NaCl 50 mL bladder instillation, 50 mg, Intravesical, Once, Kimberley Melo R, NP    LIDOcaine HCl 2% urojet, , Mucous Membrane, 1 time in Clinic/HOD,     PHYSICAL EXAMINATION:  Physical Exam  Constitutional:       Appearance: Normal appearance.   HENT:      Head: Normocephalic and atraumatic.   Eyes:      Pupils: Pupils are equal, round, and reactive to light.   Pulmonary:      Effort: Pulmonary effort is normal. No respiratory distress.   Skin:     General: Skin is warm and dry.      Capillary Refill: Capillary refill takes less than 2 seconds.   Neurological:      General: No focal deficit present.      Mental Status: He is alert.   Psychiatric:         Mood and Affect: Mood normal.         Behavior: Behavior normal.           LABS:      In office UA today was clear of active infection no visible blood.    Induction BCG; dose 3 of 5       Lab Results   Component Value Date    PSA 1.4 06/25/2020    PSA 2.3 12/30/2013    PSA 1.15 02/11/2013    PSADIAG 0.19 06/21/2024    PSADIAG 0.33 08/11/2023    PSADIAG 0.67 08/23/2022       Lab Results   Component Value Date    CREATININE 1.5 (H) 10/10/2024    EGFRNORACEVR 48.9 (A) 10/10/2024               IMPRESSION:    Encounter Diagnoses   Name Primary?    Malignant neoplasm of lateral wall of urinary bladder Yes         Assessment:       1. Malignant neoplasm of lateral wall of urinary bladder        Plan:       I spent a total of 30 minutes on the day of the visit. This includes face to face time and non-face to face time preparing to see the patient (eg, review of tests), obtaining and/or reviewing separately obtained history, documenting clinical information in the electronic or other health  record, independently interpreting results and communicating results to the patient/family/caregiver, or care coordinator  10 minutes pre counseling including last installation  10 minutes prep, travel to get medication & installation of the BCG  10 minutes post installation instructions and room clean up  We addressed his UA  Education and recommendations of today's plan of care with the BCG Bladder Installations; including home remedies and needed follow up with PCP.   BCG dose 3 was instilled using sterile and BCG precautions.  Tolerated well   We discussed his Bladder Cancer; discussed after TURBT; cancer was removed.  Discussed BCG and the expectations, benefits, risks, as well as importance of post clean up for 6 hours after the 2 hour urination.  They was given detailed written instructions as well.   Diet modifications; no caffeine the morning of installation; increase water intake at the 2 hour void to flush out.  No sex for 48 hours after installation; use of condom during the 3/6 week installations.  RTC one week for dose 4.

## 2025-01-06 ENCOUNTER — OFFICE VISIT (OUTPATIENT)
Dept: UROLOGY | Facility: CLINIC | Age: 75
End: 2025-01-06
Payer: MEDICARE

## 2025-01-06 DIAGNOSIS — C67.2 MALIGNANT NEOPLASM OF LATERAL WALL OF URINARY BLADDER: Primary | ICD-10-CM

## 2025-01-06 LAB
BILIRUB SERPL-MCNC: NEGATIVE MG/DL
BLOOD URINE, POC: NEGATIVE
CLARITY, POC UA: CLEAR
COLOR, POC UA: YELLOW
GLUCOSE UR QL STRIP: NEGATIVE
KETONES UR QL STRIP: NEGATIVE
LEUKOCYTE ESTERASE URINE, POC: NORMAL
NITRITE, POC UA: NEGATIVE
PH, POC UA: 6.5
PROTEIN, POC: NEGATIVE
SPECIFIC GRAVITY, POC UA: 1.02
UROBILINOGEN, POC UA: 1

## 2025-01-06 PROCEDURE — 99999 PR PBB SHADOW E&M-EST. PATIENT-LVL III: CPT | Mod: PBBFAC,HCNC,,

## 2025-01-06 NOTE — PROGRESS NOTES
CHIEF COMPLAINT:  BCG dose 4      HISTORY OF PRESENTING ILLINESS:  Rigoberto Vasquez is a 74 y.o. male is an established pt with the Urology dept. He is here for dose 4 of BCG. He is a patient of Dr Hensley.     7/16/24- TURBT with Dr Olivera in [place]  8/30/24- Bx of the bladder  10/8/24- open diverticulectomy and partial cystectomy for a non-muscle invasive bladder cancer. The final path revealed fat necrosis w/o evidence of residual tumor. Lymph nodes were negative.    12/16/24- Induction BCG; dose 1 of 5  12/23/24- Induction BCG; dose 2 of 5  12/30/24- Induction BCG; dose 3 of 5   01/06/25- Induction BCG; dose 4 of 5        REVIEW OF SYSTEMS:  Review of Systems   Constitutional:  Negative for chills and fever.   Gastrointestinal:  Negative for nausea and vomiting.   Genitourinary:  Negative for frequency, hematuria and urgency.         PATIENT HISTORY:    Past Medical History:   Diagnosis Date    Acid reflux     gi [facility]    Angina pectoris     Back pain     BPH (benign prostatic hyperplasia)     blue    Cholesteatoma     Degenerative joint disease     Diverticulosis     Erectile dysfunction     History of elevated PSA 02/2014    normalized, dr dave annually    History of pericarditis     Hypercholesteremia     Hypertension     Iron deficiency anemia     Malignant neoplasm of lateral wall of urinary bladder 8/5/2024    СВЕТЛАНА (obstructive sleep apnea) 05/17/2022    Pacemaker     complete av block;NO afib    Renal disorder     Squamous cell cancer of skin of mastoid region of scalp     dr jaida salgado    Type 2 diabetes mellitus     dr wyman    Urinary incontinence     when he was 7 Yrs old.        Past Surgical History:   Procedure Laterality Date    BIOPSY OF BLADDER N/A 8/30/2024    Procedure: BIOPSY, BLADDER;  Surgeon: Compa Hensley MD;  Location: [facility] OR [address] [place] [address];  Service: Urology;  Laterality: N/A;    BLADDER DIVERTICULECTOMY Right 10/8/2024    Procedure: EXCISION, DIVERTICULUM, BLADDER;  Surgeon: Shellie  Compa LOCKE MD;  Location: [facility] OR 2ND FLR;  Service: Urology;  Laterality: Right;    BLADDER FULGURATION N/A 8/30/2024    Procedure: FULGURATION, BLADDER;  Surgeon: Compa Hensley MD;  Location: [facility] OR 1ST FLR;  Service: Urology;  Laterality: N/A;    CARDIAC PACEMAKER PLACEMENT      COLONOSCOPY N/A 9/13/2019    Procedure: COLONOSCOPY;  Surgeon: Kan Ramsey III, MD;  Location: [facility];  Service: Endoscopy;  Laterality: N/A;    COLONOSCOPY N/A 9/22/2022    Procedure: COLONOSCOPY;  Surgeon: Chris Garcia MD;  Location: [facility] ENDO;  Service: Endoscopy;  Laterality: N/A;    CYSTOSCOPY N/A 8/30/2024    Procedure: CYSTOSCOPY;  Surgeon: Compa Hensley MD;  Location: [facility] OR 1ST FLR;  Service: Urology;  Laterality: N/A;  with blue light    CYSTOSCOPY W/ RETROGRADES Bilateral 7/16/2024    Procedure: CYSTOSCOPY, WITH RETROGRADE PYELOGRAM;  Surgeon: Vance Olivera MD;  Location: [facility];  Service: Urology;  Laterality: Bilateral;    CYSTOSCOPY,WITH URETERAL CATHETER INSERTION Bilateral 10/8/2024    Procedure: CYSTOSCOPY,WITH URETERAL CATHETER INSERTION;  Surgeon: Compa Hensley MD;  Location: [facility] OR 2ND FLR;  Service: Urology;  Laterality: Bilateral;    ESOPHAGOGASTRODUODENOSCOPY N/A 9/13/2019    Procedure: ESOPHAGOGASTRODUODENOSCOPY (EGD);  Surgeon: Kan Ramsey III, MD;  Location: [facility];  Service: Endoscopy;  Laterality: N/A;    ESOPHAGOGASTRODUODENOSCOPY N/A 9/3/2021    Procedure: EGD (ESOPHAGOGASTRODUODENOSCOPY);  Surgeon: Kaylene Pineda MD;  Location: [facility];  Service: Endoscopy;  Laterality: N/A;    ESOPHAGOGASTRODUODENOSCOPY N/A 5/24/2023    Procedure: EGD (ESOPHAGOGASTRODUODENOSCOPY);  Surgeon: Cory Bennett MD;  Location: [facility];  Service: Endoscopy;  Laterality: N/A;    FLEXIBLE CYSTOSCOPY N/A 10/8/2024    Procedure: CYSTOSCOPY, FLEXIBLE;  Surgeon: Compa Hensley MD;  Location: [facility] OR [address] [place] [address];  Service: Urology;  Laterality: N/A;    INSERTION OF TYMPANOSTOMY TUBE Right  3/15/2023    Procedure: INSERTION, TYMPANOSTOMY TUBE;  Surgeon: Jose L Gudino MD;  Location: [address];  Service: ENT;  Laterality: Right;    INSTILLATION OF URINARY BLADDER N/A 10/8/2024    Procedure: INSTILLATION, BLADDER;  Surgeon: Compa Hensley MD;  Location: [address];  Service: Urology;  Laterality: N/A;  Chemotherapy instillation bladder    JOINT REPLACEMENT      KNEE CARTILAGE SURGERY Bilateral     LYMPHADENECTOMY, PELVIS Right 10/8/2024    Procedure: LYMPHADENECTOMY, PELVIS;  Surgeon: Compa Hensley MD;  Location: [facility] OR [address] [place] [address];  Service: Urology;  Laterality: Right;    NASAL SINUS SURGERY      PARTIAL SURGICAL REMOVAL OF BLADDER N/A 10/8/2024    Procedure: CYSTECTOMY, PARTIAL;  Surgeon: Compa Hensley MD;  Location: [address];  Service: Urology;  Laterality: N/A;    SHOULDER ARTHROSCOPY Right     TONSILLECTOMY      TOTAL KNEE ARTHROPLASTY Left 05/15/2017    TRANSURETHRAL RESECTION OF PROSTATE      TURBT (TRANSURETHRAL RESECTION OF BLADDER TUMOR) N/A 7/16/2024    Procedure: TURBT (TRANSURETHRAL RESECTION OF BLADDER TUMOR);  Surgeon: Vance Olivera MD;  Location: [facility];  Service: Urology;  Laterality: N/A;    TYMPANOPLASTY      TYMPANOPLASTY WITH MASTOIDECTOMY Left 3/15/2023    Procedure: TYMPANOPLASTY, WITH MASTOIDECTOMY;  Surgeon: Jose L Gudino MD;  Location: [address];  Service: ENT;  Laterality: Left;    vesectomy         Family History   Problem Relation Name Age of Onset    Arthritis Mother      Hypertension Mother      Heart disease Father      Hypertension Father      Stroke Father      Diabetes Son      Diabetes Maternal Grandmother      Colon cancer Paternal Grandmother         Social History     Socioeconomic History    Marital status:      Spouse name: SAUL    Number of children: 2   Occupational History    Occupation: retired- [employer] .   Tobacco Use    Smoking status: Never    Smokeless tobacco: Never   Substance and Sexual Activity  "   Alcohol use: Yes     Comment: " once a month"    Drug use: No    Sexual activity: Yes     Partners: Female   Social History Narrative     . Lives with spouse. Has 2 children. Patient retired from [employer]. His son has type 1 diabetes.     Social Drivers of Health     Financial Resource Strain: Low Risk  (10/9/2024)    Overall Financial Resource Strain (CARDIA)     Difficulty of Paying Living Expenses: Not hard at all   Food Insecurity: No Food Insecurity (10/9/2024)    Hunger Vital Sign     Worried About Running Out of Food in the Last Year: Never true     Ran Out of Food in the Last Year: Never true   Transportation Needs: No Transportation Needs (10/9/2024)    TRANSPORTATION NEEDS     Transportation : No   Physical Activity: Sufficiently Active (10/9/2024)    Exercise Vital Sign     Days of Exercise per Week: 3 days     Minutes of Exercise per Session: 60 min   Stress: No Stress Concern Present (10/9/2024)    [facility] [employer] - Occupational Stress Questionnaire     Feeling of Stress : Not at all   Housing Stability: Low Risk  (10/9/2024)    Housing Stability Vital Sign     Unable to Pay for Housing in the Last Year: No     Homeless in the Last Year: No       Allergies:  Aspirin, Meperidine, and Niacin    Medications:    Current Outpatient Medications:     ACETAMINOPHEN (TYLENOL 8 HOUR ORAL), Take by mouth as needed., Disp: , Rfl:     azelastine (ASTELIN) 137 mcg (0.1 %) nasal spray, 2 sprays (274 mcg total) by Nasal route 2 (two) times daily., Disp: 30 mL, Rfl: 12    blood sugar diagnostic (ACCU-CHEK JAXON) Strp, Check BG 2-3 times daily. Accuchek jaxon strips, Disp: 300 strip, Rfl: 3    dorzolamide-timolol 2-0.5% (COSOPT) 22.3-6.8 mg/mL ophthalmic solution, Place 1 drop into both eyes 2 (two) times daily., Disp: 10 mL, Rfl: 5    DROPLET PEN NEEDLE 31 gauge x 3/16" Ndle, USE AS DIRECTED  WITH  INSULIN, Disp: 200 each, Rfl: 3    dutasteride (AVODART) 0.5 mg capsule, Take 1 " capsule (0.5 mg total) by mouth once daily., Disp: 90 capsule, Rfl: 3    famotidine (PEPCID) 20 MG tablet, Take 1 tablet (20 mg total) by mouth 2 (two) times daily., Disp: 180 tablet, Rfl: 3    fenofibrate micronized (LOFIBRA) 200 MG Cap, Take 1 capsule (200 mg total) by mouth every evening., Disp: 90 capsule, Rfl: 3    fluticasone propionate (FLONASE) 50 mcg/actuation nasal spray, 1 spray by Each Nostril route once daily., Disp: , Rfl:     gabapentin (CMPD PAIN MANAGEMENT COMPOUND OINTMNENT THREE), Apply bid prn pain, Disp: 100 g, Rfl: 11    hydrocortisone 2.5 % cream, Apply topically 2 (two) times daily., Disp: 3.5 g, Rfl: 5    insulin (LANTUS SOLOSTAR U-100 INSULIN) glargine 100 units/mL SubQ pen, 16 units in the AM and 4 units in the PM, Disp: 3 mL, Rfl: 11    latanoprost 0.005 % ophthalmic solution, INSTILL 1 DROP INTO BOTH EYES ONE TIME DAILY, Disp: 7.5 mL, Rfl: 3    levocetirizine (XYZAL) 5 MG tablet, Take 5 mg by mouth every evening., Disp: , Rfl:     loratadine (CLARITIN) 10 mg tablet, Take 10 mg by mouth once daily., Disp: , Rfl:     losartan (COZAAR) 25 MG tablet, Take 1 tablet (25 mg total) by mouth once daily., Disp: 90 tablet, Rfl: 3    lovastatin (MEVACOR) 40 MG tablet, Take 1 tablet by mouth Every evening., Disp: 90 tablet, Rfl: 3    multivitamin capsule, Take 1 capsule by mouth once daily., Disp: , Rfl:     oxyCODONE (ROXICODONE) 5 MG immediate release tablet, Take 1 tablet (5 mg total) by mouth every 4 (four) hours as needed for Pain., Disp: 10 tablet, Rfl: 0    pantoprazole (PROTONIX) 40 MG tablet, Take 1 tablet (40 mg total) by mouth once daily., Disp: 90 tablet, Rfl: 1    polyethylene glycol (GLYCOLAX) 17 gram/dose powder, Take 17 g by mouth once daily., Disp: , Rfl:     polyethylene glycol (GLYCOLAX) 17 gram/dose powder, Use to cap to measure 17g, mix with liquid, and take by mouth once daily., Disp: 510 g, Rfl: 0    semaglutide (OZEMPIC) 1 mg/dose (4 mg/3 mL), Inject 1 mg into the skin every 7  days., Disp: 9 mL, Rfl: 4    sildenafil (REVATIO) 20 mg Tab, Take 1 tablet (20 mg total) by mouth daily as needed (PRN). Takes prn, Disp: 30 tablet, Rfl: 11    triamcinolone acetonide 0.1% (KENALOG) 0.1 % cream, AAA bid, Disp: 454 g, Rfl: 0    Current Facility-Administered Medications:     BCG live (JESUS) 50 mg in 0.9% NaCl 50 mL bladder instillation, 50 mg, Intravesical, Once, Kimberley Melo R, NP    LIDOcaine HCl 2% urojet, , Mucous Membrane, 1 time in Clinic/HOD,     PHYSICAL EXAMINATION:  Physical Exam  Constitutional:       Appearance: Normal appearance.   HENT:      Head: Normocephalic and atraumatic.   Eyes:      Pupils: Pupils are equal, round, and reactive to light.   Pulmonary:      Effort: Pulmonary effort is normal. No respiratory distress.   Skin:     General: Skin is warm and dry.      Capillary Refill: Capillary refill takes less than 2 seconds.   Neurological:      General: No focal deficit present.      Mental Status: He is alert.   Psychiatric:         Mood and Affect: Mood normal.         Behavior: Behavior normal.           LABS:      In office UA today was clear of active infection no visible blood.    Induction BCG; dose 4 of 5       Lab Results   Component Value Date    PSA 1.4 06/25/2020    PSA 2.3 12/30/2013    PSA 1.15 02/11/2013    PSADIAG 0.19 06/21/2024    PSADIAG 0.33 08/11/2023    PSADIAG 0.67 08/23/2022       Lab Results   Component Value Date    CREATININE 1.5 (H) 10/10/2024    EGFRNORACEVR 48.9 (A) 10/10/2024               IMPRESSION:    Encounter Diagnoses   Name Primary?    Malignant neoplasm of lateral wall of urinary bladder Yes         Assessment:       1. Malignant neoplasm of lateral wall of urinary bladder        Plan:       I spent a total of 30 minutes on the day of the visit. This includes face to face time and non-face to face time preparing to see the patient (eg, review of tests), obtaining and/or reviewing separately obtained history, documenting clinical information in  the electronic or other health record, independently interpreting results and communicating results to the patient/family/caregiver, or care coordinator  10 minutes pre counseling including last installation  10 minutes prep, travel to get medication & installation of the BCG  10 minutes post installation instructions and room clean up  We addressed his UA  Education and recommendations of today's plan of care with the BCG Bladder Installations; including home remedies and needed follow up with PCP.   BCG dose 4 was instilled using sterile and BCG precautions.  Tolerated well   We discussed his Bladder Cancer; discussed after TURBT; cancer was removed.  Discussed BCG and the expectations, benefits, risks, as well as importance of post clean up for 6 hours after the 2 hour urination.  They was given detailed written instructions as well.   Diet modifications; no caffeine the morning of installation; increase water intake at the 2 hour void to flush out.  No sex for 48 hours after installation; use of condom during the 3/6 week installations.  RTC one week for dose 5.

## 2025-01-10 ENCOUNTER — HOSPITAL ENCOUNTER (EMERGENCY)
Facility: HOSPITAL | Age: 75
Discharge: HOME | End: 2025-01-10
Attending: EMERGENCY MEDICINE
Payer: MEDICARE

## 2025-01-10 VITALS
RESPIRATION RATE: 18 BRPM | HEART RATE: 75 BPM | OXYGEN SATURATION: 99 % | HEIGHT: 71 IN | SYSTOLIC BLOOD PRESSURE: 152 MMHG | DIASTOLIC BLOOD PRESSURE: 75 MMHG | TEMPERATURE: 98 F | BODY MASS INDEX: 27.78 KG/M2 | WEIGHT: 198.44 LBS

## 2025-01-10 DIAGNOSIS — R04.0 LEFT-SIDED EPISTAXIS: Primary | ICD-10-CM

## 2025-01-10 PROCEDURE — 25000003 PHARM REV CODE 250: Mod: ER | Performed by: EMERGENCY MEDICINE

## 2025-01-10 PROCEDURE — 30901 CONTROL OF NOSEBLEED: CPT | Mod: LT,ER

## 2025-01-10 PROCEDURE — 99283 EMERGENCY DEPT VISIT LOW MDM: CPT | Mod: 25,ER

## 2025-01-10 RX ORDER — VITAMIN A 3000 MCG
2 CAPSULE ORAL
Qty: 44 ML | Refills: 0 | Status: SHIPPED | OUTPATIENT
Start: 2025-01-10

## 2025-01-10 RX ORDER — OXYMETAZOLINE HCL 0.05 %
2 SPRAY, NON-AEROSOL (ML) NASAL
Status: COMPLETED | OUTPATIENT
Start: 2025-01-10 | End: 2025-01-10

## 2025-01-10 RX ADMIN — Medication 2 SPRAY: at 09:01

## 2025-01-10 NOTE — ED PROVIDER NOTES
Encounter Date: 1/10/2025       History     Chief Complaint   Patient presents with    Epistaxis     Left nosebleed x1 hour     74-year-old male who presents with epistaxis from the left nares that started this morning after blowing his nose an hour ago.  He denies blood thinners.  He reports a history of bleeding requiring cauterization many years ago.        Review of patient's allergies indicates:   Allergen Reactions    Aspirin      Stomach pain even with ppi    Meperidine      Other reaction(s): Stomach upset    Niacin      Other reaction(s): hot skin     Past Medical History:   Diagnosis Date    Acid reflux     gi [facility]    Angina pectoris     Back pain     BPH (benign prostatic hyperplasia)     blue    Cholesteatoma     Degenerative joint disease     Diverticulosis     Erectile dysfunction     History of elevated PSA 02/2014    normalized, dr dave annually    History of pericarditis     Hypercholesteremia     Hypertension     Iron deficiency anemia     Malignant neoplasm of lateral wall of urinary bladder 8/5/2024    СВЕТЛАНА (obstructive sleep apnea) 05/17/2022    Pacemaker     complete av block;NO afib    Renal disorder     Squamous cell cancer of skin of mastoid region of scalp     dr jaida salgado    Type 2 diabetes mellitus     dr wyman    Urinary incontinence     when he was 7 Yrs old.      Past Surgical History:   Procedure Laterality Date    BIOPSY OF BLADDER N/A 8/30/2024    Procedure: BIOPSY, BLADDER;  Surgeon: Compa Hensley MD;  Location: [facility] OR [address] [place] [address];  Service: Urology;  Laterality: N/A;    BLADDER DIVERTICULECTOMY Right 10/8/2024    Procedure: EXCISION, DIVERTICULUM, BLADDER;  Surgeon: Compa Hensley MD;  Location: [facility] OR 2ND FLR;  Service: Urology;  Laterality: Right;    BLADDER FULGURATION N/A 8/30/2024    Procedure: FULGURATION, BLADDER;  Surgeon: Compa Hensley MD;  [facility];  Service: Urology;  Laterality: N/A;    CARDIAC PACEMAKER PLACEMENT      COLONOSCOPY N/A  9/13/2019    Procedure: COLONOSCOPY;  Surgeon: Kan Ramsey III, MD;  Location: [facility];  Service: Endoscopy;  Laterality: N/A;    COLONOSCOPY N/A 9/22/2022    Procedure: COLONOSCOPY;  Surgeon: Chris Garcia MD;  Location: [facility];  Service: Endoscopy;  Laterality: N/A;    CYSTOSCOPY N/A 8/30/2024    Procedure: CYSTOSCOPY;  Surgeon: Compa Hensley MD;  Location: [facility] OR 1ST FLR;  Service: Urology;  Laterality: N/A;  with blue light    CYSTOSCOPY W/ RETROGRADES Bilateral 7/16/2024    Procedure: CYSTOSCOPY, WITH RETROGRADE PYELOGRAM;  Surgeon: Vance Olivera MD;  Location: [facility];  Service: Urology;  Laterality: Bilateral;    CYSTOSCOPY,WITH URETERAL CATHETER INSERTION Bilateral 10/8/2024    Procedure: CYSTOSCOPY,WITH URETERAL CATHETER INSERTION;  Surgeon: Compa Hensley MD;  Location: [facility] OR 2ND FLR;  Service: Urology;  Laterality: Bilateral;    ESOPHAGOGASTRODUODENOSCOPY N/A 9/13/2019    Procedure: ESOPHAGOGASTRODUODENOSCOPY (EGD);  Surgeon: Kan Ramsey III, MD;  Location: [facility];  Service: Endoscopy;  Laterality: N/A;    ESOPHAGOGASTRODUODENOSCOPY N/A 9/3/2021    Procedure: EGD (ESOPHAGOGASTRODUODENOSCOPY);  Surgeon: Kaylene Pineda MD;  Location: [facility];  Service: Endoscopy;  Laterality: N/A;    ESOPHAGOGASTRODUODENOSCOPY N/A 5/24/2023    Procedure: EGD (ESOPHAGOGASTRODUODENOSCOPY);  Surgeon: Cory Bennett MD;  Location: [facility];  Service: Endoscopy;  Laterality: N/A;    FLEXIBLE CYSTOSCOPY N/A 10/8/2024    Procedure: CYSTOSCOPY, FLEXIBLE;  Surgeon: Compa Hensley MD;  Location: [facility] OR 2ND FLR;  Service: Urology;  Laterality: N/A;    INSERTION OF TYMPANOSTOMY TUBE Right 3/15/2023    Procedure: INSERTION, TYMPANOSTOMY TUBE;  Surgeon: Jose L Gudino MD;  Location: [facility] OR 2ND FLR;  Service: ENT;  Laterality: Right;    INSTILLATION OF URINARY BLADDER N/A 10/8/2024    Procedure: INSTILLATION, BLADDER;  Surgeon: Compa Hensley MD;  Location: [facility] OR [address] [place] [address];  Service:  "Urology;  Laterality: N/A;  Chemotherapy instillation bladder    JOINT REPLACEMENT      KNEE CARTILAGE SURGERY Bilateral     LYMPHADENECTOMY, PELVIS Right 10/8/2024    Procedure: LYMPHADENECTOMY, PELVIS;  Surgeon: Compa Hensley MD;  Location: [address];  Service: Urology;  Laterality: Right;    NASAL SINUS SURGERY      PARTIAL SURGICAL REMOVAL OF BLADDER N/A 10/8/2024    Procedure: CYSTECTOMY, PARTIAL;  Surgeon: Compa Hensley MD;  Location: [address];  Service: Urology;  Laterality: N/A;    SHOULDER ARTHROSCOPY Right     TONSILLECTOMY      TOTAL KNEE ARTHROPLASTY Left 05/15/2017    TRANSURETHRAL RESECTION OF PROSTATE      TURBT (TRANSURETHRAL RESECTION OF BLADDER TUMOR) N/A 7/16/2024    Procedure: TURBT (TRANSURETHRAL RESECTION OF BLADDER TUMOR);  Surgeon: Vance Olivera MD;  Location: [facility];  Service: Urology;  Laterality: N/A;    TYMPANOPLASTY      TYMPANOPLASTY WITH MASTOIDECTOMY Left 3/15/2023    Procedure: TYMPANOPLASTY, WITH MASTOIDECTOMY;  Surgeon: Jose L Gudino MD;  Location: [address];  Service: ENT;  Laterality: Left;    vesectomy       Family History   Problem Relation Name Age of Onset    Arthritis Mother      Hypertension Mother      Heart disease Father      Hypertension Father      Stroke Father      Diabetes Son      Diabetes Maternal Grandmother      Colon cancer Paternal Grandmother       Social History     Tobacco Use    Smoking status: Never    Smokeless tobacco: Never   Substance Use Topics    Alcohol use: Yes     Comment: " once a month"    Drug use: No     Review of Systems   HENT:  Positive for nosebleeds.        Physical Exam     Initial Vitals [01/10/25 0936]   BP Pulse Resp Temp SpO2   (!) 152/75 75 18 98 °F (36.7 °C) 99 %      MAP       --         Physical Exam    Vitals reviewed.  Constitutional: He appears well-developed and well-nourished.   HENT:   Head: Normocephalic and atraumatic.   Epistaxis of the left nares with no identifiable point of bleeding. No " blood in the oropharynx.   Eyes: EOM are normal. Pupils are equal, round, and reactive to light.   Neck: Neck supple.   Normal range of motion.  Cardiovascular:  Normal rate and regular rhythm.           Pulmonary/Chest: Breath sounds normal. No respiratory distress.   Abdominal: Abdomen is soft. He exhibits no distension.   Musculoskeletal:         General: Normal range of motion.      Cervical back: Normal range of motion and neck supple.     Neurological: He is alert and oriented to person, place, and time.   Skin: Skin is warm and dry.   Psychiatric: He has a normal mood and affect.         ED Course   Epistaxis Mgmt    Date/Time: 1/10/2025 9:49 AM    Performed by: Vianey Patton DO  Authorized by: Vianey Patton DO  Consent Done: Yes  Consent: Verbal consent obtained.  Consent given by: patient  Patient identity confirmed: verbally with patient    Patient sedated: no  Treatment site: left anterior  Repair method: oxymetazoline  Post-procedure assessment: bleeding stopped  Treatment complexity: simple  Patient tolerance: Patient tolerated the procedure well with no immediate complications        Labs Reviewed - No data to display         Imaging Results    None          Medications   oxymetazoline 0.05 % nasal spray 2 spray (2 sprays Each Nostril Given 1/10/25 0949)     Medical Decision Making  74-year-old male presents with epistaxis from his left nares.  No signs of posterior bleeding.  Denies blood thinners.  Bleeding controlled with oxymetazoline and direct pressure.  Patient was observed in the emergency department and had no reoccurrence of bleeding.  Instructed to follow up with ENT.    Amount and/or Complexity of Data Reviewed  Labs: ordered.    Risk  OTC drugs.                                      Clinical Impression:  Final diagnoses:  [R04.0] Left-sided epistaxis (Primary)          ED Disposition Condition    Discharge Stable          ED Prescriptions       Medication Sig Dispense Start Date End  Date Auth. Provider    sodium chloride (SALINE NASAL) 0.65 % nasal spray 2 sprays by Nasal route as needed for Congestion. 44 mL 1/10/2025 -- Vianey Patton, DO          Follow-up Information       Follow up With Specialties Details Why Contact Info    Dorinda Ludwig PA-C Otolaryngology On 1/13/2025  [address] [facility] [address]  [phone]      [place] - Emergency Dept Emergency Medicine  As needed, If symptoms worsen [address]  Emergency Department  [facility] [id number] [phone]             Vianey Patton,   01/10/25 9419

## 2025-01-13 ENCOUNTER — OFFICE VISIT (OUTPATIENT)
Dept: UROLOGY | Facility: CLINIC | Age: 75
End: 2025-01-13
Payer: MEDICARE

## 2025-01-13 VITALS
DIASTOLIC BLOOD PRESSURE: 83 MMHG | WEIGHT: 199.75 LBS | HEIGHT: 71 IN | SYSTOLIC BLOOD PRESSURE: 149 MMHG | HEART RATE: 69 BPM | BODY MASS INDEX: 27.97 KG/M2

## 2025-01-13 DIAGNOSIS — C67.2 MALIGNANT NEOPLASM OF LATERAL WALL OF URINARY BLADDER: Primary | ICD-10-CM

## 2025-01-13 DIAGNOSIS — E11.22 TYPE 2 DIABETES MELLITUS WITH STAGE 3A CHRONIC KIDNEY DISEASE, WITH LONG-TERM CURRENT USE OF INSULIN: Primary | ICD-10-CM

## 2025-01-13 DIAGNOSIS — Z79.4 TYPE 2 DIABETES MELLITUS WITH STAGE 3A CHRONIC KIDNEY DISEASE, WITH LONG-TERM CURRENT USE OF INSULIN: Primary | ICD-10-CM

## 2025-01-13 DIAGNOSIS — N18.31 TYPE 2 DIABETES MELLITUS WITH STAGE 3A CHRONIC KIDNEY DISEASE, WITH LONG-TERM CURRENT USE OF INSULIN: Primary | ICD-10-CM

## 2025-01-13 LAB
BILIRUB SERPL-MCNC: NORMAL MG/DL
BLOOD URINE, POC: NORMAL
CLARITY, POC UA: CLEAR
COLOR, POC UA: YELLOW
GLUCOSE UR QL STRIP: NORMAL
KETONES UR QL STRIP: NORMAL
LEUKOCYTE ESTERASE URINE, POC: NORMAL
NITRITE, POC UA: NORMAL
PH, POC UA: 6.5
PROTEIN, POC: NORMAL
SPECIFIC GRAVITY, POC UA: 1.02
UROBILINOGEN, POC UA: 0.2

## 2025-01-13 PROCEDURE — 99999 PR PBB SHADOW E&M-EST. PATIENT-LVL IV: CPT | Mod: PBBFAC,,,

## 2025-01-13 PROCEDURE — 81002 URINALYSIS NONAUTO W/O SCOPE: CPT | Mod: S$GLB,,,

## 2025-01-13 PROCEDURE — 99499 UNLISTED E&M SERVICE: CPT | Mod: S$GLB,,,

## 2025-01-13 PROCEDURE — 51720 TREATMENT OF BLADDER LESION: CPT | Mod: S$GLB,,,

## 2025-01-13 PROCEDURE — 3077F SYST BP >= 140 MM HG: CPT | Mod: CPTII,S$GLB,,

## 2025-01-13 PROCEDURE — 3008F BODY MASS INDEX DOCD: CPT | Mod: CPTII,S$GLB,,

## 2025-01-13 PROCEDURE — 1159F MED LIST DOCD IN RCRD: CPT | Mod: CPTII,S$GLB,,

## 2025-01-13 PROCEDURE — 1160F RVW MEDS BY RX/DR IN RCRD: CPT | Mod: CPTII,S$GLB,,

## 2025-01-13 PROCEDURE — 3079F DIAST BP 80-89 MM HG: CPT | Mod: CPTII,S$GLB,,

## 2025-01-13 NOTE — PROGRESS NOTES
CHIEF COMPLAINT:  BCG dose 5      HISTORY OF PRESENTING ILLINESS:  Rigoberto Vasquez is a 74 y.o. male is an established pt with the Urology dept. He is here for dose 4 of BCG. He is a patient of Dr Hensley.     7/16/24- TURBT with Dr Olivera in [place]  8/30/24- Bx of the bladder  10/8/24- open diverticulectomy and partial cystectomy for a non-muscle invasive bladder cancer. The final path revealed fat necrosis w/o evidence of residual tumor. Lymph nodes were negative.    12/16/24- Induction BCG; dose 1 of 5  12/23/24- Induction BCG; dose 2 of 5  12/30/24- Induction BCG; dose 3 of 5   01/06/25- Induction BCG; dose 4 of 5  1/13/25- Induction BCG; dose 5 of 5         REVIEW OF SYSTEMS:  Review of Systems   Constitutional:  Negative for chills and fever.   Gastrointestinal:  Negative for nausea and vomiting.   Genitourinary:  Negative for frequency, hematuria and urgency.         PATIENT HISTORY:    Past Medical History:   Diagnosis Date    Acid reflux     gi [facility]    Angina pectoris     Back pain     BPH (benign prostatic hyperplasia)     blue    Cholesteatoma     Degenerative joint disease     Diverticulosis     Erectile dysfunction     History of elevated PSA 02/2014    normalized, dr dave annually    History of pericarditis     Hypercholesteremia     Hypertension     Iron deficiency anemia     Malignant neoplasm of lateral wall of urinary bladder 8/5/2024    СВЕТЛАНА (obstructive sleep apnea) 05/17/2022    Pacemaker     complete av block;NO afib    Renal disorder     Squamous cell cancer of skin of mastoid region of scalp     dr jaida salgado    Type 2 diabetes mellitus     dr wyman    Urinary incontinence     when he was 7 Yrs old.        Past Surgical History:   Procedure Laterality Date    BIOPSY OF BLADDER N/A 8/30/2024    Procedure: BIOPSY, BLADDER;  Surgeon: Compa Hensley MD;  Location: [facility] OR [address] [place] [address];  Service: Urology;  Laterality: N/A;    BLADDER DIVERTICULECTOMY Right 10/8/2024    Procedure: EXCISION,  DIVERTICULUM, BLADDER;  Surgeon: Compa Hensley MD;  Location: [facility] OR 2ND FLR;  Service: Urology;  Laterality: Right;    BLADDER FULGURATION N/A 8/30/2024    Procedure: FULGURATION, BLADDER;  Surgeon: Compa Hensley MD;  Location: [facility] OR 1ST FLR;  Service: Urology;  Laterality: N/A;    CARDIAC PACEMAKER PLACEMENT      COLONOSCOPY N/A 9/13/2019    Procedure: COLONOSCOPY;  Surgeon: Kan Ramsey III, MD;  Location: [facility] ENDO;  Service: Endoscopy;  Laterality: N/A;    COLONOSCOPY N/A 9/22/2022    Procedure: COLONOSCOPY;  Surgeon: Chris Garcia MD;  Location: [facility] ENDO;  Service: Endoscopy;  Laterality: N/A;    CYSTOSCOPY N/A 8/30/2024    Procedure: CYSTOSCOPY;  Surgeon: Compa Hensley MD;  Location: [facility] OR 1ST FLR;  Service: Urology;  Laterality: N/A;  with blue light    CYSTOSCOPY W/ RETROGRADES Bilateral 7/16/2024    Procedure: CYSTOSCOPY, WITH RETROGRADE PYELOGRAM;  Surgeon: Vance Olivera MD;  Location: [facility] OR;  Service: Urology;  Laterality: Bilateral;    CYSTOSCOPY,WITH URETERAL CATHETER INSERTION Bilateral 10/8/2024    Procedure: CYSTOSCOPY,WITH URETERAL CATHETER INSERTION;  Surgeon: Compa Hensley MD;  Location: [facility] OR 2ND FLR;  Service: Urology;  Laterality: Bilateral;    ESOPHAGOGASTRODUODENOSCOPY N/A 9/13/2019    Procedure: ESOPHAGOGASTRODUODENOSCOPY (EGD);  Surgeon: Kan Ramsey III, MD;  Location: [facility];  Service: Endoscopy;  Laterality: N/A;    ESOPHAGOGASTRODUODENOSCOPY N/A 9/3/2021    Procedure: EGD (ESOPHAGOGASTRODUODENOSCOPY);  Surgeon: Kaylene Pineda MD;  Location: [facility];  Service: Endoscopy;  Laterality: N/A;    ESOPHAGOGASTRODUODENOSCOPY N/A 5/24/2023    Procedure: EGD (ESOPHAGOGASTRODUODENOSCOPY);  Surgeon: Cory Bennett MD;  Location: [facility];  Service: Endoscopy;  Laterality: N/A;    FLEXIBLE CYSTOSCOPY N/A 10/8/2024    Procedure: CYSTOSCOPY, FLEXIBLE;  Surgeon: Compa Hensley MD;  Location: [facility] OR [address] [place] [address];  Service: Urology;  Laterality: N/A;     INSERTION OF TYMPANOSTOMY TUBE Right 3/15/2023    Procedure: INSERTION, TYMPANOSTOMY TUBE;  Surgeon: Jose L Gudino MD;  Location: [facility] OR [address] [place] [address];  Service: ENT;  Laterality: Right;    INSTILLATION OF URINARY BLADDER N/A 10/8/2024    Procedure: INSTILLATION, BLADDER;  Surgeon: Compa Hensley MD;  Location: [address];  Service: Urology;  Laterality: N/A;  Chemotherapy instillation bladder    JOINT REPLACEMENT      KNEE CARTILAGE SURGERY Bilateral     LYMPHADENECTOMY, PELVIS Right 10/8/2024    Procedure: LYMPHADENECTOMY, PELVIS;  Surgeon: Compa Hensley MD;  Location: [facility] OR [address] [place] [address];  Service: Urology;  Laterality: Right;    NASAL SINUS SURGERY      PARTIAL SURGICAL REMOVAL OF BLADDER N/A 10/8/2024    Procedure: CYSTECTOMY, PARTIAL;  Surgeon: Compa Hensley MD;  Location: [facility] OR [address] [place] [address];  Service: Urology;  Laterality: N/A;    SHOULDER ARTHROSCOPY Right     TONSILLECTOMY      TOTAL KNEE ARTHROPLASTY Left 05/15/2017    TRANSURETHRAL RESECTION OF PROSTATE      TURBT (TRANSURETHRAL RESECTION OF BLADDER TUMOR) N/A 7/16/2024    Procedure: TURBT (TRANSURETHRAL RESECTION OF BLADDER TUMOR);  Surgeon: Vance Olivera MD;  Location: [facility];  Service: Urology;  Laterality: N/A;    TYMPANOPLASTY      TYMPANOPLASTY WITH MASTOIDECTOMY Left 3/15/2023    Procedure: TYMPANOPLASTY, WITH MASTOIDECTOMY;  Surgeon: Jose L Gudino MD;  Location: [facility] OR [address] [place] [address];  Service: ENT;  Laterality: Left;    vesectomy         Family History   Problem Relation Name Age of Onset    Arthritis Mother      Hypertension Mother      Heart disease Father      Hypertension Father      Stroke Father      Diabetes Son      Diabetes Maternal Grandmother      Colon cancer Paternal Grandmother         Social History     Socioeconomic History    Marital status:      Spouse name: SAUL    Number of children: 2   Occupational History    Occupation: retired- [employer] .   Tobacco Use    Smoking status: Never    Smokeless tobacco:  "Never   Substance and Sexual Activity    Alcohol use: Yes     Comment: " once a month"    Drug use: No    Sexual activity: Yes     Partners: Female   Social History Narrative     . Lives with spouse. Has 2 children. Patient retired from [employer]. His son has type 1 diabetes.     Social Drivers of Health     Financial Resource Strain: Low Risk  (10/9/2024)    Overall Financial Resource Strain (CARDIA)     Difficulty of Paying Living Expenses: Not hard at all   Food Insecurity: No Food Insecurity (10/9/2024)    Hunger Vital Sign     Worried About Running Out of Food in the Last Year: Never true     Ran Out of Food in the Last Year: Never true   Transportation Needs: No Transportation Needs (10/9/2024)    TRANSPORTATION NEEDS     Transportation : No   Physical Activity: Sufficiently Active (10/9/2024)    Exercise Vital Sign     Days of Exercise per Week: 3 days     Minutes of Exercise per Session: 60 min   Stress: No Stress Concern Present (10/9/2024)    [employer] - Occupational Stress Questionnaire     Feeling of Stress : Not at all   Housing Stability: Low Risk  (10/9/2024)    Housing Stability Vital Sign     Unable to Pay for Housing in the Last Year: No     Homeless in the Last Year: No       Allergies:  Aspirin, Meperidine, and Niacin    Medications:    Current Outpatient Medications:     ACETAMINOPHEN (TYLENOL 8 HOUR ORAL), Take by mouth as needed., Disp: , Rfl:     azelastine (ASTELIN) 137 mcg (0.1 %) nasal spray, 2 sprays (274 mcg total) by Nasal route 2 (two) times daily., Disp: 30 mL, Rfl: 12    blood sugar diagnostic (ACCU-CHEK JAXON) Strp, Check BG 2-3 times daily. Accuchek jaxon strips, Disp: 300 strip, Rfl: 3    dorzolamide-timolol 2-0.5% (COSOPT) 22.3-6.8 mg/mL ophthalmic solution, Place 1 drop into both eyes 2 (two) times daily., Disp: 10 mL, Rfl: 5    DROPLET PEN NEEDLE 31 gauge x 3/16" Ndle, USE AS DIRECTED  WITH  INSULIN, Disp: 200 each, Rfl: 3    dutasteride " (AVODART) 0.5 mg capsule, Take 1 capsule (0.5 mg total) by mouth once daily., Disp: 90 capsule, Rfl: 3    famotidine (PEPCID) 20 MG tablet, Take 1 tablet (20 mg total) by mouth 2 (two) times daily., Disp: 180 tablet, Rfl: 3    fenofibrate micronized (LOFIBRA) 200 MG Cap, Take 1 capsule (200 mg total) by mouth every evening., Disp: 90 capsule, Rfl: 3    fluticasone propionate (FLONASE) 50 mcg/actuation nasal spray, 1 spray by Each Nostril route once daily., Disp: , Rfl:     gabapentin (CMPD PAIN MANAGEMENT COMPOUND OINTMNENT THREE), Apply bid prn pain, Disp: 100 g, Rfl: 11    hydrocortisone 2.5 % cream, Apply topically 2 (two) times daily., Disp: 3.5 g, Rfl: 5    insulin (LANTUS SOLOSTAR U-100 INSULIN) glargine 100 units/mL SubQ pen, 16 units in the AM and 4 units in the PM, Disp: 3 mL, Rfl: 11    latanoprost 0.005 % ophthalmic solution, INSTILL 1 DROP INTO BOTH EYES ONE TIME DAILY, Disp: 7.5 mL, Rfl: 3    levocetirizine (XYZAL) 5 MG tablet, Take 5 mg by mouth every evening., Disp: , Rfl:     loratadine (CLARITIN) 10 mg tablet, Take 10 mg by mouth once daily., Disp: , Rfl:     losartan (COZAAR) 25 MG tablet, Take 1 tablet (25 mg total) by mouth once daily., Disp: 90 tablet, Rfl: 3    lovastatin (MEVACOR) 40 MG tablet, Take 1 tablet by mouth Every evening., Disp: 90 tablet, Rfl: 3    multivitamin capsule, Take 1 capsule by mouth once daily., Disp: , Rfl:     oxyCODONE (ROXICODONE) 5 MG immediate release tablet, Take 1 tablet (5 mg total) by mouth every 4 (four) hours as needed for Pain., Disp: 10 tablet, Rfl: 0    pantoprazole (PROTONIX) 40 MG tablet, Take 1 tablet (40 mg total) by mouth once daily., Disp: 90 tablet, Rfl: 1    polyethylene glycol (GLYCOLAX) 17 gram/dose powder, Take 17 g by mouth once daily., Disp: , Rfl:     polyethylene glycol (GLYCOLAX) 17 gram/dose powder, Use to cap to measure 17g, mix with liquid, and take by mouth once daily., Disp: 510 g, Rfl: 0    semaglutide (OZEMPIC) 1 mg/dose (4 mg/3 mL),  Inject 1 mg into the skin every 7 days., Disp: 9 mL, Rfl: 4    sildenafil (REVATIO) 20 mg Tab, Take 1 tablet (20 mg total) by mouth daily as needed (PRN). Takes prn, Disp: 30 tablet, Rfl: 11    sodium chloride (SALINE NASAL) 0.65 % nasal spray, 2 sprays by Nasal route as needed for Congestion., Disp: 44 mL, Rfl: 0    triamcinolone acetonide 0.1% (KENALOG) 0.1 % cream, AAA bid, Disp: 454 g, Rfl: 0    Current Facility-Administered Medications:     LIDOcaine HCl 2% urojet, , Mucous Membrane, 1 time in Clinic/HOD,     PHYSICAL EXAMINATION:  Physical Exam  Constitutional:       Appearance: Normal appearance.   HENT:      Head: Normocephalic and atraumatic.   Eyes:      Pupils: Pupils are equal, round, and reactive to light.   Pulmonary:      Effort: Pulmonary effort is normal. No respiratory distress.   Skin:     General: Skin is warm and dry.      Capillary Refill: Capillary refill takes less than 2 seconds.   Neurological:      General: No focal deficit present.      Mental Status: He is alert.   Psychiatric:         Mood and Affect: Mood normal.         Behavior: Behavior normal.           LABS:      In office UA today was clear of active infection no visible blood.    Induction BCG; dose 5 of 5       Lab Results   Component Value Date    PSA 1.4 06/25/2020    PSA 2.3 12/30/2013    PSA 1.15 02/11/2013    PSADIAG 0.19 06/21/2024    PSADIAG 0.33 08/11/2023    PSADIAG 0.67 08/23/2022       Lab Results   Component Value Date    CREATININE 1.5 (H) 10/10/2024    EGFRNORACEVR 48.9 (A) 10/10/2024               IMPRESSION:    Encounter Diagnoses   Name Primary?    Malignant neoplasm of lateral wall of urinary bladder Yes         Assessment:       1. Malignant neoplasm of lateral wall of urinary bladder        Plan:       I spent a total of 30 minutes on the day of the visit. This includes face to face time and non-face to face time preparing to see the patient (eg, review of tests), obtaining and/or reviewing separately  obtained history, documenting clinical information in the electronic or other health record, independently interpreting results and communicating results to the patient/family/caregiver, or care coordinator  10 minutes pre counseling including last installation  10 minutes prep, travel to get medication & installation of the BCG  10 minutes post installation instructions and room clean up  We addressed his UA  Education and recommendations of today's plan of care with the BCG Bladder Installations; including home remedies and needed follow up with PCP.   BCG dose 5 was instilled using sterile and BCG precautions.  Tolerated well   We discussed his Bladder Cancer; discussed after TURBT; cancer was removed.  Discussed BCG and the expectations, benefits, risks, as well as importance of post clean up for 6 hours after the 2 hour urination.  They was given detailed written instructions as well.   Diet modifications; no caffeine the morning of installation; increase water intake at the 2 hour void to flush out.  No sex for 48 hours after installation; use of condom during the 3/6 week installations.  Cysto with Dr Hensley on 2/10/2025

## 2025-01-15 ENCOUNTER — OFFICE VISIT (OUTPATIENT)
Dept: OTOLARYNGOLOGY | Facility: CLINIC | Age: 75
End: 2025-01-15
Payer: MEDICARE

## 2025-01-15 VITALS — HEIGHT: 61 IN | BODY MASS INDEX: 37.42 KG/M2 | WEIGHT: 198.19 LBS

## 2025-01-15 DIAGNOSIS — R04.0 EPISTAXIS: Primary | ICD-10-CM

## 2025-01-15 PROCEDURE — 99214 OFFICE O/P EST MOD 30 MIN: CPT | Mod: 25,S$GLB,, | Performed by: PHYSICIAN ASSISTANT

## 2025-01-15 PROCEDURE — 1159F MED LIST DOCD IN RCRD: CPT | Mod: CPTII,S$GLB,, | Performed by: PHYSICIAN ASSISTANT

## 2025-01-15 PROCEDURE — 3008F BODY MASS INDEX DOCD: CPT | Mod: CPTII,S$GLB,, | Performed by: PHYSICIAN ASSISTANT

## 2025-01-15 PROCEDURE — 1126F AMNT PAIN NOTED NONE PRSNT: CPT | Mod: CPTII,S$GLB,, | Performed by: PHYSICIAN ASSISTANT

## 2025-01-15 PROCEDURE — 1101F PT FALLS ASSESS-DOCD LE1/YR: CPT | Mod: CPTII,S$GLB,, | Performed by: PHYSICIAN ASSISTANT

## 2025-01-15 PROCEDURE — 3288F FALL RISK ASSESSMENT DOCD: CPT | Mod: CPTII,S$GLB,, | Performed by: PHYSICIAN ASSISTANT

## 2025-01-15 PROCEDURE — 30901 CONTROL OF NOSEBLEED: CPT | Mod: LT,S$GLB,, | Performed by: PHYSICIAN ASSISTANT

## 2025-01-15 PROCEDURE — 99999 PR PBB SHADOW E&M-EST. PATIENT-LVL IV: CPT | Mod: PBBFAC,,, | Performed by: PHYSICIAN ASSISTANT

## 2025-01-15 RX ORDER — MUPIROCIN 20 MG/G
OINTMENT TOPICAL 2 TIMES DAILY
Qty: 15 G | Refills: 3 | Status: SHIPPED | OUTPATIENT
Start: 2025-01-15 | End: 2025-01-25

## 2025-01-15 NOTE — PROGRESS NOTES
Subjective     Patient ID: Rigoberto Vasquez is a 74 y.o. male.    Chief Complaint: Epistaxis (Pt is coming in today for recurrent nose bleeds. Pt went to the ER last Friday for nose bleed.)    Patient is a pleasant 74 year old gentleman here to see me today for evaluation of epistaxis.      History of intermittent bleeding several months or years:  YES, few times a year; worse in past one week  Laterality:  left  Current bleeding has been going on for days  Bleeding isolated to blood in mucus or on tissues:  No  Bleeding more than 1/4 cup of blood at any isolated bleed:  Yes   Ever required a blood transfusion due to nose bleeds:  NO  Primarily Posterior Bleeding:  No  History of coagulation disorders/bleeding when having teeth cleaning:  No  Currently on anticoagulant medications:   NO  Blood pressure:   BP Readings from Last 3 Encounters:  01/13/25 : (!) 149/83  01/10/25 : (!) 152/75  12/30/24 : 134/78    He has hx of sinus surgery about 30 years ago.  Episodic nosebleeds since that time.  Required nasal cautery several years ago.  No nasal packing.  Seen in ED on 1/10/25 and bleeding stopped once Afrin and pressure applied.  He continues to notice blood when he performs his daily sinus rinse.  He is using Astelin BID and takes Claritin in AM and Xyzal QPM.                    Review of Systems   Constitutional:  Negative for fever.   HENT:  Positive for nasal congestion, nosebleeds and sinus pressure/congestion.           Objective     Physical Exam  Constitutional:       Appearance: Normal appearance.   HENT:      Head: Normocephalic and atraumatic.      Right Ear: External ear normal.      Left Ear: External ear normal.      Nose: Nose normal. No rhinorrhea.      Right Nostril: No epistaxis.      Left Nostril: No epistaxis.      Comments: Spur on right septum and inflamed area with ectatic vessel noted on left septum (cautery described below).  Overall very dry nasal mucosa  Pulmonary:      Effort: Pulmonary effort  is normal.   Neurological:      General: No focal deficit present.      Mental Status: He is alert.       PROCEDURE NOTE:  Control of Anterior Epistaxis  Preprocedure diagnosis:  Recurrent epistaxis  Postprocedure diangosis:  Same  Complications:  None  Blood Loss:  Minimal    Procedure in detail:  After verbal consent was obtained, the patient's nasal cavity was examined with a headlight.  Upon examination, the patient had ectatic vessels on his anterior septum, on the left aspect of the nasal septum.  Under direct visualization with a head lamp and a nasal speculum, a silver nitrate stick was appiled to first his  left anterior septum.  No bleeding was noted.  The patient tolerated the procedure well, and there were no significant complications.         Assessment and Plan     1. Epistaxis    Other orders  -     mupirocin (BACTROBAN) 2 % ointment; by Nasal route 2 (two) times daily. Apply to nose twice daily for 10 days  Dispense: 15 g; Refill: 3        Epistaxis: LEFT septum cauterized today.  Will schedule recheck with MD in 2 weeks (OK to cancel if no further epistaxis).  We discussed different strategies to help increase his nasal moisture, including the use of saline spray throughout the day and a humidifier at night. In addition, I gave the patient a prescription for Bactroban to be used twice daily for two weeks, and he may use Afrin as needed for active bleeding.  We also discussed that Astelin is an antihistamine spray and may be exacerbating his nasal dryness.  Recommend he refrain from use of Astelin over the next one week while dealing with this acute issue.           No follow-ups on file.

## 2025-01-30 ENCOUNTER — OFFICE VISIT (OUTPATIENT)
Dept: DIABETES | Facility: CLINIC | Age: 75
End: 2025-01-30
Payer: MEDICARE

## 2025-01-30 VITALS
BODY MASS INDEX: 36.85 KG/M2 | HEART RATE: 67 BPM | DIASTOLIC BLOOD PRESSURE: 74 MMHG | SYSTOLIC BLOOD PRESSURE: 107 MMHG | WEIGHT: 197.06 LBS

## 2025-01-30 DIAGNOSIS — E78.5 HYPERLIPIDEMIA ASSOCIATED WITH TYPE 2 DIABETES MELLITUS: Chronic | ICD-10-CM

## 2025-01-30 DIAGNOSIS — E11.69 HYPERLIPIDEMIA ASSOCIATED WITH TYPE 2 DIABETES MELLITUS: Chronic | ICD-10-CM

## 2025-01-30 DIAGNOSIS — E11.22 TYPE 2 DIABETES MELLITUS WITH STAGE 3A CHRONIC KIDNEY DISEASE, WITH LONG-TERM CURRENT USE OF INSULIN: Primary | ICD-10-CM

## 2025-01-30 DIAGNOSIS — E11.59 HYPERTENSION ASSOCIATED WITH DIABETES: Chronic | ICD-10-CM

## 2025-01-30 DIAGNOSIS — N18.31 TYPE 2 DIABETES MELLITUS WITH STAGE 3A CHRONIC KIDNEY DISEASE, WITH LONG-TERM CURRENT USE OF INSULIN: Primary | ICD-10-CM

## 2025-01-30 DIAGNOSIS — Z00.00 ENCOUNTER FOR MEDICARE ANNUAL WELLNESS EXAM: ICD-10-CM

## 2025-01-30 DIAGNOSIS — I15.2 HYPERTENSION ASSOCIATED WITH DIABETES: Chronic | ICD-10-CM

## 2025-01-30 DIAGNOSIS — Z79.4 TYPE 2 DIABETES MELLITUS WITH STAGE 3A CHRONIC KIDNEY DISEASE, WITH LONG-TERM CURRENT USE OF INSULIN: Primary | ICD-10-CM

## 2025-01-30 LAB — GLUCOSE SERPL-MCNC: 150 MG/DL (ref 70–110)

## 2025-01-30 PROCEDURE — 99999 PR PBB SHADOW E&M-EST. PATIENT-LVL V: CPT | Mod: PBBFAC,,, | Performed by: NURSE PRACTITIONER

## 2025-01-30 NOTE — PROGRESS NOTES
Patient ID: Rigoberto Vasquez is a 74 y.o. male.  Patient's current PCP is Jesse Grimes MD.   Collaborating Physician: JULIO Galvin MD    Chief Complaint: Diabetes Mellitus  HPI    Rigoberto Vasquez is a 74 y.o. White male presenting for a follow up for diabetes.       Patient has been diagnosed with type 2 diabetes for > 10 years.  Complications related to diabetes: nephropathy  Recent diabetes related hospitalizations: none  Previous diabetes education:yes, here    Current diet: Eating 3 meals per day. Generally healthy choices - lower red meat and fried foods. Increasing vegetables. Using Qype. Decreased intake post surgery. Weight stable#  Activity level: 3 x week after breakfast, stretching/resistance/stationary bike - 1 hour +. Able to return to full time this week      Changes made at last visit:  -     Slowly increase Ozempic weekly as tolerated  -     Consider lowering Lantus as needed, especially at hs  -     Reinforced diet and exercise (once cleared by Urology)  -     Call if bs < 80 or > 180 consistently      Current issues:Bladder cancer - post surgery, 10/8/24. F/U with Dr Hensley 11/4/24. No chemo or xrt required. Feeling better daily. Mild constipation treated with Miralax as needed. Completed BCG treatments recently. Had to hold Ozempic d/t procedures so just taking x 5 weeks.      Son has T1DM since age 10.   Personal history of pancreatitis: denies  Personal history of abdominal surgery: denies  Personal history of thyroid surgery: denies  Family history of pancreatic cancer in first-degree relative: denies  Family history of MTC/MEN/endocrine tumors: denies     Past Medical History:   Diagnosis Date    Acid reflux     [facility]    Angina pectoris     Back pain     BPH (benign prostatic hyperplasia)     blue    Cholesteatoma     Degenerative joint disease     Diverticulosis     Erectile dysfunction     History of elevated PSA 02/2014    normalized, dr dave annually    History of pericarditis   "   Hypercholesteremia     Hypertension     Iron deficiency anemia     Malignant neoplasm of lateral wall of urinary bladder 8/5/2024    СВЕТЛАНА (obstructive sleep apnea) 05/17/2022    Pacemaker     complete av block;NO afib    Renal disorder     Squamous cell cancer of skin of mastoid region of scalp     dr jaida salgado    Type 2 diabetes mellitus     dr wyman    Urinary incontinence     when he was 7 Yrs old.      Social History     Socioeconomic History    Marital status:      Spouse name: SAUL    Number of children: 2   Occupational History    Occupation: retired- [employer] .   Tobacco Use    Smoking status: Never    Smokeless tobacco: Never   Substance and Sexual Activity    Alcohol use: Yes     Comment: " once a month"    Drug use: No    Sexual activity: Yes     Partners: Female   Social History Narrative     . Lives with spouse. Has 2 children. Patient retired from [employer]. His son has type 1 diabetes.     Social Drivers of Health     Financial Resource Strain: Low Risk  (10/9/2024)    Overall Financial Resource Strain (CARDIA)     Difficulty of Paying Living Expenses: Not hard at all   Food Insecurity: No Food Insecurity (10/9/2024)    Hunger Vital Sign     Worried About Running Out of Food in the Last Year: Never true     Ran Out of Food in the Last Year: Never true   Transportation Needs: No Transportation Needs (10/9/2024)    TRANSPORTATION NEEDS     Transportation : No   Physical Activity: Sufficiently Active (10/9/2024)    Exercise Vital Sign     Days of Exercise per Week: 3 days     Minutes of Exercise per Session: 60 min   Stress: No Stress Concern Present (10/9/2024)    [employer] [place] [employer] - Occupational Stress Questionnaire     Feeling of Stress : Not at all   Housing Stability: Low Risk  (10/9/2024)    Housing Stability Vital Sign     Unable to Pay for Housing in the Last Year: No     Homeless in the Last Year: No       Review of patient's " allergies indicates:   Allergen Reactions    Aspirin      Stomach pain even with ppi    Meperidine      Other reaction(s): Stomach upset    Niacin      Other reaction(s): hot skin       CURRENT DM MEDICATIONS:   Diabetes Medications               insulin (LANTUS SOLOSTAR U-100 INSULIN) glargine 100 units/mL SubQ pen 16 units in the AM and 4 units in the PM    semaglutide (OZEMPIC) 1 mg/dose (4 mg/3 mL) Inject 1 mg into the skin every 7 days.      Past failed treatment(s) include:   Pioglitazone - bladder CA/good control. Recently stopped  Victoza - formulary change/constipation    Meter/cgm: meter/Dexcom G7  Blood glucose testing is performed regularly.   Patient is testing continuously times per day.  Any episodes of hypoglycemia? Occasional 60s - not symptomatic  Glucose trends:   Per CGM download, for the last 14 days:    Average glucose of 164 mg/dL. Patient is 67% in range. 32% of readings are mildly elevated with 1% of readings > 250. 0% hypoglycemia. SD 38 mg/dL. Estimated GMI 7.2%. Glycemic control is stable - slight increase after meals      His blood sugar in the clinic today was:   Lab Results   Component Value Date    POCGLU 150 (A) 01/30/2025       Statin: Taking  ACE/ARB: Taking    Labs reviewed and are noted below.    Screening or Prevention Patient's value Goal Complete/Controlled?   HgA1C Testing and Control   Lab Results   Component Value Date    HGBA1C 6.4 (H) 11/20/2024      Annually/Less than 8% Yes   Lipid profile : 08/12/2024 Annually Yes   LDL control Lab Results   Component Value Date    LDLCALC 58.4 (L) 08/12/2024    Annually/Less than 100 mg/dl  Yes   Nephropathy screening Lab Results   Component Value Date    MICALBCREAT 10.9 02/15/2024     Lab Results   Component Value Date    PROTEINUA Negative 09/27/2024    Annually Yes   Blood pressure BP Readings from Last 1 Encounters:   01/30/25 107/74    Less than 140/90 Yes   Dilated retinal exam : 12/05/2023 Scheduled for next Tuesday -   "[place] Annually Yes    Foot exam   : 02/15/2024 Annually Yes     Glucose   Date Value Ref Range Status   10/10/2024 114 (H) 70 - 110 mg/dL Final     Anion Gap   Date Value Ref Range Status   10/10/2024 7 (L) 8 - 16 mmol/L Final     eGFR if    Date Value Ref Range Status   06/23/2022 >60.0 >60 mL/min/1.73 m^2 Final     eGFR if non    Date Value Ref Range Status   06/23/2022 55.3 (A) >60 mL/min/1.73 m^2 Final     Comment:     Calculation used to obtain the estimated glomerular filtration  rate (eGFR) is the CKD-EPI equation.        Lab Results   Component Value Date    TSH 1.837 12/27/2021     No results found for: "CPEPTIDE"  No results found for: "GLUTAMICACID"    Wt Readings from Last 3 Encounters:   01/30/25 1022 89.4 kg (197 lb 1.5 oz)   01/15/25 1145 89.9 kg (198 lb 3.1 oz)   01/13/25 0926 90.6 kg (199 lb 11.8 oz)       Review of Systems   Constitutional:  Negative for malaise/fatigue and weight loss.   Eyes:  Negative for blurred vision and double vision.   Respiratory:  Negative for shortness of breath.    Cardiovascular: Negative.    Gastrointestinal:  Positive for constipation.   Genitourinary:  Negative for frequency.   Musculoskeletal:  Negative for myalgias.   Neurological: Negative.    Psychiatric/Behavioral: Negative.         Physical Exam  Vitals reviewed.   Constitutional:       Appearance: Normal appearance. He is obese.   Eyes:      Conjunctiva/sclera: Conjunctivae normal.      Pupils: Pupils are equal, round, and reactive to light.   Cardiovascular:      Rate and Rhythm: Normal rate and regular rhythm.      Pulses: Normal pulses.      Heart sounds: Normal heart sounds.   Pulmonary:      Effort: Pulmonary effort is normal.      Breath sounds: Normal breath sounds.   Abdominal:      General: Bowel sounds are normal.      Palpations: Abdomen is soft.   Musculoskeletal:      Cervical back: Normal range of motion and neck supple.      Right lower leg: No edema.      Left " lower leg: No edema.   Skin:     General: Skin is warm and dry.      Comments: Sites without hypertrophy and/or infection   Neurological:      General: No focal deficit present.      Mental Status: He is alert and oriented to person, place, and time.   Psychiatric:         Mood and Affect: Mood normal.         Behavior: Behavior normal.           Assessment & Plan    Type 2 diabetes mellitus with stage 3a chronic kidney disease, with long-term current use of insulin- not quite to goal  -     POCT Glucose, Hand-Held Device  -     Continue Ozempic 1 mg  -     Consider increasing Ozempic if blood sugar remains elevated    Hypertension associated with diabetes - at goal on ARB    Hyperlipidemia associated with type 2 diabetes mellitus - on statin        - Follow up: 3 months    Visit today included increased complexity associated with the care of the episodic problem fluctuating blood sugars addressed and managing the longitudinal care of the patient due to the serious and/or complex managed problem(s) T2DM.

## 2025-01-31 ENCOUNTER — OFFICE VISIT (OUTPATIENT)
Dept: OTOLARYNGOLOGY | Facility: CLINIC | Age: 75
End: 2025-01-31
Payer: MEDICARE

## 2025-01-31 VITALS — WEIGHT: 197.06 LBS | HEIGHT: 61 IN | BODY MASS INDEX: 37.2 KG/M2

## 2025-01-31 DIAGNOSIS — R04.0 RECURRENT EPISTAXIS: Primary | ICD-10-CM

## 2025-01-31 PROCEDURE — 1101F PT FALLS ASSESS-DOCD LE1/YR: CPT | Mod: HCNC,CPTII,S$GLB, | Performed by: STUDENT IN AN ORGANIZED HEALTH CARE EDUCATION/TRAINING PROGRAM

## 2025-01-31 PROCEDURE — 99999 PR PBB SHADOW E&M-EST. PATIENT-LVL II: CPT | Mod: PBBFAC,HCNC,, | Performed by: STUDENT IN AN ORGANIZED HEALTH CARE EDUCATION/TRAINING PROGRAM

## 2025-01-31 PROCEDURE — 3008F BODY MASS INDEX DOCD: CPT | Mod: HCNC,CPTII,S$GLB, | Performed by: STUDENT IN AN ORGANIZED HEALTH CARE EDUCATION/TRAINING PROGRAM

## 2025-01-31 PROCEDURE — 3288F FALL RISK ASSESSMENT DOCD: CPT | Mod: HCNC,CPTII,S$GLB, | Performed by: STUDENT IN AN ORGANIZED HEALTH CARE EDUCATION/TRAINING PROGRAM

## 2025-01-31 PROCEDURE — 30901 CONTROL OF NOSEBLEED: CPT | Mod: HCNC,LT,S$GLB, | Performed by: STUDENT IN AN ORGANIZED HEALTH CARE EDUCATION/TRAINING PROGRAM

## 2025-01-31 PROCEDURE — 99213 OFFICE O/P EST LOW 20 MIN: CPT | Mod: 25,HCNC,S$GLB, | Performed by: STUDENT IN AN ORGANIZED HEALTH CARE EDUCATION/TRAINING PROGRAM

## 2025-01-31 NOTE — PROGRESS NOTES
Subjective     Patient ID: Rigoberto Vasquez is a 74 y.o. male.    Chief Complaint: Epistaxis (Pt has come with no new major bleeds /Just some blood when blowing nose )    Patient is a pleasant 74 year old gentleman here to see me today for evaluation of epistaxis.      History of intermittent bleeding several months or years:  YES, few times a year; worse in past one week  Laterality:  left  Current bleeding has been going on for days  Bleeding isolated to blood in mucus or on tissues:  No  Bleeding more than 1/4 cup of blood at any isolated bleed:  Yes   Ever required a blood transfusion due to nose bleeds:  NO  Primarily Posterior Bleeding:  No  History of coagulation disorders/bleeding when having teeth cleaning:  No  Currently on anticoagulant medications:   NO  Blood pressure:   BP Readings from Last 3 Encounters:  01/13/25 : (!) 149/83  01/10/25 : (!) 152/75  12/30/24 : 134/78    He has hx of sinus surgery about 30 years ago.  Episodic nosebleeds since that time.  Required nasal cautery several years ago.  No nasal packing.  Seen in ED on 1/10/25 and bleeding stopped once Afrin and pressure applied.  He continues to notice blood when he performs his daily sinus rinse.  He is using Astelin BID and takes Claritin in AM and Xyzal QPM.      Return clinic visit, 1/31/25  No major bleeds since last visit, but some bloody with his mucus from the left side.              Review of Systems   Constitutional:  Negative for fever.   HENT:  Positive for nasal congestion, nosebleeds and sinus pressure/congestion.           Objective     Physical Exam  Constitutional:       Appearance: Normal appearance.   HENT:      Head: Normocephalic and atraumatic.      Right Ear: External ear normal.      Left Ear: External ear normal.      Nose: Nose normal. No rhinorrhea.      Right Nostril: No epistaxis.      Left Nostril: No epistaxis.      Comments: Spur on right septum and inflamed area with ectatic vessel noted on left septum  (cautery described below).  Overall very dry nasal mucosa  Pulmonary:      Effort: Pulmonary effort is normal.   Neurological:      General: No focal deficit present.      Mental Status: He is alert.       Procedure - control of epistaxis, anterior, simple    Description: Risks, benefits, and alternatives of the procedure were discussed with the patient, and the patient consented to the control of epistaxis.  The nasal cavity was sprayed with a topical decongestant and topical anesthetic. After adequate anesthesia was obtained, control of epistaxis was performed for the left side(s).  The concerning area(s) for bleeding were addressed with extensive silver nitrate applied topically to the area(s), taking care not to over-treat at risk of septal perforation. At the end of the procedure there was no further bleeding from the nose and sinuses.  The patient tolerated the procedure well with no complications.    Nasal Findings  -     The area(s) causing the epistaxis (and cauterized today) were found to be arising from prominent blood vessels located at the  periphery of previously cauterized septal vessels         Assessment and Plan     1. Recurrent epistaxis        Nose bleeding    We discussed preventive measures such as the application of moisturizing gel to the nose (ie. AYR nasal gel (nasal saline gel) 2-3 times daily at least, but you can use it as needed. We also discussed the use saline sprays as needed during the day. A humidifier is also helpful to keep the nose from drying out during sleep.     We discussed other issues which can cause recurrence of nosebleeds, such as NSAIDs, aggressive nose blowing, wiping, or picking.     For an active nose bleed spray Afrin (oxymetazoline) to the side of the nose that is bleeding, then pinch the nose closed firmly for 10-15 straight minutes.    If there is further bleeding she should return to the clinic for re-evaluation.      Hold on astelin for 2 weeks      No  follow-ups on file.

## 2025-02-04 ENCOUNTER — OFFICE VISIT (OUTPATIENT)
Dept: OPHTHALMOLOGY | Facility: CLINIC | Age: 75
End: 2025-02-04
Payer: MEDICARE

## 2025-02-04 DIAGNOSIS — Z79.4 CONTROLLED TYPE 2 DIABETES MELLITUS WITHOUT COMPLICATION, WITH LONG-TERM CURRENT USE OF INSULIN: ICD-10-CM

## 2025-02-04 DIAGNOSIS — E11.9 CONTROLLED TYPE 2 DIABETES MELLITUS WITHOUT COMPLICATION, WITH LONG-TERM CURRENT USE OF INSULIN: ICD-10-CM

## 2025-02-04 DIAGNOSIS — H40.1121 PRIMARY OPEN ANGLE GLAUCOMA (POAG) OF LEFT EYE, MILD STAGE: Primary | ICD-10-CM

## 2025-02-04 DIAGNOSIS — H40.1112 PRIMARY OPEN ANGLE GLAUCOMA (POAG) OF RIGHT EYE, MODERATE STAGE: ICD-10-CM

## 2025-02-04 DIAGNOSIS — H35.341 LAMELLAR MACULAR HOLE OF RIGHT EYE: ICD-10-CM

## 2025-02-04 DIAGNOSIS — E11.36 DIABETIC CATARACT OF BOTH EYES: ICD-10-CM

## 2025-02-04 DIAGNOSIS — H35.373 EPIRETINAL MEMBRANE (ERM) OF BOTH EYES: ICD-10-CM

## 2025-02-04 DIAGNOSIS — D31.31 CHOROIDAL NEVUS OF RIGHT EYE: ICD-10-CM

## 2025-02-04 PROCEDURE — 1160F RVW MEDS BY RX/DR IN RCRD: CPT | Mod: HCNC,CPTII,S$GLB, | Performed by: OPHTHALMOLOGY

## 2025-02-04 PROCEDURE — 92133 CPTRZD OPH DX IMG PST SGM ON: CPT | Mod: HCNC,S$GLB,, | Performed by: OPHTHALMOLOGY

## 2025-02-04 PROCEDURE — 2023F DILAT RTA XM W/O RTNOPTHY: CPT | Mod: HCNC,CPTII,S$GLB, | Performed by: OPHTHALMOLOGY

## 2025-02-04 PROCEDURE — 1159F MED LIST DOCD IN RCRD: CPT | Mod: HCNC,CPTII,S$GLB, | Performed by: OPHTHALMOLOGY

## 2025-02-04 PROCEDURE — 99214 OFFICE O/P EST MOD 30 MIN: CPT | Mod: HCNC,S$GLB,, | Performed by: OPHTHALMOLOGY

## 2025-02-04 PROCEDURE — 99999 PR PBB SHADOW E&M-EST. PATIENT-LVL IV: CPT | Mod: PBBFAC,HCNC,, | Performed by: OPHTHALMOLOGY

## 2025-02-04 PROCEDURE — G2211 COMPLEX E/M VISIT ADD ON: HCPCS | Mod: HCNC,S$GLB,, | Performed by: OPHTHALMOLOGY

## 2025-02-04 RX ORDER — KETOROLAC TROMETHAMINE 5 MG/ML
1 SOLUTION OPHTHALMIC 4 TIMES DAILY
Qty: 5 ML | Refills: 0 | Status: SHIPPED | OUTPATIENT
Start: 2025-02-04 | End: 2025-02-09

## 2025-02-04 NOTE — PROGRESS NOTES
HPI     Glaucoma            Comments: Pt reports for 6m GOCT dil. Denies any pain or irritation. Va   stable. Using drops as directed.           Comments    1. DM since 1999   NO DBR   2. Mac hole od   3. erm od  4. Choroidal nevus od   5. HIGH MYOPE(-6)   6. SCOAG   iop 25/ 23 ou on po steroid   anamolous myopic disc   ASYMETRIC C/D 0.7 / 0.5    // 641  NO FAMILY HISTORY   HV[facility] 3/22/2022      **d/c Latanoprost due to ERM/mac hole**      GCL OD: 29 w/ Artifact, OS: 28 --8/2023       Dorz/Timolol BID OU             Last edited by Alexander Irene on 2/4/2025  9:13 AM.            Assessment /Plan     For exam results, see Encounter Report.      ICD-10-CM ICD-9-CM    1. Primary open angle glaucoma (POAG) of left eye, mild stage  H40.1121 365.11 Posterior Segment OCT Optic Nerve- Both eyes    Progression on OCT today  Due to this and fundus findings would like better control    IOP not within acceptable range relative to target IOP with risk of irreversible visual loss. Additional treatment required.  Discussed options, risks, and benefits of additional medication, SLT laser, or incisional glaucoma surgery.     Recommend: SLT OU  Keto QID OU x 5 days PO    Patient chooses the above     Reviewed importance of continued compliance with treatment and follow up.     Visit today included increased complexity associated with the care and management of glaucoma. Patient aware that management of medical condition requires long term follow up.  Written instructions were placed in the portal for the patient upon closure of the encounter regarding specific use of the required medications.     365.71       2. Primary open angle glaucoma (POAG) of right eye, moderate stage  H40.1112 365.11 See above      365.72       3. Controlled type 2 diabetes mellitus without complication, with long-term current use of insulin  E11.9 250.00 Diabetes with no diabetic retinopathy on dilated exam.   Reviewed diabetic eye precautions including  excellent blood sugar control, and importance of regular follow up.     Lab Results   Component Value Date    HGBA1C 6.4 (H) 11/20/2024         Z79.4 V58.67       4. Diabetic cataract of both eyes  E11.36 250.50 You were found to have an early cataract in your eye(s) today. However the cataract is not affecting your activities of daily living, such as reading and driving. You do not need surgery at this time. We will recheck your cataract at your next visit. You are welcome to call for an earlier appointment if your vision gets worse.        366.41       5. Lamellar macular hole of right eye  H35.341 362.54 Appears stable to previous on fundus and OCT      6. Epiretinal membrane (ERM) of both eyes  H35.373 362.56 As above       7. Choroidal nevus of right eye  D31.31 224.6 Appears stable, flat   Will refer to [facility] for 2nd opinion to confirm

## 2025-02-05 ENCOUNTER — LAB VISIT (OUTPATIENT)
Dept: LAB | Facility: HOSPITAL | Age: 75
End: 2025-02-05
Attending: UROLOGY
Payer: MEDICARE

## 2025-02-05 DIAGNOSIS — E11.22 TYPE 2 DIABETES MELLITUS WITH STAGE 3A CHRONIC KIDNEY DISEASE, WITH LONG-TERM CURRENT USE OF INSULIN: ICD-10-CM

## 2025-02-05 DIAGNOSIS — N18.31 TYPE 2 DIABETES MELLITUS WITH STAGE 3A CHRONIC KIDNEY DISEASE, WITH LONG-TERM CURRENT USE OF INSULIN: ICD-10-CM

## 2025-02-05 DIAGNOSIS — Z79.4 TYPE 2 DIABETES MELLITUS WITH STAGE 3A CHRONIC KIDNEY DISEASE, WITH LONG-TERM CURRENT USE OF INSULIN: ICD-10-CM

## 2025-02-05 LAB
CREAT SERPL-MCNC: 1.4 MG/DL (ref 0.5–1.4)
EST. GFR  (NO RACE VARIABLE): 53.1 ML/MIN/1.73 M^2

## 2025-02-05 PROCEDURE — 36415 COLL VENOUS BLD VENIPUNCTURE: CPT | Mod: HCNC,PO | Performed by: UROLOGY

## 2025-02-05 PROCEDURE — 82565 ASSAY OF CREATININE: CPT | Mod: HCNC,PO | Performed by: UROLOGY

## 2025-02-07 ENCOUNTER — TELEPHONE (OUTPATIENT)
Dept: UROLOGY | Facility: CLINIC | Age: 75
End: 2025-02-07
Payer: MEDICARE

## 2025-02-07 ENCOUNTER — HOSPITAL ENCOUNTER (OUTPATIENT)
Dept: RADIOLOGY | Facility: HOSPITAL | Age: 75
Discharge: HOME | End: 2025-02-07
Attending: UROLOGY
Payer: MEDICARE

## 2025-02-07 DIAGNOSIS — D49.4 BLADDER TUMOR: ICD-10-CM

## 2025-02-07 PROCEDURE — 25500020 PHARM REV CODE 255: Mod: HCNC | Performed by: UROLOGY

## 2025-02-07 PROCEDURE — 74178 CT ABD&PLV WO CNTR FLWD CNTR: CPT | Mod: 26,HCNC,, | Performed by: RADIOLOGY

## 2025-02-07 PROCEDURE — 74178 CT ABD&PLV WO CNTR FLWD CNTR: CPT | Mod: TC,HCNC

## 2025-02-07 RX ADMIN — IOHEXOL 100 ML: 350 INJECTION, SOLUTION INTRAVENOUS at 12:02

## 2025-02-07 NOTE — TELEPHONE ENCOUNTER
Left voicemail. Instructions given. Parking available on the [employer] open Parking lot. Check in on 2nd floor of the [facility]. Please arrive 15 minutes prior to procedure and be ready to provide a urine specimen.

## 2025-02-10 ENCOUNTER — PROCEDURE VISIT (OUTPATIENT)
Dept: UROLOGY | Facility: CLINIC | Age: 75
End: 2025-02-10
Payer: MEDICARE

## 2025-02-10 VITALS
TEMPERATURE: 97 F | RESPIRATION RATE: 17 BRPM | WEIGHT: 199.94 LBS | HEART RATE: 73 BPM | DIASTOLIC BLOOD PRESSURE: 69 MMHG | SYSTOLIC BLOOD PRESSURE: 131 MMHG | BODY MASS INDEX: 37.78 KG/M2

## 2025-02-10 DIAGNOSIS — D49.4 BLADDER TUMOR: ICD-10-CM

## 2025-02-10 PROCEDURE — 52000 CYSTOURETHROSCOPY: CPT | Mod: HCNC,S$GLB,, | Performed by: UROLOGY

## 2025-02-10 RX ADMIN — LIDOCAINE HYDROCHLORIDE: 20 JELLY TOPICAL at 12:02

## 2025-02-10 NOTE — PATIENT INSTRUCTIONS
What to Expect After a Cystoscopy  For the next 24-48 hours, you may feel a mild burning when you urinate. This burning is normal and expected. Drink plenty of water to dilute the urine to help relieve the burning sensation. You may also see a small amount of blood in your urine after the procedure.    Unless you are already taking antibiotics, you may be given an antibiotic after the test to prevent infection.    Signs and Symptoms to Report  Call the [facility] at [phone] if you develop any of the following:  Fever of 101 degrees or higher  Chills or persistent bleeding  Inability to urinate

## 2025-02-10 NOTE — PROCEDURES
Cystoscopy    Date/Time: 2/10/2025 12:30 PM    Performed by: Compa Hensley MD  Authorized by: Compa Hensley MD      Office Cystoscopy Procedure Note For Urothelial Carcinoma    Date of Procedure: 02/10/2025    Indication:  Urothelial Carcinoma    Urothelial Carcinoma History:  High-risk nonmuscle invasive bladder cancer, status post TURBT on 07/16/2024 for high-grade T1 into a diverticulum  Status post open bladder diverticulectomy and lymphadenectomy on 10/08/2024, pathology T 0 N0, negative for residual malignancy  BPH with a history of TURP approximately 10 years ago, currently on finasteride and tamsulosin  Significant nodular regrowth present  Erectile dysfunction     Informed consent:  The risks, benefits, complications, treatment options, and expected outcomes were discussed with the patient. The patient concurred with the proposed plan and provided informed consent.     Anesthesia: Lidocaine jelly 2%     Procedure:  The patient was placed in the lithotomy position, was prepped and draped in the usual manner using sterile technique, and 2% lidocaine jelly instilled into the urethra.  A procedural timeout was performed identifying the patient, all in attendance were in agreement, including the patient. A 17 F flexible cystoscope was then inserted into the urethra and the urethra and bladder carefully examined.  The following findings were noted:     Findings:   1. Normal anterior urethra without evidence of strictures or tumors   2. Prostatic urethra with predominantly left lateral lobe regrowth producing obstruction  3. Bladder with a small area of recurrence on the posterior dome versus superior aspect of the posterior wall.  Ureteral orifices in orthotopic position bilaterally.  Prior diverticulum has been excised completely        Specimens: None   Complications: None; patient tolerated the procedure well          Disposition: Home after brief observation   Condition: Stable     Assessment:  74-year-old  male with small region of recurrence in the posterior dome    Plan:  Proceed to operating room for blue light cystoscopy, biopsy, fulguration.    Attending Attestation:      I personally performed the procedure.     Compa Hensley  12:48 PM  02/10/2025

## 2025-02-12 DIAGNOSIS — Z01.818 PRE-OP TESTING: ICD-10-CM

## 2025-02-12 DIAGNOSIS — R39.9 SYMPTOMS INVOLVING URINARY SYSTEM: ICD-10-CM

## 2025-02-12 DIAGNOSIS — D49.4 BLADDER TUMOR: Primary | ICD-10-CM

## 2025-02-13 ENCOUNTER — LAB VISIT (OUTPATIENT)
Dept: LAB | Facility: HOSPITAL | Age: 75
End: 2025-02-13
Attending: FAMILY MEDICINE
Payer: MEDICARE

## 2025-02-13 DIAGNOSIS — E11.22 TYPE 2 DIABETES MELLITUS WITH STAGE 3A CHRONIC KIDNEY DISEASE, WITH LONG-TERM CURRENT USE OF INSULIN: ICD-10-CM

## 2025-02-13 DIAGNOSIS — Z79.4 TYPE 2 DIABETES MELLITUS WITH STAGE 3A CHRONIC KIDNEY DISEASE, WITH LONG-TERM CURRENT USE OF INSULIN: ICD-10-CM

## 2025-02-13 DIAGNOSIS — N18.31 TYPE 2 DIABETES MELLITUS WITH STAGE 3A CHRONIC KIDNEY DISEASE, WITH LONG-TERM CURRENT USE OF INSULIN: ICD-10-CM

## 2025-02-13 PROCEDURE — 82570 ASSAY OF URINE CREATININE: CPT | Mod: HCNC | Performed by: FAMILY MEDICINE

## 2025-02-14 LAB
ALBUMIN/CREAT UR: 10.4 UG/MG (ref 0–30)
CREAT UR-MCNC: 67 MG/DL (ref 23–375)
MICROALBUMIN UR DL<=1MG/L-MCNC: 7 UG/ML

## 2025-02-19 ENCOUNTER — OFFICE VISIT (OUTPATIENT)
Dept: OTOLARYNGOLOGY | Facility: CLINIC | Age: 75
End: 2025-02-19
Payer: MEDICARE

## 2025-02-19 DIAGNOSIS — H61.23 BILATERAL IMPACTED CERUMEN: Primary | ICD-10-CM

## 2025-02-19 DIAGNOSIS — H72.91 PERFORATION OF RIGHT TYMPANIC MEMBRANE: ICD-10-CM

## 2025-02-19 DIAGNOSIS — Z98.890 HX OF TYMPANOMASTOIDECTOMY: ICD-10-CM

## 2025-02-19 NOTE — PROGRESS NOTES
Subjective:   Cerumen impactions     Patient ID: Rigoberto Vasquez is a 74 y.o. male.    Chief Complaint:  Excessive ear wax     Rigoberto Vasquez is a 74 y.o. male here to see me today for evaluation of a possible wax impaction in bilateral ears.  He has complaints of hearing loss in the affected ears, but denies pain or drainage.  This has been an issue in the past.  The patient has not been using any sort of ear drop to soften the wax.  He has hx of left ear CWU tmastoid in 2015 and more recently revision AS CWU tmastoid for recurrence and PE tube of right ear on 3/15/2023. Unfortunately developed perforation in right TM after tube extruded.  He wears hearing aids bilaterally.     HPI  Review of Systems   HENT: Positive for hearing loss. Negative for ear discharge, ear pain and tinnitus.        Objective:     Physical Exam   HENT:   Right Ear: External ear and ear canal normal. Decreased hearing is noted.   Left Ear: External ear and ear canal normal. Decreased hearing is noted.   Bilateral complete cerumen impactions, removal described below       Procedure Note    CHIEF COMPLAINT:  Cerumen Impaction    Description:  The patient was seated in an exam chair.  An ear speculum was placed in the right EAC and was examined under the microscope.  Alligator forceps were used to remove a large cerumen impaction.  The tympanic membrane was visualized and was perforated in appearance.  The procedure was repeated on the left side in a similar fashion.  The TM was intact and normal on this side as well.  The patient tolerated the procedure well.           Assessment:     1. Bilateral impacted cerumen    2. Hx of tympanomastoidectomy    3. Perforation of right tympanic membrane        Plan:     1.  Cerumen impaction:  Removed today without difficulty.  I recommend repeat ear cleaning in 3 months for optimal hearing aid performance.  Recommend he avoid Qtips.

## 2025-02-20 ENCOUNTER — OFFICE VISIT (OUTPATIENT)
Dept: INTERNAL MEDICINE | Facility: CLINIC | Age: 75
End: 2025-02-20
Payer: MEDICARE

## 2025-02-20 VITALS
BODY MASS INDEX: 37 KG/M2 | WEIGHT: 196 LBS | SYSTOLIC BLOOD PRESSURE: 128 MMHG | TEMPERATURE: 98 F | HEIGHT: 61 IN | OXYGEN SATURATION: 98 % | DIASTOLIC BLOOD PRESSURE: 74 MMHG | HEART RATE: 81 BPM

## 2025-02-20 DIAGNOSIS — E11.69 HYPERLIPIDEMIA ASSOCIATED WITH TYPE 2 DIABETES MELLITUS: Chronic | ICD-10-CM

## 2025-02-20 DIAGNOSIS — E78.5 HYPERLIPIDEMIA ASSOCIATED WITH TYPE 2 DIABETES MELLITUS: Chronic | ICD-10-CM

## 2025-02-20 DIAGNOSIS — C67.2 MALIGNANT NEOPLASM OF LATERAL WALL OF URINARY BLADDER: ICD-10-CM

## 2025-02-20 DIAGNOSIS — L60.1 TRAUMATIC ONYCHOLYSIS: ICD-10-CM

## 2025-02-20 DIAGNOSIS — Z79.4 TYPE 2 DIABETES MELLITUS WITH STAGE 3A CHRONIC KIDNEY DISEASE, WITH LONG-TERM CURRENT USE OF INSULIN: Primary | ICD-10-CM

## 2025-02-20 DIAGNOSIS — E66.01 CLASS 2 SEVERE OBESITY DUE TO EXCESS CALORIES WITH SERIOUS COMORBIDITY AND BODY MASS INDEX (BMI) OF 37.0 TO 37.9 IN ADULT: ICD-10-CM

## 2025-02-20 DIAGNOSIS — E11.59 HYPERTENSION ASSOCIATED WITH DIABETES: Chronic | ICD-10-CM

## 2025-02-20 DIAGNOSIS — J84.10 LUNG GRANULOMA: ICD-10-CM

## 2025-02-20 DIAGNOSIS — N18.31 TYPE 2 DIABETES MELLITUS WITH STAGE 3A CHRONIC KIDNEY DISEASE, WITH LONG-TERM CURRENT USE OF INSULIN: Primary | ICD-10-CM

## 2025-02-20 DIAGNOSIS — G47.33 OSA ON CPAP: ICD-10-CM

## 2025-02-20 DIAGNOSIS — E11.22 TYPE 2 DIABETES MELLITUS WITH STAGE 3A CHRONIC KIDNEY DISEASE, WITH LONG-TERM CURRENT USE OF INSULIN: Primary | ICD-10-CM

## 2025-02-20 DIAGNOSIS — H40.023 OPEN ANGLE WITH BORDERLINE FINDINGS AND HIGH GLAUCOMA RISK IN BOTH EYES: ICD-10-CM

## 2025-02-20 DIAGNOSIS — I70.0 CALCIFICATION OF AORTA: ICD-10-CM

## 2025-02-20 DIAGNOSIS — I15.2 HYPERTENSION ASSOCIATED WITH DIABETES: Chronic | ICD-10-CM

## 2025-02-20 DIAGNOSIS — E66.812 CLASS 2 SEVERE OBESITY DUE TO EXCESS CALORIES WITH SERIOUS COMORBIDITY AND BODY MASS INDEX (BMI) OF 37.0 TO 37.9 IN ADULT: ICD-10-CM

## 2025-02-20 PROCEDURE — 99999 PR PBB SHADOW E&M-EST. PATIENT-LVL V: CPT | Mod: PBBFAC,HCNC,, | Performed by: PHYSICIAN ASSISTANT

## 2025-02-20 NOTE — PROGRESS NOTES
Subjective:      Patient ID: Rigoberto Vasquez is a 74 y.o. male.    Chief Complaint: Follow-up    HPI      History of Present Illness    CHIEF COMPLAINT:  Mr. Vasquez presents today for follow up  Here today for a routine 6 month follow up.   DM: currently on ozempic 1mg. Recent A1c 6.9 which is within acceptable range for his age.   CKD: stable on recent labs.   Lipids: at goal on lovastatin.   Foot exam due today. Left great toe.     GENITOURINARY:  He had cystoscopy several weeks ago which revealed a concerning spot requiring biopsy. Biopsy is scheduled for April 4.    DIABETES:  He continues Ozempic 1 mg and Lantus insulin for diabetes management. He reports occasional gastric symptoms when restarting Ozempic after periods of discontinuation, but these issues have largely resolved with continued use.  He is also seeing our diabetes dept.     ENT:  He reports significant sinus and allergy symptoms this year, with nosebleeds approximately 6 weeks ago requiring cauterization at two spots. He takes Zyzal nightly and Claritin during the day for allergy management, along with saline spray 4-5 times daily. He reports clear nasal drainage. He underwent ear cleaning yesterday, which he requires every 2-3 months due to wax buildup.    MUSCULOSKELETAL:  He injured his left great toe several months ago after dropping an object on it. The toenail continues to grow out and turn white, with no improvement since the initial injury.    Medications were reviewed          Wt Readings from Last 3 Encounters:   02/20/25 0907 88.9 kg (195 lb 15.8 oz)   02/10/25 1200 90.7 kg (199 lb 15.3 oz)   01/31/25 0916 89.4 kg (197 lb 1.5 oz)      Problem List[1]    Current Medications[2]    Review of Systems   Constitutional:  Negative for activity change, appetite change, chills, diaphoresis, fatigue, fever and unexpected weight change.   HENT:  Positive for congestion, nosebleeds, postnasal drip and rhinorrhea. Negative for hearing loss, sore  "throat, trouble swallowing and voice change.    Eyes: Negative.  Negative for visual disturbance.   Respiratory: Negative.  Negative for cough, choking, chest tightness and shortness of breath.    Cardiovascular:  Negative for chest pain, palpitations and leg swelling.   Gastrointestinal:  Negative for abdominal distention, abdominal pain, blood in stool, constipation, diarrhea, nausea and vomiting.   Endocrine: Negative for cold intolerance, heat intolerance, polydipsia and polyuria.   Genitourinary: Negative.  Negative for difficulty urinating and frequency.   Musculoskeletal:  Negative for arthralgias, back pain, gait problem, joint swelling and myalgias.   Skin:  Positive for wound. Negative for color change, pallor and rash.   Neurological:  Negative for dizziness, tremors, weakness, light-headedness, numbness and headaches.   Hematological:  Negative for adenopathy.   Psychiatric/Behavioral:  Negative for behavioral problems, confusion, self-injury, sleep disturbance and suicidal ideas. The patient is not nervous/anxious.      Objective:   /74 (BP Location: Left arm, Patient Position: Sitting)   Pulse 81   Temp 98 °F (36.7 °C) (Tympanic)   Ht 5' 1" (1.549 m)   Wt 88.9 kg (195 lb 15.8 oz)   SpO2 98%   BMI 37.03 kg/m²     Physical Exam  Vitals reviewed.   Constitutional:       General: He is not in acute distress.     Appearance: Normal appearance. He is well-developed. He is not ill-appearing, toxic-appearing or diaphoretic.   HENT:      Head: Normocephalic and atraumatic.      Right Ear: Tympanic membrane, ear canal and external ear normal.      Left Ear: External ear normal. A middle ear effusion (serous) is present.      Ears:      Comments: Bilateral hearing aids noted     Nose: Mucosal edema and rhinorrhea present. Rhinorrhea is clear.   Eyes:      Conjunctiva/sclera: Conjunctivae normal.      Pupils: Pupils are equal, round, and reactive to light.   Cardiovascular:      Rate and Rhythm: Normal " rate and regular rhythm.      Heart sounds: Normal heart sounds. No murmur heard.     No friction rub. No gallop.   Pulmonary:      Effort: Pulmonary effort is normal. No respiratory distress.      Breath sounds: Normal breath sounds. No wheezing or rales.   Chest:      Chest wall: No tenderness.   Abdominal:      General: There is no distension.      Palpations: Abdomen is soft.      Tenderness: There is no abdominal tenderness.   Musculoskeletal:         General: Normal range of motion.      Cervical back: Normal range of motion and neck supple.   Lymphadenopathy:      Cervical: No cervical adenopathy.   Skin:     General: Skin is warm and dry.      Capillary Refill: Capillary refill takes less than 2 seconds.      Findings: No rash.   Neurological:      Mental Status: He is alert and oriented to person, place, and time.      Motor: No weakness.      Coordination: Coordination normal.      Gait: Gait normal.   Psychiatric:         Mood and Affect: Mood normal.         Behavior: Behavior normal.         Thought Content: Thought content normal.         Judgment: Judgment normal.       Lab Visit on 02/13/2025   Component Date Value Ref Range Status    Hemoglobin A1C 02/13/2025 6.9 (H)  4.0 - 5.6 % Final    Comment: ADA Screening Guidelines:  5.7-6.4%  Consistent with prediabetes  >or=6.5%  Consistent with diabetes    High levels of fetal hemoglobin interfere with the HbA1C  assay. Heterozygous hemoglobin variants (HbS, HgC, etc)do  not significantly interfere with this assay.   However, presence of multiple variants may affect accuracy.      Estimated Avg Glucose 02/13/2025 151 (H)  68 - 131 mg/dL Final    Sodium 02/13/2025 138  136 - 145 mmol/L Final    Potassium 02/13/2025 3.9  3.5 - 5.1 mmol/L Final    Chloride 02/13/2025 107  95 - 110 mmol/L Final    CO2 02/13/2025 23  23 - 29 mmol/L Final    Glucose 02/13/2025 143 (H)  70 - 110 mg/dL Final    BUN 02/13/2025 17  8 - 23 mg/dL Final    Creatinine 02/13/2025 1.3   0.5 - 1.4 mg/dL Final    Calcium 02/13/2025 9.0  8.7 - 10.5 mg/dL Final    Total Protein 02/13/2025 6.7  6.0 - 8.4 g/dL Final    Albumin 02/13/2025 3.9  3.5 - 5.2 g/dL Final    Total Bilirubin 02/13/2025 0.7  0.1 - 1.0 mg/dL Final    Comment: For infants and newborns, interpretation of results should be based  on gestational age, weight and in agreement with clinical  observations.    Premature Infant recommended reference ranges:  Up to 24 hours.............<8.0 mg/dL  Up to 48 hours............<12.0 mg/dL  3-5 days..................<15.0 mg/dL  6-29 days.................<15.0 mg/dL      Alkaline Phosphatase 02/13/2025 40  40 - 150 U/L Final    AST 02/13/2025 32  10 - 40 U/L Final    ALT 02/13/2025 19  10 - 44 U/L Final    eGFR 02/13/2025 58.0 (A)  >60 mL/min/1.73 m^2 Final    Anion Gap 02/13/2025 8  8 - 16 mmol/L Final    Cholesterol 02/13/2025 113 (L)  120 - 199 mg/dL Final    Comment: The National Cholesterol Education Program (NCEP) has set the  following guidelines (reference ranges) for Cholesterol:  Optimal.....................<200 mg/dL  Borderline High.............200-239 mg/dL  High........................> or = 240 mg/dL      Triglycerides 02/13/2025 64  30 - 150 mg/dL Final    Comment: The National Cholesterol Education Program (NCEP) has set the  following guidelines (reference values) for triglycerides:  Normal......................<150 mg/dL  Borderline High.............150-199 mg/dL  High........................200-499 mg/dL      HDL 02/13/2025 33 (L)  40 - 75 mg/dL Final    Comment: The National Cholesterol Education Program (NCEP) has set the  following guidelines (reference values) for HDL Cholesterol:  Low...............<40 mg/dL  Optimal...........>60 mg/dL      LDL Cholesterol 02/13/2025 67.2  63.0 - 159.0 mg/dL Final    Comment: The National Cholesterol Education Program (NCEP) has set the  following guidelines (reference values) for LDL Cholesterol:  Optimal.......................<130  mg/dL  Borderline High...............130-159 mg/dL  High..........................160-189 mg/dL  Very High.....................>190 mg/dL      HDL/Cholesterol Ratio 02/13/2025 29.2  20.0 - 50.0 % Final    Total Cholesterol/HDL Ratio 02/13/2025 3.4  2.0 - 5.0 Final    Non-HDL Cholesterol 02/13/2025 80  mg/dL Final    Comment: Risk category and Non-HDL cholesterol goals:  Coronary heart disease (CHD)or equivalent (10-year risk of CHD >20%):  Non-HDL cholesterol goal     <130 mg/dL  Two or more CHD risk factors and 10-year risk of CHD <= 20%:  Non-HDL cholesterol goal     <160 mg/dL  0 to 1 CHD risk factor:  Non-HDL cholesterol goal     <190 mg/dL     Lab Visit on 02/13/2025   Component Date Value Ref Range Status    Microalbumin, Urine 02/13/2025 7.0  ug/mL Final    Creatinine, Urine 02/13/2025 67.0  23.0 - 375.0 mg/dL Final    Microalb/Creat Ratio 02/13/2025 10.4  0.0 - 30.0 ug/mg Final   Lab Visit on 02/05/2025   Component Date Value Ref Range Status    Creatinine 02/05/2025 1.4  0.5 - 1.4 mg/dL Final    eGFR 02/05/2025 53.1 (A)  >60 mL/min/1.73 m^2 Final   Office Visit on 01/30/2025   Component Date Value Ref Range Status    POC Glucose 01/30/2025 150 (A)  70 - 110 MG/DL Final       Assessment:     1. Type 2 diabetes mellitus with stage 3a chronic kidney disease, with long-term current use of insulin    2. Hypertension associated with diabetes    3. Calcification of aorta    4. Hyperlipidemia associated with type 2 diabetes mellitus    5. Malignant neoplasm of lateral wall of urinary bladder    6. Open angle with borderline findings and high glaucoma risk in both eyes    7. Lung granuloma    8. СВЕТЛАНА on CPAP    9. Class 2 severe obesity due to excess calories with serious comorbidity and body mass index (BMI) of 37.0 to 37.9 in adult    10. Traumatic onycholysis      Plan:   Reviewed lab results: cholesterol, A1c, and kidney function stable and within acceptable range for patient's age  Noted upcoming bladder biopsy  scheduled  Assessed diabetic management: A1c stable despite intermittent Ozempic use due to testing requirements  Evaluated left great toenail injury: no signs of infection    Type 2 diabetes mellitus with stage 3a chronic kidney disease, with long-term current use of insulin  -     Hemoglobin A1C; Future; Expected date: 08/20/2025  - Continued Ozempic 1 mg and lantus.  - Reviewed labs and discussed A1c with the patient.  - Noted that A1c is appropriate for the patient's age  -cont seeing diabetes management team  - Scheduled follow up in 6 months for A1c check.    Hypertension associated with diabetes  -stable and controlled on losartan    Calcification of aorta  -cont lovastatin.   -lipids at goal    Hyperlipidemia associated with type 2 diabetes mellitus  -lipids at goal on lovastatin  -labs reviewed    Malignant neoplasm of lateral wall of urinary bladder  -cont close follow up/management with urology/oncology team    Open angle with borderline findings and high glaucoma risk in both eyes  -up to date with ophthalmology    Lung granuloma  -stable    СВЕТЛАНА on CPAP  -stable    Class 2 severe obesity due to excess calories with serious comorbidity and body mass index (BMI) of 37.0 to 37.9 in adult  Advise to maintain lifestyle changes with low carbohydrate, low sugar diet and exercise 30 minutes daily    Traumatic onycholysis  -     Ambulatory referral/consult to Podiatry; Future; Expected date: 02/27/2025        ALLERGIES:  - Noted clear nasal drainage with no fever or green discharge.  - Continued Zyzal at night and Claritin during the day.  - continue using saline spray 4-5 times daily.  - Advised applying Aquaphor gel on Q-tip inside nostrils, especially at night, for moisture retention.  - Explained the relationship between allergies, vascular nature of nose, and epistaxis.    EPISTAXIS:  - Noted that patient had nosebleeds 6 weeks ago, which were cauterized.      - Mr. Vasquez to see  to set up  (3) no apparent problem appointments.     This note was generated with the assistance of ambient listening technology. Verbal consent was obtained by the patient and accompanying visitor(s) for the recording of patient appointment to facilitate this note. I attest to having reviewed and edited the generated note for accuracy, though some syntax or spelling errors may persist. Please contact the author of this note for any clarification.\      Follow up in about 6 months (around 8/20/2025), or if symptoms worsen or fail to improve.           [1]   Patient Active Problem List  Diagnosis    Osteoarthritis of knees, bilateral    Hypertension associated with diabetes    Type 2 diabetes mellitus with stage 3a chronic kidney disease, with long-term current use of insulin    Hyperlipidemia associated with type 2 diabetes mellitus    High myopia    Optic disc anomaly    CHANDRAKANT (iron deficiency anemia)    Pacemaker    Conductive hearing loss in left ear    Choroidal nevus of right eye    Epiretinal membrane (ERM) of left eye    Class 2 severe obesity due to excess calories with serious comorbidity and body mass index (BMI) of 37.0 to 37.9 in adult    Macular hole of right eye    Ectopic gastric mucosa    Open angle with borderline findings and high glaucoma risk in both eyes    History of skin cancer    History of heart block;AV block, 3rd degree    BPH with obstruction/lower urinary tract symptoms    Erectile dysfunction    Lung granuloma    Dyspepsia    Calcification of aorta    Cholesteatoma of ear, left    СВЕТЛАНА on CPAP    Gross hematuria    Malignant neoplasm of lateral wall of urinary bladder   [2]   Current Outpatient Medications:     ACETAMINOPHEN (TYLENOL 8 HOUR ORAL), Take by mouth as needed., Disp: , Rfl:     azelastine (ASTELIN) 137 mcg (0.1 %) nasal spray, 2 sprays (274 mcg total) by Nasal route 2 (two) times daily., Disp: 30 mL, Rfl: 12    blood sugar diagnostic (ACCU-CHEK LUIS FERNANDO) Strp, Check BG 2-3 times daily. Accuchek luis fernando strips, Disp:  "300 strip, Rfl: 3    dorzolamide-timolol 2-0.5% (COSOPT) 22.3-6.8 mg/mL ophthalmic solution, Place 1 drop into both eyes 2 (two) times daily., Disp: 10 mL, Rfl: 5    DROPLET PEN NEEDLE 31 gauge x 3/16" Ndle, USE AS DIRECTED  WITH  INSULIN, Disp: 200 each, Rfl: 3    dutasteride (AVODART) 0.5 mg capsule, Take 1 capsule (0.5 mg total) by mouth once daily., Disp: 90 capsule, Rfl: 3    famotidine (PEPCID) 20 MG tablet, Take 1 tablet (20 mg total) by mouth 2 (two) times daily., Disp: 180 tablet, Rfl: 3    fenofibrate micronized (LOFIBRA) 200 MG Cap, Take 1 capsule (200 mg total) by mouth every evening., Disp: 90 capsule, Rfl: 3    fluticasone propionate (FLONASE) 50 mcg/actuation nasal spray, 1 spray by Each Nostril route once daily., Disp: , Rfl:     gabapentin (CMPD PAIN MANAGEMENT COMPOUND OINTMNENT THREE), Apply bid prn pain, Disp: 100 g, Rfl: 11    hydrocortisone 2.5 % cream, Apply topically 2 (two) times daily., Disp: 3.5 g, Rfl: 5    insulin (LANTUS SOLOSTAR U-100 INSULIN) glargine 100 units/mL SubQ pen, 16 units in the AM and 4 units in the PM, Disp: 3 mL, Rfl: 11    levocetirizine (XYZAL) 5 MG tablet, Take 5 mg by mouth every evening., Disp: , Rfl:     loratadine (CLARITIN) 10 mg tablet, Take 10 mg by mouth once daily., Disp: , Rfl:     losartan (COZAAR) 25 MG tablet, Take 1 tablet (25 mg total) by mouth once daily., Disp: 90 tablet, Rfl: 3    lovastatin (MEVACOR) 40 MG tablet, Take 1 tablet by mouth Every evening., Disp: 90 tablet, Rfl: 3    multivitamin capsule, Take 1 capsule by mouth once daily., Disp: , Rfl:     oxyCODONE (ROXICODONE) 5 MG immediate release tablet, Take 1 tablet (5 mg total) by mouth every 4 (four) hours as needed for Pain., Disp: 10 tablet, Rfl: 0    pantoprazole (PROTONIX) 40 MG tablet, Take 1 tablet (40 mg total) by mouth once daily., Disp: 90 tablet, Rfl: 1    polyethylene glycol (GLYCOLAX) 17 gram/dose powder, Take 17 g by mouth once daily., Disp: , Rfl:     polyethylene glycol " (GLYCOLAX) 17 gram/dose powder, Use to cap to measure 17g, mix with liquid, and take by mouth once daily., Disp: 510 g, Rfl: 0    semaglutide (OZEMPIC) 1 mg/dose (4 mg/3 mL), Inject 1 mg into the skin every 7 days., Disp: 9 mL, Rfl: 4    sildenafil (REVATIO) 20 mg Tab, Take 1 tablet (20 mg total) by mouth daily as needed (PRN). Takes prn, Disp: 30 tablet, Rfl: 11    sodium chloride (SALINE NASAL) 0.65 % nasal spray, 2 sprays by Nasal route as needed for Congestion., Disp: 44 mL, Rfl: 0    triamcinolone acetonide 0.1% (KENALOG) 0.1 % cream, AAA bid, Disp: 454 g, Rfl: 0    latanoprost 0.005 % ophthalmic solution, INSTILL 1 DROP INTO BOTH EYES ONE TIME DAILY, Disp: 7.5 mL, Rfl: 3

## 2025-02-24 RX ORDER — PIOGLITAZONEHYDROCHLORIDE 30 MG/1
30 TABLET ORAL DAILY
COMMUNITY
Start: 2024-10-16

## 2025-03-03 ENCOUNTER — OUTSIDE PLACE OF SERVICE (OUTPATIENT)
Dept: OPHTHALMOLOGY | Facility: CLINIC | Age: 75
End: 2025-03-03
Payer: MEDICARE

## 2025-03-03 PROCEDURE — 65855 TRABECULOPLASTY LASER SURG: CPT | Mod: 50,,, | Performed by: OPHTHALMOLOGY

## 2025-03-06 ENCOUNTER — OFFICE VISIT (OUTPATIENT)
Dept: PODIATRY | Facility: CLINIC | Age: 75
End: 2025-03-06
Payer: MEDICARE

## 2025-03-06 VITALS — BODY MASS INDEX: 37 KG/M2 | WEIGHT: 196 LBS | HEIGHT: 61 IN

## 2025-03-06 DIAGNOSIS — L60.1 ONYCHOLYSIS OF TOENAIL: ICD-10-CM

## 2025-03-06 DIAGNOSIS — L60.3 DYSTROPHY OF NAIL DUE TO TRAUMA: Primary | ICD-10-CM

## 2025-03-06 PROCEDURE — 99203 OFFICE O/P NEW LOW 30 MIN: CPT | Mod: HCNC,S$GLB,, | Performed by: PODIATRIST

## 2025-03-06 PROCEDURE — 1101F PT FALLS ASSESS-DOCD LE1/YR: CPT | Mod: HCNC,CPTII,S$GLB, | Performed by: PODIATRIST

## 2025-03-06 PROCEDURE — 3066F NEPHROPATHY DOC TX: CPT | Mod: HCNC,CPTII,S$GLB, | Performed by: PODIATRIST

## 2025-03-06 PROCEDURE — 1126F AMNT PAIN NOTED NONE PRSNT: CPT | Mod: HCNC,CPTII,S$GLB, | Performed by: PODIATRIST

## 2025-03-06 PROCEDURE — 3008F BODY MASS INDEX DOCD: CPT | Mod: HCNC,CPTII,S$GLB, | Performed by: PODIATRIST

## 2025-03-06 PROCEDURE — 1159F MED LIST DOCD IN RCRD: CPT | Mod: HCNC,CPTII,S$GLB, | Performed by: PODIATRIST

## 2025-03-06 PROCEDURE — 3288F FALL RISK ASSESSMENT DOCD: CPT | Mod: HCNC,CPTII,S$GLB, | Performed by: PODIATRIST

## 2025-03-06 PROCEDURE — 3061F NEG MICROALBUMINURIA REV: CPT | Mod: HCNC,CPTII,S$GLB, | Performed by: PODIATRIST

## 2025-03-06 PROCEDURE — 3044F HG A1C LEVEL LT 7.0%: CPT | Mod: HCNC,CPTII,S$GLB, | Performed by: PODIATRIST

## 2025-03-06 PROCEDURE — 99999 PR PBB SHADOW E&M-EST. PATIENT-LVL III: CPT | Mod: PBBFAC,HCNC,, | Performed by: PODIATRIST

## 2025-03-06 NOTE — PROGRESS NOTES
[facility]  PODIATRIC MEDICINE AND SURGERY      CHIEF COMPLAINT   Chief Complaint   Patient presents with    Nail Problem     C/o smashing his toenail, left great toenail, x several months, 0 pain at the moment, diabetic, wears tennis and socks, last lauren PCP Suma Sheth on 02/20/25         HPI:    Rigoberto Vasquez is a 74 y.o. male presenting to podiatry clinic with complaint of nail trauma to left great toenail resulting in nail plate coming off. Denies any pain.  No further pedal complaints.      PMH  Past Medical History:   Diagnosis Date    Acid reflux     [facility]    Angina pectoris     Back pain     BPH (benign prostatic hyperplasia)     blue    Cholesteatoma     Class 2 severe obesity due to excess calories with serious comorbidity and body mass index (BMI) of 37.0 to 37.9 in adult 11/11/2015    Degenerative joint disease     Diverticulosis     Erectile dysfunction     History of elevated PSA 02/2014    normalized, dr dave annually    History of pericarditis     Hypercholesteremia     Hypertension     Iron deficiency anemia     Malignant neoplasm of lateral wall of urinary bladder 8/5/2024    СВЕТЛАНА (obstructive sleep apnea) 05/17/2022    Pacemaker     complete av block;NO afib    Renal disorder     Squamous cell cancer of skin of mastoid region of scalp     dr jaida salgado    Type 2 diabetes mellitus     dr wyman    Urinary incontinence     when he was 7 Yrs old.        PROBLEM LIST  Patient Active Problem List    Diagnosis Date Noted    Malignant neoplasm of lateral wall of urinary bladder 08/05/2024    Gross hematuria 07/11/2024    СВЕТЛАНА on CPAP 05/17/2022    Cholesteatoma of ear, left     Calcification of aorta 11/11/2021    Dyspepsia 09/03/2021    Lung granuloma 08/04/2021    BPH with obstruction/lower urinary tract symptoms 07/08/2020    Erectile dysfunction 07/08/2020    History of skin cancer 11/25/2019    History of heart block;AV block, 3rd degree 02/04/2019    Open angle with  borderline findings and high glaucoma risk in both eyes 01/21/2019    Ectopic gastric mucosa 07/03/2017    Macular hole of right eye 12/05/2016    Class 2 severe obesity due to excess calories with serious comorbidity and body mass index (BMI) of 37.0 to 37.9 in adult 11/11/2015    Choroidal nevus of right eye 04/06/2015    Epiretinal membrane (ERM) of left eye 04/06/2015    Conductive hearing loss in left ear     Pacemaker     CHANDRAKANT (iron deficiency anemia) 06/18/2014    High myopia 03/28/2014    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    Type 2 diabetes mellitus with stage 3a chronic kidney disease, with long-term current use of insulin     Hyperlipidemia associated with type 2 diabetes mellitus     Hypertension associated with diabetes     Osteoarthritis of knees, bilateral 03/04/2010       MEDS  Medications Ordered Prior to Encounter[1]    PSH     Past Surgical History:   Procedure Laterality Date    BIOPSY OF BLADDER N/A 8/30/2024    Procedure: BIOPSY, BLADDER;  Surgeon: Compa Hensley MD;  [facility];  Service: Urology;  Laterality: N/A;    BLADDER DIVERTICULECTOMY Right 10/8/2024    Procedure: EXCISION, DIVERTICULUM, BLADDER;  Surgeon: Compa Hensley MD;  Location: [facility] OR 2ND FLR;  Service: Urology;  Laterality: Right;    BLADDER FULGURATION N/A 8/30/2024    Procedure: FULGURATION, BLADDER;  Surgeon: Compa Hensley MD;  Location: [facility] OR 1ST FLR;  Service: Urology;  Laterality: N/A;    CARDIAC PACEMAKER PLACEMENT      COLONOSCOPY N/A 9/13/2019    Procedure: COLONOSCOPY;  Surgeon: Kan Ramsey III, MD;  Location: [facility] ENDO;  Service: Endoscopy;  Laterality: N/A;    COLONOSCOPY N/A 9/22/2022    Procedure: COLONOSCOPY;  Surgeon: Chris Garcia MD;  Location: [facility] ENDO;  Service: Endoscopy;  Laterality: N/A;    CYSTOSCOPY N/A 8/30/2024    Procedure: CYSTOSCOPY;  Surgeon: Compa Hensley MD;  [facility];  Service: Urology;  Laterality: N/A;  with blue light    CYSTOSCOPY W/ RETROGRADES  Bilateral 7/16/2024    Procedure: CYSTOSCOPY, WITH RETROGRADE PYELOGRAM;  Surgeon: Vance Olivera MD;  Location: [facility] OR;  Service: Urology;  Laterality: Bilateral;    CYSTOSCOPY,WITH URETERAL CATHETER INSERTION Bilateral 10/8/2024    Procedure: CYSTOSCOPY,WITH URETERAL CATHETER INSERTION;  Surgeon: Compa Hensley MD;  Location: [address];  Service: Urology;  Laterality: Bilateral;    ESOPHAGOGASTRODUODENOSCOPY N/A 9/13/2019    Procedure: ESOPHAGOGASTRODUODENOSCOPY (EGD);  Surgeon: Kan Ramsey III, MD;  Location: [facility];  Service: Endoscopy;  Laterality: N/A;    ESOPHAGOGASTRODUODENOSCOPY N/A 9/3/2021    Procedure: EGD (ESOPHAGOGASTRODUODENOSCOPY);  Surgeon: Kaylene Pineda MD;  Location: [facility];  Service: Endoscopy;  Laterality: N/A;    ESOPHAGOGASTRODUODENOSCOPY N/A 5/24/2023    Procedure: EGD (ESOPHAGOGASTRODUODENOSCOPY);  Surgeon: Cory Bennett MD;  Location: [facility];  Service: Endoscopy;  Laterality: N/A;    FLEXIBLE CYSTOSCOPY N/A 10/8/2024    Procedure: CYSTOSCOPY, FLEXIBLE;  Surgeon: Compa Hensley MD;  Location: [address];  Service: Urology;  Laterality: N/A;    INSERTION OF TYMPANOSTOMY TUBE Right 3/15/2023    Procedure: INSERTION, TYMPANOSTOMY TUBE;  Surgeon: Jose L Gudino MD;  Location: [address];  Service: ENT;  Laterality: Right;    INSTILLATION OF URINARY BLADDER N/A 10/8/2024    Procedure: INSTILLATION, BLADDER;  Surgeon: Compa Hensley MD;  Location: [address];  Service: Urology;  Laterality: N/A;  Chemotherapy instillation bladder    JOINT REPLACEMENT      KNEE CARTILAGE SURGERY Bilateral     LYMPHADENECTOMY, PELVIS Right 10/8/2024    Procedure: LYMPHADENECTOMY, PELVIS;  Surgeon: Compa Hensley MD;  Location: [address];  Service: Urology;  Laterality: Right;    NASAL SINUS SURGERY      PARTIAL SURGICAL REMOVAL OF BLADDER N/A 10/8/2024    Procedure: CYSTECTOMY, PARTIAL;  Surgeon: Compa Hensley MD;  Location: [address] FLR;  Service:  "Urology;  Laterality: N/A;    SHOULDER ARTHROSCOPY Right     TONSILLECTOMY      TOTAL KNEE ARTHROPLASTY Left 05/15/2017    TRANSURETHRAL RESECTION OF PROSTATE      TURBT (TRANSURETHRAL RESECTION OF BLADDER TUMOR) N/A 7/16/2024    Procedure: TURBT (TRANSURETHRAL RESECTION OF BLADDER TUMOR);  Surgeon: Vance Olivera MD;  Location: [facility];  Service: Urology;  Laterality: N/A;    TYMPANOPLASTY      TYMPANOPLASTY WITH MASTOIDECTOMY Left 3/15/2023    Procedure: TYMPANOPLASTY, WITH MASTOIDECTOMY;  Surgeon: Jose L Gudino MD;  Location: [address];  Service: ENT;  Laterality: Left;    vesectomy          ALL  Review of patient's allergies indicates:   Allergen Reactions    Aspirin      Stomach pain even with ppi    Meperidine      Other reaction(s): Stomach upset    Niacin      Other reaction(s): hot skin       SOC   Social History[2]      Family HX    Family History   Problem Relation Name Age of Onset    Arthritis Mother      Hypertension Mother      Heart disease Father      Hypertension Father      Stroke Father      Diabetes Son      Diabetes Maternal Grandmother      Colon cancer Paternal Grandmother              REVIEW OF SYSTEMS  General: Denies any fever or chills  Chest: Denies shortness of breath, wheezing, coughing, or sputum production  Heart: Denies chest pain, cold extremities, orthopenia, or reduced exercise tolerance  As noted above and per history of current illness above, otherwise negative in the remainder of the 14 systems.      PHYSICAL EXAM:      Vitals:    03/06/25 1106   Weight: 88.9 kg (195 lb 15.8 oz)   Height: 5' 1" (1.549 m)   PainSc: 0-No pain       General: This patient is well-developed, well-nourished and appears stated age, well-oriented to person, place and time, and cooperative and pleasant on today's visit    LOWER EXTREMITY PHYSICAL EXAM    VASCULAR  Dorsalis pedis and posterior tibial pulses palpable 2/4 bilaterally.   Capillary refill time immediate to the toes.   Feet are " warm to the touch. Skin temperature warm to warm from proximally to distally   There are no varicosities, telangiectasias noted to bilateral foot and ankle regions.   There are no ecchymoses noted to bilateral foot and ankle regions.       DERMATOLOGIC  There is clean, dry nail bed with small nail particle remnant   Skin moist with healthy texture and turgor.  There are no open ulcerations, lacerations, or fissures to bilateral foot and ankle regions. There are no signs of infection as there is no erythema, no proximal-extending lymphangiitis, no fluctuance, or crepitus noted on palpation to bilateral foot and ankle regions.     NEUROLOGIC  Epicritic sensation is intact as the patient is able to sense light touch to bilateral foot and ankle regions.   Achilles and patellar deep tendon reflexes intact  Babinski reflex absent    ORTHOPEDIC/BIOMECHANICAL  No symptomatic structural abnormalities noted  Muscle strength AT/EHL/EDL/PT: 5/5; Achilles/Gastroc/Soleus: 5/5; PB/PL: 5/5 Muscle tone is normal.  Ankle joint ROM INTACT DF/PF, non-crepitus      ASSESSMENT   Dystrophy of nail due to trauma    Onycholysis of toenail        PLAN    1. Patient was educated about clinical findings, and verbalizes understanding of above.  2. Reassured about nail growth 1 mm/ month    3. RTC PRN      Report Electronically Signed By:     Tonya Diallo DPM   Podiatry  [facility]  3/6/2025                     [1]   Current Outpatient Medications on File Prior to Visit   Medication Sig Dispense Refill    ACETAMINOPHEN (TYLENOL 8 HOUR ORAL) Take by mouth as needed.      azelastine (ASTELIN) 137 mcg (0.1 %) nasal spray 2 sprays (274 mcg total) by Nasal route 2 (two) times daily. 30 mL 12    blood sugar diagnostic (ACCU-CHEK LUIS FERNANDO) Strp Check BG 2-3 times daily. Accuchek luis fernando strips 300 strip 3    dorzolamide-timolol 2-0.5% (COSOPT) 22.3-6.8 mg/mL ophthalmic solution Place 1 drop into both eyes 2 (two) times daily. 10 mL 5  "   DROPLET PEN NEEDLE 31 gauge x 3/16" Ndle USE AS DIRECTED  WITH  INSULIN 200 each 3    dutasteride (AVODART) 0.5 mg capsule Take 1 capsule (0.5 mg total) by mouth once daily. 90 capsule 3    famotidine (PEPCID) 20 MG tablet Take 1 tablet (20 mg total) by mouth 2 (two) times daily. 180 tablet 3    fenofibrate micronized (LOFIBRA) 200 MG Cap Take 1 capsule (200 mg total) by mouth every evening. 90 capsule 3    fluticasone propionate (FLONASE) 50 mcg/actuation nasal spray 1 spray by Each Nostril route once daily.      gabapentin (CMPD PAIN MANAGEMENT COMPOUND OINTMNENT THREE) Apply bid prn pain 100 g 11    hydrocortisone 2.5 % cream Apply topically 2 (two) times daily. 3.5 g 5    insulin (LANTUS SOLOSTAR U-100 INSULIN) glargine 100 units/mL SubQ pen 16 units in the AM and 4 units in the PM 3 mL 11    levocetirizine (XYZAL) 5 MG tablet Take 5 mg by mouth every evening.      loratadine (CLARITIN) 10 mg tablet Take 10 mg by mouth once daily.      losartan (COZAAR) 25 MG tablet Take 1 tablet (25 mg total) by mouth once daily. 90 tablet 3    lovastatin (MEVACOR) 40 MG tablet Take 1 tablet by mouth Every evening. 90 tablet 3    multivitamin capsule Take 1 capsule by mouth once daily.      oxyCODONE (ROXICODONE) 5 MG immediate release tablet Take 1 tablet (5 mg total) by mouth every 4 (four) hours as needed for Pain. 10 tablet 0    pantoprazole (PROTONIX) 40 MG tablet Take 1 tablet (40 mg total) by mouth once daily. 90 tablet 1    pioglitazone (ACTOS) 30 MG tablet Take 30 mg by mouth once daily.      polyethylene glycol (GLYCOLAX) 17 gram/dose powder Take 17 g by mouth once daily.      polyethylene glycol (GLYCOLAX) 17 gram/dose powder Use to cap to measure 17g, mix with liquid, and take by mouth once daily. 510 g 0    semaglutide (OZEMPIC) 1 mg/dose (4 mg/3 mL) Inject 1 mg into the skin every 7 days. 9 mL 4    sildenafil (REVATIO) 20 mg Tab Take 1 tablet (20 mg total) by mouth daily as needed (PRN). Takes prn 30 tablet 11 " "   sodium chloride (SALINE NASAL) 0.65 % nasal spray 2 sprays by Nasal route as needed for Congestion. 44 mL 0    triamcinolone acetonide 0.1% (KENALOG) 0.1 % cream AAA bid 454 g 0    latanoprost 0.005 % ophthalmic solution INSTILL 1 DROP INTO BOTH EYES ONE TIME DAILY 7.5 mL 3     No current facility-administered medications on file prior to visit.   [2]   Social History  Tobacco Use    Smoking status: Never    Smokeless tobacco: Never   Substance Use Topics    Alcohol use: Yes     Comment: " once a month"    Drug use: No     "

## 2025-03-18 NOTE — ANESTHESIA PAT ROS NOTE
03/18/2025  Rigoberto Vasquez is a 74 y.o., male.      Pre-op Assessment    I have reviewed the NPO Status.   I have reviewed the Medications.     Review of Systems  Anesthesia Hx:  No problems with previous Anesthesia             Denies Family Hx of Anesthesia complications.    Denies Personal Hx of Anesthesia complications.                    Social:  Non-Smoker, Social Alcohol Use       Hematology/Oncology:       -- Anemia: Iron Deficiency Anemia                    Current/Recent Cancer.  Other (see Oncology comments)          Oncology Comments: Bladder cancer s/p 10/8/24 partial cystectomy/ diverticulum excision/ pelvic lymphadenectomy; BCG ongoing; h/o SCC mastoid region of scalp     EENT/Dental:   Hx of left tympanoplasty with mastoidectomy Eyes: Visual Impairment    Catarract         Diabetic cataract of both eyes, POAG   Nasal Problems:  Hx of sinus surgery and significant sinus/allergy issues      Cardiovascular:  Exercise tolerance: good  Pacemaker (for over 40 years now) Hypertension, well controlled   Denies MI.     Dysrhythmias (h/o PPM for CHB)       hyperlipidemia    Hx of pericarditis, calcification of aorta                        Disorder of Cardiac Conduction, A-V Block, 3rd Degree A-V Block (s/p PPM at age 30)    Pulmonary:  Pneumonia      Sleep Apnea, CPAP Hx of lung granuloma (stable)               Renal/:  Chronic Renal Disease, CKD  BPH NMIBC s/p 10/8/24 partial cystectomy/ bladder diverticulum excision/ pelvic lymphadenectomy, s/p prior bladder bx/ fulguration/TURBT; BPH, h/o TURP, h/o elevated PSA, ED, urinary incontinence, h/o vasectomy,    Neoplasm/Tumor, Bladder Tumor.   Kidney Function/Disease, Chronic Kidney Disease (CKD) , CKD Stage III (GFR 30-59)          Other Renal / Gu Conditions:   Hepatic/GI:     GERD, well controlled   Cholesteatoma, diverticulosis Taking GLP-1 Agonists  Instructed to Hold for 7 Days           Musculoskeletal:  Arthritis   OA of knees bilaterally, h/o bilateral knee surgeries, DJD, h/o rt shoulder arthroplasty, back pain       Spine Disorders:  Degenerative disease and Chronic Pain           Endocrine:  Diabetes, well controlled, type 2         Obesity / BMI > 30  Dermatological:  Hx of skin cancer   Psych:  Psychiatric Normal                         Anesthesia Assessment: Preoperative EQUATION    Planned Procedure: Procedure(s) (LRB):  TURBT,WITH BLUE LIGHT CYSTOSCOPY AND CYSVIEW (N/A)  CYSTOSCOPY, WITH BLADDER BIOPSY, WITH FULGURATION IF INDICATED (N/A)  Requested Anesthesia Type:General  Surgeon: Compa Hensley MD  Service: Urology  Known or anticipated Date of Surgery:4/4/2025    Surgeon notes: reviewed    Electronic QUestionnaire Assessment completed via nurse interview with patient.        Triage considerations:     The patient has no apparent active cardiac condition (No unstable coronary Syndrome such as severe unstable angina or recent [<1 month] myocardial infarction, decompensated CHF, severe valvular   disease or significant arrhythmia)    Previous anesthesia records:GETA, Nerve block for post-op pain, and No problems  10/08/24 EXCISION, DIVERTICULUM, BLADDER (Right: Bladder) CYSTECTOMY, PARTIAL (Bladder) LYMPHADENECTOMY, PELVIS (Right) CYSTOSCOPY, FLEXIBLE (Urethra) INSTILLATION, BLADDER (Bladder) CYSTOSCOPY,WITH URETERAL CATHETER INSERTION (Bilateral: Ureter), general anesthesia, ASA 3  Intubation     Date/Time: 10/8/2024 8:14 AM     Performed by: Melissa Dunlap MD  Authorized by: Brandon Patel MD    Intubation:     Induction:  Intravenous    Intubated:  Postinduction    Mask Ventilation:  Easy mask    Attempts:  1    Attempted By:  Resident anesthesiologist    Method of Intubation:  Video laryngoscopy    Blade:  Ott 4    Laryngeal View Grade: Grade I - full view of cords      Difficult Airway Encountered?: No      Complications:  None    Airway  Device:  Oral endotracheal tube    Airway Device Size:  7.5    Style/Cuff Inflation:  Cuffed (inflated to minimal occlusive pressure)    Tube secured:  22    Secured at:  The lips    Placement Verified By:  Capnometry    Complicating Factors:  None    Findings Post-Intubation:  Atraumatic/condition of teeth unchanged and BS equal bilateral    Last PCP note: 6-12 months ago , within [facility]   Subspecialty notes: Cardiology: General and Outside [facility], Urology    Other important co-morbidities: DM2, GERD, HLD, HTN, Obesity, СВЕТЛАНА, and bladder cancer s/p 10/8/24 partial cystectomy/diverticulum excision/pelvic lymphadenectomy/instillation bladder, BCG ongoing, s/p 8/20/24 bladder bx/fulguration/cysto, s/p 7/16/24 TURBT/ cysto/RPG, h/o elevated PSA, BPH, h/o TURP, ED, h/o vasectomy, CKD 3a, calcification of aorta, lung granuloma (stable), CHANDRAKANT, DJD, back pain, angina, cholesteatosis, diverticulosis, h/o pericarditis, CHB s/p PPM at age 30, h/o SCC skin mastoid region scalp, h/o left tympanoplasty with mastoidectomy, h/o nasal sinus surgery and significant sinus/allergy issues, diabetic cataract of both eyes, h/o bilateral knee surgeries, OA bilateral knees       Tests already available:  Available tests,  within 3 months , 6-12 months ago , outside [facility] , [facility] .   02/13/25 A1C (6.9), CMP, LIPID PANEL  08/01/24 TTE (45-50%)  07/11/24 EKG (A-sensed, V-paced)            Instructions given. (See in Nurse's note)    Optimization:  Anesthesia Preop Clinic Assessment Indicated:    --phone screen done      Plan:    Testing:  EKG dosc     Navigation:  Straight Line to surgery.               No tests, anesthesia preop clinic visit, or consult required.

## 2025-03-19 ENCOUNTER — HOSPITAL ENCOUNTER (OUTPATIENT)
Dept: RADIOLOGY | Facility: HOSPITAL | Age: 75
Discharge: HOME | End: 2025-03-19
Attending: ORTHOPAEDIC SURGERY
Payer: MEDICARE

## 2025-03-19 ENCOUNTER — OFFICE VISIT (OUTPATIENT)
Dept: ORTHOPEDICS | Facility: CLINIC | Age: 75
End: 2025-03-19
Payer: MEDICARE

## 2025-03-19 VITALS — WEIGHT: 196 LBS | BODY MASS INDEX: 37 KG/M2 | HEIGHT: 61 IN

## 2025-03-19 DIAGNOSIS — M79.642 LEFT HAND PAIN: Primary | ICD-10-CM

## 2025-03-19 DIAGNOSIS — M79.642 LEFT HAND PAIN: ICD-10-CM

## 2025-03-19 DIAGNOSIS — M18.12 ARTHRITIS OF CARPOMETACARPAL (CMC) JOINT OF LEFT THUMB: Primary | ICD-10-CM

## 2025-03-19 PROCEDURE — 73130 X-RAY EXAM OF HAND: CPT | Mod: 26,LT,, | Performed by: RADIOLOGY

## 2025-03-19 PROCEDURE — 99999 PR PBB SHADOW E&M-EST. PATIENT-LVL IV: CPT | Mod: PBBFAC,,, | Performed by: ORTHOPAEDIC SURGERY

## 2025-03-19 PROCEDURE — 73130 X-RAY EXAM OF HAND: CPT | Mod: TC,LT

## 2025-03-19 RX ORDER — LIDOCAINE HYDROCHLORIDE 10 MG/ML
0.5 INJECTION, SOLUTION EPIDURAL; INFILTRATION; INTRACAUDAL; PERINEURAL
Status: COMPLETED | OUTPATIENT
Start: 2025-03-19 | End: 2025-03-19

## 2025-03-19 RX ORDER — BETAMETHASONE SODIUM PHOSPHATE AND BETAMETHASONE ACETATE 3; 3 MG/ML; MG/ML
3 INJECTION, SUSPENSION INTRA-ARTICULAR; INTRALESIONAL; INTRAMUSCULAR; SOFT TISSUE
Status: COMPLETED | OUTPATIENT
Start: 2025-03-19 | End: 2025-03-19

## 2025-03-19 RX ADMIN — LIDOCAINE HYDROCHLORIDE 5 MG: 10 INJECTION, SOLUTION EPIDURAL; INFILTRATION; INTRACAUDAL; PERINEURAL at 11:03

## 2025-03-19 RX ADMIN — BETAMETHASONE SODIUM PHOSPHATE AND BETAMETHASONE ACETATE 3 MG: 3; 3 INJECTION, SUSPENSION INTRA-ARTICULAR; INTRALESIONAL; INTRAMUSCULAR; SOFT TISSUE at 11:03

## 2025-03-19 NOTE — PROGRESS NOTES
[facility]    03/19/2025  Rigoberto Vasquez    Chief complaint:   Chief Complaint   Patient presents with    Left Hand - Pain, Swelling       Pain: 4/10     QuickDASH:  Not obtained today.    HPI:  Rigoberto is a 74-year-old male who presents for evaluation of chronic and severe Left thumb base pain.  He has had this for 2 years.  He has taken Tylenol and Voltaren.  He has not tried bracing.  He has not had injections.  He does not have numbness with this per se.  He does not have any antecedent trauma.  No previous surgeries on this hand.  No fevers or chills.    Hand dominance:  Presumed right, well confirmed  Neck pain: N  Prior trauma to head/neck: N  Prior shoulder trauma: N  Prior elbow trauma: N  Prior wrist/hand trauma: N    Duration of symptoms:  Three years, worse over the past few months  Prior injections:  No  Bracing:  No  Home or outpatient therapy:  No  Anti-inflammatories: Voltaren  Other: Tylenol    Hx/o CVA/TIAs: N  Personal or family history of demyelinating disease: N  Hx/o Vitamin deficiency and/or malnutrition: N  Metabolic abnormalities acquired/genetic: N  Personal of family hx/o Chiari malformations: N  Oncologic Hx (personal):  Squamous cancer status post excision, scalp  Hx/o chemotherapy: N  Hx/o radiation: N  EtOH abuse: N  Hx/o diabetes 1/2:  Yes   Last A1c:  6.9   Distal neuropathy w/ S-W testing:  No  Tobacco use (incl Vape):  No    Anticoagulation/antiplatelet:  None  Assistive device use:  None  Osteoporotic/low energy fracture history:  None.  Active steroid, chemotherapy, or other immunomodulator use: None.   Personal or family history of RA or other autoimmune/inflammatory conditions: None.  Hypothyroidism:  No    Other relevant Orthopaedic hx:  Bilateral total knee arthroplasties    CT-6 Score: 4/26    Amyloidosis Screen:  Tier 1:   M>/=50: +   F>/=60:   Bilateral CT Sx or prior CTR:   Tier 2:    Lumbar/cervical stenosis:  "   BTR:   Afib/flutter:   PM:   CHF:   FamHx ATTR amyloidosis:    ROS: No fevers, chills, nausea, vomiting, chest pain, shortness of breath, dizziness, abdominal pain, N/T/P beyond described in HPI.     Past Medical History:   Diagnosis Date    Acid reflux     [facility]    Angina pectoris     Back pain     BPH (benign prostatic hyperplasia)     blue    Cholesteatoma     Class 2 severe obesity due to excess calories with serious comorbidity and body mass index (BMI) of 37.0 to 37.9 in adult 11/11/2015    Degenerative joint disease     Diverticulosis     Erectile dysfunction     History of elevated PSA 02/2014    normalized, dr dave annually    History of pericarditis     Hypercholesteremia     Hypertension     Iron deficiency anemia     Malignant neoplasm of lateral wall of urinary bladder 8/5/2024    СВЕТЛАНА (obstructive sleep apnea) 05/17/2022    Pacemaker     complete av block;NO afib    Renal disorder     Squamous cell cancer of skin of mastoid region of scalp     dr jaida salgado    Type 2 diabetes mellitus     dr wyman    Urinary incontinence     when he was 7 Yrs old.        Prior to Admission medications    Medication Sig Start Date End Date Taking? Authorizing Provider   ACETAMINOPHEN (TYLENOL 8 HOUR ORAL) Take by mouth as needed.   Yes Provider, Historical   azelastine (ASTELIN) 137 mcg (0.1 %) nasal spray 2 sprays (274 mcg total) by Nasal route 2 (two) times daily. 7/17/24 7/17/25 Yes Cookie Espinosa MD   blood sugar diagnostic (ACCU-CHEK JAXON) Strp Check BG 2-3 times daily. Accuchek jaxon strips 4/22/24  Yes Jesse Grimes MD   dorzolamide-timolol 2-0.5% (COSOPT) 22.3-6.8 mg/mL ophthalmic solution Place 1 drop into both eyes 2 (two) times daily. 10/1/24 10/1/25 Yes Alexander Reza MD   DROPLET PEN NEEDLE 31 gauge x 3/16" Ndle USE AS DIRECTED  WITH  INSULIN 2/13/23  Yes Jesse Grimes MD   dutasteride (AVODART) 0.5 mg capsule Take 1 capsule (0.5 mg total) by mouth once daily. 8/22/24  Yes " Vance Olivera MD   famotidine (PEPCID) 20 MG tablet Take 1 tablet (20 mg total) by mouth 2 (two) times daily. 12/2/24 11/27/25 Yes Kaylene Pineda MD   fenofibrate micronized (LOFIBRA) 200 MG Cap Take 1 capsule (200 mg total) by mouth every evening. 10/31/24  Yes Jesse Grimes MD   fluticasone propionate (FLONASE) 50 mcg/actuation nasal spray 1 spray by Each Nostril route once daily.   Yes Provider, Historical   gabapentin (CMPD PAIN MANAGEMENT COMPOUND OINTMNENT THREE) Apply bid prn pain 9/5/24  Yes Jesse Grimes MD   hydrocortisone 2.5 % cream Apply topically 2 (two) times daily. 11/11/19  Yes Jesse Grimes MD   insulin (LANTUS SOLOSTAR U-100 INSULIN) glargine 100 units/mL SubQ pen 16 units in the AM and 4 units in the PM 2/15/24  Yes Suma Sheth PA-C   levocetirizine (XYZAL) 5 MG tablet Take 5 mg by mouth every evening.   Yes Provider, Historical   loratadine (CLARITIN) 10 mg tablet Take 10 mg by mouth once daily.   Yes Provider, Historical   losartan (COZAAR) 25 MG tablet Take 1 tablet (25 mg total) by mouth once daily. 5/8/24 5/8/25 Yes Suma Sheth PA-C   lovastatin (MEVACOR) 40 MG tablet Take 1 tablet by mouth Every evening. 10/8/24  Yes Jesse Grimes MD   multivitamin capsule Take 1 capsule by mouth once daily.   Yes Provider, Historical   oxyCODONE (ROXICODONE) 5 MG immediate release tablet Take 1 tablet (5 mg total) by mouth every 4 (four) hours as needed for Pain. 10/9/24  Yes Alen Serrano,    pantoprazole (PROTONIX) 40 MG tablet Take 1 tablet (40 mg total) by mouth once daily. 11/21/24 5/20/25 Yes Kaylene Pineda MD   pioglitazone (ACTOS) 30 MG tablet Take 30 mg by mouth once daily. 10/16/24  Yes Provider, Historical   polyethylene glycol (GLYCOLAX) 17 gram/dose powder Take 17 g by mouth once daily.   Yes Provider, Historical   polyethylene glycol (GLYCOLAX) 17 gram/dose powder Use to cap to measure 17g, mix with liquid, and take by mouth once  daily. 10/9/24  Yes Alen Serrano,    semaglutide (OZEMPIC) 1 mg/dose (4 mg/3 mL) Inject 1 mg into the skin every 7 days. 8/4/24 8/4/25 Yes Jesse Grimes MD   sildenafil (REVATIO) 20 mg Tab Take 1 tablet (20 mg total) by mouth daily as needed (PRN). Takes prn 3/26/24 3/26/25 Yes Vance Olivera MD   sodium chloride (SALINE NASAL) 0.65 % nasal spray 2 sprays by Nasal route as needed for Congestion. 1/10/25  Yes Vianey Patton DO   latanoprost 0.005 % ophthalmic solution INSTILL 1 DROP INTO BOTH EYES ONE TIME DAILY 4/4/23 8/11/23  Alexander Reza MD   triamcinolone acetonide 0.1% (KENALOG) 0.1 % cream AAA bid 12/6/23   Nelson Leonard MD       Past Surgical History:   Procedure Laterality Date    BIOPSY OF BLADDER N/A 8/30/2024    Procedure: BIOPSY, BLADDER;  Surgeon: Compa Hensley MD;  Location: [facility] OR [address] [place] [address];  Service: Urology;  Laterality: N/A;    BLADDER DIVERTICULECTOMY Right 10/8/2024    Procedure: EXCISION, DIVERTICULUM, BLADDER;  Surgeon: Compa Hensley MD;  Location: [facility] OR 2ND FLR;  Service: Urology;  Laterality: Right;    BLADDER FULGURATION N/A 8/30/2024    Procedure: FULGURATION, BLADDER;  Surgeon: Compa Hensley MD;  Location: [facility] OR 1ST FLR;  Service: Urology;  Laterality: N/A;    CARDIAC PACEMAKER PLACEMENT      COLONOSCOPY N/A 9/13/2019    Procedure: COLONOSCOPY;  Surgeon: Kan Ramsey III, MD;  Location: [facility] ENDO;  Service: Endoscopy;  Laterality: N/A;    COLONOSCOPY N/A 9/22/2022    Procedure: COLONOSCOPY;  Surgeon: Chris Garcia MD;  Location: [facility] ENDO;  Service: Endoscopy;  Laterality: N/A;    CYSTOSCOPY N/A 8/30/2024    Procedure: CYSTOSCOPY;  Surgeon: Compa Hensley MD;  Location: [facility] OR 1ST FLR;  Service: Urology;  Laterality: N/A;  with blue light    CYSTOSCOPY W/ RETROGRADES Bilateral 7/16/2024    Procedure: CYSTOSCOPY, WITH RETROGRADE PYELOGRAM;  Surgeon: Vance Olivera MD;  Location: [facility];  Service: Urology;  Laterality: Bilateral;     CYSTOSCOPY,WITH URETERAL CATHETER INSERTION Bilateral 10/8/2024    Procedure: CYSTOSCOPY,WITH URETERAL CATHETER INSERTION;  Surgeon: Compa Hensley MD;  Location: [facility] OR [address] [place] [address];  Service: Urology;  Laterality: Bilateral;    ESOPHAGOGASTRODUODENOSCOPY N/A 9/13/2019    Procedure: ESOPHAGOGASTRODUODENOSCOPY (EGD);  Surgeon: Kan Ramsey III, MD;  Location: [facility];  Service: Endoscopy;  Laterality: N/A;    ESOPHAGOGASTRODUODENOSCOPY N/A 9/3/2021    Procedure: EGD (ESOPHAGOGASTRODUODENOSCOPY);  Surgeon: Kaylene Pineda MD;  Location: [facility];  Service: Endoscopy;  Laterality: N/A;    ESOPHAGOGASTRODUODENOSCOPY N/A 5/24/2023    Procedure: EGD (ESOPHAGOGASTRODUODENOSCOPY);  Surgeon: Cory Bennett MD;  Location: [facility];  Service: Endoscopy;  Laterality: N/A;    FLEXIBLE CYSTOSCOPY N/A 10/8/2024    Procedure: CYSTOSCOPY, FLEXIBLE;  Surgeon: Copma Hensley MD;  Location: [facility] OR [address] [place] [address];  Service: Urology;  Laterality: N/A;    INSERTION OF TYMPANOSTOMY TUBE Right 3/15/2023    Procedure: INSERTION, TYMPANOSTOMY TUBE;  Surgeon: Jose L Gudino MD;  Location: [address];  Service: ENT;  Laterality: Right;    INSTILLATION OF URINARY BLADDER N/A 10/8/2024    Procedure: INSTILLATION, BLADDER;  Surgeon: Compa Hensley MD;  Location: [address];  Service: Urology;  Laterality: N/A;  Chemotherapy instillation bladder    JOINT REPLACEMENT      KNEE CARTILAGE SURGERY Bilateral     LYMPHADENECTOMY, PELVIS Right 10/8/2024    Procedure: LYMPHADENECTOMY, PELVIS;  Surgeon: Compa Hensley MD;  Location: [facility] OR [address] [place] [address];  Service: Urology;  Laterality: Right;    NASAL SINUS SURGERY      PARTIAL SURGICAL REMOVAL OF BLADDER N/A 10/8/2024    Procedure: CYSTECTOMY, PARTIAL;  Surgeon: Compa Hensley MD;  Location: [facility] OR [address] [place] [address];  Service: Urology;  Laterality: N/A;    SHOULDER ARTHROSCOPY Right     TONSILLECTOMY      TOTAL KNEE ARTHROPLASTY Left 05/15/2017    TRANSURETHRAL RESECTION OF PROSTATE      TURBT  "(TRANSURETHRAL RESECTION OF BLADDER TUMOR) N/A 7/16/2024    Procedure: TURBT (TRANSURETHRAL RESECTION OF BLADDER TUMOR);  Surgeon: Vance Olivera MD;  Location: [facility] OR;  Service: Urology;  Laterality: N/A;    TYMPANOPLASTY      TYMPANOPLASTY WITH MASTOIDECTOMY Left 3/15/2023    Procedure: TYMPANOPLASTY, WITH MASTOIDECTOMY;  Surgeon: Jose L Gudino MD;  Location: [facility] OR [address] [place] [address];  Service: ENT;  Laterality: Left;    vesectomy         Family History   Problem Relation Name Age of Onset    Arthritis Mother      Hypertension Mother      Heart disease Father      Hypertension Father      Stroke Father      Diabetes Son      Diabetes Maternal Grandmother      Colon cancer Paternal Grandmother         Social History     Socioeconomic History    Marital status:      Spouse name: SAUL    Number of children: 2   Occupational History    Occupation: retired- [employer] .   Tobacco Use    Smoking status: Never    Smokeless tobacco: Never   Substance and Sexual Activity    Alcohol use: Yes     Comment: " once a month"    Drug use: No    Sexual activity: Yes     Partners: Female   Social History Narrative     . Lives with spouse. Has 2 children. Patient retired from [employer]. His son has type 1 diabetes.     Social Drivers of Health     Financial Resource Strain: Low Risk  (10/9/2024)    Overall Financial Resource Strain (CARDIA)     Difficulty of Paying Living Expenses: Not hard at all   Food Insecurity: No Food Insecurity (10/9/2024)    Hunger Vital Sign     Worried About Running Out of Food in the Last Year: Never true     Ran Out of Food in the Last Year: Never true   Transportation Needs: No Transportation Needs (10/9/2024)    TRANSPORTATION NEEDS     Transportation : No   Physical Activity: Sufficiently Active (10/9/2024)    Exercise Vital Sign     Days of Exercise per Week: 3 days     Minutes of Exercise per Session: 60 min   Stress: No Stress Concern Present (10/9/2024)    [place] " [place] [employer] Occup[employer] - Occupational Stress Questionnaire     Feeling of Stress : Not at all   Housing Stability: Unknown (10/9/2024)    Housing Stability Vital Sign     Unable to Pay for Housing in the Last Year: No     Homeless in the Last Year: No       Review of patient's allergies indicates:   Allergen Reactions    Aspirin      Stomach pain even with ppi    Meperidine      Other reaction(s): Stomach upset    Niacin      Other reaction(s): hot skin       Physical Exam:     Patient is alert, well-appearing, and in no acute distress. Breathing comfortably. Extraocular muscles are intact. Pupils equal. Facial muscles symmetric. Voice with good intonation. No ptosis, anhidrosis, or miosis.      C-spine/Plexus/Shoulder  Deferred.    Myelopathic signs:   Deferred.    Cerebellar exam:  Deferred.     Distal LUE exam:  No resting or volitional tremors.   No skin changes or discoloration.  No wounds rashes or lesions.   No atrophy or fasciculations.    There was focal and severe pain to palpation over the Left thumb CMC joint.  There is severe pain and crepitus with CMC grind maneuver.  No 1st webspace adduction contracture.  No MCP hyperextension.  Kapandji score is 9/10.    No pain proximally.  No pain over the A1 pulley.    APB atrophy: No.   FDI atrophy: No  ADM atrophy: No.    APB: 5/5  FPB: 5/5  FDI: 5/5  ADM: 5/5    : NT.   Key pinch: NT.      Tinel's wrist:  Positive  Durkan's:  Negative  Phalen's:  Negative  Reverse Phalen's:  Negative     Tinel's elbow:  Not tested  Elbow flexion test:  Not test  Ulnar nerve instability:  Not test    Palpable radial pulse at wrist. Fingers well-perfused with CR <2s. No swelling in digits.     Imaging:  AP, lateral, and oblique x-rays of the left hand obtained today in clinic were reviewed with the patient and his wife and independently interpreted and demonstrate no acute displaced fractures, dislocations, or subluxations.  There are severe left thumb CMC  degenerative changes.  No significant STT degenerative changes.    Labs:    Last A1c: 6.9    CT-6 Score: 4/26    Assessment:  1. Arthritis of carpometacarpal (CMC) joint of left thumb  betamethasone acetate-betamethasone sodium phosphate injection 3 mg    LIDOcaine (PF) 10 mg/ml (1%) injection 5 mg            Plan:  Rigoberto is a 73yo RHD male who presents for evaluation of severe chronic Left thumb CMC arthritis.  This has been present for several years.  It has recently become worse and has affected his .  He has tried Tylenol and Voltaren before.  He has not tried bracing or an injection.  We discussed the etiology of CMC arthritis.  Reading information was provided.  I reviewed treatment options both operative and nonoperative.  I have formula recommended a trial of nonoperative treatment with an injection in the brace.  We reviewed the risks and side effects of infection, elevated blood glucose, headaches, nerve injury, tendon injury, blood vessel injury, skin depigmentation, fatty atrophy, and diminishing returns with repeat injections and failure to achieve the desired result.  The patient wished to proceed.  See that note below.    Procedure note:  Left thumb CMC injection:  Informed consent was obtained in the skin overlying the Left thumb CMC joint was prepped in sterile fashion.  Next a 1 cc solution consisting of 3 mg of betamethasone and a 0.5 cc of 1% plain lidocaine was injected into the Left thumb CMC articulation using a 3 cc syringe and a 27 gauge half-inch needle.  There was positive pressure feedback after about 50% of the injection was administered.  The patient tolerated the procedure well.  There were no complications.  Specifically, there was no concern for injury to the SRN branches.    Patient did develop some lightheadedness after the injection.  He reports this happens when he gets knee injections.  He was laid supine.  Pulses are palpable at the wrist and normal rate.  Pupils are equal  and symmetric. Breathing comfortably with no increase in RR. No rashes/hives or other sings of anaphylaxis. He recovered without incident.     The patient is aware that it is going to take about 3 days for the steroid to take effect.  He understands that the lidocaine may help with pain relief for few hours but that will wear off.  Soreness is expected after the injection.  This will improve once the steroid kicks in.    I have also provided a thumb opposition brace for the left side.  He has not tried a brace before.  We are going to leave follow up open ended.  He understands the shot will wear off at some point.  He will let me know when this does.  The patient is happy with this plan and the treatment provided.  All questions were answered.  Information on his condition treated was provided in his after visit summary.      QuickDASH:  Not obtained today.    Christ Suresh Jr. MD  Hand and Upper Extremity Surgery  [facility]    Disclaimer:  Voice dictation software was used for this note.  I have reviewed this note and am happy to provide clarification if needed.  However, please excuse typos/errors/omissions.

## 2025-03-20 ENCOUNTER — OFFICE VISIT (OUTPATIENT)
Dept: OPHTHALMOLOGY | Facility: CLINIC | Age: 75
End: 2025-03-20
Payer: MEDICARE

## 2025-03-20 DIAGNOSIS — H40.1121 PRIMARY OPEN ANGLE GLAUCOMA (POAG) OF LEFT EYE, MILD STAGE: Primary | ICD-10-CM

## 2025-03-20 DIAGNOSIS — H40.1112 PRIMARY OPEN ANGLE GLAUCOMA (POAG) OF RIGHT EYE, MODERATE STAGE: ICD-10-CM

## 2025-03-20 PROCEDURE — 1159F MED LIST DOCD IN RCRD: CPT | Mod: CPTII,S$GLB,, | Performed by: OPHTHALMOLOGY

## 2025-03-20 PROCEDURE — 99999 PR PBB SHADOW E&M-EST. PATIENT-LVL II: CPT | Mod: PBBFAC,,, | Performed by: OPHTHALMOLOGY

## 2025-03-20 PROCEDURE — 99214 OFFICE O/P EST MOD 30 MIN: CPT | Mod: S$GLB,,, | Performed by: OPHTHALMOLOGY

## 2025-03-20 PROCEDURE — 1160F RVW MEDS BY RX/DR IN RCRD: CPT | Mod: CPTII,S$GLB,, | Performed by: OPHTHALMOLOGY

## 2025-03-20 PROCEDURE — 3066F NEPHROPATHY DOC TX: CPT | Mod: CPTII,S$GLB,, | Performed by: OPHTHALMOLOGY

## 2025-03-20 PROCEDURE — 3061F NEG MICROALBUMINURIA REV: CPT | Mod: CPTII,S$GLB,, | Performed by: OPHTHALMOLOGY

## 2025-03-20 PROCEDURE — 3044F HG A1C LEVEL LT 7.0%: CPT | Mod: CPTII,S$GLB,, | Performed by: OPHTHALMOLOGY

## 2025-03-20 NOTE — PROGRESS NOTES
"HPI     Glaucoma            Comments: Pt reports for 2-3wk IOP check. Denies any pain or irritation.   Va stable. Using drops. Noted a "film" that comes and goes in both eyes.           Comments    1. DM since 1999   NO DBR   2. Mac hole od   3. ERM OU  4. Choroidal nevus od   5. HIGH MYOPE(-6)   6. SCOAG       **d/c Latanoprost due to ERM/mac hole**      GCL OD: 29 w/ Artifact, OS: 28 --8/2023       Dorz/Timolol BID OU             Last edited by Alexander Irene on 3/20/2025 11:04 AM.            Assessment /Plan     For exam results, see Encounter Report.      ICD-10-CM ICD-9-CM    1. Primary open angle glaucoma (POAG) of left eye, mild stage  H40.1121 365.11 Little response to SLT, though thick CCT OU and good GCL   Continue Dorz/Timolol OU     365.71       2. Primary open angle glaucoma (POAG) of right eye, moderate stage  H40.1112 365.11 See above      365.72           Return to clinic 2-3 months for HVF 24-2      Dorz/Timolol BID OU           "

## 2025-03-21 ENCOUNTER — LAB VISIT (OUTPATIENT)
Dept: LAB | Facility: HOSPITAL | Age: 75
End: 2025-03-21
Attending: UROLOGY
Payer: MEDICARE

## 2025-03-21 DIAGNOSIS — R39.9 SYMPTOMS INVOLVING URINARY SYSTEM: ICD-10-CM

## 2025-03-21 DIAGNOSIS — D49.4 BLADDER TUMOR: ICD-10-CM

## 2025-03-21 DIAGNOSIS — Z01.818 PRE-OP TESTING: ICD-10-CM

## 2025-03-21 LAB
BACTERIA #/AREA URNS AUTO: ABNORMAL /HPF
BILIRUB UR QL STRIP: NEGATIVE
CLARITY UR REFRACT.AUTO: ABNORMAL
COLOR UR AUTO: YELLOW
GLUCOSE UR QL STRIP: NEGATIVE
HGB UR QL STRIP: NEGATIVE
KETONES UR QL STRIP: NEGATIVE
LEUKOCYTE ESTERASE UR QL STRIP: ABNORMAL
MICROSCOPIC COMMENT: ABNORMAL
NITRITE UR QL STRIP: NEGATIVE
PH UR STRIP: 6 [PH] (ref 5–8)
PROT UR QL STRIP: NEGATIVE
RBC #/AREA URNS AUTO: 3 /HPF (ref 0–4)
SP GR UR STRIP: 1.01 (ref 1–1.03)
URN SPEC COLLECT METH UR: ABNORMAL
UROBILINOGEN UR STRIP-ACNC: NEGATIVE EU/DL
WBC #/AREA URNS AUTO: 55 /HPF (ref 0–5)
WBC CLUMPS UR QL AUTO: ABNORMAL

## 2025-03-21 PROCEDURE — 87088 URINE BACTERIA CULTURE: CPT | Performed by: UROLOGY

## 2025-03-21 PROCEDURE — 81000 URINALYSIS NONAUTO W/SCOPE: CPT | Mod: PO | Performed by: UROLOGY

## 2025-03-21 PROCEDURE — 87086 URINE CULTURE/COLONY COUNT: CPT | Performed by: UROLOGY

## 2025-03-23 LAB — BACTERIA UR CULT: ABNORMAL

## 2025-03-24 ENCOUNTER — PATIENT MESSAGE (OUTPATIENT)
Dept: UROLOGY | Facility: CLINIC | Age: 75
End: 2025-03-24
Payer: MEDICARE

## 2025-03-25 ENCOUNTER — OFFICE VISIT (OUTPATIENT)
Dept: OTOLARYNGOLOGY | Facility: CLINIC | Age: 75
End: 2025-03-25
Payer: MEDICARE

## 2025-03-25 VITALS — HEIGHT: 71 IN | BODY MASS INDEX: 27.38 KG/M2 | WEIGHT: 195.56 LBS

## 2025-03-25 DIAGNOSIS — Z98.890 HISTORY OF NASAL SEPTOPLASTY: Primary | ICD-10-CM

## 2025-03-25 DIAGNOSIS — G47.33 OSA ON CPAP: ICD-10-CM

## 2025-03-25 DIAGNOSIS — R06.09 DYSPNEA ON EXERTION: ICD-10-CM

## 2025-03-25 PROCEDURE — 99999 PR PBB SHADOW E&M-EST. PATIENT-LVL III: CPT | Mod: PBBFAC,,, | Performed by: OTOLARYNGOLOGY

## 2025-03-25 NOTE — PROGRESS NOTES
"Referring Provider:    No referring provider defined for this encounter.  Subjective:   Patient: Rigoberto Vasquez [id number], :1950   Visit date:3/25/2025 12:51 PM    Chief Complaint:  nasal blockage (Pt states for 3 weeks has not been able to breathe nose. States when puts CPAP mask on cannot breathe in.C/o right side worse than left)    HPI:    Prior notes reviewed by myself.  Clinical documentation obtained by nursing staff reviewed.     Patient is a pleasant 74-year-old male presenting for nasal obstruction.  Symptoms have been present for 3-4 weeks.  He says when he puts his CPAP mask on, he is unable to get any air through, R>L.  He has a history of turbinate reduction and septoplasty about 20 years ago.  Denies any recent trauma.  Denies any recent illness.  He uses saline rinses b.i.d., Flonase, and Astelin.  He had a recent cauterization at the end of January.  He denies any new settings or new masks on CPAP.  He endorses shortness of breath at baseline and with exertion. Currently being treated for bladder cancer.       Objective:     Physical Exam:  Vitals:  Ht 5' 11" (1.803 m)   Wt 88.7 kg (195 lb 8.8 oz)   BMI 27.27 kg/m²   General appearance:  Well developed, well nourished    Ears:  Otoscopy of external auditory canals and tympanic membranes was normal, clinical speech reception thresholds grossly intact, no mass/lesion of auricle.    Nose:  No masses/lesions of external nose, nasal mucosa, septum, and turbinates were within normal limits. Mild crusting on right septum likely due to cauterization. Minimal nasal collapse of right nare.     Mouth:  No mass/lesion of lips, teeth, gums, hard/soft palate, tongue, tonsils, or oropharynx.    Neck & Lymphatics:  No cervical lymphadenopathy, no neck mass/crepitus/ asymmetry, trachea is midline, no thyroid enlargement/tenderness/mass.        [x]  Data Reviewed:    Lab Results   Component Value Date    WBC 5.69 10/10/2024    HGB 11.0 (L) 10/10/2024    HCT " 32.3 (L) 10/10/2024    MCV 96 10/10/2024    EOSINOPHIL 2.1 10/10/2024             Assessment & Plan:   History of nasal septoplasty    СВЕТЛАНА on CPAP    Dyspnea on exertion      We discussed his symptoms today in detail.  I assured him that there is no evidence of a nasal obstruction, contrarily he has ample amount of room in both nares.  I recommend he is evaluated by Cardiology and pulmonology due to his shortness of breath symptoms at rest and with exertion.  If those workups are negative, I recommend following up with his CPAP mask distributor. He can RTC PRN.     Todd Goldstein M.D.  Department of Otolaryngology - Head & Neck Surgery  [address] [facility].  [place] [address]  P: [phone]  F: [phone]        DISCLAIMER: This note was prepared with [employer] voice recognition transcription software. Garbled syntax, mangled pronouns, and other bizarre constructions may be attributed to that software system. While efforts were made to correct any mistakes made by this voice recognition program, some errors and/or omissions may remain in the note that were missed when the note was originally created.

## 2025-03-26 PROBLEM — E66.9 TYPE 2 DIABETES MELLITUS WITH OBESITY: Status: ACTIVE | Noted: 2025-03-26

## 2025-03-26 PROBLEM — E11.69 TYPE 2 DIABETES MELLITUS WITH OBESITY: Status: ACTIVE | Noted: 2025-03-26

## 2025-03-26 PROBLEM — R31.0 GROSS HEMATURIA: Status: RESOLVED | Noted: 2024-07-11 | Resolved: 2025-03-26

## 2025-03-27 ENCOUNTER — RESULTS FOLLOW-UP (OUTPATIENT)
Dept: UROLOGY | Facility: CLINIC | Age: 75
End: 2025-03-27

## 2025-03-27 ENCOUNTER — PATIENT MESSAGE (OUTPATIENT)
Dept: ADMINISTRATIVE | Facility: CLINIC | Age: 75
End: 2025-03-27
Payer: MEDICARE

## 2025-03-27 RX ORDER — LINEZOLID 600 MG/1
600 TABLET, FILM COATED ORAL EVERY 12 HOURS
Qty: 14 TABLET | Refills: 0 | Status: SHIPPED | OUTPATIENT
Start: 2025-03-27 | End: 2025-04-03

## 2025-03-31 ENCOUNTER — OFFICE VISIT (OUTPATIENT)
Dept: FAMILY MEDICINE | Facility: CLINIC | Age: 75
End: 2025-03-31
Payer: MEDICARE

## 2025-03-31 VITALS
RESPIRATION RATE: 20 BRPM | DIASTOLIC BLOOD PRESSURE: 68 MMHG | BODY MASS INDEX: 27.27 KG/M2 | SYSTOLIC BLOOD PRESSURE: 107 MMHG | HEIGHT: 71 IN

## 2025-03-31 DIAGNOSIS — N13.8 BPH WITH OBSTRUCTION/LOWER URINARY TRACT SYMPTOMS: ICD-10-CM

## 2025-03-31 DIAGNOSIS — N52.9 ERECTILE DYSFUNCTION, UNSPECIFIED ERECTILE DYSFUNCTION TYPE: ICD-10-CM

## 2025-03-31 DIAGNOSIS — N40.1 BPH WITH OBSTRUCTION/LOWER URINARY TRACT SYMPTOMS: ICD-10-CM

## 2025-03-31 DIAGNOSIS — H42 DIABETIC GLAUCOMA: ICD-10-CM

## 2025-03-31 DIAGNOSIS — Z85.828 HISTORY OF SKIN CANCER: ICD-10-CM

## 2025-03-31 DIAGNOSIS — H90.12 CONDUCTIVE HEARING LOSS OF LEFT EAR, UNSPECIFIED HEARING STATUS ON CONTRALATERAL SIDE: ICD-10-CM

## 2025-03-31 DIAGNOSIS — R10.13 DYSPEPSIA: ICD-10-CM

## 2025-03-31 DIAGNOSIS — Q40.2 ECTOPIC GASTRIC MUCOSA: Chronic | ICD-10-CM

## 2025-03-31 DIAGNOSIS — Z79.4 TYPE 2 DIABETES MELLITUS WITH STAGE 3A CHRONIC KIDNEY DISEASE, WITH LONG-TERM CURRENT USE OF INSULIN: ICD-10-CM

## 2025-03-31 DIAGNOSIS — I15.2 HYPERTENSION ASSOCIATED WITH DIABETES: Chronic | ICD-10-CM

## 2025-03-31 DIAGNOSIS — M18.12 ARTHRITIS OF CARPOMETACARPAL (CMC) JOINT OF LEFT THUMB: ICD-10-CM

## 2025-03-31 DIAGNOSIS — Z86.79 HISTORY OF HEART BLOCK: ICD-10-CM

## 2025-03-31 DIAGNOSIS — M17.0 OSTEOARTHRITIS OF BOTH KNEES, UNSPECIFIED OSTEOARTHRITIS TYPE: ICD-10-CM

## 2025-03-31 DIAGNOSIS — H71.92 CHOLESTEATOMA OF EAR, LEFT: ICD-10-CM

## 2025-03-31 DIAGNOSIS — G47.33 OSA ON CPAP: ICD-10-CM

## 2025-03-31 DIAGNOSIS — J84.10 LUNG GRANULOMA: ICD-10-CM

## 2025-03-31 DIAGNOSIS — E11.39 DIABETIC GLAUCOMA: ICD-10-CM

## 2025-03-31 DIAGNOSIS — C67.2 MALIGNANT NEOPLASM OF LATERAL WALL OF URINARY BLADDER: ICD-10-CM

## 2025-03-31 DIAGNOSIS — E11.22 TYPE 2 DIABETES MELLITUS WITH STAGE 3A CHRONIC KIDNEY DISEASE, WITH LONG-TERM CURRENT USE OF INSULIN: ICD-10-CM

## 2025-03-31 DIAGNOSIS — Z95.0 PACEMAKER: Chronic | ICD-10-CM

## 2025-03-31 DIAGNOSIS — E11.8 DIABETES MELLITUS TYPE 2 WITH COMPLICATIONS: ICD-10-CM

## 2025-03-31 DIAGNOSIS — D50.9 IRON DEFICIENCY ANEMIA, UNSPECIFIED IRON DEFICIENCY ANEMIA TYPE: Chronic | ICD-10-CM

## 2025-03-31 DIAGNOSIS — E78.5 HYPERLIPIDEMIA ASSOCIATED WITH TYPE 2 DIABETES MELLITUS: Chronic | ICD-10-CM

## 2025-03-31 DIAGNOSIS — E11.69 HYPERLIPIDEMIA ASSOCIATED WITH TYPE 2 DIABETES MELLITUS: Chronic | ICD-10-CM

## 2025-03-31 DIAGNOSIS — N18.31 TYPE 2 DIABETES MELLITUS WITH STAGE 3A CHRONIC KIDNEY DISEASE, WITH LONG-TERM CURRENT USE OF INSULIN: ICD-10-CM

## 2025-03-31 DIAGNOSIS — H40.023 OPEN ANGLE WITH BORDERLINE FINDINGS AND HIGH GLAUCOMA RISK IN BOTH EYES: ICD-10-CM

## 2025-03-31 DIAGNOSIS — E11.59 HYPERTENSION ASSOCIATED WITH DIABETES: Chronic | ICD-10-CM

## 2025-03-31 DIAGNOSIS — I70.0 CALCIFICATION OF AORTA: ICD-10-CM

## 2025-03-31 DIAGNOSIS — Z00.00 ENCOUNTER FOR MEDICARE ANNUAL WELLNESS EXAM: Primary | ICD-10-CM

## 2025-03-31 PROBLEM — E66.9 TYPE 2 DIABETES MELLITUS WITH OBESITY: Status: RESOLVED | Noted: 2025-03-26 | Resolved: 2025-03-31

## 2025-03-31 NOTE — PATIENT INSTRUCTIONS
Counseling and Referral of Other Preventative  (Italic type indicates deductible and co-insurance are waived)    Patient Name: Rigoberto Vasquez  Today's Date: 3/31/2025    Health Maintenance       Date Due Completion Date    COVID-19 Vaccine (6 - 2024-25 season) 01/06/2025 11/11/2024    PROSTATE-SPECIFIC ANTIGEN 06/21/2025 6/21/2024    Hemoglobin A1c 08/13/2025 2/13/2025    Diabetic Eye Exam 02/04/2026 2/4/2025    Override on 2/4/2025: Done    Override on 12/5/2023: Done    Override on 12/5/2016: Done (DR JOÃO WESLEY. NO DBR)    Override on 5/30/2016: Done    Override on 4/6/2015: Done    Override on 2/25/2013: Done    Diabetes Urine Screening 02/13/2026 2/13/2025    Lipid Panel 02/13/2026 2/13/2025    Foot Exam 02/20/2026 2/20/2025 (Done)    Override on 2/20/2025: Done    Override on 2/15/2024: Done    Override on 2/13/2023: Done    Override on 12/2/2021: Done    Override on 1/2/2020: Done    Override on 1/2/2019: Done    Override on 8/9/2016: Done    Override on 6/29/2016: Done    Override on 11/11/2015: Done    Override on 2/19/2014: Done    Override on 8/14/2013: Done    High Dose Statin 03/31/2026 3/31/2025    Colorectal Cancer Screening 09/22/2027 9/22/2022    Override on 1/16/2009: Done    TETANUS VACCINE 10/27/2032 10/27/2022        No orders of the defined types were placed in this encounter.      The following information is provided to all patients.  This information is to help you find resources for any of the problems found today that may be affecting your health:                  Living healthy guide: www.[facility].[place].gov      Understanding Diabetes: www.diabetes.org      Eating healthy: www.cdc.gov/healthyweight      CDC home safety checklist: www.cdc.gov/steadi/patient.html      Agency on Aging: www.goea.[place].gov      Alcoholics anonymous (AA): www.aa.org      Physical Activity: www.renee.nih.gov/ox7haqb      Tobacco use: www.quitwithusla.org

## 2025-03-31 NOTE — PROGRESS NOTES
"The patient location is: [place]  The chief complaint leading to consultation is: AWV    Visit type: audiovisual    Face to Face time with patient: 30 minutes  60 minutes of total time spent on the encounter, which includes face to face time and non-face to face time preparing to see the patient (eg, review of tests), Obtaining and/or reviewing separately obtained history, Documenting clinical information in the electronic or other health record, Independently interpreting results (not separately reported) and communicating results to the patient/family/caregiver, or Care coordination (not separately reported).         Each patient to whom he or she provides medical services by telemedicine is:  (1) informed of the relationship between the physician and patient and the respective role of any other health care provider with respect to management of the patient; and (2) notified that he or she may decline to receive medical services by telemedicine and may withdraw from such care at any time.    Notes:                         Rigoberto Vasquez presented for a  Medicare AWV and comprehensive Health Risk Assessment today. The following components were reviewed and updated:    Medical history  Family History  Social history  Allergies and Current Medications  Health Risk Assessment  Health Maintenance  Care Team         ** See Completed Assessments for Annual Wellness Visit within the encounter summary.**         The following assessments were completed:  Living Situation  CAGE  Depression Screening  Fall Risk Assessment (MACH 10)  Hearing Assessment(HHI)  Cognitive Function Screening  Nutrition Screening  ADL Screening  PAQ Screening    Opioid documentation:      Patient does not have a current opioid prescription.        Vitals:    03/31/25 1108   BP: 107/68   Resp: 20   Height: 5' 11" (1.803 m)     Body mass index is 27.27 kg/m².    B/P as stated by patient    Physical Exam  Constitutional:       General: He is not in " acute distress.     Appearance: He is not ill-appearing or diaphoretic.   Pulmonary:      Effort: Pulmonary effort is normal. No respiratory distress.   Neurological:      Mental Status: He is alert and oriented to person, place, and time. Mental status is at baseline.   Psychiatric:         Mood and Affect: Mood normal.         Behavior: Behavior normal.         Thought Content: Thought content normal.         Judgment: Judgment normal.           Diagnoses and health risks identified today and associated recommendations/orders:    1. Encounter for Medicare annual wellness exam  Patient states he does not drink alcohol    I offered to discuss advanced care planning, including how to pick a person who would make decisions for you if you were unable to make them for yourself, called a health care power of , and what kind of decisions you might make such as use of life sustaining treatments such as ventilators and tube feeding when faced with a life limiting illness recorded on a living will that they will need to know. (How you want to be cared for as you near the end of your natural life)     X  Patient has advanced directives written and agrees to provide copies to the institution.  - Referral to Enhanced Annual Wellness Visit (eAWV) W+1    2. Malignant neoplasm of lateral wall of urinary bladder S/p Resection  Monitor  Follow up as directed     3. Hypertension associated with diabetes  Monitor  Stable  Continue home losartan    4. Diabetes mellitus type 2 with complications   Monitor  Follow up with PCP  Continue home ozempic- I did discuss with patient importance of him getting in enough calories due to cancer and if not eating well may need to change DM medications. Patient stated he is being followed by DM management and they have been discussing this and monitoring closely.  Continue home actos, lantus    5. History of heart block;AV block, 3rd degree, Pacemaker  Monitor  Follow up with Cardiology as  "directed     6.  Ectopic gastric mucosa, Dyspepsia   Monitor  Follow up with GI as needed, directed  Continue home pepcid    7. Calcification of aorta, Hyperlipidemia associated with type 2 diabetes mellitus  Monitor  Follow up with Cardiology as directed    Continue home lofibra, mevacor    8. 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 Type 2 diabetes mellitus with stage 3a chronic kidney disease, with long-term current use of insulin  Monitor  Follow up as directed      10. Arthritis of carpometacarpal (CMC) joint of left thumb, Osteoarthritis of both knees, unspecified osteoarthritis type  Monitor  Follow up as needed, directed  Continue home tylenol, gabapentin  Chair yoga encouraged    11. 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 Cholesteatoma of ear, left, Conductive hearing loss of left ear, unspecified hearing status on contralateral side  Monitor  Follow up as needed, directed     13. BPH with obstruction/lower urinary tract symptoms, Erectile dysfunction, unspecified erectile dysfunction type  Monitor  Follow up with Urology as needed, directed  Continue home avodart, revatio    14. History of skin cancer   Monitor  Follow up with Dermatology as directed    15. Iron deficiency anemia, unspecified iron deficiency anemia type  Monitor  Follow up as directed  Continue home MVI         Patient reports Hx SOB and fatigue that has become progressively worse over time. No fever, CP, pressure, or palpitations. Patient states he has not been able to wear his CPAP in past month. Patient states he saw his ENT last week and "everything was ok". Patient states he now " has a Cardiology check up today at 3:00. Patient is to follow up with Pulmonology about CPAP and SOB also. He also states on Abx for recent Dx UTI and has Bladder Bx scheduled for later this week.                                           Provided Rigoberto with a 5-10 year written screening schedule and personal prevention plan. Recommendations were developed using the USPSTF age appropriate recommendations. Education, counseling, and referrals were provided as needed. After Visit Summary printed and given to patient which includes a list of additional screenings\tests needed.    Follow up in about 1 year (around 3/31/2026) for Annual wellness visit.    MARYURI Larose

## 2025-04-03 ENCOUNTER — PATIENT MESSAGE (OUTPATIENT)
Dept: UROLOGY | Facility: CLINIC | Age: 75
End: 2025-04-03
Payer: MEDICARE

## 2025-04-03 ENCOUNTER — TELEPHONE (OUTPATIENT)
Dept: UROLOGY | Facility: CLINIC | Age: 75
End: 2025-04-03
Payer: MEDICARE

## 2025-04-04 ENCOUNTER — HOSPITAL ENCOUNTER (OUTPATIENT)
Facility: HOSPITAL | Age: 75
Discharge: HOME | End: 2025-04-04
Attending: UROLOGY | Admitting: UROLOGY
Payer: MEDICARE

## 2025-04-04 ENCOUNTER — ANESTHESIA EVENT (OUTPATIENT)
Dept: SURGERY | Facility: HOSPITAL | Age: 75
End: 2025-04-04
Payer: MEDICARE

## 2025-04-04 ENCOUNTER — DOCUMENTATION ONLY (OUTPATIENT)
Dept: ELECTROPHYSIOLOGY | Facility: CLINIC | Age: 75
End: 2025-04-04
Payer: MEDICARE

## 2025-04-04 ENCOUNTER — ANESTHESIA (OUTPATIENT)
Dept: SURGERY | Facility: HOSPITAL | Age: 75
End: 2025-04-04
Payer: MEDICARE

## 2025-04-04 VITALS
OXYGEN SATURATION: 100 % | HEIGHT: 71 IN | RESPIRATION RATE: 20 BRPM | DIASTOLIC BLOOD PRESSURE: 74 MMHG | WEIGHT: 195 LBS | TEMPERATURE: 98 F | SYSTOLIC BLOOD PRESSURE: 141 MMHG | BODY MASS INDEX: 27.3 KG/M2 | HEART RATE: 71 BPM

## 2025-04-04 DIAGNOSIS — Z01.818 PREOP TESTING: ICD-10-CM

## 2025-04-04 DIAGNOSIS — D49.4 BLADDER TUMOR: Primary | ICD-10-CM

## 2025-04-04 DIAGNOSIS — I15.2 HYPERTENSION ASSOCIATED WITH DIABETES: Chronic | ICD-10-CM

## 2025-04-04 DIAGNOSIS — E11.59 HYPERTENSION ASSOCIATED WITH DIABETES: Chronic | ICD-10-CM

## 2025-04-04 LAB
ABSOLUTE EOSINOPHIL (OHS): 0.13 K/UL
ABSOLUTE MONOCYTE (OHS): 0.37 K/UL (ref 0.3–1)
ABSOLUTE NEUTROPHIL COUNT (OHS): 3.8 K/UL (ref 1.8–7.7)
ANION GAP (OHS): 9 MMOL/L (ref 8–16)
BASOPHILS # BLD AUTO: 0.05 K/UL
BASOPHILS NFR BLD AUTO: 0.9 %
BUN SERPL-MCNC: 18 MG/DL (ref 8–23)
CALCIUM SERPL-MCNC: 9.5 MG/DL (ref 8.7–10.5)
CHLORIDE SERPL-SCNC: 106 MMOL/L (ref 95–110)
CO2 SERPL-SCNC: 23 MMOL/L (ref 23–29)
CREAT SERPL-MCNC: 1.1 MG/DL (ref 0.5–1.4)
ERYTHROCYTE [DISTWIDTH] IN BLOOD BY AUTOMATED COUNT: 12.2 % (ref 11.5–14.5)
GFR SERPLBLD CREATININE-BSD FMLA CKD-EPI: >60 ML/MIN/1.73/M2
GLUCOSE SERPL-MCNC: 126 MG/DL (ref 70–110)
HCT VFR BLD AUTO: 43.1 % (ref 40–54)
HGB BLD-MCNC: 14.3 GM/DL (ref 14–18)
IMM GRANULOCYTES # BLD AUTO: 0.02 K/UL (ref 0–0.04)
IMM GRANULOCYTES NFR BLD AUTO: 0.4 % (ref 0–0.5)
LYMPHOCYTES # BLD AUTO: 0.91 K/UL (ref 1–4.8)
MCH RBC QN AUTO: 31.4 PG (ref 27–31)
MCHC RBC AUTO-ENTMCNC: 33.2 G/DL (ref 32–36)
MCV RBC AUTO: 95 FL (ref 82–98)
NUCLEATED RBC (/100WBC) (OHS): 0 /100 WBC
PLATELET # BLD AUTO: 225 K/UL (ref 150–450)
PMV BLD AUTO: 9.4 FL (ref 9.2–12.9)
POCT GLUCOSE: 137 MG/DL (ref 70–110)
POTASSIUM SERPL-SCNC: 3.6 MMOL/L (ref 3.5–5.1)
RBC # BLD AUTO: 4.55 M/UL (ref 4.6–6.2)
RELATIVE EOSINOPHIL (OHS): 2.5 %
RELATIVE LYMPHOCYTE (OHS): 17.2 % (ref 18–48)
RELATIVE MONOCYTE (OHS): 7 % (ref 4–15)
RELATIVE NEUTROPHIL (OHS): 72 % (ref 38–73)
SODIUM SERPL-SCNC: 138 MMOL/L (ref 136–145)
WBC # BLD AUTO: 5.28 K/UL (ref 3.9–12.7)

## 2025-04-04 PROCEDURE — 25000003 PHARM REV CODE 250: Mod: HCNC | Performed by: NURSE ANESTHETIST, CERTIFIED REGISTERED

## 2025-04-04 PROCEDURE — 52234 CYSTOSCOPY AND TREATMENT: CPT | Mod: ,,, | Performed by: UROLOGY

## 2025-04-04 PROCEDURE — 63600175 PHARM REV CODE 636 W HCPCS: Mod: HCNC

## 2025-04-04 PROCEDURE — 71000015 HC POSTOP RECOV 1ST HR: Mod: HCNC | Performed by: UROLOGY

## 2025-04-04 PROCEDURE — 25000003 PHARM REV CODE 250: Mod: HCNC

## 2025-04-04 PROCEDURE — 37000008 HC ANESTHESIA 1ST 15 MINUTES: Mod: HCNC | Performed by: UROLOGY

## 2025-04-04 PROCEDURE — 63600175 PHARM REV CODE 636 W HCPCS: Mod: HCNC | Performed by: NURSE ANESTHETIST, CERTIFIED REGISTERED

## 2025-04-04 PROCEDURE — 82962 GLUCOSE BLOOD TEST: CPT | Mod: HCNC | Performed by: UROLOGY

## 2025-04-04 PROCEDURE — 71000044 HC DOSC ROUTINE RECOVERY FIRST HOUR: Mod: HCNC | Performed by: UROLOGY

## 2025-04-04 PROCEDURE — 88307 TISSUE EXAM BY PATHOLOGIST: CPT | Mod: TC,91,HCNC | Performed by: UROLOGY

## 2025-04-04 PROCEDURE — 36000707: Mod: HCNC | Performed by: UROLOGY

## 2025-04-04 PROCEDURE — 52204 CYSTOSCOPY W/BIOPSY(S): CPT | Mod: XS,,, | Performed by: UROLOGY

## 2025-04-04 PROCEDURE — 37000009 HC ANESTHESIA EA ADD 15 MINS: Mod: HCNC | Performed by: UROLOGY

## 2025-04-04 PROCEDURE — 85025 COMPLETE CBC W/AUTO DIFF WBC: CPT | Mod: HCNC | Performed by: ANESTHESIOLOGY

## 2025-04-04 PROCEDURE — 80048 BASIC METABOLIC PNL TOTAL CA: CPT | Mod: HCNC | Performed by: ANESTHESIOLOGY

## 2025-04-04 PROCEDURE — A9589 INSTI HEXAMINOLEVULINATE HCL: HCPCS | Mod: HCNC

## 2025-04-04 PROCEDURE — 36000706: Mod: HCNC | Performed by: UROLOGY

## 2025-04-04 PROCEDURE — 88307 TISSUE EXAM BY PATHOLOGIST: CPT | Mod: 26,HCNC,, | Performed by: PATHOLOGY

## 2025-04-04 RX ORDER — ONDANSETRON HYDROCHLORIDE 2 MG/ML
INJECTION, SOLUTION INTRAVENOUS
Status: DISCONTINUED | OUTPATIENT
Start: 2025-04-04 | End: 2025-04-04

## 2025-04-04 RX ORDER — PHENYLEPHRINE HYDROCHLORIDE 10 MG/ML
INJECTION INTRAVENOUS
Status: DISCONTINUED | OUTPATIENT
Start: 2025-04-04 | End: 2025-04-04

## 2025-04-04 RX ORDER — ROCURONIUM BROMIDE 10 MG/ML
INJECTION, SOLUTION INTRAVENOUS
Status: DISCONTINUED | OUTPATIENT
Start: 2025-04-04 | End: 2025-04-04

## 2025-04-04 RX ORDER — ONDANSETRON HYDROCHLORIDE 2 MG/ML
4 INJECTION, SOLUTION INTRAVENOUS DAILY PRN
Status: DISCONTINUED | OUTPATIENT
Start: 2025-04-04 | End: 2025-04-04 | Stop reason: HOSPADM

## 2025-04-04 RX ORDER — SODIUM CHLORIDE 0.9 % (FLUSH) 0.9 %
3 SYRINGE (ML) INJECTION
Status: DISCONTINUED | OUTPATIENT
Start: 2025-04-04 | End: 2025-04-04 | Stop reason: HOSPADM

## 2025-04-04 RX ORDER — SODIUM CHLORIDE 9 MG/ML
INJECTION, SOLUTION INTRAVENOUS CONTINUOUS PRN
Status: DISCONTINUED | OUTPATIENT
Start: 2025-04-04 | End: 2025-04-04

## 2025-04-04 RX ORDER — OXYCODONE HYDROCHLORIDE 5 MG/1
5 TABLET ORAL EVERY 4 HOURS PRN
Qty: 5 TABLET | Refills: 0 | Status: SHIPPED | OUTPATIENT
Start: 2025-04-04

## 2025-04-04 RX ORDER — HYDROMORPHONE HYDROCHLORIDE 1 MG/ML
0.2 INJECTION, SOLUTION INTRAMUSCULAR; INTRAVENOUS; SUBCUTANEOUS EVERY 5 MIN PRN
Status: DISCONTINUED | OUTPATIENT
Start: 2025-04-04 | End: 2025-04-04 | Stop reason: HOSPADM

## 2025-04-04 RX ORDER — FENTANYL CITRATE 50 UG/ML
INJECTION, SOLUTION INTRAMUSCULAR; INTRAVENOUS
Status: DISCONTINUED | OUTPATIENT
Start: 2025-04-04 | End: 2025-04-04

## 2025-04-04 RX ORDER — GLUCAGON 1 MG
1 KIT INJECTION
Status: DISCONTINUED | OUTPATIENT
Start: 2025-04-04 | End: 2025-04-04 | Stop reason: HOSPADM

## 2025-04-04 RX ORDER — HALOPERIDOL LACTATE 5 MG/ML
0.5 INJECTION, SOLUTION INTRAMUSCULAR EVERY 10 MIN PRN
Status: DISCONTINUED | OUTPATIENT
Start: 2025-04-04 | End: 2025-04-04 | Stop reason: HOSPADM

## 2025-04-04 RX ORDER — OXYCODONE AND ACETAMINOPHEN 5; 325 MG/1; MG/1
1 TABLET ORAL
Status: DISCONTINUED | OUTPATIENT
Start: 2025-04-04 | End: 2025-04-04 | Stop reason: HOSPADM

## 2025-04-04 RX ORDER — MIDAZOLAM HYDROCHLORIDE 1 MG/ML
INJECTION INTRAMUSCULAR; INTRAVENOUS
Status: DISCONTINUED | OUTPATIENT
Start: 2025-04-04 | End: 2025-04-04

## 2025-04-04 RX ORDER — PROPOFOL 10 MG/ML
VIAL (ML) INTRAVENOUS
Status: DISCONTINUED | OUTPATIENT
Start: 2025-04-04 | End: 2025-04-04

## 2025-04-04 RX ORDER — CEFAZOLIN 2 G/1
2 INJECTION, POWDER, FOR SOLUTION INTRAMUSCULAR; INTRAVENOUS
Status: COMPLETED | OUTPATIENT
Start: 2025-04-04 | End: 2025-04-04

## 2025-04-04 RX ORDER — LIDOCAINE HYDROCHLORIDE 20 MG/ML
INJECTION INTRAVENOUS
Status: DISCONTINUED | OUTPATIENT
Start: 2025-04-04 | End: 2025-04-04

## 2025-04-04 RX ADMIN — CEFAZOLIN 2 G: 2 INJECTION, POWDER, FOR SOLUTION INTRAMUSCULAR; INTRAVENOUS at 10:04

## 2025-04-04 RX ADMIN — HEXAMINOLEVULINATE HYDROCHLORIDE 100 MG: KIT at 10:04

## 2025-04-04 RX ADMIN — FENTANYL CITRATE 50 MCG: 50 INJECTION, SOLUTION INTRAMUSCULAR; INTRAVENOUS at 10:04

## 2025-04-04 RX ADMIN — FENTANYL CITRATE 25 MCG: 50 INJECTION, SOLUTION INTRAMUSCULAR; INTRAVENOUS at 11:04

## 2025-04-04 RX ADMIN — ONDANSETRON 4 MG: 2 INJECTION INTRAMUSCULAR; INTRAVENOUS at 11:04

## 2025-04-04 RX ADMIN — ROCURONIUM BROMIDE 50 MG: 10 INJECTION, SOLUTION INTRAVENOUS at 11:04

## 2025-04-04 RX ADMIN — PROPOFOL 50 MG: 10 INJECTION, EMULSION INTRAVENOUS at 11:04

## 2025-04-04 RX ADMIN — SODIUM CHLORIDE: 0.9 INJECTION, SOLUTION INTRAVENOUS at 10:04

## 2025-04-04 RX ADMIN — SUGAMMADEX 200 MG: 100 INJECTION, SOLUTION INTRAVENOUS at 11:04

## 2025-04-04 RX ADMIN — MIDAZOLAM HYDROCHLORIDE 2 MG: 2 INJECTION, SOLUTION INTRAMUSCULAR; INTRAVENOUS at 10:04

## 2025-04-04 RX ADMIN — LIDOCAINE HYDROCHLORIDE 100 MG: 20 INJECTION INTRAVENOUS at 10:04

## 2025-04-04 RX ADMIN — PROPOFOL 200 MG: 10 INJECTION, EMULSION INTRAVENOUS at 10:04

## 2025-04-04 RX ADMIN — PHENYLEPHRINE HYDROCHLORIDE 100 MCG: 10 INJECTION INTRAVENOUS at 11:04

## 2025-04-04 NOTE — DISCHARGE INSTRUCTIONS
Remove catheter on Wednesday, 4/9/25    Post Cystoscopy and Transurethral resection of Bladder Tumor Instructions  Do not strain to have a bowel movement  No strenuous exercise x 7 days  No driving while you are on narcotic pain medications or if your turner  catheter is in place    You can expect:  To see blood in your urine.    Go to the ER if:  Your catheter stops draining  You are having severe abdominal pain  Inability to void if you do not have a catheter    Call the doctor if:  Temperature is greater than 101F  Persistent vomiting and inability to keep food down

## 2025-04-04 NOTE — ANESTHESIA PREPROCEDURE EVALUATION
"                                                                                                             04/04/2025  Rigoberto Vasquez is a 74 y.o., male.    Procedures:      TURBT,WITH BLUE LIGHT CYSTOSCOPY AND CYSVIEW      CYSTOSCOPY, WITH BLADDER BIOPSY, WITH FULGURATION IF INDICATED   Anesthesia type: General   Diagnosis: Bladder tumor [D49.4]       Pre-operative evaluation for Procedure(s) (LRB):  TURBT,WITH BLUE LIGHT CYSTOSCOPY AND CYSVIEW (N/A)  CYSTOSCOPY, WITH BLADDER BIOPSY, WITH FULGURATION IF INDICATED (N/A)      Encounter Diagnoses   Name Primary?    Preop testing Yes    Hypertension associated with diabetes     Preop testing        Review of patient's allergies indicates:   Allergen Reactions    Aspirin      Stomach pain even with ppi    Meperidine      Other reaction(s): Stomach upset    Niacin      Other reaction(s): hot skin       Medications Prior to Admission   Medication Sig Dispense Refill Last Dose/Taking    pioglitazone (ACTOS) 30 MG tablet Take 30 mg by mouth once daily.   Taking    ACETAMINOPHEN (TYLENOL 8 HOUR ORAL) Take by mouth as needed.       azelastine (ASTELIN) 137 mcg (0.1 %) nasal spray 2 sprays (274 mcg total) by Nasal route 2 (two) times daily. 30 mL 12     blood sugar diagnostic (ACCU-CHEK JAXON) Strp Check BG 2-3 times daily. Accuchek jaoxn strips 300 strip 3     dorzolamide-timolol 2-0.5% (COSOPT) 22.3-6.8 mg/mL ophthalmic solution Place 1 drop into both eyes 2 (two) times daily. 10 mL 5     DROPLET PEN NEEDLE 31 gauge x 3/16" Ndle USE AS DIRECTED  WITH  INSULIN 200 each 3     dutasteride (AVODART) 0.5 mg capsule Take 1 capsule (0.5 mg total) by mouth once daily. 90 capsule 3     famotidine (PEPCID) 20 MG tablet Take 1 tablet (20 mg total) by mouth 2 (two) times daily. 180 tablet 3     fenofibrate micronized (LOFIBRA) 200 MG Cap Take 1 capsule (200 mg total) by mouth every evening. 90 capsule 3     fluticasone propionate (FLONASE) 50 mcg/actuation nasal spray 1 spray by Each " Nostril route once daily.       gabapentin (CMPD PAIN MANAGEMENT COMPOUND OINTMNENT THREE) Apply bid prn pain 100 g 11     hydrocortisone 2.5 % cream Apply topically 2 (two) times daily. 3.5 g 5     insulin (LANTUS SOLOSTAR U-100 INSULIN) glargine 100 units/mL SubQ pen 16 units in the AM and 4 units in the PM 3 mL 11     latanoprost 0.005 % ophthalmic solution INSTILL 1 DROP INTO BOTH EYES ONE TIME DAILY 7.5 mL 3     levocetirizine (XYZAL) 5 MG tablet Take 5 mg by mouth every evening.       [] linezolid (ZYVOX) 600 mg Tab Take 1 tablet (600 mg total) by mouth every 12 (twelve) hours. for 7 days 14 tablet 0     loratadine (CLARITIN) 10 mg tablet Take 10 mg by mouth once daily.       losartan (COZAAR) 25 MG tablet Take 1 tablet (25 mg total) by mouth once daily. 90 tablet 3     lovastatin (MEVACOR) 40 MG tablet Take 1 tablet by mouth Every evening. 90 tablet 3     multivitamin capsule Take 1 capsule by mouth once daily.       pantoprazole (PROTONIX) 40 MG tablet Take 1 tablet (40 mg total) by mouth once daily. 90 tablet 1     polyethylene glycol (GLYCOLAX) 17 gram/dose powder Use to cap to measure 17g, mix with liquid, and take by mouth once daily. 510 g 0     semaglutide (OZEMPIC) 1 mg/dose (4 mg/3 mL) Inject 1 mg into the skin every 7 days. 9 mL 4     sildenafil (REVATIO) 20 mg Tab Take 1 tablet (20 mg total) by mouth daily as needed (PRN). Takes prn 30 tablet 11     sodium chloride (SALINE NASAL) 0.65 % nasal spray 2 sprays by Nasal route as needed for Congestion. 44 mL 0     triamcinolone acetonide 0.1% (KENALOG) 0.1 % cream AAA bid 454 g 0             No current facility-administered medications on file prior to encounter.     Current Outpatient Medications on File Prior to Encounter   Medication Sig Dispense Refill    pioglitazone (ACTOS) 30 MG tablet Take 30 mg by mouth once daily.      ACETAMINOPHEN (TYLENOL 8 HOUR ORAL) Take by mouth as needed.      azelastine (ASTELIN) 137 mcg (0.1 %) nasal spray 2  "sprays (274 mcg total) by Nasal route 2 (two) times daily. 30 mL 12    blood sugar diagnostic (ACCU-CHEK JAXON) Strp Check BG 2-3 times daily. Accuchek jaxon strips 300 strip 3    dorzolamide-timolol 2-0.5% (COSOPT) 22.3-6.8 mg/mL ophthalmic solution Place 1 drop into both eyes 2 (two) times daily. 10 mL 5    DROPLET PEN NEEDLE 31 gauge x 3/16" Ndle USE AS DIRECTED  WITH  INSULIN 200 each 3    dutasteride (AVODART) 0.5 mg capsule Take 1 capsule (0.5 mg total) by mouth once daily. 90 capsule 3    famotidine (PEPCID) 20 MG tablet Take 1 tablet (20 mg total) by mouth 2 (two) times daily. 180 tablet 3    fenofibrate micronized (LOFIBRA) 200 MG Cap Take 1 capsule (200 mg total) by mouth every evening. 90 capsule 3    fluticasone propionate (FLONASE) 50 mcg/actuation nasal spray 1 spray by Each Nostril route once daily.      gabapentin (CMPD PAIN MANAGEMENT COMPOUND OINTMNENT THREE) Apply bid prn pain 100 g 11    hydrocortisone 2.5 % cream Apply topically 2 (two) times daily. 3.5 g 5    insulin (LANTUS SOLOSTAR U-100 INSULIN) glargine 100 units/mL SubQ pen 16 units in the AM and 4 units in the PM 3 mL 11    latanoprost 0.005 % ophthalmic solution INSTILL 1 DROP INTO BOTH EYES ONE TIME DAILY 7.5 mL 3    levocetirizine (XYZAL) 5 MG tablet Take 5 mg by mouth every evening.      loratadine (CLARITIN) 10 mg tablet Take 10 mg by mouth once daily.      losartan (COZAAR) 25 MG tablet Take 1 tablet (25 mg total) by mouth once daily. 90 tablet 3    lovastatin (MEVACOR) 40 MG tablet Take 1 tablet by mouth Every evening. 90 tablet 3    multivitamin capsule Take 1 capsule by mouth once daily.      pantoprazole (PROTONIX) 40 MG tablet Take 1 tablet (40 mg total) by mouth once daily. 90 tablet 1    polyethylene glycol (GLYCOLAX) 17 gram/dose powder Use to cap to measure 17g, mix with liquid, and take by mouth once daily. 510 g 0    semaglutide (OZEMPIC) 1 mg/dose (4 mg/3 mL) Inject 1 mg into the skin every 7 days. 9 mL 4    sildenafil " (REVATIO) 20 mg Tab Take 1 tablet (20 mg total) by mouth daily as needed (PRN). Takes prn 30 tablet 11    sodium chloride (SALINE NASAL) 0.65 % nasal spray 2 sprays by Nasal route as needed for Congestion. 44 mL 0    triamcinolone acetonide 0.1% (KENALOG) 0.1 % cream AAA bid 454 g 0       Past Medical History:  No date: Acid reflux      Comment:  [facility]  No date: Angina pectoris  No date: Back pain  No date: BPH (benign prostatic hyperplasia)      Comment:  blue  No date: Cholesteatoma  11/11/2015: Class 2 severe obesity due to excess calories with   serious comorbidity and body mass index (BMI) of 37.0 to 37.9 in adult  No date: Degenerative joint disease  3/31/2025: Diabetes mellitus type 2 with complications  No date: Diverticulosis  No date: Erectile dysfunction  02/2014: History of elevated PSA      Comment:  dr tenzin slade annually  No date: History of pericarditis  No date: Hypercholesteremia  No date: Hypertension  No date: Iron deficiency anemia  8/5/2024: Malignant neoplasm of lateral wall of urinary bladder  05/17/2022: СВЕТЛАНА (obstructive sleep apnea)  No date: Pacemaker      Comment:  complete av block;NO afib  No date: Renal disorder  No date: 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    Past Surgical History:   Procedure Laterality Date    BIOPSY OF BLADDER N/A 8/30/2024    Procedure: BIOPSY, BLADDER;  Surgeon: Compa Hensley MD;  Location: [facility] OR [address] [place] [address];  Service: Urology;  Laterality: N/A;    BLADDER DIVERTICULECTOMY Right 10/8/2024    Procedure: EXCISION, DIVERTICULUM, BLADDER;  Surgeon: Compa Hensley MD;  Location: [facility] OR 2ND FLR;  Service: Urology;  Laterality: Right;    BLADDER FULGURATION N/A 8/30/2024    Procedure: FULGURATION, BLADDER;  Surgeon: Compa Hensley MD;  Location: [facility] OR 1ST FLR;  Service: Urology;   Laterality: N/A;    CARDIAC PACEMAKER PLACEMENT      COLONOSCOPY N/A 9/13/2019    Procedure: COLONOSCOPY;  Surgeon: Kan Ramsey III, MD;  Location: [facility];  Service: Endoscopy;  Laterality: N/A;    COLONOSCOPY N/A 9/22/2022    Procedure: COLONOSCOPY;  Surgeon: Chris Garcia MD;  Location: [facility];  Service: Endoscopy;  Laterality: N/A;    CYSTOSCOPY N/A 8/30/2024    Procedure: CYSTOSCOPY;  Surgeon: Compa Hensley MD;  Location: [facility] OR 1ST FLR;  Service: Urology;  Laterality: N/A;  with blue light    CYSTOSCOPY W/ RETROGRADES Bilateral 7/16/2024    Procedure: CYSTOSCOPY, WITH RETROGRADE PYELOGRAM;  Surgeon: Vance Olivera MD;  Location: [facility];  Service: Urology;  Laterality: Bilateral;    CYSTOSCOPY,WITH URETERAL CATHETER INSERTION Bilateral 10/8/2024    Procedure: CYSTOSCOPY,WITH URETERAL CATHETER INSERTION;  Surgeon: Compa Hensley MD;  Location: [facility] OR 2ND FLR;  Service: Urology;  Laterality: Bilateral;    ESOPHAGOGASTRODUODENOSCOPY N/A 9/13/2019    Procedure: ESOPHAGOGASTRODUODENOSCOPY (EGD);  Surgeon: Kan Ramsey III, MD;  Location: [facility];  Service: Endoscopy;  Laterality: N/A;    ESOPHAGOGASTRODUODENOSCOPY N/A 9/3/2021    Procedure: EGD (ESOPHAGOGASTRODUODENOSCOPY);  Surgeon: Kaylene Pineda MD;  Location: [facility];  Service: Endoscopy;  Laterality: N/A;    ESOPHAGOGASTRODUODENOSCOPY N/A 5/24/2023    Procedure: EGD (ESOPHAGOGASTRODUODENOSCOPY);  Surgeon: Cory Bennett MD;  Location: [facility];  Service: Endoscopy;  Laterality: N/A;    FLEXIBLE CYSTOSCOPY N/A 10/8/2024    Procedure: CYSTOSCOPY, FLEXIBLE;  Surgeon: Compa Hensley MD;  Location: [facility] OR 2ND FLR;  Service: Urology;  Laterality: N/A;    INSERTION OF TYMPANOSTOMY TUBE Right 3/15/2023    Procedure: INSERTION, TYMPANOSTOMY TUBE;  Surgeon: Jose L Gudino MD;  Location: [facility] OR 2ND FLR;  Service: ENT;  Laterality: Right;    INSTILLATION OF URINARY BLADDER N/A 10/8/2024    Procedure: INSTILLATION, BLADDER;   "Surgeon: Compa Hensley MD;  Location: [address];  Service: Urology;  Laterality: N/A;  Chemotherapy instillation bladder    JOINT REPLACEMENT      KNEE CARTILAGE SURGERY Bilateral     LYMPHADENECTOMY, PELVIS Right 10/8/2024    Procedure: LYMPHADENECTOMY, PELVIS;  Surgeon: Compa Hensley MD;  Location: [address];  Service: Urology;  Laterality: Right;    NASAL SINUS SURGERY      PARTIAL SURGICAL REMOVAL OF BLADDER N/A 10/8/2024    Procedure: CYSTECTOMY, PARTIAL;  Surgeon: Compa Hensley MD;  Location: [address];  Service: Urology;  Laterality: N/A;    SHOULDER ARTHROSCOPY Right     TONSILLECTOMY      TOTAL KNEE ARTHROPLASTY Left 05/15/2017    TRANSURETHRAL RESECTION OF PROSTATE      TURBT (TRANSURETHRAL RESECTION OF BLADDER TUMOR) N/A 7/16/2024    Procedure: TURBT (TRANSURETHRAL RESECTION OF BLADDER TUMOR);  Surgeon: Vance Olivera MD;  Location: [facility];  Service: Urology;  Laterality: N/A;    TYMPANOPLASTY      TYMPANOPLASTY WITH MASTOIDECTOMY Left 3/15/2023    Procedure: TYMPANOPLASTY, WITH MASTOIDECTOMY;  Surgeon: Jose L Gudino MD;  Location: [address];  Service: ENT;  Laterality: Left;    vesectomy         Tobacco Use History[1]    Social History     Substance and Sexual Activity   Alcohol Use Yes    Comment: " once a month"       Physical Activity: Inactive (3/31/2025)    Exercise Vital Sign     Days of Exercise per Week: 0 days     Minutes of Exercise per Session: 0 min       No birth history on file.  No results for input(s): "HCT" in the last 72 hours.  No results for input(s): "PLT" in the last 72 hours.  No results for input(s): "K" in the last 72 hours.  No results for input(s): "CREATININE" in the last 72 hours.  No results for input(s): "GLU" in the last 72 hours.  No results for input(s): "PT" in the last 72 hours.  There were no vitals filed for this visit.                    Pre-op Assessment          Review of Systems  Anesthesia Hx:  No problems with previous " Anesthesia                Hematology/Oncology:  Hematology Normal                     Current/Recent Cancer. (this only, no mets)                Cardiovascular:    Pacemaker Hypertension, well controlled   Denies MI.     Denies CABG/stent.    Denies Angina.         Exercycle 20 minutes, weights                           Pulmonary:  Pneumonia (7 years old, none since)  Denies COPD.  Denies Asthma.   Denies Shortness of breath.  Sleep Apnea           Education provided regarding risk of obstructive sleep apnea            Renal/:  Chronic Renal Disease, CKD                Hepatic/GI:     GERD, well controlled Denies Liver Disease.   Taking GLP-1 Agonists (thursday a week ago) Instructed to Hold for 7 Days No Reported GI Symptoms          Neurological:  Denies TIA.  Denies CVA.    Denies Seizures.                                Endocrine:  Diabetes, type 2, using insulin               Physical Exam  General: Well nourished, Cooperative, Alert and Oriented    Airway:  Mallampati: II   Mouth Opening: Normal  TM Distance: Normal  Tongue: Normal  Neck ROM: Normal ROM        Anesthesia Plan  Type of Anesthesia, risks & benefits discussed:    Anesthesia Type: Gen Natural Airway, Gen Supraglottic Airway  Intra-op Monitoring Plan: Standard ASA Monitors  Post Op Pain Control Plan: IV/PO Opioids PRN  Induction:  IV  Informed Consent: Informed consent signed with the Patient and all parties understand the risks and agree with anesthesia plan.  All questions answered.   ASA Score: 2  Day of Surgery Review of History & Physical: H&P Update referred to the surgeon/provider.    Ready For Surgery From Anesthesia Perspective.     .  Date/Time: 10/8/2024 8:14 AM     Performed by: Melissa Dunlap MD  Authorized by: Brandon Patel MD    Intubation:     Induction:  Intravenous    Intubated:  Postinduction    Mask Ventilation:  Easy mask    Attempts:  1    Attempted By:  Resident anesthesiologist    Method of Intubation:  Video  laryngoscopy    Blade:  Ott 4    Laryngeal View Grade: Grade I - full view of cords      Difficult Airway Encountered?: No      Complications:  None    Airway Device:  Oral endotracheal tube    Airway Device Size:  7.5    Style/Cuff Inflation:  Cuffed (inflated to minimal occlusive pressure)    Tube secured:  22    Secured at:  The lips    Placement Verified By:  Capnometry    Complicating Factors:  None    Findings Post-Intubation:  Atraumatic/condition of teeth unchanged and BS equal bilateral       Latest Reference Range & Units 06/21/24 10:08 07/11/24 11:03 09/27/24 11:09 10/09/24 05:56 10/09/24 12:41 10/10/24 02:12 02/05/25 10:25 02/13/25 07:45   Creatinine 0.5 - 1.4 mg/dL 1.5 (H) 1.3 1.4 1.9 (H) 1.8 (H) 1.5 (H) 1.4 1.3   (H): Data is abnormally high      Vent. Rate : 089 BPM     Atrial Rate : 066 BPM      P-R Int : 182 ms          QRS Dur : 154 ms       QT Int : 444 ms       P-R-T Axes : 068 -62 092 degrees      QTc Int : 540 ms     Atrial-sensed ventricular-paced rhythm   Abnormal ECG   When compared with ECG of 06-MAR-2023 13:31,   Vent. rate has increased BY  18 BPM   Confirmed by Carlos MERAZ, Kimi' BDes ([id number]) on 7/11/2024 8:55:46 PM       8/2024     CONCLUSIONS:   1. Normal left ventricular cavity size. Mildly depressed left ventricular systolic   function. LVEF 45 - 50%.   2. Normal right ventricular size.   3. Mild mitral valve regurgitation.   4. Mild aortic valve regurgitation.   5. Mild tricuspid valve regurgitation.        [1]   Social History  Tobacco Use   Smoking Status Never   Smokeless Tobacco Never

## 2025-04-04 NOTE — TRANSFER OF CARE
"Anesthesia Transfer of Care Note    Patient: Rigoberto Vasquez    Procedure(s) Performed: Procedure(s) (LRB):  TURBT,WITH BLUE LIGHT CYSTOSCOPY AND CYSVIEW (N/A)  CYSTOSCOPY, WITH BLADDER BIOPSY, WITH FULGURATION (N/A)    Patient location: PACU    Anesthesia Type: general    Transport from OR: Transported from OR on 6-10 L/min O2 by face mask with adequate spontaneous ventilation    Post pain: adequate analgesia    Post assessment: no apparent anesthetic complications and tolerated procedure well    Post vital signs: stable    Level of consciousness: lethargic and responds to stimulation    Nausea/Vomiting: no nausea/vomiting    Complications: none    Transfer of care protocol was followed    Last vitals: Visit Vitals  BP (!) 161/89 (BP Location: Left arm, Patient Position: Lying)   Pulse 74   Temp 36.7 °C (98 °F) (Temporal)   Resp 20   Ht 5' 11" (1.803 m)   Wt 88.5 kg (195 lb)   SpO2 100%   BMI 27.20 kg/m²     "

## 2025-04-04 NOTE — ANESTHESIA POSTPROCEDURE EVALUATION
Anesthesia Post Evaluation    Patient: Rigoberto Vasquez    Procedure(s) Performed: Procedure(s) (LRB):  TURBT,WITH BLUE LIGHT CYSTOSCOPY AND CYSVIEW (N/A)  CYSTOSCOPY, WITH BLADDER BIOPSY, WITH FULGURATION (N/A)    Final Anesthesia Type: general      Patient location during evaluation: PACU  Patient participation: Yes- Able to Participate  Level of consciousness: awake and alert and oriented  Post-procedure vital signs: reviewed and stable  Pain management: adequate  Airway patency: patent    PONV status at discharge: No PONV  Anesthetic complications: no      Cardiovascular status: stable  Respiratory status: unassisted, spontaneous ventilation and room air  Hydration status: euvolemic  Follow-up not needed.              Vitals Value Taken Time   /88 04/04/25 13:01   Temp 36.5 °C (97.7 °F) 04/04/25 11:52   Pulse 69 04/04/25 13:14   Resp 20 04/04/25 13:14   SpO2 99 % 04/04/25 13:14   Vitals shown include unfiled device data.      No case tracking events are documented in the log.      Pain/Berna Score: Berna Score: 10 (4/4/2025 12:45 PM)

## 2025-04-04 NOTE — PROGRESS NOTES
Recovery care complete. Pt tolerated well. Discharge instructions and handouts provided. Pt and spouse verbalized understanding. Pt given PO fluids and tolerated. Pt given demo of how tor removed turner cath and bag change. Pt in NAD, VSS. Pt discharge home to self care.

## 2025-04-04 NOTE — ANESTHESIA PROCEDURE NOTES
Intubation    Date/Time: 4/4/2025 11:12 AM    Performed by: Juno Carrillo CRNA  Authorized by: Yoni Aguilar Jr., MD    Intubation:     Induction:  Intravenous    Intubated:  Postinduction    Mask Ventilation:  Not attempted    Attempts:  1    Attempted By:  CRNA    Method of Intubation:  Video laryngoscopy    Blade:  Ott 3    Laryngeal View Grade: Grade I - full view of cords      Difficult Airway Encountered?: No      Complications:  None    Airway Device:  Oral endotracheal tube    Airway Device Size:  7.5    Style/Cuff Inflation:  Cuffed (inflated to minimal occlusive pressure)    Tube secured:  24    Secured at:  The lips    Placement Verified By:  Capnometry    Complicating Factors:  None    Findings Post-Intubation:  BS equal bilateral and atraumatic/condition of teeth unchanged

## 2025-04-04 NOTE — BRIEF OP NOTE
[facility] - Surgery ([facility])  Brief Operative Note    Surgery Date: 4/4/2025     Surgeons and Role:     * Compa Hensley MD - Primary     * Christ Scherer MD - Resident - Assisting        Pre-op Diagnosis:  Bladder tumor [D49.4]    Post-op Diagnosis:  Post-Op Diagnosis Codes:     * Bladder tumor [D49.4]    Procedure(s) (LRB):  TURBT,WITH BLUE LIGHT CYSTOSCOPY AND CYSVIEW (N/A)  CYSTOSCOPY, WITH BLADDER BIOPSY, WITH FULGURATION (N/A)    Anesthesia: General    Operative Findings: TURBT without immediate complication    Estimated Blood Loss: * No values recorded between 4/4/2025 10:42 AM and 4/4/2025 11:37 AM *         Specimens:   Specimen (24h ago, onward)       Start     Ordered    04/04/25 1112  Specimen to Pathology  RELEASE UPON ORDERING        References:    Click here for ordering Quick Tip   Question:  Release to patient  Answer:  Immediate    04/04/25 1112                    ID Type Source Tests Collected by Time Destination   1 :  Tissue Urinary Bladder, Posterior Wall SPECIMEN TO PATHOLOGY Compa Hensley MD 4/4/2025 1111    2 :  Tissue Urinary Bladder, Trigone SPECIMEN TO PATHOLOGY Compa Hensley MD 4/4/2025 1114    3 : right lateral wall bladdr tumor Tissue Urinary Bladder, Lateral Wall, Right SPECIMEN TO PATHOLOGY Compa Hensley MD 4/4/2025 1122    4 :  Tissue Urinary Bladder, Posterior Wall, Right SPECIMEN TO PATHOLOGY Compa Hensley MD 4/4/2025 1124    5 : bladder dome tumor Tissue Urinary Bladder, Dome SPECIMEN TO PATHOLOGY Compa Hensley MD 4/4/2025 1130            Discharge Note    OUTCOME: Patient tolerated treatment/procedure well without complication and is now ready for discharge.    DISPOSITION: Home or Self Care    FINAL DIAGNOSIS:  Bladder cancer    FOLLOWUP: In clinic    DISCHARGE INSTRUCTIONS:    Discharge Procedure Orders   Basic Metabolic Panel   Standing Status: Future Standing Exp. Date: 04/25/26     CBC Without Differential   Standing Status: Future Standing Exp. Date: 04/25/26     Diet  Adult Regular     Lifting restrictions     Notify your health care provider if you experience any of the following:  temperature >100.4     Notify your health care provider if you experience any of the following:  persistent nausea and vomiting or diarrhea     Notify your health care provider if you experience any of the following:  severe uncontrolled pain     Notify your health care provider if you experience any of the following:  redness, tenderness, or signs of infection (pain, swelling, redness, odor or green/yellow discharge around incision site)     Notify your health care provider if you experience any of the following:  difficulty breathing or increased cough     Notify your health care provider if you experience any of the following:  severe persistent headache     Notify your health care provider if you experience any of the following:  worsening rash     Notify your health care provider if you experience any of the following:  persistent dizziness, light-headedness, or visual disturbances     Notify your health care provider if you experience any of the following:  increased confusion or weakness     Notify your health care provider if you experience any of the following:     EKG 12-lead   Standing Status: Future Standing Exp. Date: 02/24/26     Activity as tolerated

## 2025-04-04 NOTE — OP NOTE
[facility]    Operative Note     DATE OF PROCEDURE:   4/4/2025    PRE-OP DIAGNOSES:   1) Bladder tumor     POST-OP DIAGNOSES AND OPERATIVE FINDINGS:   As above    PROCEDURES:  1) Transurethral resection of bladder tumor, blue light  2) Bladder biopsy    SURGEONS:   Surgeons and Role:     * Compa Hensley MD - Primary     * Christ Scherer MD - Resident - Assisting    ANESTHESIOLOGY:   Anesthesiologist: Yoni Aguilar Jr., MD  CRNA: Juno Carrillo CRNA  Student Nurse Anesthetist: Marysol Keene    STAFF:   Circulator: Mila Melendez RN; Sylvia Carrillo RN    ANESTHESIA TYPE:  General anesthesia    ESTIMATED BLOOD LOSS:   Minimal    COMPLICATIONS:   None    ANTIBIOTICS:  Cefazolin    INTRAOPERATIVE THROMBOEMBOLISM PROPHYLAXIS:  Pneumatic compression stockings    PROCEDURE SUMMARY  The patient was brought to the operating room and anesthesia obtained.  The patient was then placed in the lithotomy position and prepped and draped using standard sterile technique.  All pressure points were carefully padded.  A surgical time-out occurred, antibiotics were administered, and thromboembolism prophylaxis was given.      A 22Fr rigid cystoscope was inserted. Two blue light avid lesions were noted on the posterior wall. These were biopsied with cold cup biopsy forceps.     A 26 F saline resectoscope was inserted into the urethra. Dialation of the urethral meatus was not needed using urethral sounds to 28F. The urethra and bladder were carefully inspected with the following findings and resection was completed of each tumor with details below:    Urethra: Normal   Prostate: BPH with median lobe   Bladder: Multifocal tumor   Visualization method: Blue light, after administration of 100 mg of Cysview   Electrical element: Bipolar   Immediate postoperative instillation of chemotherapy Not applied.   Post-TUR bimanual exam: Not indicated (clinically non-invasive tumor)   Ureteral orifices:      -Right  Normal     -Left Normal   Other findings: Hyperemic bladder mucosa with multiple saccules and bladder diverticula.          Specimen # 1   Blue Light Avid Yes   Specimen Name: Posterior wall     -Appearance CIS     -Recurrent or Primary Primary     -Number of foci 1     -Tumor location(s) Posterior wall     -Aggregate tumor diameter 1 cm     -Largest individual tumor 1 cm   Resection technique: Cold cup biopsy   Resection completeness: Incomplete   Bladder perforation: None   Detrusor muscle visualized No   Comments None     Specimen # 2   Blue Light Avid Yes   Specimen Name: Trigone     -Appearance CIS     -Recurrent or Primary Primary     -Number of foci 1     -Tumor location(s) Trigone     -Aggregate tumor diameter 1 cm     -Largest individual tumor 1 cm   Resection technique: Cold cup biopsy   Resection completeness: Incomplete   Bladder perforation: None   Detrusor muscle visualized No   Comments None     Specimen # 3   Blue Light Avid Yes   Specimen Name: Right lateral wall bladder tumor     -Appearance Papillary     -Recurrent or Primary Recurrent     -Number of foci 1     -Tumor location(s) Right lateral wall     -Aggregate tumor diameter 0.5 cm     -Largest individual tumor 0.5 cm   Resection technique: Standard   Resection completeness: Complete   Bladder perforation: None   Detrusor muscle visualized Yes   Comments LG appearing papillary tumor     Specimen # 4   Blue Light Avid Yes   Specimen Name: Right posterior wall bladder tumor     -Appearance Papillary     -Recurrent or Primary Recurrent     -Number of foci 1     -Tumor location(s) Right posterior wall     -Aggregate tumor diameter 0.5 cm     -Largest individual tumor 0.5 cm   Resection technique: Standard   Resection completeness: Complete   Bladder perforation: None   Detrusor muscle visualized Yes   Comments LG appearing papillary tumor     Specimen # 5   Blue Light Avid Yes   Specimen Name: Bladder dome tumor     -Appearance Papillary      -Recurrent or Primary Recurrent     -Number of foci 1     -Tumor location(s) 1     -Aggregate tumor diameter 0.5 cm     -Largest individual tumor 0.5 cm   Resection technique: Standard   Resection completeness: Complete   Bladder perforation: None   Detrusor muscle visualized Yes   Comments LG appearing papillary tumor         Random bladder biopsies: Not done   Targeted cold cup biopsies: Not done       The bladder was emptied and a 18 F Florence catheter inserted into the bladder, and the case terminated.        DISPOSITION:   PACU hemodynamically stable      SPECIMENS:   Specimens (From admission, onward)       Start     Ordered    04/04/25 1112  Specimen to Pathology  RELEASE UPON ORDERING        References:    Click here for ordering Quick Tip   Question:  Release to patient  Answer:  Immediate    04/04/25 1118

## 2025-04-04 NOTE — PROGRESS NOTES
Patient has been identified as having an implanted cardiac rhythm device (CRD); the implanted device is a [employer]tronic pacemaker.      The planned surgical procedure is a TURPT.      PACEMAKERS/BELOW WAIST    The reported surgical procedure is BELOW the umbilicus; per protocol, magnet application is not needed and device reprogramming is not required.      For additional questions, please contact the Arrhythmia Department at Ext [phone].

## 2025-04-04 NOTE — ANESTHESIA PROCEDURE NOTES
Intubation    Date/Time: 4/4/2025 10:52 AM    Performed by: Marysol Keene  Authorized by: Yoni Aguilar Jr., MD    Intubation:     Induction:  Intravenous    Mask Ventilation:  Not attempted    Difficult Airway Encountered?: No      Complications:  None    Airway Device:  Supraglottic airway/LMA    Airway Device Size:  4.0    Placement Verified By:  Capnometry    Findings Post-Intubation:  BS equal bilateral and atraumatic/condition of teeth unchanged

## 2025-04-04 NOTE — H&P
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  His excision was performed using Satinsky clamps with the bladder was not opened.  The final pathology revealed fat necrosis without evidence of residual tumor.  His lymph nodes were negative.    Surveillance cystoscopy with evidence of recurrence on 2/10/25. CTU negative for evidence of upper tract disease.       Allergies:  Review of patient's allergies indicates:   Allergen Reactions    Aspirin      Stomach pain even with ppi    Meperidine      Other reaction(s): Stomach upset    Niacin      Other reaction(s): hot skin        Medications per EMR:  Medications Prior to Admission   Medication Sig Dispense Refill Last Dose/Taking    pioglitazone (ACTOS) 30 MG tablet Take 30 mg by mouth once daily.   Taking    ACETAMINOPHEN (TYLENOL 8 HOUR ORAL) Take by mouth as needed.       azelastine (ASTELIN) 137 mcg (0.1 %) nasal spray 2 sprays (274 mcg total) by Nasal route 2 (two) times daily. 30 mL 12     blood sugar diagnostic (ACCU-CHEK JAXON) Strp Check BG 2-3 times daily. Accuchek jaxon strips 300 strip 3     dorzolamide-timolol 2-0.5% (COSOPT) 22.3-6.8 mg/mL ophthalmic solution Place 1 drop into both eyes 2 (two) times daily. 10 mL 5     DROPLET PEN NEEDLE 31 gauge x 3/16" Ndle USE AS DIRECTED  WITH  INSULIN 200 each 3     dutasteride " (AVODART) 0.5 mg capsule Take 1 capsule (0.5 mg total) by mouth once daily. 90 capsule 3     famotidine (PEPCID) 20 MG tablet Take 1 tablet (20 mg total) by mouth 2 (two) times daily. 180 tablet 3     fenofibrate micronized (LOFIBRA) 200 MG Cap Take 1 capsule (200 mg total) by mouth every evening. 90 capsule 3     fluticasone propionate (FLONASE) 50 mcg/actuation nasal spray 1 spray by Each Nostril route once daily.       gabapentin (CMPD PAIN MANAGEMENT COMPOUND OINTMNENT THREE) Apply bid prn pain 100 g 11     hydrocortisone 2.5 % cream Apply topically 2 (two) times daily. 3.5 g 5     insulin (LANTUS SOLOSTAR U-100 INSULIN) glargine 100 units/mL SubQ pen 16 units in the AM and 4 units in the PM 3 mL 11     latanoprost 0.005 % ophthalmic solution INSTILL 1 DROP INTO BOTH EYES ONE TIME DAILY 7.5 mL 3     levocetirizine (XYZAL) 5 MG tablet Take 5 mg by mouth every evening.       [] linezolid (ZYVOX) 600 mg Tab Take 1 tablet (600 mg total) by mouth every 12 (twelve) hours. for 7 days 14 tablet 0     loratadine (CLARITIN) 10 mg tablet Take 10 mg by mouth once daily.       losartan (COZAAR) 25 MG tablet Take 1 tablet (25 mg total) by mouth once daily. 90 tablet 3     lovastatin (MEVACOR) 40 MG tablet Take 1 tablet by mouth Every evening. 90 tablet 3     multivitamin capsule Take 1 capsule by mouth once daily.       pantoprazole (PROTONIX) 40 MG tablet Take 1 tablet (40 mg total) by mouth once daily. 90 tablet 1     polyethylene glycol (GLYCOLAX) 17 gram/dose powder Use to cap to measure 17g, mix with liquid, and take by mouth once daily. 510 g 0     semaglutide (OZEMPIC) 1 mg/dose (4 mg/3 mL) Inject 1 mg into the skin every 7 days. 9 mL 4     sildenafil (REVATIO) 20 mg Tab Take 1 tablet (20 mg total) by mouth daily as needed (PRN). Takes prn 30 tablet 11     sodium chloride (SALINE NASAL) 0.65 % nasal spray 2 sprays by Nasal route as needed for Congestion. 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         Past Surgical History:  Past Surgical History:   Procedure Laterality Date    BIOPSY OF BLADDER N/A 8/30/2024    Procedure: BIOPSY, BLADDER;  Surgeon: Compa Hensley MD;  [facility];  Service: Urology;  Laterality: N/A;    BLADDER DIVERTICULECTOMY Right 10/8/2024    Procedure: EXCISION, DIVERTICULUM, BLADDER;  Surgeon: Compa Hensley MD;  Location: [facility] OR 2ND FLR;  Service: Urology;  Laterality: Right;    BLADDER FULGURATION N/A 8/30/2024    Procedure: FULGURATION, BLADDER;  Surgeon: Compa Hensley MD;  [facility];  Service: Urology;  Laterality: N/A;    CARDIAC PACEMAKER PLACEMENT      COLONOSCOPY N/A 9/13/2019    Procedure: COLONOSCOPY;  Surgeon: Kan Ramsey III, MD;  Location: [facility];  Service: Endoscopy;  Laterality: N/A;    COLONOSCOPY N/A 9/22/2022    Procedure: COLONOSCOPY;  Surgeon: Chris Garcia MD;  Location: [facility] ENDO;  Service: Endoscopy;  Laterality: N/A;    CYSTOSCOPY  N/A 8/30/2024    Procedure: CYSTOSCOPY;  Surgeon: Compa Hensley MD;  Location: [facility] OR 1ST FLR;  Service: Urology;  Laterality: N/A;  with blue light    CYSTOSCOPY W/ RETROGRADES Bilateral 7/16/2024    Procedure: CYSTOSCOPY, WITH RETROGRADE PYELOGRAM;  Surgeon: Vance Olivera MD;  Location: [facility];  Service: Urology;  Laterality: Bilateral;    CYSTOSCOPY,WITH URETERAL CATHETER INSERTION Bilateral 10/8/2024    Procedure: CYSTOSCOPY,WITH URETERAL CATHETER INSERTION;  Surgeon: Compa Hensley MD;  Location: [facility] OR 2ND FLR;  Service: Urology;  Laterality: Bilateral;    ESOPHAGOGASTRODUODENOSCOPY N/A 9/13/2019    Procedure: ESOPHAGOGASTRODUODENOSCOPY (EGD);  Surgeon: Kan Ramsey III, MD;  Location: [facility];  Service: Endoscopy;  Laterality: N/A;    ESOPHAGOGASTRODUODENOSCOPY N/A 9/3/2021    Procedure: EGD (ESOPHAGOGASTRODUODENOSCOPY);  Surgeon: Kaylene Pineda MD;  Location: [facility];  Service: Endoscopy;  Laterality: N/A;    ESOPHAGOGASTRODUODENOSCOPY N/A 5/24/2023    Procedure: EGD (ESOPHAGOGASTRODUODENOSCOPY);  Surgeon: Cory Bennett MD;  Location: [facility];  Service: Endoscopy;  Laterality: N/A;    FLEXIBLE CYSTOSCOPY N/A 10/8/2024    Procedure: CYSTOSCOPY, FLEXIBLE;  Surgeon: Compa Hensley MD;  Location: [facility] OR 2ND FLR;  Service: Urology;  Laterality: N/A;    INSERTION OF TYMPANOSTOMY TUBE Right 3/15/2023    Procedure: INSERTION, TYMPANOSTOMY TUBE;  Surgeon: Jose L Gudino MD;  Location: [facility] OR 2ND FLR;  Service: ENT;  Laterality: Right;    INSTILLATION OF URINARY BLADDER N/A 10/8/2024    Procedure: INSTILLATION, BLADDER;  Surgeon: Compa Hensley MD;  Location: [facility] OR 2ND FLR;  Service: Urology;  Laterality: N/A;  Chemotherapy instillation bladder    JOINT REPLACEMENT      KNEE CARTILAGE SURGERY Bilateral     LYMPHADENECTOMY, PELVIS Right 10/8/2024    Procedure: LYMPHADENECTOMY, PELVIS;  Surgeon: Compa Hensley MD;  Location: [facility] OR [address] [place] [address];  Service: Urology;  Laterality: Right;     "NASAL SINUS SURGERY      PARTIAL SURGICAL REMOVAL OF BLADDER N/A 10/8/2024    Procedure: CYSTECTOMY, PARTIAL;  Surgeon: Compa Hensley MD;  Location: [address];  Service: Urology;  Laterality: N/A;    SHOULDER ARTHROSCOPY Right     TONSILLECTOMY      TOTAL KNEE ARTHROPLASTY Left 05/15/2017    TRANSURETHRAL RESECTION OF PROSTATE      TURBT (TRANSURETHRAL RESECTION OF BLADDER TUMOR) N/A 7/16/2024    Procedure: TURBT (TRANSURETHRAL RESECTION OF BLADDER TUMOR);  Surgeon: Vance Olivera MD;  Location: [facility];  Service: Urology;  Laterality: N/A;    TYMPANOPLASTY      TYMPANOPLASTY WITH MASTOIDECTOMY Left 3/15/2023    Procedure: TYMPANOPLASTY, WITH MASTOIDECTOMY;  Surgeon: Jose L Gudino MD;  Location: [address];  Service: ENT;  Laterality: Left;    vesectomy          Family History:  Family History   Problem Relation Name Age of Onset    Arthritis Mother      Hypertension Mother      Heart disease Father      Hypertension Father      Stroke Father      Diabetes Son      Diabetes Maternal Grandmother      Colon cancer Paternal Grandmother          Social History:  Social History     Tobacco Use    Smoking status: Never    Smokeless tobacco: Never   Substance Use Topics    Alcohol use: Yes     Comment: " once a month"          OBJECTIVE:     There were no vitals filed for this visit.     Physical Exam            Physical Exam    General: No acute distress. Nontoxic appearing.  HENT: Normocephalic. Atraumatic.  Respiratory: Normal respiratory effort. No conversational dyspnea. No audible wheezing.  Abdomen: No obvious distension.  Skin: No visible abnormalities.  Extremities: No edema upper extremities. No edema lower extremities.  Neurological: Alert and oriented x3. Normal speech.  Psychiatric: Normal mood. Normal affect. No evidence of SI.         LABS:    CBC:  Lab Results   Component Value Date    WBC 5.69 10/10/2024    HGB 11.0 (L) 10/10/2024    HCT 32.3 (L) 10/10/2024    MCV 96 10/10/2024     " 10/10/2024         BMP:  Lab Results   Component Value Date     02/13/2025    K 3.9 02/13/2025     02/13/2025    CO2 23 02/13/2025    BUN 17 02/13/2025    CREATININE 1.3 02/13/2025    CALCIUM 9.0 02/13/2025    ANIONGAP 8 02/13/2025    EGFRNORACEVR 58.0 (A) 02/13/2025         ASSESSMENT/PLAN:     Assessment & Plan            OR today for blue light TURBT    Urine cx 3/21 with coag denzel jessica. He completed course of linezolid.   No blood thinners    I have explained the indication, risks, benefits, and alternatives of the procedure in detail. I discussed the rationale for the procedure and the typical management and expectations. I discussed the risks associated with the procedure. We discussed alternative management options. He voiced understanding following the discussion. He was given the opportunity to ask questions and all these questions were all answered to his satisfaction. He has elected to proceed as planned. Consent was obtained.    EVANGELISTA Scherer MD  Urology PGY-3

## 2025-04-07 LAB
ESTROGEN SERPL-MCNC: ABNORMAL PG/ML
INSULIN SERPL-ACNC: ABNORMAL U[IU]/ML
LAB AP CLINICAL INFORMATION: ABNORMAL
LAB AP DIAGNOSIS CATEGORY: ABNORMAL
LAB AP GROSS DESCRIPTION: ABNORMAL
LAB AP PERFORMING LOCATION(S): ABNORMAL
LAB AP REPORT FOOTNOTES: ABNORMAL

## 2025-04-10 ENCOUNTER — RESULTS FOLLOW-UP (OUTPATIENT)
Dept: UROLOGY | Facility: CLINIC | Age: 75
End: 2025-04-10

## 2025-04-10 DIAGNOSIS — D49.4 BLADDER TUMOR: ICD-10-CM

## 2025-04-10 DIAGNOSIS — R39.9 SYMPTOMS INVOLVING URINARY SYSTEM: Primary | ICD-10-CM

## 2025-04-15 DIAGNOSIS — I10 ESSENTIAL HYPERTENSION: ICD-10-CM

## 2025-04-16 RX ORDER — LOSARTAN POTASSIUM 25 MG/1
25 TABLET ORAL DAILY
Qty: 90 TABLET | Refills: 3 | Status: SHIPPED | OUTPATIENT
Start: 2025-04-16 | End: 2026-04-16

## 2025-05-08 ENCOUNTER — RESULTS FOLLOW-UP (OUTPATIENT)
Dept: PULMONOLOGY | Facility: CLINIC | Age: 75
End: 2025-05-08

## 2025-05-08 ENCOUNTER — CLINICAL SUPPORT (OUTPATIENT)
Dept: PULMONOLOGY | Facility: CLINIC | Age: 75
End: 2025-05-08
Payer: MEDICARE

## 2025-05-08 ENCOUNTER — HOSPITAL ENCOUNTER (OUTPATIENT)
Dept: RADIOLOGY | Facility: HOSPITAL | Age: 75
Discharge: HOME | End: 2025-05-08
Attending: INTERNAL MEDICINE
Payer: MEDICARE

## 2025-05-08 ENCOUNTER — TELEPHONE (OUTPATIENT)
Dept: SLEEP MEDICINE | Facility: CLINIC | Age: 75
End: 2025-05-08

## 2025-05-08 ENCOUNTER — OFFICE VISIT (OUTPATIENT)
Dept: SLEEP MEDICINE | Facility: CLINIC | Age: 75
End: 2025-05-08
Payer: MEDICARE

## 2025-05-08 VITALS
WEIGHT: 194.44 LBS | SYSTOLIC BLOOD PRESSURE: 116 MMHG | RESPIRATION RATE: 11 BRPM | HEIGHT: 71 IN | BODY MASS INDEX: 27.22 KG/M2 | OXYGEN SATURATION: 97 % | DIASTOLIC BLOOD PRESSURE: 70 MMHG | HEART RATE: 67 BPM

## 2025-05-08 VITALS — BODY MASS INDEX: 27.22 KG/M2 | HEIGHT: 71 IN | WEIGHT: 194.44 LBS

## 2025-05-08 DIAGNOSIS — R06.00 DYSPNEA AND RESPIRATORY ABNORMALITIES: ICD-10-CM

## 2025-05-08 DIAGNOSIS — Z79.4 TYPE 2 DIABETES MELLITUS WITH STAGE 3A CHRONIC KIDNEY DISEASE, WITH LONG-TERM CURRENT USE OF INSULIN: ICD-10-CM

## 2025-05-08 DIAGNOSIS — R06.89 DYSPNEA AND RESPIRATORY ABNORMALITIES: ICD-10-CM

## 2025-05-08 DIAGNOSIS — N18.31 TYPE 2 DIABETES MELLITUS WITH STAGE 3A CHRONIC KIDNEY DISEASE, WITH LONG-TERM CURRENT USE OF INSULIN: ICD-10-CM

## 2025-05-08 DIAGNOSIS — J84.10 LUNG GRANULOMA: ICD-10-CM

## 2025-05-08 DIAGNOSIS — E78.5 HYPERLIPIDEMIA ASSOCIATED WITH TYPE 2 DIABETES MELLITUS: Chronic | ICD-10-CM

## 2025-05-08 DIAGNOSIS — E11.22 TYPE 2 DIABETES MELLITUS WITH STAGE 3A CHRONIC KIDNEY DISEASE, WITH LONG-TERM CURRENT USE OF INSULIN: ICD-10-CM

## 2025-05-08 DIAGNOSIS — I15.2 HYPERTENSION ASSOCIATED WITH DIABETES: Chronic | ICD-10-CM

## 2025-05-08 DIAGNOSIS — G47.33 OSA ON CPAP: Primary | ICD-10-CM

## 2025-05-08 DIAGNOSIS — R79.89 POSITIVE D DIMER: ICD-10-CM

## 2025-05-08 DIAGNOSIS — E11.69 HYPERLIPIDEMIA ASSOCIATED WITH TYPE 2 DIABETES MELLITUS: Chronic | ICD-10-CM

## 2025-05-08 DIAGNOSIS — J30.1 SEASONAL ALLERGIC RHINITIS DUE TO POLLEN: ICD-10-CM

## 2025-05-08 DIAGNOSIS — R07.9 CHEST PAIN, UNSPECIFIED TYPE: ICD-10-CM

## 2025-05-08 DIAGNOSIS — E11.59 HYPERTENSION ASSOCIATED WITH DIABETES: Chronic | ICD-10-CM

## 2025-05-08 LAB
BRPFT: NORMAL
FEF 25 75 CHG: 5.2 %
FEF 25 75 LLN: 1.34
FEF 25 75 POST REF: 82.2 %
FEF 25 75 PRE REF: 78.2 %
FEF 25 75 REF: 3.05
FET100 CHG: -18.9 %
FEV1 CHG: 0.7 %
FEV1 FVC CHG: 2.3 %
FEV1 FVC LLN: 61
FEV1 FVC POST REF: 102.1 %
FEV1 FVC PRE REF: 99.8 %
FEV1 FVC REF: 75
FEV1 LLN: 2.25
FEV1 POST REF: 95.3 %
FEV1 PRE REF: 94.6 %
FEV1 REF: 3.17
FVC CHG: -1.6 %
FVC LLN: 3.11
FVC POST REF: 92.7 %
FVC PRE REF: 94.2 %
FVC REF: 4.23
PEF CHG: -5.2 %
PEF LLN: 5.87
PEF POST REF: 85.8 %
PEF PRE REF: 90.5 %
PEF REF: 8.25
POST FEF 25 75: 2.51 L/S
POST FET 100: 7.74 SEC
POST FEV1 FVC: 76.91 %
POST FEV1: 3.02 L
POST FVC: 3.92 L
POST PEF: 7.08 L/S
PRE FEF 25 75: 2.39 L/S
PRE FET 100: 9.54 SEC
PRE FEV1 FVC: 75.17 %
PRE FEV1: 3 L
PRE FVC: 3.99 L
PRE PEF: 7.46 L/S

## 2025-05-08 PROCEDURE — 4010F ACE/ARB THERAPY RXD/TAKEN: CPT | Mod: CPTII,S$GLB,, | Performed by: INTERNAL MEDICINE

## 2025-05-08 PROCEDURE — 3008F BODY MASS INDEX DOCD: CPT | Mod: CPTII,S$GLB,, | Performed by: INTERNAL MEDICINE

## 2025-05-08 PROCEDURE — 1160F RVW MEDS BY RX/DR IN RCRD: CPT | Mod: CPTII,S$GLB,, | Performed by: INTERNAL MEDICINE

## 2025-05-08 PROCEDURE — 3061F NEG MICROALBUMINURIA REV: CPT | Mod: CPTII,S$GLB,, | Performed by: INTERNAL MEDICINE

## 2025-05-08 PROCEDURE — 99999 PR PBB SHADOW E&M-EST. PATIENT-LVL V: CPT | Mod: PBBFAC,,, | Performed by: INTERNAL MEDICINE

## 2025-05-08 PROCEDURE — 3066F NEPHROPATHY DOC TX: CPT | Mod: CPTII,S$GLB,, | Performed by: INTERNAL MEDICINE

## 2025-05-08 PROCEDURE — 3078F DIAST BP <80 MM HG: CPT | Mod: CPTII,S$GLB,, | Performed by: INTERNAL MEDICINE

## 2025-05-08 PROCEDURE — 71046 X-RAY EXAM CHEST 2 VIEWS: CPT | Mod: 26,HCNC,, | Performed by: STUDENT IN AN ORGANIZED HEALTH CARE EDUCATION/TRAINING PROGRAM

## 2025-05-08 PROCEDURE — 3288F FALL RISK ASSESSMENT DOCD: CPT | Mod: CPTII,S$GLB,, | Performed by: INTERNAL MEDICINE

## 2025-05-08 PROCEDURE — 3074F SYST BP LT 130 MM HG: CPT | Mod: CPTII,S$GLB,, | Performed by: INTERNAL MEDICINE

## 2025-05-08 PROCEDURE — 1126F AMNT PAIN NOTED NONE PRSNT: CPT | Mod: CPTII,S$GLB,, | Performed by: INTERNAL MEDICINE

## 2025-05-08 PROCEDURE — 1159F MED LIST DOCD IN RCRD: CPT | Mod: CPTII,S$GLB,, | Performed by: INTERNAL MEDICINE

## 2025-05-08 PROCEDURE — 71046 X-RAY EXAM CHEST 2 VIEWS: CPT | Mod: TC,HCNC

## 2025-05-08 PROCEDURE — 1101F PT FALLS ASSESS-DOCD LE1/YR: CPT | Mod: CPTII,S$GLB,, | Performed by: INTERNAL MEDICINE

## 2025-05-08 PROCEDURE — 3044F HG A1C LEVEL LT 7.0%: CPT | Mod: CPTII,S$GLB,, | Performed by: INTERNAL MEDICINE

## 2025-05-08 PROCEDURE — 99214 OFFICE O/P EST MOD 30 MIN: CPT | Mod: 25,S$GLB,, | Performed by: INTERNAL MEDICINE

## 2025-05-08 PROCEDURE — 99999 PR PBB SHADOW E&M-EST. PATIENT-LVL I: CPT | Mod: PBBFAC,HCNC,,

## 2025-05-08 NOTE — PROGRESS NOTES
Subjective:      Patient ID: Rigoberto Vasquez is a 74 y.o. male.    Patient Active Problem List   Diagnosis    Osteoarthritis of knees, bilateral    Hypertension associated with diabetes    Type 2 diabetes mellitus with stage 3a chronic kidney disease, with long-term current use of insulin    Hyperlipidemia associated with type 2 diabetes mellitus    High myopia    Optic disc anomaly    CHANDRAKANT (iron deficiency anemia)    Pacemaker    Conductive hearing loss in left ear    Choroidal nevus of right eye    Epiretinal membrane (ERM) of left eye    Macular hole of right eye    Ectopic gastric mucosa    Open angle with borderline findings and high glaucoma risk in both eyes    History of skin cancer    History of heart block;AV block, 3rd degree    BPH with obstruction/lower urinary tract symptoms    Erectile dysfunction    Lung granuloma    Dyspepsia    Calcification of aorta    Cholesteatoma of ear, left    СВЕТЛАНА on CPAP    Malignant neoplasm of lateral wall of urinary bladder S/p Resection    Arthritis of carpometacarpal (CMC) joint of left thumb    Diabetic glaucoma    Diabetes mellitus type 2 with complications       he has been referred by No ref. provider found for evaluation and management for   Chief Complaint   Patient presents with    CPAP issues         Chief Complaint: CPAP issues        HPI:  He presents for СВЕТЛАНА with review 2/10/2022 Home Sleep Study mild obstructive sleep apnea AHI 12.1.   Patient symptomatic for obstructive sleep apnea with feeling tired and fatigued and snoring.   Patient reports somewhat restful sleep.  He denies morning headache.   He reports day time napping; duration 1 Hour  He denies recent weight gain.  Cardiovascular risk factors: diabetes, hypertension, hyperlipidemia and coronary artery disease  Bed time is 0930  Wake time is 0600 - 0700  Sleep onset is within 15 - 30 Minutes.  Sleep maintenance difficulties related to frequent night time awakening  Wake after sleep onset occurs  two times a night.  Nocturia occurs two times a night,   Sleep aids : YES , melatonin 1-2 times a week if working on things around his house, he will think about it, most nights able to go to sleep in 15-20 minutes.  Dry mouth :  NO  Sleep walking:  NO  Sleep talking :  NO  Sleep eating: NO  Vivid Dreams :  NO  Cataplexy :  NO       07/26/2022  Last visit 05/17/2022  Using device and benefits  AHI was 0.4  Episodes of noctural dyspnea  Had inhaaler in past, never used for night issues, had daytime fatiigue which resolved  Seen Dr Senior; PPM since 30 years old  Complete heart block  Never smoker  Las 6MWD reveiwed  Last PFT: hyperinflation    11/08/2022  Last seen 07/26/2022  No resp issues  No cough, No SOB  PFT: Normal  Using CPAP Usage > 4 hrs was 100%      08/31/2023  Followup  On RESVENT device  APAP 5-20  FFM  Using and benefits  No respiratory issues  Bed time 9 pm, Wake time 6am  No NAPS  AHI was 0.3    09/05/2024  Followup  Recent bladder cancer  Cystoscopy Dr Hensley  Using CPAP and benefits  No snoring  FFM  New supplies    05/08/2025  Followup  Bladder cancer followed by surgery and BCG therapy  Left deconditioned weak  MEEK several months  Seen cardiology and ENT  Told sins okay  No cough or wheezing  SOB since Feb 2025  Unable to wear PAP  Says pressure issues  Last HST AHI was 12.1/hr ( 85 events)  No hemoptysis  No Hx VTE  Bed time 9 pm  Wake 6-7 am  Full face mask    Taking: Claritin, flonase, xyzal.       Encourage flonase, asteline  Not seen allergy recently  Lasty chest imaging 10/2024        Previous Report Reviewed: lab reports and office notes     Past Medical History: The following portions of the patient's history were reviewed and updated as appropriate:   He  has a past surgical history that includes Shoulder arthroscopy (Right); Nasal sinus surgery; Cardiac pacemaker placement; Tonsillectomy; Transurethral resection of prostate; Knee cartilage surgery (Bilateral); Tympanoplasty; vesectomy;  Total knee arthroplasty (Left, 05/15/2017); Joint replacement; Esophagogastroduodenoscopy (N/A, 9/13/2019); Colonoscopy (N/A, 9/13/2019); Esophagogastroduodenoscopy (N/A, 9/3/2021); Colonoscopy (N/A, 9/22/2022); Tympanoplasty with mastoidectomy (Left, 3/15/2023); Insertion of tympanostomy tube (Right, 3/15/2023); Esophagogastroduodenoscopy (N/A, 5/24/2023); turbt (transurethral resection of bladder tumor) (N/A, 7/16/2024); Cystoscopy w/ retrogrades (Bilateral, 7/16/2024); Biopsy of bladder (N/A, 8/30/2024); Cystoscopy (N/A, 8/30/2024); Bladder fulguration (N/A, 8/30/2024); Bladder diverticulectomy (Right, 10/8/2024); Partial surgical removal of bladder (N/A, 10/8/2024); lymphadenectomy, pelvis (Right, 10/8/2024); Flexible cystoscopy (N/A, 10/8/2024); Instillation of urinary bladder (N/A, 10/8/2024); cystoscopy,with ureteral catheter insertion (Bilateral, 10/8/2024); TURBT, WITH BLUE LIGHT CYSTOSCOPY AND CYSVIEW (N/A, 4/4/2025); and Cystoscopy with biopsy of bladder (N/A, 4/4/2025).  His family history includes Arthritis in his mother; Colon cancer in his paternal grandmother; Diabetes in his maternal grandmother and son; Heart disease in his father; Hypertension in his father and mother; Stroke in his father.  He  reports that he has never smoked. He has never used smokeless tobacco. He reports current alcohol use. He reports that he does not use drugs.  He has a current medication list which includes the following prescription(s): acetaminophen, azelastine, blood sugar diagnostic, dorzolamide-timolol 2-0.5%, droplet pen needle, dutasteride, famotidine, fenofibrate micronized, fluticasone propionate, gabapentin, hydrocortisone, lantus solostar u-100 insulin, latanoprost, levocetirizine, loratadine, losartan, lovastatin, multivitamin, oxycodone, pantoprazole, pioglitazone, polyethylene glycol, ozempic, sildenafil, saline nasal, and triamcinolone acetonide 0.1%.  He is allergic to aspirin, meperidine, and  "niacin..    Review of Systems   Constitutional:  Negative for fever, chills, weight loss, weight gain, activity change, appetite change, fatigue and night sweats.   HENT:  Negative for postnasal drip, rhinorrhea, sinus pressure, voice change and congestion.    Eyes:  Negative for redness and itching.   Respiratory:  Negative for apnea, snoring, cough, sputum production, chest tightness, shortness of breath, wheezing, orthopnea, asthma nighttime symptoms, dyspnea on extertion, use of rescue inhaler and somnolence.    Cardiovascular: Negative.  Negative for chest pain, palpitations and leg swelling.   Genitourinary:  Negative for difficulty urinating and hematuria.   Endocrine:  Negative for cold intolerance and heat intolerance.    Musculoskeletal:  Negative for arthralgias, gait problem, joint swelling and myalgias.   Skin: Negative.    Gastrointestinal:  Negative for nausea, vomiting, abdominal pain and acid reflux.   Neurological:  Negative for dizziness, weakness, light-headedness and headaches.   Hematological:  Negative for adenopathy. No excessive bruising.   All other systems reviewed and are negative.     Objective:   /70   Pulse 67   Resp 11   Ht 5' 11" (1.803 m)   Wt 88.2 kg (194 lb 7.1 oz)   SpO2 97%   BMI 27.12 kg/m²   Physical Exam  Vitals and nursing note reviewed.   Constitutional:       Appearance: He is well-developed.       HENT:      Head: Normocephalic and atraumatic.   Eyes:      Conjunctiva/sclera: Conjunctivae normal.   Cardiovascular:      Rate and Rhythm: Normal rate and regular rhythm.      Heart sounds: Normal heart sounds.   Pulmonary:      Effort: Pulmonary effort is normal.      Breath sounds: Normal breath sounds.   Abdominal:      General: Bowel sounds are normal.      Palpations: Abdomen is soft.   Musculoskeletal:         General: Normal range of motion.      Cervical back: Normal range of motion and neck supple.   Skin:     General: Skin is warm and dry.   Neurological: " "     Mental Status: He is alert and oriented to person, place, and time.      Deep Tendon Reflexes: Reflexes are normal and symmetric.   Psychiatric:         Behavior: Behavior normal.         Thought Content: Thought content normal.         Judgment: Judgment normal.         Personal Diagnostic Review    BLADDER TUMOR, TRANSURETHRAL RESECTION:   High-grade papillary urothelial carcinoma, invasive.    The carcinoma invades the lamina propria.   Muscularis propria is not present for evaluation.   Deeper levels examined.   MMR testing will be attempted, results will be reported in a supplemental.   Review of labs, xray's, cardiology reports.      DOWNLOAD    ABG completed. See ABG Below.     pH 7.429  PCO2 29.2  PO2 94  BE -5  HCO3 19.3  SpO2 98%  Room Air 21% Oxygen    Ordering Provider: Luis Alberto MERAZ         Interpreting Provider: Luis Alberto MERAZ  Performing nurse/tech/RT: LS RRT  Diagnosis:  (Dyspnea)  Height: 5' 11" (180.3 cm)  Weight: 88.2 kg (194 lb 7.1 oz)  BMI (Calculated): 27.1                                                                                 Phase Oxygen Assessment Supplemental O2 Heart   Rate Blood Pressure Loulou Dyspnea Scale Rating   Resting 97 % Room Air 67 bpm 114/69 2   Exercise             Minute             1 97 % Room Air 67 bpm       2 95 % Room Air 90 bpm       3 97 % Room Air 87 bpm       4 95 % Room Air 95 bpm       5 94 % Room Air 100 bpm       6  98 % Room Air 97 bpm 133/68 3   Recovery             Minute             1 96 % Room Air 85 bpm       2 97 % Room Air 82 bpm       3 99 % Room Air 74 bpm       4 98 % Room Air 68 bpm 120/68 2      Six Minute Walk Summary  6MWT Status: completed without stopping  Patient Reported: Leg/hip pain                Interpretation:  Did the patient stop or pause?: No  Total Time Walked (Calculated): 360 seconds  Final Partial Lap Distance (feet): 0 feet  Total Distance Meters (Calculated): 365.76 meters  Predicted Distance Meters (Calculated): 534.18 " "meters  Percentage of Predicted (Calculated): 68.47  Peak VO2 (Calculated): 14.95  Mets: 4.27  Has The Patient Had a Previous Six Minute Walk Test?: Yes     Previous 6MWT Results  Has The Patient Had a Previous Six Minute Walk Test?: Yes  Date of Previous Test: 01/23/20  Total Time Walked: 360 seconds  Total Distance (meters): 419  Predicted Distance (meters): 549 meters  Percentage of Predicted: 76.2  Percent Change (Calculated): 0.13         Spirometry  Normal  FEV1: 3.00L( 94.6%), FVC 3.99L( 94.2%)  FEV1/FVC 75  No results for input(s): "PH", "PCO2", "PO2", "HCO3", "POCSATURATED", "BE" in the last 72 hours.      Assessment:     1. СВЕТЛАНА on CPAP    2. Lung granuloma    3. Hyperlipidemia associated with type 2 diabetes mellitus    4. Hypertension associated with diabetes    5. Type 2 diabetes mellitus with stage 3a chronic kidney disease, with long-term current use of insulin    6. Dyspnea and respiratory abnormalities    7. Seasonal allergic rhinitis due to pollen      Orders Placed This Encounter   Procedures    X-Ray Chest PA And Lateral     Standing Status:   Future     Number of Occurrences:   1     Expected Date:   5/8/2025     Expiration Date:   5/8/2026    D-Dimer, Quantitative     Standing Status:   Future     Number of Occurrences:   1     Expected Date:   5/8/2025     Expiration Date:   7/7/2026     Send normal result to authorizing provider's In Basket if patient is active on MyChart::   Yes    Ambulatory referral/consult to Allergy     Standing Status:   Future     Expected Date:   5/15/2025     Expiration Date:   6/8/2026     Referral Priority:   Routine     Referral Type:   Allergy Testing     Referral Reason:   Specialty Services Required     Referred to Provider:   Lucho Weiss MD     Requested Specialty:   Allergy     Number of Visits Requested:   1    Stress test, pulmonary     Standing Status:   Future     Number of Occurrences:   1     Expiration Date:   5/8/2026     Reason for study:   " Functional status     Release to patient:   Immediate    Spirometry with/without bronchodilator     Standing Status:   Future     Number of Occurrences:   1     Expiration Date:   5/8/2026     Release to patient:   Immediate    PFT - related Arterial Blood Gas     Standing Status:   Future     Number of Occurrences:   1     Expiration Date:   5/8/2026     Release to patient:   Immediate    BiPAP/CPAP Titration ((Must have dx of СВЕТЛАНА from previous sleep study)     Standing Status:   Future     Expiration Date:   5/8/2026     Scheduling Instructions:      SPLIT NIGHT SLEEP STUDY TO RE EVALUATE СВЕТЛАНА AND PAP THERAPY     Titration Type::   CPAP       Plan:   Discussed diagnosis, its evaluation, treatment and usual course. All questions answered.  Problem List Items Addressed This Visit       Hyperlipidemia associated with type 2 diabetes mellitus (Chronic)    Hypertension associated with diabetes (Chronic)    Lung granuloma    Type 2 diabetes mellitus with stage 3a chronic kidney disease, with long-term current use of insulin    СВЕТЛАНА on CPAP - Primary        5/8/2025   EPWORTH SLEEPINESS SCALE   Sitting and reading 0   Watching TV 0   Sitting, inactive in a public place (e.g. a theatre or a meeting) 1   As a passenger in a car for an hour without a break 1   Lying down to rest in the afternoon when circumstances permit 3   Sitting and talking to someone 0   Sitting quietly after a lunch without alcohol 3   In a car, while stopped for a few minutes in traffic 0   Total score 8     Heater plate not working    Download  04/13/2025 to 04/19/2025  Usage > 4 hrs : 0%  APAP 5-20  AHI 4.4             Relevant Orders    BiPAP/CPAP Titration ((Must have dx of СВЕТЛАНА from previous sleep study)     Other Visit Diagnoses         Dyspnea and respiratory abnormalities        Relevant Orders    D-Dimer, Quantitative (Completed)    Stress test, pulmonary (Completed)    Spirometry with/without bronchodilator (Completed)    PFT - related  Arterial Blood Gas (Completed)    X-Ray Chest PA And Lateral (Completed)      Seasonal allergic rhinitis due to pollen        Relevant Orders    Ambulatory referral/consult to Allergy          I have personally reviewed spirometry, 6 minute walk, ABGs   PCO2 slightly reduced suggesting some alkalosis   Exercise capacity was slightly reduced support deconditioned  Spirometry normal     Follow up in about 4 weeks (around 6/5/2025), or cxr and lab, ABG, walk, [place], sleep study, ref allergy, ask ludivina look at machine for malfunct.    This note was prepared using voice recognition system and is likely to have sound alike errors that may have been overlooked even after proof reading.  Please call me with any questions    Discussed diagnosis, its evaluation, treatment and usual course. All questions answered.    Thank you for the courtesy of participating in the care of this patient    Luciano Sahu MD

## 2025-05-08 NOTE — PROCEDURES
ABG completed. See ABG Below.    pH 7.429  PCO2 29.2  PO2 94  BE -5  HCO3 19.3  SpO2 98%  Room Air 21% Oxygen      Interpretation:  [x] Arterial blood gases on room air demonstrate normal   pO2  [] Arterial blood gases on room air are abnormal demonstrating hypercarbia (pCO2 >45 mmHg)  [x] Arterial blood gases on room air are abnormal demonstrating hypocarbia (pCO2 < 35 mmHg)  [] Arterial blood gases on room air are abnormal demonstrating hypoxemia (pO2 < 80 mmHg)  [] Arterial blood gases on room air are abnormal demonstrating hyperoxemia (pO2 >120 mmHg)  [] Arterial blood gases on room air are abnormal demonstrating hypoxemia (pO2 < 80 mmHg) and hypercarbia (pCO2>45 mmHg)    The table below summarizes the main interpretations of the relationship between the arterial blood gases, pH, pCO2 and HCO-3    [x]Compensated respiratory alkalosis    I

## 2025-05-08 NOTE — PROGRESS NOTES
ABG done @10:02 ; resulted @ 10:06.  Collected Right Radial.  Ph 7.429  PCO2 29.2  PO2 94  BE -5  HCO 19.3  SO2% 98    Darnell test done; positive  ABG down time sheet filled out and uploaded into epic.

## 2025-05-08 NOTE — PROCEDURES
"[facility]  Six Minute Walk     SUMMARY     Ordering Provider: Luis Alberto MERAZ   Interpreting Provider: Luis Alberto MERAZ  Performing nurse/tech/RT: LS RRT  Diagnosis:  (Dyspnea)  Height: 5' 11" (180.3 cm)  Weight: 88.2 kg (194 lb 7.1 oz)  BMI (Calculated): 27.1                Phase Oxygen Assessment Supplemental O2 Heart   Rate Blood Pressure Loulou Dyspnea Scale Rating   Resting 97 % Room Air 67 bpm 114/69 2   Exercise        Minute        1 97 % Room Air 67 bpm     2 95 % Room Air 90 bpm     3 97 % Room Air 87 bpm     4 95 % Room Air 95 bpm     5 94 % Room Air 100 bpm     6  98 % Room Air 97 bpm 133/68 3   Recovery        Minute        1 96 % Room Air 85 bpm     2 97 % Room Air 82 bpm     3 99 % Room Air 74 bpm     4 98 % Room Air 68 bpm 120/68 2     Six Minute Walk Summary  6MWT Status: completed without stopping  Patient Reported: Leg/hip pain     Interpretation:  Did the patient stop or pause?: No            Total Time Walked (Calculated): 360 seconds  Final Partial Lap Distance (feet): 0 feet  Total Distance Meters (Calculated): 365.76 meters  Predicted Distance Meters (Calculated): 534.18 meters  Percentage of Predicted (Calculated): 68.47  Peak VO2 (Calculated): 14.95  Mets: 4.27  Has The Patient Had a Previous Six Minute Walk Test?: Yes       Previous 6MWT Results  Has The Patient Had a Previous Six Minute Walk Test?: Yes  Date of Previous Test: 01/23/20  Total Time Walked: 360 seconds  Total Distance (meters): 419  Predicted Distance (meters): 549 meters  Percentage of Predicted: 76.2  Percent Change (Calculated): 0.13           CLINICAL INTERPRETATION:  Six minute walk distance is 365.76m (68.47 % predicted) with light dyspnea.  During exercise, there was no significant desaturation while breathing room air.  Blood pressure remained stable and Heart rate remained stable with walking.  The patient reported non-pulmonary symptoms during exercise.  The patient did complete the study, walking 360 " seconds of the 360 second test.  Since the previous study in 01/23/2020, exercise capacity may be somewhat worse.  Based upon age and body mass index, exercise capacity is less than predicted.      [x] Mild exercise-induced hypoxemia described as an arterial oxygen saturation of 93-95% (or 3-4% less than at rest)  []  Moderate exercise-induced hypoxemia as 89-93%  []  Severe exercise induced hypoxemia as < 89% O2 saturation.  Medicare Criteria for oxygen prescription comments:   []  When arterial oxygen saturation is at or below 88% during exercise (severe exercise induced hypoxemia) then the patient falls under Medicare Group 1 criteria for supplemental oxygen

## 2025-05-08 NOTE — TELEPHONE ENCOUNTER
----- Message from Luciano Sahu MD sent at 5/8/2025 12:27 PM CDT -----  Dimer was +ve, ordered creatinine and CTA chest ASAP

## 2025-05-08 NOTE — ASSESSMENT & PLAN NOTE
5/8/2025   EPWORTH SLEEPINESS SCALE   Sitting and reading 0   Watching TV 0   Sitting, inactive in a public place (e.g. a theatre or a meeting) 1   As a passenger in a car for an hour without a break 1   Lying down to rest in the afternoon when circumstances permit 3   Sitting and talking to someone 0   Sitting quietly after a lunch without alcohol 3   In a car, while stopped for a few minutes in traffic 0   Total score 8     Heater plate not working    Download  04/13/2025 to 04/19/2025  Usage > 4 hrs : 0%  APAP 5-20  AHI 4.4

## 2025-05-09 LAB
ALLENS TEST: ABNORMAL
DELSYS: ABNORMAL
FIO2: 21
HCO3 UR-SCNC: 19.3 MMOL/L (ref 24–28)
MODE: ABNORMAL
PCO2 BLDA: 29.2 MMHG (ref 35–45)
PH SMN: 7.43 [PH] (ref 7.35–7.45)
PO2 BLDA: 94 MMHG (ref 80–100)
POC BE: -5 MMOL/L (ref -2–2)
POC SATURATED O2: 98 % (ref 95–100)
SAMPLE: ABNORMAL
SITE: ABNORMAL

## 2025-05-10 ENCOUNTER — PATIENT MESSAGE (OUTPATIENT)
Dept: SLEEP MEDICINE | Facility: CLINIC | Age: 75
End: 2025-05-10
Payer: MEDICARE

## 2025-05-13 ENCOUNTER — HOSPITAL ENCOUNTER (OUTPATIENT)
Dept: RADIOLOGY | Facility: HOSPITAL | Age: 75
Discharge: HOME | End: 2025-05-13
Attending: INTERNAL MEDICINE
Payer: MEDICARE

## 2025-05-13 DIAGNOSIS — R06.89 DYSPNEA AND RESPIRATORY ABNORMALITIES: ICD-10-CM

## 2025-05-13 DIAGNOSIS — R79.89 POSITIVE D DIMER: ICD-10-CM

## 2025-05-13 DIAGNOSIS — R07.9 CHEST PAIN, UNSPECIFIED TYPE: ICD-10-CM

## 2025-05-13 DIAGNOSIS — R06.00 DYSPNEA AND RESPIRATORY ABNORMALITIES: ICD-10-CM

## 2025-05-13 PROCEDURE — 71275 CT ANGIOGRAPHY CHEST: CPT | Mod: 26,,, | Performed by: RADIOLOGY

## 2025-05-13 PROCEDURE — 71275 CT ANGIOGRAPHY CHEST: CPT | Mod: TC,PO

## 2025-05-13 PROCEDURE — 25500020 PHARM REV CODE 255: Mod: PO | Performed by: INTERNAL MEDICINE

## 2025-05-13 RX ADMIN — IOHEXOL 100 ML: 350 INJECTION, SOLUTION INTRAVENOUS at 08:05

## 2025-05-16 ENCOUNTER — LAB VISIT (OUTPATIENT)
Dept: LAB | Facility: HOSPITAL | Age: 75
End: 2025-05-16
Attending: UROLOGY
Payer: MEDICARE

## 2025-05-16 DIAGNOSIS — R39.9 SYMPTOMS INVOLVING URINARY SYSTEM: ICD-10-CM

## 2025-05-16 DIAGNOSIS — D49.4 BLADDER TUMOR: ICD-10-CM

## 2025-05-16 LAB
BILIRUB UR QL STRIP.AUTO: NEGATIVE
CLARITY UR: CLEAR
COLOR UR AUTO: YELLOW
GLUCOSE UR QL STRIP: NEGATIVE
HGB UR QL STRIP: NEGATIVE
KETONES UR QL STRIP: NEGATIVE
LEUKOCYTE ESTERASE UR QL STRIP: NEGATIVE
NITRITE UR QL STRIP: NEGATIVE
PH UR STRIP: 6 [PH]
PROT UR QL STRIP: NEGATIVE
SP GR UR STRIP: 1.02
UROBILINOGEN UR STRIP-ACNC: NEGATIVE EU/DL

## 2025-05-16 PROCEDURE — 81003 URINALYSIS AUTO W/O SCOPE: CPT | Mod: PO

## 2025-05-16 PROCEDURE — 87086 URINE CULTURE/COLONY COUNT: CPT

## 2025-05-17 ENCOUNTER — PATIENT MESSAGE (OUTPATIENT)
Dept: UROLOGY | Facility: CLINIC | Age: 75
End: 2025-05-17
Payer: MEDICARE

## 2025-05-18 LAB — BACTERIA UR CULT: NO GROWTH

## 2025-05-21 ENCOUNTER — OFFICE VISIT (OUTPATIENT)
Dept: OTOLARYNGOLOGY | Facility: CLINIC | Age: 75
End: 2025-05-21
Payer: MEDICARE

## 2025-05-21 VITALS — BODY MASS INDEX: 27.67 KG/M2 | WEIGHT: 198.44 LBS

## 2025-05-21 DIAGNOSIS — Z98.890 HX OF TYMPANOMASTOIDECTOMY: ICD-10-CM

## 2025-05-21 DIAGNOSIS — H72.91 PERFORATION OF RIGHT TYMPANIC MEMBRANE: ICD-10-CM

## 2025-05-21 DIAGNOSIS — H61.23 BILATERAL IMPACTED CERUMEN: Primary | ICD-10-CM

## 2025-05-21 DIAGNOSIS — G47.33 OSA ON CPAP: ICD-10-CM

## 2025-05-21 PROCEDURE — 69210 REMOVE IMPACTED EAR WAX UNI: CPT | Mod: S$GLB,,, | Performed by: PHYSICIAN ASSISTANT

## 2025-05-21 PROCEDURE — 99212 OFFICE O/P EST SF 10 MIN: CPT | Mod: 25,S$GLB,, | Performed by: PHYSICIAN ASSISTANT

## 2025-05-21 PROCEDURE — 1101F PT FALLS ASSESS-DOCD LE1/YR: CPT | Mod: CPTII,S$GLB,, | Performed by: PHYSICIAN ASSISTANT

## 2025-05-21 PROCEDURE — 99999 PR PBB SHADOW E&M-EST. PATIENT-LVL III: CPT | Mod: PBBFAC,,, | Performed by: PHYSICIAN ASSISTANT

## 2025-05-21 PROCEDURE — 1126F AMNT PAIN NOTED NONE PRSNT: CPT | Mod: CPTII,S$GLB,, | Performed by: PHYSICIAN ASSISTANT

## 2025-05-21 PROCEDURE — 3288F FALL RISK ASSESSMENT DOCD: CPT | Mod: CPTII,S$GLB,, | Performed by: PHYSICIAN ASSISTANT

## 2025-05-21 PROCEDURE — 4010F ACE/ARB THERAPY RXD/TAKEN: CPT | Mod: CPTII,S$GLB,, | Performed by: PHYSICIAN ASSISTANT

## 2025-05-21 PROCEDURE — 3066F NEPHROPATHY DOC TX: CPT | Mod: CPTII,S$GLB,, | Performed by: PHYSICIAN ASSISTANT

## 2025-05-21 PROCEDURE — 3044F HG A1C LEVEL LT 7.0%: CPT | Mod: CPTII,S$GLB,, | Performed by: PHYSICIAN ASSISTANT

## 2025-05-21 PROCEDURE — 1159F MED LIST DOCD IN RCRD: CPT | Mod: CPTII,S$GLB,, | Performed by: PHYSICIAN ASSISTANT

## 2025-05-21 PROCEDURE — 3008F BODY MASS INDEX DOCD: CPT | Mod: CPTII,S$GLB,, | Performed by: PHYSICIAN ASSISTANT

## 2025-05-21 PROCEDURE — 3061F NEG MICROALBUMINURIA REV: CPT | Mod: CPTII,S$GLB,, | Performed by: PHYSICIAN ASSISTANT

## 2025-05-21 NOTE — PROGRESS NOTES
Subjective:   Cerumen impactions     Patient ID: Rigoberto Vasquez is a 74 y.o. male.    Chief Complaint:  Excessive ear wax     Rigoberto Vasquez is a 74 y.o. male here to see me today for evaluation of a possible wax impaction in bilateral ears.  He has complaints of hearing loss in the affected ears, but denies pain or drainage.  This has been an issue in the past.  The patient has not been using any sort of ear drop to soften the wax.   He has hx of left ear CWU tmastoid in 2015 and more recently revision AS CWU tmastoid for recurrence and PE tube of right ear on 3/15/2023. Unfortunately developed perforation in right TM after tube extruded.  He wears hearing aids bilaterally.     Here last with Dr. Goldstein in 3/2025 with nasal obstruction x 3-4 weeks.  He says when he puts his CPAP mask on, he is unable to get any air through, R>L.  He has a history of turbinate reduction and septoplasty about 20 years ago.  Denies any recent trauma.  Denies any recent illness.  He uses saline rinses b.i.d., Flonase, and Astelin.  He had a recent cauterization at the end of January.  He denies any new settings or new masks on CPAP.  He endorses shortness of breath at baseline and with exertion. Currently being treated for bladder cancer.   He had a normal intranasal exam at that time with no evidence of a nasal obstruction, contrarily he has ample amount of room in both nares.  He has since followed up with Cardiology and pulmonology due to his shortness of breath symptoms at rest and with exertion.  He reports being scheduled for repeat sleep study.       HPI  Review of Systems   HENT: Positive for hearing loss. Negative for ear discharge, ear pain and tinnitus.        Objective:     Physical Exam   HENT:   Right Ear: External ear and ear canal normal. Decreased hearing is noted.   Left Ear: External ear and ear canal normal. Decreased hearing is noted.   Bilateral complete cerumen impactions, removal described below       Procedure  Note    CHIEF COMPLAINT:  Cerumen Impaction    Description:  The patient was seated in an exam chair.  An ear speculum was placed in the right EAC and was examined under the microscope.  Alligator forceps were used to remove a large cerumen impaction.  The tympanic membrane was visualized and was perforated in appearance.  The procedure was repeated on the left side in a similar fashion.  The TM was intact and retracted on this side.  The patient tolerated the procedure well.           Assessment:     1. Bilateral impacted cerumen    2. Hx of tympanomastoidectomy    3. Perforation of right tympanic membrane    4. СВЕТЛАНА on CPAP        Plan:     1.  Cerumen impaction:  Removed today without difficulty.   I recommend repeat ear cleaning in 3 months for optimal hearing aid performance.  Recommend he avoid Qtips.   2. СВЕТЛАНА on CPAP:  No evidence of nasal obstruction on recent exam with Dr. Goldstein.  I recommend he continue to follow with Cardiology and pulmonology due to his shortness of breath symptoms at rest and with exertion.  Has repeat sleep study upcoming.   If those workups are negative, I recommend following up with his CPAP mask distributor.

## 2025-05-22 NOTE — PROGRESS NOTES
HPI     Glaucoma Suspect     Comments: 2-3M IOP              Comments     Patient states that he is using and tolerating latan Ou Qam - denies   pain/ discomfort OU - no va changes noted        1. DM since 1999   NO DBR   2. Mac hole od   3. erm od  4. Choroidal nevus od   5. HIGH MYOPE(-6)   6. SCOAG   iop 25/ 23 ou on po steroid   anamolous myopic disc   ASYMETRIC C/D 0.7 / 0.5    // 641  NO FAMILY HISTORY   HVF 3/22/2022      GCL 31/28 5/3/22      Latanoprost qam OU            Last edited by Enma Madison MA on 8/16/2022  1:07 PM. (History)            Assessment /Plan     For exam results, see Encounter Report.      ICD-10-CM ICD-9-CM    1. Open angle with borderline findings and low glaucoma risk in both eyes  H40.013 365.01 Doing well - intraocular pressure is within acceptable range relative to target pressure with no evidence of progression.   Continue current treatment.  Reviewed importance of continued compliance with treatment and follow up.      2. Controlled type 2 diabetes mellitus without complication, with long-term current use of insulin  E11.9 250.00 Not dilated today    Z79.4 V58.67    3. Choroidal nevus of right eye  D31.31 224.6 follow   4. Epiretinal membrane (ERM) of left eye  H35.372 362.56 follow       Please use your drops as follows:    Latanoprost once a day in both eyes(s)    Use this medication at the time of day that is easiest for you to remember. Please wipe the excess of this medication off the eyelid skin after instillation and place pressure on the lids near your nose for 1-2 minutes after using it.     Return to clinic 4 months iop                  
Awake/Alert

## 2025-05-29 ENCOUNTER — ANESTHESIA EVENT (OUTPATIENT)
Dept: SURGERY | Facility: HOSPITAL | Age: 75
End: 2025-05-29
Payer: MEDICARE

## 2025-05-29 ENCOUNTER — TELEPHONE (OUTPATIENT)
Dept: UROLOGY | Facility: CLINIC | Age: 75
End: 2025-05-29
Payer: MEDICARE

## 2025-05-30 ENCOUNTER — ANESTHESIA (OUTPATIENT)
Dept: SURGERY | Facility: HOSPITAL | Age: 75
End: 2025-05-30
Payer: MEDICARE

## 2025-05-30 ENCOUNTER — DOCUMENTATION ONLY (OUTPATIENT)
Dept: ELECTROPHYSIOLOGY | Facility: CLINIC | Age: 75
End: 2025-05-30
Payer: MEDICARE

## 2025-05-30 ENCOUNTER — HOSPITAL ENCOUNTER (OUTPATIENT)
Facility: HOSPITAL | Age: 75
Discharge: HOME | End: 2025-05-30
Attending: UROLOGY | Admitting: UROLOGY
Payer: MEDICARE

## 2025-05-30 VITALS
WEIGHT: 195 LBS | SYSTOLIC BLOOD PRESSURE: 166 MMHG | HEART RATE: 67 BPM | RESPIRATION RATE: 11 BRPM | DIASTOLIC BLOOD PRESSURE: 91 MMHG | TEMPERATURE: 98 F | OXYGEN SATURATION: 100 % | BODY MASS INDEX: 27.3 KG/M2 | HEIGHT: 71 IN

## 2025-05-30 DIAGNOSIS — C67.9 BLADDER CANCER: Primary | ICD-10-CM

## 2025-05-30 DIAGNOSIS — D49.4 BLADDER TUMOR: ICD-10-CM

## 2025-05-30 LAB
POCT GLUCOSE: 124 MG/DL (ref 70–110)
POCT GLUCOSE: 136 MG/DL (ref 70–110)

## 2025-05-30 PROCEDURE — 71000016 HC POSTOP RECOV ADDL HR: Performed by: UROLOGY

## 2025-05-30 PROCEDURE — 37000009 HC ANESTHESIA EA ADD 15 MINS: Performed by: UROLOGY

## 2025-05-30 PROCEDURE — 63600175 PHARM REV CODE 636 W HCPCS: Performed by: NURSE ANESTHETIST, CERTIFIED REGISTERED

## 2025-05-30 PROCEDURE — 82962 GLUCOSE BLOOD TEST: CPT | Mod: 91 | Performed by: UROLOGY

## 2025-05-30 PROCEDURE — 25000003 PHARM REV CODE 250

## 2025-05-30 PROCEDURE — 52235 CYSTOSCOPY AND TREATMENT: CPT | Mod: ,,, | Performed by: UROLOGY

## 2025-05-30 PROCEDURE — 63600175 PHARM REV CODE 636 W HCPCS

## 2025-05-30 PROCEDURE — 37000008 HC ANESTHESIA 1ST 15 MINUTES: Performed by: UROLOGY

## 2025-05-30 PROCEDURE — 36000707: Performed by: UROLOGY

## 2025-05-30 PROCEDURE — 71000015 HC POSTOP RECOV 1ST HR: Performed by: UROLOGY

## 2025-05-30 PROCEDURE — 25000003 PHARM REV CODE 250: Performed by: UROLOGY

## 2025-05-30 PROCEDURE — 27201423 OPTIME MED/SURG SUP & DEVICES STERILE SUPPLY: Performed by: UROLOGY

## 2025-05-30 PROCEDURE — 71000044 HC DOSC ROUTINE RECOVERY FIRST HOUR: Performed by: UROLOGY

## 2025-05-30 PROCEDURE — 82962 GLUCOSE BLOOD TEST: CPT | Performed by: UROLOGY

## 2025-05-30 PROCEDURE — 25000003 PHARM REV CODE 250: Performed by: NURSE ANESTHETIST, CERTIFIED REGISTERED

## 2025-05-30 PROCEDURE — 36000706: Performed by: UROLOGY

## 2025-05-30 PROCEDURE — 88307 TISSUE EXAM BY PATHOLOGIST: CPT | Mod: TC,59 | Performed by: UROLOGY

## 2025-05-30 RX ORDER — OXYCODONE HYDROCHLORIDE 5 MG/1
5 TABLET ORAL EVERY 6 HOURS PRN
Refills: 0 | Status: DISCONTINUED | OUTPATIENT
Start: 2025-05-30 | End: 2025-05-30 | Stop reason: HOSPADM

## 2025-05-30 RX ORDER — MIDAZOLAM HYDROCHLORIDE 1 MG/ML
INJECTION INTRAMUSCULAR; INTRAVENOUS
Status: DISCONTINUED | OUTPATIENT
Start: 2025-05-30 | End: 2025-05-30

## 2025-05-30 RX ORDER — ONDANSETRON HYDROCHLORIDE 2 MG/ML
4 INJECTION, SOLUTION INTRAVENOUS DAILY PRN
Status: DISCONTINUED | OUTPATIENT
Start: 2025-05-30 | End: 2025-05-30 | Stop reason: HOSPADM

## 2025-05-30 RX ORDER — ONDANSETRON HYDROCHLORIDE 2 MG/ML
INJECTION, SOLUTION INTRAVENOUS
Status: DISCONTINUED | OUTPATIENT
Start: 2025-05-30 | End: 2025-05-30

## 2025-05-30 RX ORDER — FENTANYL CITRATE 50 UG/ML
INJECTION, SOLUTION INTRAMUSCULAR; INTRAVENOUS
Status: DISCONTINUED | OUTPATIENT
Start: 2025-05-30 | End: 2025-05-30

## 2025-05-30 RX ORDER — HYDROMORPHONE HYDROCHLORIDE 1 MG/ML
0.2 INJECTION, SOLUTION INTRAMUSCULAR; INTRAVENOUS; SUBCUTANEOUS EVERY 5 MIN PRN
Refills: 0 | Status: DISCONTINUED | OUTPATIENT
Start: 2025-05-30 | End: 2025-05-30 | Stop reason: HOSPADM

## 2025-05-30 RX ORDER — LIDOCAINE HYDROCHLORIDE 20 MG/ML
INJECTION INTRAVENOUS
Status: DISCONTINUED | OUTPATIENT
Start: 2025-05-30 | End: 2025-05-30

## 2025-05-30 RX ORDER — PHENAZOPYRIDINE HYDROCHLORIDE 100 MG/1
200 TABLET, FILM COATED ORAL ONCE
Status: DISCONTINUED | OUTPATIENT
Start: 2025-05-30 | End: 2025-05-30

## 2025-05-30 RX ORDER — PHENAZOPYRIDINE HYDROCHLORIDE 100 MG/1
100 TABLET, FILM COATED ORAL ONCE
Status: COMPLETED | OUTPATIENT
Start: 2025-05-30 | End: 2025-05-30

## 2025-05-30 RX ORDER — OXYCODONE HYDROCHLORIDE 5 MG/1
5 TABLET ORAL EVERY 4 HOURS PRN
Qty: 5 TABLET | Refills: 0 | Status: SHIPPED | OUTPATIENT
Start: 2025-05-30

## 2025-05-30 RX ORDER — FAMOTIDINE 10 MG/ML
INJECTION, SOLUTION INTRAVENOUS
Status: DISCONTINUED | OUTPATIENT
Start: 2025-05-30 | End: 2025-05-30

## 2025-05-30 RX ORDER — PROPOFOL 10 MG/ML
VIAL (ML) INTRAVENOUS
Status: DISCONTINUED | OUTPATIENT
Start: 2025-05-30 | End: 2025-05-30

## 2025-05-30 RX ORDER — GLUCAGON 1 MG
1 KIT INJECTION
Status: DISCONTINUED | OUTPATIENT
Start: 2025-05-30 | End: 2025-05-30 | Stop reason: HOSPADM

## 2025-05-30 RX ORDER — CEFAZOLIN 2 G/1
2 INJECTION, POWDER, FOR SOLUTION INTRAMUSCULAR; INTRAVENOUS
Status: COMPLETED | OUTPATIENT
Start: 2025-05-30 | End: 2025-05-30

## 2025-05-30 RX ADMIN — ONDANSETRON 4 MG: 2 INJECTION INTRAMUSCULAR; INTRAVENOUS at 07:05

## 2025-05-30 RX ADMIN — FENTANYL CITRATE 50 MCG: 50 INJECTION, SOLUTION INTRAMUSCULAR; INTRAVENOUS at 08:05

## 2025-05-30 RX ADMIN — SUGAMMADEX 200 MG: 100 INJECTION, SOLUTION INTRAVENOUS at 07:05

## 2025-05-30 RX ADMIN — PHENAZOPYRIDINE 100 MG: 100 TABLET ORAL at 09:05

## 2025-05-30 RX ADMIN — LIDOCAINE HYDROCHLORIDE 100 MG: 20 INJECTION INTRAVENOUS at 07:05

## 2025-05-30 RX ADMIN — PROPOFOL 150 MG: 10 INJECTION, EMULSION INTRAVENOUS at 07:05

## 2025-05-30 RX ADMIN — CEFAZOLIN 2 G: 2 INJECTION, POWDER, FOR SOLUTION INTRAMUSCULAR; INTRAVENOUS at 07:05

## 2025-05-30 RX ADMIN — FAMOTIDINE 20 MG: 10 INJECTION, SOLUTION INTRAVENOUS at 07:05

## 2025-05-30 RX ADMIN — SODIUM CHLORIDE, SODIUM GLUCONATE, SODIUM ACETATE, POTASSIUM CHLORIDE, MAGNESIUM CHLORIDE, SODIUM PHOSPHATE, DIBASIC, AND POTASSIUM PHOSPHATE: .53; .5; .37; .037; .03; .012; .00082 INJECTION, SOLUTION INTRAVENOUS at 07:05

## 2025-05-30 RX ADMIN — MIDAZOLAM HYDROCHLORIDE 2 MG: 2 INJECTION, SOLUTION INTRAMUSCULAR; INTRAVENOUS at 07:05

## 2025-05-30 RX ADMIN — OXYCODONE 5 MG: 5 TABLET ORAL at 08:05

## 2025-05-30 RX ADMIN — SODIUM CHLORIDE: 0.9 INJECTION, SOLUTION INTRAVENOUS at 07:05

## 2025-05-30 RX ADMIN — FENTANYL CITRATE 50 MCG: 50 INJECTION, SOLUTION INTRAMUSCULAR; INTRAVENOUS at 07:05

## 2025-05-30 NOTE — PROGRESS NOTES
Patient has been identified as having an implanted cardiac rhythm device (CRD); the implanted device is a MDT pacemaker  The planned surgical procedure is a Bladder surgery    Pacer dependency has been noted on EKG          PACEMAKERS/DEPENDENT   The reported surgical procedure is above /near the umbilicus.  Per protocol, apply a magnet directly over the implanted device during entire surgical procedure if electrocautery will be used.        For additional questions, please contact the Arrhythmia Department at Ext [phone].

## 2025-05-30 NOTE — ANESTHESIA POSTPROCEDURE EVALUATION
Anesthesia Post Evaluation    Patient: Rigoberto Vasquez    Procedure(s) Performed: Procedure(s) (LRB):  TURBT (TRANSURETHRAL RESECTION OF BLADDER TUMOR) (N/A)  CYSTOSCOPY (N/A)    Final Anesthesia Type: general      Patient location during evaluation: PACU  Patient participation: Yes- Able to Participate  Level of consciousness: awake and alert  Post-procedure vital signs: reviewed and stable  Pain management: adequate  Airway patency: patent    PONV status at discharge: No PONV  Anesthetic complications: no      Cardiovascular status: blood pressure returned to baseline  Respiratory status: unassisted, room air and spontaneous ventilation  Hydration status: euvolemic  Follow-up not needed.              Vitals Value Taken Time   /91 05/30/25 09:32   Temp 36.7 °C (98 °F) 05/30/25 09:30   Pulse 67 05/30/25 09:36   Resp 16 05/30/25 09:36   SpO2 100 % 05/30/25 09:36   Vitals shown include unfiled device data.      No case tracking events are documented in the log.      Pain/Berna Score: Pain Rating Prior to Med Admin: 5 (5/30/2025  8:52 AM)  Pain Rating Post Med Admin: 2 (5/30/2025  9:49 AM)  Berna Score: 10 (5/30/2025  8:45 AM)  Modified Berna Score: 20 (5/30/2025  8:30 AM)

## 2025-05-30 NOTE — ANESTHESIA PREPROCEDURE EVALUATION
05/30/2025  Rigoberto Vasquez is a 74 y.o., male here for TURBT.    Past Medical History:   Diagnosis Date    Acid reflux     [facility]    Angina pectoris     Back pain     BPH (benign prostatic hyperplasia)     blue    Cholesteatoma     Class 2 severe obesity due to excess calories with serious comorbidity and body mass index (BMI) of 37.0 to 37.9 in adult 11/11/2015    Degenerative joint disease     Diabetes mellitus type 2 with complications 3/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      Past Surgical History:   Procedure Laterality Date    BIOPSY OF BLADDER N/A 8/30/2024    Procedure: BIOPSY, BLADDER;  Surgeon: Compa Hensley MD;  Location: [facility] OR [address] [place] [address];  Service: Urology;  Laterality: N/A;    BLADDER DIVERTICULECTOMY Right 10/8/2024    Procedure: EXCISION, DIVERTICULUM, BLADDER;  Surgeon: Compa Hensley MD;  Location: [facility] OR 2ND FLR;  Service: Urology;  Laterality: Right;    BLADDER FULGURATION N/A 8/30/2024    Procedure: FULGURATION, BLADDER;  Surgeon: Compa Hensley MD;  Location: [facility] OR [address] [place] [address];  Service: Urology;  Laterality: N/A;    CARDIAC PACEMAKER PLACEMENT      COLONOSCOPY N/A 9/13/2019    Procedure: COLONOSCOPY;  Surgeon: Kan Ramsey III, MD;  Location: [facility] ENDO;  Service: Endoscopy;  Laterality: N/A;    COLONOSCOPY N/A 9/22/2022    Procedure: COLONOSCOPY;  Surgeon:  Chris Garcia MD;  Location: [facility];  Service: Endoscopy;  Laterality: N/A;    CYSTOSCOPY N/A 8/30/2024    Procedure: CYSTOSCOPY;  Surgeon: Compa Hensley MD;  [facility];  Service: Urology;  Laterality: N/A;  with blue light    CYSTOSCOPY W/ RETROGRADES Bilateral 7/16/2024    Procedure: CYSTOSCOPY, WITH RETROGRADE PYELOGRAM;  Surgeon: Vance Olivera MD;  Location: [facility];  Service: Urology;  Laterality: Bilateral;    CYSTOSCOPY WITH BIOPSY OF BLADDER N/A 4/4/2025    Procedure: CYSTOSCOPY, WITH BLADDER BIOPSY, WITH FULGURATION;  Surgeon: Compa Hensley MD;  [facility];  Service: Urology;  Laterality: N/A;    CYSTOSCOPY,WITH URETERAL CATHETER INSERTION Bilateral 10/8/2024    Procedure: CYSTOSCOPY,WITH URETERAL CATHETER INSERTION;  Surgeon: Compa Hensley MD;  Location: [facility] OR 2ND FLR;  Service: Urology;  Laterality: Bilateral;    ESOPHAGOGASTRODUODENOSCOPY N/A 9/13/2019    Procedure: ESOPHAGOGASTRODUODENOSCOPY (EGD);  Surgeon: Kan Ramsey III, MD;  Location: [facility];  Service: Endoscopy;  Laterality: N/A;    ESOPHAGOGASTRODUODENOSCOPY N/A 9/3/2021    Procedure: EGD (ESOPHAGOGASTRODUODENOSCOPY);  Surgeon: Kaylene Pineda MD;  Location: [facility];  Service: Endoscopy;  Laterality: N/A;    ESOPHAGOGASTRODUODENOSCOPY N/A 5/24/2023    Procedure: EGD (ESOPHAGOGASTRODUODENOSCOPY);  Surgeon: Cory Bennett MD;  Location: [facility];  Service: Endoscopy;  Laterality: N/A;    FLEXIBLE CYSTOSCOPY N/A 10/8/2024    Procedure: CYSTOSCOPY, FLEXIBLE;  Surgeon: Compa Hensley MD;  Location: [facility] OR 2ND FLR;  Service: Urology;  Laterality: N/A;    INSERTION OF TYMPANOSTOMY TUBE Right 3/15/2023    Procedure: INSERTION, TYMPANOSTOMY TUBE;  Surgeon: Jose L Gudino MD;  [facility];  Service: ENT;  Laterality: Right;    INSTILLATION OF URINARY BLADDER N/A 10/8/2024    Procedure: INSTILLATION, BLADDER;  Surgeon: Compa Hensley MD;  Location: [facility] OR [address] [place] [address];   Service: Urology;  Laterality: N/A;  Chemotherapy instillation bladder    JOINT REPLACEMENT      KNEE CARTILAGE SURGERY Bilateral     LYMPHADENECTOMY, PELVIS Right 10/8/2024    Procedure: LYMPHADENECTOMY, PELVIS;  Surgeon: Compa Hensley MD;  Location: [facility] OR [address] [place] [address];  Service: Urology;  Laterality: Right;    NASAL SINUS SURGERY      PARTIAL SURGICAL REMOVAL OF BLADDER N/A 10/8/2024    Procedure: CYSTECTOMY, PARTIAL;  Surgeon: Compa Hensley MD;  Location: [facility] OR [address] [place] [address];  Service: Urology;  Laterality: N/A;    SHOULDER ARTHROSCOPY Right     TONSILLECTOMY      TOTAL KNEE ARTHROPLASTY Left 05/15/2017    TRANSURETHRAL RESECTION OF PROSTATE      TURBT (TRANSURETHRAL RESECTION OF BLADDER TUMOR) N/A 7/16/2024    Procedure: TURBT (TRANSURETHRAL RESECTION OF BLADDER TUMOR);  Surgeon: Vance Olivera MD;  Location: [facility];  Service: Urology;  Laterality: N/A;    TURBT, WITH BLUE LIGHT CYSTOSCOPY AND CYSVIEW N/A 4/4/2025    Procedure: TURBT,WITH BLUE LIGHT CYSTOSCOPY AND CYSVIEW;  Surgeon: Compa Hensley MD;  Location: [facility] OR [address] [place] [address];  Service: Urology;  Laterality: N/A;    TYMPANOPLASTY      TYMPANOPLASTY WITH MASTOIDECTOMY Left 3/15/2023    Procedure: TYMPANOPLASTY, WITH MASTOIDECTOMY;  Surgeon: Jose L Gudino MD;  Location: [facility] OR [address] [place] [address];  Service: ENT;  Laterality: Left;    vesectomy             Pre-op Assessment          Review of Systems  Anesthesia Hx:  No problems with previous Anesthesia   History of prior surgery of interest to airway management or planning:          Denies Family Hx of Anesthesia complications.    Denies Personal Hx of Anesthesia complications.                    Cardiovascular:     Hypertension    Dysrhythmias (heart block; pacemaker for over 40  years, no issues)   Denies Angina.       Denies MEEK.                              Pulmonary:    Denies COPD.  Denies Asthma.    Denies Recent URI. Sleep Apnea Was having shortness of breath, had workup with pulmonologist, had elevated d-dimer,  this was evaluated also and no cause was found.  Shortness of breath has improved- back to baseline.                Neurological:  Neurology Normal                                          Physical Exam  General: Alert, Oriented and Well nourished    Airway:  Mallampati: III   Mouth Opening: Normal  TM Distance: Normal  Neck ROM: Normal ROM    Dental:  Intact    Chest/Lungs:  Normal Respiratory Rate    Heart:  Rate: Normal  Rhythm: Regular Rhythm        Anesthesia Plan  Type of Anesthesia, risks & benefits discussed:    Anesthesia Type: Gen ETT  Intra-op Monitoring Plan: Standard ASA Monitors  Post Op Pain Control Plan: multimodal analgesia and IV/PO Opioids PRN  Informed Consent: Informed consent signed with the Patient and all parties understand the risks and agree with anesthesia plan.  All questions answered.   ASA Score: 3  Day of Surgery Review of History & Physical: H&P Update referred to the surgeon/provider.    Ready For Surgery From Anesthesia Perspective.     .

## 2025-05-30 NOTE — TRANSFER OF CARE
"Anesthesia Transfer of Care Note    Patient: Rigoberto Vasquez    Procedure(s) Performed: Procedure(s) (LRB):  TURBT (TRANSURETHRAL RESECTION OF BLADDER TUMOR) (N/A)  CYSTOSCOPY (N/A)    Patient location: PACU    Transport from OR: Transported from OR on 6-10 L/min O2 by face mask with adequate spontaneous ventilation    Post pain: adequate analgesia    Post assessment: no apparent anesthetic complications and tolerated procedure well    Post vital signs: stable    Level of consciousness: awake    Nausea/Vomiting: no nausea/vomiting    Complications: none    Transfer of care protocol was followed      Last vitals: Visit Vitals  BP (!) 159/91 (BP Location: Left arm, Patient Position: Sitting)   Pulse 60   Temp 36.9 °C (98.4 °F) (Temporal)   Resp 18   Ht 5' 11" (1.803 m)   Wt 88.5 kg (195 lb)   SpO2 100%   BMI 27.20 kg/m²     "

## 2025-05-30 NOTE — ANESTHESIA PROCEDURE NOTES
Intubation    Date/Time: 5/30/2025 7:11 AM    Performed by: Jasen Burns CRNA  Authorized by: Meena Tapia MD    Intubation:     Induction:  Intravenous    Intubated:  Postinduction    Mask Ventilation:  Easy mask    Attempts:  1    Attempted By:  CRNA    Method of Intubation:  Video laryngoscopy    Blade:  Ott 3    Laryngeal View Grade: Grade I - full view of cords      Difficult Airway Encountered?: No      Complications:  None    Airway Device:  Oral endotracheal tube    Airway Device Size:  7.5    Style/Cuff Inflation:  Cuffed (inflated to minimal occlusive pressure)    Inflation Amount (mL):  5    Tube secured:  22    Secured at:  The lips    Placement Verified By:  Capnometry    Complicating Factors:  None    Findings Post-Intubation:  BS equal bilateral and atraumatic/condition of teeth unchanged

## 2025-06-02 LAB
ESTROGEN SERPL-MCNC: NORMAL PG/ML
INSULIN SERPL-ACNC: NORMAL U[IU]/ML
LAB AP CLINICAL INFORMATION: NORMAL
LAB AP GROSS DESCRIPTION: NORMAL
LAB AP PERFORMING LOCATION(S): NORMAL
LAB AP REPORT FOOTNOTES: NORMAL

## 2025-06-06 ENCOUNTER — RESULTS FOLLOW-UP (OUTPATIENT)
Dept: UROLOGY | Facility: CLINIC | Age: 75
End: 2025-06-06

## 2025-06-10 ENCOUNTER — OFFICE VISIT (OUTPATIENT)
Dept: DIABETES | Facility: CLINIC | Age: 75
End: 2025-06-10
Payer: MEDICARE

## 2025-06-10 VITALS
BODY MASS INDEX: 26.81 KG/M2 | WEIGHT: 192.25 LBS | HEART RATE: 71 BPM | SYSTOLIC BLOOD PRESSURE: 125 MMHG | DIASTOLIC BLOOD PRESSURE: 78 MMHG

## 2025-06-10 DIAGNOSIS — E11.22 TYPE 2 DIABETES MELLITUS WITH STAGE 3A CHRONIC KIDNEY DISEASE, WITH LONG-TERM CURRENT USE OF INSULIN: Primary | ICD-10-CM

## 2025-06-10 DIAGNOSIS — Z79.4 TYPE 2 DIABETES MELLITUS WITH STAGE 3A CHRONIC KIDNEY DISEASE, WITH LONG-TERM CURRENT USE OF INSULIN: Primary | ICD-10-CM

## 2025-06-10 DIAGNOSIS — E78.5 HYPERLIPIDEMIA ASSOCIATED WITH TYPE 2 DIABETES MELLITUS: Chronic | ICD-10-CM

## 2025-06-10 DIAGNOSIS — N18.31 TYPE 2 DIABETES MELLITUS WITH STAGE 3A CHRONIC KIDNEY DISEASE, WITH LONG-TERM CURRENT USE OF INSULIN: Primary | ICD-10-CM

## 2025-06-10 DIAGNOSIS — E11.69 HYPERLIPIDEMIA ASSOCIATED WITH TYPE 2 DIABETES MELLITUS: Chronic | ICD-10-CM

## 2025-06-10 DIAGNOSIS — I15.2 HYPERTENSION ASSOCIATED WITH DIABETES: Chronic | ICD-10-CM

## 2025-06-10 DIAGNOSIS — E11.59 HYPERTENSION ASSOCIATED WITH DIABETES: Chronic | ICD-10-CM

## 2025-06-10 LAB — GLUCOSE SERPL-MCNC: 141 MG/DL (ref 70–110)

## 2025-06-10 PROCEDURE — 1126F AMNT PAIN NOTED NONE PRSNT: CPT | Mod: CPTII,HCNC,S$GLB, | Performed by: NURSE PRACTITIONER

## 2025-06-10 PROCEDURE — 3288F FALL RISK ASSESSMENT DOCD: CPT | Mod: CPTII,HCNC,S$GLB, | Performed by: NURSE PRACTITIONER

## 2025-06-10 PROCEDURE — 3044F HG A1C LEVEL LT 7.0%: CPT | Mod: CPTII,HCNC,S$GLB, | Performed by: NURSE PRACTITIONER

## 2025-06-10 PROCEDURE — 3066F NEPHROPATHY DOC TX: CPT | Mod: CPTII,HCNC,S$GLB, | Performed by: NURSE PRACTITIONER

## 2025-06-10 PROCEDURE — 3008F BODY MASS INDEX DOCD: CPT | Mod: CPTII,HCNC,S$GLB, | Performed by: NURSE PRACTITIONER

## 2025-06-10 PROCEDURE — 99214 OFFICE O/P EST MOD 30 MIN: CPT | Mod: HCNC,S$GLB,, | Performed by: NURSE PRACTITIONER

## 2025-06-10 PROCEDURE — 3078F DIAST BP <80 MM HG: CPT | Mod: CPTII,HCNC,S$GLB, | Performed by: NURSE PRACTITIONER

## 2025-06-10 PROCEDURE — 3061F NEG MICROALBUMINURIA REV: CPT | Mod: CPTII,HCNC,S$GLB, | Performed by: NURSE PRACTITIONER

## 2025-06-10 PROCEDURE — 1160F RVW MEDS BY RX/DR IN RCRD: CPT | Mod: CPTII,HCNC,S$GLB, | Performed by: NURSE PRACTITIONER

## 2025-06-10 PROCEDURE — 1159F MED LIST DOCD IN RCRD: CPT | Mod: CPTII,HCNC,S$GLB, | Performed by: NURSE PRACTITIONER

## 2025-06-10 PROCEDURE — G2211 COMPLEX E/M VISIT ADD ON: HCPCS | Mod: HCNC,S$GLB,, | Performed by: NURSE PRACTITIONER

## 2025-06-10 PROCEDURE — 95251 CONT GLUC MNTR ANALYSIS I&R: CPT | Mod: HCNC,S$GLB,, | Performed by: NURSE PRACTITIONER

## 2025-06-10 PROCEDURE — 4010F ACE/ARB THERAPY RXD/TAKEN: CPT | Mod: CPTII,HCNC,S$GLB, | Performed by: NURSE PRACTITIONER

## 2025-06-10 PROCEDURE — 99999 PR PBB SHADOW E&M-EST. PATIENT-LVL V: CPT | Mod: PBBFAC,HCNC,, | Performed by: NURSE PRACTITIONER

## 2025-06-10 PROCEDURE — 82962 GLUCOSE BLOOD TEST: CPT | Mod: HCNC,S$GLB,, | Performed by: NURSE PRACTITIONER

## 2025-06-10 PROCEDURE — 1101F PT FALLS ASSESS-DOCD LE1/YR: CPT | Mod: CPTII,HCNC,S$GLB, | Performed by: NURSE PRACTITIONER

## 2025-06-10 PROCEDURE — 3074F SYST BP LT 130 MM HG: CPT | Mod: CPTII,HCNC,S$GLB, | Performed by: NURSE PRACTITIONER

## 2025-06-10 NOTE — PROGRESS NOTES
Patient ID: Rigoberto Vasquez is a 74 y.o. male.  Patient's current PCP is Jesse Grimes MD.   Collaborating Physician: JULIO Galvin MD    Chief Complaint: Diabetes Mellitus  HPI    Rigoberto Vasquez is a 74 y.o. White male presenting for a follow up for diabetes.       Patient has been diagnosed with type 2 diabetes for > 10 years.  Complications related to diabetes: nephropathy  Recent diabetes related hospitalizations: none  Previous diabetes education:yes, here    Current diet: Eating 3 meals per day. Generally healthy choices - lower red meat and fried foods. Increasing vegetables. Using [employer].   Current weight: 192 on 6/10/25  Weight at LOV: 197 on 1/30/25  Weight at FOV: 203 on 8/29/25    Activity level: Has not been able to return to regular exercise yet    Changes made at last visit:  -     Continue Ozempic 1 mg  -     Consider increasing Ozempic if blood sugar remains elevated      Current issues: Further procedures for bladder cancer. C/o being tired. Constipation controlled with Miralax, metamucil and extra fiber. On and off Ozempic due to multiple procedures requiring it to be held    Son has T1DM since age 10.   Personal history of pancreatitis: denies  Personal history of abdominal surgery: denies  Personal history of thyroid surgery: denies  Family history of pancreatic cancer in first-degree relative: denies  Family history of MTC/MEN/endocrine tumors: denies     Past Medical History:   Diagnosis Date    Acid reflux     [facility]    Angina pectoris     Back pain     BPH (benign prostatic hyperplasia)     blue    Cholesteatoma     Class 2 severe obesity due to excess calories with serious comorbidity and body mass index (BMI) of 37.0 to 37.9 in adult 11/11/2015    Degenerative joint disease     Diabetes mellitus type 2 with complications 3/31/2025    Diverticulosis     Erectile dysfunction     History of elevated PSA 02/2014    normalized, dr dave annually    History of pericarditis      "Hypercholesteremia     Hypertension     Iron deficiency anemia     Malignant neoplasm of lateral wall of urinary bladder 8/5/2024    СВЕТЛАНА (obstructive sleep apnea) 05/17/2022    Pacemaker     complete av block;NO afib    Renal disorder     Squamous cell cancer of skin of mastoid region of scalp     dr jaida salgado    Type 2 diabetes mellitus     dr wyman    Urinary incontinence     when he was 7 Yrs old.      Social History     Socioeconomic History    Marital status:      Spouse name: SAUL    Number of children: 2   Occupational History    Occupation: retired- [employer] .   Tobacco Use    Smoking status: Never    Smokeless tobacco: Never   Substance and Sexual Activity    Alcohol use: Yes     Comment: " once a month"    Drug use: No    Sexual activity: Yes     Partners: Female   Social History Narrative     . Lives with spouse. Has 2 children. Patient retired from [employer]. His son has type 1 diabetes.     Social Drivers of Health     Financial Resource Strain: Low Risk  (3/31/2025)    Overall Financial Resource Strain (CARDIA)     Difficulty of Paying Living Expenses: Not hard at all   Food Insecurity: No Food Insecurity (3/31/2025)    Hunger Vital Sign     Worried About Running Out of Food in the Last Year: Never true     Ran Out of Food in the Last Year: Never true   Transportation Needs: No Transportation Needs (3/31/2025)    PRAPARE - Transportation     Lack of Transportation (Medical): No     Lack of Transportation (Non-Medical): No   Physical Activity: Inactive (3/31/2025)    Exercise Vital Sign     Days of Exercise per Week: 0 days     Minutes of Exercise per Session: 0 min   Stress: No Stress Concern Present (3/31/2025)    [employer] - Occupational Stress Questionnaire     Feeling of Stress : Not at all   Housing Stability: Low Risk  (3/31/2025)    Housing Stability Vital Sign     Unable to Pay for Housing in the Last Year: No     Number " of Times Moved in the Last Year: 0     Homeless in the Last Year: No       Review of patient's allergies indicates:   Allergen Reactions    Aspirin      Stomach pain even with ppi    Meperidine      Other reaction(s): Stomach upset    Niacin      Other reaction(s): hot skin       CURRENT DM MEDICATIONS:   Diabetes Medications               insulin (LANTUS SOLOSTAR U-100 INSULIN) glargine 100 units/mL SubQ pen 20 units in the AM and 4 units in the PM    semaglutide (OZEMPIC) 1 mg/dose (4 mg/3 mL) Inject 1 mg into the skin every 7 days.      Past failed treatment(s) include:   Pioglitazone - bladder CA/good control. Recently stopped  Victoza - formulary change/constipation    Meter/cgm: meter/Dexcom G7  Blood glucose testing is performed regularly.   Patient is testing continuously times per day.  Any episodes of hypoglycemia? Occasional 60s - not symptomatic  Glucose trends:   Per CGM download, for the last 13 days:    Average glucose of 163 mg/dL. Patient is 63% in range. 33% of readings are mildly elevated with 4% of readings > 250. 0% hypoglycemia. SD 48 mg/dL. Estimated GMI 7.2%. Glycemic control is stable - Rising during the day with slight increase after meals        His blood sugar in the clinic today was:   Lab Results   Component Value Date    POCGLU 141 (A) 06/10/2025       Statin: Taking  ACE/ARB: Taking    Labs reviewed and are noted below.    Screening or Prevention Patient's value Goal Complete/Controlled?   HgA1C Testing and Control   Lab Results   Component Value Date    HGBA1C 6.9 (H) 02/13/2025      Annually/Less than 8% Yes   Lipid profile : 02/13/2025 Annually Yes   LDL control Lab Results   Component Value Date    LDLCALC 67.2 02/13/2025    Annually/Less than 100 mg/dl  Yes   Nephropathy screening Lab Results   Component Value Date    MICALBCREAT 10.4 02/13/2025     Lab Results   Component Value Date    PROTEINUA Negative 05/16/2025    Annually Yes   Blood pressure BP Readings from Last 1  "Encounters:   06/10/25 125/78    Less than 140/90 Yes   Dilated retinal exam : 02/04/2025 Scheduled for next Tuesday - Dr Reza Annually Yes    Foot exam   : 02/20/2025 Annually Yes     Glucose   Date Value Ref Range Status   04/04/2025 126 (H) 70 - 110 mg/dL Final   02/13/2025 143 (H) 70 - 110 mg/dL Final     Anion Gap   Date Value Ref Range Status   04/04/2025 9 8 - 16 mmol/L Final     eGFR if    Date Value Ref Range Status   06/23/2022 >60.0 >60 mL/min/1.73 m^2 Final     eGFR if non    Date Value Ref Range Status   06/23/2022 55.3 (A) >60 mL/min/1.73 m^2 Final     Comment:     Calculation used to obtain the estimated glomerular filtration  rate (eGFR) is the CKD-EPI equation.        Lab Results   Component Value Date    TSH 1.837 12/27/2021     No results found for: "CPEPTIDE"  No results found for: "GLUTAMICACID"    Wt Readings from Last 3 Encounters:   06/10/25 1557 87.2 kg (192 lb 3.9 oz)   05/30/25 0543 88.5 kg (195 lb)   05/21/25 0948 90 kg (198 lb 6.6 oz)       Review of Systems   Constitutional:  Negative for malaise/fatigue and weight loss.   Eyes:  Negative for blurred vision and double vision.   Respiratory:  Negative for shortness of breath.    Cardiovascular: Negative.    Gastrointestinal:  Positive for constipation.   Genitourinary:  Negative for frequency.   Musculoskeletal:  Negative for myalgias.   Neurological: Negative.    Psychiatric/Behavioral: Negative.         Physical Exam  Vitals reviewed.   Constitutional:       Appearance: Normal appearance. He is normal weight.   Eyes:      Conjunctiva/sclera: Conjunctivae normal.      Pupils: Pupils are equal, round, and reactive to light.   Cardiovascular:      Rate and Rhythm: Normal rate and regular rhythm.      Pulses: Normal pulses.      Heart sounds: Normal heart sounds.   Pulmonary:      Effort: Pulmonary effort is normal.      Breath sounds: Normal breath sounds.   Abdominal:      General: Bowel sounds are " normal.      Palpations: Abdomen is soft.   Musculoskeletal:      Cervical back: Normal range of motion and neck supple.      Right lower leg: No edema.      Left lower leg: No edema.   Skin:     General: Skin is warm and dry.      Comments: Sites without hypertrophy and/or infection   Neurological:      General: No focal deficit present.      Mental Status: He is alert and oriented to person, place, and time.   Psychiatric:         Mood and Affect: Mood normal.         Behavior: Behavior normal.           Assessment & Plan    Type 2 diabetes mellitus with stage 3a chronic kidney disease, with long-term current use of insulin  -     POCT Glucose, Hand-Held Device  -     Increase Lantus to 22 units in am and continue 4 units in pm  -     Consider increasing Ozempic to 2 mg after being on 1 mg consistently as appropriate    Hypertension associated with diabetes - at goal on ARB    Hyperlipidemia associated with type 2 diabetes mellitus - on statin    - Reviewed with patient:  The basic pathophysiology of Type 2 diabetes  Mechanism of action and action time of medications  Use of home glucose monitor/cgm  Basic diet/carbohydrate counting/avoiding simple sugars/plate method  Proper hydration   Risk of complications and preventive measures  When to call for assistance  Call for bs < 80 or > 180 consistently      - Follow up: 2 months    Visit today included increased complexity associated with the care of the episodic problem fluctuating blood sugars addressed and managing the longitudinal care of the patient due to the serious and/or complex managed problem(s) T2DM.

## 2025-06-11 DIAGNOSIS — Z79.4 TYPE 2 DIABETES MELLITUS WITH STAGE 3A CHRONIC KIDNEY DISEASE, WITH LONG-TERM CURRENT USE OF INSULIN: ICD-10-CM

## 2025-06-11 DIAGNOSIS — E11.22 TYPE 2 DIABETES MELLITUS WITH STAGE 3A CHRONIC KIDNEY DISEASE, WITH LONG-TERM CURRENT USE OF INSULIN: ICD-10-CM

## 2025-06-11 DIAGNOSIS — N18.31 TYPE 2 DIABETES MELLITUS WITH STAGE 3A CHRONIC KIDNEY DISEASE, WITH LONG-TERM CURRENT USE OF INSULIN: ICD-10-CM

## 2025-06-11 RX ORDER — INSULIN GLARGINE 100 [IU]/ML
INJECTION, SOLUTION SUBCUTANEOUS
Qty: 3 ML | Refills: 11 | Status: SHIPPED | OUTPATIENT
Start: 2025-06-11

## 2025-06-16 ENCOUNTER — OFFICE VISIT (OUTPATIENT)
Dept: UROLOGY | Facility: CLINIC | Age: 75
End: 2025-06-16
Payer: MEDICARE

## 2025-06-16 DIAGNOSIS — C67.2 MALIGNANT NEOPLASM OF LATERAL WALL OF URINARY BLADDER: Primary | ICD-10-CM

## 2025-06-16 PROCEDURE — 4010F ACE/ARB THERAPY RXD/TAKEN: CPT | Mod: CPTII,HCNC,95, | Performed by: UROLOGY

## 2025-06-16 PROCEDURE — 3061F NEG MICROALBUMINURIA REV: CPT | Mod: CPTII,HCNC,95, | Performed by: UROLOGY

## 2025-06-16 PROCEDURE — 3044F HG A1C LEVEL LT 7.0%: CPT | Mod: CPTII,HCNC,95, | Performed by: UROLOGY

## 2025-06-16 PROCEDURE — 3066F NEPHROPATHY DOC TX: CPT | Mod: CPTII,HCNC,95, | Performed by: UROLOGY

## 2025-06-16 PROCEDURE — 98006 SYNCH AUDIO-VIDEO EST MOD 30: CPT | Mod: HCNC,95,, | Performed by: UROLOGY

## 2025-06-16 PROCEDURE — G2211 COMPLEX E/M VISIT ADD ON: HCPCS | Mod: HCNC,95,, | Performed by: UROLOGY

## 2025-06-16 NOTE — PROGRESS NOTES
[facility]  Urologic Oncology    The patient location is:  [facility]  The chief complaint leading to consultation is:  Bladder cancer    Visit type: audiovisual    Face to Face time with patient: 10  20 minutes of total time spent on the encounter, which includes face to face time and non-face to face time preparing to see the patient (eg, review of tests), Obtaining and/or reviewing separately obtained history, Documenting clinical information in the electronic or other health record, Independently interpreting results (not separately reported) and communicating results to the patient/family/caregiver, or Care coordination (not separately reported).     Each patient to whom he or she provides medical services by telemedicine is:  (1) informed of the relationship between the physician and patient and the respective role of any other health care provider with respect to management of the patient; and (2) notified that he or she may decline to receive medical services by telemedicine and may withdraw from such care at any time.    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, status post TURBT on 07/16/2024 for high-grade T1 into a diverticulum  Status post open bladder diverticulectomy and lymphadenectomy on 10/08/2024, pathology T 0 N0, negative for residual malignancy  Status post induction BCG  Recurrence in April 2025, with a CIS in the posterior bladder wall, high-grade TA present on the right lateral bladder wall, high-grade TA on the right posterior bladder wall, and high-grade TA on the bladder dome  Repeat resection on 05/30/2025 showed no evidence of bladder cancer recurrence  BPH with a history of TURP approximately 10 years ago, currently on finasteride and tamsulosin  Erectile dysfunction    History of Present Illness:   Patient is a very pleasant 74-year-old male well known to me, he has a history of bladder cancer  status post diverticulectomy.  He then had a bladder recurrence in April 2025 with CIS and high-grade TA in multifocal fashion.  He recently had a repeat TUR on 05/30/2025 this showed no evidence of bladder cancer.  He presents today for follow up    Imaging: I have reviewed the imaging study CT urogram on 02/07/2025 personally, have independently interpreted this, and agree with the findings    Allergies:  Review of patient's allergies indicates:   Allergen Reactions    Aspirin      Stomach pain even with ppi    Meperidine      Other reaction(s): Stomach upset    Niacin      Other reaction(s): hot skin        Medications per EMR:  Prescriptions Prior to Admission[1]    Past Medical History:  Past Medical History:   Diagnosis Date    Acid reflux     [facility]    Angina pectoris     Back pain     BPH (benign prostatic hyperplasia)     blue    Cholesteatoma     Class 2 severe obesity due to excess calories with serious comorbidity and body mass index (BMI) of 37.0 to 37.9 in adult 11/11/2015    Degenerative joint disease     Diabetes mellitus type 2 with complications 3/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         Past Surgical History:  Past Surgical History:   Procedure Laterality Date    BIOPSY OF BLADDER N/A 8/30/2024    Procedure: BIOPSY, BLADDER;  Surgeon: Compa Hensley MD;  Location: [facility] OR [address] [place] [address];  Service: Urology;  Laterality: N/A;    BLADDER DIVERTICULECTOMY Right 10/8/2024    Procedure:  EXCISION, DIVERTICULUM, BLADDER;  Surgeon: Compa Hensley MD;  Location: [facility] OR 2ND FLR;  Service: Urology;  Laterality: Right;    BLADDER FULGURATION N/A 8/30/2024    Procedure: FULGURATION, BLADDER;  Surgeon: Compa Hensley MD;  Location: [facility] OR 1ST FLR;  Service: Urology;  Laterality: N/A;    CARDIAC PACEMAKER PLACEMENT      COLONOSCOPY N/A 9/13/2019    Procedure: COLONOSCOPY;  Surgeon: Kan Ramsey III, MD;  Location: [facility] ENDO;  Service: Endoscopy;  Laterality: N/A;    COLONOSCOPY N/A 9/22/2022    Procedure: COLONOSCOPY;  Surgeon: Chris Garcia MD;  Location: [facility] ENDO;  Service: Endoscopy;  Laterality: N/A;    CYSTOSCOPY N/A 8/30/2024    Procedure: CYSTOSCOPY;  Surgeon: Compa Hensley MD;  Location: [facility] OR 1ST FLR;  Service: Urology;  Laterality: N/A;  with blue light    CYSTOSCOPY N/A 5/30/2025    Procedure: CYSTOSCOPY;  Surgeon: Compa Hensley MD;  Location: [facility] OR 1ST FLR;  Service: Urology;  Laterality: N/A;    CYSTOSCOPY W/ RETROGRADES Bilateral 7/16/2024    Procedure: CYSTOSCOPY, WITH RETROGRADE PYELOGRAM;  Surgeon: Vance Olivera MD;  Location: [facility];  Service: Urology;  Laterality: Bilateral;    CYSTOSCOPY WITH BIOPSY OF BLADDER N/A 4/4/2025    Procedure: CYSTOSCOPY, WITH BLADDER BIOPSY, WITH FULGURATION;  Surgeon: Compa Hensley MD;  Location: [facility] OR 1ST FLR;  Service: Urology;  Laterality: N/A;    CYSTOSCOPY,WITH URETERAL CATHETER INSERTION Bilateral 10/8/2024    Procedure: CYSTOSCOPY,WITH URETERAL CATHETER INSERTION;  Surgeon: Compa Hensley MD;  Location: [facility] OR 2ND FLR;  Service: Urology;  Laterality: Bilateral;    ESOPHAGOGASTRODUODENOSCOPY N/A 9/13/2019    Procedure: ESOPHAGOGASTRODUODENOSCOPY (EGD);  Surgeon: Kan Ramsey III, MD;  Location: [facility] ENDO;  Service: Endoscopy;  Laterality: N/A;    ESOPHAGOGASTRODUODENOSCOPY N/A 9/3/2021    Procedure: EGD (ESOPHAGOGASTRODUODENOSCOPY);  Surgeon: Kaylene Pineda MD;  Location: [facility];  Service: Endoscopy;   Laterality: N/A;    ESOPHAGOGASTRODUODENOSCOPY N/A 5/24/2023    Procedure: EGD (ESOPHAGOGASTRODUODENOSCOPY);  Surgeon: Cory Bennett MD;  Location: [facility];  Service: Endoscopy;  Laterality: N/A;    FLEXIBLE CYSTOSCOPY N/A 10/8/2024    Procedure: CYSTOSCOPY, FLEXIBLE;  Surgeon: Compa Hensley MD;  [facility];  Service: Urology;  Laterality: N/A;    INSERTION OF TYMPANOSTOMY TUBE Right 3/15/2023    Procedure: INSERTION, TYMPANOSTOMY TUBE;  Surgeon: Jose L Gudino MD;  [facility];  Service: ENT;  Laterality: Right;    INSTILLATION OF URINARY BLADDER N/A 10/8/2024    Procedure: INSTILLATION, BLADDER;  Surgeon: Compa Hensley MD;  Location: [facility] OR [address] [place] [address];  Service: Urology;  Laterality: N/A;  Chemotherapy instillation bladder    JOINT REPLACEMENT      KNEE CARTILAGE SURGERY Bilateral     LYMPHADENECTOMY, PELVIS Right 10/8/2024    Procedure: LYMPHADENECTOMY, PELVIS;  Surgeon: Compa Hensley MD;  Location: [facility] OR [address] [place] [address];  Service: Urology;  Laterality: Right;    NASAL SINUS SURGERY      PARTIAL SURGICAL REMOVAL OF BLADDER N/A 10/8/2024    Procedure: CYSTECTOMY, PARTIAL;  Surgeon: Compa Hensley MD;  Location: [facility] OR [address] [place] [address];  Service: Urology;  Laterality: N/A;    SHOULDER ARTHROSCOPY Right     TONSILLECTOMY      TOTAL KNEE ARTHROPLASTY Left 05/15/2017    TRANSURETHRAL RESECTION OF PROSTATE      TURBT (TRANSURETHRAL RESECTION OF BLADDER TUMOR) N/A 7/16/2024    Procedure: TURBT (TRANSURETHRAL RESECTION OF BLADDER TUMOR);  Surgeon: Vance Olivera MD;  Location: [facility];  Service: Urology;  Laterality: N/A;    TURBT (TRANSURETHRAL RESECTION OF BLADDER TUMOR) N/A 5/30/2025    Procedure: TURBT (TRANSURETHRAL RESECTION OF BLADDER TUMOR);  Surgeon: Compa Hensley MD;  Location: [facility] OR [address] [place] [address];  Service: Urology;  Laterality: N/A;    TURBT, WITH BLUE LIGHT CYSTOSCOPY AND CYSVIEW N/A 4/4/2025    Procedure: TURBT,WITH BLUE LIGHT CYSTOSCOPY AND CYSVIEW;  Surgeon: Compa Hensley MD;   "Location: [facility] OR [address] [place] [address];  Service: Urology;  Laterality: N/A;    TYMPANOPLASTY      TYMPANOPLASTY WITH MASTOIDECTOMY Left 3/15/2023    Procedure: TYMPANOPLASTY, WITH MASTOIDECTOMY;  Surgeon: Jose L Gudino MD;  Location: [facility] OR 2ND [facility];  Service: ENT;  Laterality: Left;    vesectomy          Family History:  Family History   Problem Relation Name Age of Onset    Arthritis Mother      Hypertension Mother      Heart disease Father      Hypertension Father      Stroke Father      Diabetes Son      Diabetes Maternal Grandmother      Colon cancer Paternal Grandmother          Social History:  Social History     Tobacco Use    Smoking status: Never    Smokeless tobacco: Never   Substance Use Topics    Alcohol use: Yes     Comment: " once a month"          OBJECTIVE:     There were no vitals filed for this visit.     Physical Exam    General: No acute distress. Nontoxic appearing.  HENT: Normocephalic. Atraumatic.  Respiratory: Normal respiratory effort. No conversational dyspnea. No audible wheezing.  Abdomen: No obvious distension.  Skin: No visible abnormalities.  Extremities: No edema upper extremities. No edema lower extremities.  Neurological: Alert and oriented x3. Normal speech.  Psychiatric: Normal mood. Normal affect. No evidence of SI.     LABS:    CBC:  Lab Results   Component Value Date    WBC 5.28 04/04/2025    HGB 14.3 04/04/2025    HCT 43.1 04/04/2025    MCV 95 04/04/2025     04/04/2025         BMP:  Lab Results   Component Value Date     04/04/2025    K 3.6 04/04/2025     04/04/2025    CO2 23 04/04/2025    BUN 18 04/04/2025    CREATININE 1.3 05/13/2025    CALCIUM 9.5 04/04/2025    ANIONGAP 9 04/04/2025    EGFRNORACEVR 58 (L) 05/13/2025         ASSESSMENT/PLAN:     Assessment: 74-year-old male with a history of nonmuscle invasive bladder cancer status post induction BCG, recurrence with CIS and high-grade TA    Plan:   -I had a thorough discussion regarding reinduction BCG, discussed the " risks and alternatives of this, patient tolerated the 1st induction course well, we will plan to proceed with reinduction BCG and monitoring his response with a follow up cystoscopy and cytology in 3 months    Discussed this patient's bladder cancer, which fits into risk category by NCCN of high risk    We discussed both induction and maintenance BCG therapy, and we will re-evaluate after the repeat induction therapy. See below schedule from [facility]:          In some instances where patients tumors no longer respond to BCG or chemotherapy, alternatives like doublet chemotherapy or placement on investigational clinical trail may be warranted to avoid major surgery to remove the bladder to prevent spread of bladder cancer.    - code applied: patient requires or will require a continuous, longitudinal, and active collaborative plan of care related to this patient's health condition, bladder cancer --the management of which requires the direction of a practitioner with specialized clinical knowledge, skill, and expertise.     This encounter was dictated and transcribed using DeepScribe and [employer], please excuse any typographical or grammatical errors.                   [1] (Not in a hospital admission)

## 2025-06-20 ENCOUNTER — PATIENT MESSAGE (OUTPATIENT)
Dept: UROLOGY | Facility: CLINIC | Age: 75
End: 2025-06-20
Payer: MEDICARE

## 2025-06-22 DIAGNOSIS — R10.13 DYSPEPSIA: ICD-10-CM

## 2025-06-22 DIAGNOSIS — R11.0 NAUSEA: ICD-10-CM

## 2025-06-23 RX ORDER — PANTOPRAZOLE SODIUM 40 MG/1
40 TABLET, DELAYED RELEASE ORAL DAILY
Qty: 90 TABLET | Refills: 1 | Status: SHIPPED | OUTPATIENT
Start: 2025-06-23 | End: 2025-12-20

## 2025-06-28 ENCOUNTER — HOSPITAL ENCOUNTER (OUTPATIENT)
Dept: SLEEP MEDICINE | Facility: HOSPITAL | Age: 75
Discharge: HOME | End: 2025-06-28
Attending: INTERNAL MEDICINE
Payer: MEDICARE

## 2025-06-28 DIAGNOSIS — G47.33 OSA ON CPAP: ICD-10-CM

## 2025-06-28 PROCEDURE — 95811 POLYSOM 6/>YRS CPAP 4/> PARM: CPT | Mod: HCNC

## 2025-06-30 ENCOUNTER — OFFICE VISIT (OUTPATIENT)
Dept: UROLOGY | Facility: CLINIC | Age: 75
End: 2025-06-30
Payer: MEDICARE

## 2025-06-30 VITALS
SYSTOLIC BLOOD PRESSURE: 132 MMHG | HEART RATE: 66 BPM | HEIGHT: 71 IN | DIASTOLIC BLOOD PRESSURE: 80 MMHG | BODY MASS INDEX: 26.91 KG/M2 | WEIGHT: 192.25 LBS

## 2025-06-30 DIAGNOSIS — C67.2 MALIGNANT NEOPLASM OF LATERAL WALL OF URINARY BLADDER: Primary | ICD-10-CM

## 2025-06-30 LAB
BILIRUBIN, UA POC OHS: NEGATIVE
BLOOD, UA POC OHS: ABNORMAL
CLARITY, UA POC OHS: CLEAR
COLOR, UA POC OHS: YELLOW
GLUCOSE, UA POC OHS: NEGATIVE
KETONES, UA POC OHS: NEGATIVE
LEUKOCYTES, UA POC OHS: ABNORMAL
NITRITE, UA POC OHS: NEGATIVE
PH, UA POC OHS: 5.5
PROTEIN, UA POC OHS: NEGATIVE
SPECIFIC GRAVITY, UA POC OHS: 1.02
UROBILINOGEN, UA POC OHS: 0.2

## 2025-06-30 PROCEDURE — 1101F PT FALLS ASSESS-DOCD LE1/YR: CPT | Mod: CPTII,HCNC,S$GLB, | Performed by: NURSE PRACTITIONER

## 2025-06-30 PROCEDURE — 51720 TREATMENT OF BLADDER LESION: CPT | Mod: HCNC,S$GLB,, | Performed by: NURSE PRACTITIONER

## 2025-06-30 PROCEDURE — 3044F HG A1C LEVEL LT 7.0%: CPT | Mod: CPTII,HCNC,S$GLB, | Performed by: NURSE PRACTITIONER

## 2025-06-30 PROCEDURE — 3075F SYST BP GE 130 - 139MM HG: CPT | Mod: CPTII,HCNC,S$GLB, | Performed by: NURSE PRACTITIONER

## 2025-06-30 PROCEDURE — 81003 URINALYSIS AUTO W/O SCOPE: CPT | Mod: QW,HCNC,S$GLB, | Performed by: NURSE PRACTITIONER

## 2025-06-30 PROCEDURE — 1126F AMNT PAIN NOTED NONE PRSNT: CPT | Mod: CPTII,HCNC,S$GLB, | Performed by: NURSE PRACTITIONER

## 2025-06-30 PROCEDURE — 3079F DIAST BP 80-89 MM HG: CPT | Mod: CPTII,HCNC,S$GLB, | Performed by: NURSE PRACTITIONER

## 2025-06-30 PROCEDURE — 3061F NEG MICROALBUMINURIA REV: CPT | Mod: CPTII,HCNC,S$GLB, | Performed by: NURSE PRACTITIONER

## 2025-06-30 PROCEDURE — 1160F RVW MEDS BY RX/DR IN RCRD: CPT | Mod: CPTII,HCNC,S$GLB, | Performed by: NURSE PRACTITIONER

## 2025-06-30 PROCEDURE — 99999 PR PBB SHADOW E&M-EST. PATIENT-LVL IV: CPT | Mod: PBBFAC,HCNC,, | Performed by: NURSE PRACTITIONER

## 2025-06-30 PROCEDURE — 99499 UNLISTED E&M SERVICE: CPT | Mod: HCNC,S$GLB,, | Performed by: NURSE PRACTITIONER

## 2025-06-30 PROCEDURE — 3066F NEPHROPATHY DOC TX: CPT | Mod: CPTII,HCNC,S$GLB, | Performed by: NURSE PRACTITIONER

## 2025-06-30 PROCEDURE — 4010F ACE/ARB THERAPY RXD/TAKEN: CPT | Mod: CPTII,HCNC,S$GLB, | Performed by: NURSE PRACTITIONER

## 2025-06-30 PROCEDURE — 1159F MED LIST DOCD IN RCRD: CPT | Mod: CPTII,HCNC,S$GLB, | Performed by: NURSE PRACTITIONER

## 2025-06-30 PROCEDURE — 3288F FALL RISK ASSESSMENT DOCD: CPT | Mod: CPTII,HCNC,S$GLB, | Performed by: NURSE PRACTITIONER

## 2025-06-30 PROCEDURE — 3008F BODY MASS INDEX DOCD: CPT | Mod: CPTII,HCNC,S$GLB, | Performed by: NURSE PRACTITIONER

## 2025-06-30 NOTE — PROGRESS NOTES
CHIEF COMPLAINT:    Rigoberto Vasquez is a 74 y.o. male presents today for Bladder Cancer.     HISTORY OF PRESENTING ILLINESS:    Rigoberto Vsaquez is a 74 y.o. Type 2 DIABETIC male who is an established patient in our clinic. He has a history of BPH with LUTS managed with Flomax and Finasteride. Hx of TURP ~ 10 years ago. ED > 1 year. He aslos has a history of recurrent Bladder Cancer. He was initially diagnosed 07/16/2024 with Hg T1 with Dr. Olivera. Treated with BCG.   08/07/2024 he established care with Dr. Hensley.     He is here today to begin another Induction BCG; dose 1 of 5.   Voices no urinary complaints today.         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, status post TURBT on 07/16/2024 for high-grade T1 into a diverticulum  Status post open bladder diverticulectomy and lymphadenectomy on 10/08/2024, pathology T 0 N0, negative for residual malignancy  Status post induction BCG (completed 01/13/2025)  Recurrence in April 2025, with a CIS in the posterior bladder wall, high-grade TA present on the right lateral bladder wall, high-grade TA on the right posterior bladder wall, and high-grade TA on the bladder dome  Repeat resection on 05/30/2025 showed no evidence of bladder cancer recurrence  BPH with a history of TURP approximately 10 years ago, currently on finasteride and tamsulosin  Erectile dysfunction                 REVIEW OF SYSTEMS:  Review of Systems   Constitutional: Negative.  Negative for chills and fever.   Eyes:  Negative for double vision.   Respiratory:  Negative for cough and shortness of breath.    Cardiovascular:  Negative for chest pain and palpitations.   Gastrointestinal:  Negative for abdominal pain, constipation, diarrhea, nausea and vomiting.   Genitourinary:  Negative for dysuria, flank pain, frequency, hematuria and urgency.        See HPI   Musculoskeletal:  Negative for falls.   Neurological:  Negative for dizziness and seizures.    Endo/Heme/Allergies:  Negative for polydipsia.         PATIENT HISTORY:    Past Medical History:   Diagnosis Date    Acid reflux     [facility]    Angina pectoris     Back pain     BPH (benign prostatic hyperplasia)     blue    Cholesteatoma     Class 2 severe obesity due to excess calories with serious comorbidity and body mass index (BMI) of 37.0 to 37.9 in adult 11/11/2015    Degenerative joint disease     Diabetes mellitus type 2 with complications 3/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        Past Surgical History:   Procedure Laterality Date    BIOPSY OF BLADDER N/A 8/30/2024    Procedure: BIOPSY, BLADDER;  Surgeon: Compa Hensley MD;  [facility];  Service: Urology;  Laterality: N/A;    BLADDER DIVERTICULECTOMY Right 10/8/2024    Procedure: EXCISION, DIVERTICULUM, BLADDER;  Surgeon: Compa Hensley MD;  Location: [facility] OR 2ND FLR;  Service: Urology;  Laterality: Right;    BLADDER FULGURATION N/A 8/30/2024    Procedure: FULGURATION, BLADDER;  Surgeon: Compa Hensley MD;  Location: [facility];  Service: Urology;  Laterality: N/A;    CARDIAC PACEMAKER PLACEMENT      COLONOSCOPY N/A 9/13/2019    Procedure: COLONOSCOPY;  Surgeon: Kan Ramsey III, MD;  Location: [facility];  Service: Endoscopy;  Laterality: N/A;    COLONOSCOPY N/A 9/22/2022    Procedure: COLONOSCOPY;  Surgeon: Chris Garcia MD;  Location: [facility];  Service: Endoscopy;  Laterality: N/A;    CYSTOSCOPY  N/A 8/30/2024    Procedure: CYSTOSCOPY;  Surgeon: oCmpa Hensley MD;  Location: [facility] OR 1ST FLR;  Service: Urology;  Laterality: N/A;  with blue light    CYSTOSCOPY N/A 5/30/2025    Procedure: CYSTOSCOPY;  Surgeon: Compa Hensley MD;  Location: [facility] OR 1ST FLR;  Service: Urology;  Laterality: N/A;    CYSTOSCOPY W/ RETROGRADES Bilateral 7/16/2024    Procedure: CYSTOSCOPY, WITH RETROGRADE PYELOGRAM;  Surgeon: Vance Olivera MD;  Location: [facility];  Service: Urology;  Laterality: Bilateral;    CYSTOSCOPY WITH BIOPSY OF BLADDER N/A 4/4/2025    Procedure: CYSTOSCOPY, WITH BLADDER BIOPSY, WITH FULGURATION;  Surgeon: Compa Hensley MD;  [facility];  Service: Urology;  Laterality: N/A;    CYSTOSCOPY,WITH URETERAL CATHETER INSERTION Bilateral 10/8/2024    Procedure: CYSTOSCOPY,WITH URETERAL CATHETER INSERTION;  Surgeon: Compa Hensley MD;  Location: [facility] OR 2ND FLR;  Service: Urology;  Laterality: Bilateral;    ESOPHAGOGASTRODUODENOSCOPY N/A 9/13/2019    Procedure: ESOPHAGOGASTRODUODENOSCOPY (EGD);  Surgeon: Kan Ramsey III, MD;  Location: [facility];  Service: Endoscopy;  Laterality: N/A;    ESOPHAGOGASTRODUODENOSCOPY N/A 9/3/2021    Procedure: EGD (ESOPHAGOGASTRODUODENOSCOPY);  Surgeon: Kaylene Pineda MD;  Location: [facility];  Service: Endoscopy;  Laterality: N/A;    ESOPHAGOGASTRODUODENOSCOPY N/A 5/24/2023    Procedure: EGD (ESOPHAGOGASTRODUODENOSCOPY);  Surgeon: Cory Bennett MD;  Location: [facility];  Service: Endoscopy;  Laterality: N/A;    FLEXIBLE CYSTOSCOPY N/A 10/8/2024    Procedure: CYSTOSCOPY, FLEXIBLE;  Surgeon: Compa Hensley MD;  Location: [facility] OR 2ND FLR;  Service: Urology;  Laterality: N/A;    INSERTION OF TYMPANOSTOMY TUBE Right 3/15/2023    Procedure: INSERTION, TYMPANOSTOMY TUBE;  Surgeon: Jose L Gudino MD;  Location: [facility] OR [address] [place] [address];  Service: ENT;  Laterality: Right;    INSTILLATION OF URINARY BLADDER N/A 10/8/2024    Procedure: INSTILLATION, BLADDER;  Surgeon: Shlelie,  Compa LOCKE MD;  Location: [facility] OR [address] [place] [address];  Service: Urology;  Laterality: N/A;  Chemotherapy instillation bladder    JOINT REPLACEMENT      KNEE CARTILAGE SURGERY Bilateral     LYMPHADENECTOMY, PELVIS Right 10/8/2024    Procedure: LYMPHADENECTOMY, PELVIS;  Surgeon: Compa Hensley MD;  Location: [facility] OR [address] [place] [address];  Service: Urology;  Laterality: Right;    NASAL SINUS SURGERY      PARTIAL SURGICAL REMOVAL OF BLADDER N/A 10/8/2024    Procedure: CYSTECTOMY, PARTIAL;  Surgeon: Compa Hensley MD;  Location: [facility] OR [address] [place] [address];  Service: Urology;  Laterality: N/A;    SHOULDER ARTHROSCOPY Right     TONSILLECTOMY      TOTAL KNEE ARTHROPLASTY Left 05/15/2017    TRANSURETHRAL RESECTION OF PROSTATE      TURBT (TRANSURETHRAL RESECTION OF BLADDER TUMOR) N/A 7/16/2024    Procedure: TURBT (TRANSURETHRAL RESECTION OF BLADDER TUMOR);  Surgeon: Vance Olivera MD;  Location: [facility];  Service: Urology;  Laterality: N/A;    TURBT (TRANSURETHRAL RESECTION OF BLADDER TUMOR) N/A 5/30/2025    Procedure: TURBT (TRANSURETHRAL RESECTION OF BLADDER TUMOR);  Surgeon: Compa Hensley MD;  Location: [facility] OR [address] [place] [address];  Service: Urology;  Laterality: N/A;    TURBT, WITH BLUE LIGHT CYSTOSCOPY AND CYSVIEW N/A 4/4/2025    Procedure: TURBT,WITH BLUE LIGHT CYSTOSCOPY AND CYSVIEW;  Surgeon: Compa Hensley MD;  Location: [facility] OR [address] [place] [address];  Service: Urology;  Laterality: N/A;    TYMPANOPLASTY      TYMPANOPLASTY WITH MASTOIDECTOMY Left 3/15/2023    Procedure: TYMPANOPLASTY, WITH MASTOIDECTOMY;  Surgeon: Jose L Gudino MD;  Location: [address];  Service: ENT;  Laterality: Left;    vesectomy         Family History   Problem Relation Name Age of Onset    Arthritis Mother      Hypertension Mother      Heart disease Father      Hypertension Father      Stroke Father      Diabetes Son      Diabetes Maternal Grandmother      Colon cancer Paternal Grandmother         Social History[1]    Allergies:  Aspirin, Meperidine, and Niacin    Medications:  Current  Medications[2]    PHYSICAL EXAMINATION:  Physical Exam  Vitals and nursing note reviewed.   Constitutional:       General: He is awake.      Appearance: Normal appearance.   HENT:      Head: Normocephalic.      Right Ear: External ear normal.      Left Ear: External ear normal.      Nose: Nose normal.   Cardiovascular:      Rate and Rhythm: Normal rate.   Pulmonary:      Effort: Pulmonary effort is normal. No respiratory distress.   Abdominal:      Tenderness: There is no abdominal tenderness. There is no right CVA tenderness or left CVA tenderness.   Genitourinary:     Penis: Normal.       Testes: Normal.   Musculoskeletal:         General: Normal range of motion.      Cervical back: Normal range of motion.   Skin:     General: Skin is warm and dry.   Neurological:      General: No focal deficit present.      Mental Status: He is alert and oriented to person, place, and time.   Psychiatric:         Mood and Affect: Mood normal.         Behavior: Behavior is cooperative.           LABS:      In office UA today was clear of active infection and blood.       Lab Results   Component Value Date    PSA 1.4 06/25/2020    PSA 2.3 12/30/2013    PSA 1.15 02/11/2013    PSADIAG 0.19 06/21/2024    PSADIAG 0.33 08/11/2023    PSADIAG 0.67 08/23/2022       Lab Results   Component Value Date    CREATININE 1.3 05/13/2025    EGFRNORACEVR 58 (L) 05/13/2025               IMPRESSION:    Encounter Diagnoses   Name Primary?    Malignant neoplasm of lateral wall of urinary bladder Yes       Induction BCG; dose 1 of 5      Assessment:       1. Malignant neoplasm of lateral wall of urinary bladder        Plan:         I spent a total of 30 minutes on the day of the visit. This includes face to face time and non-face to face time preparing to see the patient (eg, review of tests), obtaining and/or reviewing separately obtained history, documenting clinical information in the electronic or other health record, independently interpreting  "results and communicating results to the patient/family/caregiver, or care coordinator  10 minutes pre counseling including last installation  10 minutes prep, travel to get medication & installation of the BCG  10 minutes post installation instructions and room clean up  We addressed his UA  Education and recommendations of today's plan of care with the BCG Bladder Installations; including home remedies and needed follow up with PCP.   BCG dose 1 of 5 was instilled using sterile and BCG precautions.  Tolerated well   We discussed his Bladder Cancer; discussed after TURBT; cancer was removed.  Discussed BCG and the expectations, benefits, risks, as well as importance of post clean up for 6 hours after the 2 hour urination.  They was given detailed written instructions as well.   Diet modifications; no caffeine the morning of installation; increase water intake at the 2 hour void to flush out.  No sex for 48 hours after installation; use of condom during the 5 week installations.  RTC one week for dose 2 of 5.    The visit today is associated with current or anticipated ongoing medical care related to this patient's single serious condition/complex condition.              [1]   Social History  Socioeconomic History    Marital status:      Spouse name: SAUL    Number of children: 2   Occupational History    Occupation: retired- [employer] .   Tobacco Use    Smoking status: Never    Smokeless tobacco: Never   Substance and Sexual Activity    Alcohol use: Yes     Comment: " once a month"    Drug use: No    Sexual activity: Yes     Partners: Female   Social History Narrative     . Lives with spouse. Has 2 children. Patient retired from [employer]. His son has type 1 diabetes.     Social Drivers of Health     Financial Resource Strain: Low Risk  (3/31/2025)    Overall Financial Resource Strain (CARDIA)     Difficulty of Paying Living Expenses: Not hard at all   Food Insecurity: No Food " "Insecurity (3/31/2025)    Hunger Vital Sign     Worried About Running Out of Food in the Last Year: Never true     Ran Out of Food in the Last Year: Never true   Transportation Needs: No Transportation Needs (3/31/2025)    PRAPARE - Transportation     Lack of Transportation (Medical): No     Lack of Transportation (Non-Medical): No   Physical Activity: Inactive (3/31/2025)    Exercise Vital Sign     Days of Exercise per Week: 0 days     Minutes of Exercise per Session: 0 min   Stress: No Stress Concern Present (3/31/2025)    [facility] of Occupational Health - Occupational Stress Questionnaire     Feeling of Stress : Not at all   Housing Stability: Low Risk  (3/31/2025)    Housing Stability Vital Sign     Unable to Pay for Housing in the Last Year: No     Number of Times Moved in the Last Year: 0     Homeless in the Last Year: No   [2]   Current Outpatient Medications:     ACETAMINOPHEN (TYLENOL 8 HOUR ORAL), Take by mouth as needed., Disp: , Rfl:     azelastine (ASTELIN) 137 mcg (0.1 %) nasal spray, 2 sprays (274 mcg total) by Nasal route 2 (two) times daily., Disp: 30 mL, Rfl: 12    blood sugar diagnostic (ACCU-CHEK JAXON) Strp, Check BG 2-3 times daily. Accuchek jaxon strips, Disp: 300 strip, Rfl: 3    dorzolamide-timolol 2-0.5% (COSOPT) 22.3-6.8 mg/mL ophthalmic solution, Place 1 drop into both eyes 2 (two) times daily., Disp: 10 mL, Rfl: 5    DROPLET PEN NEEDLE 31 gauge x 3/16" Ndle, USE AS DIRECTED  WITH  INSULIN, Disp: 200 each, Rfl: 3    dutasteride (AVODART) 0.5 mg capsule, Take 1 capsule (0.5 mg total) by mouth once daily., Disp: 90 capsule, Rfl: 3    famotidine (PEPCID) 20 MG tablet, Take 1 tablet (20 mg total) by mouth 2 (two) times daily., Disp: 180 tablet, Rfl: 3    fenofibrate micronized (LOFIBRA) 200 MG Cap, Take 1 capsule (200 mg total) by mouth every evening., Disp: 90 capsule, Rfl: 3    fluticasone propionate (FLONASE) 50 mcg/actuation nasal spray, 1 spray by Each Nostril route once daily., " Disp: , Rfl:     gabapentin (CMPD PAIN MANAGEMENT COMPOUND OINTMNENT THREE), Apply bid prn pain, Disp: 100 g, Rfl: 11    hydrocortisone 2.5 % cream, Apply topically 2 (two) times daily., Disp: 3.5 g, Rfl: 5    insulin glargine U-100, Lantus, (LANTUS SOLOSTAR U-100 INSULIN) 100 unit/mL (3 mL) InPn pen, 16 units in the AM and 4 units in the PM, Disp: 3 mL, Rfl: 11    latanoprost 0.005 % ophthalmic solution, INSTILL 1 DROP INTO BOTH EYES ONE TIME DAILY, Disp: 7.5 mL, Rfl: 3    levocetirizine (XYZAL) 5 MG tablet, Take 5 mg by mouth every evening., Disp: , Rfl:     loratadine (CLARITIN) 10 mg tablet, Take 10 mg by mouth once daily., Disp: , Rfl:     losartan (COZAAR) 25 MG tablet, Take 1 tablet (25 mg total) by mouth once daily., Disp: 90 tablet, Rfl: 3    lovastatin (MEVACOR) 40 MG tablet, Take 1 tablet by mouth Every evening., Disp: 90 tablet, Rfl: 3    multivitamin capsule, Take 1 capsule by mouth once daily., Disp: , Rfl:     oxyCODONE (ROXICODONE) 5 MG immediate release tablet, Take 1 tablet (5 mg total) by mouth every 4 (four) hours as needed for Pain., Disp: 5 tablet, Rfl: 0    pantoprazole (PROTONIX) 40 MG tablet, Take 1 tablet (40 mg total) by mouth once daily., Disp: 90 tablet, Rfl: 1    polyethylene glycol (GLYCOLAX) 17 gram/dose powder, Use to cap to measure 17g, mix with liquid, and take by mouth once daily., Disp: 510 g, Rfl: 0    semaglutide (OZEMPIC) 1 mg/dose (4 mg/3 mL), Inject 1 mg into the skin every 7 days., Disp: 9 mL, Rfl: 4    sildenafil (REVATIO) 20 mg Tab, Take 1 tablet (20 mg total) by mouth daily as needed (PRN). Takes prn, Disp: 30 tablet, Rfl: 11    sodium chloride (SALINE NASAL) 0.65 % nasal spray, 2 sprays by Nasal route as needed for Congestion., Disp: 44 mL, Rfl: 0    triamcinolone acetonide 0.1% (KENALOG) 0.1 % cream, AAA bid, Disp: 454 g, Rfl: 0    Current Facility-Administered Medications:     BCG live (JESUS) 50 mg in 0.9% NaCl 50 mL bladder instillation, 50 mg, Intravesical, Once,  Lori Celeste, NP

## 2025-07-02 ENCOUNTER — TELEPHONE (OUTPATIENT)
Dept: OPHTHALMOLOGY | Facility: CLINIC | Age: 75
End: 2025-07-02
Payer: MEDICARE

## 2025-07-02 NOTE — TELEPHONE ENCOUNTER
Copied from CRM #[id number]. Topic: Appointments - Appointment Rescheduling  >> Jul 2, 2025  2:20 PM Neida wrote:  Type:  Patient Requesting Call Back    Who Called:KAT  Does the patient know what this is regarding?: PATIENT WANTS TO SCHEDULE AN APPT  Would the patient rather a call back or a response via [facility]? PLEASE CALL BACK  Best Call Back Number:[phone]  Additional Information:

## 2025-07-07 ENCOUNTER — OFFICE VISIT (OUTPATIENT)
Dept: UROLOGY | Facility: CLINIC | Age: 75
End: 2025-07-07
Payer: MEDICARE

## 2025-07-07 ENCOUNTER — PATIENT MESSAGE (OUTPATIENT)
Dept: UROLOGY | Facility: CLINIC | Age: 75
End: 2025-07-07

## 2025-07-07 VITALS
HEIGHT: 71 IN | HEART RATE: 68 BPM | DIASTOLIC BLOOD PRESSURE: 71 MMHG | WEIGHT: 191.94 LBS | SYSTOLIC BLOOD PRESSURE: 116 MMHG | BODY MASS INDEX: 26.87 KG/M2

## 2025-07-07 DIAGNOSIS — C67.2 MALIGNANT NEOPLASM OF LATERAL WALL OF URINARY BLADDER: Primary | ICD-10-CM

## 2025-07-07 DIAGNOSIS — H40.013 OPEN ANGLE WITH BORDERLINE FINDINGS AND LOW GLAUCOMA RISK IN BOTH EYES: ICD-10-CM

## 2025-07-07 LAB
BILIRUBIN, UA POC OHS: NEGATIVE
BLOOD, UA POC OHS: NEGATIVE
CLARITY, UA POC OHS: CLEAR
COLOR, UA POC OHS: YELLOW
GLUCOSE, UA POC OHS: NEGATIVE
KETONES, UA POC OHS: NEGATIVE
LEUKOCYTES, UA POC OHS: ABNORMAL
NITRITE, UA POC OHS: NEGATIVE
PH, UA POC OHS: 6
PROTEIN, UA POC OHS: NEGATIVE
SPECIFIC GRAVITY, UA POC OHS: 1.02
UROBILINOGEN, UA POC OHS: 0.2

## 2025-07-07 PROCEDURE — 99499 UNLISTED E&M SERVICE: CPT | Mod: HCNC,S$GLB,,

## 2025-07-07 PROCEDURE — 51720 TREATMENT OF BLADDER LESION: CPT | Mod: HCNC,S$GLB,,

## 2025-07-07 PROCEDURE — 4010F ACE/ARB THERAPY RXD/TAKEN: CPT | Mod: CPTII,HCNC,S$GLB,

## 2025-07-07 PROCEDURE — 3044F HG A1C LEVEL LT 7.0%: CPT | Mod: CPTII,HCNC,S$GLB,

## 2025-07-07 PROCEDURE — 1126F AMNT PAIN NOTED NONE PRSNT: CPT | Mod: CPTII,HCNC,S$GLB,

## 2025-07-07 PROCEDURE — 81003 URINALYSIS AUTO W/O SCOPE: CPT | Mod: QW,HCNC,S$GLB,

## 2025-07-07 PROCEDURE — 1159F MED LIST DOCD IN RCRD: CPT | Mod: CPTII,HCNC,S$GLB,

## 2025-07-07 PROCEDURE — 3074F SYST BP LT 130 MM HG: CPT | Mod: CPTII,HCNC,S$GLB,

## 2025-07-07 PROCEDURE — 3078F DIAST BP <80 MM HG: CPT | Mod: CPTII,HCNC,S$GLB,

## 2025-07-07 PROCEDURE — 1101F PT FALLS ASSESS-DOCD LE1/YR: CPT | Mod: CPTII,HCNC,S$GLB,

## 2025-07-07 PROCEDURE — 3008F BODY MASS INDEX DOCD: CPT | Mod: CPTII,HCNC,S$GLB,

## 2025-07-07 PROCEDURE — 3066F NEPHROPATHY DOC TX: CPT | Mod: CPTII,HCNC,S$GLB,

## 2025-07-07 PROCEDURE — 3061F NEG MICROALBUMINURIA REV: CPT | Mod: CPTII,HCNC,S$GLB,

## 2025-07-07 PROCEDURE — 99999 PR PBB SHADOW E&M-EST. PATIENT-LVL IV: CPT | Mod: PBBFAC,HCNC,,

## 2025-07-07 PROCEDURE — 3288F FALL RISK ASSESSMENT DOCD: CPT | Mod: CPTII,HCNC,S$GLB,

## 2025-07-07 RX ORDER — DORZOLAMIDE HYDROCHLORIDE AND TIMOLOL MALEATE 20; 5 MG/ML; MG/ML
1 SOLUTION/ DROPS OPHTHALMIC 2 TIMES DAILY
Qty: 10 ML | Refills: 5 | Status: SHIPPED | OUTPATIENT
Start: 2025-07-07 | End: 2026-07-07

## 2025-07-07 NOTE — PROGRESS NOTES
CHIEF COMPLAINT:    Rigoberto Vasquez is a 74 y.o. male presents today for Bladder Cancer.     HISTORY OF PRESENTING ILLINESS:    Rigoberto Vasquez is a 74 y.o. Type 2 DIABETIC male who is an established patient in our clinic. He has a history of BPH with LUTS managed with Flomax and Finasteride. Hx of TURP ~ 10 years ago. ED > 1 year. He aslos has a history of recurrent Bladder Cancer. He was initially diagnosed 07/16/2024 with Hg T1 with Dr. Olivera. Treated with BCG.   08/07/2024 he established care with Dr. Hensley.     He is here today to begin another Induction BCG; dose 2 of 5.   Voices no urinary complaints today. He was able to hold his bladder last week for about 1.5 hours.     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, status post TURBT on 07/16/2024 for high-grade T1 into a diverticulum  Status post open bladder diverticulectomy and lymphadenectomy on 10/08/2024, pathology T 0 N0, negative for residual malignancy  Status post induction BCG (completed 01/13/2025)  Recurrence in April 2025, with a CIS in the posterior bladder wall, high-grade TA present on the right lateral bladder wall, high-grade TA on the right posterior bladder wall, and high-grade TA on the bladder dome  Repeat resection on 05/30/2025 showed no evidence of bladder cancer recurrence  BPH with a history of TURP approximately 10 years ago, currently on finasteride and tamsulosin  Erectile dysfunction     REVIEW OF SYSTEMS:  Review of Systems   Constitutional: Negative.  Negative for chills and fever.   Eyes:  Negative for double vision.   Respiratory:  Negative for cough and shortness of breath.    Cardiovascular:  Negative for chest pain and palpitations.   Gastrointestinal:  Negative for abdominal pain, constipation, diarrhea, nausea and vomiting.   Genitourinary:  Negative for dysuria, flank pain, frequency, hematuria and urgency.        See HPI   Musculoskeletal:  Negative for falls.   Neurological:  Negative  for dizziness and seizures.   Endo/Heme/Allergies:  Negative for polydipsia.     PATIENT HISTORY:  Past Medical History:   Diagnosis Date    Acid reflux     [facility]    Angina pectoris     Back pain     BPH (benign prostatic hyperplasia)     blue    Cholesteatoma     Class 2 severe obesity due to excess calories with serious comorbidity and body mass index (BMI) of 37.0 to 37.9 in adult 11/11/2015    Degenerative joint disease     Diabetes mellitus type 2 with complications 3/31/2025    Diverticulosis     Erectile dysfunction     History of elevated PSA 02/2014    normalized, dr dave annually    History of pericarditis     Hypercholesteremia     Hypertension     Iron deficiency anemia     Malignant neoplasm of lateral wall of urinary bladder 8/5/2024    СВЕТЛАНА (obstructive sleep apnea) 05/17/2022    Pacemaker     complete av block;NO afib    Renal disorder     Squamous cell cancer of skin of mastoid region of scalp     dr jaida saglado    Type 2 diabetes mellitus     dr wyman    Urinary incontinence     when he was 7 Yrs old.      Past Surgical History:   Procedure Laterality Date    BIOPSY OF BLADDER N/A 8/30/2024    Procedure: BIOPSY, BLADDER;  Surgeon: Compa Hensley MD;  Location: [facility] OR [address] [place] [address];  Service: Urology;  Laterality: N/A;    BLADDER DIVERTICULECTOMY Right 10/8/2024    Procedure: EXCISION, DIVERTICULUM, BLADDER;  Surgeon: Compa Hensley MD;  Location: [facility] OR 2ND FLR;  Service: Urology;  Laterality: Right;    BLADDER FULGURATION N/A 8/30/2024    Procedure: FULGURATION, BLADDER;  Surgeon: Compa Hensley MD;  [facility];  Service: Urology;  Laterality: N/A;    CARDIAC PACEMAKER PLACEMENT      COLONOSCOPY N/A 9/13/2019    Procedure: COLONOSCOPY;  Surgeon: Kan Ramsey III, MD;  Location: [facility];  Service: Endoscopy;  Laterality: N/A;    COLONOSCOPY N/A 9/22/2022    Procedure: COLONOSCOPY;  Surgeon: Chris Garcia MD;  Location: [facility] ENDO;  Service: Endoscopy;  Laterality:  N/A;    CYSTOSCOPY N/A 8/30/2024    Procedure: CYSTOSCOPY;  Surgeon: Compa Hensley MD;  Location: [facility] OR 1ST FLR;  Service: Urology;  Laterality: N/A;  with blue light    CYSTOSCOPY N/A 5/30/2025    Procedure: CYSTOSCOPY;  Surgeon: Compa Hensley MD;  Location: [facility] OR 1ST FLR;  Service: Urology;  Laterality: N/A;    CYSTOSCOPY W/ RETROGRADES Bilateral 7/16/2024    Procedure: CYSTOSCOPY, WITH RETROGRADE PYELOGRAM;  Surgeon: Vance Olivera MD;  Location: [facility];  Service: Urology;  Laterality: Bilateral;    CYSTOSCOPY WITH BIOPSY OF BLADDER N/A 4/4/2025    Procedure: CYSTOSCOPY, WITH BLADDER BIOPSY, WITH FULGURATION;  Surgeon: Compa Hensley MD;  Location: [facility] OR [address] [place] [address];  Service: Urology;  Laterality: N/A;    CYSTOSCOPY,WITH URETERAL CATHETER INSERTION Bilateral 10/8/2024    Procedure: CYSTOSCOPY,WITH URETERAL CATHETER INSERTION;  Surgeon: Compa Hensley MD;  [facility];  Service: Urology;  Laterality: Bilateral;    ESOPHAGOGASTRODUODENOSCOPY N/A 9/13/2019    Procedure: ESOPHAGOGASTRODUODENOSCOPY (EGD);  Surgeon: Kan Ramsey III, MD;  Location: [facility];  Service: Endoscopy;  Laterality: N/A;    ESOPHAGOGASTRODUODENOSCOPY N/A 9/3/2021    Procedure: EGD (ESOPHAGOGASTRODUODENOSCOPY);  Surgeon: Kaylene Pineda MD;  Location: [facility];  Service: Endoscopy;  Laterality: N/A;    ESOPHAGOGASTRODUODENOSCOPY N/A 5/24/2023    Procedure: EGD (ESOPHAGOGASTRODUODENOSCOPY);  Surgeon: Cory Bennett MD;  Location: [facility];  Service: Endoscopy;  Laterality: N/A;    FLEXIBLE CYSTOSCOPY N/A 10/8/2024    Procedure: CYSTOSCOPY, FLEXIBLE;  Surgeon: Compa Hensley MD;  Location: [facility] OR 2ND FLR;  Service: Urology;  Laterality: N/A;    INSERTION OF TYMPANOSTOMY TUBE Right 3/15/2023    Procedure: INSERTION, TYMPANOSTOMY TUBE;  Surgeon: Jose L Gudino MD;  Location: [facility] OR [address] [place] [address];  Service: ENT;  Laterality: Right;    INSTILLATION OF URINARY BLADDER N/A 10/8/2024    Procedure: INSTILLATION,  BLADDER;  Surgeon: Compa Hensley MD;  Location: [facility] OR [address] [place] [address];  Service: Urology;  Laterality: N/A;  Chemotherapy instillation bladder    JOINT REPLACEMENT      KNEE CARTILAGE SURGERY Bilateral     LYMPHADENECTOMY, PELVIS Right 10/8/2024    Procedure: LYMPHADENECTOMY, PELVIS;  Surgeon: Compa Hensley MD;  [facility];  Service: Urology;  Laterality: Right;    NASAL SINUS SURGERY      PARTIAL SURGICAL REMOVAL OF BLADDER N/A 10/8/2024    Procedure: CYSTECTOMY, PARTIAL;  Surgeon: Compa Hensley MD;  Location: [facility] OR [address] [place] [address];  Service: Urology;  Laterality: N/A;    SHOULDER ARTHROSCOPY Right     TONSILLECTOMY      TOTAL KNEE ARTHROPLASTY Left 05/15/2017    TRANSURETHRAL RESECTION OF PROSTATE      TURBT (TRANSURETHRAL RESECTION OF BLADDER TUMOR) N/A 7/16/2024    Procedure: TURBT (TRANSURETHRAL RESECTION OF BLADDER TUMOR);  Surgeon: Vance Olivera MD;  Location: [facility];  Service: Urology;  Laterality: N/A;    TURBT (TRANSURETHRAL RESECTION OF BLADDER TUMOR) N/A 5/30/2025    Procedure: TURBT (TRANSURETHRAL RESECTION OF BLADDER TUMOR);  Surgeon: Compa Hensley MD;  Location: [facility] OR [address] [place] [address];  Service: Urology;  Laterality: N/A;    TURBT, WITH BLUE LIGHT CYSTOSCOPY AND CYSVIEW N/A 4/4/2025    Procedure: TURBT,WITH BLUE LIGHT CYSTOSCOPY AND CYSVIEW;  Surgeon: Compa Hensley MD;  Location: [facility] OR [address] [place] [address];  Service: Urology;  Laterality: N/A;    TYMPANOPLASTY      TYMPANOPLASTY WITH MASTOIDECTOMY Left 3/15/2023    Procedure: TYMPANOPLASTY, WITH MASTOIDECTOMY;  Surgeon: Jose L Gudino MD;  Location: [facility] OR [address] [place] [address];  Service: ENT;  Laterality: Left;    vesectomy       Family History   Problem Relation Name Age of Onset    Arthritis Mother      Hypertension Mother      Heart disease Father      Hypertension Father      Stroke Father      Diabetes Son      Diabetes Maternal Grandmother      Colon cancer Paternal Grandmother       Social History[1]    Allergies:  Aspirin, Meperidine, and  Niacin    Medications:  Current Medications[2]    PHYSICAL EXAMINATION:  Physical Exam  Vitals and nursing note reviewed.   Constitutional:       General: He is awake.      Appearance: Normal appearance.   HENT:      Head: Normocephalic.      Right Ear: External ear normal.      Left Ear: External ear normal.      Nose: Nose normal.   Cardiovascular:      Rate and Rhythm: Normal rate.   Pulmonary:      Effort: Pulmonary effort is normal. No respiratory distress.   Abdominal:      Tenderness: There is no abdominal tenderness. There is no right CVA tenderness or left CVA tenderness.   Genitourinary:     Penis: Normal.       Testes: Normal.   Musculoskeletal:         General: Normal range of motion.      Cervical back: Normal range of motion.   Skin:     General: Skin is warm and dry.   Neurological:      General: No focal deficit present.      Mental Status: He is alert and oriented to person, place, and time.   Psychiatric:         Mood and Affect: Mood normal.         Behavior: Behavior is cooperative.       LABS:  In office UA today was clear of active infection and blood.     Lab Results   Component Value Date    PSA 1.4 06/25/2020    PSA 2.3 12/30/2013    PSA 1.15 02/11/2013    PSADIAG 0.19 06/21/2024    PSADIAG 0.33 08/11/2023    PSADIAG 0.67 08/23/2022     Lab Results   Component Value Date    CREATININE 1.3 05/13/2025    EGFRNORACEVR 58 (L) 05/13/2025     IMPRESSION:  Encounter Diagnoses   Name Primary?    Malignant neoplasm of lateral wall of urinary bladder Yes     Induction BCG; dose 2 of 5    Assessment:       1. Malignant neoplasm of lateral wall of urinary bladder        Plan:         I spent a total of 30 minutes on the day of the visit. This includes face to face time and non-face to face time preparing to see the patient (eg, review of tests), obtaining and/or reviewing separately obtained history, documenting clinical information in the electronic or other health record, independently interpreting  "results and communicating results to the patient/family/caregiver, or care coordinator  10 minutes pre counseling including last installation  10 minutes prep, travel to get medication & installation of the BCG  10 minutes post installation instructions and room clean up  We addressed his UA  Education and recommendations of today's plan of care with the BCG Bladder Installations; including home remedies and needed follow up with PCP.   BCG dose 2 of 5 was instilled using sterile and BCG precautions.  Tolerated well   We discussed his Bladder Cancer; discussed after TURBT; cancer was removed.  Discussed BCG and the expectations, benefits, risks, as well as importance of post clean up for 6 hours after the 2 hour urination.  They was given detailed written instructions as well.   Diet modifications; no caffeine the morning of installation; increase water intake at the 2 hour void to flush out.  No sex for 48 hours after installation; use of condom during the 5 week installations.  RTC one week for dose 3 of 5.    The visit today is associated with current or anticipated ongoing medical care related to this patient's single serious condition/complex condition.                [1]   Social History  Socioeconomic History    Marital status:      Spouse name: SAUL    Number of children: 2   Occupational History    Occupation: retired- [employer] .   Tobacco Use    Smoking status: Never    Smokeless tobacco: Never   Substance and Sexual Activity    Alcohol use: Yes     Comment: " once a month"    Drug use: No    Sexual activity: Yes     Partners: Female   Social History Narrative     . Lives with spouse. Has 2 children. Patient retired from [employer]. His son has type 1 diabetes.     Social Drivers of Health     Financial Resource Strain: Low Risk  (3/31/2025)    Overall Financial Resource Strain (CARDIA)     Difficulty of Paying Living Expenses: Not hard at all   Food Insecurity: No Food " "Insecurity (3/31/2025)    Hunger Vital Sign     Worried About Running Out of Food in the Last Year: Never true     Ran Out of Food in the Last Year: Never true   Transportation Needs: No Transportation Needs (3/31/2025)    PRAPARE - Transportation     Lack of Transportation (Medical): No     Lack of Transportation (Non-Medical): No   Physical Activity: Inactive (3/31/2025)    Exercise Vital Sign     Days of Exercise per Week: 0 days     Minutes of Exercise per Session: 0 min   Stress: No Stress Concern Present (3/31/2025)    [facility] - Occupational Stress Questionnaire     Feeling of Stress : Not at all   Housing Stability: Low Risk  (3/31/2025)    Housing Stability Vital Sign     Unable to Pay for Housing in the Last Year: No     Number of Times Moved in the Last Year: 0     Homeless in the Last Year: No   [2]   Current Outpatient Medications:     ACETAMINOPHEN (TYLENOL 8 HOUR ORAL), Take by mouth as needed., Disp: , Rfl:     azelastine (ASTELIN) 137 mcg (0.1 %) nasal spray, 2 sprays (274 mcg total) by Nasal route 2 (two) times daily., Disp: 30 mL, Rfl: 12    blood sugar diagnostic (ACCU-CHEK JAXON) Strp, Check BG 2-3 times daily. Accuchek jaxon strips, Disp: 300 strip, Rfl: 3    dorzolamide-timolol 2-0.5% (COSOPT) 22.3-6.8 mg/mL ophthalmic solution, Place 1 drop into both eyes 2 (two) times daily., Disp: 10 mL, Rfl: 5    DROPLET PEN NEEDLE 31 gauge x 3/16" Ndle, USE AS DIRECTED  WITH  INSULIN, Disp: 200 each, Rfl: 3    dutasteride (AVODART) 0.5 mg capsule, Take 1 capsule (0.5 mg total) by mouth once daily., Disp: 90 capsule, Rfl: 3    famotidine (PEPCID) 20 MG tablet, Take 1 tablet (20 mg total) by mouth 2 (two) times daily., Disp: 180 tablet, Rfl: 3    fenofibrate micronized (LOFIBRA) 200 MG Cap, Take 1 capsule (200 mg total) by mouth every evening., Disp: 90 capsule, Rfl: 3    fluticasone propionate (FLONASE) 50 mcg/actuation nasal spray, 1 spray by Each Nostril route once daily., " Disp: , Rfl:     gabapentin (CMPD PAIN MANAGEMENT COMPOUND OINTMNENT THREE), Apply bid prn pain, Disp: 100 g, Rfl: 11    hydrocortisone 2.5 % cream, Apply topically 2 (two) times daily., Disp: 3.5 g, Rfl: 5    insulin glargine U-100, Lantus, (LANTUS SOLOSTAR U-100 INSULIN) 100 unit/mL (3 mL) InPn pen, 16 units in the AM and 4 units in the PM, Disp: 3 mL, Rfl: 11    latanoprost 0.005 % ophthalmic solution, INSTILL 1 DROP INTO BOTH EYES ONE TIME DAILY, Disp: 7.5 mL, Rfl: 3    levocetirizine (XYZAL) 5 MG tablet, Take 5 mg by mouth every evening., Disp: , Rfl:     loratadine (CLARITIN) 10 mg tablet, Take 10 mg by mouth once daily., Disp: , Rfl:     losartan (COZAAR) 25 MG tablet, Take 1 tablet (25 mg total) by mouth once daily., Disp: 90 tablet, Rfl: 3    lovastatin (MEVACOR) 40 MG tablet, Take 1 tablet by mouth Every evening., Disp: 90 tablet, Rfl: 3    multivitamin capsule, Take 1 capsule by mouth once daily., Disp: , Rfl:     oxyCODONE (ROXICODONE) 5 MG immediate release tablet, Take 1 tablet (5 mg total) by mouth every 4 (four) hours as needed for Pain., Disp: 5 tablet, Rfl: 0    pantoprazole (PROTONIX) 40 MG tablet, Take 1 tablet (40 mg total) by mouth once daily., Disp: 90 tablet, Rfl: 1    polyethylene glycol (GLYCOLAX) 17 gram/dose powder, Use to cap to measure 17g, mix with liquid, and take by mouth once daily., Disp: 510 g, Rfl: 0    semaglutide (OZEMPIC) 1 mg/dose (4 mg/3 mL), Inject 1 mg into the skin every 7 days., Disp: 9 mL, Rfl: 4    sildenafil (REVATIO) 20 mg Tab, Take 1 tablet (20 mg total) by mouth daily as needed (PRN). Takes prn, Disp: 30 tablet, Rfl: 11    sodium chloride (SALINE NASAL) 0.65 % nasal spray, 2 sprays by Nasal route as needed for Congestion., Disp: 44 mL, Rfl: 0    triamcinolone acetonide 0.1% (KENALOG) 0.1 % cream, AAA bid, Disp: 454 g, Rfl: 0    Current Facility-Administered Medications:     BCG live (JESUS) 50 mg in 0.9% NaCl 50 mL bladder instillation, 50 mg, Intravesical, Once,  Lori Celeste, NP

## 2025-07-09 ENCOUNTER — PATIENT MESSAGE (OUTPATIENT)
Dept: SLEEP MEDICINE | Facility: CLINIC | Age: 75
End: 2025-07-09
Payer: MEDICARE

## 2025-07-11 ENCOUNTER — OFFICE VISIT (OUTPATIENT)
Dept: ALLERGY | Facility: CLINIC | Age: 75
End: 2025-07-11
Payer: MEDICARE

## 2025-07-11 ENCOUNTER — LAB VISIT (OUTPATIENT)
Dept: LAB | Facility: HOSPITAL | Age: 75
End: 2025-07-11
Attending: STUDENT IN AN ORGANIZED HEALTH CARE EDUCATION/TRAINING PROGRAM
Payer: MEDICARE

## 2025-07-11 VITALS
OXYGEN SATURATION: 98 % | WEIGHT: 190.06 LBS | BODY MASS INDEX: 26.5 KG/M2 | TEMPERATURE: 98 F | DIASTOLIC BLOOD PRESSURE: 70 MMHG | HEART RATE: 71 BPM | SYSTOLIC BLOOD PRESSURE: 109 MMHG

## 2025-07-11 DIAGNOSIS — R09.82 POST-NASAL DRIP: ICD-10-CM

## 2025-07-11 DIAGNOSIS — J31.0 CHRONIC RHINITIS: Primary | ICD-10-CM

## 2025-07-11 DIAGNOSIS — J31.0 CHRONIC RHINITIS: ICD-10-CM

## 2025-07-11 PROCEDURE — 36415 COLL VENOUS BLD VENIPUNCTURE: CPT | Mod: HCNC

## 2025-07-11 PROCEDURE — 86003 ALLG SPEC IGE CRUDE XTRC EA: CPT | Mod: HCNC

## 2025-07-11 PROCEDURE — 99999 PR PBB SHADOW E&M-EST. PATIENT-LVL IV: CPT | Mod: PBBFAC,HCNC,, | Performed by: STUDENT IN AN ORGANIZED HEALTH CARE EDUCATION/TRAINING PROGRAM

## 2025-07-11 NOTE — PROGRESS NOTES
Allergy and Immunology  New Patient Clinic Note    Date: 7/11/2025  Chief Complaint   Patient presents with    Allergic Rhinitis      Referred by: Luciano Sahu MD  [address] [place] [address]    History  Rigoberto Vasquez is a 74 y.o. male being seen as a New Patient today.    Chronic Rhinitis  Obstructive Sleep Apnea    - Onset:  Childhood with persistence into adulthood  - Symptoms:  Congestion, rhinorrhea, postnasal drip, and some sneezing  - Suspected triggers include:  Environmental versus postnasal drip overproduction  - Pattern: Perennial with Seasonal Exacerbations  - Medications:  Flonase and Astelin as well as antihistamines b.i.d.   - Other:  Patient is not using intranasal sprays with proper technique  - Reported sensation of feeling like nasal passages are obstructed during the nighttime    Chronic or Inducible Urticaria  - No hx of chronic urticaria     Asthma   - No hx of asthma     CRSwNP  - No hx of CRSwNP     Eczema   - No hx of eczema     Eosinophilic Esophagitis  - No hx of eosinophilic esophagitis     Adverse Food Reaction  - No hx of food allergy    Adverse Drug Reaction  - No hx of drug allergy   - Intolerances and side effects listed in Allergy tab    Recurrent Infections  - No hx of recurrent infections     Venom Allergy  - No hx of venom allergy     Allergies, PMH, PSH, Social, and Family History were reviewed.    Review of patient's allergies indicates:   Allergen Reactions    Aspirin      Stomach pain even with ppi    Meperidine      Other reaction(s): Stomach upset    Niacin      Other reaction(s): hot skin      Past Medical History:   Diagnosis Date    Acid reflux     [facility]    Angina pectoris     Back pain     BPH (benign prostatic hyperplasia)     blue    Cholesteatoma     Class 2 severe obesity due to excess calories with serious comorbidity and body mass index (BMI) of 37.0 to 37.9 in adult 11/11/2015    Degenerative joint disease     Diabetes mellitus type  2 with complications 3/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      Past Surgical History:   Procedure Laterality Date    BIOPSY OF BLADDER N/A 8/30/2024    Procedure: BIOPSY, BLADDER;  Surgeon: Compa Hensley MD;  Location: [facility] OR 1ST FLR;  Service: Urology;  Laterality: N/A;    BLADDER DIVERTICULECTOMY Right 10/8/2024    Procedure: EXCISION, DIVERTICULUM, BLADDER;  Surgeon: Compa Hensley MD;  Location: [facility] OR 2ND FLR;  Service: Urology;  Laterality: Right;    BLADDER FULGURATION N/A 8/30/2024    Procedure: FULGURATION, BLADDER;  Surgeon: Compa Hensley MD;  [facility];  Service: Urology;  Laterality: N/A;    CARDIAC PACEMAKER PLACEMENT      COLONOSCOPY N/A 9/13/2019    Procedure: COLONOSCOPY;  Surgeon: Kan Ramsey III, MD;  Location: [facility];  Service: Endoscopy;  Laterality: N/A;    COLONOSCOPY N/A 9/22/2022    Procedure: COLONOSCOPY;  Surgeon: Chris Garcia MD;  Location: [facility] ENDO;  Service: Endoscopy;  Laterality: N/A;    CYSTOSCOPY N/A 8/30/2024    Procedure: CYSTOSCOPY;  Surgeon: Compa Hensley MD;  [facility];  Service: Urology;  Laterality: N/A;  with blue light    CYSTOSCOPY N/A 5/30/2025    Procedure: CYSTOSCOPY;  Surgeon: Compa Hensley MD;  [facility];  Service: Urology;  Laterality: N/A;    CYSTOSCOPY W/ RETROGRADES Bilateral 7/16/2024    Procedure: CYSTOSCOPY, WITH RETROGRADE PYELOGRAM;  Surgeon: Vance Olivera MD;   Location: [facility] OR;  Service: Urology;  Laterality: Bilateral;    CYSTOSCOPY WITH BIOPSY OF BLADDER N/A 4/4/2025    Procedure: CYSTOSCOPY, WITH BLADDER BIOPSY, WITH FULGURATION;  Surgeon: Compa Hensley MD;  Location: [facility] OR 1ST FLR;  Service: Urology;  Laterality: N/A;    CYSTOSCOPY,WITH URETERAL CATHETER INSERTION Bilateral 10/8/2024    Procedure: CYSTOSCOPY,WITH URETERAL CATHETER INSERTION;  Surgeon: Compa Hensley MD;  Location: [facility] OR 2ND FLR;  Service: Urology;  Laterality: Bilateral;    ESOPHAGOGASTRODUODENOSCOPY N/A 9/13/2019    Procedure: ESOPHAGOGASTRODUODENOSCOPY (EGD);  Surgeon: Kan Ramsey III, MD;  Location: [facility];  Service: Endoscopy;  Laterality: N/A;    ESOPHAGOGASTRODUODENOSCOPY N/A 9/3/2021    Procedure: EGD (ESOPHAGOGASTRODUODENOSCOPY);  Surgeon: Kaylene Pineda MD;  Location: [facility];  Service: Endoscopy;  Laterality: N/A;    ESOPHAGOGASTRODUODENOSCOPY N/A 5/24/2023    Procedure: EGD (ESOPHAGOGASTRODUODENOSCOPY);  Surgeon: Cory Bennett MD;  Location: [facility];  Service: Endoscopy;  Laterality: N/A;    FLEXIBLE CYSTOSCOPY N/A 10/8/2024    Procedure: CYSTOSCOPY, FLEXIBLE;  Surgeon: Compa Hensley MD;  [facility];  Service: Urology;  Laterality: N/A;    INSERTION OF TYMPANOSTOMY TUBE Right 3/15/2023    Procedure: INSERTION, TYMPANOSTOMY TUBE;  Surgeon: Jose L Gudino MD;  [facility];  Service: ENT;  Laterality: Right;    INSTILLATION OF URINARY BLADDER N/A 10/8/2024    Procedure: INSTILLATION, BLADDER;  Surgeon: Compa Hensley MD;  Location: [facility] OR 2ND FLR;  Service: Urology;  Laterality: N/A;  Chemotherapy instillation bladder    JOINT REPLACEMENT      KNEE CARTILAGE SURGERY Bilateral     LYMPHADENECTOMY, PELVIS Right 10/8/2024    Procedure: LYMPHADENECTOMY, PELVIS;  Surgeon: Compa Hensley MD;  Location: [facility] OR [address] [place] [address];  Service: Urology;  Laterality: Right;    NASAL SINUS SURGERY      PARTIAL SURGICAL REMOVAL OF BLADDER N/A 10/8/2024     Procedure: CYSTECTOMY, PARTIAL;  Surgeon: Compa Hensley MD;  Location: [facility] OR [address] [place] [address];  Service: Urology;  Laterality: N/A;    SHOULDER ARTHROSCOPY Right     TONSILLECTOMY      TOTAL KNEE ARTHROPLASTY Left 05/15/2017    TRANSURETHRAL RESECTION OF PROSTATE      TURBT (TRANSURETHRAL RESECTION OF BLADDER TUMOR) N/A 7/16/2024    Procedure: TURBT (TRANSURETHRAL RESECTION OF BLADDER TUMOR);  Surgeon: Vance Olivera MD;  Location: [facility];  Service: Urology;  Laterality: N/A;    TURBT (TRANSURETHRAL RESECTION OF BLADDER TUMOR) N/A 5/30/2025    Procedure: TURBT (TRANSURETHRAL RESECTION OF BLADDER TUMOR);  Surgeon: Compa Hensley MD;  [facility];  Service: Urology;  Laterality: N/A;    TURBT, WITH BLUE LIGHT CYSTOSCOPY AND CYSVIEW N/A 4/4/2025    Procedure: TURBT,WITH BLUE LIGHT CYSTOSCOPY AND CYSVIEW;  Surgeon: Compa Hensley MD;  [facility];  Service: Urology;  Laterality: N/A;    TYMPANOPLASTY      TYMPANOPLASTY WITH MASTOIDECTOMY Left 3/15/2023    Procedure: TYMPANOPLASTY, WITH MASTOIDECTOMY;  Surgeon: Jose L Gudino MD;  Location: [facility] OR [address] [place] [address];  Service: ENT;  Laterality: Left;    vesectomy       Social History     Social History Narrative     . Lives with spouse. Has 2 children. Patient retired from [employer]. His son has type 1 diabetes.     S/he reports that he has never smoked. He has never used smokeless tobacco. He reports current alcohol use. He reports that he does not use drugs.    Medications Ordered Prior to Encounter[1]    Physical Examination  Vitals:    07/11/25 0915   BP: 109/70   Pulse: 71   Temp: 97.5 °F (36.4 °C)     GENERAL:  male in no apparent distress and well developed and well nourished  HEAD:  Normocephalic, without obvious abnormality, atraumatic  EYES: sclera anicteric, conjunctiva normochromic  EARS: normal TM's and external ear canals both ears  NOSE: without erythema or discharge, clear discharge, turbinates normal    OROPHARYNX: moist  mucous membranes without erythema, exudates or petechiae, clear post-nasal drainage present  LYMPH NODES: normal, supple, no lymphadenopathy  LUNGS: clear to auscultation, no wheezes, rales or rhonchi, symmetric air entry.  HEART: normal rate, regular rhythm, normal S1, S2, no murmurs, rubs, clicks or gallops.  ABDOMEN: soft, nontender, nondistended, no masses or organomegaly.  MUSCULOSKELETAL: no gross joint deformity or swelling.  NEURO: alert, oriented, normal speech, no focal findings or movement disorder noted.  SKIN: normal coloration and turgor, no rashes, no suspicious skin lesions noted.     Assessment/Plan:   Problem List Items Addressed This Visit       Chronic rhinitis - Primary    Relevant Medications    olopatadine-mometasone 665-25 mcg/spray Spry    Other Relevant Orders    Allergen Profile, Zone 6    Post-nasal drip    Relevant Medications    olopatadine-mometasone 665-25 mcg/spray Spry    Other Relevant Orders    Allergen Profile, Zone 6     Chronic rhinitis   Postnasal drip   Not controlled at this time   Ordered olopatadine mometasone dual intranasal spray b.i.d.   Educated on proper use of intranasal spray   Ordered serum IgE to zone 6 aeroallergens  Patient is not ideal candidate for allergy shots in the future even if positives due to cardiovascular history   Patient has 1 cat at home and lives in an older home    Follow up:  Follow up in about 2 months (around 9/11/2025).    DISCLAIMER: This note was prepared with [employer] voice recognition transcription software. Garbled syntax, mangled pronouns, and other bizarre constructions may be attributed to that software system. While efforts were made to correct any mistakes made by this voice recognition program, some errors and/or omissions may remain in the note that were missed when the note was originally created.     Lucho Weiss MD   [facility]  Allergy and Immunology        [1]   Current Outpatient Medications on File Prior to  "Visit   Medication Sig Dispense Refill    ACETAMINOPHEN (TYLENOL 8 HOUR ORAL) Take by mouth as needed.      blood sugar diagnostic (ACCU-CHEK JAXON) Strp Check BG 2-3 times daily. Accuchek jaxon strips 300 strip 3    dorzolamide-timolol 2-0.5% (COSOPT) 22.3-6.8 mg/mL ophthalmic solution Place 1 drop into both eyes 2 (two) times daily. 10 mL 5    DROPLET PEN NEEDLE 31 gauge x 3/16" Ndle USE AS DIRECTED  WITH  INSULIN 200 each 3    dutasteride (AVODART) 0.5 mg capsule Take 1 capsule (0.5 mg total) by mouth once daily. 90 capsule 3    famotidine (PEPCID) 20 MG tablet Take 1 tablet (20 mg total) by mouth 2 (two) times daily. 180 tablet 3    fenofibrate micronized (LOFIBRA) 200 MG Cap Take 1 capsule (200 mg total) by mouth every evening. 90 capsule 3    gabapentin (CMPD PAIN MANAGEMENT COMPOUND OINTMNENT THREE) Apply bid prn pain 100 g 11    hydrocortisone 2.5 % cream Apply topically 2 (two) times daily. 3.5 g 5    insulin glargine U-100, Lantus, (LANTUS SOLOSTAR U-100 INSULIN) 100 unit/mL (3 mL) InPn pen 16 units in the AM and 4 units in the PM 3 mL 11    latanoprost 0.005 % ophthalmic solution INSTILL 1 DROP INTO BOTH EYES ONE TIME DAILY 7.5 mL 3    levocetirizine (XYZAL) 5 MG tablet Take 5 mg by mouth every evening.      loratadine (CLARITIN) 10 mg tablet Take 10 mg by mouth once daily.      losartan (COZAAR) 25 MG tablet Take 1 tablet (25 mg total) by mouth once daily. 90 tablet 3    lovastatin (MEVACOR) 40 MG tablet Take 1 tablet by mouth Every evening. 90 tablet 3    multivitamin capsule Take 1 capsule by mouth once daily.      oxyCODONE (ROXICODONE) 5 MG immediate release tablet Take 1 tablet (5 mg total) by mouth every 4 (four) hours as needed for Pain. 5 tablet 0    pantoprazole (PROTONIX) 40 MG tablet Take 1 tablet (40 mg total) by mouth once daily. 90 tablet 1    polyethylene glycol (GLYCOLAX) 17 gram/dose powder Use to cap to measure 17g, mix with liquid, and take by mouth once daily. 510 g 0    semaglutide " (OZEMPIC) 1 mg/dose (4 mg/3 mL) Inject 1 mg into the skin every 7 days. 9 mL 4    sildenafil (REVATIO) 20 mg Tab Take 1 tablet (20 mg total) by mouth daily as needed (PRN). Takes prn 30 tablet 11    sodium chloride (SALINE NASAL) 0.65 % nasal spray 2 sprays by Nasal route as needed for Congestion. 44 mL 0    triamcinolone acetonide 0.1% (KENALOG) 0.1 % cream AAA bid 454 g 0    [DISCONTINUED] azelastine (ASTELIN) 137 mcg (0.1 %) nasal spray 2 sprays (274 mcg total) by Nasal route 2 (two) times daily. 30 mL 12    [DISCONTINUED] fluticasone propionate (FLONASE) 50 mcg/actuation nasal spray 1 spray by Each Nostril route once daily.       No current facility-administered medications on file prior to visit.

## 2025-07-14 ENCOUNTER — OFFICE VISIT (OUTPATIENT)
Dept: UROLOGY | Facility: CLINIC | Age: 75
End: 2025-07-14
Payer: MEDICARE

## 2025-07-14 VITALS
BODY MASS INDEX: 26.7 KG/M2 | HEART RATE: 69 BPM | SYSTOLIC BLOOD PRESSURE: 127 MMHG | WEIGHT: 191.5 LBS | DIASTOLIC BLOOD PRESSURE: 75 MMHG

## 2025-07-14 DIAGNOSIS — G47.33 OSA (OBSTRUCTIVE SLEEP APNEA): Primary | ICD-10-CM

## 2025-07-14 DIAGNOSIS — C67.2 MALIGNANT NEOPLASM OF LATERAL WALL OF URINARY BLADDER: Primary | ICD-10-CM

## 2025-07-14 PROCEDURE — 3044F HG A1C LEVEL LT 7.0%: CPT | Mod: CPTII,HCNC,S$GLB,

## 2025-07-14 PROCEDURE — 99499 UNLISTED E&M SERVICE: CPT | Mod: HCNC,S$GLB,,

## 2025-07-14 PROCEDURE — 1160F RVW MEDS BY RX/DR IN RCRD: CPT | Mod: CPTII,HCNC,S$GLB,

## 2025-07-14 PROCEDURE — 1159F MED LIST DOCD IN RCRD: CPT | Mod: CPTII,HCNC,S$GLB,

## 2025-07-14 PROCEDURE — 3074F SYST BP LT 130 MM HG: CPT | Mod: CPTII,HCNC,S$GLB,

## 2025-07-14 PROCEDURE — 81003 URINALYSIS AUTO W/O SCOPE: CPT | Mod: QW,HCNC,S$GLB,

## 2025-07-14 PROCEDURE — 3066F NEPHROPATHY DOC TX: CPT | Mod: CPTII,HCNC,S$GLB,

## 2025-07-14 PROCEDURE — 3078F DIAST BP <80 MM HG: CPT | Mod: CPTII,HCNC,S$GLB,

## 2025-07-14 PROCEDURE — 4010F ACE/ARB THERAPY RXD/TAKEN: CPT | Mod: CPTII,HCNC,S$GLB,

## 2025-07-14 PROCEDURE — 3008F BODY MASS INDEX DOCD: CPT | Mod: CPTII,HCNC,S$GLB,

## 2025-07-14 PROCEDURE — 3061F NEG MICROALBUMINURIA REV: CPT | Mod: CPTII,HCNC,S$GLB,

## 2025-07-14 PROCEDURE — 99999 PR PBB SHADOW E&M-EST. PATIENT-LVL III: CPT | Mod: PBBFAC,HCNC,,

## 2025-07-14 PROCEDURE — 51720 TREATMENT OF BLADDER LESION: CPT | Mod: HCNC,S$GLB,,

## 2025-07-14 NOTE — PROGRESS NOTES
CHIEF COMPLAINT:    Rigoberto Vasquez is a 74 y.o. male presents today for Bladder Cancer.     HISTORY OF PRESENTING ILLINESS:    Rigoberto Vasquez is a 74 y.o. Type 2 DIABETIC male who is an established patient in our clinic. He has a history of BPH with LUTS managed with Flomax and Finasteride. Hx of TURP ~ 10 years ago. ED > 1 year. He aslos has a history of recurrent Bladder Cancer. He was initially diagnosed 07/16/2024 with Hg T1 with Dr. Olivera. Treated with BCG.   08/07/2024 he established care with Dr. Hensley.     He is here today to begin another Induction BCG; dose 3 of 5.   Voices no urinary complaints today. He was able to hold his bladder last week for about 1.5 hours.   The last 2 instillations he reports flu like symptoms lasting about a day after.     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, status post TURBT on 07/16/2024 for high-grade T1 into a diverticulum  Status post open bladder diverticulectomy and lymphadenectomy on 10/08/2024, pathology T 0 N0, negative for residual malignancy  Status post induction BCG (completed 01/13/2025)  Recurrence in April 2025, with a CIS in the posterior bladder wall, high-grade TA present on the right lateral bladder wall, high-grade TA on the right posterior bladder wall, and high-grade TA on the bladder dome  Repeat resection on 05/30/2025 showed no evidence of bladder cancer recurrence  BPH with a history of TURP approximately 10 years ago, currently on finasteride and tamsulosin  Erectile dysfunction     REVIEW OF SYSTEMS:  Review of Systems   Constitutional: Negative.  Negative for chills and fever.   Eyes:  Negative for double vision.   Respiratory:  Negative for cough and shortness of breath.    Cardiovascular:  Negative for chest pain and palpitations.   Gastrointestinal:  Negative for abdominal pain, constipation, diarrhea, nausea and vomiting.   Genitourinary:  Negative for dysuria, flank pain, frequency, hematuria and  urgency.        See HPI   Musculoskeletal:  Negative for falls.   Neurological:  Negative for dizziness and seizures.   Endo/Heme/Allergies:  Negative for polydipsia.     PATIENT HISTORY:  Past Medical History:   Diagnosis Date    Acid reflux     [facility]    Angina pectoris     Back pain     BPH (benign prostatic hyperplasia)     blue    Cholesteatoma     Class 2 severe obesity due to excess calories with serious comorbidity and body mass index (BMI) of 37.0 to 37.9 in adult 11/11/2015    Degenerative joint disease     Diabetes mellitus type 2 with complications 3/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      Past Surgical History:   Procedure Laterality Date    BIOPSY OF BLADDER N/A 8/30/2024    Procedure: BIOPSY, BLADDER;  Surgeon: Compa Hensley MD;  Location: [facility] OR [address] [place] [address];  Service: Urology;  Laterality: N/A;    BLADDER DIVERTICULECTOMY Right 10/8/2024    Procedure: EXCISION, DIVERTICULUM, BLADDER;  Surgeon: Compa Hensley MD;  Location: [facility] OR [address] [place] [address];  Service: Urology;  Laterality: Right;    BLADDER FULGURATION N/A 8/30/2024    Procedure: FULGURATION, BLADDER;  Surgeon: Compa Hensley MD;  Location: [facility] OR [address] [place] [address];  Service: Urology;  Laterality: N/A;    CARDIAC PACEMAKER PLACEMENT      COLONOSCOPY N/A 9/13/2019    Procedure: COLONOSCOPY;  Surgeon: Kan Ramsey III, MD;  Location: [facility];  Service: Endoscopy;  Laterality: N/A;    COLONOSCOPY N/A 9/22/2022    Procedure: COLONOSCOPY;   Surgeon: Chris Garcia MD;  Location: [facility];  Service: Endoscopy;  Laterality: N/A;    CYSTOSCOPY N/A 8/30/2024    Procedure: CYSTOSCOPY;  Surgeon: Compa Hensley MD;  Location: [facility] OR 1ST FLR;  Service: Urology;  Laterality: N/A;  with blue light    CYSTOSCOPY N/A 5/30/2025    Procedure: CYSTOSCOPY;  Surgeon: Compa Hensley MD;  Location: [facility] OR 1ST FLR;  Service: Urology;  Laterality: N/A;    CYSTOSCOPY W/ RETROGRADES Bilateral 7/16/2024    Procedure: CYSTOSCOPY, WITH RETROGRADE PYELOGRAM;  Surgeon: Vance Olivera MD;  Location: [facility];  Service: Urology;  Laterality: Bilateral;    CYSTOSCOPY WITH BIOPSY OF BLADDER N/A 4/4/2025    Procedure: CYSTOSCOPY, WITH BLADDER BIOPSY, WITH FULGURATION;  Surgeon: Compa Hensley MD;  Location: [facility] OR 1ST FLR;  Service: Urology;  Laterality: N/A;    CYSTOSCOPY,WITH URETERAL CATHETER INSERTION Bilateral 10/8/2024    Procedure: CYSTOSCOPY,WITH URETERAL CATHETER INSERTION;  Surgeon: Compa Hensley MD;  Location: [facility] OR 2ND FLR;  Service: Urology;  Laterality: Bilateral;    ESOPHAGOGASTRODUODENOSCOPY N/A 9/13/2019    Procedure: ESOPHAGOGASTRODUODENOSCOPY (EGD);  Surgeon: Kan Ramsey III, MD;  Location: [facility];  Service: Endoscopy;  Laterality: N/A;    ESOPHAGOGASTRODUODENOSCOPY N/A 9/3/2021    Procedure: EGD (ESOPHAGOGASTRODUODENOSCOPY);  Surgeon: Kaylene Pineda MD;  Location: [facility];  Service: Endoscopy;  Laterality: N/A;    ESOPHAGOGASTRODUODENOSCOPY N/A 5/24/2023    Procedure: EGD (ESOPHAGOGASTRODUODENOSCOPY);  Surgeon: Cory Bennett MD;  Location: [facility];  Service: Endoscopy;  Laterality: N/A;    FLEXIBLE CYSTOSCOPY N/A 10/8/2024    Procedure: CYSTOSCOPY, FLEXIBLE;  Surgeon: Compa Hensley MD;  Location: [facility] OR 2ND FLR;  Service: Urology;  Laterality: N/A;    INSERTION OF TYMPANOSTOMY TUBE Right 3/15/2023    Procedure: INSERTION, TYMPANOSTOMY TUBE;  Surgeon: Jose L Gudino MD;  Location: [facility] OR [address] [place] [address];  Service: ENT;   Laterality: Right;    INSTILLATION OF URINARY BLADDER N/A 10/8/2024    Procedure: INSTILLATION, BLADDER;  Surgeon: Compa Hensley MD;  Location: [facility] OR [address] [place] [address];  Service: Urology;  Laterality: N/A;  Chemotherapy instillation bladder    JOINT REPLACEMENT      KNEE CARTILAGE SURGERY Bilateral     LYMPHADENECTOMY, PELVIS Right 10/8/2024    Procedure: LYMPHADENECTOMY, PELVIS;  Surgeon: Compa Hensley MD;  Location: [facility] OR [address] [place] [address];  Service: Urology;  Laterality: Right;    NASAL SINUS SURGERY      PARTIAL SURGICAL REMOVAL OF BLADDER N/A 10/8/2024    Procedure: CYSTECTOMY, PARTIAL;  Surgeon: Compa Hensley MD;  Location: [facility] OR [address] [place] [address];  Service: Urology;  Laterality: N/A;    SHOULDER ARTHROSCOPY Right     TONSILLECTOMY      TOTAL KNEE ARTHROPLASTY Left 05/15/2017    TRANSURETHRAL RESECTION OF PROSTATE      TURBT (TRANSURETHRAL RESECTION OF BLADDER TUMOR) N/A 7/16/2024    Procedure: TURBT (TRANSURETHRAL RESECTION OF BLADDER TUMOR);  Surgeon: Vance Olivera MD;  Location: [facility];  Service: Urology;  Laterality: N/A;    TURBT (TRANSURETHRAL RESECTION OF BLADDER TUMOR) N/A 5/30/2025    Procedure: TURBT (TRANSURETHRAL RESECTION OF BLADDER TUMOR);  Surgeon: Compa Hensley MD;  Location: [facility] OR [address] [place] [address];  Service: Urology;  Laterality: N/A;    TURBT, WITH BLUE LIGHT CYSTOSCOPY AND CYSVIEW N/A 4/4/2025    Procedure: TURBT,WITH BLUE LIGHT CYSTOSCOPY AND CYSVIEW;  Surgeon: Compa Hensley MD;  Location: [facility] OR [address] [place] [address];  Service: Urology;  Laterality: N/A;    TYMPANOPLASTY      TYMPANOPLASTY WITH MASTOIDECTOMY Left 3/15/2023    Procedure: TYMPANOPLASTY, WITH MASTOIDECTOMY;  Surgeon: Jose L Gudino MD;  Location: [facility] OR [address] [place] [address];  Service: ENT;  Laterality: Left;    vesectomy       Family History   Problem Relation Name Age of Onset    Arthritis Mother      Hypertension Mother      Heart disease Father      Hypertension Father      Stroke Father      Diabetes Son      Diabetes Maternal Grandmother      Colon cancer Paternal  Grandmother       Social History[1]    Allergies:  Aspirin, Meperidine, and Niacin    Medications:  Current Medications[2]    PHYSICAL EXAMINATION:  Physical Exam  Vitals and nursing note reviewed.   Constitutional:       General: He is awake.      Appearance: Normal appearance.   HENT:      Head: Normocephalic.      Right Ear: External ear normal.      Left Ear: External ear normal.      Nose: Nose normal.   Cardiovascular:      Rate and Rhythm: Normal rate.   Pulmonary:      Effort: Pulmonary effort is normal. No respiratory distress.   Abdominal:      Tenderness: There is no abdominal tenderness. There is no right CVA tenderness or left CVA tenderness.   Genitourinary:     Penis: Normal.       Testes: Normal.   Musculoskeletal:         General: Normal range of motion.      Cervical back: Normal range of motion.   Skin:     General: Skin is warm and dry.   Neurological:      General: No focal deficit present.      Mental Status: He is alert and oriented to person, place, and time.   Psychiatric:         Mood and Affect: Mood normal.         Behavior: Behavior is cooperative.       LABS:  In office UA today was clear of active infection and visible blood.     Lab Results   Component Value Date    PSA 1.4 06/25/2020    PSA 2.3 12/30/2013    PSA 1.15 02/11/2013    PSADIAG 0.19 06/21/2024    PSADIAG 0.33 08/11/2023    PSADIAG 0.67 08/23/2022     Lab Results   Component Value Date    CREATININE 1.3 05/13/2025    EGFRNORACEVR 58 (L) 05/13/2025     IMPRESSION:  Encounter Diagnoses   Name Primary?    Malignant neoplasm of lateral wall of urinary bladder Yes     Induction BCG; dose 3 of 5    Assessment:       1. Malignant neoplasm of lateral wall of urinary bladder        Plan:         I spent a total of 30 minutes on the day of the visit. This includes face to face time and non-face to face time preparing to see the patient (eg, review of tests), obtaining and/or reviewing separately obtained history, documenting clinical  "information in the electronic or other health record, independently interpreting results and communicating results to the patient/family/caregiver, or care coordinator  10 minutes pre counseling including last installation  10 minutes prep, travel to get medication & installation of the BCG  10 minutes post installation instructions and room clean up  We addressed his UA  Education and recommendations of today's plan of care with the BCG Bladder Installations; including home remedies and needed follow up with PCP.   BCG dose 3 of 5 was instilled using sterile and BCG precautions.  Tolerated well   We discussed his Bladder Cancer; discussed after TURBT; cancer was removed.  Discussed BCG and the expectations, benefits, risks, as well as importance of post clean up for 6 hours after the 2 hour urination.  They was given detailed written instructions as well.   Diet modifications; no caffeine the morning of installation; increase water intake at the 2 hour void to flush out.  No sex for 48 hours after installation; use of condom during the 5 week installations.  RTC one week for dose 4 of 5.    The visit today is associated with current or anticipated ongoing medical care related to this patient's single serious condition/complex condition.       Yesenia Menjivar FNP-C         [1]   Social History  Socioeconomic History    Marital status:      Spouse name: SAUL    Number of children: 2   Occupational History    Occupation: retired- [employer] .   Tobacco Use    Smoking status: Never    Smokeless tobacco: Never   Substance and Sexual Activity    Alcohol use: Yes     Comment: " once a month"    Drug use: No    Sexual activity: Yes     Partners: Female   Social History Narrative     . Lives with spouse. Has 2 children. Patient retired from [employer]. His son has type 1 diabetes.     Social Drivers of Health     Financial Resource Strain: Low Risk  (3/31/2025)    Overall Financial Resource " "Strain (CARDIA)     Difficulty of Paying Living Expenses: Not hard at all   Food Insecurity: No Food Insecurity (3/31/2025)    Hunger Vital Sign     Worried About Running Out of Food in the Last Year: Never true     Ran Out of Food in the Last Year: Never true   Transportation Needs: No Transportation Needs (3/31/2025)    PRAPARE - Transportation     Lack of Transportation (Medical): No     Lack of Transportation (Non-Medical): No   Physical Activity: Inactive (3/31/2025)    Exercise Vital Sign     Days of Exercise per Week: 0 days     Minutes of Exercise per Session: 0 min   Stress: No Stress Concern Present (3/31/2025)    Citizen of [place] [employer] - Occupational Stress Questionnaire     Feeling of Stress : Not at all   Housing Stability: Low Risk  (3/31/2025)    Housing Stability Vital Sign     Unable to Pay for Housing in the Last Year: No     Number of Times Moved in the Last Year: 0     Homeless in the Last Year: No   [2]   Current Outpatient Medications:     ACETAMINOPHEN (TYLENOL 8 HOUR ORAL), Take by mouth as needed., Disp: , Rfl:     blood sugar diagnostic (ACCU-CHEK JAXON) Strp, Check BG 2-3 times daily. Accuchek jaxon strips, Disp: 300 strip, Rfl: 3    dorzolamide-timolol 2-0.5% (COSOPT) 22.3-6.8 mg/mL ophthalmic solution, Place 1 drop into both eyes 2 (two) times daily., Disp: 10 mL, Rfl: 5    DROPLET PEN NEEDLE 31 gauge x 3/16" Ndle, USE AS DIRECTED  WITH  INSULIN, Disp: 200 each, Rfl: 3    dutasteride (AVODART) 0.5 mg capsule, Take 1 capsule (0.5 mg total) by mouth once daily., Disp: 90 capsule, Rfl: 3    famotidine (PEPCID) 20 MG tablet, Take 1 tablet (20 mg total) by mouth 2 (two) times daily., Disp: 180 tablet, Rfl: 3    fenofibrate micronized (LOFIBRA) 200 MG Cap, Take 1 capsule (200 mg total) by mouth every evening., Disp: 90 capsule, Rfl: 3    gabapentin (CMPD PAIN MANAGEMENT COMPOUND OINTMNENT THREE), Apply bid prn pain, Disp: 100 g, Rfl: 11    hydrocortisone 2.5 % cream, Apply " topically 2 (two) times daily., Disp: 3.5 g, Rfl: 5    insulin glargine U-100, Lantus, (LANTUS SOLOSTAR U-100 INSULIN) 100 unit/mL (3 mL) InPn pen, 16 units in the AM and 4 units in the PM, Disp: 3 mL, Rfl: 11    latanoprost 0.005 % ophthalmic solution, INSTILL 1 DROP INTO BOTH EYES ONE TIME DAILY, Disp: 7.5 mL, Rfl: 3    levocetirizine (XYZAL) 5 MG tablet, Take 5 mg by mouth every evening., Disp: , Rfl:     loratadine (CLARITIN) 10 mg tablet, Take 10 mg by mouth once daily., Disp: , Rfl:     losartan (COZAAR) 25 MG tablet, Take 1 tablet (25 mg total) by mouth once daily., Disp: 90 tablet, Rfl: 3    lovastatin (MEVACOR) 40 MG tablet, Take 1 tablet by mouth Every evening., Disp: 90 tablet, Rfl: 3    multivitamin capsule, Take 1 capsule by mouth once daily., Disp: , Rfl:     olopatadine-mometasone 665-25 mcg/spray Spry, 2 sprays by Nasal route 2 (two) times daily., Disp: 29 g, Rfl: 11    oxyCODONE (ROXICODONE) 5 MG immediate release tablet, Take 1 tablet (5 mg total) by mouth every 4 (four) hours as needed for Pain., Disp: 5 tablet, Rfl: 0    pantoprazole (PROTONIX) 40 MG tablet, Take 1 tablet (40 mg total) by mouth once daily., Disp: 90 tablet, Rfl: 1    polyethylene glycol (GLYCOLAX) 17 gram/dose powder, Use to cap to measure 17g, mix with liquid, and take by mouth once daily., Disp: 510 g, Rfl: 0    semaglutide (OZEMPIC) 1 mg/dose (4 mg/3 mL), Inject 1 mg into the skin every 7 days., Disp: 9 mL, Rfl: 4    sildenafil (REVATIO) 20 mg Tab, Take 1 tablet (20 mg total) by mouth daily as needed (PRN). Takes prn, Disp: 30 tablet, Rfl: 11    sodium chloride (SALINE NASAL) 0.65 % nasal spray, 2 sprays by Nasal route as needed for Congestion., Disp: 44 mL, Rfl: 0    triamcinolone acetonide 0.1% (KENALOG) 0.1 % cream, AAA bid, Disp: 454 g, Rfl: 0

## 2025-07-14 NOTE — PROGRESS NOTES
Orders Placed This Encounter   Procedures    BiPAP/CPAP Titration ((Must have dx of СВЕТЛАНА from previous sleep study)     Standing Status:   Future     Expiration Date:   7/14/2026     Titration Type::   CPAP

## 2025-07-15 LAB
W ALLERGY INTERPRETATION: NORMAL
W ALTERNARIA ALTERNATA, CLASS: NORMAL
W ALTERNARIA ALTERNATA, IGE: <0.1 KU/L
W ASPERGILLUS FUMIGATUS, CLASS: NORMAL
W ASPERGILLUS FUMIGATUS, IGE: <0.1 KU/L
W BERMUDA GRASS, CLASS: NORMAL
W BERMUDA GRASS, IGE: <0.1 KU/L
W CAT DANDER, CLASS: NORMAL
W CAT DANDER, IGE: <0.1 KU/L
W CLADOSPORIUM HERBARUM, CLASS: NORMAL
W CLADOSPORIUM HERBARUM, IGE: <0.1 KU/L
W COCKROACH, GERMAN, CLASS: NORMAL
W COCKROACH, GERMAN, IGE: <0.1 KU/L
W COMMON PIGWEED, CLASS: NORMAL
W COMMON PIGWEED, IGE: <0.1 KU/L
W COMMON RAGWEED (SHORT), CLASS: NORMAL
W COMMON RAGWEED (SHORT), IGE: <0.1 KU/L
W COMMON SILVER BIRCH, CLASS: NORMAL
W COMMON SILVER BIRCH, IGE: <0.1 KU/L
W DERMATOPHAGOIDES FARINAE CLASS: NORMAL
W DERMATOPHAGOIDES FARINAE, IGE: <0.1 KU/L
W DERMATOPHAGOIDES PTERONYSSINUS CLASS: NORMAL
W DERMATOPHAGOIDES PTERONYSSINUS, IGE: <0.1 KU/L
W DOG DANDER, CLASS: NORMAL
W DOG DANDER, IGE: <0.1 KU/L
W ELM, CLASS: NORMAL
W ELM, IGE: <0.1 KU/L
W IGE: 27.5 IU/ML
W MAPLE (BOX ELDER), CLASS: NORMAL
W MAPLE (BOX ELDER), IGE: <0.1 KU/L
W MOUNTAIN JUNIPER CLASS: NORMAL
W MOUNTAIN JUNIPER, IGE: <0.1 KU/L
W MOUSE URINE PROTEINS CLASS: NORMAL
W MOUSE URINE PROTEINS, IGE: <0.1 KU/L
W MULBERRY, CLASS: NORMAL
W MULBERRY, IGE: <0.1 KU/L
W OAK, CLASS: NORMAL
W OAK, IGE: <0.1 KU/L
W PECAN, HICKORY, CLASS: NORMAL
W PECAN, HICKORY, IGE: <0.1 KU/L
W PENICILLIUM CHRYSOGENUM, CLASS: NORMAL
W PENICILLIUM CHRYSOGENUM, IGE: <0.1 KU/L
W ROUGH MARSHELDER, CLASS: NORMAL
W ROUGH MARSHELDER, IGE: <0.1 KU/L
W TIMOTHY GRASS, CLASS: NORMAL
W TIMOTHY GRASS, IGE: <0.1 KU/L
W WALNUT TREE, CLASS: NORMAL
W WALNUT TREE, IGE: <0.1 KU/L

## 2025-07-21 ENCOUNTER — OFFICE VISIT (OUTPATIENT)
Dept: UROLOGY | Facility: CLINIC | Age: 75
End: 2025-07-21
Payer: MEDICARE

## 2025-07-21 VITALS
HEART RATE: 66 BPM | WEIGHT: 191.38 LBS | BODY MASS INDEX: 26.79 KG/M2 | SYSTOLIC BLOOD PRESSURE: 141 MMHG | DIASTOLIC BLOOD PRESSURE: 79 MMHG | HEIGHT: 71 IN

## 2025-07-21 DIAGNOSIS — C67.2 MALIGNANT NEOPLASM OF LATERAL WALL OF URINARY BLADDER: Primary | ICD-10-CM

## 2025-07-21 PROCEDURE — 99499 UNLISTED E&M SERVICE: CPT | Mod: HCNC,S$GLB,, | Performed by: NURSE PRACTITIONER

## 2025-07-21 PROCEDURE — 1126F AMNT PAIN NOTED NONE PRSNT: CPT | Mod: CPTII,HCNC,S$GLB, | Performed by: NURSE PRACTITIONER

## 2025-07-21 PROCEDURE — 1101F PT FALLS ASSESS-DOCD LE1/YR: CPT | Mod: CPTII,HCNC,S$GLB, | Performed by: NURSE PRACTITIONER

## 2025-07-21 PROCEDURE — 3288F FALL RISK ASSESSMENT DOCD: CPT | Mod: CPTII,HCNC,S$GLB, | Performed by: NURSE PRACTITIONER

## 2025-07-21 PROCEDURE — 1159F MED LIST DOCD IN RCRD: CPT | Mod: CPTII,HCNC,S$GLB, | Performed by: NURSE PRACTITIONER

## 2025-07-21 PROCEDURE — 3078F DIAST BP <80 MM HG: CPT | Mod: CPTII,HCNC,S$GLB, | Performed by: NURSE PRACTITIONER

## 2025-07-21 PROCEDURE — 81003 URINALYSIS AUTO W/O SCOPE: CPT | Mod: QW,HCNC,S$GLB, | Performed by: NURSE PRACTITIONER

## 2025-07-21 PROCEDURE — 3008F BODY MASS INDEX DOCD: CPT | Mod: CPTII,HCNC,S$GLB, | Performed by: NURSE PRACTITIONER

## 2025-07-21 PROCEDURE — 51720 TREATMENT OF BLADDER LESION: CPT | Mod: HCNC,S$GLB,, | Performed by: NURSE PRACTITIONER

## 2025-07-21 PROCEDURE — 3066F NEPHROPATHY DOC TX: CPT | Mod: CPTII,HCNC,S$GLB, | Performed by: NURSE PRACTITIONER

## 2025-07-21 PROCEDURE — 3077F SYST BP >= 140 MM HG: CPT | Mod: CPTII,HCNC,S$GLB, | Performed by: NURSE PRACTITIONER

## 2025-07-21 PROCEDURE — 99999 PR PBB SHADOW E&M-EST. PATIENT-LVL IV: CPT | Mod: PBBFAC,HCNC,, | Performed by: NURSE PRACTITIONER

## 2025-07-21 PROCEDURE — 3061F NEG MICROALBUMINURIA REV: CPT | Mod: CPTII,HCNC,S$GLB, | Performed by: NURSE PRACTITIONER

## 2025-07-21 PROCEDURE — 1160F RVW MEDS BY RX/DR IN RCRD: CPT | Mod: CPTII,HCNC,S$GLB, | Performed by: NURSE PRACTITIONER

## 2025-07-21 PROCEDURE — 3044F HG A1C LEVEL LT 7.0%: CPT | Mod: CPTII,HCNC,S$GLB, | Performed by: NURSE PRACTITIONER

## 2025-07-21 PROCEDURE — 4010F ACE/ARB THERAPY RXD/TAKEN: CPT | Mod: CPTII,HCNC,S$GLB, | Performed by: NURSE PRACTITIONER

## 2025-07-21 NOTE — PROGRESS NOTES
CHIEF COMPLAINT:    Rigoberto Vasquez is a 74 y.o. male presents today for Bladder Cancer    HISTORY OF PRESENTING ILLINESS:    Rigoberto Vasquez is a 74 y.o. Type 2 DIABETIC male who is an established patient in our clinic. He has a history of BPH with LUTS managed with Flomax and Finasteride. Hx of TURP ~ 10 years ago. ED > 1 year. He aslos has a history of recurrent Bladder Cancer. He was initially diagnosed 07/16/2024 with Hg T1 with Dr. Olivera. Treated with BCG.   08/07/2024 he established care with Dr. Hensley.      He is here today to begin another Induction BCG; dose 4 of 5.   Voices no urinary complaints today.            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, status post TURBT on 07/16/2024 for high-grade T1 into a diverticulum  Status post open bladder diverticulectomy and lymphadenectomy on 10/08/2024, pathology T 0 N0, negative for residual malignancy  Status post induction BCG (completed 01/13/2025)  Recurrence in April 2025, with a CIS in the posterior bladder wall, high-grade TA present on the right lateral bladder wall, high-grade TA on the right posterior bladder wall, and high-grade TA on the bladder dome  Repeat resection on 05/30/2025 showed no evidence of bladder cancer recurrence  BPH with a history of TURP approximately 10 years ago, currently on finasteride and tamsulosin  Erectile dysfunction              REVIEW OF SYSTEMS:  Review of Systems   Constitutional: Negative.  Negative for chills and fever.   Eyes:  Negative for double vision.   Respiratory:  Negative for cough and shortness of breath.    Cardiovascular:  Negative for chest pain and palpitations.   Gastrointestinal:  Negative for abdominal pain, constipation, diarrhea, nausea and vomiting.   Genitourinary:         See HPI   Musculoskeletal:  Negative for falls.   Neurological:  Negative for dizziness and seizures.   Endo/Heme/Allergies:  Negative for polydipsia.         PATIENT HISTORY:    Past  Medical History:   Diagnosis Date    Acid reflux     [facility]    Angina pectoris     Back pain     BPH (benign prostatic hyperplasia)     blue    Cholesteatoma     Class 2 severe obesity due to excess calories with serious comorbidity and body mass index (BMI) of 37.0 to 37.9 in adult 11/11/2015    Degenerative joint disease     Diabetes mellitus type 2 with complications 3/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        Past Surgical History:   Procedure Laterality Date    BIOPSY OF BLADDER N/A 8/30/2024    Procedure: BIOPSY, BLADDER;  Surgeon: Compa Hensley MD;  Location: [facility] OR [address] [place] [address];  Service: Urology;  Laterality: N/A;    BLADDER DIVERTICULECTOMY Right 10/8/2024    Procedure: EXCISION, DIVERTICULUM, BLADDER;  Surgeon: Compa Hensley MD;  Location: [facility] OR 2ND FLR;  Service: Urology;  Laterality: Right;    BLADDER FULGURATION N/A 8/30/2024    Procedure: FULGURATION, BLADDER;  Surgeon: Compa Hesnley MD;  Location: [facility] OR 1ST FLR;  Service: Urology;  Laterality: N/A;    CARDIAC PACEMAKER PLACEMENT      COLONOSCOPY N/A 9/13/2019    Procedure: COLONOSCOPY;  Surgeon: Kan Ramsey III, MD;  Location: [facility] ENDO;  Service: Endoscopy;  Laterality: N/A;    COLONOSCOPY N/A 9/22/2022    Procedure: COLONOSCOPY;  Surgeon: Chris Garcia MD;  Location: [facility] ENDO;  Service: Endoscopy;  Laterality: N/A;    CYSTOSCOPY N/A 8/30/2024    Procedure: CYSTOSCOPY;  Surgeon: Compa Hensley MD;  Location: [facility]  OR 1ST FLR;  Service: Urology;  Laterality: N/A;  with blue light    CYSTOSCOPY N/A 5/30/2025    Procedure: CYSTOSCOPY;  Surgeon: Compa Hensley MD;  Location: [facility] OR 1ST FLR;  Service: Urology;  Laterality: N/A;    CYSTOSCOPY W/ RETROGRADES Bilateral 7/16/2024    Procedure: CYSTOSCOPY, WITH RETROGRADE PYELOGRAM;  Surgeon: Vance Olivera MD;  Location: [facility];  Service: Urology;  Laterality: Bilateral;    CYSTOSCOPY WITH BIOPSY OF BLADDER N/A 4/4/2025    Procedure: CYSTOSCOPY, WITH BLADDER BIOPSY, WITH FULGURATION;  Surgeon: Compa Hensley MD;  Location: [facility] OR 1ST FLR;  Service: Urology;  Laterality: N/A;    CYSTOSCOPY,WITH URETERAL CATHETER INSERTION Bilateral 10/8/2024    Procedure: CYSTOSCOPY,WITH URETERAL CATHETER INSERTION;  Surgeon: Compa Hensley MD;  Location: [facility] OR 2ND FLR;  Service: Urology;  Laterality: Bilateral;    ESOPHAGOGASTRODUODENOSCOPY N/A 9/13/2019    Procedure: ESOPHAGOGASTRODUODENOSCOPY (EGD);  Surgeon: Kan Ramsey III, MD;  Location: [facility];  Service: Endoscopy;  Laterality: N/A;    ESOPHAGOGASTRODUODENOSCOPY N/A 9/3/2021    Procedure: EGD (ESOPHAGOGASTRODUODENOSCOPY);  Surgeon: Kaylene Pineda MD;  Location: [facility];  Service: Endoscopy;  Laterality: N/A;    ESOPHAGOGASTRODUODENOSCOPY N/A 5/24/2023    Procedure: EGD (ESOPHAGOGASTRODUODENOSCOPY);  Surgeon: Cory Bennett MD;  Location: [facility];  Service: Endoscopy;  Laterality: N/A;    FLEXIBLE CYSTOSCOPY N/A 10/8/2024    Procedure: CYSTOSCOPY, FLEXIBLE;  Surgeon: Compa Hensley MD;  Location: [facility] OR 2ND FLR;  Service: Urology;  Laterality: N/A;    INSERTION OF TYMPANOSTOMY TUBE Right 3/15/2023    Procedure: INSERTION, TYMPANOSTOMY TUBE;  Surgeon: Jose L Gudino MD;  Location: [facility] OR 2ND FLR;  Service: ENT;  Laterality: Right;    INSTILLATION OF URINARY BLADDER N/A 10/8/2024    Procedure: INSTILLATION, BLADDER;  Surgeon: Compa Hensley MD;  Location: [facility] OR [address] [place] [address];  Service: Urology;  Laterality: N/A;   Chemotherapy instillation bladder    JOINT REPLACEMENT      KNEE CARTILAGE SURGERY Bilateral     LYMPHADENECTOMY, PELVIS Right 10/8/2024    Procedure: LYMPHADENECTOMY, PELVIS;  Surgeon: Compa Hensley MD;  Location: [address];  Service: Urology;  Laterality: Right;    NASAL SINUS SURGERY      PARTIAL SURGICAL REMOVAL OF BLADDER N/A 10/8/2024    Procedure: CYSTECTOMY, PARTIAL;  Surgeon: Compa Hensley MD;  Location: [address];  Service: Urology;  Laterality: N/A;    SHOULDER ARTHROSCOPY Right     TONSILLECTOMY      TOTAL KNEE ARTHROPLASTY Left 05/15/2017    TRANSURETHRAL RESECTION OF PROSTATE      TURBT (TRANSURETHRAL RESECTION OF BLADDER TUMOR) N/A 7/16/2024    Procedure: TURBT (TRANSURETHRAL RESECTION OF BLADDER TUMOR);  Surgeon: Vance Olivera MD;  Location: [facility];  Service: Urology;  Laterality: N/A;    TURBT (TRANSURETHRAL RESECTION OF BLADDER TUMOR) N/A 5/30/2025    Procedure: TURBT (TRANSURETHRAL RESECTION OF BLADDER TUMOR);  Surgeon: Compa Hensley MD;  Location: [address];  Service: Urology;  Laterality: N/A;    TURBT, WITH BLUE LIGHT CYSTOSCOPY AND CYSVIEW N/A 4/4/2025    Procedure: TURBT,WITH BLUE LIGHT CYSTOSCOPY AND CYSVIEW;  Surgeon: Compa Hensley MD;  Location: [address];  Service: Urology;  Laterality: N/A;    TYMPANOPLASTY      TYMPANOPLASTY WITH MASTOIDECTOMY Left 3/15/2023    Procedure: TYMPANOPLASTY, WITH MASTOIDECTOMY;  Surgeon: Jose L Gudino MD;  Location: [address];  Service: ENT;  Laterality: Left;    vesectomy         Family History   Problem Relation Name Age of Onset    Arthritis Mother      Hypertension Mother      Heart disease Father      Hypertension Father      Stroke Father      Diabetes Son      Diabetes Maternal Grandmother      Colon cancer Paternal Grandmother         Social History[1]    Allergies:  Aspirin, Meperidine, and Niacin    Medications:  Current Medications[2]    PHYSICAL EXAMINATION:  Physical Exam  Vitals and nursing note  reviewed.   Constitutional:       General: He is awake.      Appearance: Normal appearance.   HENT:      Head: Normocephalic.      Right Ear: External ear normal.      Left Ear: External ear normal.      Nose: Nose normal.   Cardiovascular:      Rate and Rhythm: Normal rate.   Pulmonary:      Effort: Pulmonary effort is normal. No respiratory distress.   Abdominal:      Tenderness: There is no abdominal tenderness. There is no right CVA tenderness or left CVA tenderness.   Genitourinary:     Penis: Normal.       Testes: Normal.   Musculoskeletal:         General: Normal range of motion.      Cervical back: Normal range of motion.   Skin:     General: Skin is warm and dry.   Neurological:      General: No focal deficit present.      Mental Status: He is alert and oriented to person, place, and time.   Psychiatric:         Mood and Affect: Mood normal.         Behavior: Behavior is cooperative.           LABS:      In office UA today was clear of active infection and visible blood.       Lab Results   Component Value Date    PSA 1.4 06/25/2020    PSA 2.3 12/30/2013    PSA 1.15 02/11/2013    PSADIAG 0.19 06/21/2024    PSADIAG 0.33 08/11/2023    PSADIAG 0.67 08/23/2022       Lab Results   Component Value Date    CREATININE 1.3 05/13/2025    EGFRNORACEVR 58 (L) 05/13/2025               IMPRESSION:    Encounter Diagnoses   Name Primary?    Malignant neoplasm of lateral wall of urinary bladder Yes       Induction BCG; dose 4 of 5      Assessment:       1. Malignant neoplasm of lateral wall of urinary bladder        Plan:         I spent a total of 30 minutes on the day of the visit. This includes face to face time and non-face to face time preparing to see the patient (eg, review of tests), obtaining and/or reviewing separately obtained history, documenting clinical information in the electronic or other health record, independently interpreting results and communicating results to the patient/family/caregiver, or care  "coordinator  10 minutes pre counseling including last installation  10 minutes prep, travel to get medication & installation of the BCG  10 minutes post installation instructions and room clean up  We addressed his UA  Education and recommendations of today's plan of care with the BCG Bladder Installations; including home remedies and needed follow up with PCP.   BCG dose 4 of 5 was instilled using sterile and BCG precautions.  Tolerated well   We discussed his Bladder Cancer; discussed after TURBT; cancer was removed.  Discussed BCG and the expectations, benefits, risks, as well as importance of post clean up for 6 hours after the 2 hour urination.  They was given detailed written instructions as well.   Diet modifications; no caffeine the morning of installation; increase water intake at the 2 hour void to flush out.  No sex for 48 hours after installation; use of condom during the 5 week installations.  RTC one week for dose 5 of 5.      The visit today is associated with current or anticipated ongoing medical care related to this patient's single serious condition/complex condition.              [1]   Social History  Socioeconomic History    Marital status:      Spouse name: SAUL    Number of children: 2   Occupational History    Occupation: retired- [employer] .   Tobacco Use    Smoking status: Never    Smokeless tobacco: Never   Substance and Sexual Activity    Alcohol use: Yes     Comment: " once a month"    Drug use: No    Sexual activity: Yes     Partners: Female   Social History Narrative     . Lives with spouse. Has 2 children. Patient retired from [employer]. His son has type 1 diabetes.     Social Drivers of Health     Financial Resource Strain: Low Risk  (3/31/2025)    Overall Financial Resource Strain (CARDIA)     Difficulty of Paying Living Expenses: Not hard at all   Food Insecurity: No Food Insecurity (3/31/2025)    Hunger Vital Sign     Worried About Running Out " "of Food in the Last Year: Never true     Ran Out of Food in the Last Year: Never true   Transportation Needs: No Transportation Needs (3/31/2025)    PRAPARE - Transportation     Lack of Transportation (Medical): No     Lack of Transportation (Non-Medical): No   Physical Activity: Inactive (3/31/2025)    Exercise Vital Sign     Days of Exercise per Week: 0 days     Minutes of Exercise per Session: 0 min   Stress: No Stress Concern Present (3/31/2025)    [facility]ational [facility] - Occupational Stress Questionnaire     Feeling of Stress : Not at all   Housing Stability: Low Risk  (3/31/2025)    Housing Stability Vital Sign     Unable to Pay for Housing in the Last Year: No     Number of Times Moved in the Last Year: 0     Homeless in the Last Year: No   [2]   Current Outpatient Medications:     ACETAMINOPHEN (TYLENOL 8 HOUR ORAL), Take by mouth as needed., Disp: , Rfl:     blood sugar diagnostic (ACCU-CHEK JAXON) Strp, Check BG 2-3 times daily. Accuchek jaxon strips, Disp: 300 strip, Rfl: 3    dorzolamide-timolol 2-0.5% (COSOPT) 22.3-6.8 mg/mL ophthalmic solution, Place 1 drop into both eyes 2 (two) times daily., Disp: 10 mL, Rfl: 5    DROPLET PEN NEEDLE 31 gauge x 3/16" Ndle, USE AS DIRECTED  WITH  INSULIN, Disp: 200 each, Rfl: 3    dutasteride (AVODART) 0.5 mg capsule, Take 1 capsule (0.5 mg total) by mouth once daily., Disp: 90 capsule, Rfl: 3    famotidine (PEPCID) 20 MG tablet, Take 1 tablet (20 mg total) by mouth 2 (two) times daily., Disp: 180 tablet, Rfl: 3    fenofibrate micronized (LOFIBRA) 200 MG Cap, Take 1 capsule (200 mg total) by mouth every evening., Disp: 90 capsule, Rfl: 3    gabapentin (CMPD PAIN MANAGEMENT COMPOUND OINTMNENT THREE), Apply bid prn pain, Disp: 100 g, Rfl: 11    hydrocortisone 2.5 % cream, Apply topically 2 (two) times daily., Disp: 3.5 g, Rfl: 5    insulin glargine U-100, Lantus, (LANTUS SOLOSTAR U-100 INSULIN) 100 unit/mL (3 mL) InPn pen, 16 units in the AM and 4 " units in the PM, Disp: 3 mL, Rfl: 11    latanoprost 0.005 % ophthalmic solution, INSTILL 1 DROP INTO BOTH EYES ONE TIME DAILY, Disp: 7.5 mL, Rfl: 3    levocetirizine (XYZAL) 5 MG tablet, Take 5 mg by mouth every evening., Disp: , Rfl:     loratadine (CLARITIN) 10 mg tablet, Take 10 mg by mouth once daily., Disp: , Rfl:     losartan (COZAAR) 25 MG tablet, Take 1 tablet (25 mg total) by mouth once daily., Disp: 90 tablet, Rfl: 3    lovastatin (MEVACOR) 40 MG tablet, Take 1 tablet by mouth Every evening., Disp: 90 tablet, Rfl: 3    multivitamin capsule, Take 1 capsule by mouth once daily., Disp: , Rfl:     olopatadine-mometasone 665-25 mcg/spray Spry, 2 sprays by Nasal route 2 (two) times daily., Disp: 29 g, Rfl: 11    oxyCODONE (ROXICODONE) 5 MG immediate release tablet, Take 1 tablet (5 mg total) by mouth every 4 (four) hours as needed for Pain., Disp: 5 tablet, Rfl: 0    pantoprazole (PROTONIX) 40 MG tablet, Take 1 tablet (40 mg total) by mouth once daily., Disp: 90 tablet, Rfl: 1    polyethylene glycol (GLYCOLAX) 17 gram/dose powder, Use to cap to measure 17g, mix with liquid, and take by mouth once daily., Disp: 510 g, Rfl: 0    semaglutide (OZEMPIC) 1 mg/dose (4 mg/3 mL), Inject 1 mg into the skin every 7 days., Disp: 9 mL, Rfl: 4    sildenafil (REVATIO) 20 mg Tab, Take 1 tablet (20 mg total) by mouth daily as needed (PRN). Takes prn, Disp: 30 tablet, Rfl: 11    sodium chloride (SALINE NASAL) 0.65 % nasal spray, 2 sprays by Nasal route as needed for Congestion., Disp: 44 mL, Rfl: 0    triamcinolone acetonide 0.1% (KENALOG) 0.1 % cream, AAA bid, Disp: 454 g, Rfl: 0    Current Facility-Administered Medications:     BCG live (JESUS) 50 mg in 0.9% NaCl 50 mL bladder instillation, 50 mg, Intravesical, Once, Lori Celeste, NP

## 2025-07-24 ENCOUNTER — OFFICE VISIT (OUTPATIENT)
Dept: SLEEP MEDICINE | Facility: CLINIC | Age: 75
End: 2025-07-24
Payer: MEDICARE

## 2025-07-24 ENCOUNTER — TELEPHONE (OUTPATIENT)
Dept: PULMONOLOGY | Facility: CLINIC | Age: 75
End: 2025-07-24
Payer: MEDICARE

## 2025-07-24 VITALS
BODY MASS INDEX: 26.73 KG/M2 | WEIGHT: 190.94 LBS | SYSTOLIC BLOOD PRESSURE: 120 MMHG | OXYGEN SATURATION: 99 % | HEART RATE: 76 BPM | HEIGHT: 71 IN | DIASTOLIC BLOOD PRESSURE: 79 MMHG

## 2025-07-24 DIAGNOSIS — I15.2 HYPERTENSION ASSOCIATED WITH DIABETES: Chronic | ICD-10-CM

## 2025-07-24 DIAGNOSIS — G47.33 OSA ON CPAP: ICD-10-CM

## 2025-07-24 DIAGNOSIS — E11.22 TYPE 2 DIABETES MELLITUS WITH STAGE 3A CHRONIC KIDNEY DISEASE, WITH LONG-TERM CURRENT USE OF INSULIN: ICD-10-CM

## 2025-07-24 DIAGNOSIS — J31.0 CHRONIC RHINITIS: ICD-10-CM

## 2025-07-24 DIAGNOSIS — E11.59 HYPERTENSION ASSOCIATED WITH DIABETES: Chronic | ICD-10-CM

## 2025-07-24 DIAGNOSIS — N18.31 TYPE 2 DIABETES MELLITUS WITH STAGE 3A CHRONIC KIDNEY DISEASE, WITH LONG-TERM CURRENT USE OF INSULIN: ICD-10-CM

## 2025-07-24 DIAGNOSIS — E78.5 HYPERLIPIDEMIA ASSOCIATED WITH TYPE 2 DIABETES MELLITUS: Primary | Chronic | ICD-10-CM

## 2025-07-24 DIAGNOSIS — Z79.4 TYPE 2 DIABETES MELLITUS WITH STAGE 3A CHRONIC KIDNEY DISEASE, WITH LONG-TERM CURRENT USE OF INSULIN: ICD-10-CM

## 2025-07-24 DIAGNOSIS — E11.69 HYPERLIPIDEMIA ASSOCIATED WITH TYPE 2 DIABETES MELLITUS: Primary | Chronic | ICD-10-CM

## 2025-07-24 PROCEDURE — 1126F AMNT PAIN NOTED NONE PRSNT: CPT | Mod: CPTII,HCNC,S$GLB, | Performed by: INTERNAL MEDICINE

## 2025-07-24 PROCEDURE — 3008F BODY MASS INDEX DOCD: CPT | Mod: CPTII,HCNC,S$GLB, | Performed by: INTERNAL MEDICINE

## 2025-07-24 PROCEDURE — 1159F MED LIST DOCD IN RCRD: CPT | Mod: CPTII,HCNC,S$GLB, | Performed by: INTERNAL MEDICINE

## 2025-07-24 PROCEDURE — 99214 OFFICE O/P EST MOD 30 MIN: CPT | Mod: HCNC,S$GLB,, | Performed by: INTERNAL MEDICINE

## 2025-07-24 PROCEDURE — 1101F PT FALLS ASSESS-DOCD LE1/YR: CPT | Mod: CPTII,HCNC,S$GLB, | Performed by: INTERNAL MEDICINE

## 2025-07-24 PROCEDURE — 3074F SYST BP LT 130 MM HG: CPT | Mod: CPTII,HCNC,S$GLB, | Performed by: INTERNAL MEDICINE

## 2025-07-24 PROCEDURE — 3044F HG A1C LEVEL LT 7.0%: CPT | Mod: CPTII,HCNC,S$GLB, | Performed by: INTERNAL MEDICINE

## 2025-07-24 PROCEDURE — 3078F DIAST BP <80 MM HG: CPT | Mod: CPTII,HCNC,S$GLB, | Performed by: INTERNAL MEDICINE

## 2025-07-24 PROCEDURE — 3288F FALL RISK ASSESSMENT DOCD: CPT | Mod: CPTII,HCNC,S$GLB, | Performed by: INTERNAL MEDICINE

## 2025-07-24 PROCEDURE — 3061F NEG MICROALBUMINURIA REV: CPT | Mod: CPTII,HCNC,S$GLB, | Performed by: INTERNAL MEDICINE

## 2025-07-24 PROCEDURE — 99999 PR PBB SHADOW E&M-EST. PATIENT-LVL IV: CPT | Mod: PBBFAC,HCNC,, | Performed by: INTERNAL MEDICINE

## 2025-07-24 PROCEDURE — 4010F ACE/ARB THERAPY RXD/TAKEN: CPT | Mod: CPTII,HCNC,S$GLB, | Performed by: INTERNAL MEDICINE

## 2025-07-24 PROCEDURE — 3066F NEPHROPATHY DOC TX: CPT | Mod: CPTII,HCNC,S$GLB, | Performed by: INTERNAL MEDICINE

## 2025-07-24 NOTE — ASSESSMENT & PLAN NOTE
7/24/2025   EPWORTH SLEEPINESS SCALE   Sitting and reading 0   Watching TV 0   Sitting, inactive in a public place (e.g. a theatre or a meeting) 0   As a passenger in a car for an hour without a break 2   Lying down to rest in the afternoon when circumstances permit 2   Sitting and talking to someone 0   Sitting quietly after a lunch without alcohol 0   In a car, while stopped for a few minutes in traffic 0   Total score 4         It appears that he has nasal symptoms converted patient to poorly adherent from previously well adherent in 2022 and 2023   Data from his old download were reviewed with patient   Based on his old study his AHI was minimal when wearing his CPAP   His diagnostic study still showed significant sleep apnea   He is not a candidate for the options including inspire versus mandibular   He wants to try CPAP

## 2025-07-24 NOTE — ASSESSMENT & PLAN NOTE
Ongoing issue with  nasal discomfort  Follow up with ENT and allergy   Nasal CPAP we will definitely improve his symptoms  Patient is agreeable to return for titration in September

## 2025-07-24 NOTE — PROGRESS NOTES
Subjective:      Patient ID: Rigoberto Vsaquez is a 74 y.o. male.    Patient Active Problem List   Diagnosis    Osteoarthritis of knees, bilateral    Hypertension associated with diabetes    Type 2 diabetes mellitus with stage 3a chronic kidney disease, with long-term current use of insulin    Hyperlipidemia associated with type 2 diabetes mellitus    High myopia    Optic disc anomaly    CHANDRAKANT (iron deficiency anemia)    Pacemaker    Conductive hearing loss in left ear    Choroidal nevus of right eye    Epiretinal membrane (ERM) of left eye    Macular hole of right eye    Ectopic gastric mucosa    Open angle with borderline findings and high glaucoma risk in both eyes    History of skin cancer    History of heart block;AV block, 3rd degree    BPH with obstruction/lower urinary tract symptoms    Erectile dysfunction    Lung granuloma    Dyspepsia    Calcification of aorta    Cholesteatoma of ear, left    СВЕТЛАНА on CPAP    Malignant neoplasm of lateral wall of urinary bladder S/p Resection    Arthritis of carpometacarpal (CMC) joint of left thumb    Diabetic glaucoma    Diabetes mellitus type 2 with complications    Chronic rhinitis    Post-nasal drip       he has been referred by No ref. provider found for evaluation and management for   Chief Complaint   Patient presents with    Apnea         Chief Complaint: Apnea        HPI:  He presents for СВЕТЛАНА with review 2/10/2022 Home Sleep Study mild obstructive sleep apnea AHI 12.1.   Patient symptomatic for obstructive sleep apnea with feeling tired and fatigued and snoring.   Patient reports somewhat restful sleep.  He denies morning headache.   He reports day time napping; duration 1 Hour  He denies recent weight gain.  Cardiovascular risk factors: diabetes, hypertension, hyperlipidemia and coronary artery disease  Bed time is 0930  Wake time is 0600 - 0700  Sleep onset is within 15 - 30 Minutes.  Sleep maintenance difficulties related to frequent night time awakening  Wake  after sleep onset occurs two times a night.  Nocturia occurs two times a night,   Sleep aids : YES , melatonin 1-2 times a week if working on things around his house, he will think about it, most nights able to go to sleep in 15-20 minutes.  Dry mouth :  NO  Sleep walking:  NO  Sleep talking :  NO  Sleep eating: NO  Vivid Dreams :  NO  Cataplexy :  NO       07/26/2022  Last visit 05/17/2022  Using device and benefits  AHI was 0.4  Episodes of noctural dyspnea  Had inhaaler in past, never used for night issues, had daytime fatiigue which resolved  Seen Dr Senior; PPM since 30 years old  Complete heart block  Never smoker  Las 6MWD reveiwed  Last PFT: hyperinflation    11/08/2022  Last seen 07/26/2022  No resp issues  No cough, No SOB  PFT: Normal  Using CPAP Usage > 4 hrs was 100%      08/31/2023  Followup  On RESVENT device  APAP 5-20  FFM  Using and benefits  No respiratory issues  Bed time 9 pm, Wake time 6am  No NAPS  AHI was 0.3    09/05/2024  Followup  Recent bladder cancer  Cystoscopy Dr Hensley  Using CPAP and benefits  No snoring  FFM  New supplies    05/08/2025  Followup  Bladder cancer followed by surgery and BCG therapy  Left deconditioned weak  MEEK several months  Seen cardiology and ENT  Told sins okay  No cough or wheezing  SOB since Feb 2025  Unable to wear PAP  Says pressure issues  Last HST AHI was 12.1/hr ( 85 events)  No hemoptysis  No Hx VTE  Bed time 9 pm  Wake 6-7 am  Full face mask    Taking: Claritin, flonase, xyzal.       Encourage flonase, asteline  Not seen allergy recently  Lasty chest imaging 10/2024     07/24/2025   Follow-up visit   Sleep study reviewed   Respiratory Events  The polysomnogram revealed a presence of - obstructive, - central, and - mixed apneas resulting in an Apnea index of - events per hour.  There were 75 hypopneas (>=3% desat and/or Ar.) resulting in an Apnea\Hypopnea Index (AHI >=3% desat and/or Ar.) of 14.2 events per hour.  There were 28 hypopneas (>=4% desat)  resulting in an Apnea\Hypopnea Index (AHI >=4% desat) of 5.3 events per hour.  There were - Respiratory Effort Related Arousals resulting in a RERA index of - events per hour. The Respiratory Disturbance Index is 14.2 events per hour.     Mean oxygen saturation was 92.5%.  The lowest oxygen saturation during sleep was 79.0%.  Time spent <=88% oxygen saturation was 11.2 minutes (2.6%).  After much persuasion patient is agreeable for CPAP titration   He will need close follow up for habituation  He has allergy issues and sinus issues we will definitely be a factor  I believe use of heated tubing and humidification we will help him      Previous Report Reviewed: lab reports and office notes     Past Medical History: The following portions of the patient's history were reviewed and updated as appropriate:   He  has a past surgical history that includes Shoulder arthroscopy (Right); Nasal sinus surgery; Cardiac pacemaker placement; Tonsillectomy; Transurethral resection of prostate; Knee cartilage surgery (Bilateral); Tympanoplasty; vesectomy; Total knee arthroplasty (Left, 05/15/2017); Joint replacement; Esophagogastroduodenoscopy (N/A, 9/13/2019); Colonoscopy (N/A, 9/13/2019); Esophagogastroduodenoscopy (N/A, 9/3/2021); Colonoscopy (N/A, 9/22/2022); Tympanoplasty with mastoidectomy (Left, 3/15/2023); Insertion of tympanostomy tube (Right, 3/15/2023); Esophagogastroduodenoscopy (N/A, 5/24/2023); turbt (transurethral resection of bladder tumor) (N/A, 7/16/2024); Cystoscopy w/ retrogrades (Bilateral, 7/16/2024); Biopsy of bladder (N/A, 8/30/2024); Cystoscopy (N/A, 8/30/2024); Bladder fulguration (N/A, 8/30/2024); Bladder diverticulectomy (Right, 10/8/2024); Partial surgical removal of bladder (N/A, 10/8/2024); lymphadenectomy, pelvis (Right, 10/8/2024); Flexible cystoscopy (N/A, 10/8/2024); Instillation of urinary bladder (N/A, 10/8/2024); cystoscopy,with ureteral catheter insertion (Bilateral, 10/8/2024); TURBT, WITH  BLUE LIGHT CYSTOSCOPY AND CYSVIEW (N/A, 4/4/2025); Cystoscopy with biopsy of bladder (N/A, 4/4/2025); turbt (transurethral resection of bladder tumor) (N/A, 5/30/2025); and Cystoscopy (N/A, 5/30/2025).  His family history includes Arthritis in his mother; Colon cancer in his paternal grandmother; Diabetes in his maternal grandmother and son; Heart disease in his father; Hypertension in his father and mother; Stroke in his father.  He  reports that he has never smoked. He has never used smokeless tobacco. He reports current alcohol use. He reports that he does not use drugs.  He has a current medication list which includes the following prescription(s): acetaminophen, blood sugar diagnostic, dorzolamide-timolol 2-0.5%, droplet pen needle, dutasteride, famotidine, fenofibrate micronized, gabapentin, hydrocortisone, lantus solostar u-100 insulin, latanoprost, levocetirizine, loratadine, losartan, lovastatin, multivitamin, olopatadine-mometasone, oxycodone, pantoprazole, polyethylene glycol, ozempic, sildenafil, saline nasal, and triamcinolone acetonide 0.1%.  He is allergic to aspirin, meperidine, and niacin..    Review of Systems   Constitutional:  Negative for fever, chills, weight loss, weight gain, activity change, appetite change, fatigue and night sweats.   HENT:  Negative for postnasal drip, rhinorrhea, sinus pressure, voice change and congestion.    Eyes:  Negative for redness and itching.   Respiratory:  Negative for apnea, snoring, cough, sputum production, chest tightness, shortness of breath, wheezing, orthopnea, asthma nighttime symptoms, dyspnea on extertion, use of rescue inhaler and somnolence.    Cardiovascular: Negative.  Negative for chest pain, palpitations and leg swelling.   Genitourinary:  Negative for difficulty urinating and hematuria.   Endocrine:  Negative for cold intolerance and heat intolerance.    Musculoskeletal:  Negative for arthralgias, gait problem, joint swelling and myalgias.  "  Skin: Negative.    Gastrointestinal:  Negative for nausea, vomiting, abdominal pain and acid reflux.   Neurological:  Negative for dizziness, weakness, light-headedness and headaches.   Hematological:  Negative for adenopathy. No excessive bruising.   All other systems reviewed and are negative.     Objective:   /79   Pulse 76   Ht 5' 11" (1.803 m)   Wt 86.6 kg (190 lb 14.7 oz)   SpO2 99%   BMI 26.63 kg/m²   Physical Exam  Vitals and nursing note reviewed.   Constitutional:       Appearance: He is well-developed.       HENT:      Head: Normocephalic and atraumatic.   Eyes:      Conjunctiva/sclera: Conjunctivae normal.   Cardiovascular:      Rate and Rhythm: Normal rate and regular rhythm.      Heart sounds: Normal heart sounds.   Pulmonary:      Effort: Pulmonary effort is normal.      Breath sounds: Normal breath sounds.   Abdominal:      General: Bowel sounds are normal.      Palpations: Abdomen is soft.   Musculoskeletal:         General: Normal range of motion.      Cervical back: Normal range of motion and neck supple.   Skin:     General: Skin is warm and dry.   Neurological:      Mental Status: He is alert and oriented to person, place, and time.      Deep Tendon Reflexes: Reflexes are normal and symmetric.   Psychiatric:         Behavior: Behavior normal.         Thought Content: Thought content normal.         Judgment: Judgment normal.         Personal Diagnostic Review   Diagnostic PSG Report  [facility]  [address] [place] [address] [place] [address]  [phone]      Fax: [phone]      Patient Name: Rigoberto Vasquez Study Date: 6/28/2025   YOB: 1950 Study Type: PSG   Age: 74 year Patient #: [id number]   Sex: Male Referred by: Luciano Sahu MD   Height: 5' 11" Interpreting Physician: Luciano Sahu MD   Weight: 192.0 lbs Recording Tech: Carrington Davidson   BMI: 26.9 Scoring Tech: Ely Smith   [place]: 8 Neck " "Circumference: 16     Indications for Polysomnography  The patient is a 74 year-old Male who is 5' 11" and weighs 192.0 lbs. His BMI equals 26.9.  A full night polysomnogram was performed to evaluate for -.    Medical History: Patient symptomatic for obstructive sleep apnea with feeling tired and fatigued and snoring.   Patient reports somewhat restful sleep.  He denies morning headache.   He reports day time napping; duration 1 Hour  He denies recent weight gain.  Cardiovascular risk factors: diabetes, hypertension, hyperlipidemia and coronary artery disease  Bed time is 0930  Wake time is 0600 - 0700  Sleep onset is within 15 - 30 Minutes.  Sleep maintenance difficulties related to frequent night time awakening  Wake after sleep onset occurs two times a night.  Nocturia occurs two times a night,   Sleep aids : YES , melatonin 1-2 times a week if working on things around his house, he will think about it, most nights able to go to sleep in 15-20 minutes.  Dry mouth :  NO  Sleep walking:  NO  Sleep talking :  NO  Sleep eating: NO  Vivid Dreams :  NO  Cataplexy :  NO    Unable to wear PAP  Says pressure issues  Last HST AHI was 12.1/hr ( 85 events)    Download  04/13/2025 to 04/19/2025  Usage > 4 hrs : 0%  APAP 5-20  AHI 4.4      epworth 8      Medication   ACETAMINOPHEN (TYLENOL 8 HOUR ORAL)    azelastine (ASTELIN) 137 mcg (0.1 %) nasal spray    dorzolamide-timolol 2-0.5% (COSOPT) 22.3-6.8 mg/mL ophthalmic solution    dutasteride (AVODART) 0.5 mg capsule    famotidine (PEPCID) 20 MG tablet    fenofibrate micronized (LOFIBRA) 200 MG Cap    fluticasone propionate (FLONASE) 50 mcg/actuation nasal spray    gabapentin (CMPD PAIN MANAGEMENT COMPOUND OINTMNENT THREE)    hydrocortisone 2.5 % cream    insulin glargine U-100, Lantus, (LANTUS SOLOSTAR U-100 INSULIN) 100 unit/mL (3 mL) InPn pen    latanoprost 0.005 % ophthalmic solution    levocetirizine (XYZAL) 5 MG tablet    loratadine (CLARITIN) 10 mg tablet    losartan " (COZAAR) 25 MG tablet    lovastatin (MEVACOR) 40 MG tablet    multivitamin capsule    oxyCODONE (ROXICODONE) 5 MG immediate release tablet    pantoprazole (PROTONIX) 40 MG tablet    polyethylene glycol (GLYCOLAX) 17 gram/dose powder    semaglutide (OZEMPIC) 1 mg/dose (4 mg/3 mL)    sildenafil (REVATIO) 20 mg Tab    sodium chloride (SALINE NASAL) 0.65 % nasal spray    triamcinolone acetonide 0.1% (KENALOG) 0.1 % cream      Test Description  Patient was connected to a full set of leads with a sleep technician in attendance.  Parameters used were EEG derivations (F4-A1, C4-A1, O2-A1), EOG derivations (LOC-A2, INGA-A2), EKG, EMG - extremity (leg) & chin, oxygen saturation, effort parameters + abdominal/thoracic (RIP), and airflow parameters - nasal pressure/thermal.  Body position was visually monitored and noted.  Audio & video monitoring was performed during study.    Scoring is done in accordance with the American Academy of Sleep Medicine Manual for the Scoring of Sleep and Associated Events version 3.0.  Hypopneas are scored using the 4% desaturation rule.    Obstructive apnea: Drop in thermal sensor by greater than 90% for 10 seconds with continued respiratory effort.  Central apnea: Drop in thermal sensor by greater than 90% for 10 seconds with absent respiratory effort.  Option 1B hypopneas: characterized by 30% decrease in airflow accompanied by 4% oxygen desaturation were scored according to current CMS and 2023  AASM (Version 3.0) and expressed in the Apnea Hypopnea Index (AHI).  Option 1A hypopneas: characterized by 30% decrease in airflow accompanied by 3% oxygen desaturation or arousal were scored according to the 2023 AASM  (Version 3.0 and expressed in the Respiratory Disturbance Index (RDI).  Respiratory effort related arousals (RERAs): Drop in nasal pressure or flattening of the flow lasting equal or more than      Sleep Architecture  The total recording time of the polysomnogram was 468.7 minutes.  The  total sleep time was 317.0 minutes.  The patient spent 10.6% of total sleep time in Stage N1, 56.6% in Stage N2, 1.7% in Stages N3, and 31.1% in REM.  Sleep latency was 41.2 minutes.  REM latency was 90.5 minutes.  Sleep Efficiency was 67.6%.  Wake after Sleep Onset time was 110.0 minutes.    Respiratory Events  The polysomnogram revealed a presence of - obstructive, - central, and - mixed apneas resulting in an Apnea index of - events per hour.  There were 75 hypopneas (>=3% desat and/or Ar.) resulting in an Apnea\Hypopnea Index (AHI >=3% desat and/or Ar.) of 14.2 events per hour.  There were 28 hypopneas (>=4% desat) resulting in an Apnea\Hypopnea Index (AHI >=4% desat) of 5.3 events per hour.  There were - Respiratory Effort Related Arousals resulting in a RERA index of - events per hour. The Respiratory Disturbance Index is 14.2 events per hour.     Mean oxygen saturation was 92.5%.  The lowest oxygen saturation during sleep was 79.0%.  Time spent <=88% oxygen saturation was 11.2 minutes (2.6%).    Limb Activity  There were 262 total limb movements recorded, of this total, 261 were classified as PLMs.  PLM index was 49.4 per hour and PLM associated with Arousals index was 4.0 per hour.    Cardiac Summary  The average pulse rate was 63.5 bpm.  The minimum pulse rate was 36.0 bpm while the maximum pulse rate was 167.0 bpm.  Cardiac rhythm was normal    Conclusion:   Awake 21:23 hrs to 22:39 hrs and 02:32 hrs o 03:20 hrs  Overall AHI was 14.2 events per hour with 75 events mild sleep apnea based on 3% scoring rule.    Based on 4% scoring rule AHI was 5.3 events per hour with 28 events   Lowest oxygen saturation was 79%   Oxygen saturation was below 88% for 11.2 minutes   REM RDI was 26.2 events per hour.  PLM index was 4.0 events per hour       Diagnosis: Obstructive sleep apnea (adult) (pediatric) / G47.33   PLMD   Psychophysiological Insomnia / 307.42    Recommendations:     Sleep hygiene   Return for CPAP  "titration         Dear No referring provider defined for this encounter./Jesse Grimes MD         The sleep study that you ordered is complete.  You have ordered sleep LAB services to perform the sleep study for Rigoberto Vasquez .      Please find Sleep Study result in  the "Media tab" of Chart Review menu.        You can look  for the report in the  Media by the document type "Sleep Study Documents". Alphabetizing  "Document type" column helps to find the SLEEP STUDY report  Faster.       As the ordering provider, you are responsible for reviewing the results and implementing a treatment plan with your patient.    If you need a Sleep Medicine provider to explain the sleep study findings and arrange treatment for the patient, please refer patient for consultation to our Sleep Clinic via [facility] with Ambulatory Consult Sleep.     To do that please place an order for an  "Ambulatory Consult Sleep" -  order , it will go to our clinic work queue for our staff  to contact the patient for an appointment.      For any questions, please contact our sleep lab  staff at [phone] to talk to clinical staff          Luciano Sahu MD   No results for input(s): "PH", "PCO2", "PO2", "HCO3", "POCSATURATED", "BE" in the last 72 hours.      Assessment:     1. Hyperlipidemia associated with type 2 diabetes mellitus    2. Hypertension associated with diabetes    3. Type 2 diabetes mellitus with stage 3a chronic kidney disease, with long-term current use of insulin    4. СВЕТЛАНА on CPAP    5. Chronic rhinitis      No orders of the defined types were placed in this encounter.      Plan:   Discussed diagnosis, its evaluation, treatment and usual course. All questions answered.  Problem List Items Addressed This Visit       Hyperlipidemia associated with type 2 diabetes mellitus - Primary (Chronic)    Hypertension associated with diabetes (Chronic)    Type 2 diabetes mellitus with stage 3a chronic kidney disease, with long-term " current use of insulin    СВЕТЛАНА on CPAP        7/24/2025   EPWORTH SLEEPINESS SCALE   Sitting and reading 0   Watching TV 0   Sitting, inactive in a public place (e.g. a theatre or a meeting) 0   As a passenger in a car for an hour without a break 2   Lying down to rest in the afternoon when circumstances permit 2   Sitting and talking to someone 0   Sitting quietly after a lunch without alcohol 0   In a car, while stopped for a few minutes in traffic 0   Total score 4         It appears that he has nasal symptoms converted patient to poorly adherent from previously well adherent in 2022 and 2023   Data from his old download were reviewed with patient   Based on his old study his AHI was minimal when wearing his CPAP   His diagnostic study still showed significant sleep apnea   He is not a candidate for the options including inspire versus mandibular   He wants to try CPAP            Chronic rhinitis    Ongoing issue with  nasal discomfort  Follow up with ENT and allergy   Nasal CPAP we will definitely improve his symptoms  Patient is agreeable to return for titration in September          I have personally reviewed spirometry, 6 minute walk, ABGs   PCO2 slightly reduced suggesting some alkalosis   Exercise capacity was slightly reduced support deconditioned  Spirometry normal     Follow up f/u after titration.    This note was prepared using voice recognition system and is likely to have sound alike errors that may have been overlooked even after proof reading.  Please call me with any questions    Discussed diagnosis, its evaluation, treatment and usual course. All questions answered.    Thank you for the courtesy of participating in the care of this patient    Luciano Sahu MD

## 2025-07-28 ENCOUNTER — OFFICE VISIT (OUTPATIENT)
Dept: UROLOGY | Facility: CLINIC | Age: 75
End: 2025-07-28
Payer: MEDICARE

## 2025-07-28 VITALS
WEIGHT: 190.94 LBS | BODY MASS INDEX: 26.73 KG/M2 | HEIGHT: 71 IN | SYSTOLIC BLOOD PRESSURE: 128 MMHG | DIASTOLIC BLOOD PRESSURE: 76 MMHG | HEART RATE: 60 BPM

## 2025-07-28 DIAGNOSIS — C67.2 MALIGNANT NEOPLASM OF LATERAL WALL OF URINARY BLADDER: Primary | ICD-10-CM

## 2025-07-28 LAB
BILIRUBIN, UA POC OHS: NEGATIVE
BLOOD, UA POC OHS: ABNORMAL
CLARITY, UA POC OHS: ABNORMAL
COLOR, UA POC OHS: YELLOW
GLUCOSE, UA POC OHS: NEGATIVE
KETONES, UA POC OHS: NEGATIVE
LEUKOCYTES, UA POC OHS: ABNORMAL
NITRITE, UA POC OHS: NEGATIVE
PH, UA POC OHS: 5.5
PROTEIN, UA POC OHS: ABNORMAL
SPECIFIC GRAVITY, UA POC OHS: 1.02
UROBILINOGEN, UA POC OHS: 0.2

## 2025-07-28 PROCEDURE — 99999 PR PBB SHADOW E&M-EST. PATIENT-LVL IV: CPT | Mod: PBBFAC,HCNC,,

## 2025-07-28 RX ORDER — CIPROFLOXACIN 500 MG/1
500 TABLET, FILM COATED ORAL ONCE
OUTPATIENT
Start: 2025-07-28 | End: 2025-07-28

## 2025-07-28 RX ORDER — LIDOCAINE HYDROCHLORIDE 20 MG/ML
JELLY TOPICAL ONCE
OUTPATIENT
Start: 2025-07-28 | End: 2025-07-28

## 2025-07-28 NOTE — PROGRESS NOTES
CHIEF COMPLAINT:  Rigoberto Vasquez is a 74 y.o. male presents today for Bladder Cancer    HISTORY OF PRESENTING ILLINESS:  Rigoberto Vasquez is a 74 y.o. Type 2 DIABETIC male who is an established patient in our clinic. He has a history of BPH with LUTS managed with Flomax and Finasteride. Hx of TURP ~ 10 years ago. ED > 1 year. He aslos has a history of recurrent Bladder Cancer. He was initially diagnosed 07/16/2024 with Hg T1 with Dr. Olivera. Treated with BCG.   08/07/2024 he established care with Dr. Hensley.      He is here today to begin another Induction BCG; dose 5 of 5.   Voices no urinary complaints today.      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, status post TURBT on 07/16/2024 for high-grade T1 into a diverticulum  Status post open bladder diverticulectomy and lymphadenectomy on 10/08/2024, pathology T 0 N0, negative for residual malignancy  Status post induction BCG (completed 01/13/2025)  Recurrence in April 2025, with a CIS in the posterior bladder wall, high-grade TA present on the right lateral bladder wall, high-grade TA on the right posterior bladder wall, and high-grade TA on the bladder dome  Repeat resection on 05/30/2025 showed no evidence of bladder cancer recurrence  BPH with a history of TURP approximately 10 years ago, currently on finasteride and tamsulosin  Erectile dysfunction     REVIEW OF SYSTEMS:  Review of Systems   Constitutional: Negative.  Negative for chills and fever.   Eyes:  Negative for double vision.   Respiratory:  Negative for cough and shortness of breath.    Cardiovascular:  Negative for chest pain and palpitations.   Gastrointestinal:  Negative for abdominal pain, constipation, diarrhea, nausea and vomiting.   Genitourinary:         See HPI   Musculoskeletal:  Negative for falls.   Neurological:  Negative for dizziness and seizures.   Endo/Heme/Allergies:  Negative for polydipsia.     PATIENT HISTORY:  Past Medical History:   Diagnosis  Date    Acid reflux     [facility]    Angina pectoris     Back pain     BPH (benign prostatic hyperplasia)     blue    Cholesteatoma     Class 2 severe obesity due to excess calories with serious comorbidity and body mass index (BMI) of 37.0 to 37.9 in adult 11/11/2015    Degenerative joint disease     Diabetes mellitus type 2 with complications 3/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      Past Surgical History:   Procedure Laterality Date    BIOPSY OF BLADDER N/A 8/30/2024    Procedure: BIOPSY, BLADDER;  Surgeon: Compa Hensley MD;  Location: [facility] OR [address] [place] [address];  Service: Urology;  Laterality: N/A;    BLADDER DIVERTICULECTOMY Right 10/8/2024    Procedure: EXCISION, DIVERTICULUM, BLADDER;  Surgeon: Compa Hensley MD;  Location: [facility] OR 2ND FLR;  Service: Urology;  Laterality: Right;    BLADDER FULGURATION N/A 8/30/2024    Procedure: FULGURATION, BLADDER;  Surgeon: Compa Hensley MD;  [facility];  Service: Urology;  Laterality: N/A;    CARDIAC PACEMAKER PLACEMENT      COLONOSCOPY N/A 9/13/2019    Procedure: COLONOSCOPY;  Surgeon: Kan Ramsey III, MD;  Location: [facility] ENDO;  Service: Endoscopy;  Laterality: N/A;    COLONOSCOPY N/A 9/22/2022    Procedure: COLONOSCOPY;  Surgeon: Chris Garcia MD;  Location: [facility] ENDO;  Service: Endoscopy;  Laterality: N/A;    CYSTOSCOPY N/A 8/30/2024    Procedure: CYSTOSCOPY;  Surgeon: Compa Hensley MD;  Location: [facility] OR [address] [place] [address];  Service: Urology;   Laterality: N/A;  with blue light    CYSTOSCOPY N/A 5/30/2025    Procedure: CYSTOSCOPY;  Surgeon: Compa Hensley MD;  Location: [facility] OR 1ST FLR;  Service: Urology;  Laterality: N/A;    CYSTOSCOPY W/ RETROGRADES Bilateral 7/16/2024    Procedure: CYSTOSCOPY, WITH RETROGRADE PYELOGRAM;  Surgeon: Vance Olivera MD;  Location: [facility];  Service: Urology;  Laterality: Bilateral;    CYSTOSCOPY WITH BIOPSY OF BLADDER N/A 4/4/2025    Procedure: CYSTOSCOPY, WITH BLADDER BIOPSY, WITH FULGURATION;  Surgeon: Compa Hensley MD;  [facility];  Service: Urology;  Laterality: N/A;    CYSTOSCOPY,WITH URETERAL CATHETER INSERTION Bilateral 10/8/2024    Procedure: CYSTOSCOPY,WITH URETERAL CATHETER INSERTION;  Surgeon: Compa Hensley MD;  Location: [facility] OR [address] [place] [address];  Service: Urology;  Laterality: Bilateral;    ESOPHAGOGASTRODUODENOSCOPY N/A 9/13/2019    Procedure: ESOPHAGOGASTRODUODENOSCOPY (EGD);  Surgeon: Kan Ramsey III, MD;  Location: [facility];  Service: Endoscopy;  Laterality: N/A;    ESOPHAGOGASTRODUODENOSCOPY N/A 9/3/2021    Procedure: EGD (ESOPHAGOGASTRODUODENOSCOPY);  Surgeon: Kaylene Pineda MD;  Location: [facility];  Service: Endoscopy;  Laterality: N/A;    ESOPHAGOGASTRODUODENOSCOPY N/A 5/24/2023    Procedure: EGD (ESOPHAGOGASTRODUODENOSCOPY);  Surgeon: Cory Bennett MD;  Location: [facility];  Service: Endoscopy;  Laterality: N/A;    FLEXIBLE CYSTOSCOPY N/A 10/8/2024    Procedure: CYSTOSCOPY, FLEXIBLE;  Surgeon: Compa Hensley MD;  [facility];  Service: Urology;  Laterality: N/A;    INSERTION OF TYMPANOSTOMY TUBE Right 3/15/2023    Procedure: INSERTION, TYMPANOSTOMY TUBE;  Surgeon: Jose L Gudino MD;  [facility];  Service: ENT;  Laterality: Right;    INSTILLATION OF URINARY BLADDER N/A 10/8/2024    Procedure: INSTILLATION, BLADDER;  Surgeon: Compa Hensley MD;  Location: [facility] OR [address] [place] [address];  Service: Urology;  Laterality: N/A;  Chemotherapy instillation bladder     JOINT REPLACEMENT      KNEE CARTILAGE SURGERY Bilateral     LYMPHADENECTOMY, PELVIS Right 10/8/2024    Procedure: LYMPHADENECTOMY, PELVIS;  Surgeon: Compa Hensley MD;  Location: [facility] OR [address] [place] [address];  Service: Urology;  Laterality: Right;    NASAL SINUS SURGERY      PARTIAL SURGICAL REMOVAL OF BLADDER N/A 10/8/2024    Procedure: CYSTECTOMY, PARTIAL;  Surgeon: Compa Hensley MD;  Location: [facility] OR [address] [place] [address];  Service: Urology;  Laterality: N/A;    SHOULDER ARTHROSCOPY Right     TONSILLECTOMY      TOTAL KNEE ARTHROPLASTY Left 05/15/2017    TRANSURETHRAL RESECTION OF PROSTATE      TURBT (TRANSURETHRAL RESECTION OF BLADDER TUMOR) N/A 7/16/2024    Procedure: TURBT (TRANSURETHRAL RESECTION OF BLADDER TUMOR);  Surgeon: Vance Olivera MD;  Location: [facility];  Service: Urology;  Laterality: N/A;    TURBT (TRANSURETHRAL RESECTION OF BLADDER TUMOR) N/A 5/30/2025    Procedure: TURBT (TRANSURETHRAL RESECTION OF BLADDER TUMOR);  Surgeon: Compa Hensley MD;  Location: [address];  Service: Urology;  Laterality: N/A;    TURBT, WITH BLUE LIGHT CYSTOSCOPY AND CYSVIEW N/A 4/4/2025    Procedure: TURBT,WITH BLUE LIGHT CYSTOSCOPY AND CYSVIEW;  Surgeon: Compa Hensley MD;  Location: [address];  Service: Urology;  Laterality: N/A;    TYMPANOPLASTY      TYMPANOPLASTY WITH MASTOIDECTOMY Left 3/15/2023    Procedure: TYMPANOPLASTY, WITH MASTOIDECTOMY;  Surgeon: Jose L Gudino MD;  Location: [address];  Service: ENT;  Laterality: Left;    vesectomy       Family History   Problem Relation Name Age of Onset    Arthritis Mother      Hypertension Mother      Heart disease Father      Hypertension Father      Stroke Father      Diabetes Son      Diabetes Maternal Grandmother      Colon cancer Paternal Grandmother       Social History[1]    Allergies:  Aspirin, Meperidine, and Niacin    Medications:  Current Medications[2]    PHYSICAL EXAMINATION:  Physical Exam  Vitals and nursing note reviewed.   Constitutional:       General: He is  awake.      Appearance: Normal appearance.   HENT:      Head: Normocephalic.      Right Ear: External ear normal.      Left Ear: External ear normal.      Nose: Nose normal.   Cardiovascular:      Rate and Rhythm: Normal rate.   Pulmonary:      Effort: Pulmonary effort is normal. No respiratory distress.   Abdominal:      Tenderness: There is no abdominal tenderness. There is no right CVA tenderness or left CVA tenderness.   Genitourinary:     Penis: Normal.       Testes: Normal.   Musculoskeletal:         General: Normal range of motion.      Cervical back: Normal range of motion.   Skin:     General: Skin is warm and dry.   Neurological:      General: No focal deficit present.      Mental Status: He is alert and oriented to person, place, and time.   Psychiatric:         Mood and Affect: Mood normal.         Behavior: Behavior is cooperative.       LABS:  In office UA today was clear of active infection and visible blood.     Lab Results   Component Value Date    PSA 1.4 06/25/2020    PSA 2.3 12/30/2013    PSA 1.15 02/11/2013    PSADIAG 0.19 06/21/2024    PSADIAG 0.33 08/11/2023    PSADIAG 0.67 08/23/2022     Lab Results   Component Value Date    CREATININE 1.3 05/13/2025    EGFRNORACEVR 58 (L) 05/13/2025     IMPRESSION:  Encounter Diagnoses   Name Primary?    Malignant neoplasm of lateral wall of urinary bladder Yes     Induction BCG; dose 5 of 5  Assessment:       1. Malignant neoplasm of lateral wall of urinary bladder        Plan:         I spent a total of 30 minutes on the day of the visit. This includes face to face time and non-face to face time preparing to see the patient (eg, review of tests), obtaining and/or reviewing separately obtained history, documenting clinical information in the electronic or other health record, independently interpreting results and communicating results to the patient/family/caregiver, or care coordinator  10 minutes pre counseling including last installation  10 minutes  "prep, travel to get medication & installation of the BCG  10 minutes post installation instructions and room clean up  We addressed his UA  Education and recommendations of today's plan of care with the BCG Bladder Installations; including home remedies and needed follow up with PCP.   BCG dose 5 of 5 was instilled using sterile and BCG precautions.  Tolerated well   We discussed his Bladder Cancer; discussed after TURBT; cancer was removed.  Discussed BCG and the expectations, benefits, risks, as well as importance of post clean up for 6 hours after the 2 hour urination.  They was given detailed written instructions as well.   Diet modifications; no caffeine the morning of installation; increase water intake at the 2 hour void to flush out.  No sex for 48 hours after installation; use of condom during the 5 week installations.  Cystoscopy ordered and scheduled with Dr. Hensley on 9/8.     The visit today is associated with current or anticipated ongoing medical care related to this patient's single serious condition/complex condition.            [1]   Social History  Socioeconomic History    Marital status:      Spouse name: SAUL    Number of children: 2   Occupational History    Occupation: retired- [employer] .   Tobacco Use    Smoking status: Never    Smokeless tobacco: Never   Substance and Sexual Activity    Alcohol use: Yes     Comment: " once a month"    Drug use: No    Sexual activity: Yes     Partners: Female   Social History Narrative     . Lives with spouse. Has 2 children. Patient retired from [employer]. His son has type 1 diabetes.     Social Drivers of Health     Financial Resource Strain: Low Risk  (3/31/2025)    Overall Financial Resource Strain (CARDIA)     Difficulty of Paying Living Expenses: Not hard at all   Food Insecurity: No Food Insecurity (3/31/2025)    Hunger Vital Sign     Worried About Running Out of Food in the Last Year: Never true     Ran Out of Food " "in the Last Year: Never true   Transportation Needs: No Transportation Needs (3/31/2025)    PRAPARE - Transportation     Lack of Transportation (Medical): No     Lack of Transportation (Non-Medical): No   Physical Activity: Inactive (3/31/2025)    Exercise Vital Sign     Days of Exercise per Week: 0 days     Minutes of Exercise per Session: 0 min   Stress: No Stress Concern Present (3/31/2025)    [employer] - Occupational Stress Questionnaire     Feeling of Stress : Not at all   Housing Stability: Low Risk  (3/31/2025)    Housing Stability Vital Sign     Unable to Pay for Housing in the Last Year: No     Number of Times Moved in the Last Year: 0     Homeless in the Last Year: No   [2]   Current Outpatient Medications:     ACETAMINOPHEN (TYLENOL 8 HOUR ORAL), Take by mouth as needed., Disp: , Rfl:     blood sugar diagnostic (ACCU-CHEK JAXON) Strp, Check BG 2-3 times daily. Accuchek jaxon strips, Disp: 300 strip, Rfl: 3    dorzolamide-timolol 2-0.5% (COSOPT) 22.3-6.8 mg/mL ophthalmic solution, Place 1 drop into both eyes 2 (two) times daily., Disp: 10 mL, Rfl: 5    DROPLET PEN NEEDLE 31 gauge x 3/16" Ndle, USE AS DIRECTED  WITH  INSULIN, Disp: 200 each, Rfl: 3    dutasteride (AVODART) 0.5 mg capsule, Take 1 capsule (0.5 mg total) by mouth once daily., Disp: 90 capsule, Rfl: 3    famotidine (PEPCID) 20 MG tablet, Take 1 tablet (20 mg total) by mouth 2 (two) times daily., Disp: 180 tablet, Rfl: 3    fenofibrate micronized (LOFIBRA) 200 MG Cap, Take 1 capsule (200 mg total) by mouth every evening., Disp: 90 capsule, Rfl: 3    gabapentin (CMPD PAIN MANAGEMENT COMPOUND OINTMNENT THREE), Apply bid prn pain, Disp: 100 g, Rfl: 11    hydrocortisone 2.5 % cream, Apply topically 2 (two) times daily., Disp: 3.5 g, Rfl: 5    insulin glargine U-100, Lantus, (LANTUS SOLOSTAR U-100 INSULIN) 100 unit/mL (3 mL) InPn pen, 16 units in the AM and 4 units in the PM, Disp: 3 mL, Rfl: 11    latanoprost 0.005 % " ophthalmic solution, INSTILL 1 DROP INTO BOTH EYES ONE TIME DAILY, Disp: 7.5 mL, Rfl: 3    levocetirizine (XYZAL) 5 MG tablet, Take 5 mg by mouth every evening., Disp: , Rfl:     loratadine (CLARITIN) 10 mg tablet, Take 10 mg by mouth once daily., Disp: , Rfl:     losartan (COZAAR) 25 MG tablet, Take 1 tablet (25 mg total) by mouth once daily., Disp: 90 tablet, Rfl: 3    lovastatin (MEVACOR) 40 MG tablet, Take 1 tablet by mouth Every evening., Disp: 90 tablet, Rfl: 3    multivitamin capsule, Take 1 capsule by mouth once daily., Disp: , Rfl:     olopatadine-mometasone 665-25 mcg/spray Spry, 2 sprays by Nasal route 2 (two) times daily., Disp: 29 g, Rfl: 11    oxyCODONE (ROXICODONE) 5 MG immediate release tablet, Take 1 tablet (5 mg total) by mouth every 4 (four) hours as needed for Pain., Disp: 5 tablet, Rfl: 0    pantoprazole (PROTONIX) 40 MG tablet, Take 1 tablet (40 mg total) by mouth once daily., Disp: 90 tablet, Rfl: 1    polyethylene glycol (GLYCOLAX) 17 gram/dose powder, Use to cap to measure 17g, mix with liquid, and take by mouth once daily., Disp: 510 g, Rfl: 0    semaglutide (OZEMPIC) 1 mg/dose (4 mg/3 mL), Inject 1 mg into the skin every 7 days., Disp: 9 mL, Rfl: 4    sildenafil (REVATIO) 20 mg Tab, Take 1 tablet (20 mg total) by mouth daily as needed (PRN). Takes prn, Disp: 30 tablet, Rfl: 11    sodium chloride (SALINE NASAL) 0.65 % nasal spray, 2 sprays by Nasal route as needed for Congestion., Disp: 44 mL, Rfl: 0    triamcinolone acetonide 0.1% (KENALOG) 0.1 % cream, AAA bid, Disp: 454 g, Rfl: 0

## 2025-07-29 ENCOUNTER — PATIENT MESSAGE (OUTPATIENT)
Dept: UROLOGY | Facility: CLINIC | Age: 75
End: 2025-07-29
Payer: MEDICARE

## 2025-07-29 DIAGNOSIS — N40.1 BPH WITH OBSTRUCTION/LOWER URINARY TRACT SYMPTOMS: Primary | ICD-10-CM

## 2025-07-29 DIAGNOSIS — N13.8 BPH WITH OBSTRUCTION/LOWER URINARY TRACT SYMPTOMS: Primary | ICD-10-CM

## 2025-07-31 ENCOUNTER — PATIENT MESSAGE (OUTPATIENT)
Dept: ALLERGY | Facility: CLINIC | Age: 75
End: 2025-07-31
Payer: MEDICARE

## 2025-07-31 ENCOUNTER — LAB VISIT (OUTPATIENT)
Dept: LAB | Facility: HOSPITAL | Age: 75
End: 2025-07-31
Attending: UROLOGY
Payer: MEDICARE

## 2025-07-31 DIAGNOSIS — N40.1 BPH WITH OBSTRUCTION/LOWER URINARY TRACT SYMPTOMS: ICD-10-CM

## 2025-07-31 DIAGNOSIS — N13.8 BPH WITH OBSTRUCTION/LOWER URINARY TRACT SYMPTOMS: ICD-10-CM

## 2025-07-31 LAB — PSA SERPL-MCNC: 0.93 NG/ML

## 2025-07-31 PROCEDURE — 36415 COLL VENOUS BLD VENIPUNCTURE: CPT | Mod: HCNC,PO

## 2025-07-31 PROCEDURE — 84153 ASSAY OF PSA TOTAL: CPT | Mod: HCNC

## 2025-08-01 ENCOUNTER — OFFICE VISIT (OUTPATIENT)
Dept: OTOLARYNGOLOGY | Facility: CLINIC | Age: 75
End: 2025-08-01
Payer: MEDICARE

## 2025-08-01 VITALS — WEIGHT: 188.5 LBS | BODY MASS INDEX: 26.29 KG/M2

## 2025-08-01 DIAGNOSIS — N40.1 BENIGN PROSTATIC HYPERPLASIA (BPH) WITH STRAINING ON URINATION: ICD-10-CM

## 2025-08-01 DIAGNOSIS — J01.90 ACUTE NON-RECURRENT SINUSITIS, UNSPECIFIED LOCATION: Primary | ICD-10-CM

## 2025-08-01 DIAGNOSIS — R39.16 BENIGN PROSTATIC HYPERPLASIA (BPH) WITH STRAINING ON URINATION: ICD-10-CM

## 2025-08-01 DIAGNOSIS — K21.9 LARYNGOPHARYNGEAL REFLUX (LPR): ICD-10-CM

## 2025-08-01 PROCEDURE — 99999 PR PBB SHADOW E&M-EST. PATIENT-LVL III: CPT | Mod: PBBFAC,HCNC,,

## 2025-08-01 RX ORDER — AMOXICILLIN AND CLAVULANATE POTASSIUM 875; 125 MG/1; MG/1
1 TABLET, FILM COATED ORAL 2 TIMES DAILY
Qty: 20 TABLET | Refills: 0 | Status: SHIPPED | OUTPATIENT
Start: 2025-08-01 | End: 2025-08-11

## 2025-08-01 RX ORDER — DUTASTERIDE 0.5 MG/1
0.5 CAPSULE, LIQUID FILLED ORAL DAILY
Qty: 90 CAPSULE | Refills: 3 | Status: SHIPPED | OUTPATIENT
Start: 2025-08-01

## 2025-08-01 RX ORDER — PANTOPRAZOLE SODIUM 40 MG/1
40 TABLET, DELAYED RELEASE ORAL 2 TIMES DAILY
Qty: 60 TABLET | Refills: 0 | Status: SHIPPED | OUTPATIENT
Start: 2025-08-01 | End: 2025-08-31

## 2025-08-01 RX ORDER — METHYLPREDNISOLONE 4 MG/1
TABLET ORAL
Qty: 1 EACH | Refills: 0 | Status: SHIPPED | OUTPATIENT
Start: 2025-08-01

## 2025-08-01 NOTE — PROGRESS NOTES
Subjective:   Patient ID: Rigoberto Vasquez is a 75 y.o. male.    Chief Complaint: Sinus Problem (Pt is coming in today for a sinus infection ongoing for a couple of weeks pt states that the mucus going down the back of his throat is making him choke and cough)    History of Present Illness    Patient presents today for sinus problems with postnasal drip. He reports sinus symptoms that began at the start of the year with thick postnasal drip causing a choking sensation. Drainage is partially green/yellow, primarily tracking down the back of the throat. Denies fever or ear pain. He experiences facial pressure without pain and has to frequently swallow to manage drainage. He attributes breathing difficulties to possible allergic response and ongoing sinus problems.  He takes protonix 40 mg every morning & pepcid in the evening. States he suffers from indigestion.  A previous endoscopy was normal. He has diabetes requiring insulin and has been on Ozempic for a few years. He has a history of two prior sinus surgeries. He currently uses nasal rinse morning and night, and Ryaltris prescribed by allergy.        Review of patient's allergies indicates:   Allergen Reactions    Aspirin      Stomach pain even with ppi    Meperidine      Other reaction(s): Stomach upset    Niacin      Other reaction(s): hot skin           Review of Systems   HENT:  Positive for congestion, postnasal drip, rhinorrhea and sinus pressure. Negative for ear discharge, ear pain and sinus pain.          Objective:   Wt 85.5 kg (188 lb 7.9 oz)   BMI 26.29 kg/m²     Physical Exam  Constitutional:       General: He is not in acute distress.     Appearance: Normal appearance. He is not ill-appearing.   HENT:      Head: Normocephalic and atraumatic.      Right Ear: Ear canal and external ear normal. Tympanic membrane is perforated (not new).      Left Ear: Tympanic membrane, ear canal and external ear normal.      Nose: Congestion present.      Right Sinus:  Maxillary sinus tenderness present.      Left Sinus: Maxillary sinus tenderness present.      Mouth/Throat:      Mouth: Mucous membranes are moist.      Pharynx: Oropharynx is clear.   Eyes:      Extraocular Movements: Extraocular movements intact.      Conjunctiva/sclera: Conjunctivae normal.      Pupils: Pupils are equal, round, and reactive to light.   Pulmonary:      Effort: Pulmonary effort is normal.   Musculoskeletal:         General: Normal range of motion.      Cervical back: Normal range of motion.   Neurological:      General: No focal deficit present.      Mental Status: He is alert and oriented to person, place, and time.   Psychiatric:         Mood and Affect: Mood normal.         Behavior: Behavior normal. Behavior is cooperative.         Thought Content: Thought content normal.         Judgment: Judgment normal.          Reviewed previous ENT notes    Assessment/Plan:     1. Acute non-recurrent sinusitis, unspecified location    2. Laryngopharyngeal reflux (LPR)          Acute non-recurrent sinusitis, unspecified location  -     amoxicillin-clavulanate 875-125mg (AUGMENTIN) 875-125 mg per tablet; Take 1 tablet by mouth 2 (two) times daily. for 10 days  Dispense: 20 tablet; Refill: 0  -     methylPREDNISolone (MEDROL DOSEPACK) 4 mg tablet; use as directed  Dispense: 1 each; Refill: 0    Laryngopharyngeal reflux (LPR)    Other orders  -     pantoprazole (PROTONIX) 40 MG tablet; Take 1 tablet (40 mg total) by mouth 2 (two) times daily.  Dispense: 60 tablet; Refill: 0        Assessment & Plan    ACUTE NON-RECURRENT SINUSITIS, UNSPECIFIED LOCATION:  - Suspect possible sinus infection   - Considered sinus XR but opted for empiric treatment first   - Started Augmentin Started Medrol Dosepak with warning about potential glucose elevation (decided initially against steroids due to diabetes but ultimately prescribed due to patient preference. He meets with diabetes specialist next week)   -Contact PCP if  glucose gets too high while on Medrol Dosepak   - Follow up with diabetic nurse next week   - Follow up in 2-3 weeks to reassess symptoms    LARYNGOPHARYNGEAL REFLUX (LPR):  - Suspect uncontrolled reflux   - Recognized potential exacerbation of reflux symptoms due to use of Ozempic (semaglutide)   - Explained how reflux can affect the back of the throat causing a feeling of drainage, and can irritate the back of the nose leading to congestion  - Educated on lifestyle factors that can worsen reflux: avoid lying down soon after eating, limit alcohol, chocolate, and coffee   - Discontinued Pepcid until recheck  - Increased Protonix to twice daily (morning and night) for 3-4 weeks   - Follow up in 2-3 weeks to reassess symptoms    PLAN SUMMARY:  - Discontinued Pepcid until recheck  - Increased Protonix to twice daily (morning and night)  - Started Augmentin  - Started Medrol Dosepak with warning about potential glucose elevation  - Contact PCP if glucose gets too high while on Medrol Dosepak  - Follow up with diabetic nurse next week  - Follow up in 2-3 weeks to reassess reflux and sinus symptoms. Will plan for sinus XR/CT sinus and/or evaluation with ENT MD for scope.          This note was generated with the assistance of ambient listening technology. Verbal consent was obtained by the patient and accompanying visitor(s) for the recording of patient appointment to facilitate this note. I attest to having reviewed and edited the generated note for accuracy, though some syntax or spelling errors may persist. Please contact the author of this note for any clarification.     I spent a total of 30 minutes on the day of the visit.  This includes face to face time and non-face to face time preparing to see the patient (eg, review of tests), obtaining and/or reviewing separately obtained history, documenting clinical information in the electronic or other health record, independently interpreting results and communicating  results to the patient/family/caregiver, or care coordinator.

## 2025-08-01 NOTE — PATIENT INSTRUCTIONS
Stop pepcid   Increased protonix to twice a day (once in the morning and once in the evening) for 3-4 weeks.

## 2025-08-05 ENCOUNTER — TELEPHONE (OUTPATIENT)
Dept: DIABETES | Facility: CLINIC | Age: 75
End: 2025-08-05

## 2025-08-05 ENCOUNTER — OFFICE VISIT (OUTPATIENT)
Dept: DIABETES | Facility: CLINIC | Age: 75
End: 2025-08-05
Payer: MEDICARE

## 2025-08-05 VITALS
BODY MASS INDEX: 26.35 KG/M2 | WEIGHT: 188.94 LBS | HEART RATE: 72 BPM | DIASTOLIC BLOOD PRESSURE: 76 MMHG | SYSTOLIC BLOOD PRESSURE: 110 MMHG

## 2025-08-05 DIAGNOSIS — E11.59 HYPERTENSION ASSOCIATED WITH DIABETES: Chronic | ICD-10-CM

## 2025-08-05 DIAGNOSIS — E11.22 TYPE 2 DIABETES MELLITUS WITH STAGE 3A CHRONIC KIDNEY DISEASE, WITH LONG-TERM CURRENT USE OF INSULIN: Primary | ICD-10-CM

## 2025-08-05 DIAGNOSIS — E11.69 HYPERLIPIDEMIA ASSOCIATED WITH TYPE 2 DIABETES MELLITUS: Chronic | ICD-10-CM

## 2025-08-05 DIAGNOSIS — Z79.4 TYPE 2 DIABETES MELLITUS WITH STAGE 3A CHRONIC KIDNEY DISEASE, WITH LONG-TERM CURRENT USE OF INSULIN: Primary | ICD-10-CM

## 2025-08-05 DIAGNOSIS — E78.5 HYPERLIPIDEMIA ASSOCIATED WITH TYPE 2 DIABETES MELLITUS: Chronic | ICD-10-CM

## 2025-08-05 DIAGNOSIS — I15.2 HYPERTENSION ASSOCIATED WITH DIABETES: Chronic | ICD-10-CM

## 2025-08-05 DIAGNOSIS — N18.31 TYPE 2 DIABETES MELLITUS WITH STAGE 3A CHRONIC KIDNEY DISEASE, WITH LONG-TERM CURRENT USE OF INSULIN: Primary | ICD-10-CM

## 2025-08-05 LAB — GLUCOSE SERPL-MCNC: 191 MG/DL (ref 70–110)

## 2025-08-05 PROCEDURE — 95251 CONT GLUC MNTR ANALYSIS I&R: CPT | Mod: HCNC,S$GLB,, | Performed by: NURSE PRACTITIONER

## 2025-08-05 PROCEDURE — G2211 COMPLEX E/M VISIT ADD ON: HCPCS | Mod: HCNC,S$GLB,, | Performed by: NURSE PRACTITIONER

## 2025-08-05 PROCEDURE — 3044F HG A1C LEVEL LT 7.0%: CPT | Mod: CPTII,HCNC,S$GLB, | Performed by: NURSE PRACTITIONER

## 2025-08-05 PROCEDURE — 1160F RVW MEDS BY RX/DR IN RCRD: CPT | Mod: CPTII,HCNC,S$GLB, | Performed by: NURSE PRACTITIONER

## 2025-08-05 PROCEDURE — 3078F DIAST BP <80 MM HG: CPT | Mod: CPTII,HCNC,S$GLB, | Performed by: NURSE PRACTITIONER

## 2025-08-05 PROCEDURE — 3288F FALL RISK ASSESSMENT DOCD: CPT | Mod: CPTII,HCNC,S$GLB, | Performed by: NURSE PRACTITIONER

## 2025-08-05 PROCEDURE — 82962 GLUCOSE BLOOD TEST: CPT | Mod: HCNC,S$GLB,, | Performed by: NURSE PRACTITIONER

## 2025-08-05 PROCEDURE — 3074F SYST BP LT 130 MM HG: CPT | Mod: CPTII,HCNC,S$GLB, | Performed by: NURSE PRACTITIONER

## 2025-08-05 PROCEDURE — 4010F ACE/ARB THERAPY RXD/TAKEN: CPT | Mod: CPTII,HCNC,S$GLB, | Performed by: NURSE PRACTITIONER

## 2025-08-05 PROCEDURE — 3008F BODY MASS INDEX DOCD: CPT | Mod: CPTII,HCNC,S$GLB, | Performed by: NURSE PRACTITIONER

## 2025-08-05 PROCEDURE — 99214 OFFICE O/P EST MOD 30 MIN: CPT | Mod: HCNC,S$GLB,, | Performed by: NURSE PRACTITIONER

## 2025-08-05 PROCEDURE — 1101F PT FALLS ASSESS-DOCD LE1/YR: CPT | Mod: CPTII,HCNC,S$GLB, | Performed by: NURSE PRACTITIONER

## 2025-08-05 PROCEDURE — 99999 PR PBB SHADOW E&M-EST. PATIENT-LVL V: CPT | Mod: PBBFAC,HCNC,, | Performed by: NURSE PRACTITIONER

## 2025-08-05 PROCEDURE — 3061F NEG MICROALBUMINURIA REV: CPT | Mod: CPTII,HCNC,S$GLB, | Performed by: NURSE PRACTITIONER

## 2025-08-05 PROCEDURE — 3066F NEPHROPATHY DOC TX: CPT | Mod: CPTII,HCNC,S$GLB, | Performed by: NURSE PRACTITIONER

## 2025-08-05 PROCEDURE — 1126F AMNT PAIN NOTED NONE PRSNT: CPT | Mod: CPTII,HCNC,S$GLB, | Performed by: NURSE PRACTITIONER

## 2025-08-05 PROCEDURE — 1159F MED LIST DOCD IN RCRD: CPT | Mod: CPTII,HCNC,S$GLB, | Performed by: NURSE PRACTITIONER

## 2025-08-05 NOTE — PROGRESS NOTES
Patient ID: Rigoberto Vasquez is a 75 y.o. male.  Patient's current PCP is Jesse Grimes MD.   Collaborating Physician: JULIO Galvin MD    Chief Complaint: Diabetes Mellitus  HPI    Rigoberto Vasquez is a 75 y.o. White male presenting for a follow up for diabetes.       Patient has been diagnosed with type 2 diabetes for > 10 years.  Complications related to diabetes: nephropathy  Recent diabetes related hospitalizations: none  Previous diabetes education:yes, here    Current diet: Eating 3 meals per day. Decreased appetite and portion sizes. Generally healthy choices - lower red meat and fried foods. Increasing vegetables. Using [employer].   Current weight: 188 on 8/5/25  Weight at LOV: 192 on 6/10/25  Weight at FOV: 203 on 8/29/24    Activity level: Has not been able to return to regular exercise consistently yet due to illness or procedures    Changes made at last visit:  -     Increase Lantus to 22 units in am and continue 4 units in pm  -     Consider increasing Ozempic to 2 mg after being on 1 mg consistently as appropriate    Current issues: Further procedures for bladder cancer. Completed recent BCG treatments. C/o being tired. Constipation controlled with Miralax, metamucil and extra fiber. Recent issues with sinuses. Saw ENT and given antibiotics and steroid dose pack    Son has T1DM since age 10.     Personal history of pancreatitis: denies  Personal history of abdominal surgery: denies  Personal history of thyroid surgery: denies  Family history of pancreatic cancer in first-degree relative: denies  Family history of MTC/MEN/endocrine tumors: denies     Past Medical History:   Diagnosis Date    Acid reflux     [facility]    Angina pectoris     Back pain     BPH (benign prostatic hyperplasia)     blue    Cholesteatoma     Class 2 severe obesity due to excess calories with serious comorbidity and body mass index (BMI) of 37.0 to 37.9 in adult 11/11/2015    Degenerative joint disease     Diabetes mellitus  "type 2 with complications 3/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      Social History     Socioeconomic History    Marital status:      Spouse name: SAUL    Number of children: 2   Occupational History    Occupation: retired- [employer] .   Tobacco Use    Smoking status: Never    Smokeless tobacco: Never   Substance and Sexual Activity    Alcohol use: Yes     Comment: " once a month"    Drug use: No    Sexual activity: Yes     Partners: Female   Social History Narrative     . Lives with spouse. Has 2 children. Patient retired from [employer]. His son has type 1 diabetes.     Social Drivers of Health     Financial Resource Strain: Low Risk  (3/31/2025)    Overall Financial Resource Strain (CARDIA)     Difficulty of Paying Living Expenses: Not hard at all   Food Insecurity: No Food Insecurity (3/31/2025)    Hunger Vital Sign     Worried About Running Out of Food in the Last Year: Never true     Ran Out of Food in the Last Year: Never true   Transportation Needs: No Transportation Needs (3/31/2025)    PRAPARE - Transportation     Lack of Transportation (Medical): No     Lack of Transportation (Non-Medical): No   Physical Activity: Inactive (3/31/2025)    Exercise Vital Sign     Days of Exercise per Week: 0 days     Minutes of Exercise per Session: 0 min   Stress: No Stress Concern Present (3/31/2025)    [employer] [place] [employer] - Occupational Stress " Questionnaire     Feeling of Stress : Not at all   Housing Stability: Low Risk  (3/31/2025)    Housing Stability Vital Sign     Unable to Pay for Housing in the Last Year: No     Number of Times Moved in the Last Year: 0     Homeless in the Last Year: No       Review of patient's allergies indicates:   Allergen Reactions    Aspirin      Stomach pain even with ppi    Meperidine      Other reaction(s): Stomach upset    Niacin      Other reaction(s): hot skin       CURRENT DM MEDICATIONS:   Diabetes Medications               insulin (LANTUS SOLOSTAR U-100 INSULIN) glargine 100 units/mL SubQ pen 24 units in the AM and 4 units in the PM    semaglutide (OZEMPIC) 1 mg/dose (4 mg/3 mL) Inject 1 mg into the skin every 7 days.      Past failed treatment(s) include:   Pioglitazone - bladder CA/good control. Recently stopped  Victoza - formulary change/constipation    Meter/cgm: meter/Dexcom G7  Blood glucose testing is performed regularly.   Patient is testing continuously times per day.  Any episodes of hypoglycemia? Occasional 60s - not symptomatic  Glucose trends:   Per CGM download, for the last 13 days:    Average glucose of 165 mg/dL. Patient is 62% in range. 35% of readings are mildly elevated with 3% of readings > 250. 0% hypoglycemia. SD 45 mg/dL. Estimated GMI 7.3%. Glycemic control is stable - Rising during the day with slight increase after meals. Worse with use of steroids        His blood sugar in the clinic today was:   Lab Results   Component Value Date    POCGLU 191 (A) 08/05/2025       Statin: Taking  ACE/ARB: Taking    Labs reviewed and are noted below.    Screening or Prevention Patient's value Goal Complete/Controlled?   HgA1C Testing and Control   Lab Results   Component Value Date    HGBA1C 6.9 (H) 02/13/2025      Annually/Less than 8% Yes   Lipid profile : 02/13/2025 Annually Yes   LDL control Lab Results   Component Value Date    LDLCALC 67.2 02/13/2025    Annually/Less than 100 mg/dl  Yes  "  Nephropathy screening Lab Results   Component Value Date    MICALBCREAT 10.4 02/13/2025     Lab Results   Component Value Date    PROTEINUA Negative 05/16/2025    Annually Yes   Blood pressure BP Readings from Last 1 Encounters:   08/05/25 110/76    Less than 140/90 Yes   Dilated retinal exam : 02/04/2025 Scheduled for next Tuesday - Dr Reza Annually Yes    Foot exam   : 02/20/2025 Annually Yes     Glucose   Date Value Ref Range Status   04/04/2025 126 (H) 70 - 110 mg/dL Final   02/13/2025 143 (H) 70 - 110 mg/dL Final     Anion Gap   Date Value Ref Range Status   04/04/2025 9 8 - 16 mmol/L Final     eGFR if    Date Value Ref Range Status   06/23/2022 >60.0 >60 mL/min/1.73 m^2 Final     eGFR if non    Date Value Ref Range Status   06/23/2022 55.3 (A) >60 mL/min/1.73 m^2 Final     Comment:     Calculation used to obtain the estimated glomerular filtration  rate (eGFR) is the CKD-EPI equation.        Lab Results   Component Value Date    TSH 1.837 12/27/2021     No results found for: "CPEPTIDE"  No results found for: "GLUTAMICACID"    Wt Readings from Last 3 Encounters:   08/05/25 1347 85.7 kg (188 lb 15 oz)   08/01/25 0937 85.5 kg (188 lb 7.9 oz)   07/28/25 0810 86.6 kg (190 lb 14.7 oz)       Review of Systems   Constitutional:  Positive for malaise/fatigue. Negative for weight loss.   Eyes:  Negative for blurred vision and double vision.   Respiratory:  Negative for shortness of breath.    Cardiovascular: Negative.    Gastrointestinal:  Positive for constipation.   Genitourinary:  Negative for frequency.   Musculoskeletal:  Negative for myalgias.   Neurological: Negative.    Psychiatric/Behavioral: Negative.         Physical Exam  Vitals reviewed.   Constitutional:       Appearance: Normal appearance. He is normal weight.   Eyes:      Conjunctiva/sclera: Conjunctivae normal.      Pupils: Pupils are equal, round, and reactive to light.   Cardiovascular:      Rate and Rhythm: Normal " rate and regular rhythm.      Pulses: Normal pulses.      Heart sounds: Normal heart sounds.   Pulmonary:      Effort: Pulmonary effort is normal.      Breath sounds: Normal breath sounds.   Abdominal:      General: Bowel sounds are normal.      Palpations: Abdomen is soft.   Musculoskeletal:      Cervical back: Normal range of motion and neck supple.      Right lower leg: No edema.      Left lower leg: No edema.   Skin:     General: Skin is warm and dry.      Comments: Sites without hypertrophy and/or infection   Neurological:      General: No focal deficit present.      Mental Status: He is alert and oriented to person, place, and time.   Psychiatric:         Mood and Affect: Mood normal.         Behavior: Behavior normal.           Assessment & Plan      Type 2 diabetes mellitus with stage 3a chronic kidney disease, with long-term current use of insulin  -     POCT Glucose, Hand-Held Device  -     Increase Lantus to 26 units in am if highs continue  -     Consider increasing Ozempic to 2 mg weekly. Patient declines at this time  -      Lab next week with pcp    Hyperlipidemia associated with type 2 diabetes mellitus - on statin and fenofibrate    Hypertension associated with diabetes - at goal on ARB        - Reviewed with patient:  The basic pathophysiology of Type 2 diabetes  Mechanism of action and action time of medications  Use of home glucose monitor/cgm  Basic diet/carbohydrate counting/avoiding simple sugars/plate method  Proper hydration   Risk of complications and preventive measures  When to call for assistance  Call for bs < 80 or > 180 consistently      - Follow up: 4 months    Visit today included increased complexity associated with the care of the episodic problem fluctuating blood sugars addressed and managing the longitudinal care of the patient due to the serious and/or complex managed problem(s) T2DM.

## 2025-08-13 RX ORDER — LOVASTATIN 40 MG/1
TABLET ORAL
Qty: 90 TABLET | Refills: 4 | Status: SHIPPED | OUTPATIENT
Start: 2025-08-13

## 2025-08-20 ENCOUNTER — PATIENT OUTREACH (OUTPATIENT)
Dept: ADMINISTRATIVE | Facility: HOSPITAL | Age: 75
End: 2025-08-20
Payer: MEDICARE

## 2025-08-20 ENCOUNTER — OFFICE VISIT (OUTPATIENT)
Dept: OTOLARYNGOLOGY | Facility: CLINIC | Age: 75
End: 2025-08-20
Payer: MEDICARE

## 2025-08-20 DIAGNOSIS — J32.9 CHRONIC SINUSITIS, UNSPECIFIED LOCATION: Primary | ICD-10-CM

## 2025-08-20 DIAGNOSIS — Z98.890 HX OF TYMPANOMASTOIDECTOMY: ICD-10-CM

## 2025-08-20 DIAGNOSIS — H72.91 PERFORATION OF RIGHT TYMPANIC MEMBRANE: ICD-10-CM

## 2025-08-20 DIAGNOSIS — H61.23 BILATERAL IMPACTED CERUMEN: ICD-10-CM

## 2025-08-20 PROCEDURE — 99999 PR PBB SHADOW E&M-EST. PATIENT-LVL III: CPT | Mod: PBBFAC,HCNC,, | Performed by: PHYSICIAN ASSISTANT

## 2025-08-21 ENCOUNTER — HOSPITAL ENCOUNTER (OUTPATIENT)
Dept: RADIOLOGY | Facility: HOSPITAL | Age: 75
Discharge: HOME | End: 2025-08-21
Attending: PHYSICIAN ASSISTANT
Payer: MEDICARE

## 2025-08-21 DIAGNOSIS — J32.9 CHRONIC SINUSITIS, UNSPECIFIED LOCATION: ICD-10-CM

## 2025-08-21 PROCEDURE — 70486 CT MAXILLOFACIAL W/O DYE: CPT | Mod: 26,HCNC,, | Performed by: RADIOLOGY

## 2025-08-21 PROCEDURE — 70486 CT MAXILLOFACIAL W/O DYE: CPT | Mod: TC,HCNC,PO

## 2025-08-22 ENCOUNTER — OFFICE VISIT (OUTPATIENT)
Dept: OTOLARYNGOLOGY | Facility: CLINIC | Age: 75
End: 2025-08-22
Payer: MEDICARE

## 2025-08-22 VITALS — BODY MASS INDEX: 26.61 KG/M2 | WEIGHT: 190.06 LBS | HEIGHT: 71 IN

## 2025-08-22 DIAGNOSIS — K21.9 LARYNGOPHARYNGEAL REFLUX (LPR): ICD-10-CM

## 2025-08-22 DIAGNOSIS — G47.33 OSA ON CPAP: ICD-10-CM

## 2025-08-22 DIAGNOSIS — J32.9 CHRONIC SINUSITIS, UNSPECIFIED LOCATION: Primary | ICD-10-CM

## 2025-08-22 PROCEDURE — 99999 PR PBB SHADOW E&M-EST. PATIENT-LVL III: CPT | Mod: PBBFAC,HCNC,,

## 2025-08-28 ENCOUNTER — OFFICE VISIT (OUTPATIENT)
Dept: INTERNAL MEDICINE | Facility: CLINIC | Age: 75
End: 2025-08-28
Payer: MEDICARE

## 2025-08-28 VITALS
SYSTOLIC BLOOD PRESSURE: 110 MMHG | DIASTOLIC BLOOD PRESSURE: 74 MMHG | WEIGHT: 189.81 LBS | OXYGEN SATURATION: 97 % | HEART RATE: 72 BPM | HEIGHT: 71 IN | BODY MASS INDEX: 26.57 KG/M2 | TEMPERATURE: 98 F

## 2025-08-28 DIAGNOSIS — Z79.4 TYPE 2 DIABETES MELLITUS WITH STAGE 3A CHRONIC KIDNEY DISEASE, WITH LONG-TERM CURRENT USE OF INSULIN: Primary | ICD-10-CM

## 2025-08-28 DIAGNOSIS — Z95.0 PACEMAKER: ICD-10-CM

## 2025-08-28 DIAGNOSIS — Z85.51 HISTORY OF BLADDER CANCER: ICD-10-CM

## 2025-08-28 DIAGNOSIS — N18.31 TYPE 2 DIABETES MELLITUS WITH STAGE 3A CHRONIC KIDNEY DISEASE, WITH LONG-TERM CURRENT USE OF INSULIN: Primary | ICD-10-CM

## 2025-08-28 DIAGNOSIS — E11.22 TYPE 2 DIABETES MELLITUS WITH STAGE 3A CHRONIC KIDNEY DISEASE, WITH LONG-TERM CURRENT USE OF INSULIN: Primary | ICD-10-CM

## 2025-08-28 DIAGNOSIS — E78.5 HYPERLIPIDEMIA ASSOCIATED WITH TYPE 2 DIABETES MELLITUS: ICD-10-CM

## 2025-08-28 DIAGNOSIS — E11.59 HYPERTENSION ASSOCIATED WITH DIABETES: ICD-10-CM

## 2025-08-28 DIAGNOSIS — I10 ESSENTIAL HYPERTENSION: ICD-10-CM

## 2025-08-28 DIAGNOSIS — G47.33 OSA ON CPAP: ICD-10-CM

## 2025-08-28 DIAGNOSIS — I15.2 HYPERTENSION ASSOCIATED WITH DIABETES: ICD-10-CM

## 2025-08-28 DIAGNOSIS — E11.69 HYPERLIPIDEMIA ASSOCIATED WITH TYPE 2 DIABETES MELLITUS: ICD-10-CM

## 2025-08-28 PROCEDURE — 3061F NEG MICROALBUMINURIA REV: CPT | Mod: CPTII,HCNC,S$GLB, | Performed by: FAMILY MEDICINE

## 2025-08-28 PROCEDURE — 1101F PT FALLS ASSESS-DOCD LE1/YR: CPT | Mod: CPTII,HCNC,S$GLB, | Performed by: FAMILY MEDICINE

## 2025-08-28 PROCEDURE — 3066F NEPHROPATHY DOC TX: CPT | Mod: CPTII,HCNC,S$GLB, | Performed by: FAMILY MEDICINE

## 2025-08-28 PROCEDURE — 99214 OFFICE O/P EST MOD 30 MIN: CPT | Mod: HCNC,S$GLB,, | Performed by: FAMILY MEDICINE

## 2025-08-28 PROCEDURE — 3044F HG A1C LEVEL LT 7.0%: CPT | Mod: CPTII,HCNC,S$GLB, | Performed by: FAMILY MEDICINE

## 2025-08-28 PROCEDURE — 1159F MED LIST DOCD IN RCRD: CPT | Mod: CPTII,HCNC,S$GLB, | Performed by: FAMILY MEDICINE

## 2025-08-28 PROCEDURE — 4010F ACE/ARB THERAPY RXD/TAKEN: CPT | Mod: CPTII,HCNC,S$GLB, | Performed by: FAMILY MEDICINE

## 2025-08-28 PROCEDURE — 3078F DIAST BP <80 MM HG: CPT | Mod: CPTII,HCNC,S$GLB, | Performed by: FAMILY MEDICINE

## 2025-08-28 PROCEDURE — 3074F SYST BP LT 130 MM HG: CPT | Mod: CPTII,HCNC,S$GLB, | Performed by: FAMILY MEDICINE

## 2025-08-28 PROCEDURE — G2211 COMPLEX E/M VISIT ADD ON: HCPCS | Mod: HCNC,S$GLB,, | Performed by: FAMILY MEDICINE

## 2025-08-28 PROCEDURE — 1126F AMNT PAIN NOTED NONE PRSNT: CPT | Mod: CPTII,HCNC,S$GLB, | Performed by: FAMILY MEDICINE

## 2025-08-28 PROCEDURE — 99999 PR PBB SHADOW E&M-EST. PATIENT-LVL IV: CPT | Mod: PBBFAC,HCNC,, | Performed by: FAMILY MEDICINE

## 2025-08-28 PROCEDURE — 3288F FALL RISK ASSESSMENT DOCD: CPT | Mod: CPTII,HCNC,S$GLB, | Performed by: FAMILY MEDICINE

## 2025-08-28 PROCEDURE — 3008F BODY MASS INDEX DOCD: CPT | Mod: CPTII,HCNC,S$GLB, | Performed by: FAMILY MEDICINE

## 2025-08-28 RX ORDER — SEMAGLUTIDE 1.34 MG/ML
1 INJECTION, SOLUTION SUBCUTANEOUS
Start: 2025-08-28 | End: 2025-08-29

## 2025-08-29 ENCOUNTER — OFFICE VISIT (OUTPATIENT)
Dept: OTOLARYNGOLOGY | Facility: CLINIC | Age: 75
End: 2025-08-29
Payer: MEDICARE

## 2025-08-29 ENCOUNTER — PATIENT MESSAGE (OUTPATIENT)
Dept: DIABETES | Facility: CLINIC | Age: 75
End: 2025-08-29
Payer: MEDICARE

## 2025-08-29 VITALS — WEIGHT: 192.88 LBS | BODY MASS INDEX: 27 KG/M2 | HEIGHT: 71 IN

## 2025-08-29 DIAGNOSIS — J32.9 CHRONIC RHINOSINUSITIS: Primary | ICD-10-CM

## 2025-08-29 DIAGNOSIS — J30.0 VASOMOTOR RHINITIS: ICD-10-CM

## 2025-08-29 DIAGNOSIS — N18.31 TYPE 2 DIABETES MELLITUS WITH STAGE 3A CHRONIC KIDNEY DISEASE, WITH LONG-TERM CURRENT USE OF INSULIN: Primary | ICD-10-CM

## 2025-08-29 DIAGNOSIS — Z98.890 HISTORY OF ENDOSCOPIC SINUS SURGERY: ICD-10-CM

## 2025-08-29 DIAGNOSIS — E11.22 TYPE 2 DIABETES MELLITUS WITH STAGE 3A CHRONIC KIDNEY DISEASE, WITH LONG-TERM CURRENT USE OF INSULIN: Primary | ICD-10-CM

## 2025-08-29 DIAGNOSIS — Z79.4 TYPE 2 DIABETES MELLITUS WITH STAGE 3A CHRONIC KIDNEY DISEASE, WITH LONG-TERM CURRENT USE OF INSULIN: Primary | ICD-10-CM

## 2025-08-29 PROCEDURE — 99999 PR PBB SHADOW E&M-EST. PATIENT-LVL III: CPT | Mod: PBBFAC,HCNC,, | Performed by: STUDENT IN AN ORGANIZED HEALTH CARE EDUCATION/TRAINING PROGRAM

## 2025-08-29 PROCEDURE — 87070 CULTURE OTHR SPECIMN AEROBIC: CPT | Mod: HCNC | Performed by: STUDENT IN AN ORGANIZED HEALTH CARE EDUCATION/TRAINING PROGRAM

## 2025-08-29 RX ORDER — SEMAGLUTIDE 2.68 MG/ML
2 INJECTION, SOLUTION SUBCUTANEOUS
Qty: 9 ML | Refills: 3 | Status: SHIPPED | OUTPATIENT
Start: 2025-08-29

## 2025-08-29 RX ORDER — IPRATROPIUM BROMIDE 21 UG/1
2 SPRAY, METERED NASAL 2 TIMES DAILY
Qty: 30 ML | Refills: 0 | Status: SHIPPED | OUTPATIENT
Start: 2025-08-29

## 2025-09-03 ENCOUNTER — HOSPITAL ENCOUNTER (OUTPATIENT)
Dept: SLEEP MEDICINE | Facility: HOSPITAL | Age: 75
Discharge: HOME | End: 2025-09-03
Attending: INTERNAL MEDICINE
Payer: MEDICARE

## 2025-09-03 ENCOUNTER — TELEPHONE (OUTPATIENT)
Dept: OTOLARYNGOLOGY | Facility: CLINIC | Age: 75
End: 2025-09-03
Payer: MEDICARE

## 2025-09-03 DIAGNOSIS — G47.33 OSA (OBSTRUCTIVE SLEEP APNEA): ICD-10-CM

## 2025-09-03 PROCEDURE — 95811 POLYSOM 6/>YRS CPAP 4/> PARM: CPT | Mod: HCNC

## 2025-09-05 ENCOUNTER — TELEPHONE (OUTPATIENT)
Dept: UROLOGY | Facility: CLINIC | Age: 75
End: 2025-09-05
Payer: MEDICARE

## (undated) DEVICE — BOOT SUTURE AID

## (undated) DEVICE — KIT DRESS GLASSCK EAR ADLT ST

## (undated) DEVICE — BLADE BEVELED GUARISCO

## (undated) DEVICE — APPLICATOR CHLORAPREP ORN 26ML

## (undated) DEVICE — SYR 10CC LUER LOCK

## (undated) DEVICE — SUT PROLENE 2-0 30 SH

## (undated) DEVICE — SUT CTD VICRYL 3-0 PS-2 UND

## (undated) DEVICE — SUT PDS II 5-0 RB-1RB-1 VI

## (undated) DEVICE — CATH THORACIC STRAIGHT 28F

## (undated) DEVICE — DRAPE LAP T SHT W/ INSTR PAD

## (undated) DEVICE — Device

## (undated) DEVICE — SUT PDS II 2-0 CT1

## (undated) DEVICE — SUT CTD VICRYL UND BR CP-1

## (undated) DEVICE — DRAPE CRANIOTOMY T SURG STRL

## (undated) DEVICE — SOL IRRI STRL WATER 1000ML

## (undated) DEVICE — DRAPE UINDERBUT GRAD PCH

## (undated) DEVICE — LOOP VESSEL YELLOW MAXI

## (undated) DEVICE — PACK CYSTO

## (undated) DEVICE — LUBRICANT SURGILUBE 2 OZ

## (undated) DEVICE — KIT SUCTION IRRIGATION

## (undated) DEVICE — SYR 50ML CATH TIP

## (undated) DEVICE — TOWEL OR DISP STRL BLUE 4/PK

## (undated) DEVICE — BLADE SCALP OPHTL RND TIP

## (undated) DEVICE — SOL IRR NACL .9% 3000ML

## (undated) DEVICE — SUT VICRYL PLUS 0 CT1 36IN

## (undated) DEVICE — TRAY CATH 1-LYR URIMTR 16FR

## (undated) DEVICE — TRAY SKIN SCRUB WET 4 COMPART

## (undated) DEVICE — GUIDE WIRE MOTION .035 X 150CM

## (undated) DEVICE — LEGGING CLEAR POLY 2/PACK

## (undated) DEVICE — SPONGE COTTON TRAY 4X4IN

## (undated) DEVICE — DRAPE ABDOMINAL TIBURON 14X11

## (undated) DEVICE — SUT 0 VICRYL / CT-1

## (undated) DEVICE — BOWL STERILE LARGE 32OZ

## (undated) DEVICE — CATH POLLACK OPEN-END FLEXI-TI

## (undated) DEVICE — LOOP VESSEL BLUE MAXI

## (undated) DEVICE — CLIPPER BLADE MOD 4406 (CAREF)

## (undated) DEVICE — STAPLER INT PROX TX 60X3.5MM

## (undated) DEVICE — BLADE SURG #15 CARBON STEEL

## (undated) DEVICE — SUT CTD VICRYL VIL BR UR-6

## (undated) DEVICE — DRESSING ADH ISLAND 3.6 X 14

## (undated) DEVICE — LOOP CUT BIPOLAR 24/26FR YEL

## (undated) DEVICE — UNDERGLOVES BIOGEL PI SZ 7 LF

## (undated) DEVICE — SUT CTD VICRYL VIL BR SH 27

## (undated) DEVICE — SYR LUER LOCK STERILE 10ML

## (undated) DEVICE — NDL BLUNT W/O FILTER 18GX1.5IN

## (undated) DEVICE — NDL 22GA X1 1/2 REG BEVEL

## (undated) DEVICE — TRAY CYSTO BASIN OMC

## (undated) DEVICE — SUT SILK 3-0 SH 18IN BLACK

## (undated) DEVICE — ELECTRODE REM PLYHSV RETURN 9

## (undated) DEVICE — COTTONBALL LG ST

## (undated) DEVICE — SYR IRRIGATION BULB STER 60ML

## (undated) DEVICE — CLIP LIGACLIP XTRA TITANIUM

## (undated) DEVICE — UNDERGLOVES BIOGEL PI SIZE 7.5

## (undated) DEVICE — DRAPE HALF SURGICAL 40X58IN

## (undated) DEVICE — BAG DRAIN ANTI REFLUX 4000ML

## (undated) DEVICE — GAUZE SPONGE XRAY 4X4

## (undated) DEVICE — UROVIEW 2600/2800

## (undated) DEVICE — GOWN POLY REINF X-LONG XL

## (undated) DEVICE — ELECTRODE LOOP CUTTING BIPOLAR

## (undated) DEVICE — BURR PRECISION ROUND 4.0MM

## (undated) DEVICE — DRAPE STERI 32 X 50

## (undated) DEVICE — GOWN SURGICAL X-LARGE

## (undated) DEVICE — CANISTER SUCTION JUMBO 12L

## (undated) DEVICE — BLADE TONGUE DEPRESSOR STRL

## (undated) DEVICE — ADHESIVE DERMABOND ADVANCED

## (undated) DEVICE — SUT VICRYL 3-0 27 SH

## (undated) DEVICE — SYR 30CC LUER LOCK

## (undated) DEVICE — GAUZE SPONGE PEANUT STRL

## (undated) DEVICE — SOL IRR WATER STRL 3000 ML

## (undated) DEVICE — SUT MCRYL PLUS 4-0 PS2 27IN

## (undated) DEVICE — SUT BONE WAX 2.5 GRMS 12/BX

## (undated) DEVICE — CATH RED RUBBER LATEX 14F 16IN

## (undated) DEVICE — SLEEVE ECLIPSE GOWN STRL 23IN

## (undated) DEVICE — DRAPE STERI INSTRUMENT 1018

## (undated) DEVICE — ADAPTER HOSE 10FT 8MM

## (undated) DEVICE — CUTTER PROXIMATE BLUE 75MM

## (undated) DEVICE — GLOVE SIGNATURE ESSNTL LTX 7.5

## (undated) DEVICE — GOWN NONREINF SET-IN SLV 2XL

## (undated) DEVICE — TRAY SKIN SCRUB WET PREMIUM

## (undated) DEVICE — SUT CTD VICRYL 1 UND BR

## (undated) DEVICE — CONTAINER SPECIMEN OR STER 4OZ

## (undated) DEVICE — GLOVE BIOGEL 7.5

## (undated) DEVICE — BLADE OTOLOGY BEAVER 5X84MM

## (undated) DEVICE — DRAPE MICROSCOPE OPMI

## (undated) DEVICE — KIT EAR EAOFE OMC

## (undated) DEVICE — DRAPE T CYSTOSCOPY STERILE

## (undated) DEVICE — SUT ETHILON 2-0 PSLX 30IN

## (undated) DEVICE — PACK CYSTOSCOPY III SIRUS

## (undated) DEVICE — BLADE SCALP BEAVER 2.5MM ANG

## (undated) DEVICE — DRAPE SURGICAL STERI IRRG PCH

## (undated) DEVICE — SUT VICRYL CTD 2-0 GI 27 SH

## (undated) DEVICE — PAD CUSTOM COTTON 2 X 2

## (undated) DEVICE — SUT CHR GUT 3-0 RB-1 27IN

## (undated) DEVICE — CLOSURE SKIN STERI STRIP 1/2X4

## (undated) DEVICE — BAND RUBBER ROYLAN YELLOW

## (undated) DEVICE — SUT SILK 0 STRANDS 30IN BLK

## (undated) DEVICE — SUT 5/0 27IN PDS II VIO MO

## (undated) DEVICE — DRAPE INCISE IOBAN 2 23X17IN

## (undated) DEVICE — NDL BLUNT W/O FILTER 18GX1IN

## (undated) DEVICE — CLIP MED TICALL

## (undated) DEVICE — BLADE SCALP OPTHL NDL TIP 3MM

## (undated) DEVICE — SALINE INTRAVENOUS 1L VITEK2

## (undated) DEVICE — STRIP MEDI WND CLSR 1/2X4IN

## (undated) DEVICE — SUT PDS II O CTX MONO 60

## (undated) DEVICE — INSTRUMENT SURG SUCT FRZR W/C

## (undated) DEVICE — SUCTION SURGICAL FRAZR

## (undated) DEVICE — SYR 3CC LUER LOC

## (undated) DEVICE — BELLOW CANN HEMOBLAST 1.65GR

## (undated) DEVICE — SOL NACL IRR 1000ML BTL

## (undated) DEVICE — BURR ROUND DIAMOND TAPR 1.0MM

## (undated) DEVICE — TRAY GENERAL SURGERY OMC

## (undated) DEVICE — BLADE SURG CARBON STEEL SZ11

## (undated) DEVICE — TIP YANKAUERS BULB NO VENT

## (undated) DEVICE — STAPLER SKIN PROXIMATE WIDE